# Patient Record
Sex: FEMALE | Race: ASIAN | Employment: OTHER | URBAN - METROPOLITAN AREA
[De-identification: names, ages, dates, MRNs, and addresses within clinical notes are randomized per-mention and may not be internally consistent; named-entity substitution may affect disease eponyms.]

---

## 2017-01-04 ENCOUNTER — TELEPHONE (OUTPATIENT)
Dept: INTERNAL MEDICINE CLINIC | Facility: CLINIC | Age: 47
End: 2017-01-04

## 2017-01-09 RX ORDER — INSULIN LISPRO 100 [IU]/ML
INJECTION, SOLUTION INTRAVENOUS; SUBCUTANEOUS
Qty: 15 ML | Refills: 5 | Status: SHIPPED | OUTPATIENT
Start: 2017-01-09 | End: 2017-07-14

## 2017-01-09 NOTE — TELEPHONE ENCOUNTER
LOV 9/26/16. Per [facility] [place] protocol ok to refill x 6 months.

## 2017-01-10 ENCOUNTER — APPOINTMENT (OUTPATIENT)
Dept: LAB | Age: 47
End: 2017-01-10
Attending: INTERNAL MEDICINE
Payer: MEDICAID

## 2017-01-10 ENCOUNTER — OFFICE VISIT (OUTPATIENT)
Dept: PODIATRY CLINIC | Facility: CLINIC | Age: 47
End: 2017-01-10

## 2017-01-10 DIAGNOSIS — M77.32 CALCANEAL SPUR OF FOOT, LEFT: Primary | ICD-10-CM

## 2017-01-10 PROCEDURE — 99213 OFFICE O/P EST LOW 20 MIN: CPT

## 2017-01-10 PROCEDURE — 85060 BLOOD SMEAR INTERPRETATION: CPT | Performed by: INTERNAL MEDICINE

## 2017-01-10 PROCEDURE — 80053 COMPREHEN METABOLIC PANEL: CPT | Performed by: INTERNAL MEDICINE

## 2017-01-10 PROCEDURE — 85025 COMPLETE CBC W/AUTO DIFF WBC: CPT | Performed by: INTERNAL MEDICINE

## 2017-01-10 PROCEDURE — 80061 LIPID PANEL: CPT | Performed by: INTERNAL MEDICINE

## 2017-01-10 PROCEDURE — 36415 COLL VENOUS BLD VENIPUNCTURE: CPT | Performed by: INTERNAL MEDICINE

## 2017-01-10 NOTE — PROGRESS NOTES
HPI:    Patient ID: Jonn Sanchez is a 48year old female. Foot Pain   The pain is present in the left foot and right foot. This is a chronic problem. There has been no history of extremity trauma. The problem occurs intermittently.  The problem has been Prescriptions:  HUMALOG KWIKPEN 100 UNIT/ML Subcutaneous Solution Pen-injector ADMINISTER 15 UNITS UNDER THE SKIN THREE TIMES DAILY WITH MEALS Disp: 15 mL Rfl: 5   Linagliptin (TRADJENTA) 5 MG Oral Tab Take 5 mg by mouth daily.  Disp: 90 tablet Rfl: 0   hyd 325 mg by mouth daily. Disp:  Rfl:    TRUEPLUS LANCETS 33G Does not apply Misc TEST BLOOD GLUCOSE LEVEL FOUR TIMES DAILY Disp: 200 each Rfl: 5   Metoprolol Succinate ER (TOPROL XL) 100 MG Oral Tablet 24 Hr Take 1 tablet (100 mg total) by mouth daily.  Disp: wants. In the meantime, take NSAIDs - aleve or motrin with food. No orders of the defined types were placed in this encounter.        Meds This Visit:  No prescriptions requested or ordered in this encounter    Imaging & Referrals:  None       [facility]#[id number]

## 2017-01-17 ENCOUNTER — TELEPHONE (OUTPATIENT)
Dept: INTERNAL MEDICINE CLINIC | Facility: CLINIC | Age: 47
End: 2017-01-17

## 2017-01-17 RX ORDER — DIPHENHYDRAMINE HCL 25 MG
1 TABLET ORAL AS NEEDED
Qty: 1 KIT | Refills: 0 | Status: SHIPPED | OUTPATIENT
Start: 2017-01-17 | End: 2017-05-10

## 2017-01-17 NOTE — TELEPHONE ENCOUNTER
Rx approved per IM protocol. Chart reviewed.  Supplies sent to the pharmacy    Diabetes Medications  Protocol Criteria:  · Appointment scheduled in the past 6 months or the next 3 months  · A1C < 7.5 in the past 6 months  · Creatinine in the past 12 months

## 2017-01-17 NOTE — TELEPHONE ENCOUNTER
Per sister of pt,  [email or web address] would like pt to be in a True Metric machine and so pt needs a prescription of strips for that machine and also the Lancet. Per sister of pt,  [email or web address] would like pt to have it asap.   Pls send to pt pharmacy on file verified

## 2017-01-20 RX ORDER — GABAPENTIN 300 MG/1
CAPSULE ORAL
Qty: 90 CAPSULE | Refills: 0 | Status: SHIPPED | OUTPATIENT
Start: 2017-01-20 | End: 2017-02-09

## 2017-01-20 NOTE — TELEPHONE ENCOUNTER
Pharmacy following up on medication. Pharmacy state that pt is home bound and they have a delivery service and are trying to get medication to her today. .. please advise

## 2017-01-28 ENCOUNTER — TELEPHONE (OUTPATIENT)
Dept: INTERNAL MEDICINE CLINIC | Facility: CLINIC | Age: 47
End: 2017-01-28

## 2017-01-28 NOTE — TELEPHONE ENCOUNTER
[facility] requesting RX for Atorvastatin     Pharmacy states this was faxed on 01/24 and called and in and was advised nothing in system   Re faxed it on 1/25 and 1/26 the electronic request was not going thru   Patient has been out of medi

## 2017-01-30 RX ORDER — ATORVASTATIN CALCIUM 40 MG/1
40 TABLET, FILM COATED ORAL NIGHTLY
Qty: 90 TABLET | Refills: 0 | Status: SHIPPED | OUTPATIENT
Start: 2017-01-30 | End: 2017-02-13

## 2017-01-30 NOTE — TELEPHONE ENCOUNTER
Pt states she was told her Cholesterol was high on her Last blood test, Pt states she has been out of this med since last month. Advised per Sourav Hernandez She will send 90 day supply and to please keep her Scheduled apt on 2/2017.

## 2017-01-30 NOTE — TELEPHONE ENCOUNTER
Cholesterol Medications  Protocol Criteria:  · Appointment scheduled in the past 12 months or in the next 3 months  · ALT & LDL on file in the past 12 months  · ALT result < 80  · LDL result <130   Recent Visits       Provider Department Primary Dx    1 mo

## 2017-01-30 NOTE — TELEPHONE ENCOUNTER
[facility] pharmacist stated that they had the meter and needles and will get them ready for patient. Pharmacist indicated that did not know why it was not dispensed.      Left message on voicemail to that [facility] had the prescriptions from 1/17/1

## 2017-02-02 ENCOUNTER — OFFICE VISIT (OUTPATIENT)
Dept: ENDOCRINOLOGY CLINIC | Facility: CLINIC | Age: 47
End: 2017-02-02

## 2017-02-02 VITALS
SYSTOLIC BLOOD PRESSURE: 126 MMHG | HEART RATE: 84 BPM | DIASTOLIC BLOOD PRESSURE: 83 MMHG | HEIGHT: 62 IN | WEIGHT: 194 LBS | BODY MASS INDEX: 35.7 KG/M2

## 2017-02-02 DIAGNOSIS — Z79.4 UNCONTROLLED TYPE 2 DIABETES MELLITUS WITH COMPLICATION, WITH LONG-TERM CURRENT USE OF INSULIN (HCC): Primary | ICD-10-CM

## 2017-02-02 DIAGNOSIS — E11.8 UNCONTROLLED TYPE 2 DIABETES MELLITUS WITH COMPLICATION, WITH LONG-TERM CURRENT USE OF INSULIN (HCC): Primary | ICD-10-CM

## 2017-02-02 DIAGNOSIS — E11.65 UNCONTROLLED TYPE 2 DIABETES MELLITUS WITH COMPLICATION, WITH LONG-TERM CURRENT USE OF INSULIN (HCC): Primary | ICD-10-CM

## 2017-02-02 LAB
CARTRIDGE LOT#: NORMAL NUMERIC
GLUCOSE BLOOD: 168
HEMOGLOBIN A1C: 8 % (ref 4.3–5.6)
TEST STRIP LOT #: NORMAL NUMERIC

## 2017-02-02 PROCEDURE — 36416 COLLJ CAPILLARY BLOOD SPEC: CPT | Performed by: INTERNAL MEDICINE

## 2017-02-02 PROCEDURE — 82962 GLUCOSE BLOOD TEST: CPT | Performed by: INTERNAL MEDICINE

## 2017-02-02 PROCEDURE — 99212 OFFICE O/P EST SF 10 MIN: CPT | Performed by: INTERNAL MEDICINE

## 2017-02-02 PROCEDURE — 83036 HEMOGLOBIN GLYCOSYLATED A1C: CPT | Performed by: INTERNAL MEDICINE

## 2017-02-02 PROCEDURE — 99213 OFFICE O/P EST LOW 20 MIN: CPT | Performed by: INTERNAL MEDICINE

## 2017-02-02 NOTE — PROGRESS NOTES
Name: Rosy Lebron  Date: 2/2/2017    Referring Physician: No ref. provider found    HISTORY OF PRESENT ILLNESS   Rosy Lebron is a 48year old female who presents for diabetes mellitus, diagnosed over 10 years ago.   She was seen during hospitalization in HUMALOG KWIKPEN 100 UNIT/ML Subcutaneous Solution Pen-injector, ADMINISTER 15 UNITS UNDER THE SKIN THREE TIMES DAILY WITH MEALS, Disp: 15 mL, Rfl: 5  •  Linagliptin (TRADJENTA) 5 MG Oral Tab, Take 5 mg by mouth daily. , Disp: 90 tablet, Rfl: 0  •  hydrochlo 30 tablet, Rfl: 6  •  Glucose Blood (TRUETRACK TEST) In Vitro Strip, Test blood glucose level 4 times per day, Disp: 200 each, Rfl: 3  •  Blood Glucose Monitoring Suppl (TRUETRACK BLOOD GLUCOSE) Does not apply Device, Use as directed, Disp: 1 Device, Rfl: EXAM  /83 mmHg  Pulse 84  Ht 5' 2\" (1.575 m)  Wt 194 lb (87.998 kg)  BMI 35.47 kg/m2    General Appearance:  alert, well developed, in no acute distress  Eyes:  normal conjunctivae, sclera. , normal sclera and normal pupils  Ears/Nose/Mouth/Throat/Ne

## 2017-02-02 NOTE — PATIENT INSTRUCTIONS
Levemir 50 units SQ daily    Humalog  INSULIN SLIDING SCALE  Base Values  Breakfast: 15  Lunch: 15  Dinner: 15  Ranges:  80-99: -2  100-119: 0  120-139: 0  140-159: 1  160-179: 1  180-199: 2  200-219: 2  220-239: 3  240-259: 3  260-279: 4  280-299: 4  300-

## 2017-02-06 NOTE — TELEPHONE ENCOUNTER
Per pt, she is out of med Gabapentin, told pt that an rx was sent to her pharmacy last 01/20/2017 for 90 days supplies but pt stts that EV increased her dose to twice a day NOT once a day. Pls advise.       Current Outpatient Prescriptions:        Farrukh Soni

## 2017-02-10 RX ORDER — GABAPENTIN 300 MG/1
CAPSULE ORAL
Qty: 90 CAPSULE | Refills: 0 | Status: SHIPPED | OUTPATIENT
Start: 2017-02-10 | End: 2017-02-13

## 2017-02-13 ENCOUNTER — LAB ENCOUNTER (OUTPATIENT)
Dept: LAB | Age: 47
End: 2017-02-13
Attending: INTERNAL MEDICINE
Payer: MEDICAID

## 2017-02-13 ENCOUNTER — OFFICE VISIT (OUTPATIENT)
Dept: INTERNAL MEDICINE CLINIC | Facility: CLINIC | Age: 47
End: 2017-02-13

## 2017-02-13 VITALS
WEIGHT: 192.63 LBS | SYSTOLIC BLOOD PRESSURE: 136 MMHG | RESPIRATION RATE: 18 BRPM | HEIGHT: 62 IN | BODY MASS INDEX: 35.45 KG/M2 | DIASTOLIC BLOOD PRESSURE: 76 MMHG | HEART RATE: 80 BPM

## 2017-02-13 DIAGNOSIS — E11.8 TYPE 2 DIABETES MELLITUS WITH COMPLICATION, WITH LONG-TERM CURRENT USE OF INSULIN (HCC): ICD-10-CM

## 2017-02-13 DIAGNOSIS — Z98.890 HISTORY OF CRANIOTOMY: ICD-10-CM

## 2017-02-13 DIAGNOSIS — Z79.4 TYPE 2 DIABETES MELLITUS WITH COMPLICATION, WITH LONG-TERM CURRENT USE OF INSULIN (HCC): ICD-10-CM

## 2017-02-13 DIAGNOSIS — D64.9 ANEMIA, UNSPECIFIED TYPE: ICD-10-CM

## 2017-02-13 DIAGNOSIS — G44.52 NEW DAILY PERSISTENT HEADACHE: Primary | ICD-10-CM

## 2017-02-13 DIAGNOSIS — I10 ESSENTIAL HYPERTENSION WITH GOAL BLOOD PRESSURE LESS THAN 140/90: ICD-10-CM

## 2017-02-13 LAB
BASOPHILS # BLD: 0.1 K/UL (ref 0–0.2)
BASOPHILS NFR BLD: 0 %
EOSINOPHIL # BLD: 0.3 K/UL (ref 0–0.7)
EOSINOPHIL NFR BLD: 3 %
ERYTHROCYTE [DISTWIDTH] IN BLOOD BY AUTOMATED COUNT: 19.2 % (ref 11–15)
FERRITIN SERPL IA-MCNC: 8 NG/ML (ref 11–307)
HCT VFR BLD AUTO: 33 % (ref 35–48)
HGB BLD-MCNC: 9.9 G/DL (ref 12–16)
IRON SATN MFR SERPL: 6 % (ref 15–50)
IRON SERPL-MCNC: 25 MCG/DL (ref 28–170)
LYMPHOCYTES # BLD: 2 K/UL (ref 1–4)
LYMPHOCYTES NFR BLD: 18 %
MCH RBC QN AUTO: 19.8 PG (ref 27–32)
MCHC RBC AUTO-ENTMCNC: 29.9 G/DL (ref 32–37)
MCV RBC AUTO: 66.1 FL (ref 80–100)
MONOCYTES # BLD: 0.5 K/UL (ref 0–1)
MONOCYTES NFR BLD: 5 %
NEUTROPHILS # BLD AUTO: 8.4 K/UL (ref 1.8–7.7)
NEUTROPHILS NFR BLD: 74 %
PLATELET # BLD AUTO: 299 K/UL (ref 140–400)
PMV BLD AUTO: 10.1 FL (ref 7.4–10.3)
RBC # BLD AUTO: 5 M/UL (ref 3.7–5.4)
TIBC SERPL-MCNC: 426 MCG/DL (ref 228–428)
TRANSFERRIN SERPL-MCNC: 323 MG/DL (ref 192–382)
VIT B12 SERPL-MCNC: 376 PG/ML (ref 181–914)
WBC # BLD AUTO: 11.4 K/UL (ref 4–11)

## 2017-02-13 PROCEDURE — 83540 ASSAY OF IRON: CPT

## 2017-02-13 PROCEDURE — 82607 VITAMIN B-12: CPT

## 2017-02-13 PROCEDURE — 82728 ASSAY OF FERRITIN: CPT

## 2017-02-13 PROCEDURE — 83021 HEMOGLOBIN CHROMOTOGRAPHY: CPT

## 2017-02-13 PROCEDURE — 99214 OFFICE O/P EST MOD 30 MIN: CPT | Performed by: INTERNAL MEDICINE

## 2017-02-13 PROCEDURE — 99212 OFFICE O/P EST SF 10 MIN: CPT | Performed by: INTERNAL MEDICINE

## 2017-02-13 PROCEDURE — 36415 COLL VENOUS BLD VENIPUNCTURE: CPT

## 2017-02-13 PROCEDURE — 84466 ASSAY OF TRANSFERRIN: CPT

## 2017-02-13 PROCEDURE — 85025 COMPLETE CBC W/AUTO DIFF WBC: CPT

## 2017-02-13 PROCEDURE — 83020 HEMOGLOBIN ELECTROPHORESIS: CPT

## 2017-02-13 RX ORDER — FERROUS SULFATE 325(65) MG
325 TABLET ORAL DAILY
Qty: 90 TABLET | Refills: 1 | Status: SHIPPED | OUTPATIENT
Start: 2017-02-13 | End: 2017-02-18

## 2017-02-13 RX ORDER — ACETAMINOPHEN 325 MG/1
TABLET ORAL
Qty: 360 TABLET | Refills: 0 | Status: SHIPPED | OUTPATIENT
Start: 2017-02-13 | End: 2021-04-19

## 2017-02-13 RX ORDER — DOCUSATE SODIUM 100 MG/1
100 CAPSULE, LIQUID FILLED ORAL 2 TIMES DAILY
Qty: 180 CAPSULE | Refills: 3 | Status: SHIPPED | OUTPATIENT
Start: 2017-02-13 | End: 2017-05-10

## 2017-02-13 RX ORDER — ATORVASTATIN CALCIUM 40 MG/1
40 TABLET, FILM COATED ORAL NIGHTLY
Qty: 90 TABLET | Refills: 0 | Status: SHIPPED | OUTPATIENT
Start: 2017-02-13 | End: 2017-05-10

## 2017-02-13 RX ORDER — LEVETIRACETAM 500 MG/1
500 TABLET ORAL 2 TIMES DAILY
Qty: 180 TABLET | Refills: 1 | Status: SHIPPED | OUTPATIENT
Start: 2017-02-13 | End: 2017-09-28

## 2017-02-13 RX ORDER — METOPROLOL SUCCINATE 100 MG/1
100 TABLET, EXTENDED RELEASE ORAL DAILY
Qty: 90 TABLET | Refills: 0 | Status: SHIPPED | OUTPATIENT
Start: 2017-02-13 | End: 2017-05-13

## 2017-02-13 RX ORDER — GABAPENTIN 300 MG/1
CAPSULE ORAL
Qty: 90 CAPSULE | Refills: 0 | Status: SHIPPED | OUTPATIENT
Start: 2017-02-13 | End: 2017-05-10

## 2017-02-13 RX ORDER — HYDROCHLOROTHIAZIDE 25 MG/1
25 TABLET ORAL
Qty: 90 TABLET | Refills: 0 | Status: SHIPPED | OUTPATIENT
Start: 2017-02-13 | End: 2017-06-03

## 2017-02-13 RX ORDER — LOSARTAN POTASSIUM 100 MG/1
100 TABLET ORAL
Qty: 90 TABLET | Refills: 0 | Status: SHIPPED | OUTPATIENT
Start: 2017-02-13 | End: 2017-04-08

## 2017-02-13 NOTE — PROGRESS NOTES
HPI:    Patient ID: Milan Jose is a 48year old female. HPI Comments: She has been getting bad headaches for the past few days. Headache   This is a recurrent problem. The current episode started in the past 7 days. The problem occurs daily.  The pa (two) times daily. Disp: 60 tablet Rfl: 2   Metoprolol Succinate ER 25 MG Oral Tablet 24 Hr Take 1 tablet (25 mg total) by mouth once daily.  Disp: 30 tablet Rfl: 6   Cholecalciferol (VITAMIN D) 2000 UNITS Oral Cap Take 1 capsule (2,000 Units total) by mout Normocephalic and atraumatic. Eyes: Conjunctivae and EOM are normal.   Cardiovascular: Normal rate, regular rhythm and normal heart sounds. Pulmonary/Chest: Effort normal and breath sounds normal. No respiratory distress.    Neurological: She is alert Tab; Take 1 tablet (325 mg total) by mouth daily. Dispense: 90 tablet; Refill: 1  - CBC With Differential With Platelet; Future  - Vitamin B12 [E]; Future  - Ferritin [E]; Future  - Iron And Tibc [E]; Future  - Hemoglobin Electrophoresis [E];  Future

## 2017-02-16 ENCOUNTER — TELEPHONE (OUTPATIENT)
Dept: FAMILY MEDICINE CLINIC | Facility: CLINIC | Age: 47
End: 2017-02-16

## 2017-02-16 DIAGNOSIS — Z98.890 HISTORY OF CRANIOTOMY: Primary | ICD-10-CM

## 2017-02-16 NOTE — TELEPHONE ENCOUNTER
Pt is calling requesting a refill on medication pt state that she is out of medication  Pt is also requesting to speak with a RN      Current Outpatient Prescriptions:  gabapentin 300 MG Oral Cap TAKE 1 CAPSULE(300 MG) BY MOUTH EVERY MORNING Disp: 90 capsu

## 2017-02-17 LAB
HGB A2 MFR BLD: 1.7 % (ref 1.5–3.5)
HGB F MFR BLD: 0 % (ref 0–2)
HGB PNL BLD ELPH: 96.9 % (ref 95.5–100)
HGB S MFR BLD: 0 %

## 2017-02-18 ENCOUNTER — TELEPHONE (OUTPATIENT)
Dept: INTERNAL MEDICINE CLINIC | Facility: CLINIC | Age: 47
End: 2017-02-18

## 2017-02-18 DIAGNOSIS — D64.9 ANEMIA, UNSPECIFIED TYPE: ICD-10-CM

## 2017-02-18 DIAGNOSIS — D50.9 IRON DEFICIENCY ANEMIA, UNSPECIFIED IRON DEFICIENCY ANEMIA TYPE: Primary | ICD-10-CM

## 2017-02-18 RX ORDER — FERROUS SULFATE 325(65) MG
325 TABLET ORAL DAILY
Qty: 90 TABLET | Refills: 3 | Status: SHIPPED | OUTPATIENT
Start: 2017-02-18 | End: 2017-07-07

## 2017-02-18 NOTE — TELEPHONE ENCOUNTER
Call pt. She is anemic and her iron is low. She should take iron and recheck her blood test in 6 months. I am going to refer her to the gastroenterologist to check her colon for possible source of the blood loss.   Sometimes it can be small polyp or raven

## 2017-02-22 RX ORDER — GABAPENTIN 300 MG/1
CAPSULE ORAL
Qty: 90 CAPSULE | Refills: 0 | Status: CANCELLED | OUTPATIENT
Start: 2017-02-22

## 2017-02-22 NOTE — TELEPHONE ENCOUNTER
Spoke with patient's sister who is on HIPPA  (identified name and ), results reviewed and agrees with plan. Referral ordered. Sister also asked that we refill all medication that is needed.    Instructed to have her pharmacy contact us with what is

## 2017-03-24 NOTE — TELEPHONE ENCOUNTER
Julio Back and Diabetic Educator from [employer] on behalf of patient. Coretta Liz if working/monitoring patient. Pt is going out of town and needs test strips. Pt did not meet protocol due to last hgba1c but is being followed by Dr. Diana Tejeda.

## 2017-03-27 RX ORDER — DOCUSATE SODIUM 100 MG/1
CAPSULE, LIQUID FILLED ORAL
Qty: 180 CAPSULE | Refills: 0 | OUTPATIENT
Start: 2017-03-27

## 2017-03-27 RX ORDER — DOCUSATE SODIUM 100 MG/1
CAPSULE, LIQUID FILLED ORAL
Qty: 180 CAPSULE | Refills: 0 | Status: SHIPPED | OUTPATIENT
Start: 2017-03-27 | End: 2017-04-08

## 2017-03-27 NOTE — TELEPHONE ENCOUNTER
Refill Protocol Appointment Criteria: Refilled per protocol    · Appointment scheduled in the past 12 months or in the next 3 months  Recent Visits       Provider Department Primary Dx    1 month ago Alessio Perdomo MD [facility], [facility], [address] [place] [address]

## 2017-04-06 NOTE — TELEPHONE ENCOUNTER
Refill request received for Losartan. Medication is currently prescribed by Dr. Jabari Partida. Forwarded to IM staff.

## 2017-04-08 ENCOUNTER — TELEPHONE (OUTPATIENT)
Dept: INTERNAL MEDICINE CLINIC | Facility: CLINIC | Age: 47
End: 2017-04-08

## 2017-04-08 DIAGNOSIS — I10 ESSENTIAL HYPERTENSION WITH GOAL BLOOD PRESSURE LESS THAN 140/90: Primary | ICD-10-CM

## 2017-04-08 RX ORDER — DOCUSATE SODIUM 100 MG/1
100 CAPSULE, LIQUID FILLED ORAL 2 TIMES DAILY
Qty: 180 CAPSULE | Refills: 0 | Status: SHIPPED | OUTPATIENT
Start: 2017-04-08 | End: 2017-05-10

## 2017-04-08 RX ORDER — LOSARTAN POTASSIUM 100 MG/1
100 TABLET ORAL
Qty: 90 TABLET | Refills: 0 | Status: SHIPPED | OUTPATIENT
Start: 2017-04-08 | End: 2017-09-28

## 2017-04-08 NOTE — TELEPHONE ENCOUNTER
Spoke to patient, she states that she has not received refill on docusate, I will re-fax to the pharmacy, she has not had bowel movement in 2 days, she is trying to strain and it is difficult, she has mild abdominal pain, I advised her to use Dulcolax supp

## 2017-04-10 RX ORDER — LOSARTAN POTASSIUM 100 MG/1
TABLET ORAL
Qty: 90 TABLET | Refills: 1 | OUTPATIENT
Start: 2017-04-10

## 2017-05-02 ENCOUNTER — OFFICE VISIT (OUTPATIENT)
Dept: GASTROENTEROLOGY | Facility: CLINIC | Age: 47
End: 2017-05-02

## 2017-05-02 ENCOUNTER — TELEPHONE (OUTPATIENT)
Dept: ENDOCRINOLOGY CLINIC | Facility: CLINIC | Age: 47
End: 2017-05-02

## 2017-05-02 ENCOUNTER — TELEPHONE (OUTPATIENT)
Dept: GASTROENTEROLOGY | Facility: CLINIC | Age: 47
End: 2017-05-02

## 2017-05-02 VITALS
DIASTOLIC BLOOD PRESSURE: 74 MMHG | SYSTOLIC BLOOD PRESSURE: 131 MMHG | HEIGHT: 61 IN | BODY MASS INDEX: 36.32 KG/M2 | HEART RATE: 71 BPM | WEIGHT: 192.38 LBS

## 2017-05-02 DIAGNOSIS — D50.0 IRON DEFICIENCY ANEMIA DUE TO CHRONIC BLOOD LOSS: Primary | ICD-10-CM

## 2017-05-02 DIAGNOSIS — N92.1 MENOMETRORRHAGIA: ICD-10-CM

## 2017-05-02 DIAGNOSIS — Z80.0 FAMILY HISTORY OF COLON CANCER: ICD-10-CM

## 2017-05-02 PROCEDURE — 99244 OFF/OP CNSLTJ NEW/EST MOD 40: CPT | Performed by: INTERNAL MEDICINE

## 2017-05-02 PROCEDURE — 99212 OFFICE O/P EST SF 10 MIN: CPT | Performed by: INTERNAL MEDICINE

## 2017-05-02 RX ORDER — INSULIN DETEMIR 100 [IU]/ML
100 INJECTION, SOLUTION SUBCUTANEOUS DAILY
COMMUNITY
Start: 2017-03-20 | End: 2017-06-02 | Stop reason: ALTCHOICE

## 2017-05-02 NOTE — PROGRESS NOTES
HPI:    Patient ID: Sirisha Willard is a 50year old female. HPI    Review of Systems   Constitutional: Positive for unexpected weight change. Negative for fever, chills, diaphoresis, activity change, appetite change and fatigue.    HENT: Negative for conge 500 MG Oral Tab Take 1 tablet (500 mg total) by mouth 2 (two) times daily. Disp: 180 tablet Rfl: 1   Metoprolol Succinate  MG Oral Tablet 24 Hr Take 1 tablet (100 mg total) by mouth daily.  Disp: 90 tablet Rfl: 0   Atorvastatin Calcium 40 MG Oral Tab MG Oral Tab Take 325 mg by mouth daily. Disp:  Rfl:    LEVEMIR FLEXTOUCH 100 UNIT/ML Subcutaneous Solution Pen-injector Inject 100 Units as directed daily.  54 units once daily before bedtime Disp:  Rfl:    docusate sodium (DOK) 100 MG Oral Cap Take 1 capsu no edema or tenderness. Lymphadenopathy:     She has no cervical adenopathy. Neurological: She is alert and oriented to person, place, and time. Skin: Skin is warm and dry. No rash noted. She is not diaphoretic. No erythema. No pallor.    Psychiatric:

## 2017-05-02 NOTE — PATIENT INSTRUCTIONS
1.  Schedule anappointment with gynecology to discuss your heavy menstrual cycle. This may be the cause of your iron deficiency anemia. 2.  Schedule colonoscopy and EGD with Colyte prep and MAC anesthesia.   Your diabetic medications will need to be adjust

## 2017-05-02 NOTE — H&P
History of present illness: This is a 40-year-old female referred by Dr. Sherin Leung and DR. Cacerse as an evaluation for iron deficiency anemia. The patient has had intermittent heavy menstrual bleeding on and off for a years.   She does not have a gynec Colyte preparation, and MAC anesthesia. The rationale, the risks, benefits, the alternatives, and the miss rate for colonoscopy and EGD have been explained to the patient in detail she is agreeable to proceed.     She should get the lab tests that have Art

## 2017-05-02 NOTE — TELEPHONE ENCOUNTER
Fax received from [place] in [facility] requesting Levemir prescription be changed to lantus due to insurance coverage. Per LOV note patient to continue with lantus 50 units nightly.  Per [facility] protocol OK to substitute prescriptions for levemir and lantus u

## 2017-05-03 ENCOUNTER — TELEPHONE (OUTPATIENT)
Dept: GASTROENTEROLOGY | Facility: CLINIC | Age: 47
End: 2017-05-03

## 2017-05-03 NOTE — TELEPHONE ENCOUNTER
Scheduled for:  Colonoscopy [address] and EGD [address]  Provider Name:  Dr. Mcmullen Busing  Date:  6/22/17  Location:  [facility]  Sedation:  MAC  Time:  0800 (pt is aware to arrive at 0700)  Prep:  Colyte  Meds/Allergies Reconciled?:  Yes  Diagnosis with codes:  Iron deficient a

## 2017-05-03 NOTE — TELEPHONE ENCOUNTER
Dr. Anam Sands patient is schedule for a colonoscopy/EGD (see below), please advise on diabetic orders. Thank you.

## 2017-05-04 NOTE — TELEPHONE ENCOUNTER
I spoke with this patients sister to inform her of Dr. Eduar Gallagher DM orders. She verbally understood and also wrote the instructions down. I will mail a new instructions to her today with these orders.

## 2017-05-04 NOTE — TELEPHONE ENCOUNTER
Please hold humalog, metformin and jardiance on the day of prep. In the morning before colonoscopy decrease levemir to 30 units and no other diabetic medication in the morning before procedure.

## 2017-05-05 ENCOUNTER — TELEPHONE (OUTPATIENT)
Dept: FAMILY MEDICINE CLINIC | Facility: CLINIC | Age: 47
End: 2017-05-05

## 2017-05-05 DIAGNOSIS — I10 ESSENTIAL HYPERTENSION WITH GOAL BLOOD PRESSURE LESS THAN 140/90: Primary | ICD-10-CM

## 2017-05-05 NOTE — TELEPHONE ENCOUNTER
[facility] calling for a refill request for med below  Metoprolol Succinate  MG Oral Tablet 24 Hr 90 tablet 0 2/13/2017       Sig :  Take 1 tablet (100 mg total) by mouth daily.       Route:   Oral       Order #:   [id number]

## 2017-05-10 ENCOUNTER — OFFICE VISIT (OUTPATIENT)
Dept: INTERNAL MEDICINE CLINIC | Facility: CLINIC | Age: 47
End: 2017-05-10

## 2017-05-10 VITALS
TEMPERATURE: 98 F | DIASTOLIC BLOOD PRESSURE: 78 MMHG | RESPIRATION RATE: 20 BRPM | HEIGHT: 61 IN | WEIGHT: 189 LBS | SYSTOLIC BLOOD PRESSURE: 119 MMHG | HEART RATE: 74 BPM | BODY MASS INDEX: 35.68 KG/M2

## 2017-05-10 DIAGNOSIS — N93.9 MENSTRUAL BLEEDING PROBLEM: ICD-10-CM

## 2017-05-10 DIAGNOSIS — Z79.4 TYPE 2 DIABETES MELLITUS WITH COMPLICATION, WITH LONG-TERM CURRENT USE OF INSULIN (HCC): Primary | ICD-10-CM

## 2017-05-10 DIAGNOSIS — Z12.31 SCREENING MAMMOGRAM, ENCOUNTER FOR: ICD-10-CM

## 2017-05-10 DIAGNOSIS — Z98.890 HISTORY OF CRANIOTOMY: ICD-10-CM

## 2017-05-10 DIAGNOSIS — E11.8 TYPE 2 DIABETES MELLITUS WITH COMPLICATION, WITH LONG-TERM CURRENT USE OF INSULIN (HCC): Primary | ICD-10-CM

## 2017-05-10 PROCEDURE — 99214 OFFICE O/P EST MOD 30 MIN: CPT | Performed by: INTERNAL MEDICINE

## 2017-05-10 PROCEDURE — 99212 OFFICE O/P EST SF 10 MIN: CPT | Performed by: INTERNAL MEDICINE

## 2017-05-10 RX ORDER — DIPHENHYDRAMINE HCL 25 MG
1 TABLET ORAL AS NEEDED
Qty: 1 KIT | Refills: 0 | Status: SHIPPED | OUTPATIENT
Start: 2017-05-10 | End: 2018-04-10

## 2017-05-10 RX ORDER — DOCUSATE SODIUM 100 MG/1
100 CAPSULE, LIQUID FILLED ORAL 2 TIMES DAILY
Qty: 180 CAPSULE | Refills: 3 | Status: SHIPPED | OUTPATIENT
Start: 2017-05-10 | End: 2017-09-25

## 2017-05-10 RX ORDER — ATORVASTATIN CALCIUM 40 MG/1
40 TABLET, FILM COATED ORAL NIGHTLY
Qty: 90 TABLET | Refills: 3 | Status: SHIPPED | OUTPATIENT
Start: 2017-05-10 | End: 2017-09-28

## 2017-05-10 RX ORDER — GABAPENTIN 300 MG/1
CAPSULE ORAL
Qty: 90 CAPSULE | Refills: 3 | Status: SHIPPED | OUTPATIENT
Start: 2017-05-10 | End: 2017-06-27

## 2017-05-10 NOTE — PROGRESS NOTES
HPI:    Patient ID: Maria L Camejo is a 50year old female. Diabetes  She presents for her follow-up diabetic visit. Her disease course has been improving.  Pertinent negatives for hypoglycemia include no confusion, dizziness, headaches, nervousness/anxiou Blood Glucose Monitoring Suppl (TRUE METRIX AIR GLUCOSE METER) w/Device Does not apply Kit 1 kit by Does not apply route as needed.  Use as directed Disp: 1 kit Rfl: 0   Glucose Blood (TRUE METRIX BLOOD GLUCOSE TEST) In Vitro Strip Test blood sugar three Does not apply Misc TRUE TRACK LANCETS, OR BRAND AS APPROVED BY INSURANCE. TO BE USED 4 TIMES DAILY Disp: 200 each Rfl: 11   aspirin 325 MG Oral Tab Take 325 mg by mouth daily.  Disp:  Rfl:    Metoprolol Succinate  MG Oral Tablet 24 Hr Take 1 tablet ( monofilament/sensation of both feet is normal.  Pulsation pedal pulse exam of both lower legs/feet is normal as well. Skin: Skin is warm and dry. Psychiatric: She has a normal mood and affect. Her behavior is normal.   Nursing note and vitals reviewed. GLUCOSE TEST) In Vitro Strip 300 strip 11      Sig: Test blood sugar three times daily.            Imaging & Referrals:  OBG - INTERNAL  PODIATRY - EXTERNAL  OPHTHALMOLOGY - INTERNAL  US PELVIS (TRANSABDOMINAL AND TRANSVAGINAL) (CPT=76856/05428)  STEPHEN SCREEN

## 2017-05-11 ENCOUNTER — LAB ENCOUNTER (OUTPATIENT)
Dept: LAB | Age: 47
End: 2017-05-11
Attending: INTERNAL MEDICINE
Payer: MEDICAID

## 2017-05-11 DIAGNOSIS — D50.9 IRON DEFICIENCY ANEMIA, UNSPECIFIED IRON DEFICIENCY ANEMIA TYPE: ICD-10-CM

## 2017-05-11 DIAGNOSIS — Z79.4 TYPE 2 DIABETES MELLITUS WITH COMPLICATION, WITH LONG-TERM CURRENT USE OF INSULIN (HCC): ICD-10-CM

## 2017-05-11 DIAGNOSIS — E11.8 TYPE 2 DIABETES MELLITUS WITH COMPLICATION, WITH LONG-TERM CURRENT USE OF INSULIN (HCC): ICD-10-CM

## 2017-05-11 PROCEDURE — 80061 LIPID PANEL: CPT

## 2017-05-11 PROCEDURE — 85025 COMPLETE CBC W/AUTO DIFF WBC: CPT

## 2017-05-11 PROCEDURE — 84443 ASSAY THYROID STIM HORMONE: CPT

## 2017-05-11 PROCEDURE — 82728 ASSAY OF FERRITIN: CPT

## 2017-05-11 PROCEDURE — 83540 ASSAY OF IRON: CPT

## 2017-05-11 PROCEDURE — 80053 COMPREHEN METABOLIC PANEL: CPT

## 2017-05-11 PROCEDURE — 83036 HEMOGLOBIN GLYCOSYLATED A1C: CPT

## 2017-05-11 PROCEDURE — 82043 UR ALBUMIN QUANTITATIVE: CPT

## 2017-05-11 PROCEDURE — 36415 COLL VENOUS BLD VENIPUNCTURE: CPT

## 2017-05-11 PROCEDURE — 82570 ASSAY OF URINE CREATININE: CPT

## 2017-05-11 PROCEDURE — 84466 ASSAY OF TRANSFERRIN: CPT

## 2017-05-12 RX ORDER — OMEPRAZOLE 40 MG/1
CAPSULE, DELAYED RELEASE ORAL
Qty: 90 CAPSULE | Refills: 0 | Status: ON HOLD | OUTPATIENT
Start: 2017-05-12 | End: 2017-06-29

## 2017-05-12 NOTE — TELEPHONE ENCOUNTER
Refill Protocol Appointment Criteria  · Appointment scheduled in the past 12 months or in the next 3 months  Recent Visits       Provider Department Primary Dx    2 months ago Chip Singh MD [facility], [facility], [address], [place] New daily persistent hea

## 2017-05-13 RX ORDER — METOPROLOL SUCCINATE 100 MG/1
100 TABLET, EXTENDED RELEASE ORAL DAILY
Qty: 90 TABLET | Refills: 0 | Status: ON HOLD | OUTPATIENT
Start: 2017-05-13 | End: 2017-06-29

## 2017-05-13 NOTE — TELEPHONE ENCOUNTER
Hypertensive Medications  Protocol Criteria:  · Appointment scheduled in the past 6 months or in the next 3 months  · BMP or CMP in the past 12 months  · Creatinine result < 2    Future Appointments       Provider Department Appt Notes    In 2 weeks [place],

## 2017-05-15 RX ORDER — OMEPRAZOLE 40 MG/1
CAPSULE, DELAYED RELEASE ORAL
Qty: 30 CAPSULE | Refills: 0 | OUTPATIENT
Start: 2017-05-15

## 2017-05-16 ENCOUNTER — TELEPHONE (OUTPATIENT)
Dept: INTERNAL MEDICINE CLINIC | Facility: CLINIC | Age: 47
End: 2017-05-16

## 2017-05-16 NOTE — TELEPHONE ENCOUNTER
No PA required, spoke with [employer]	[place] and diabetic supplies are going through patient insurance.

## 2017-05-16 NOTE — TELEPHONE ENCOUNTER
Pt's sister maricruz HENNING is needed for:    Blood Glucose Monitoring Suppl (TRUE METRIX AIR GLUCOSE METER) w/Device Does not apply Kit

## 2017-05-17 ENCOUNTER — TELEPHONE (OUTPATIENT)
Dept: INTERNAL MEDICINE CLINIC | Facility: CLINIC | Age: 47
End: 2017-05-17

## 2017-05-17 NOTE — TELEPHONE ENCOUNTER
NATASHA pt has been following with EV. Seen 5/10 and follow up scheduled 08/16/17. EV FYI.   May transfer to  today or [phone]

## 2017-05-17 NOTE — TELEPHONE ENCOUNTER
----- Message from Paola Fall MD sent at 5/13/2017  3:01 PM CDT -----  Send letter: Your anemia improved some, but your iron is still low. I recommend that you continue the iron and schedule a follow-up appointment.

## 2017-05-18 ENCOUNTER — TELEPHONE (OUTPATIENT)
Dept: INTERNAL MEDICINE CLINIC | Facility: CLINIC | Age: 47
End: 2017-05-18

## 2017-05-24 RX ORDER — METOPROLOL SUCCINATE 25 MG/1
TABLET, EXTENDED RELEASE ORAL
Qty: 30 TABLET | Refills: 0 | OUTPATIENT
Start: 2017-05-24

## 2017-05-24 NOTE — TELEPHONE ENCOUNTER
Hypertensive Medications: addressed in an earlier encounter  Protocol Criteria:  · Appointment scheduled in the past 6 months or in the next 3 months  · BMP or CMP in the past 12 months  · Creatinine result < 2  Recent Visits       Provider Department Prim

## 2017-05-29 RX ORDER — PEN NEEDLE, DIABETIC 32GX 5/32"
NEEDLE, DISPOSABLE MISCELLANEOUS
Qty: 100 EACH | Refills: 3 | Status: SHIPPED | OUTPATIENT
Start: 2017-05-29 | End: 2017-09-25

## 2017-05-30 ENCOUNTER — TELEPHONE (OUTPATIENT)
Dept: INTERNAL MEDICINE CLINIC | Facility: CLINIC | Age: 47
End: 2017-05-30

## 2017-05-30 DIAGNOSIS — I10 ESSENTIAL HYPERTENSION WITH GOAL BLOOD PRESSURE LESS THAN 140/90: Primary | ICD-10-CM

## 2017-05-30 NOTE — TELEPHONE ENCOUNTER
Chart reviewed. Refills sent per Triage Dept protocol.    Diabetes Medications  Protocol Criteria:  · Appointment scheduled in the past 6 months or the next 3 months  · A1C < 7.5 in the past 6 months  · Creatinine in the past 12 months  · Creatinine result

## 2017-06-01 ENCOUNTER — TELEPHONE (OUTPATIENT)
Dept: INTERNAL MEDICINE CLINIC | Facility: CLINIC | Age: 47
End: 2017-06-01

## 2017-06-01 NOTE — TELEPHONE ENCOUNTER
On last OV, pt stated that she is taking Metoprolol 25 mg one a day as well as Metoprolol 100 mg one a day. Should pt be taking both strength? Please advise. Thank you.

## 2017-06-01 NOTE — TELEPHONE ENCOUNTER
Pt calling in for a refill rx on med below  Linagliptin (TRADJENTA) 5 MG Oral Tab 90 tablet 0 2/13/2017       Sig :  Take 5 mg by mouth daily.       Route:   Oral       Order #:   [id number]

## 2017-06-02 ENCOUNTER — OFFICE VISIT (OUTPATIENT)
Dept: ENDOCRINOLOGY CLINIC | Facility: CLINIC | Age: 47
End: 2017-06-02

## 2017-06-02 VITALS
WEIGHT: 194 LBS | HEART RATE: 84 BPM | SYSTOLIC BLOOD PRESSURE: 154 MMHG | BODY MASS INDEX: 35.7 KG/M2 | HEIGHT: 62 IN | DIASTOLIC BLOOD PRESSURE: 96 MMHG

## 2017-06-02 DIAGNOSIS — E11.65 UNCONTROLLED TYPE 2 DIABETES MELLITUS WITH HYPERGLYCEMIA, WITH LONG-TERM CURRENT USE OF INSULIN (HCC): Primary | ICD-10-CM

## 2017-06-02 DIAGNOSIS — Z79.4 UNCONTROLLED TYPE 2 DIABETES MELLITUS WITH HYPERGLYCEMIA, WITH LONG-TERM CURRENT USE OF INSULIN (HCC): Primary | ICD-10-CM

## 2017-06-02 PROCEDURE — 36416 COLLJ CAPILLARY BLOOD SPEC: CPT | Performed by: INTERNAL MEDICINE

## 2017-06-02 PROCEDURE — 99212 OFFICE O/P EST SF 10 MIN: CPT | Performed by: INTERNAL MEDICINE

## 2017-06-02 PROCEDURE — 99214 OFFICE O/P EST MOD 30 MIN: CPT | Performed by: INTERNAL MEDICINE

## 2017-06-02 PROCEDURE — 82962 GLUCOSE BLOOD TEST: CPT | Performed by: INTERNAL MEDICINE

## 2017-06-02 NOTE — PROGRESS NOTES
Name: Yaron Acuña  Date: 6/2/2017    Referring Physician: No ref. provider found    HISTORY OF PRESENT ILLNESS   Yaron Acuña is a 50year old female who presents for diabetes mellitus, diagnosed over 10 years ago.   She was seen during hospitalization in kit by Does not apply route as needed. Use as directed, Disp: 1 kit, Rfl: 0  •  Glucose Blood (TRUE METRIX BLOOD GLUCOSE TEST) In Vitro Strip, Test blood sugar three times daily. , Disp: 300 strip, Rfl: 11  •  Insulin Glargine (LANTUS SOLOSTAR) 100 UNIT/ML gabapentin 300 MG Oral Cap, TAKE 1 CAPSULE(300 MG) BY MOUTH EVERY MORNING, Disp: 90 capsule, Rfl: 3  •  Linagliptin (TRADJENTA) 5 MG Oral Tab, Take 5 mg by mouth daily. , Disp: 90 tablet, Rfl: 0     Allergies:     Reglan [Metoclopram*        Comment:Sensiti tenderness  Respiratory:  clear to auscultation bilaterally  Cardiovascular:  regular rate, rhythm, , no murmurs, S3 or S4  Gastrointestinal:  normal bowel sounds and no palpable masses in abdomen, organomegaly or tenderness   Musculoskeletal:  normal musc

## 2017-06-02 NOTE — PATIENT INSTRUCTIONS
Levemir 50 units SQ daily    Continue tradjenta and metformin     Novolog  INSULIN SLIDING SCALE  Base Values  Breakfast: 15  Lunch: 15  Dinner: 18  Ranges:  80-99: -2  100-119: 0  120-139: 0  140-159: 1  160-179: 1  180-199: 2  200-219: 2  220-239: 3  240

## 2017-06-03 RX ORDER — HYDROCHLOROTHIAZIDE 25 MG/1
25 TABLET ORAL
Qty: 90 TABLET | Refills: 0 | Status: ON HOLD | OUTPATIENT
Start: 2017-06-03 | End: 2017-06-29

## 2017-06-06 RX ORDER — METOPROLOL SUCCINATE 25 MG/1
TABLET, EXTENDED RELEASE ORAL
Qty: 30 TABLET | Refills: 0 | OUTPATIENT
Start: 2017-06-06

## 2017-06-06 NOTE — TELEPHONE ENCOUNTER
rx sent 5/13/17  Hypertensive Medications  Protocol Criteria:  · Appointment scheduled in the past 6 months or in the next 3 months  · BMP or CMP in the past 12 months  · Creatinine result < 2  Recent Visits       Provider Department Primary Dx    3 weeks

## 2017-06-08 ENCOUNTER — OFFICE VISIT (OUTPATIENT)
Dept: PODIATRY CLINIC | Facility: CLINIC | Age: 47
End: 2017-06-08

## 2017-06-08 DIAGNOSIS — M72.2 PLANTAR FASCIITIS, BILATERAL: Primary | ICD-10-CM

## 2017-06-08 PROCEDURE — 99213 OFFICE O/P EST LOW 20 MIN: CPT | Performed by: PODIATRIST

## 2017-06-08 PROCEDURE — 99212 OFFICE O/P EST SF 10 MIN: CPT | Performed by: PODIATRIST

## 2017-06-08 NOTE — TELEPHONE ENCOUNTER
Patient called and LMTCB. Left message regarding what dosage of Metoprolol ER she is taking with a 100mg and 25mg both on file.

## 2017-06-08 NOTE — TELEPHONE ENCOUNTER
Rx for Metoprolol 25 mg #30 with 6 RF was called to "[facility] [employer] and given to the pharmacist.

## 2017-06-08 NOTE — TELEPHONE ENCOUNTER
Pt stopped by at [address] [place] [address] location wanting to know why medication has not been refilled would like a call back. Pt stated she has been off her meds for 1mos. Please advise.       Current Outpatient Prescriptions:

## 2017-06-14 ENCOUNTER — OFFICE VISIT (OUTPATIENT)
Dept: OBGYN CLINIC | Facility: CLINIC | Age: 47
End: 2017-06-14

## 2017-06-14 ENCOUNTER — APPOINTMENT (OUTPATIENT)
Dept: LAB | Age: 47
End: 2017-06-14
Attending: OBSTETRICS & GYNECOLOGY
Payer: MEDICAID

## 2017-06-14 VITALS — DIASTOLIC BLOOD PRESSURE: 77 MMHG | HEART RATE: 79 BPM | SYSTOLIC BLOOD PRESSURE: 148 MMHG

## 2017-06-14 DIAGNOSIS — N89.8 VAGINAL DRYNESS: ICD-10-CM

## 2017-06-14 DIAGNOSIS — N89.8 VAGINAL ITCHING: ICD-10-CM

## 2017-06-14 DIAGNOSIS — N93.9 ABNORMAL UTERINE BLEEDING (AUB): ICD-10-CM

## 2017-06-14 DIAGNOSIS — N93.9 ABNORMAL UTERINE BLEEDING (AUB): Primary | ICD-10-CM

## 2017-06-14 LAB
ERYTHROCYTE [DISTWIDTH] IN BLOOD BY AUTOMATED COUNT: 19.5 % (ref 11–15)
ESTRADIOL SERPL-MCNC: 48 PG/ML
FSH SERPL-ACNC: 11.2 MIU/ML
HCT VFR BLD AUTO: 34.1 % (ref 35–48)
HGB BLD-MCNC: 10.6 G/DL (ref 12–16)
MCH RBC QN AUTO: 21.5 PG (ref 27–32)
MCHC RBC AUTO-ENTMCNC: 31 G/DL (ref 32–37)
MCV RBC AUTO: 69.4 FL (ref 80–100)
PLATELET # BLD AUTO: 268 K/UL (ref 140–400)
PMV BLD AUTO: 10 FL (ref 7.4–10.3)
RBC # BLD AUTO: 4.91 M/UL (ref 3.7–5.4)
TSH SERPL-ACNC: 2.51 UIU/ML (ref 0.45–5.33)
WBC # BLD AUTO: 14.3 K/UL (ref 4–11)

## 2017-06-14 PROCEDURE — 83001 ASSAY OF GONADOTROPIN (FSH): CPT

## 2017-06-14 PROCEDURE — 85027 COMPLETE CBC AUTOMATED: CPT

## 2017-06-14 PROCEDURE — 82670 ASSAY OF TOTAL ESTRADIOL: CPT

## 2017-06-14 PROCEDURE — 84443 ASSAY THYROID STIM HORMONE: CPT

## 2017-06-14 PROCEDURE — 36415 COLL VENOUS BLD VENIPUNCTURE: CPT

## 2017-06-14 PROCEDURE — 99204 OFFICE O/P NEW MOD 45 MIN: CPT | Performed by: OBSTETRICS & GYNECOLOGY

## 2017-06-14 RX ORDER — MISOPROSTOL 200 UG/1
TABLET ORAL
Qty: 6 TABLET | Refills: 0 | Status: ON HOLD | OUTPATIENT
Start: 2017-06-14 | End: 2017-06-29 | Stop reason: ALTCHOICE

## 2017-06-14 NOTE — PROGRESS NOTES
Mounika Medeiros is a 50year old female [id number] Patient's last menstrual period was 04/14/2017. Patient presents with:  Gyn Problem: IRREGULAR PERIODS / HEAVY BLEEDING / DRY VAGINA  Patient presents today for abnormal uterine bleeding.  She has always had irre Alcohol Use: No    Drug Use: No    Sexual Activity: Not on file   Not on file  Other Topics Concern   None on file     Social History Narrative       MEDICATIONS:    Current outpatient prescriptions:   •  misoprostol (CYTOTEC) 200 MCG Oral Tab, Take 2 tabl daily., Disp: 90 tablet, Rfl: 3  •  levETIRAcetam 500 MG Oral Tab, Take 1 tablet (500 mg total) by mouth 2 (two) times daily. , Disp: 180 tablet, Rfl: 1  •  acetaminophen 325 MG Oral Tab, 2 tabs 3 times daily. , Disp: 360 tablet, Rfl: 0  •  HUMALOG KWIKPEN 1 and affect    Pelvic Exam:  External Genitalia: normal appearance, hair distribution, and no lesions  Urethral Meatus:  normal in size, location, without lesions and prolapse  Bladder:  No fullness, masses or tenderness  Vagina:  Normal appearance without

## 2017-06-14 NOTE — PATIENT INSTRUCTIONS
Instructions:    Please get your blood drawn at the lab to check blood levels. Please call and schedule your ultrasound to look at your uterus and ovaries. Make an appointment for an endometrial biopsy.  You will need to take two pills called cytotec (s

## 2017-06-17 NOTE — TELEPHONE ENCOUNTER
Patient's sister called regarding planned colonoscopy and EGD on Thursday per myself. They lost the instructions of GoLYTELY prep as well as diabetic changes. GI RN: The sister will stop by on Monday to  instructions.   Hold diabetic medications

## 2017-06-19 ENCOUNTER — TELEPHONE (OUTPATIENT)
Dept: OBGYN CLINIC | Facility: CLINIC | Age: 47
End: 2017-06-19

## 2017-06-19 ENCOUNTER — TELEPHONE (OUTPATIENT)
Dept: GASTROENTEROLOGY | Facility: CLINIC | Age: 47
End: 2017-06-19

## 2017-06-19 NOTE — TELEPHONE ENCOUNTER
----- Message from Jocelin Barrientos MD sent at 6/19/2017  8:09 AM CDT -----  Please let patient know that her vaginal culture was negative

## 2017-06-19 NOTE — TELEPHONE ENCOUNTER
Prep instructions with DM meds orders from Dr. Fish Ly and orders on holding iron 5 days prior to procedure from TE 5/3/17- left at the  for the pt and sister to  today.    LM for patient to inform that her instructions are ready for

## 2017-06-19 NOTE — TELEPHONE ENCOUNTER
Since this patient did not hold her Iron medication we had to reschedule this patients procedure    Rescheduled for:  Colonoscopy [phone] and EGD [id number]  Provider Name:  Dr. Adarsh Schroeder  Date:    From-6/22/17  To-8/25/17  Location:  [facility]  Sedation:  MAC  Time:   From

## 2017-06-19 NOTE — TELEPHONE ENCOUNTER
The patients sister and the patient comes in today to ask questions regarding the Colon/EGD on 6/22/17 and holding the DM meds which I explained the orders given on the prep instructions per Dr. Thee Ge.  Also, regarding holding the Iron pills for 5 days shayna

## 2017-06-20 ENCOUNTER — HOSPITAL ENCOUNTER (OUTPATIENT)
Dept: ULTRASOUND IMAGING | Age: 47
Discharge: HOME | End: 2017-06-20
Attending: OBSTETRICS & GYNECOLOGY
Payer: MEDICAID

## 2017-06-20 DIAGNOSIS — N93.9 ABNORMAL UTERINE BLEEDING (AUB): ICD-10-CM

## 2017-06-20 PROCEDURE — 76830 TRANSVAGINAL US NON-OB: CPT | Performed by: OBSTETRICS & GYNECOLOGY

## 2017-06-20 PROCEDURE — 76856 US EXAM PELVIC COMPLETE: CPT | Performed by: OBSTETRICS & GYNECOLOGY

## 2017-06-22 ENCOUNTER — TELEPHONE (OUTPATIENT)
Dept: OBGYN CLINIC | Facility: CLINIC | Age: 47
End: 2017-06-22

## 2017-06-22 ENCOUNTER — TELEPHONE (OUTPATIENT)
Dept: ORTHOPEDICS CLINIC | Facility: CLINIC | Age: 47
End: 2017-06-22

## 2017-06-22 ENCOUNTER — OFFICE VISIT (OUTPATIENT)
Dept: PODIATRY CLINIC | Facility: CLINIC | Age: 47
End: 2017-06-22

## 2017-06-22 DIAGNOSIS — M72.2 PLANTAR FASCIITIS, BILATERAL: Primary | ICD-10-CM

## 2017-06-22 DIAGNOSIS — B35.3 TINEA PEDIS OF BOTH FEET: ICD-10-CM

## 2017-06-22 PROCEDURE — 99212 OFFICE O/P EST SF 10 MIN: CPT | Performed by: PODIATRIST

## 2017-06-22 PROCEDURE — 99213 OFFICE O/P EST LOW 20 MIN: CPT | Performed by: PODIATRIST

## 2017-06-22 RX ORDER — MELOXICAM 15 MG/1
TABLET ORAL
Qty: 30 TABLET | Refills: 1 | Status: SHIPPED | OUTPATIENT
Start: 2017-06-22 | End: 2017-06-27

## 2017-06-22 NOTE — TELEPHONE ENCOUNTER
Pt stopped at desk and states Dr. Rupinder Stinson suggested a cream to her foot. Discussed with Dr. Rupinder Stinson. Lamisil cream- over the counter to be used once daily. Called it in to the [facility] with her prescription going of Mobic.  Called to pt's home phone

## 2017-06-22 NOTE — PROGRESS NOTES
HPI:    Patient ID: Laureen Bartholomew is a 50year old female. HPI  This 77-year-old female presents for follow-up in reference to bilateral arch and heel pain. She would state that she is not significantly better.   She finds that the support makes a diff UNIT/ML Subcutaneous Solution Pen-injector Inject 50 Units into the skin daily. (Patient taking differently: Inject 50 Units into the skin daily. 50-55 units ) Disp: 45 mL Rfl: 1   losartan 100 MG Oral Tab Take 1 tablet (100 mg total) by mouth once daily. to meloxicam I reviewed the use of the medication and my expectations. She was also given some instruction on the use of Lamisil cream for a minor rash.   Plan follow-up in 2 weeks with consideration for a cortisone injection although patient has had a his

## 2017-06-22 NOTE — TELEPHONE ENCOUNTER
----- Message from Chrystal Ramirez MD sent at 6/21/2017  1:35 PM CDT -----  Please let patient know that her hormone levels show that she is NOT menopausal. She is slightly anemic and needs to be taking iron daily (her anemia is improving though).  She had

## 2017-06-23 NOTE — TELEPHONE ENCOUNTER
pt called following up on foot cream.  States its not at the pharmacy. She states she never rec'd a message from RN. Number verified. She is aware office is closed until Monday. Please advise.

## 2017-06-26 ENCOUNTER — TELEPHONE (OUTPATIENT)
Dept: ENDOCRINOLOGY CLINIC | Facility: CLINIC | Age: 47
End: 2017-06-26

## 2017-06-26 NOTE — TELEPHONE ENCOUNTER
Current Outpatient Prescriptions:  Meloxicam 15 MG Oral Tab Take once daily at dinner Disp: 30 tablet Rfl: 1   misoprostol (CYTOTEC) 200 MCG Oral Tab Take 2 tablets night prior to procedure Disp: 6 tablet Rfl: 0   hydrochlorothiazide 25 MG Oral Tab Take acetaminophen 325 MG Oral Tab 2 tabs 3 times daily.  Disp: 360 tablet Rfl: 0   HUMALOG KWIKPEN 100 UNIT/ML Subcutaneous Solution Pen-injector ADMINISTER 15 UNITS UNDER THE SKIN THREE TIMES DAILY WITH MEALS Disp: 15 mL Rfl: 5   Metoprolol Succinate ER 25 M

## 2017-06-26 NOTE — TELEPHONE ENCOUNTER
Per pharmacy notes, Levemir no longer covered and Lantus preferred. Lantus prescription pended if ok to change. LOV 6/2/2017.

## 2017-06-27 ENCOUNTER — APPOINTMENT (OUTPATIENT)
Dept: GENERAL RADIOLOGY | Facility: HOSPITAL | Age: 47
DRG: 067 | End: 2017-06-27
Attending: EMERGENCY MEDICINE
Payer: COMMERCIAL

## 2017-06-27 ENCOUNTER — OFFICE VISIT (OUTPATIENT)
Dept: INTERNAL MEDICINE CLINIC | Facility: CLINIC | Age: 47
End: 2017-06-27

## 2017-06-27 ENCOUNTER — HOSPITAL ENCOUNTER (INPATIENT)
Facility: HOSPITAL | Age: 47
LOS: 6 days | Discharge: HOME | DRG: 067 | End: 2017-07-03
Attending: EMERGENCY MEDICINE | Admitting: HOSPITALIST
Payer: COMMERCIAL

## 2017-06-27 ENCOUNTER — HOSPITAL ENCOUNTER (OUTPATIENT)
Age: 47
Discharge: ANOTHER HOSPITAL | DRG: 067 | End: 2017-06-27
Payer: COMMERCIAL

## 2017-06-27 ENCOUNTER — APPOINTMENT (OUTPATIENT)
Dept: CT IMAGING | Facility: HOSPITAL | Age: 47
DRG: 067 | End: 2017-06-27
Attending: EMERGENCY MEDICINE
Payer: COMMERCIAL

## 2017-06-27 ENCOUNTER — LAB ENCOUNTER (OUTPATIENT)
Dept: LAB | Age: 47
DRG: 067 | End: 2017-06-27
Attending: INTERNAL MEDICINE
Payer: COMMERCIAL

## 2017-06-27 VITALS
OXYGEN SATURATION: 96 % | BODY MASS INDEX: 35.33 KG/M2 | RESPIRATION RATE: 20 BRPM | TEMPERATURE: 98 F | HEART RATE: 81 BPM | SYSTOLIC BLOOD PRESSURE: 130 MMHG | DIASTOLIC BLOOD PRESSURE: 85 MMHG | WEIGHT: 192 LBS | HEIGHT: 62 IN

## 2017-06-27 VITALS
WEIGHT: 190 LBS | HEART RATE: 74 BPM | DIASTOLIC BLOOD PRESSURE: 70 MMHG | HEIGHT: 62 IN | RESPIRATION RATE: 18 BRPM | OXYGEN SATURATION: 100 % | TEMPERATURE: 97 F | BODY MASS INDEX: 34.96 KG/M2 | SYSTOLIC BLOOD PRESSURE: 144 MMHG

## 2017-06-27 DIAGNOSIS — R73.9 HYPERGLYCEMIA: ICD-10-CM

## 2017-06-27 DIAGNOSIS — R06.00 DOE (DYSPNEA ON EXERTION): ICD-10-CM

## 2017-06-27 DIAGNOSIS — D64.9 ANEMIA, UNSPECIFIED TYPE: ICD-10-CM

## 2017-06-27 DIAGNOSIS — I10 ESSENTIAL HYPERTENSION WITH GOAL BLOOD PRESSURE LESS THAN 140/90: ICD-10-CM

## 2017-06-27 DIAGNOSIS — R41.82 ALTERED MENTAL STATUS, UNSPECIFIED ALTERED MENTAL STATUS TYPE: Primary | ICD-10-CM

## 2017-06-27 DIAGNOSIS — R06.02 SHORTNESS OF BREATH: ICD-10-CM

## 2017-06-27 DIAGNOSIS — E11.8 TYPE 2 DIABETES MELLITUS WITH COMPLICATION, WITH LONG-TERM CURRENT USE OF INSULIN (HCC): ICD-10-CM

## 2017-06-27 DIAGNOSIS — R53.1 WEAKNESS: Primary | ICD-10-CM

## 2017-06-27 DIAGNOSIS — R40.4 TRANSIENT ALTERATION OF AWARENESS: ICD-10-CM

## 2017-06-27 DIAGNOSIS — D64.9 ANEMIA, UNSPECIFIED TYPE: Primary | ICD-10-CM

## 2017-06-27 DIAGNOSIS — Z79.4 TYPE 2 DIABETES MELLITUS WITH COMPLICATION, WITH LONG-TERM CURRENT USE OF INSULIN (HCC): ICD-10-CM

## 2017-06-27 DIAGNOSIS — R41.82 ALTERED MENTAL STATUS, UNSPECIFIED ALTERED MENTAL STATUS TYPE: ICD-10-CM

## 2017-06-27 LAB
ANION GAP SERPL CALC-SCNC: 10 MMOL/L (ref 0–18)
APTT PPP: 31.4 SECONDS (ref 23.2–35.3)
BASOPHILS # BLD: 0.1 K/UL (ref 0–0.2)
BASOPHILS NFR BLD: 1 %
BILIRUB UR QL: NEGATIVE
BUN SERPL-MCNC: 14 MG/DL (ref 8–20)
BUN/CREAT SERPL: 15.7 (ref 10–20)
CALCIUM SERPL-MCNC: 9.4 MG/DL (ref 8.5–10.5)
CHLORIDE SERPL-SCNC: 101 MMOL/L (ref 95–110)
CLARITY UR: CLEAR
CO2 SERPL-SCNC: 27 MMOL/L (ref 22–32)
COLOR UR: COLORLESS
CREAT SERPL-MCNC: 0.89 MG/DL (ref 0.5–1.5)
EOSINOPHIL # BLD: 0.2 K/UL (ref 0–0.7)
EOSINOPHIL NFR BLD: 2 %
ERYTHROCYTE [DISTWIDTH] IN BLOOD BY AUTOMATED COUNT: 19.5 % (ref 11–15)
GLUCOSE BLDC GLUCOMTR-MCNC: 127 MG/DL (ref 70–99)
GLUCOSE BLDC GLUCOMTR-MCNC: 227 MG/DL (ref 70–99)
GLUCOSE SERPL-MCNC: 158 MG/DL (ref 70–99)
HCT VFR BLD AUTO: 34.2 % (ref 35–48)
HGB BLD-MCNC: 10.7 G/DL (ref 12–16)
INR BLD: 1 (ref 0.9–1.2)
KETONES UR-MCNC: NEGATIVE MG/DL
LEUKOCYTE ESTERASE UR QL STRIP.AUTO: NEGATIVE
LYMPHOCYTES # BLD: 3.2 K/UL (ref 1–4)
LYMPHOCYTES NFR BLD: 29 %
MCH RBC QN AUTO: 21.5 PG (ref 27–32)
MCHC RBC AUTO-ENTMCNC: 31.2 G/DL (ref 32–37)
MCV RBC AUTO: 69 FL (ref 80–100)
MONOCYTES # BLD: 0.7 K/UL (ref 0–1)
MONOCYTES NFR BLD: 7 %
NEUTROPHILS # BLD AUTO: 6.7 K/UL (ref 1.8–7.7)
NEUTROPHILS NFR BLD: 61 %
NITRITE UR QL STRIP.AUTO: NEGATIVE
OSMOLALITY UR CALC.SUM OF ELEC: 290 MOSM/KG (ref 275–295)
PH UR: 7 [PH] (ref 5–8)
PLATELET # BLD AUTO: 285 K/UL (ref 140–400)
PMV BLD AUTO: 9.2 FL (ref 7.4–10.3)
POTASSIUM SERPL-SCNC: 3.8 MMOL/L (ref 3.3–5.1)
PROT UR-MCNC: NEGATIVE MG/DL
PROTHROMBIN TIME: 12.7 SECONDS (ref 11.8–14.5)
RBC # BLD AUTO: 4.96 M/UL (ref 3.7–5.4)
RBC #/AREA URNS AUTO: <1 /HPF
SODIUM SERPL-SCNC: 138 MMOL/L (ref 136–144)
SP GR UR STRIP: 1 (ref 1–1.03)
TROPONIN I SERPL-MCNC: 0.03 NG/ML (ref ?–0.03)
UROBILINOGEN UR STRIP-ACNC: <2
VIT C UR-MCNC: NEGATIVE MG/DL
WBC # BLD AUTO: 10.9 K/UL (ref 4–11)
WBC #/AREA URNS AUTO: 1 /HPF

## 2017-06-27 PROCEDURE — 85025 COMPLETE CBC W/AUTO DIFF WBC: CPT

## 2017-06-27 PROCEDURE — 99215 OFFICE O/P EST HI 40 MIN: CPT

## 2017-06-27 PROCEDURE — 82306 VITAMIN D 25 HYDROXY: CPT

## 2017-06-27 PROCEDURE — 82607 VITAMIN B-12: CPT

## 2017-06-27 PROCEDURE — 82728 ASSAY OF FERRITIN: CPT

## 2017-06-27 PROCEDURE — 93005 ELECTROCARDIOGRAM TRACING: CPT

## 2017-06-27 PROCEDURE — 93010 ELECTROCARDIOGRAM REPORT: CPT | Performed by: PHYSICIAN ASSISTANT

## 2017-06-27 PROCEDURE — 99223 1ST HOSP IP/OBS HIGH 75: CPT | Performed by: HOSPITALIST

## 2017-06-27 PROCEDURE — 99214 OFFICE O/P EST MOD 30 MIN: CPT | Performed by: INTERNAL MEDICINE

## 2017-06-27 PROCEDURE — 99254 IP/OBS CNSLTJ NEW/EST MOD 60: CPT | Performed by: OTHER

## 2017-06-27 PROCEDURE — 71010 XR CHEST AP PORTABLE  (CPT=71010): CPT | Performed by: EMERGENCY MEDICINE

## 2017-06-27 PROCEDURE — 82746 ASSAY OF FOLIC ACID SERUM: CPT

## 2017-06-27 PROCEDURE — 70450 CT HEAD/BRAIN W/O DYE: CPT | Performed by: EMERGENCY MEDICINE

## 2017-06-27 PROCEDURE — 82962 GLUCOSE BLOOD TEST: CPT

## 2017-06-27 PROCEDURE — 36415 COLL VENOUS BLD VENIPUNCTURE: CPT

## 2017-06-27 PROCEDURE — 99212 OFFICE O/P EST SF 10 MIN: CPT | Performed by: INTERNAL MEDICINE

## 2017-06-27 RX ORDER — HYDROCHLOROTHIAZIDE 25 MG/1
25 TABLET ORAL
Status: DISCONTINUED | OUTPATIENT
Start: 2017-06-27 | End: 2017-07-03

## 2017-06-27 RX ORDER — LEVETIRACETAM 500 MG/1
500 TABLET ORAL 2 TIMES DAILY
Status: DISCONTINUED | OUTPATIENT
Start: 2017-06-27 | End: 2017-07-03

## 2017-06-27 RX ORDER — DEXTROSE MONOHYDRATE 25 G/50ML
50 INJECTION, SOLUTION INTRAVENOUS AS NEEDED
Status: DISCONTINUED | OUTPATIENT
Start: 2017-06-27 | End: 2017-07-03

## 2017-06-27 RX ORDER — ACETAMINOPHEN 325 MG/1
650 TABLET ORAL EVERY 6 HOURS PRN
Status: DISCONTINUED | OUTPATIENT
Start: 2017-06-27 | End: 2017-06-27

## 2017-06-27 RX ORDER — ONDANSETRON 2 MG/ML
4 INJECTION INTRAMUSCULAR; INTRAVENOUS EVERY 6 HOURS PRN
Status: DISCONTINUED | OUTPATIENT
Start: 2017-06-27 | End: 2017-07-03

## 2017-06-27 RX ORDER — METOPROLOL SUCCINATE 25 MG/1
25 TABLET, EXTENDED RELEASE ORAL
Status: DISCONTINUED | OUTPATIENT
Start: 2017-06-27 | End: 2017-07-03

## 2017-06-27 RX ORDER — CETIRIZINE HYDROCHLORIDE 10 MG/1
10 TABLET ORAL DAILY
Status: DISCONTINUED | OUTPATIENT
Start: 2017-06-27 | End: 2017-07-03

## 2017-06-27 RX ORDER — PANTOPRAZOLE SODIUM 40 MG/1
40 TABLET, DELAYED RELEASE ORAL
Status: DISCONTINUED | OUTPATIENT
Start: 2017-06-28 | End: 2017-07-03

## 2017-06-27 RX ORDER — ACETAMINOPHEN 325 MG/1
650 TABLET ORAL EVERY 6 HOURS PRN
Status: DISCONTINUED | OUTPATIENT
Start: 2017-06-27 | End: 2017-07-03

## 2017-06-27 RX ORDER — LORAZEPAM 2 MG/ML
2 INJECTION INTRAMUSCULAR EVERY 4 HOURS PRN
Status: DISCONTINUED | OUTPATIENT
Start: 2017-06-27 | End: 2017-07-03

## 2017-06-27 RX ORDER — METOPROLOL SUCCINATE 100 MG/1
100 TABLET, EXTENDED RELEASE ORAL DAILY
Status: DISCONTINUED | OUTPATIENT
Start: 2017-06-27 | End: 2017-07-03

## 2017-06-27 RX ORDER — LOSARTAN POTASSIUM 100 MG/1
100 TABLET ORAL
Status: DISCONTINUED | OUTPATIENT
Start: 2017-06-27 | End: 2017-07-03

## 2017-06-27 RX ORDER — ESCITALOPRAM OXALATE 10 MG/1
10 TABLET ORAL DAILY
Qty: 30 TABLET | Refills: 0 | Status: SHIPPED | OUTPATIENT
Start: 2017-06-27 | End: 2017-07-11

## 2017-06-27 RX ORDER — DOCUSATE SODIUM 100 MG/1
100 CAPSULE, LIQUID FILLED ORAL 2 TIMES DAILY
Status: DISCONTINUED | OUTPATIENT
Start: 2017-06-27 | End: 2017-07-03

## 2017-06-27 RX ORDER — MELATONIN
325 DAILY
Status: DISCONTINUED | OUTPATIENT
Start: 2017-06-28 | End: 2017-07-03

## 2017-06-27 RX ORDER — LORATADINE 10 MG/1
10 TABLET ORAL DAILY
Qty: 30 TABLET | Refills: 1 | Status: SHIPPED | OUTPATIENT
Start: 2017-06-27 | End: 2017-08-01

## 2017-06-27 RX ORDER — ATORVASTATIN CALCIUM 40 MG/1
40 TABLET, FILM COATED ORAL NIGHTLY
Status: DISCONTINUED | OUTPATIENT
Start: 2017-06-27 | End: 2017-07-03

## 2017-06-27 RX ORDER — POTASSIUM CHLORIDE 20 MEQ/1
40 TABLET, EXTENDED RELEASE ORAL ONCE
Status: COMPLETED | OUTPATIENT
Start: 2017-06-27 | End: 2017-06-27

## 2017-06-27 RX ORDER — ASPIRIN 325 MG
325 TABLET ORAL DAILY
Status: DISCONTINUED | OUTPATIENT
Start: 2017-06-27 | End: 2017-07-03

## 2017-06-27 RX ORDER — CLOPIDOGREL BISULFATE 75 MG/1
75 TABLET ORAL DAILY
Status: DISCONTINUED | OUTPATIENT
Start: 2017-06-27 | End: 2017-07-03

## 2017-06-27 RX ORDER — ESCITALOPRAM OXALATE 10 MG/1
10 TABLET ORAL DAILY
Status: DISCONTINUED | OUTPATIENT
Start: 2017-06-28 | End: 2017-07-03

## 2017-06-27 NOTE — ED PROVIDER NOTES
Patient Seen in: [facility] AND [facility] Emergency Department    History   Patient presents with:  Fatigue (constitutional, neurologic)    Stated Complaint: weakness    HPI    49-year-old female with history of prior CVA with residual left-sided weakness, migr U/F 32G X 4 MM Does not apply Misc,  USE DAILY WITH LEVEMIR   Metoprolol Succinate  MG Oral Tablet 24 Hr,  Take 1 tablet (100 mg total) by mouth daily.    OMEPRAZOLE 40 MG Oral Capsule Delayed Release,  TAKE 1 CAPSULE BY MOUTH EVERY DAY 30 TO 60 MINUT Packs/day: 0.00      Years: 0.00         Quit date: 12/16/2013  Smokeless tobacco: Former User                     Alcohol use:  No                Review of Systems    Positive for stated complaint: weakness  Other systems are as noted (*)     All other components within normal limits   BASIC METABOLIC PANEL (8) - Abnormal; Notable for the following:     Glucose 158 (*)     All other components within normal limits   CBC W/ DIFFERENTIAL - Abnormal; Notable for the following:     HGB 10.7 Unknown

## 2017-06-27 NOTE — TELEPHONE ENCOUNTER
[facility] - Vian[facility] calling requesting new rx for pt since it is the first time using mail order. Pt requesting Trazidone and meds below.        Current Outpatient Prescriptions:  hydrochlorothiazide 25 MG Oral Tab Take 1 tablet (25 mg total) by mouth once d

## 2017-06-27 NOTE — H&P
[facility]    PATIENT'S NAME: Stella Hanson   ATTENDING PHYSICIAN: Bishop Amor MD   PATIENT ACCOUNT#:   [id number]    LOCATION:  [place] [address]  MEDICAL RECORD #:   [id number]       YOB: 1970  ADMISSION DATE:       06/27/2 cholecystectomy. MEDICATIONS:  At home, please see medication reconciliation list.  The patient does not have a history of seizures and she is on p.o. Keppra as prophylaxis considering her history of hemorrhagic stroke.     ALLERGIES:  No known drug purvi cerebrovascular accident. 2.   Diabetes mellitus type 2.  3.   Hyperlipidemia. 4.   Hypertension. 5.   Obesity. 6.   Chronic left hemiparesis.     The patient will be admitted to telemetry floor, obtain electroencephalogram, MRI scan of the brain, naida

## 2017-06-27 NOTE — ED INITIAL ASSESSMENT (HPI)
Pt arrived via medics from [facility] with 20g right ac iv for complaint of feeling weak, dizzy and sleepy. Also states her back hurts. Pt has history of CVA in past, no new deficits per pt. Pt awake and talking but extremely tired.  Pt complains

## 2017-06-27 NOTE — ED PROVIDER NOTES
Patient Seen in: [facility]    History   Patient presents with:  Dizziness (neurologic)    Stated Complaint: dizziness    HPI    Patient is a 49-year-old female with a significant medical history including DM, stroke and (25 mg total) by mouth once daily. BD PEN NEEDLE ABDOULAYE U/F 32G X 4 MM Does not apply Misc,  USE DAILY WITH LEVEMIR   Metoprolol Succinate  MG Oral Tablet 24 Hr,  Take 1 tablet (100 mg total) by mouth daily.    OMEPRAZOLE 40 MG Oral Capsule Delayed Re status: Former Smoker                                                              Packs/day: 0.00      Years: 0.00         Quit date: 12/16/2013  Smokeless tobacco: Former User                     Alcohol use:  No                Review of Systems   Constit deficit. She exhibits normal muscle tone. She displays no seizure activity. GCS eye subscore is 4. GCS verbal subscore is 5. GCS motor subscore is 6. She displays no Babinski's sign on the right side. She displays no Babinski's sign on the left side.    Neg

## 2017-06-27 NOTE — ED NOTES
Pt awake, responsive to verbal stimuli, not in distress, c/o dizziness, weakness, headache, sob and chest discomfort after leaving her primary care' s office and having blood test done, h/o previous stroke, residual weakness to left side of body, no facial

## 2017-06-27 NOTE — ED NOTES
Pt presents to ED with a c/o new onset headache and generalized weakness at approx. 1130. Pt states she had a cva in 2014 and has residual left side weakness with intermittent blurry vision.

## 2017-06-27 NOTE — ED PROVIDER NOTES
Patient Seen in: [facility]    History   Patient presents with:  Dizziness (neurologic)    Stated Complaint: dizziness    HPI    Patient seen and evaluated by JUVENCIO Goddard.   I agree with note and management and will Suppl (TRUE METRIX AIR GLUCOSE METER) w/Device Does not apply Kit,  1 kit by Does not apply route as needed. Use as directed   Glucose Blood (TRUE METRIX BLOOD GLUCOSE TEST) In Vitro Strip,  Test blood sugar three times daily.    losartan 100 MG Oral Tab, Triage Vitals [06/27/17 1325]  BP: 144/70  Pulse: 74  Resp: 18  Temp: (!) 97.4 °F (36.3 °C)  Temp src: Oral  SpO2: 100 %  O2 Device: None (Room air)    Current:/70   Pulse 74   Temp (!) 97.4 °F (36.3 °C) (Oral)   Resp 18   Ht 157.5 cm (5' 2\")   Wt 8

## 2017-06-27 NOTE — PROGRESS NOTES
06/27/17 1528   Clinical Encounter Type   Visited With Patient and family together   Continue Visiting Yes  (Pt and family may prefer a female  )   Crisis Visit (Stroke Alert)   Patient's Supportive Strategies/Resources Family    Patient Spiritu

## 2017-06-27 NOTE — CONSULTS
[facility] HOSP - [facility]    Report of Consultation    Marquise Antonio Patient Status:  Emergency    3/30/1970 MRN [id number]   Location [address] Attending Orin Heller MD   Hosp Day # 0 PCP Arina Sabillon MD     Da • Unspecified essential hypertension        Past Surgical History  Past Surgical History:  2014: BRAIN SURGERY  04/19/16: INCISION AND DRAINAGE Right      Comment: lower abdominal skin  2002: [address] History  Family Histor alert, but is slow to answer questions. Her speech is soft, but she is able to name and repeat. She was oriented ×3. Cranial Nerves: II-Visual acuity grossly normal, with full visual fields. Pupil react to light.   Fundoscopic exam normal.  III,IV,VI- EO convexity and right parieto-occipital region from infarction the vascular territories of the left middle cerebral and watershed region between the right MCA and PCA, respectively. 4. Status post right temporoparietal craniotomy.   5. Mild sequela of chroni

## 2017-06-27 NOTE — PROGRESS NOTES
HPI:    Patient ID: Megan Christina is a 50year old female.     Anemia   This is a new (per patient feels weak and has  some  sob if walking  ,also has  some bleed menstrualy  for last 2  weeks , sugars this am 190  ,  pt used insulin  and  eat ,  state cam Succinate  MG Oral Tablet 24 Hr Take 1 tablet (100 mg total) by mouth daily.  Disp: 90 tablet Rfl: 0   Omeprazole 40 MG Oral Capsule Delayed Release TAKE 1 CAPSULE BY MOUTH EVERY DAY 30 TO 60 MINUTES BEFORE A MEAL Disp: 90 capsule Rfl: 0   hydrochloro is normal and behavior is normal. Judgment and thought content normal. Her mood appears not anxious. Cognition and memory are normal. She exhibits a depressed mood. She expresses no homicidal and no suicidal ideation.  She expresses no suicidal plans and no tablet orally once daily  for 1-2 weeks than as needed Disp: 30 tablet Rfl: 1   Insulin Glargine (LANTUS SOLOSTAR) 100 UNIT/ML Subcutaneous Solution Pen-injector Inject 50 Units into the skin daily.  Disp: 45 mL Rfl: 1   [DISCONTINUED] misoprostol (CYTOTEC) MG Oral Tablet 24 Hr Take 1 tablet (25 mg total) by mouth once daily. Disp: 30 tablet Rfl: 6   Cholecalciferol (VITAMIN D) 2000 UNITS Oral Cap Take 1 capsule (2,000 Units total) by mouth daily.  Disp: 30 capsule Rfl: 2   MetFORMIN HCl 1000 MG Oral Tab Take (10 mg total) by mouth daily. loratadine 10 MG Oral Tab 30 tablet 1      Sig: Take 1 tablet (10 mg total) by mouth daily.  1 tablet orally once daily  for 1-2 weeks than as needed           Imaging & Referrals:  CARD ECHO 2D DOPPLER (CPT=93306)       I

## 2017-06-28 ENCOUNTER — APPOINTMENT (OUTPATIENT)
Dept: MRI IMAGING | Facility: HOSPITAL | Age: 47
DRG: 067 | End: 2017-06-28
Attending: HOSPITALIST
Payer: COMMERCIAL

## 2017-06-28 ENCOUNTER — APPOINTMENT (OUTPATIENT)
Dept: ULTRASOUND IMAGING | Facility: HOSPITAL | Age: 47
DRG: 067 | End: 2017-06-28
Attending: HOSPITALIST
Payer: COMMERCIAL

## 2017-06-28 ENCOUNTER — APPOINTMENT (OUTPATIENT)
Dept: CV DIAGNOSTICS | Facility: HOSPITAL | Age: 47
DRG: 067 | End: 2017-06-28
Attending: HOSPITALIST
Payer: COMMERCIAL

## 2017-06-28 PROBLEM — R41.82 ALTERED MENTAL STATUS: Status: ACTIVE | Noted: 2017-06-27

## 2017-06-28 LAB
ANION GAP SERPL CALC-SCNC: 10 MMOL/L (ref 0–18)
BASOPHILS # BLD: 0.1 K/UL (ref 0–0.2)
BASOPHILS NFR BLD: 1 %
BUN SERPL-MCNC: 11 MG/DL (ref 8–20)
BUN/CREAT SERPL: 12 (ref 10–20)
CALCIUM SERPL-MCNC: 9.5 MG/DL (ref 8.5–10.5)
CHLORIDE SERPL-SCNC: 99 MMOL/L (ref 95–110)
CO2 SERPL-SCNC: 27 MMOL/L (ref 22–32)
CREAT SERPL-MCNC: 0.92 MG/DL (ref 0.5–1.5)
EOSINOPHIL # BLD: 0.2 K/UL (ref 0–0.7)
EOSINOPHIL NFR BLD: 2 %
ERYTHROCYTE [DISTWIDTH] IN BLOOD BY AUTOMATED COUNT: 19.5 % (ref 11–15)
ERYTHROCYTE [SEDIMENTATION RATE] IN BLOOD: 38 MM/HR (ref 0–20)
GLUCOSE BLDC GLUCOMTR-MCNC: 152 MG/DL (ref 70–99)
GLUCOSE BLDC GLUCOMTR-MCNC: 176 MG/DL (ref 70–99)
GLUCOSE BLDC GLUCOMTR-MCNC: 186 MG/DL (ref 70–99)
GLUCOSE BLDC GLUCOMTR-MCNC: 204 MG/DL (ref 70–99)
GLUCOSE BLDC GLUCOMTR-MCNC: 225 MG/DL (ref 70–99)
GLUCOSE BLDC GLUCOMTR-MCNC: 285 MG/DL (ref 70–99)
GLUCOSE SERPL-MCNC: 242 MG/DL (ref 70–99)
HBA1C MFR BLD: 8.2 % (ref 4–6)
HCT VFR BLD AUTO: 32.7 % (ref 35–48)
HGB BLD-MCNC: 10.4 G/DL (ref 12–16)
LYMPHOCYTES # BLD: 2.2 K/UL (ref 1–4)
LYMPHOCYTES NFR BLD: 24 %
MCH RBC QN AUTO: 21.9 PG (ref 27–32)
MCHC RBC AUTO-ENTMCNC: 31.7 G/DL (ref 32–37)
MCV RBC AUTO: 69 FL (ref 80–100)
MONOCYTES # BLD: 0.5 K/UL (ref 0–1)
MONOCYTES NFR BLD: 6 %
NEUTROPHILS # BLD AUTO: 6.2 K/UL (ref 1.8–7.7)
NEUTROPHILS NFR BLD: 68 %
OSMOLALITY UR CALC.SUM OF ELEC: 289 MOSM/KG (ref 275–295)
PLATELET # BLD AUTO: 257 K/UL (ref 140–400)
PMV BLD AUTO: 9.1 FL (ref 7.4–10.3)
POTASSIUM SERPL-SCNC: 4.7 MMOL/L (ref 3.3–5.1)
RBC # BLD AUTO: 4.74 M/UL (ref 3.7–5.4)
SODIUM SERPL-SCNC: 136 MMOL/L (ref 136–144)
TSH SERPL-ACNC: 1.02 UIU/ML (ref 0.45–5.33)
VIT B12 SERPL-MCNC: 316 PG/ML (ref 181–914)
WBC # BLD AUTO: 9.2 K/UL (ref 4–11)

## 2017-06-28 PROCEDURE — 93306 TTE W/DOPPLER COMPLETE: CPT | Performed by: HOSPITALIST

## 2017-06-28 PROCEDURE — 99232 SBSQ HOSP IP/OBS MODERATE 35: CPT | Performed by: OTHER

## 2017-06-28 PROCEDURE — 99233 SBSQ HOSP IP/OBS HIGH 50: CPT | Performed by: HOSPITALIST

## 2017-06-28 PROCEDURE — 4A10X4Z MONITORING OF CENTRAL NERVOUS ELECTRICAL ACTIVITY, EXTERNAL APPROACH: ICD-10-PCS | Performed by: OTHER

## 2017-06-28 PROCEDURE — 93880 EXTRACRANIAL BILAT STUDY: CPT | Performed by: HOSPITALIST

## 2017-06-28 RX ORDER — 0.9 % SODIUM CHLORIDE 0.9 %
VIAL (ML) INJECTION
Status: COMPLETED
Start: 2017-06-28 | End: 2017-06-28

## 2017-06-28 NOTE — OCCUPATIONAL THERAPY NOTE
OCCUPATIONAL THERAPY EVALUATION - INPATIENT     Room Number: 346/346-A  Evaluation Date: 6/28/2017  Type of Evaluation: Initial  Presenting Problem:  (Dizziness, Weakness)    Physician Order: IP Consult to Occupational Therapy  Reason for Therapy: ADL/IADL both upper and lower lids 3/30/2016   • Migraine    • Stenosis of right vertebral artery    • Stroke [facility])    • Type II or unspecified type diabetes mellitus without mention of complication, not stated as uncontrolled    • Unspecified essential hypertensio Pressure: 142    COGNITION  Overall Cognitive Status:  Impaired    VISION  Ocular Range of Motion:  WFL = within functional limits  Tracking:  able to track stimulus in all quads without difficulty    Behavioral/Emotional/Social: Cooperative throughout, do Comment:    Patient will complete item retrieval with mod i  Comment:       Goals  on: 17  Frequency: 3-5x/week    [address]

## 2017-06-28 NOTE — PLAN OF CARE
rec'd from er to 3swb 346, settled in room & oriented to room & hospital/unit routine. See admission, assessment, notes, dr. Justyn Berrios reviewing/ordering meds. Safety in place.

## 2017-06-28 NOTE — PHYSICAL THERAPY NOTE
PHYSICAL THERAPY EVALUATION - INPATIENT     Room Number: 346/346-A  Evaluation Date: 6/28/2017  Type of Evaluation: Initial  Physician Order: PT Eval and Treat    Presenting Problem: AMS  Reason for Therapy: Mobility Dysfunction and Discharge Planning patient's sister she was independent in basic ADL's. Patient has difficulty processing and with vision at baseline. Patient will benefit from continued IP PT services to address these deficits in preparation for discharge.     DISCHARGE RECOMMENDATIONS vision\"    PHYSICAL THERAPY EXAMINATION     OBJECTIVE  Precautions: Low vision  Fall Risk: Standard fall risk    WEIGHT BEARING RESTRICTION  Weight Bearing Restriction: None                PAIN ASSESSMENT  Ratin          COGNITION  · Overall Cognitive tolerated  Gait training  Transfer training    Patient End of Session: Up in chair;Needs met;Call light within reach;RN aware of session/findings; All patient questions and concerns addressed    CURRENT GOALS    Goals to be met by: 7/10/17  Patient Goal Mc Patel

## 2017-06-28 NOTE — PLAN OF CARE
Had carotid us, echo, eeg done. Attempted mri w/ativan (ok'd per dr. Trinity Mason) but wasn't able to lay still enough-mri will reattempt later. Assessment unchanged. Cont. To monitor, md's updated.      Diabetes/Glucose Control    • Glucose maintained within p

## 2017-06-28 NOTE — PAYOR COMM NOTE
ED Provider Notes signed by Mc Joshi MD at 6/27/2017  4:27 PM     Author: Mc Joshi MD Service: (none) Author Type: Physician   Filed: 6/27/2017  4:27 PM Date of Service: 6/27/2017  3:28 PM Status: Signed   : Mc Joshi MD (y loratadine 10 MG Oral Tab,  Take 1 tablet (10 mg total) by mouth daily. 1 tablet orally once daily  for 1-2 weeks than as needed   Insulin Glargine (LANTUS SOLOSTAR) 100 UNIT/ML Subcutaneous Solution Pen-injector,  Inject 50 Units into the skin daily.    m TRUEPLUS LANCETS 33G Does not apply Misc,  TEST BLOOD GLUCOSE LEVEL FOUR TIMES DAILY   Lancets Does not apply Misc,  TRUE TRACK LANCETS, OR BRAND AS APPROVED BY INSURANCE. TO BE USED 4 TIMES DAILY   aspirin 325 MG Oral Tab,  Take 325 mg by mouth daily.     Musculoskeletal: neck is supple non tender        Extremities are symmetrical, full range of motion  Psychiatric: patient is oriented to person only, there is no agitation     DIFFERENTIAL DIAGNOSIS: After history and physical exam differential diagnosis w Patient with improvement during her ED stay. She is much more awake and lucid at present. Lab and CT results noted. Will admit for further diagnostic testing and further evaluation. Discussed with Dr. Jovanny Wiley, neurology.   Also discussed with Dr. Mary Arndt HISTORY OF PRESENT ILLNESS:  The patient is a 78-year-old [facility] female who has a history of intracranial bleed and multiple cerebrovascular accidents.   She was seen by her primary care physician today and during a routine blood work draw she started SOCIAL HISTORY:  No tobacco, alcohol, or drug use. She is an ex-tobacco user.   Currently dependent in her basic activities of daily living.      REVIEW OF SYSTEMS:  The patient still continues to be a bit groggy and confused, but able to answer questions The patient will be admitted to telemetry floor, obtain electroencephalogram, MRI scan of the brain, carotid ultrasound, and 2D echocardiogram with Doppler. Resume her home medications including full-dose aspirin. Seizure and fall precautions.   Neurology Neurologic: She is awake and alert. She is able to name and repeat. Visual fields are full. She is able to finger count with either eye. She has mild left-sided weakness.   She is able to ambulate but has difficulty initiating her gait.     Review of Sy docusate sodium (COLACE) cap 100 mg 100 mg Oral BID   escitalopram (LEXAPRO) tablet 10 mg 10 mg Oral Daily   ferrous sulfate EC tab 325 mg 325 mg Oral Daily   insulin aspart (NOVOLOG) 100 UNIT/ML flexpen 15 Units 15 Units Subcutaneous TID CC   hydrochlorot CONCLUSION:            1. No acute hemorrhage is identified. No acute intracranial process by noncontrast CT technique. 2. Chronic encephalomalacia of the right frontal lobe from remote hemorrhagic or ischemic insult.   3. Additional encephalomalacic hoyt Date of Admission:  6/27/2017  Date of Consult: June 27, 2017  Reason for Consultation:   Poorly responsive     History of Present Illness:   Patient is a 50year old female who was admitted to the hospital for <principal problem not specified>:  Patient i Family History        Family History   Problem Relation Age of Onset   • Diabetes Father     • Heart Disorder Mother     • Glaucoma Neg     • Macular degeneration Neg           Social History  She is . She does not smoke, use alcohol, or drugs. Cranial Nerves: II-Visual acuity grossly normal, with full visual fields. Pupil react to light. Fundoscopic exam normal.  III,IV,VI- EOM full, with normal pursuit. V-Facial sensation intact, with symmetric corneal reflex. VII- face symmetric.  VIII- Audito

## 2017-06-28 NOTE — PROCEDURES
[facility], [address] [place] [address] [address]      PATIENT'S NAME: Gil Bush   ATTENDING PHYSICIAN: Adah Holter, MD   PATIENT ACCOUNT #: [de-identified] LOCATION: [address] [place] [address] Road #: [id number] DATE OF BIRTH: [birth year]

## 2017-06-28 NOTE — PROGRESS NOTES
[facility]    Progress Note    Megan Christina Patient Status:  Inpatient    3/30/1970 MRN [id number]   Location [facility] 3W/SW Attending Tula Krabbe, MD   Hosp Day # 1 PCP Andreea Kothari MD       Subjective:   Cherry Davis abnormalities, but because of the multiple ischemic lesions, MRI will be obtained to rule out any acute ischemia. EEG also would be suggested to rule out partial seizures. Physical therapy will see patient for her gait.              Medications:     Renny CO2 27 06/28/2017    (H) 06/28/2017   CA 9.5 06/28/2017   ALB 3.7 06/27/2017   ALKPHO 72 06/27/2017   BILT 0.5 06/27/2017   TP 7.4 06/27/2017   AST 16 06/27/2017   ALT 19 06/27/2017   PTT 31.4 06/27/2017   INR 1.0 06/27/2017   PT 12.8 09/26/2016 change Electronically signed on 06/27/2017 at 15:22 by MD Anuj Mcbride MD, MD  6/28/2017

## 2017-06-29 ENCOUNTER — APPOINTMENT (OUTPATIENT)
Dept: CT IMAGING | Facility: HOSPITAL | Age: 47
DRG: 067 | End: 2017-06-29
Attending: Other
Payer: COMMERCIAL

## 2017-06-29 ENCOUNTER — APPOINTMENT (OUTPATIENT)
Dept: MRI IMAGING | Facility: HOSPITAL | Age: 47
DRG: 067 | End: 2017-06-29
Attending: HOSPITALIST
Payer: COMMERCIAL

## 2017-06-29 LAB
ANA SER QL: NEGATIVE
ANION GAP SERPL CALC-SCNC: 11 MMOL/L (ref 0–18)
BASOPHILS # BLD: 0.1 K/UL (ref 0–0.2)
BASOPHILS NFR BLD: 1 %
BUN SERPL-MCNC: 11 MG/DL (ref 8–20)
BUN/CREAT SERPL: 17.5 (ref 10–20)
CALCIUM SERPL-MCNC: 9.4 MG/DL (ref 8.5–10.5)
CHLORIDE SERPL-SCNC: 97 MMOL/L (ref 95–110)
CO2 SERPL-SCNC: 26 MMOL/L (ref 22–32)
CREAT SERPL-MCNC: 0.63 MG/DL (ref 0.5–1.5)
EOSINOPHIL # BLD: 0.2 K/UL (ref 0–0.7)
EOSINOPHIL NFR BLD: 2 %
ERYTHROCYTE [DISTWIDTH] IN BLOOD BY AUTOMATED COUNT: 19.3 % (ref 11–15)
GLUCOSE BLDC GLUCOMTR-MCNC: 198 MG/DL (ref 70–99)
GLUCOSE BLDC GLUCOMTR-MCNC: 322 MG/DL (ref 70–99)
GLUCOSE BLDC GLUCOMTR-MCNC: 389 MG/DL (ref 70–99)
GLUCOSE BLDC GLUCOMTR-MCNC: 74 MG/DL (ref 70–99)
GLUCOSE SERPL-MCNC: 204 MG/DL (ref 70–99)
HCT VFR BLD AUTO: 34.3 % (ref 35–48)
HGB BLD-MCNC: 10.8 G/DL (ref 12–16)
LEVETIRACETAM (KEPPRA): 19 UG/ML
LYMPHOCYTES # BLD: 2.5 K/UL (ref 1–4)
LYMPHOCYTES NFR BLD: 27 %
MCH RBC QN AUTO: 21.6 PG (ref 27–32)
MCHC RBC AUTO-ENTMCNC: 31.4 G/DL (ref 32–37)
MCV RBC AUTO: 68.7 FL (ref 80–100)
MONOCYTES # BLD: 0.6 K/UL (ref 0–1)
MONOCYTES NFR BLD: 7 %
NEUTROPHILS # BLD AUTO: 5.7 K/UL (ref 1.8–7.7)
NEUTROPHILS NFR BLD: 63 %
OSMOLALITY UR CALC.SUM OF ELEC: 283 MOSM/KG (ref 275–295)
PLATELET # BLD AUTO: 281 K/UL (ref 140–400)
PMV BLD AUTO: 8.9 FL (ref 7.4–10.3)
POTASSIUM SERPL-SCNC: 3.8 MMOL/L (ref 3.3–5.1)
RBC # BLD AUTO: 4.99 M/UL (ref 3.7–5.4)
SODIUM SERPL-SCNC: 134 MMOL/L (ref 136–144)
WBC # BLD AUTO: 9.1 K/UL (ref 4–11)

## 2017-06-29 PROCEDURE — 99233 SBSQ HOSP IP/OBS HIGH 50: CPT | Performed by: HOSPITALIST

## 2017-06-29 PROCEDURE — 70498 CT ANGIOGRAPHY NECK: CPT | Performed by: OTHER

## 2017-06-29 PROCEDURE — 70551 MRI BRAIN STEM W/O DYE: CPT | Performed by: HOSPITALIST

## 2017-06-29 PROCEDURE — 99232 SBSQ HOSP IP/OBS MODERATE 35: CPT | Performed by: OTHER

## 2017-06-29 PROCEDURE — 95819 EEG AWAKE AND ASLEEP: CPT | Performed by: OTHER

## 2017-06-29 PROCEDURE — 70496 CT ANGIOGRAPHY HEAD: CPT | Performed by: OTHER

## 2017-06-29 RX ORDER — 0.9 % SODIUM CHLORIDE 0.9 %
VIAL (ML) INJECTION
Status: COMPLETED
Start: 2017-06-29 | End: 2017-06-29

## 2017-06-29 RX ORDER — METOPROLOL SUCCINATE 100 MG/1
100 TABLET, EXTENDED RELEASE ORAL DAILY
Qty: 90 TABLET | Refills: 0 | Status: SHIPPED | OUTPATIENT
Start: 2017-06-29 | End: 2017-07-03

## 2017-06-29 RX ORDER — HYDROCHLOROTHIAZIDE 25 MG/1
25 TABLET ORAL
Qty: 90 TABLET | Refills: 0 | Status: SHIPPED | OUTPATIENT
Start: 2017-06-29 | End: 2017-09-08

## 2017-06-29 RX ORDER — OMEPRAZOLE 40 MG/1
CAPSULE, DELAYED RELEASE ORAL
Qty: 90 CAPSULE | Refills: 0 | Status: SHIPPED | OUTPATIENT
Start: 2017-06-29 | End: 2017-09-25

## 2017-06-29 NOTE — PHYSICAL THERAPY NOTE
PHYSICAL THERAPY TREATMENT NOTE - INPATIENT    Room Number: 346/346-A       Presenting Problem: AMS    Problem List  Principal Problem:    Altered mental status  Active Problems:    Transient alteration of awareness      ASSESSMENT   Patient received sitt '6-Clicks' INPATIENT SHORT FORM - BASIC MOBILITY  How much difficulty does the patient currently have. ..  -   Turning over in bed (including adjusting bedclothes, sheets and blankets)?: A Little   -   Sitting down on and standing up from a chair with arms Goal #5 Patient to demonstrate independence with home activity/exercise instructions provided to patient in preparation for discharge.    Goal #5   Current Status In progress   Goal #6     Goal #6  Current Status

## 2017-06-29 NOTE — TELEPHONE ENCOUNTER
Patient is currently hospitalized, she requested order be sent to mail order pharmacy for Trazodone, pended. Last OV 6/27/17, last refill 10/11/2016. Please advise. Patient requested medications be sent to mail order pharmacy.

## 2017-06-29 NOTE — PROGRESS NOTES
[facility]    Progress Note    Joanie Rico Patient Status:  Inpatient    3/30/1970 MRN [id number]   Location [facility] 3W/SW Attending Ridge Camacho MD   Hosp Day # 2 PCP Germán Slater MD       Subjective:   Montse Saravia occlusion. Previous CT angiogram did show some flow in the right carotid.   She does have a superficial temporal artery middle cerebral artery bypass done in the past.    I suggested CT angiogram of the brain and neck, to compare with the prior studies and WBC 9.1 06/29/2017   HGB 10.8 (L) 06/29/2017   HCT 34.3 (L) 06/29/2017    06/29/2017   CREATSERUM 0.63 06/29/2017   BUN 11 06/29/2017    (L) 06/29/2017   K 3.8 06/29/2017   CL 97 06/29/2017   CO2 26 06/29/2017    (H) 06/29/2017   CA 9 6/27/2017  CONCLUSION:  1. No acute cardiopulmonary disease. Mild elevation right diaphragm. Ct Stroke Brain (no Iv)(cpt=70450)    Result Date: 6/27/2017  CONCLUSION:  1. No acute hemorrhage is identified.  No acute intracranial process by Armin Valdovinos

## 2017-06-29 NOTE — DISCHARGE PLANNING
6/29CM-The Patient is pending a PMR consult. Case Management to follow up after the PMR recommendation.      -[facility] [id number]

## 2017-06-29 NOTE — CONSULTS
PHYSICAL MEDICINE AND REHABILITATION CONSULTATION     CC: Impaired mobility and ADL dysfunction secondary to transient alteration of awareness    HPI: This is a 50year old female with PMH of intracranial bleed and multiple cerebrovascular accidents with r 1-11 Units 1-11 Units Subcutaneous TID CC   [COMPLETED] levETIRAcetam (KEPPRA) 500 mg in sodium chloride 0.9 % 100 mL IVPB 500 mg Intravenous Once   Clopidogrel Bisulfate (PLAVIX) tab 75 mg 75 mg Oral Daily   aspirin tab 325 mg 325 mg Oral Daily   atorvast CHOLECYSTECTOMY    Family History:   Family History   Problem Relation Age of Onset   • Diabetes Father    • Heart Disorder Mother    • Glaucoma Neg    • Macular degeneration Neg        Social History:  The patient lives in a condo with her sister.  There a dysfunction secondary to transient alteration of awareness  2. Gait dysfunction with fall risk  3. Hx for multiple strokes in past with residual left sided weakness  4.  HTN  5.DM II uncontrolled with elevated HgA1c    IMPAIRMENTS: ADLs, functional mobility

## 2017-06-29 NOTE — PROGRESS NOTES
[facility]    Progress Note    Daiana Jackson Patient Status:  Inpatient    3/30/1970 MRN [id number]   Location [facility] 3W/SW Attending Brien Estes MD   Hosp Day # 2 PCP Jaswinder Camejo MD       SUBJECTIVE:  No CP, SO flow void of the right MCA, suggesting collateral flow from the left carotid system. Further evaluation with MRA is recommended.   3. Encephalomalacia sequela of remote insults of the right frontal lobe, in the vascular territory the right MCA/ZARA, left par called immediately at 1512 hours to Emergency Department Pod 3 and discussed with the patient's ER physician, Dr. Gabrielle Canela.            Meds:     Current Facility-Administered Medications:  acetaminophen (TYLENOL) tab 650 mg 650 mg Oral Q6H PRN   ondansetron abdominal wall     Allergic rhinitis     Essential hypertension with goal blood pressure less than 140/90     TMJ (dislocation of temporomandibular joint)     Iron deficiency anemia     Leukocytosis     Plantar fasciitis of left foot     Type 2 diabetes me

## 2017-06-30 LAB
ANION GAP SERPL CALC-SCNC: 10 MMOL/L (ref 0–18)
BASOPHILS # BLD: 0.1 K/UL (ref 0–0.2)
BASOPHILS NFR BLD: 1 %
BUN SERPL-MCNC: 15 MG/DL (ref 8–20)
BUN/CREAT SERPL: 18.3 (ref 10–20)
CALCIUM SERPL-MCNC: 9.4 MG/DL (ref 8.5–10.5)
CHLORIDE SERPL-SCNC: 98 MMOL/L (ref 95–110)
CO2 SERPL-SCNC: 26 MMOL/L (ref 22–32)
CREAT SERPL-MCNC: 0.82 MG/DL (ref 0.5–1.5)
EOSINOPHIL # BLD: 0.2 K/UL (ref 0–0.7)
EOSINOPHIL NFR BLD: 2 %
ERYTHROCYTE [DISTWIDTH] IN BLOOD BY AUTOMATED COUNT: 19.4 % (ref 11–15)
GLUCOSE BLDC GLUCOMTR-MCNC: 167 MG/DL (ref 70–99)
GLUCOSE BLDC GLUCOMTR-MCNC: 212 MG/DL (ref 70–99)
GLUCOSE BLDC GLUCOMTR-MCNC: 254 MG/DL (ref 70–99)
GLUCOSE BLDC GLUCOMTR-MCNC: 277 MG/DL (ref 70–99)
GLUCOSE SERPL-MCNC: 219 MG/DL (ref 70–99)
HCT VFR BLD AUTO: 33.8 % (ref 35–48)
HGB BLD-MCNC: 10.6 G/DL (ref 12–16)
LYMPHOCYTES # BLD: 2.5 K/UL (ref 1–4)
LYMPHOCYTES NFR BLD: 22 %
MCH RBC QN AUTO: 21.5 PG (ref 27–32)
MCHC RBC AUTO-ENTMCNC: 31.4 G/DL (ref 32–37)
MCV RBC AUTO: 68.5 FL (ref 80–100)
MONOCYTES # BLD: 0.8 K/UL (ref 0–1)
MONOCYTES NFR BLD: 7 %
NEUTROPHILS # BLD AUTO: 7.7 K/UL (ref 1.8–7.7)
NEUTROPHILS NFR BLD: 69 %
OSMOLALITY UR CALC.SUM OF ELEC: 286 MOSM/KG (ref 275–295)
PLATELET # BLD AUTO: 290 K/UL (ref 140–400)
PMV BLD AUTO: 9.1 FL (ref 7.4–10.3)
POTASSIUM SERPL-SCNC: 3.9 MMOL/L (ref 3.3–5.1)
RBC # BLD AUTO: 4.94 M/UL (ref 3.7–5.4)
SODIUM SERPL-SCNC: 134 MMOL/L (ref 136–144)
WBC # BLD AUTO: 11.2 K/UL (ref 4–11)

## 2017-06-30 PROCEDURE — 99232 SBSQ HOSP IP/OBS MODERATE 35: CPT | Performed by: OTHER

## 2017-06-30 PROCEDURE — 99233 SBSQ HOSP IP/OBS HIGH 50: CPT | Performed by: HOSPITALIST

## 2017-06-30 NOTE — PROGRESS NOTES
[facility] - [facility]    Progress Note    Hood Marte Patient Status:  Inpatient    3/30/1970 MRN [id number]   Location [facility] 3W/SW Attending Constance Rondon MD   Hosp Day # 3 PCP Eliot Spencer MD       Subjective:   Luis Courts much.        Medications:     Current Facility-Administered Medications:  acetaminophen (TYLENOL) tab 650 mg 650 mg Oral Q6H PRN   ondansetron HCl (ZOFRAN) injection 4 mg 4 mg Intravenous Q6H PRN   LORazepam (ATIVAN) injection 2 mg 2 mg Intravenous Q4H PRN INR 1.0 06/27/2017   PT 12.8 09/26/2016   TSH 1.02 06/28/2017   ESRML 38 (H) 06/28/2017   TROP 0.03 06/27/2017   B12 316 06/28/2017       Mri Brain (cpt=70551)    Result Date: 6/29/2017  CONCLUSION:  Severely motion compromised examination.  Within these and PCA, respectively. 5. Senescent changes of parenchymal volume loss with sequela of chronic microvascular ischemic disease. There is also large vessel atherosclerosis. 6. Lesser incidental findings as above.         Cta Carotid Arteries (psm=66323)

## 2017-06-30 NOTE — PHYSICAL THERAPY NOTE
PHYSICAL THERAPY TREATMENT NOTE - INPATIENT    Room Number: 346/346-A       Presenting Problem: AMS    Problem List  Principal Problem:    Altered mental status  Active Problems:    Transient alteration of awareness      ASSESSMENT   Received pt sitting i with arms (e.g., wheelchair, bedside commode, etc.): A Little   -   Moving from lying on back to sitting on the side of the bed?: A Little   How much help from another person does the patient currently need. ..   -   Moving to and from a bed to a chair (inc Pt performed B LE HEP in the chair 1x20        Goal #6     Goal #6  Current Status

## 2017-06-30 NOTE — PAYOR COMM NOTE
REF# [id number]    Progress Notes signed by Elaina Jha MD at 6/30/2017  9:22 AM     Author: Elaina Jha MD Service: (none) Author Type: Physician   Filed: 6/30/2017  9:22 AM Date of Service: 6/30/2017  9:20 AM Status: Signed   :  Sheri Patient admitted with near syncope. MRI does not show any acute change. CT angiogram was obtained, which shows a complete occlusion of the right internal carotid artery, which is a new finding.   She has flow from the external carotid through an anastomos Metoprolol Succinate ER (Toprol XL) 24 hr tab 25 mg 25 mg Oral Daily   Pantoprazole Sodium (PROTONIX) EC tab 40 mg 40 mg Oral QAM AC         Results:      Lab Results  Component Value Date   WBC 11.2 (H) 06/30/2017   HGB 10.6 (L) 06/30/2017   HCT 33.8 (L) CONCLUSION:            1. There is a right external-internal carotid anastomosis. This appears to be continuously patent with preservation of flow in the right MCA and right ZARA.  Thready, minimal flow is present in the petrous and cavernous segments of the

## 2017-06-30 NOTE — OCCUPATIONAL THERAPY NOTE
OCCUPATIONAL THERAPY TREATMENT NOTE - INPATIENT     Room Number: 346/346-A      Presenting Problem:  (Dizziness, Weakness)    Problem List  Principal Problem:    Altered mental status  Active Problems:    Transient alteration of awareness      ASSESSMENT 46.65%  Standardized Score (AM-PAC Scale): 38.66  CMS Modifier (G-Code): CK    FUNCTIONAL TRANSFER ASSESSMENT  Supine to Sit : Not tested  Sit to Stand: CGA    Toilet Transfer: CGA  Shower Transfer: CGA  Chair Transfer: CGA    Bedroom Mobility: CGA    FUNC

## 2017-06-30 NOTE — DISCHARGE PLANNING
6/30CM-MD orders received in regards to discharge planning. The Patient was seen at bedside. The Patient resides with her sister  in [place] in a single family home with 12 stairs and 3 steps to enter.  Prior to hospitalization, the Patient wasn't drivin

## 2017-06-30 NOTE — PROGRESS NOTES
Patient's history and studies were reviewed and discussed with Dr. Gregory Owens from neurology. She may be a candidate for right external carotid endarterectomy.   We have arranged a follow-up appointment with me as an outpatient in my office on 7/19/2017

## 2017-06-30 NOTE — PROGRESS NOTES
[facility]    Progress Note    Nany Xavier Patient Status:  Inpatient    3/30/1970 MRN [id number]   Location [facility] 3W/SW Attending Jcarlos Foss MD   Hosp Day # 3 PCP Shahid Macedo MD       Subjective:   Raphael Monae void of the right MCA, suggesting collateral flow from the left carotid system. Further evaluation with MRA is recommended.   3. Encephalomalacia sequela of remote insults of the right frontal lobe, in the vascular territory the right MCA/ZARA, left parietal chronic microvascular ischemic disease. There is also large vessel atherosclerosis. 6. Lesser incidental findings as above. Cta Carotid Arteries (cpt=70498)    Result Date: 6/29/2017  CONCLUSION:  1.  Status post right external-internal carotid kalie pending  - TSH, B12 nl  - echo with normal EF, no wall motion abnormalities  - continue keppra      DM type 2  - a1c 8.2  - cont accuchecks, ISS  - continue levemier 50 units daily      Hyperlipidemia  - cont statin     Hypertension  - cont current meds, m

## 2017-07-01 LAB
ANION GAP SERPL CALC-SCNC: 10 MMOL/L (ref 0–18)
BASOPHILS # BLD: 0.1 K/UL (ref 0–0.2)
BASOPHILS NFR BLD: 1 %
BUN SERPL-MCNC: 14 MG/DL (ref 8–20)
BUN/CREAT SERPL: 18.7 (ref 10–20)
CALCIUM SERPL-MCNC: 9.5 MG/DL (ref 8.5–10.5)
CHLORIDE SERPL-SCNC: 97 MMOL/L (ref 95–110)
CO2 SERPL-SCNC: 28 MMOL/L (ref 22–32)
CREAT SERPL-MCNC: 0.75 MG/DL (ref 0.5–1.5)
EOSINOPHIL # BLD: 0.3 K/UL (ref 0–0.7)
EOSINOPHIL NFR BLD: 2 %
ERYTHROCYTE [DISTWIDTH] IN BLOOD BY AUTOMATED COUNT: 19.1 % (ref 11–15)
GLUCOSE BLDC GLUCOMTR-MCNC: 214 MG/DL (ref 70–99)
GLUCOSE BLDC GLUCOMTR-MCNC: 224 MG/DL (ref 70–99)
GLUCOSE BLDC GLUCOMTR-MCNC: 225 MG/DL (ref 70–99)
GLUCOSE BLDC GLUCOMTR-MCNC: 259 MG/DL (ref 70–99)
GLUCOSE SERPL-MCNC: 206 MG/DL (ref 70–99)
HCT VFR BLD AUTO: 34 % (ref 35–48)
HGB BLD-MCNC: 10.6 G/DL (ref 12–16)
LYMPHOCYTES # BLD: 2.9 K/UL (ref 1–4)
LYMPHOCYTES NFR BLD: 25 %
MCH RBC QN AUTO: 21.8 PG (ref 27–32)
MCHC RBC AUTO-ENTMCNC: 31.1 G/DL (ref 32–37)
MCV RBC AUTO: 69.9 FL (ref 80–100)
MONOCYTES # BLD: 0.8 K/UL (ref 0–1)
MONOCYTES NFR BLD: 7 %
NEUTROPHILS # BLD AUTO: 7.3 K/UL (ref 1.8–7.7)
NEUTROPHILS NFR BLD: 65 %
OSMOLALITY UR CALC.SUM OF ELEC: 286 MOSM/KG (ref 275–295)
PLATELET # BLD AUTO: 283 K/UL (ref 140–400)
PMV BLD AUTO: 9.4 FL (ref 7.4–10.3)
POTASSIUM SERPL-SCNC: 3.9 MMOL/L (ref 3.3–5.1)
RBC # BLD AUTO: 4.86 M/UL (ref 3.7–5.4)
SODIUM SERPL-SCNC: 135 MMOL/L (ref 136–144)
WBC # BLD AUTO: 11.3 K/UL (ref 4–11)

## 2017-07-01 PROCEDURE — 99233 SBSQ HOSP IP/OBS HIGH 50: CPT | Performed by: HOSPITALIST

## 2017-07-01 PROCEDURE — 99233 SBSQ HOSP IP/OBS HIGH 50: CPT | Performed by: OTHER

## 2017-07-01 NOTE — PROGRESS NOTES
[facility]    Progress Note    Yolande Leung Patient Status:  Inpatient    3/30/1970 MRN [id number]   Location [facility] 3W/SW Attending Mirna Chavez MD   Hosp Day # 4 PCP Iván Stubbs MD       Subjective:   Wayne Maxwell visualized, which may relate to occlusion. There is a diminutive flow void of the right MCA, suggesting collateral flow from the left carotid system. Further evaluation with MRA is recommended.   3. Encephalomalacia sequela of remote insults of the right fr anastomosis between the distal right external carotid artery and posterior division M2/M3 branches of the right middle cerebral artery. 2. There has been interval complete occlusion of the distal right ICA just beyond the origin.  Previously, there was a h type 2  - a1c 8.2  - cont accuchecks, ISS  - continue levemier 50 units daily      Hyperlipidemia  - cont statin     Hypertension  - cont current meds, monitor vitals     Obesity   - BMI 34  - counseled on diet and exercise     Chronic left hemiparesis  -

## 2017-07-01 NOTE — PLAN OF CARE
Diabetes/Glucose Control    • Glucose maintained within prescribed range Not Progressing          NEUROLOGICAL - ADULT    • Achieves stable or improved neurological status Progressing    • Absence of seizures Progressing    • Remains free of injury related

## 2017-07-02 LAB
GLUCOSE BLDC GLUCOMTR-MCNC: 193 MG/DL (ref 70–99)
GLUCOSE BLDC GLUCOMTR-MCNC: 226 MG/DL (ref 70–99)
GLUCOSE BLDC GLUCOMTR-MCNC: 235 MG/DL (ref 70–99)
GLUCOSE BLDC GLUCOMTR-MCNC: 327 MG/DL (ref 70–99)

## 2017-07-02 PROCEDURE — 99233 SBSQ HOSP IP/OBS HIGH 50: CPT | Performed by: HOSPITALIST

## 2017-07-02 RX ORDER — GABAPENTIN 300 MG/1
300 CAPSULE ORAL 3 TIMES DAILY
Status: DISCONTINUED | OUTPATIENT
Start: 2017-07-02 | End: 2017-07-03

## 2017-07-02 NOTE — PROGRESS NOTES
[facility]    Progress Note    Yue Robles Patient Status:  Inpatient    3/30/1970 MRN [id number]   Location [facility] 3W/SW Attending Grace Esqueda MD   Hosp Day # 5 PCP Davie Desai MD       Subjective:   Garima Streeter mg Oral Daily   • ferrous sulfate  325 mg Oral Daily   • insulin aspart  15 Units Subcutaneous TID CC   • hydrochlorothiazide  25 mg Oral Daily   • insulin detemir  50 Units Subcutaneous Daily   • levETIRAcetam  500 mg Oral BID   • cetirizine  10 mg Oral D

## 2017-07-02 NOTE — CONSULTS
She relates no new neurological problems. She still complaining of fatigue, dizziness but this is been present for several days. Residual left-sided weakness. Exam: Visual fields are full.   Cranial nerves III through VII 9 through 12 normal.  There is

## 2017-07-03 VITALS
SYSTOLIC BLOOD PRESSURE: 119 MMHG | RESPIRATION RATE: 18 BRPM | DIASTOLIC BLOOD PRESSURE: 73 MMHG | WEIGHT: 192.31 LBS | BODY MASS INDEX: 35.39 KG/M2 | TEMPERATURE: 98 F | HEART RATE: 63 BPM | HEIGHT: 62 IN | OXYGEN SATURATION: 96 %

## 2017-07-03 LAB
GLUCOSE BLDC GLUCOMTR-MCNC: 203 MG/DL (ref 70–99)
GLUCOSE BLDC GLUCOMTR-MCNC: 204 MG/DL (ref 70–99)

## 2017-07-03 PROCEDURE — 99239 HOSP IP/OBS DSCHRG MGMT >30: CPT | Performed by: HOSPITALIST

## 2017-07-03 RX ORDER — CLOPIDOGREL BISULFATE 75 MG/1
75 TABLET ORAL DAILY
Qty: 30 TABLET | Refills: 0 | Status: SHIPPED | OUTPATIENT
Start: 2017-07-03 | End: 2017-08-01

## 2017-07-03 RX ORDER — METOPROLOL SUCCINATE 25 MG/1
25 TABLET, EXTENDED RELEASE ORAL
Status: DISCONTINUED | OUTPATIENT
Start: 2017-07-03 | End: 2017-07-03

## 2017-07-03 NOTE — PHYSICAL THERAPY NOTE
PHYSICAL THERAPY TREATMENT NOTE - INPATIENT    Room Number: 346/346-A       Presenting Problem: AMS    Problem List  Principal Problem:    Altered mental status  Active Problems:    Transient alteration of awareness      ASSESSMENT   Consulted w/ RN prior lying on back to sitting on the side of the bed?: A Little   How much help from another person does the patient currently need. ..   -   Moving to and from a bed to a chair (including a wheelchair)?: A Little   -   Need to walk in hospital room?: A Little

## 2017-07-03 NOTE — DISCHARGE PLANNING
SW received phone call from PT stating that recommendations at this time are for [facility]. SW placed referral to [facility]. Will need HHC order prior to d/c.     Due to pt's insurance, [facility] and there is no guarantee for [facility] post d/c fro

## 2017-07-03 NOTE — DISCHARGE SUMMARY
[facility] [facility] - [facility]    Discharge Summary    Enmanuel Paris Patient Status:  Inpatient    3/30/1970 MRN [id number]   Location [facility] 3W/SW Attending Deepika Lomeli MD   [facility] Day [facility] 6 PCP Mili Renner MD     Date of Admission:  minor blurry vision and dizziness   - secondary to probable carotid stenosis  - Consulted Dr. Charanjit Lemus- follow up for cea as an outpatient 7/19/2017  - neuro consulted  - eeg negative for acute process  - MRI brain - no acute process  - reviewed carotid dop not apply Misc  USE DAILY WITH LEVEMIR    atorvastatin 40 MG Oral Tab  Take 1 tablet (40 mg total) by mouth nightly. docusate sodium (DOCQLACE) 100 MG Oral Cap  Take 1 capsule (100 mg total) by mouth 2 (two) times daily.     Blood Glucose Monitoring Supp Diet     Discharge Activity: As tolerated       Discharge Medications      START taking these medications      Instructions Prescription details   Clopidogrel Bisulfate 75 MG Tabs  Commonly known as:  PLAVIX      Take 1 tablet (75 mg total) by mouth daily. blood sugar three times daily.    Quantity:  300 strip  Refills:  11     HUMALOG KWIKPEN 100 UNIT/ML Sopn  Generic drug:  Insulin Lispro      ADMINISTER 15 UNITS UNDER THE SKIN THREE TIMES DAILY WITH MEALS   Quantity:  15 mL  Refills:  5     hydrochlorothia Phone:  [phone]   · hydrochlorothiazide 25 MG Tabs  · Metoprolol Succinate  MG Tb24  · Omeprazole 40 MG Cpdr     Please  your prescriptions at the location directed by your doctor or nurse    Bring a paper prescription for each of these m yes

## 2017-07-03 NOTE — PLAN OF CARE
Diabetes/Glucose Control    • Glucose maintained within prescribed range Completed        NEUROLOGICAL - ADULT    • Achieves stable or improved neurological status Completed    • Absence of seizures Completed    • Remains free of injury related to seizure

## 2017-07-03 NOTE — OCCUPATIONAL THERAPY NOTE
OCCUPATIONAL THERAPY TREATMENT NOTE - INPATIENT     Room Number: 346/346-A          Presenting Problem:  (Dizziness, Weakness)    Problem List  Principal Problem:    Altered mental status  Active Problems:    Transient alteration of awareness      ASSESSME with).     OBJECTIVE  Precautions: Low vision    WEIGHT BEARING RESTRICTION  Weight Bearing Restriction: None                PAIN ASSESSMENT  Ratin             ACTIVITIES OF DAILY LIVING ASSESSMENT  AM-PAC ‘6-Clicks’ Inpatient Daily Activity Short Form 3-5x/week    MAYA Saba/CRISTOFER 7/3/2017

## 2017-07-05 ENCOUNTER — TELEPHONE (OUTPATIENT)
Dept: INTERNAL MEDICINE UNIT | Facility: HOSPITAL | Age: 47
End: 2017-07-05

## 2017-07-05 ENCOUNTER — TELEPHONE (OUTPATIENT)
Dept: MEDSURG UNIT | Facility: HOSPITAL | Age: 47
End: 2017-07-05

## 2017-07-05 DIAGNOSIS — D64.9 ANEMIA, UNSPECIFIED TYPE: ICD-10-CM

## 2017-07-05 NOTE — PAYOR COMM NOTE
REF# [id number]  DISCHARGED 7/3  Progress Notes signed by Yvrose Simms MD at 7/1/2017 12:07 PM     Author: Yvrose Simms MD Service: (none) Author Type: Physician   Filed: 7/1/2017 12:07 PM Date of Service: 7/1/2017 12:02 PM Status: Signed   : TSH 1.02 06/28/2017   ESRML 38 (H) 06/28/2017   TROP 0.03 06/27/2017   B12 316 06/28/2017         Mri Brain (hbd=[id number])     Result Date: 6/29/2017  CONCLUSION:            Severely motion compromised examination. Within these parameters: 1.  No acute intracr CONCLUSION:            1. There is a right external-internal carotid anastomosis. This appears to be continuously patent with preservation of flow in the right MCA and right ZARA.  Thready, minimal flow is present in the petrous and cavernous segments of the • ferrous sulfate  325 mg Oral Daily   • insulin aspart  15 Units Subcutaneous TID CC   • hydrochlorothiazide  25 mg Oral Daily   • insulin detemir  50 Units Subcutaneous Daily   • levETIRAcetam  500 mg Oral BID   • cetirizine  10 mg Oral Daily   • haresh []Copied  [facility]     Progress Note           Enmanuel Paris Patient Status:  Inpatient    3/30/1970 MRN [id number]   Location [facility] 3W/SW Attending Deepika Lomeli MD   Hosp Day # 5 PCP MD Alli Jackson • docusate sodium  100 mg Oral BID   • escitalopram  10 mg Oral Daily   • ferrous sulfate  325 mg Oral Daily   • insulin aspart  15 Units Subcutaneous TID CC   • hydrochlorothiazide  25 mg Oral Daily   • insulin detemir  50 Units Subcutaneous Daily   • lev Hospital Account: [de-identified]   Description: 50year old F Primary Service: Cardiac Telemetry Unit Info: [address] [place] [address] 3-W/SW   Discharge Summary Notes     Discharge Summaries signed by Mc Paulino MD at 7/3/2017  4:21 PM     Author: Mc Paulino MD Service: (    Discharge Physical Exam:   Physical Exam:    General: No acute distress. Respiratory: Clear to auscultation bilaterally. No wheezes. No rhonchi. Cardiovascular: S1, S2. Regular rate and rhythm. No murmurs, rubs or gallops.    Abdomen: Soft, nontender,   TROPONIN I, 0 HOUR Collected (06/27/17 8031)             Discharge Plan:   Discharge Condition: Stable     Current Discharge Medication List     New Orders     Clopidogrel Bisulfate 75 MG Oral Tab  Take 1 tablet (75 mg total) by mouth daily.        Home Take 1 tablet (100 mg total) by mouth daily.     Omeprazole 40 MG Oral Capsule Delayed Release  TAKE 1 CAPSULE BY MOUTH EVERY DAY 30 TO 60 MINUTES BEFORE A MEAL     hydrochlorothiazide 25 MG Oral Tab  Take 1 tablet (25 mg total) by mouth once daily.     lo BD PEN NEEDLE ABDOULAYE U/F 32G X 4 MM Misc  Generic drug:  Insulin Pen Needle    USE DAILY WITH LEVEMIR Quantity:  100 each  Refills:  3   docusate sodium 100 MG Caps  Commonly known as:  DOCQLACE    Take 1 capsule (100 mg total) by mouth 2 (two) times daily. Commonly known as:  GLUCOPHAGE    Take 1 tablet (1,000 mg total) by mouth 2 (two) times daily with meals. Quantity:  60 tablet  Refills:  6   TRUE METRIX AIR GLUCOSE METER w/Device Kit    1 kit by Does not apply route as needed.  Use as directed Quantity: GO TO YOUR APPOINTMENT WITH DR Casandra Domingo ON July 19 AT 08:30 AM.

## 2017-07-05 NOTE — TELEPHONE ENCOUNTER
Pt discharged from [facility] on 7/3/17 . Please call to schedule follow up with Primary Care Physician.    Thanks

## 2017-07-06 ENCOUNTER — OFFICE VISIT (OUTPATIENT)
Dept: PODIATRY CLINIC | Facility: CLINIC | Age: 47
End: 2017-07-06

## 2017-07-06 DIAGNOSIS — M72.2 PLANTAR FASCIITIS, BILATERAL: Primary | ICD-10-CM

## 2017-07-06 PROCEDURE — 99212 OFFICE O/P EST SF 10 MIN: CPT | Performed by: PODIATRIST

## 2017-07-06 NOTE — PROGRESS NOTES
HPI:    Patient ID: Daiana Jackson is a 50year old female. HPI  This 64-year-old female presents for follow-up in reference to bilateral arch and heel pain.   Patient has consistently improved although she feels as though the left support is not exactly Tab Take 1 tablet (500 mg total) by mouth 2 (two) times daily. Disp: 180 tablet Rfl: 1   acetaminophen 325 MG Oral Tab 2 tabs 3 times daily.  Disp: 360 tablet Rfl: 0   HUMALOG KWIKPEN 100 UNIT/ML Subcutaneous Solution Pen-injector ADMINISTER 15 UNITS UNDER

## 2017-07-07 RX ORDER — FERROUS SULFATE 325(65) MG
325 TABLET ORAL 2 TIMES DAILY
Qty: 60 TABLET | Refills: 3 | Status: SHIPPED | OUTPATIENT
Start: 2017-07-07 | End: 2017-07-08

## 2017-07-10 ENCOUNTER — TELEPHONE (OUTPATIENT)
Dept: INTERNAL MEDICINE CLINIC | Facility: CLINIC | Age: 47
End: 2017-07-10

## 2017-07-10 RX ORDER — TRAZODONE HYDROCHLORIDE 50 MG/1
TABLET ORAL
Qty: 30 TABLET | Refills: 0 | Status: SHIPPED | OUTPATIENT
Start: 2017-07-10 | End: 2017-07-11

## 2017-07-10 NOTE — TELEPHONE ENCOUNTER
Per Rodney, Dr. Emerson Harrison unable to add pt to full schedule. Pt scheduled to see Dr. Jannet Henning 7/11/17 at 0910. Sister was again instructed to take pt directly to ED if symptoms return. She voices understanding and agrees with plan.

## 2017-07-10 NOTE — TELEPHONE ENCOUNTER
Actions Requested:  Appt for this afternoon with EV. Problem: Weakness on left side, was unable to walk yesterday  Onset and Timing: Yesterday  Associated Symptoms: Nausea since beginning Plavix.   Aggravating by: None  Alleviated by: None  Triage Note: Kofi side of the body, loss of speech or garbled speech  * Sounds like a life-threatening emergency to the triager  * Headache (with neurologic deficit)  * Can't use hand normally (e.g., hold a glass of water)  * Can't walk or can barely walk  * Back pain with

## 2017-07-11 ENCOUNTER — OFFICE VISIT (OUTPATIENT)
Dept: INTERNAL MEDICINE CLINIC | Facility: CLINIC | Age: 47
End: 2017-07-11

## 2017-07-11 ENCOUNTER — TELEPHONE (OUTPATIENT)
Dept: OBGYN CLINIC | Facility: CLINIC | Age: 47
End: 2017-07-11

## 2017-07-11 VITALS
WEIGHT: 192 LBS | HEART RATE: 81 BPM | HEIGHT: 62 IN | TEMPERATURE: 98 F | BODY MASS INDEX: 35.33 KG/M2 | DIASTOLIC BLOOD PRESSURE: 92 MMHG | SYSTOLIC BLOOD PRESSURE: 142 MMHG

## 2017-07-11 DIAGNOSIS — I65.21: ICD-10-CM

## 2017-07-11 DIAGNOSIS — I63.9 LEFT-SIDED CEREBROVASCULAR ACCIDENT (CVA) (HCC): Primary | ICD-10-CM

## 2017-07-11 DIAGNOSIS — N91.2 AMENIA: ICD-10-CM

## 2017-07-11 DIAGNOSIS — K21.9 GASTROESOPHAGEAL REFLUX DISEASE WITHOUT ESOPHAGITIS: ICD-10-CM

## 2017-07-11 DIAGNOSIS — F32.9 REACTIVE DEPRESSION: ICD-10-CM

## 2017-07-11 DIAGNOSIS — E11.49 TYPE 2 DIABETES MELLITUS WITH OTHER NEUROLOGIC COMPLICATION: ICD-10-CM

## 2017-07-11 PROCEDURE — 99212 OFFICE O/P EST SF 10 MIN: CPT | Performed by: INTERNAL MEDICINE

## 2017-07-11 PROCEDURE — 99214 OFFICE O/P EST MOD 30 MIN: CPT | Performed by: INTERNAL MEDICINE

## 2017-07-11 RX ORDER — ESCITALOPRAM OXALATE 20 MG/1
20 TABLET ORAL DAILY
Qty: 30 TABLET | Refills: 1 | Status: SHIPPED | OUTPATIENT
Start: 2017-07-11 | End: 2017-08-10

## 2017-07-11 NOTE — PROGRESS NOTES
Valdemar Pang is a 50year old female.   Patient presents with:  Hospital F/U: presenting for weakness 7/9/17      HPI:   Pt is a 51 yo woman comes with her sister Shun Haynes  Had a stroke in 2014 --?hemorrhagic - surg [facility] - s/p craniotomy with re Glucose Blood (TRUE METRIX BLOOD GLUCOSE TEST) In Vitro Strip Test blood sugar three times daily. Disp: 300 strip Rfl: 11   levETIRAcetam 500 MG Oral Tab Take 1 tablet (500 mg total) by mouth 2 (two) times daily.  Disp: 180 tablet Rfl: 1   HUMALOG KWIKPEN History:  Smoking status: Former Smoker                                                              Packs/day: 0.00      Years: 0.00         Quit date: 12/16/2013  Smokeless tobacco: Never Used                      Alcohol use:  No                 REVIEW O doctor  Reactive depression  Increase dose of meds   -     escitalopram 20 MG Oral Tab;  Take 1 tablet (20 mg total) by mouth daily.  -      NAVIGATOR    Type 2 diabetes mellitus with other neurologic complication (HCC)   Cont to check   [place] -microcy[facility]

## 2017-07-11 NOTE — TELEPHONE ENCOUNTER
Pt's sister states pt can not remember what appointment is for tomorrow. Explained to Isidra Albert that appt is for embx. Procedure explained. Isidra Albert informed that pt can take ibuprofen prior to appt if desired. Gregory Morejon understanding.

## 2017-07-11 NOTE — PATIENT INSTRUCTIONS
Managing Type 2 Diabetes    Type 2 diabetes is a long-term (chronic) condition. Managing your diabetes means making some changes that may be hard. Your healthcare provider, nurse, diabetes educator, and others can help you.   Managing type 2 diabetes mean Ask your health care provider to work with you to create an activity program that's right for you. Your activity program is based on your age, general health, and types of activity you enjoy.  You should start slowly, but aim for at least 30 minutes of exer ¨ At least two times a year, your healthcare provider will check your hemoglobin A1C. This blood test shows how well you have been controlling your blood sugar over 2 to 3 months.  The results help your healthcare provider manage your diabetes.    ¨ You mary

## 2017-07-12 ENCOUNTER — OFFICE VISIT (OUTPATIENT)
Dept: OBGYN CLINIC | Facility: CLINIC | Age: 47
End: 2017-07-12

## 2017-07-12 VITALS
DIASTOLIC BLOOD PRESSURE: 82 MMHG | HEART RATE: 83 BPM | WEIGHT: 192.38 LBS | SYSTOLIC BLOOD PRESSURE: 141 MMHG | BODY MASS INDEX: 35 KG/M2

## 2017-07-12 DIAGNOSIS — N92.0 EXCESSIVE OR FREQUENT MENSTRUATION: ICD-10-CM

## 2017-07-12 DIAGNOSIS — R87.615 UNSATISFACTORY CERVICAL CYTOLOGY SMEAR: Primary | ICD-10-CM

## 2017-07-12 LAB
CONTROL LINE PRESENT WITH A CLEAR BACKGROUND (YES/NO): YES YES/NO
KIT LOT #: NORMAL NUMERIC

## 2017-07-12 PROCEDURE — 81025 URINE PREGNANCY TEST: CPT | Performed by: OBSTETRICS & GYNECOLOGY

## 2017-07-12 PROCEDURE — 58100 BIOPSY OF UTERUS LINING: CPT | Performed by: OBSTETRICS & GYNECOLOGY

## 2017-07-12 NOTE — PROCEDURES
Endometrial Biopsy     Pre-Procedure Care:   Consent was obtained. Procedure/risks were explained. Questions were answered. Correct patient was identified. Correct side and site were confirmed.     Pregnancy Results: negative from urine test     Indicat

## 2017-07-14 RX ORDER — INSULIN LISPRO 100 [IU]/ML
INJECTION, SOLUTION INTRAVENOUS; SUBCUTANEOUS
Qty: 15 ML | Refills: 2 | Status: SHIPPED | OUTPATIENT
Start: 2017-07-14 | End: 2017-09-28

## 2017-07-14 RX ORDER — TRAZODONE HYDROCHLORIDE 50 MG/1
TABLET ORAL
Qty: 30 TABLET | Refills: 0 | OUTPATIENT
Start: 2017-07-14

## 2017-07-14 NOTE — TELEPHONE ENCOUNTER
I am covering for Dr. Dominick Cohen, cannot  approve this trazodone, no records in our list of medications, she did see Dr. Ramiro Craven can ask her if she would approve trazodone, she mentioned depression and referring her to psychiatrist and he will let us know

## 2017-07-15 NOTE — TELEPHONE ENCOUNTER
Yes, pt was seen this week with her sister and it is noted that the trazadone was stoppd in 2014 due to side effects . She was started on lexarp and referred to  behavioral health at her office visit .  I do not think refill of this medication is appropri

## 2017-07-18 RX ORDER — ACETAMINOPHEN 325 MG/1
TABLET ORAL
Qty: 180 TABLET | Refills: 0 | OUTPATIENT
Start: 2017-07-18

## 2017-07-18 NOTE — TELEPHONE ENCOUNTER
Dr Tommy Goltz: please advise on further refills.     Refill Protocol Appointment Criteria  · Appointment scheduled in the past 6 months or in the next 3 months  Recent Outpatient Visits            6 days ago Unsatisfactory cervical cytology smear    Lina CLAROS

## 2017-07-19 NOTE — TELEPHONE ENCOUNTER
Spoke with pt & stated she didn't request rx ref for mapap 325 mg, pt is not even aware the use of this meds. Rx denied. Pt was also advised to f/u with [facility] to update their records. Pt stated understanding. Pt's PCP is Dr Nicolette Richards.    TIM

## 2017-07-28 ENCOUNTER — TELEPHONE (OUTPATIENT)
Dept: OBGYN CLINIC | Facility: CLINIC | Age: 47
End: 2017-07-28

## 2017-07-28 NOTE — TELEPHONE ENCOUNTER
Called patient to review EMB was negative and to discuss options. She would like to do this face to face in the office.  She is to call and make an appointment

## 2017-07-30 RX ORDER — ESCITALOPRAM OXALATE 10 MG/1
TABLET ORAL
Qty: 30 TABLET | Refills: 0 | Status: SHIPPED | OUTPATIENT
Start: 2017-07-30 | End: 2017-08-01

## 2017-08-01 ENCOUNTER — OFFICE VISIT (OUTPATIENT)
Dept: INTERNAL MEDICINE CLINIC | Facility: CLINIC | Age: 47
End: 2017-08-01

## 2017-08-01 VITALS
HEIGHT: 62 IN | BODY MASS INDEX: 34.96 KG/M2 | RESPIRATION RATE: 20 BRPM | DIASTOLIC BLOOD PRESSURE: 80 MMHG | TEMPERATURE: 99 F | WEIGHT: 190 LBS | SYSTOLIC BLOOD PRESSURE: 140 MMHG | HEART RATE: 68 BPM

## 2017-08-01 DIAGNOSIS — I63.9 LEFT-SIDED CEREBROVASCULAR ACCIDENT (CVA) (HCC): ICD-10-CM

## 2017-08-01 DIAGNOSIS — I65.21: ICD-10-CM

## 2017-08-01 DIAGNOSIS — I10 ESSENTIAL HYPERTENSION WITH GOAL BLOOD PRESSURE LESS THAN 140/90: ICD-10-CM

## 2017-08-01 DIAGNOSIS — F32.9 REACTIVE DEPRESSION: ICD-10-CM

## 2017-08-01 DIAGNOSIS — F32.A DEPRESSION, UNSPECIFIED DEPRESSION TYPE: ICD-10-CM

## 2017-08-01 DIAGNOSIS — E11.49 TYPE 2 DIABETES MELLITUS WITH OTHER NEUROLOGIC COMPLICATION: Primary | ICD-10-CM

## 2017-08-01 PROCEDURE — 99214 OFFICE O/P EST MOD 30 MIN: CPT | Performed by: INTERNAL MEDICINE

## 2017-08-01 PROCEDURE — 99212 OFFICE O/P EST SF 10 MIN: CPT | Performed by: INTERNAL MEDICINE

## 2017-08-01 RX ORDER — TRAZODONE HYDROCHLORIDE 50 MG/1
TABLET ORAL
Refills: 0 | COMMUNITY
Start: 2017-07-13 | End: 2017-09-13

## 2017-08-01 RX ORDER — METOPROLOL SUCCINATE 25 MG/1
25 TABLET, EXTENDED RELEASE ORAL NIGHTLY
Refills: 5 | COMMUNITY
Start: 2017-07-13 | End: 2017-09-28

## 2017-08-01 RX ORDER — FERROUS SULFATE 325(65) MG
325 TABLET ORAL 2 TIMES DAILY
COMMUNITY
Start: 2017-07-31 | End: 2017-09-28

## 2017-08-01 RX ORDER — GABAPENTIN 300 MG/1
300 CAPSULE ORAL NIGHTLY
COMMUNITY
Start: 2017-07-17 | End: 2017-09-28

## 2017-08-01 RX ORDER — METOPROLOL SUCCINATE 100 MG/1
TABLET, EXTENDED RELEASE ORAL
Refills: 0 | COMMUNITY
Start: 2017-07-20 | End: 2017-09-28

## 2017-08-01 NOTE — PROGRESS NOTES
HPI:    Patient ID: Chayo Rico is a 50year old female. Hypertension   This is a chronic problem. The current episode started more than 1 year ago. The problem has been gradually improving since onset. The problem is controlled.  Pertinent negatives SKIN THREE TIMES A DAY WITH MEALS Disp: 15 mL Rfl: 2   escitalopram 20 MG Oral Tab Take 1 tablet (20 mg total) by mouth daily.  Disp: 30 tablet Rfl: 1   Omeprazole 40 MG Oral Capsule Delayed Release TAKE 1 CAPSULE BY MOUTH EVERY DAY 30 TO 60 MINUTES BEFORE Constitutional: She is oriented to person, place, and time. She appears well-developed and well-nourished. No distress. Obese    HENT:   Head: Normocephalic and atraumatic.    Right Ear: Tympanic membrane, external ear and ear canal normal.   Left Ear: diabetes mellitus with other neurologic complication (hcc)  (primary encounter diagnosis)  Discussed importance of glycemic control to prevent complications of DM2  Discussed importance of low CHO diet,-1800 osvaldo ADA- Diabetic diet  Education   Encouraged d

## 2017-08-11 ENCOUNTER — OFFICE VISIT (OUTPATIENT)
Dept: OBGYN CLINIC | Facility: CLINIC | Age: 47
End: 2017-08-11

## 2017-08-11 VITALS
SYSTOLIC BLOOD PRESSURE: 142 MMHG | BODY MASS INDEX: 36 KG/M2 | WEIGHT: 194.81 LBS | HEART RATE: 71 BPM | DIASTOLIC BLOOD PRESSURE: 80 MMHG

## 2017-08-11 DIAGNOSIS — N93.9 ABNORMAL UTERINE BLEEDING (AUB): Primary | ICD-10-CM

## 2017-08-11 PROCEDURE — 99212 OFFICE O/P EST SF 10 MIN: CPT | Performed by: OBSTETRICS & GYNECOLOGY

## 2017-08-11 RX ORDER — ESCITALOPRAM OXALATE 20 MG/1
10 TABLET ORAL DAILY
COMMUNITY
End: 2017-09-28

## 2017-08-11 RX ORDER — LINAGLIPTIN 5 MG/1
TABLET, FILM COATED ORAL
COMMUNITY
Start: 2017-08-05 | End: 2017-08-16

## 2017-08-11 NOTE — PROGRESS NOTES
Marybeth Dubon is a 50year old female  No LMP recorded. Patient presents with: Follow - Up: f/u from EMB  Patient presents today for follow up after EMB and US. Pt with normal EMB and US. Reviewed results with her as well as results of labs done. daily., Disp: , Rfl:   •  HUMALOG KWIKPEN 100 UNIT/ML Subcutaneous Solution Pen-injector, INJECT 15 UNITS UNDER THE SKIN THREE TIMES A DAY WITH MEALS, Disp: 15 mL, Rfl: 2  •  Omeprazole 40 MG Oral Capsule Delayed Release, TAKE 1 CAPSULE BY MOUTH EVERY DAY Rfl:     ALLERGIES:    Reglan [Metoclopram*        Comment:Sensitivity, with crawling sensation.       Review of Systems:  Constitutional:  Denies fevers or chills   Gastrointestinal:  denies nausea, vomiting, diarrhea or constipation  Genitourinary:  denie

## 2017-08-14 ENCOUNTER — MED REC SCAN ONLY (OUTPATIENT)
Dept: ENDOCRINOLOGY CLINIC | Facility: CLINIC | Age: 47
End: 2017-08-14

## 2017-08-14 ENCOUNTER — TELEPHONE (OUTPATIENT)
Dept: GASTROENTEROLOGY | Facility: CLINIC | Age: 47
End: 2017-08-14

## 2017-08-14 NOTE — TELEPHONE ENCOUNTER
Pt called to inform EBS that she has not received prep kit for CLN/EGD scheduled on 8/25/17. Please call pt in regards to questions about solution.  Thank You

## 2017-08-14 NOTE — TELEPHONE ENCOUNTER
Dr Pearson Clause:     Please sign off on this prep for procedure date 08/25/17.  Original orders were on 05/02/17

## 2017-08-15 ENCOUNTER — MED REC SCAN ONLY (OUTPATIENT)
Dept: INTERNAL MEDICINE CLINIC | Facility: CLINIC | Age: 47
End: 2017-08-15

## 2017-08-15 RX ORDER — OMEPRAZOLE 40 MG/1
CAPSULE, DELAYED RELEASE ORAL
Qty: 90 CAPSULE | Refills: 0 | OUTPATIENT
Start: 2017-08-15

## 2017-08-15 NOTE — TELEPHONE ENCOUNTER
Pt was notified that the prep was sent to the pharmacy and to not mix the prep with water until she needs it the day before her procedure.  She verbalizes understanding

## 2017-08-16 ENCOUNTER — OFFICE VISIT (OUTPATIENT)
Dept: INTERNAL MEDICINE CLINIC | Facility: CLINIC | Age: 47
End: 2017-08-16

## 2017-08-16 ENCOUNTER — TELEPHONE (OUTPATIENT)
Dept: INTERNAL MEDICINE CLINIC | Facility: CLINIC | Age: 47
End: 2017-08-16

## 2017-08-16 VITALS
DIASTOLIC BLOOD PRESSURE: 82 MMHG | WEIGHT: 190 LBS | BODY MASS INDEX: 34.96 KG/M2 | HEART RATE: 63 BPM | RESPIRATION RATE: 20 BRPM | HEIGHT: 62 IN | SYSTOLIC BLOOD PRESSURE: 150 MMHG | TEMPERATURE: 99 F

## 2017-08-16 DIAGNOSIS — D64.9 ANEMIA, UNSPECIFIED TYPE: ICD-10-CM

## 2017-08-16 DIAGNOSIS — Z91.89 NEED FOR DENTAL CARE: ICD-10-CM

## 2017-08-16 DIAGNOSIS — I10 ESSENTIAL HYPERTENSION: Primary | ICD-10-CM

## 2017-08-16 DIAGNOSIS — E11.49 TYPE 2 DIABETES MELLITUS WITH OTHER NEUROLOGIC COMPLICATION: ICD-10-CM

## 2017-08-16 DIAGNOSIS — I63.9 LEFT-SIDED CEREBROVASCULAR ACCIDENT (CVA) (HCC): ICD-10-CM

## 2017-08-16 PROCEDURE — 99212 OFFICE O/P EST SF 10 MIN: CPT | Performed by: INTERNAL MEDICINE

## 2017-08-16 PROCEDURE — 99214 OFFICE O/P EST MOD 30 MIN: CPT | Performed by: INTERNAL MEDICINE

## 2017-08-16 RX ORDER — AMOXICILLIN 500 MG/1
500 CAPSULE ORAL 3 TIMES DAILY
COMMUNITY
Start: 2017-08-14 | End: 2017-09-08

## 2017-08-16 RX ORDER — TRAZODONE HYDROCHLORIDE 50 MG/1
TABLET ORAL
Qty: 30 TABLET | Refills: 11 | OUTPATIENT
Start: 2017-08-16

## 2017-08-16 NOTE — PROGRESS NOTES
HPI:    Patient ID: Chayo Rico is a 50year old female. Patient patient presents today for approval for dental prcedure  Has possible infection of tooth and  Taking antibiotic . With possible tooth  extraction  Next week .   Patient states doing well Psychiatric/Behavioral: Negative for confusion. The patient is not nervous/anxious. Current Outpatient Prescriptions:  amoxicillin 500 MG Oral Cap Take 500 mg by mouth 3 (three) times daily.    Disp:  Rfl:    PEG 3350-KCl-NaBcb-NaCl-NaSulf ( directed Disp: 1 kit Rfl: 0   Glucose Blood (TRUE METRIX BLOOD GLUCOSE TEST) In Vitro Strip Test blood sugar three times daily. Disp: 300 strip Rfl: 11   losartan 100 MG Oral Tab Take 1 tablet (100 mg total) by mouth once daily.  Disp: 90 tablet Rfl: 0   le has no wheezes. She has no rales. Abdominal: Soft. She exhibits no mass. There is no hepatosplenomegaly. There is no tenderness. There is no CVA tenderness. Musculoskeletal: She exhibits no edema. Lymphadenopathy:     She has no cervical adenopathy. This Visit:    No prescriptions requested or ordered in this encounter       Imaging & Referrals:  None       QX#[id number]

## 2017-08-17 ENCOUNTER — OFFICE VISIT (OUTPATIENT)
Dept: PODIATRY CLINIC | Facility: CLINIC | Age: 47
End: 2017-08-17

## 2017-08-17 DIAGNOSIS — L30.9 DERMATITIS: Primary | ICD-10-CM

## 2017-08-17 PROCEDURE — 99212 OFFICE O/P EST SF 10 MIN: CPT | Performed by: PODIATRIST

## 2017-08-17 PROCEDURE — 99213 OFFICE O/P EST LOW 20 MIN: CPT | Performed by: PODIATRIST

## 2017-08-17 NOTE — PROGRESS NOTES
HPI:    Patient ID: Valdemar Pang is a 50year old female. HPI  This pleasant 15-year-old diabetic presents with concerns and thinks that she has a rash on both of her feet. It has been a concern to her and she thought I should check and evaluated.   R Cap Take 1 capsule (100 mg total) by mouth 2 (two) times daily. Disp: 180 capsule Rfl: 3   Blood Glucose Monitoring Suppl (TRUE METRIX AIR GLUCOSE METER) w/Device Does not apply Kit 1 kit by Does not apply route as needed.  Use as directed Disp: 1 kit Rfl: good portion of the time. Plan follow-up if unresolved         ASSESSMENT/PLAN:   Dermatitis  (primary encounter diagnosis)    No orders of the defined types were placed in this encounter.       Meds This Visit:  No prescriptions requested or ordered in th

## 2017-08-20 RX ORDER — ACETAMINOPHEN 325 MG/1
TABLET ORAL
Qty: 180 TABLET | Refills: 11 | OUTPATIENT
Start: 2017-08-20

## 2017-08-24 ENCOUNTER — OFFICE VISIT (OUTPATIENT)
Dept: OPHTHALMOLOGY | Facility: CLINIC | Age: 47
End: 2017-08-24

## 2017-08-24 DIAGNOSIS — H25.13 AGE-RELATED NUCLEAR CATARACT OF BOTH EYES: ICD-10-CM

## 2017-08-24 DIAGNOSIS — E10.9 TYPE 1 DIABETES MELLITUS WITHOUT RETINOPATHY (HCC): Primary | ICD-10-CM

## 2017-08-24 PROBLEM — E11.9 DIABETES MELLITUS TYPE 2 WITHOUT RETINOPATHY (HCC): Status: ACTIVE | Noted: 2017-08-24

## 2017-08-24 PROCEDURE — 99243 OFF/OP CNSLTJ NEW/EST LOW 30: CPT | Performed by: OPHTHALMOLOGY

## 2017-08-24 PROCEDURE — 99212 OFFICE O/P EST SF 10 MIN: CPT | Performed by: OPHTHALMOLOGY

## 2017-08-24 PROCEDURE — 92015 DETERMINE REFRACTIVE STATE: CPT | Performed by: OPHTHALMOLOGY

## 2017-08-24 NOTE — ASSESSMENT & PLAN NOTE
Diabetes type I: no background retinopathy, no signs of neovascularization noted. Discussed ocular and systemic benefits of blood sugar control.     Discussed with patient that it is important that they continue to follow up with an ophthalmologist in 1 ye

## 2017-08-24 NOTE — PROGRESS NOTES
Mira Aguilar is a 50year old female.     HPI:     HPI     Consult    Additional comments: PCP is Dr. Juan Carlos Nunn           Diabetic Eye Exam    Additional comments: Pt has been a diabetic for 6 years  6 years on pills  2 years on Insulin taking pills and mouth 3 (three) times daily. Disp:  Rfl:    PEG 3350-KCl-NaBcb-NaCl-NaSulf (COLYTE WITH FLAVOR PACKS) 240 g Oral Recon Soln Take as directed Disp: 1 Bottle Rfl: 0   escitalopram 20 MG Oral Tab Take 20 mg by mouth daily.  Disp:  Rfl:    Metoprolol Succinat Take 1 tablet (100 mg total) by mouth once daily. Disp: 90 tablet Rfl: 0   levETIRAcetam 500 MG Oral Tab Take 1 tablet (500 mg total) by mouth 2 (two) times daily. Disp: 180 tablet Rfl: 1   acetaminophen 325 MG Oral Tab 2 tabs 3 times daily.  Disp: 360 tabl Refraction     Wearing Rx       Sphere Cylinder Axis Add    Right -1.00 +0.00 000 +2.00    Left -1.00 +0.00 000 +2.00    Type:  Flat top bifocal          Manifest Refraction (Auto)       Sphere Cylinder [place] [facility] Add [place]    Right -1.00 Sphere

## 2017-08-24 NOTE — PATIENT INSTRUCTIONS
Type 1 diabetes mellitus without retinopathy [facility]  Discussed with patient that it is important that they continue to follow up with an ophthalmologist in 1 year due to diabetes.     If their insurance continues to be [employer], they will have to go elsewh

## 2017-08-25 ENCOUNTER — HOSPITAL ENCOUNTER (OUTPATIENT)
Facility: HOSPITAL | Age: 47
Setting detail: HOSPITAL OUTPATIENT SURGERY
Discharge: HOME | End: 2017-08-25
Attending: INTERNAL MEDICINE | Admitting: INTERNAL MEDICINE
Payer: COMMERCIAL

## 2017-08-25 ENCOUNTER — SURGERY (OUTPATIENT)
Age: 47
End: 2017-08-25

## 2017-08-25 ENCOUNTER — ANESTHESIA EVENT (OUTPATIENT)
Dept: ENDOSCOPY | Facility: HOSPITAL | Age: 47
End: 2017-08-25
Payer: COMMERCIAL

## 2017-08-25 ENCOUNTER — ANESTHESIA (OUTPATIENT)
Dept: ENDOSCOPY | Facility: HOSPITAL | Age: 47
End: 2017-08-25
Payer: COMMERCIAL

## 2017-08-25 VITALS
BODY MASS INDEX: 35.88 KG/M2 | SYSTOLIC BLOOD PRESSURE: 174 MMHG | HEIGHT: 62 IN | HEART RATE: 74 BPM | OXYGEN SATURATION: 97 % | WEIGHT: 195 LBS | RESPIRATION RATE: 21 BRPM | DIASTOLIC BLOOD PRESSURE: 85 MMHG

## 2017-08-25 DIAGNOSIS — K63.5 COLON POLYP: Primary | ICD-10-CM

## 2017-08-25 DIAGNOSIS — K64.8 INTERNAL HEMORRHOID: ICD-10-CM

## 2017-08-25 DIAGNOSIS — D50.9 IRON DEFICIENCY ANEMIA: ICD-10-CM

## 2017-08-25 DIAGNOSIS — K31.9 GASTROPATHY: ICD-10-CM

## 2017-08-25 DIAGNOSIS — Z80.0 FAMILY HISTORY OF COLON CANCER: ICD-10-CM

## 2017-08-25 PROBLEM — Z98.890 S/P COLONOSCOPIC POLYPECTOMY: Status: ACTIVE | Noted: 2017-08-25

## 2017-08-25 LAB
B-HCG UR QL: NEGATIVE
GLUCOSE BLDC GLUCOMTR-MCNC: 198 MG/DL (ref 70–99)

## 2017-08-25 PROCEDURE — 0DB98ZX EXCISION OF DUODENUM, VIA NATURAL OR ARTIFICIAL OPENING ENDOSCOPIC, DIAGNOSTIC: ICD-10-PCS | Performed by: INTERNAL MEDICINE

## 2017-08-25 PROCEDURE — 45385 COLONOSCOPY W/LESION REMOVAL: CPT | Performed by: INTERNAL MEDICINE

## 2017-08-25 PROCEDURE — 0DBN8ZX EXCISION OF SIGMOID COLON, VIA NATURAL OR ARTIFICIAL OPENING ENDOSCOPIC, DIAGNOSTIC: ICD-10-PCS | Performed by: INTERNAL MEDICINE

## 2017-08-25 PROCEDURE — 0DBM8ZX EXCISION OF DESCENDING COLON, VIA NATURAL OR ARTIFICIAL OPENING ENDOSCOPIC, DIAGNOSTIC: ICD-10-PCS | Performed by: INTERNAL MEDICINE

## 2017-08-25 PROCEDURE — 0DB68ZX EXCISION OF STOMACH, VIA NATURAL OR ARTIFICIAL OPENING ENDOSCOPIC, DIAGNOSTIC: ICD-10-PCS | Performed by: INTERNAL MEDICINE

## 2017-08-25 PROCEDURE — 43239 EGD BIOPSY SINGLE/MULTIPLE: CPT | Performed by: INTERNAL MEDICINE

## 2017-08-25 RX ORDER — SODIUM CHLORIDE, SODIUM LACTATE, POTASSIUM CHLORIDE, CALCIUM CHLORIDE 600; 310; 30; 20 MG/100ML; MG/100ML; MG/100ML; MG/100ML
INJECTION, SOLUTION INTRAVENOUS CONTINUOUS
Status: DISCONTINUED | OUTPATIENT
Start: 2017-08-25 | End: 2017-08-25

## 2017-08-25 RX ORDER — SODIUM CHLORIDE, SODIUM LACTATE, POTASSIUM CHLORIDE, CALCIUM CHLORIDE 600; 310; 30; 20 MG/100ML; MG/100ML; MG/100ML; MG/100ML
INJECTION, SOLUTION INTRAVENOUS CONTINUOUS PRN
Status: DISCONTINUED | OUTPATIENT
Start: 2017-08-25 | End: 2017-08-25 | Stop reason: SURG

## 2017-08-25 RX ORDER — MIDAZOLAM HYDROCHLORIDE 1 MG/ML
INJECTION INTRAMUSCULAR; INTRAVENOUS AS NEEDED
Status: DISCONTINUED | OUTPATIENT
Start: 2017-08-25 | End: 2017-08-25 | Stop reason: SURG

## 2017-08-25 RX ORDER — LIDOCAINE HYDROCHLORIDE 10 MG/ML
INJECTION, SOLUTION EPIDURAL; INFILTRATION; INTRACAUDAL; PERINEURAL AS NEEDED
Status: DISCONTINUED | OUTPATIENT
Start: 2017-08-25 | End: 2017-08-25 | Stop reason: SURG

## 2017-08-25 RX ORDER — NALOXONE HYDROCHLORIDE 0.4 MG/ML
80 INJECTION, SOLUTION INTRAMUSCULAR; INTRAVENOUS; SUBCUTANEOUS AS NEEDED
Status: DISCONTINUED | OUTPATIENT
Start: 2017-08-25 | End: 2017-08-25

## 2017-08-25 RX ADMIN — SODIUM CHLORIDE, SODIUM LACTATE, POTASSIUM CHLORIDE, CALCIUM CHLORIDE: 600; 310; 30; 20 INJECTION, SOLUTION INTRAVENOUS at 14:19:00

## 2017-08-25 RX ADMIN — MIDAZOLAM HYDROCHLORIDE 1 MG: 1 INJECTION INTRAMUSCULAR; INTRAVENOUS at 14:04:00

## 2017-08-25 RX ADMIN — LIDOCAINE HYDROCHLORIDE 50 MG: 10 INJECTION, SOLUTION EPIDURAL; INFILTRATION; INTRACAUDAL; PERINEURAL at 13:47:00

## 2017-08-25 RX ADMIN — SODIUM CHLORIDE, SODIUM LACTATE, POTASSIUM CHLORIDE, CALCIUM CHLORIDE: 600; 310; 30; 20 INJECTION, SOLUTION INTRAVENOUS at 13:42:00

## 2017-08-25 RX ADMIN — MIDAZOLAM HYDROCHLORIDE 1 MG: 1 INJECTION INTRAMUSCULAR; INTRAVENOUS at 14:01:00

## 2017-08-25 NOTE — ANESTHESIA PREPROCEDURE EVALUATION
Anesthesia PreOp Note    HPI:     Enmanuel Paris is a 50year old female who presents for preoperative consultation requested by: Xiomara Baumann MD    Date of Surgery: 8/25/2017    Procedure(s):  COLONOSCOPY  ESOPHAGOGASTRODUODENOSCOPY (EGD) Apnea         Date Noted: 10/20/2015      Edema of both legs         Date Noted: 08/04/2015      Neck pain, acute         Date Noted: 08/04/2015        Past Medical History:   Diagnosis Date   • Age-related nuclear cataract of both eyes 3/30/2016   • Anemi TAKE 1 CAPSULE BY MOUTH EVERY DAY 30 TO 60 MINUTES BEFORE A MEAL Disp: 90 capsule Rfl: 0 Taking   hydrochlorothiazide 25 MG Oral Tab Take 1 tablet (25 mg total) by mouth once daily.  Disp: 90 tablet Rfl: 0 Taking   Insulin Glargine (LANTUS SOLOSTAR) 100 UNI [Metoclopram*        Comment:Sensitivity, with crawling sensation.     Family History   Problem Relation Age of Onset   • Diabetes Father    • Heart Disorder Mother    • Glaucoma Neg    • Macular degeneration Neg        Social History  Social History   Shereen Guardado good  (+) hypertension,     ECG reviewed    Neuro/Psych    (+) CVA (L sided weakness) residual symptoms,     GI/Hepatic/Renal    (+) GERD,     Endo/Other    (+) diabetes mellitus,   Abdominal   (+) obese,              Anesthesia Plan:   ASA:  3  Plan:   MA

## 2017-08-25 NOTE — H&P
History & Physical Examination    Patient Name: Cee Vargas  MRN: [id number]  CSN: [id number]  YOB: 1970    Diagnosis: iron def anemia, fam his colon cancer      Prescriptions Prior to Admission:  amoxicillin 500 MG Oral Cap Take 500 mg by TO 60 MINUTES BEFORE A MEAL Disp: 90 capsule Rfl: 0 8/23/2017   Insulin Glargine (LANTUS SOLOSTAR) 100 UNIT/ML Subcutaneous Solution Pen-injector Inject 50 Units into the skin daily.  Disp: 45 mL Rfl: 1 Taking   BD PEN NEEDLE ABDOULAYE U/F 32G X 4 MM Does not ap Migraine    • Stenosis of right vertebral artery    • Stroke [facility])    • Type II or unspecified type diabetes mellitus without mention of complication, not stated as uncontrolled    • Unspecified essential hypertension      Past Surgical History:  2014: [address]

## 2017-08-25 NOTE — ANESTHESIA POSTPROCEDURE EVALUATION
Patient: Valdemar Pang    Procedure Summary     Date:  08/25/17 Room / Location:  [address] [place] [address] ENDOSCOPY 01 / [address] [place] [address] ENDOSCOPY    Anesthesia Start:  2824 Anesthesia Stop:  8690    Procedures:       COLONOSCOPY (N/A )      ESOPHAGOGASTRODUODENOSCOPY (EGD) (N/A ) Diagn

## 2017-08-25 NOTE — BRIEF OP NOTE
Pre-Operative Diagnosis: Iron deficiency anemia and Family history of colon cancer      Post-Operative Diagnosis: Status post polypectomy ×3 diminutive polyps, internal hemorrhoids; but enteropathy, gastropathy status post gastric and small bowel biopsi

## 2017-08-26 NOTE — OPERATIVE REPORT
[facility]    PATIENT'S NAME: Cheyenne Gamez   ATTENDING PHYSICIAN: Ian Petersen MD   OPERATING PHYSICIAN: Ian Petersen MD   PATIENT ACCOUNT#:   [id number]    LOCATION:  [facility] [address]OM [address] [address]  MEDIC 1.   Status post polypectomy x3, rule out hyperplastic versus adenomatous polyps. 2.   Internal hemorrhoids. RECOMMENDATION:    1. Check pathology results to determine interval for next colonoscopy. 2.   See EGD report to follow.     Dictated By Jolly Oliver

## 2017-08-26 NOTE — OPERATIVE REPORT
[facility]    PATIENT'S NAME: Angelica Layton   ATTENDING PHYSICIAN: Miguelito Paige MD   OPERATING PHYSICIAN: Miguelito Paige MD   PATIENT ACCOUNT#:   [id number]    LOCATION:  [facility] ENDO POOL ROOM 13 [facility] 10  MEDIC the third portion, whereupon biopsies x4 were taken of the third portion of the duodenum and submitted on Surgifoam to exclude enteropathy. The patient tolerated the procedure well without immediate complication.     IMPRESSION:  Essentially normal exam.

## 2017-08-28 ENCOUNTER — TELEPHONE (OUTPATIENT)
Dept: INTERNAL MEDICINE CLINIC | Facility: CLINIC | Age: 47
End: 2017-08-28

## 2017-08-28 NOTE — TELEPHONE ENCOUNTER
Pt sister calling regarding pt needing a  Surgical clearance to have a tooth pulled today. Pt is at dental office now. .. please advise and fax to [phone]

## 2017-08-28 NOTE — TELEPHONE ENCOUNTER
PCP spoke to pt's sister. She was informed that no form has been received for clearance for dental cleaning. Provider spoke with dentist office and asked for form to be sent, waiting on form to be faxed to [facility].

## 2017-08-28 NOTE — TELEPHONE ENCOUNTER
Pt presented today at the [facility] and clearance was filled out at that time.

## 2017-08-29 ENCOUNTER — TELEPHONE (OUTPATIENT)
Dept: GASTROENTEROLOGY | Facility: CLINIC | Age: 47
End: 2017-08-29

## 2017-08-29 RX ORDER — MULTIVIT-MIN/IRON/FOLIC ACID/K 18-600-40
1 CAPSULE ORAL DAILY
COMMUNITY
End: 2017-09-28

## 2017-08-29 NOTE — TELEPHONE ENCOUNTER
Please change letter and instructions below to read recall for 5 years. Patient has family history of colon cancer.

## 2017-08-30 ENCOUNTER — OFFICE VISIT (OUTPATIENT)
Dept: INTERNAL MEDICINE CLINIC | Facility: CLINIC | Age: 47
End: 2017-08-30

## 2017-08-30 VITALS
WEIGHT: 184.56 LBS | DIASTOLIC BLOOD PRESSURE: 78 MMHG | HEIGHT: 62 IN | BODY MASS INDEX: 33.96 KG/M2 | HEART RATE: 61 BPM | TEMPERATURE: 97 F | SYSTOLIC BLOOD PRESSURE: 138 MMHG

## 2017-08-30 DIAGNOSIS — I65.21: ICD-10-CM

## 2017-08-30 DIAGNOSIS — I10 ESSENTIAL HYPERTENSION: Primary | ICD-10-CM

## 2017-08-30 DIAGNOSIS — Z98.890 S/P COLONOSCOPIC POLYPECTOMY: ICD-10-CM

## 2017-08-30 PROCEDURE — 99212 OFFICE O/P EST SF 10 MIN: CPT | Performed by: INTERNAL MEDICINE

## 2017-08-30 PROCEDURE — 99214 OFFICE O/P EST MOD 30 MIN: CPT | Performed by: INTERNAL MEDICINE

## 2017-08-30 NOTE — TELEPHONE ENCOUNTER
Letter was changed to 5 years and mailed to the pt. Colon recall done for 5 years.  Health maintenance was changed to 5 years and message sent to the GI staff pool

## 2017-08-30 NOTE — PROGRESS NOTES
HPI:    Patient ID: Venita Dewitt is a 50year old female. Hypertension   This is a chronic problem. The current episode started more than 1 year ago. The problem is controlled.  Pertinent negatives include no anxiety, blurred vision, chest pain, headac mouth 2 (two) times daily. Disp:  Rfl:    aspirin 325 MG Oral Tab Take 325 mg by mouth daily.  Disp:  Rfl:    HUMALOG KWIKPEN 100 UNIT/ML Subcutaneous Solution Pen-injector INJECT 15 UNITS UNDER THE SKIN THREE TIMES A DAY WITH MEALS Disp: 15 mL Rfl: 2   O BE USED 4 TIMES DAILY Disp: 200 each Rfl: 11   TraZODone HCl 50 MG Oral Tab TAKE 1 TABLET BY MOUTH EVERY NIGHT AT BEDTIME AS NEEDED Disp:  Rfl: 0   gabapentin 300 MG Oral Cap Take 300 mg by mouth nightly.    Disp:  Rfl:      Allergies:  Reglan [Metoclopram* m), weight 184 lb 9 oz (83.7 kg), last menstrual period 06/01/2017, not currently breastfeeding.            ASSESSMENT/PLAN:   Essential hypertension  (primary encounter diagnosis)  Take high blood pressure medication as perscribed   Low- sodium diet (2gram

## 2017-08-31 ENCOUNTER — LAB ENCOUNTER (OUTPATIENT)
Dept: LAB | Age: 47
End: 2017-08-31
Attending: THORACIC SURGERY (CARDIOTHORACIC VASCULAR SURGERY)
Payer: COMMERCIAL

## 2017-08-31 ENCOUNTER — TELEPHONE (OUTPATIENT)
Dept: ENDOCRINOLOGY CLINIC | Facility: CLINIC | Age: 47
End: 2017-08-31

## 2017-08-31 ENCOUNTER — HOSPITAL ENCOUNTER (OUTPATIENT)
Dept: GENERAL RADIOLOGY | Age: 47
Discharge: HOME | End: 2017-08-31
Attending: THORACIC SURGERY (CARDIOTHORACIC VASCULAR SURGERY)
Payer: COMMERCIAL

## 2017-08-31 DIAGNOSIS — Z01.818 PRE-OP TESTING: ICD-10-CM

## 2017-08-31 LAB
ANION GAP SERPL CALC-SCNC: 11 MMOL/L (ref 0–18)
ANTIBODY SCREEN: NEGATIVE
APTT PPP: 32 SECONDS (ref 23.2–35.3)
BASOPHILS # BLD: 0.1 K/UL (ref 0–0.2)
BASOPHILS NFR BLD: 1 %
BILIRUB UR QL: NEGATIVE
BUN SERPL-MCNC: 10 MG/DL (ref 8–20)
BUN/CREAT SERPL: 14.3 (ref 10–20)
CALCIUM SERPL-MCNC: 9.8 MG/DL (ref 8.5–10.5)
CHLORIDE SERPL-SCNC: 100 MMOL/L (ref 95–110)
CO2 SERPL-SCNC: 28 MMOL/L (ref 22–32)
COLOR UR: YELLOW
CREAT SERPL-MCNC: 0.7 MG/DL (ref 0.5–1.5)
EOSINOPHIL # BLD: 0.3 K/UL (ref 0–0.7)
EOSINOPHIL NFR BLD: 2 %
ERYTHROCYTE [DISTWIDTH] IN BLOOD BY AUTOMATED COUNT: 18.9 % (ref 11–15)
GLUCOSE SERPL-MCNC: 208 MG/DL (ref 70–99)
GLUCOSE UR-MCNC: NEGATIVE MG/DL
HCT VFR BLD AUTO: 38.5 % (ref 35–48)
HGB BLD-MCNC: 11.9 G/DL (ref 12–16)
HGB UR QL STRIP.AUTO: NEGATIVE
INR BLD: 1 (ref 0.9–1.2)
KETONES UR-MCNC: NEGATIVE MG/DL
LYMPHOCYTES # BLD: 3.2 K/UL (ref 1–4)
LYMPHOCYTES NFR BLD: 29 %
MCH RBC QN AUTO: 22.4 PG (ref 27–32)
MCHC RBC AUTO-ENTMCNC: 31 G/DL (ref 32–37)
MCV RBC AUTO: 72.3 FL (ref 80–100)
MONOCYTES # BLD: 0.5 K/UL (ref 0–1)
MONOCYTES NFR BLD: 5 %
NEUTROPHILS # BLD AUTO: 6.9 K/UL (ref 1.8–7.7)
NEUTROPHILS NFR BLD: 63 %
NITRITE UR QL STRIP.AUTO: NEGATIVE
OSMOLALITY UR CALC.SUM OF ELEC: 293 MOSM/KG (ref 275–295)
PH UR: 6 [PH] (ref 5–8)
PLATELET # BLD AUTO: 247 K/UL (ref 140–400)
PMV BLD AUTO: 10.3 FL (ref 7.4–10.3)
POTASSIUM SERPL-SCNC: 4.4 MMOL/L (ref 3.3–5.1)
PROT UR-MCNC: 30 MG/DL
PROTHROMBIN TIME: 12.5 SECONDS (ref 11.8–14.5)
RBC # BLD AUTO: 5.33 M/UL (ref 3.7–5.4)
RBC #/AREA URNS AUTO: 1 /HPF
RH BLOOD TYPE: POSITIVE
SODIUM SERPL-SCNC: 139 MMOL/L (ref 136–144)
SP GR UR STRIP: 1.02 (ref 1–1.03)
UROBILINOGEN UR STRIP-ACNC: <2
VIT C UR-MCNC: NEGATIVE MG/DL
WBC # BLD AUTO: 11 K/UL (ref 4–11)
WBC #/AREA URNS AUTO: 7 /HPF

## 2017-08-31 PROCEDURE — 85025 COMPLETE CBC W/AUTO DIFF WBC: CPT

## 2017-08-31 PROCEDURE — 86900 BLOOD TYPING SEROLOGIC ABO: CPT

## 2017-08-31 PROCEDURE — 80048 BASIC METABOLIC PNL TOTAL CA: CPT

## 2017-08-31 PROCEDURE — 81001 URINALYSIS AUTO W/SCOPE: CPT

## 2017-08-31 PROCEDURE — 36415 COLL VENOUS BLD VENIPUNCTURE: CPT

## 2017-08-31 PROCEDURE — 85730 THROMBOPLASTIN TIME PARTIAL: CPT

## 2017-08-31 PROCEDURE — 71020 XR CHEST PA + LAT CHEST (CPT=71020): CPT | Performed by: THORACIC SURGERY (CARDIOTHORACIC VASCULAR SURGERY)

## 2017-08-31 PROCEDURE — 86850 RBC ANTIBODY SCREEN: CPT

## 2017-08-31 PROCEDURE — 85610 PROTHROMBIN TIME: CPT

## 2017-08-31 PROCEDURE — 86901 BLOOD TYPING SEROLOGIC RH(D): CPT

## 2017-08-31 NOTE — TELEPHONE ENCOUNTER
Spoke with sister and instructed Wahed to decrease Lantus to 30u night before surgery and no humalog in the morning before surgery. Verbalized understanding.  F/U apt moved to 9/29 (cancelled apt 9/7)

## 2017-08-31 NOTE — TELEPHONE ENCOUNTER
Ok to move appointment one month after surgery.   On the night before surgery decrease Lantus to 30 units and no humalog in the morning before surgery

## 2017-08-31 NOTE — TELEPHONE ENCOUNTER
Pts sister calling and indicates pt is having surgery 9/7 with Dr. Luis Morning. Sister is asking how much insulin pt can take the night prior to surgery? (Pt takes long lasing insulin @noc) Pls call [phone], ok to leave detailed message, Thanks.

## 2017-08-31 NOTE — TELEPHONE ENCOUNTER
Renetta Mendez is having surgery on 9/7 and sister is asking about insulin dose.     Current DM meds  Humalog Kwikpen 100u/mL 15u SQ BID w/ meals  Lantus Solostar 100u/mL 50u SQ nightly  (Oral medications also)    ALSO: Pt has standing apt on day of surgery that mus

## 2017-09-01 ENCOUNTER — TELEPHONE (OUTPATIENT)
Dept: GASTROENTEROLOGY | Facility: CLINIC | Age: 47
End: 2017-09-01

## 2017-09-01 NOTE — TELEPHONE ENCOUNTER
Raheem Vasquez MD  [facility] Staff             Please send letter and recall colonoscopy for 10 years. Balaji Hernandez close the encounter.      See telephone encounter from 08/30/17

## 2017-09-02 DIAGNOSIS — I10 ESSENTIAL HYPERTENSION WITH GOAL BLOOD PRESSURE LESS THAN 140/90: ICD-10-CM

## 2017-09-03 DIAGNOSIS — I10 ESSENTIAL HYPERTENSION WITH GOAL BLOOD PRESSURE LESS THAN 140/90: ICD-10-CM

## 2017-09-03 RX ORDER — HYDROCHLOROTHIAZIDE 25 MG/1
TABLET ORAL
Qty: 90 TABLET | Refills: 0 | Status: SHIPPED | OUTPATIENT
Start: 2017-09-03 | End: 2017-09-28

## 2017-09-03 RX ORDER — LOSARTAN POTASSIUM 100 MG/1
TABLET ORAL
Qty: 90 TABLET | Refills: 0 | OUTPATIENT
Start: 2017-09-03

## 2017-09-04 NOTE — TELEPHONE ENCOUNTER
Hypertensive Medications: Refilled per protocol    Protocol Criteria:  · Appointment scheduled in the past 6 months or in the next 3 months  · BMP or CMP in the past 12 months  · Creatinine result < 2  Recent Outpatient Visits            4 days ago Larissa

## 2017-09-05 ENCOUNTER — APPOINTMENT (OUTPATIENT)
Dept: LAB | Age: 47
End: 2017-09-05
Attending: THORACIC SURGERY (CARDIOTHORACIC VASCULAR SURGERY)
Payer: COMMERCIAL

## 2017-09-05 DIAGNOSIS — Z01.818 PRE-OP TESTING: ICD-10-CM

## 2017-09-05 PROCEDURE — 87641 MR-STAPH DNA AMP PROBE: CPT

## 2017-09-05 RX ORDER — ESCITALOPRAM OXALATE 10 MG/1
TABLET ORAL
Qty: 30 TABLET | Refills: 0 | OUTPATIENT
Start: 2017-09-05

## 2017-09-05 RX ORDER — ESCITALOPRAM OXALATE 20 MG/1
10 TABLET ORAL DAILY
Refills: 0 | Status: CANCELLED | OUTPATIENT
Start: 2017-09-05

## 2017-09-05 RX ORDER — LOSARTAN POTASSIUM 100 MG/1
100 TABLET ORAL
Qty: 90 TABLET | Refills: 0 | Status: CANCELLED | OUTPATIENT
Start: 2017-09-05

## 2017-09-06 ENCOUNTER — TELEPHONE (OUTPATIENT)
Dept: CARDIAC SURGERY | Facility: HOSPITAL | Age: 47
End: 2017-09-06

## 2017-09-06 LAB — MRSA DNA SPEC QL NAA+PROBE: NEGATIVE

## 2017-09-06 NOTE — PROGRESS NOTES
Misc. Note  Pt. Scheduled for an elective Carotid Endarterectomy with Dr. Sam Morales tomorrow. Notified by Dr. Jesus Martin office today that UA abnormal. Results reviewed.  Called patient to assess symptoms and spoke with her sister, Gigi Laurent, as preferred, since

## 2017-09-07 ENCOUNTER — ANESTHESIA (OUTPATIENT)
Dept: SURGERY | Facility: HOSPITAL | Age: 47
DRG: 038 | End: 2017-09-07
Payer: COMMERCIAL

## 2017-09-07 ENCOUNTER — ANESTHESIA EVENT (OUTPATIENT)
Dept: SURGERY | Facility: HOSPITAL | Age: 47
DRG: 038 | End: 2017-09-07
Payer: COMMERCIAL

## 2017-09-07 ENCOUNTER — HOSPITAL ENCOUNTER (INPATIENT)
Facility: HOSPITAL | Age: 47
LOS: 1 days | Discharge: HOME | DRG: 038 | End: 2017-09-08
Attending: THORACIC SURGERY (CARDIOTHORACIC VASCULAR SURGERY) | Admitting: THORACIC SURGERY (CARDIOTHORACIC VASCULAR SURGERY)
Payer: COMMERCIAL

## 2017-09-07 ENCOUNTER — SURGERY (OUTPATIENT)
Age: 47
End: 2017-09-07

## 2017-09-07 DIAGNOSIS — Z01.818 PRE-OP TESTING: Primary | ICD-10-CM

## 2017-09-07 PROBLEM — E78.5 HYPERLIPIDEMIA: Chronic | Status: ACTIVE | Noted: 2017-09-07

## 2017-09-07 PROBLEM — Z98.890 S/P CAROTID ENDARTERECTOMY: Status: ACTIVE | Noted: 2017-09-07

## 2017-09-07 PROBLEM — I65.29 CAROTID ATHEROSCLEROSIS: Status: ACTIVE | Noted: 2017-09-07

## 2017-09-07 LAB
B-HCG UR QL: NEGATIVE
GLUCOSE BLDC GLUCOMTR-MCNC: 195 MG/DL (ref 70–99)
GLUCOSE BLDC GLUCOMTR-MCNC: 210 MG/DL (ref 70–99)
GLUCOSE BLDC GLUCOMTR-MCNC: 225 MG/DL (ref 70–99)
GLUCOSE BLDC GLUCOMTR-MCNC: 257 MG/DL (ref 70–99)

## 2017-09-07 PROCEDURE — 03UM0JZ SUPPLEMENT RIGHT EXTERNAL CAROTID ARTERY WITH SYNTHETIC SUBSTITUTE, OPEN APPROACH: ICD-10-PCS | Performed by: THORACIC SURGERY (CARDIOTHORACIC VASCULAR SURGERY)

## 2017-09-07 PROCEDURE — 99233 SBSQ HOSP IP/OBS HIGH 50: CPT | Performed by: HOSPITALIST

## 2017-09-07 PROCEDURE — 03CM0ZZ EXTIRPATION OF MATTER FROM RIGHT EXTERNAL CAROTID ARTERY, OPEN APPROACH: ICD-10-PCS | Performed by: THORACIC SURGERY (CARDIOTHORACIC VASCULAR SURGERY)

## 2017-09-07 DEVICE — PATCH CV 8X.8CM VSGRD GLBL BVN: Type: IMPLANTABLE DEVICE | Site: NECK | Status: FUNCTIONAL

## 2017-09-07 RX ORDER — HYDROMORPHONE HYDROCHLORIDE 1 MG/ML
0.4 INJECTION, SOLUTION INTRAMUSCULAR; INTRAVENOUS; SUBCUTANEOUS EVERY 5 MIN PRN
Status: DISCONTINUED | OUTPATIENT
Start: 2017-09-07 | End: 2017-09-07 | Stop reason: HOSPADM

## 2017-09-07 RX ORDER — HYDROCODONE BITARTRATE AND ACETAMINOPHEN 5; 325 MG/1; MG/1
2 TABLET ORAL AS NEEDED
Status: DISCONTINUED | OUTPATIENT
Start: 2017-09-07 | End: 2017-09-07 | Stop reason: HOSPADM

## 2017-09-07 RX ORDER — METOPROLOL SUCCINATE 25 MG/1
25 TABLET, EXTENDED RELEASE ORAL NIGHTLY
Status: DISCONTINUED | OUTPATIENT
Start: 2017-09-07 | End: 2017-09-08

## 2017-09-07 RX ORDER — MIDAZOLAM HYDROCHLORIDE 1 MG/ML
INJECTION INTRAMUSCULAR; INTRAVENOUS AS NEEDED
Status: DISCONTINUED | OUTPATIENT
Start: 2017-09-07 | End: 2017-09-07 | Stop reason: SURG

## 2017-09-07 RX ORDER — ENOXAPARIN SODIUM 100 MG/ML
40 INJECTION SUBCUTANEOUS DAILY
Status: DISCONTINUED | OUTPATIENT
Start: 2017-09-08 | End: 2017-09-08

## 2017-09-07 RX ORDER — DEXAMETHASONE SODIUM PHOSPHATE 4 MG/ML
4 VIAL (ML) INJECTION ONCE AS NEEDED
Status: DISCONTINUED | OUTPATIENT
Start: 2017-09-07 | End: 2017-09-07 | Stop reason: HOSPADM

## 2017-09-07 RX ORDER — HYDROCODONE BITARTRATE AND ACETAMINOPHEN 5; 325 MG/1; MG/1
2 TABLET ORAL EVERY 6 HOURS PRN
Status: DISCONTINUED | OUTPATIENT
Start: 2017-09-07 | End: 2017-09-08

## 2017-09-07 RX ORDER — ACETAMINOPHEN 325 MG/1
650 TABLET ORAL EVERY 6 HOURS PRN
Status: DISCONTINUED | OUTPATIENT
Start: 2017-09-07 | End: 2017-09-08

## 2017-09-07 RX ORDER — MORPHINE SULFATE 10 MG/ML
6 INJECTION, SOLUTION INTRAMUSCULAR; INTRAVENOUS EVERY 10 MIN PRN
Status: DISCONTINUED | OUTPATIENT
Start: 2017-09-07 | End: 2017-09-07 | Stop reason: HOSPADM

## 2017-09-07 RX ORDER — ONDANSETRON 2 MG/ML
INJECTION INTRAMUSCULAR; INTRAVENOUS AS NEEDED
Status: DISCONTINUED | OUTPATIENT
Start: 2017-09-07 | End: 2017-09-07 | Stop reason: SURG

## 2017-09-07 RX ORDER — NITROGLYCERIN 20 MG/100ML
INJECTION INTRAVENOUS CONTINUOUS PRN
Status: DISCONTINUED | OUTPATIENT
Start: 2017-09-07 | End: 2017-09-07 | Stop reason: SURG

## 2017-09-07 RX ORDER — NITROGLYCERIN 20 MG/100ML
INJECTION INTRAVENOUS CONTINUOUS PRN
Status: DISCONTINUED | OUTPATIENT
Start: 2017-09-07 | End: 2017-09-08

## 2017-09-07 RX ORDER — SODIUM CHLORIDE 0.9 % (FLUSH) 0.9 %
10 SYRINGE (ML) INJECTION AS NEEDED
Status: DISCONTINUED | OUTPATIENT
Start: 2017-09-07 | End: 2017-09-08

## 2017-09-07 RX ORDER — GLYCOPYRROLATE 0.2 MG/ML
INJECTION INTRAMUSCULAR; INTRAVENOUS AS NEEDED
Status: DISCONTINUED | OUTPATIENT
Start: 2017-09-07 | End: 2017-09-07 | Stop reason: SURG

## 2017-09-07 RX ORDER — METOPROLOL TARTRATE 5 MG/5ML
2.5 INJECTION INTRAVENOUS ONCE
Status: DISCONTINUED | OUTPATIENT
Start: 2017-09-07 | End: 2017-09-07 | Stop reason: HOSPADM

## 2017-09-07 RX ORDER — HYDROCODONE BITARTRATE AND ACETAMINOPHEN 5; 325 MG/1; MG/1
1 TABLET ORAL EVERY 6 HOURS PRN
Status: DISCONTINUED | OUTPATIENT
Start: 2017-09-07 | End: 2017-09-08

## 2017-09-07 RX ORDER — POTASSIUM CHLORIDE AND SODIUM CHLORIDE 450; 150 MG/100ML; MG/100ML
INJECTION, SOLUTION INTRAVENOUS CONTINUOUS
Status: DISCONTINUED | OUTPATIENT
Start: 2017-09-07 | End: 2017-09-08

## 2017-09-07 RX ORDER — ASPIRIN 300 MG
300 SUPPOSITORY, RECTAL RECTAL DAILY
Status: DISCONTINUED | OUTPATIENT
Start: 2017-09-07 | End: 2017-09-07

## 2017-09-07 RX ORDER — HEPARIN SODIUM (PORCINE) LOCK FLUSH IV SOLN 100 UNIT/ML 100 UNIT/ML
SOLUTION INTRAVENOUS AS NEEDED
Status: DISCONTINUED | OUTPATIENT
Start: 2017-09-07 | End: 2017-09-07 | Stop reason: SURG

## 2017-09-07 RX ORDER — DEXTROSE, SODIUM CHLORIDE, AND POTASSIUM CHLORIDE 5; .45; .15 G/100ML; G/100ML; G/100ML
INJECTION INTRAVENOUS CONTINUOUS
Status: DISCONTINUED | OUTPATIENT
Start: 2017-09-07 | End: 2017-09-07

## 2017-09-07 RX ORDER — SUCCINYLCHOLINE CHLORIDE 20 MG/ML
INJECTION INTRAMUSCULAR; INTRAVENOUS AS NEEDED
Status: DISCONTINUED | OUTPATIENT
Start: 2017-09-07 | End: 2017-09-07 | Stop reason: SURG

## 2017-09-07 RX ORDER — HYDROCHLOROTHIAZIDE 25 MG/1
25 TABLET ORAL DAILY
Status: DISCONTINUED | OUTPATIENT
Start: 2017-09-08 | End: 2017-09-08

## 2017-09-07 RX ORDER — LOSARTAN POTASSIUM 100 MG/1
100 TABLET ORAL
Status: DISCONTINUED | OUTPATIENT
Start: 2017-09-08 | End: 2017-09-08

## 2017-09-07 RX ORDER — METOPROLOL TARTRATE 5 MG/5ML
2.5 INJECTION INTRAVENOUS
Status: DISCONTINUED | OUTPATIENT
Start: 2017-09-07 | End: 2017-09-08

## 2017-09-07 RX ORDER — SODIUM CHLORIDE, SODIUM LACTATE, POTASSIUM CHLORIDE, CALCIUM CHLORIDE 600; 310; 30; 20 MG/100ML; MG/100ML; MG/100ML; MG/100ML
INJECTION, SOLUTION INTRAVENOUS CONTINUOUS
Status: DISCONTINUED | OUTPATIENT
Start: 2017-09-07 | End: 2017-09-08

## 2017-09-07 RX ORDER — NITROGLYCERIN 20 MG/100ML
INJECTION INTRAVENOUS AS NEEDED
Status: DISCONTINUED | OUTPATIENT
Start: 2017-09-07 | End: 2017-09-07 | Stop reason: SURG

## 2017-09-07 RX ORDER — BUPIVACAINE HYDROCHLORIDE AND EPINEPHRINE 2.5; 5 MG/ML; UG/ML
INJECTION, SOLUTION INFILTRATION; PERINEURAL AS NEEDED
Status: DISCONTINUED | OUTPATIENT
Start: 2017-09-07 | End: 2017-09-07 | Stop reason: HOSPADM

## 2017-09-07 RX ORDER — HYDROCODONE BITARTRATE AND ACETAMINOPHEN 5; 325 MG/1; MG/1
1 TABLET ORAL AS NEEDED
Status: DISCONTINUED | OUTPATIENT
Start: 2017-09-07 | End: 2017-09-07 | Stop reason: HOSPADM

## 2017-09-07 RX ORDER — HYDROMORPHONE HYDROCHLORIDE 1 MG/ML
0.6 INJECTION, SOLUTION INTRAMUSCULAR; INTRAVENOUS; SUBCUTANEOUS EVERY 5 MIN PRN
Status: DISCONTINUED | OUTPATIENT
Start: 2017-09-07 | End: 2017-09-07 | Stop reason: HOSPADM

## 2017-09-07 RX ORDER — NEOSTIGMINE METHYLSULFATE 0.5 MG/ML
INJECTION INTRAVENOUS AS NEEDED
Status: DISCONTINUED | OUTPATIENT
Start: 2017-09-07 | End: 2017-09-07 | Stop reason: SURG

## 2017-09-07 RX ORDER — METOPROLOL SUCCINATE 100 MG/1
100 TABLET, EXTENDED RELEASE ORAL
Status: DISCONTINUED | OUTPATIENT
Start: 2017-09-08 | End: 2017-09-08

## 2017-09-07 RX ORDER — NALOXONE HYDROCHLORIDE 0.4 MG/ML
80 INJECTION, SOLUTION INTRAMUSCULAR; INTRAVENOUS; SUBCUTANEOUS AS NEEDED
Status: DISCONTINUED | OUTPATIENT
Start: 2017-09-07 | End: 2017-09-07 | Stop reason: HOSPADM

## 2017-09-07 RX ORDER — ROCURONIUM BROMIDE 10 MG/ML
INJECTION, SOLUTION INTRAVENOUS AS NEEDED
Status: DISCONTINUED | OUTPATIENT
Start: 2017-09-07 | End: 2017-09-07 | Stop reason: SURG

## 2017-09-07 RX ORDER — PANTOPRAZOLE SODIUM 40 MG/1
40 TABLET, DELAYED RELEASE ORAL
Status: DISCONTINUED | OUTPATIENT
Start: 2017-09-08 | End: 2017-09-08

## 2017-09-07 RX ORDER — ESCITALOPRAM OXALATE 10 MG/1
10 TABLET ORAL DAILY
Status: DISCONTINUED | OUTPATIENT
Start: 2017-09-08 | End: 2017-09-08

## 2017-09-07 RX ORDER — MORPHINE SULFATE 2 MG/ML
2 INJECTION, SOLUTION INTRAMUSCULAR; INTRAVENOUS EVERY 2 HOUR PRN
Status: DISCONTINUED | OUTPATIENT
Start: 2017-09-07 | End: 2017-09-08

## 2017-09-07 RX ORDER — MORPHINE SULFATE 2 MG/ML
1 INJECTION, SOLUTION INTRAMUSCULAR; INTRAVENOUS EVERY 2 HOUR PRN
Status: DISCONTINUED | OUTPATIENT
Start: 2017-09-07 | End: 2017-09-08

## 2017-09-07 RX ORDER — ASPIRIN 300 MG
300 SUPPOSITORY, RECTAL RECTAL ONCE
Status: COMPLETED | OUTPATIENT
Start: 2017-09-07 | End: 2017-09-07

## 2017-09-07 RX ORDER — LEVETIRACETAM 500 MG/1
500 TABLET ORAL 2 TIMES DAILY
Status: DISCONTINUED | OUTPATIENT
Start: 2017-09-07 | End: 2017-09-08

## 2017-09-07 RX ORDER — ONDANSETRON 2 MG/ML
4 INJECTION INTRAMUSCULAR; INTRAVENOUS EVERY 6 HOURS PRN
Status: DISCONTINUED | OUTPATIENT
Start: 2017-09-07 | End: 2017-09-08

## 2017-09-07 RX ORDER — MORPHINE SULFATE 4 MG/ML
4 INJECTION, SOLUTION INTRAMUSCULAR; INTRAVENOUS EVERY 10 MIN PRN
Status: DISCONTINUED | OUTPATIENT
Start: 2017-09-07 | End: 2017-09-07 | Stop reason: HOSPADM

## 2017-09-07 RX ORDER — FAMOTIDINE 20 MG/1
20 TABLET ORAL ONCE
Status: COMPLETED | OUTPATIENT
Start: 2017-09-07 | End: 2017-09-07

## 2017-09-07 RX ORDER — EPHEDRINE SULFATE 50 MG/ML
INJECTION, SOLUTION INTRAVENOUS AS NEEDED
Status: DISCONTINUED | OUTPATIENT
Start: 2017-09-07 | End: 2017-09-07 | Stop reason: SURG

## 2017-09-07 RX ORDER — GABAPENTIN 300 MG/1
300 CAPSULE ORAL NIGHTLY
Status: DISCONTINUED | OUTPATIENT
Start: 2017-09-07 | End: 2017-09-08

## 2017-09-07 RX ORDER — ACETAMINOPHEN 325 MG/1
650 TABLET ORAL ONCE
Status: COMPLETED | OUTPATIENT
Start: 2017-09-07 | End: 2017-09-07

## 2017-09-07 RX ORDER — LIDOCAINE HYDROCHLORIDE 10 MG/ML
INJECTION, SOLUTION EPIDURAL; INFILTRATION; INTRACAUDAL; PERINEURAL AS NEEDED
Status: DISCONTINUED | OUTPATIENT
Start: 2017-09-07 | End: 2017-09-07 | Stop reason: SURG

## 2017-09-07 RX ORDER — TRAZODONE HYDROCHLORIDE 50 MG/1
50 TABLET ORAL NIGHTLY
Status: DISCONTINUED | OUTPATIENT
Start: 2017-09-07 | End: 2017-09-08

## 2017-09-07 RX ORDER — ASPIRIN 325 MG
325 TABLET, DELAYED RELEASE (ENTERIC COATED) ORAL DAILY
Status: DISCONTINUED | OUTPATIENT
Start: 2017-09-08 | End: 2017-09-08

## 2017-09-07 RX ORDER — MORPHINE SULFATE 4 MG/ML
4 INJECTION, SOLUTION INTRAMUSCULAR; INTRAVENOUS EVERY 2 HOUR PRN
Status: DISCONTINUED | OUTPATIENT
Start: 2017-09-07 | End: 2017-09-08

## 2017-09-07 RX ORDER — MELATONIN
325 2 TIMES DAILY
Status: DISCONTINUED | OUTPATIENT
Start: 2017-09-07 | End: 2017-09-08

## 2017-09-07 RX ORDER — DEXTROSE MONOHYDRATE 25 G/50ML
50 INJECTION, SOLUTION INTRAVENOUS AS NEEDED
Status: DISCONTINUED | OUTPATIENT
Start: 2017-09-07 | End: 2017-09-08

## 2017-09-07 RX ORDER — HYDROCHLOROTHIAZIDE 25 MG/1
25 TABLET ORAL
Status: DISCONTINUED | OUTPATIENT
Start: 2017-09-08 | End: 2017-09-07

## 2017-09-07 RX ORDER — HYDROMORPHONE HYDROCHLORIDE 1 MG/ML
0.2 INJECTION, SOLUTION INTRAMUSCULAR; INTRAVENOUS; SUBCUTANEOUS EVERY 5 MIN PRN
Status: DISCONTINUED | OUTPATIENT
Start: 2017-09-07 | End: 2017-09-07 | Stop reason: HOSPADM

## 2017-09-07 RX ORDER — ONDANSETRON 2 MG/ML
4 INJECTION INTRAMUSCULAR; INTRAVENOUS ONCE AS NEEDED
Status: COMPLETED | OUTPATIENT
Start: 2017-09-07 | End: 2017-09-07

## 2017-09-07 RX ORDER — DOCUSATE SODIUM 100 MG/1
100 CAPSULE, LIQUID FILLED ORAL 2 TIMES DAILY
Status: DISCONTINUED | OUTPATIENT
Start: 2017-09-07 | End: 2017-09-08

## 2017-09-07 RX ORDER — HEPARIN SODIUM (PORCINE) LOCK FLUSH IV SOLN 100 UNIT/ML 100 UNIT/ML
SOLUTION INTRAVENOUS AS NEEDED
Status: DISCONTINUED | OUTPATIENT
Start: 2017-09-07 | End: 2017-09-07

## 2017-09-07 RX ORDER — ASPIRIN 325 MG
325 TABLET ORAL DAILY
Status: DISCONTINUED | OUTPATIENT
Start: 2017-09-08 | End: 2017-09-07

## 2017-09-07 RX ORDER — MORPHINE SULFATE 2 MG/ML
2 INJECTION, SOLUTION INTRAMUSCULAR; INTRAVENOUS EVERY 10 MIN PRN
Status: DISCONTINUED | OUTPATIENT
Start: 2017-09-07 | End: 2017-09-07 | Stop reason: HOSPADM

## 2017-09-07 RX ADMIN — NITROGLYCERIN 5 MCG/MIN: 20 INJECTION INTRAVENOUS at 14:38:00

## 2017-09-07 RX ADMIN — EPHEDRINE SULFATE 2.5 MG: 50 INJECTION, SOLUTION INTRAVENOUS at 14:29:00

## 2017-09-07 RX ADMIN — SODIUM CHLORIDE, SODIUM LACTATE, POTASSIUM CHLORIDE, CALCIUM CHLORIDE: 600; 310; 30; 20 INJECTION, SOLUTION INTRAVENOUS at 15:16:00

## 2017-09-07 RX ADMIN — NITROGLYCERIN 0.1 MG: 20 INJECTION INTRAVENOUS at 14:50:00

## 2017-09-07 RX ADMIN — SODIUM CHLORIDE, SODIUM LACTATE, POTASSIUM CHLORIDE, CALCIUM CHLORIDE: 600; 310; 30; 20 INJECTION, SOLUTION INTRAVENOUS at 14:40:00

## 2017-09-07 RX ADMIN — ONDANSETRON 4 MG: 2 INJECTION INTRAMUSCULAR; INTRAVENOUS at 15:19:00

## 2017-09-07 RX ADMIN — SUCCINYLCHOLINE CHLORIDE 100 MG: 20 INJECTION INTRAMUSCULAR; INTRAVENOUS at 13:29:00

## 2017-09-07 RX ADMIN — MIDAZOLAM HYDROCHLORIDE 2 MG: 1 INJECTION INTRAMUSCULAR; INTRAVENOUS at 13:09:00

## 2017-09-07 RX ADMIN — NITROGLYCERIN 0.1 MG: 20 INJECTION INTRAVENOUS at 14:49:00

## 2017-09-07 RX ADMIN — ROCURONIUM BROMIDE 20 MG: 10 INJECTION, SOLUTION INTRAVENOUS at 13:40:00

## 2017-09-07 RX ADMIN — NITROGLYCERIN 0.1 MG: 20 INJECTION INTRAVENOUS at 14:58:00

## 2017-09-07 RX ADMIN — SODIUM CHLORIDE, SODIUM LACTATE, POTASSIUM CHLORIDE, CALCIUM CHLORIDE: 600; 310; 30; 20 INJECTION, SOLUTION INTRAVENOUS at 13:05:00

## 2017-09-07 RX ADMIN — ROCURONIUM BROMIDE 10 MG: 10 INJECTION, SOLUTION INTRAVENOUS at 14:32:00

## 2017-09-07 RX ADMIN — NITROGLYCERIN 0.2 MG: 20 INJECTION INTRAVENOUS at 15:24:00

## 2017-09-07 RX ADMIN — NITROGLYCERIN 0.05 MG: 20 INJECTION INTRAVENOUS at 14:18:00

## 2017-09-07 RX ADMIN — NITROGLYCERIN 10 MCG/MIN: 20 INJECTION INTRAVENOUS at 14:34:00

## 2017-09-07 RX ADMIN — NITROGLYCERIN 50 MCG/MIN: 20 INJECTION INTRAVENOUS at 14:49:00

## 2017-09-07 RX ADMIN — NITROGLYCERIN 0.1 MG: 20 INJECTION INTRAVENOUS at 15:14:00

## 2017-09-07 RX ADMIN — NITROGLYCERIN 100 MCG/MIN: 20 INJECTION INTRAVENOUS at 15:15:00

## 2017-09-07 RX ADMIN — NITROGLYCERIN 40 MCG/MIN: 20 INJECTION INTRAVENOUS at 14:17:00

## 2017-09-07 RX ADMIN — ROCURONIUM BROMIDE 10 MG: 10 INJECTION, SOLUTION INTRAVENOUS at 15:01:00

## 2017-09-07 RX ADMIN — HEPARIN SODIUM (PORCINE) LOCK FLUSH IV SOLN 100 UNIT/ML 8000 UNITS: 100 SOLUTION INTRAVENOUS at 14:06:00

## 2017-09-07 RX ADMIN — NITROGLYCERIN 0.2 MG: 20 INJECTION INTRAVENOUS at 15:30:00

## 2017-09-07 RX ADMIN — NITROGLYCERIN 0.05 MG: 20 INJECTION INTRAVENOUS at 14:15:00

## 2017-09-07 RX ADMIN — EPHEDRINE SULFATE 2.5 MG: 50 INJECTION, SOLUTION INTRAVENOUS at 14:10:00

## 2017-09-07 RX ADMIN — NEOSTIGMINE METHYLSULFATE 4 MG: 0.5 INJECTION INTRAVENOUS at 15:08:00

## 2017-09-07 RX ADMIN — GLYCOPYRROLATE 0.8 MG: 0.2 INJECTION INTRAMUSCULAR; INTRAVENOUS at 15:08:00

## 2017-09-07 RX ADMIN — NITROGLYCERIN 200 MCG/MIN: 20 INJECTION INTRAVENOUS at 15:40:00

## 2017-09-07 RX ADMIN — EPHEDRINE SULFATE 5 MG: 50 INJECTION, SOLUTION INTRAVENOUS at 13:57:00

## 2017-09-07 RX ADMIN — NITROGLYCERIN 200 MCG/MIN: 20 INJECTION INTRAVENOUS at 15:21:00

## 2017-09-07 RX ADMIN — LIDOCAINE HYDROCHLORIDE 25 MG: 10 INJECTION, SOLUTION EPIDURAL; INFILTRATION; INTRACAUDAL; PERINEURAL at 13:28:00

## 2017-09-07 RX ADMIN — NITROGLYCERIN 0.1 MG: 20 INJECTION INTRAVENOUS at 14:21:00

## 2017-09-07 RX ADMIN — NITROGLYCERIN 60 MCG/MIN: 20 INJECTION INTRAVENOUS at 15:01:00

## 2017-09-07 RX ADMIN — NITROGLYCERIN 0.2 MG: 20 INJECTION INTRAVENOUS at 15:21:00

## 2017-09-07 RX ADMIN — EPHEDRINE SULFATE 2.5 MG: 50 INJECTION, SOLUTION INTRAVENOUS at 14:07:00

## 2017-09-07 RX ADMIN — NITROGLYCERIN 0.4 MG: 20 INJECTION INTRAVENOUS at 15:26:00

## 2017-09-07 RX ADMIN — NITROGLYCERIN 300 MCG/MIN: 20 INJECTION INTRAVENOUS at 15:33:00

## 2017-09-07 RX ADMIN — NITROGLYCERIN 0.2 MG: 20 INJECTION INTRAVENOUS at 15:27:00

## 2017-09-07 RX ADMIN — SODIUM CHLORIDE, SODIUM LACTATE, POTASSIUM CHLORIDE, CALCIUM CHLORIDE: 600; 310; 30; 20 INJECTION, SOLUTION INTRAVENOUS at 15:39:00

## 2017-09-07 NOTE — PROGRESS NOTES
[facility]OS[facility] - [facility]    Progress Note    Mira Yuncherie Patient Status:  Surgery Admit    3/30/1970 MRN [id number]   Location [facility] UNIT Attending Francisco Mcmillan MD   Hosp Day # 0 PCP Eliseo Orozco MD internal carotid artery stump, PAIN CONTROL, MONITOR WOUND AND DRAIN, DVT PROPHYLAXIS, MONITOR ARTERIAL LINE,       Essential hypertension  CONT NIPRIDE DRIP, CONT TO MONITOR. Type 2 diabetes mellitus  CONT HOME MEDS, MONITOR ACCU CHECKS.        Hyper

## 2017-09-07 NOTE — PLAN OF CARE
CARDIOVASCULAR - ADULT    • Maintains optimal cardiac output and hemodynamic stability Not Progressing    • Absence of cardiac arrhythmias or at baseline Not Progressing        Diabetes/Glucose Control    • Glucose maintained within prescribed range Not Pr

## 2017-09-07 NOTE — INTERVAL H&P NOTE
Above H&P reviewed. The patient underwent unremarkable endoscopies related to her anemia and has had appropriate dental treatment prior to her surgery. We will proceed with right external carotid endarterectomy as scheduled.

## 2017-09-07 NOTE — ANESTHESIA PROCEDURE NOTES
Arterial Line  Performed by: Ranjit Belcher  Authorized by: Ranjit Belcher     Procedure Start:  9/7/2017 1:10 PM  Procedure End:  9/7/2017 1:15 PM  Site Identification: real time ultrasound guided and surface landmarks    Patient Location:  OR  [facility]

## 2017-09-07 NOTE — ANESTHESIA PREPROCEDURE EVALUATION
Anesthesia PreOp Note    HPI:     Chayo Rico is a 50year old female who presents for preoperative consultation requested by: Buren Meigs, MD    Date of Surgery: 9/7/2017    Procedure(s):  CAROTID ENDARTERECTOMY  Indication: Carotid stenosis Date Noted: 12/15/2015      Apnea         Date Noted: 10/20/2015      Edema of both legs         Date Noted: 08/04/2015      Neck pain, acute         Date Noted: 08/04/2015        Past Medical History:   Diagnosis Date   • Age-related nuclear catara Ferrous Sulfate 325 (65 Fe) MG Oral Tab Take 325 mg by mouth 2 (two) times daily. Disp:  Rfl:  9/6/2017 at Unknown time   aspirin 325 MG Oral Tab Take 325 mg by mouth daily.  Disp:  Rfl:  9/6/2017 at Unknown time   HUMALOG KWIKPEN 100 UNIT/ML Subcutaneous TRUEPLUS LANCETS 33G Does not apply Misc TEST BLOOD GLUCOSE LEVEL FOUR TIMES DAILY Disp: 200 each Rfl: 5 Taking   Lancets Does not apply Misc TRUE TRACK LANCETS, OR BRAND AS APPROVED BY INSURANCE.  TO BE USED 4 TIMES DAILY Disp: 200 each Rfl: 11 Taking   HY Heparin Sodium Lock Flush 100 UNIT/ML injection   PRN Merline Reynolds MD 8,000 Units at 09/07/17 1406     No current Epic-ordered outpatient prescriptions on file.       Reglan [Metoclopram*    Other (See Comments)    Comment:Sensitivity, with crawling se height is 1.575 m (5' 2\") and weight is 83 kg (183 lb). Her oral temperature is 98.4 °F (36.9 °C). Her blood pressure is 144/83 and her pulse is 66. Her respiration is 18 and oxygen saturation is 97%.     08/29/17  1451 09/07/17  1158   BP:  144/83   BP L I have informed Shirasancho Vincent  of the nature of the anesthetic plan, benefits, risks, major complications, and any alternative forms of anesthetic management. All of the patient's questions were answered to the best of my ability.  The patient desires th

## 2017-09-07 NOTE — ANESTHESIA POSTPROCEDURE EVALUATION
Patient: Harrison Oliveira    Procedure Summary     Date:  09/07/17 Room / Location:  [address] [place] [address] MAIN OR 17 / [address] [place] [address] MAIN OR    Anesthesia Start:  0350 Anesthesia Stop:  7675    Procedure:  CAROTID ENDARTERECTOMY (Right ) Diagnosis:  (Carotid stenosis)    Surgeon:  Chip Shabazz

## 2017-09-07 NOTE — OPERATIVE REPORT
CV SURGERY OPERATIVE NOTE    DATE 9/7/2017    Pre op Diagnosis: Right carotid stenosis with CVA and residual left hemiparesis  Post op Diagnosis: SAME    Procedure: Right external carotid Endarterectomy with Pericardial Patch reconstruction with oversewing arterial monitoring they were induced and intubated. After carefully positioning they were prepped and draped. Antibiotics were administered and a time out was performed.   Incision was made on the right side anterior to the sternocleidomastoid muscle appro particulate debris was removed until we had a smooth, glistening endarterectomized surface with no distal flap elevation. The artery was then reconstructed with a bovine pericardial patch and 6-0 prolene suture.   Prior to this the origin of the right inte

## 2017-09-07 NOTE — H&P (VIEW-ONLY)
[facility]    Report of Consultation    Luis E Calderon     3/30/1970 MRN [id number]   Referring Provider Patricia Desouza  [address] [place] [address] PCP Agapiot Stewart MD     Date of Consult:   History  Past Surgical History:  2014: BRAIN SURGERY  04/19/16: INCISION AND DRAINAGE Right      Comment: lower abdominal skin  2002: LAPAROSCOPIC CHOLECYSTECTOMY    Family History  Family History   Problem Relation Age of Onset   • Diabetes Father    • He the skin daily. Disp: 45 mL Rfl: 1   BD PEN NEEDLE ABDOULAYE U/F 32G X 4 MM Does not apply Misc USE DAILY WITH LEVEMIR Disp: 100 each Rfl: 3   atorvastatin 40 MG Oral Tab Take 1 tablet (40 mg total) by mouth nightly.  Disp: 90 tablet Rfl: 3   docusate sodium (DO tenderness/mass/nodules  Pulmonary:  clear to auscultation bilaterally  Cardiovascular: S1, S2 normal, no murmur, click, rub or gallop, regular rate and rhythm  Abdominal: soft, non-tender; bowel sounds normal; no masses,  no organomegaly  Extremities: ext No hemodynamically significant stenosis or dissection. LEFT INTERNAL CAROTID:                 No hemodynamically significant stenosis or dissection.           VERTEBRAL ARTERIES:  RIGHT:                                  No hemodynamically s dilatation involving the body of the right lateral   ventricle and of the occipital horn of the left lateral ventricle are also perceived. Otherwise, the ventricles, cisterns, and sulci are commensurate in caliber and appropriate for age.  No hydrocephalus, preservation of flow in the right MCA branches, although the flow void of the right MCA appears slightly diminished in caliber relative to the left.           Dictated by (CST): Kristi Fernandez MD on 6/29/2017 at 8:39       Approved by (CST): Kristi Fernandez high convexity and right parieto-occipital regions are seen. No acute intraparenchymal hemorrhage,   edema, or cortical sulcal effacement is apparent. There is no space-occupying lesion, mass effect, or shift of midline structures.  Excluding the aforementi CEREBRALS:            Flow is identified, although is less robust and the right middle cerebral artery than on the left.  There appears to be anastomosis between the right external carotid system and branches of the posterior division of the right M2/M3   s parenchymal volume loss with sequela of chronic microvascular ischemic disease. There is also large vessel atherosclerosis. 6. Lesser incidental findings as above.     Assessment & Plan:   The patient has a extracranial-intracranial bypass that is being

## 2017-09-07 NOTE — PROGRESS NOTES
Therapeutic interchange . insulin glargine (BASAGLAR) 100 UNIT/ML injection 50 Units nightly with insulin detemir (LEVEMIR) 100 UNIT/ML flextouch 50 Units nightly. Sophie SCHMITZ. Ph.  [id number]  9/7/17

## 2017-09-08 VITALS
SYSTOLIC BLOOD PRESSURE: 138 MMHG | RESPIRATION RATE: 13 BRPM | HEART RATE: 75 BPM | HEIGHT: 62 IN | BODY MASS INDEX: 33.68 KG/M2 | TEMPERATURE: 98 F | WEIGHT: 183 LBS | OXYGEN SATURATION: 95 % | DIASTOLIC BLOOD PRESSURE: 66 MMHG

## 2017-09-08 LAB
ANION GAP SERPL CALC-SCNC: 10 MMOL/L (ref 0–18)
BUN SERPL-MCNC: 9 MG/DL (ref 8–20)
BUN/CREAT SERPL: 9.8 (ref 10–20)
CALCIUM SERPL-MCNC: 8.5 MG/DL (ref 8.5–10.5)
CHLORIDE SERPL-SCNC: 98 MMOL/L (ref 95–110)
CO2 SERPL-SCNC: 26 MMOL/L (ref 22–32)
CREAT SERPL-MCNC: 0.92 MG/DL (ref 0.5–1.5)
ERYTHROCYTE [DISTWIDTH] IN BLOOD BY AUTOMATED COUNT: 18.5 % (ref 11–15)
GLUCOSE BLDC GLUCOMTR-MCNC: 160 MG/DL (ref 70–99)
GLUCOSE BLDC GLUCOMTR-MCNC: 221 MG/DL (ref 70–99)
GLUCOSE SERPL-MCNC: 234 MG/DL (ref 70–99)
HBA1C MFR BLD: 8.5 % (ref 4–6)
HCT VFR BLD AUTO: 30.5 % (ref 35–48)
HGB BLD-MCNC: 9.5 G/DL (ref 12–16)
MCH RBC QN AUTO: 22 PG (ref 27–32)
MCHC RBC AUTO-ENTMCNC: 31.3 G/DL (ref 32–37)
MCV RBC AUTO: 70.2 FL (ref 80–100)
OSMOLALITY UR CALC.SUM OF ELEC: 284 MOSM/KG (ref 275–295)
PLATELET # BLD AUTO: 222 K/UL (ref 140–400)
PMV BLD AUTO: 9.2 FL (ref 7.4–10.3)
POTASSIUM SERPL-SCNC: 3.6 MMOL/L (ref 3.3–5.1)
RBC # BLD AUTO: 4.34 M/UL (ref 3.7–5.4)
SODIUM SERPL-SCNC: 134 MMOL/L (ref 136–144)
WBC # BLD AUTO: 11.5 K/UL (ref 4–11)

## 2017-09-08 PROCEDURE — 99239 HOSP IP/OBS DSCHRG MGMT >30: CPT | Performed by: HOSPITALIST

## 2017-09-08 RX ORDER — HYDROCODONE BITARTRATE AND ACETAMINOPHEN 5; 325 MG/1; MG/1
1 TABLET ORAL EVERY 6 HOURS PRN
Qty: 30 TABLET | Refills: 0 | Status: SHIPPED | OUTPATIENT
Start: 2017-09-08 | End: 2018-09-19

## 2017-09-08 NOTE — PROGRESS NOTES
Misc. Note    Pete Pete NP  2017  [facility] HOSP - [facility]    Progress Note    Albertalicha Bull Patient Status:  Inpatient    3/30/1970 MRN [id number]   Location [facility] 2W/SW Attending Robson Dill MD   Hosp Day # 1 PCP Naoma Brain cooperative  Neurologic: Alert and oriented X 3,  Tongue midline; smile symmetrical; moves all extremities Left sided weakness from previous CVA  Psychiatric: calm  Right carotid incision dressing C/D/I;  LY drain present with 15cc serous drainage in drain

## 2017-09-08 NOTE — PLAN OF CARE
- pt up ambulating in hallway  - flat affect, experiences pain when removing tape from arm   - went back to sleep when she was told she could go home  - steady gait  - denies pain at time of d/c  - went over all instructions, f/up appts, meds, and when to

## 2017-09-08 NOTE — DISCHARGE SUMMARY
[facility]    Discharge Summary    Kirill Lisa Patient Status:  Inpatient    3/30/1970 MRN [id number]   Location [facility] 2W/SW Attending Yamila Garcia MD   Hosp Day # 1 PCP Bjorn Potts MD     Date of Admission: 9 retinopathy ([facility] [address]     S/P colonoscopic polypectomy     Gastropathy     S/P carotid endarterectomy     Carotid atherosclerosis     Hyperlipidemia     Pre-op testing        Physical Exam:     Gen: No acute distress, alert and oriented x3  Pulm: Lungs clear, Topher    HTN  - initially on nipride drip - now stopped and pressures controlled - resume home meds on discharge    Type 2 DM  - insulin dependent, resume home meds on discharge, counseled on diet    Hyperlipidemia  - cont statin    Discharge Condition: Ferrous Sulfate 325 (65 Fe) MG Tabs      Take 325 mg by mouth 2 (two) times daily. Refills:  0     gabapentin 300 MG Caps  Commonly known as:  NEURONTIN      Take 300 mg by mouth nightly.    Refills:  0     Glucose Blood Strp  Commonly known as:  TRUE M by mouth daily.    Refills:  0        STOP taking these medications    amoxicillin 500 MG Caps  Commonly known as:  AMOXIL              Where to Get Your Medications      Please  your prescriptions at the location directed by your doctor or nurse

## 2017-09-08 NOTE — PHYSICAL THERAPY NOTE
PHYSICAL THERAPY EVALUATION - INPATIENT     Room Number: 751/209-P  Evaluation Date: 9/8/2017  Type of Evaluation: Initial  Physician Order: PT Eval and Treat    Presenting Problem: carotid endarectectomy  Reason for Therapy: Mobility Dysfunction and D bilateral, both upper and lower lids 3/30/2016   • Migraine    • Migraines    • Muscle weakness     left sided weakness uses walker and cane   • Stenosis of right vertebral artery    • Stroke [facility]) 2014   • Type II or unspecified type diabetes mellitus wit does the patient currently have. ..  -   Turning over in bed (including adjusting bedclothes, sheets and blankets)?: None   -   Sitting down on and standing up from a chair with arms (e.g., wheelchair, bedside commode, etc.): None   -   Moving from lying on

## 2017-09-09 ENCOUNTER — TELEPHONE (OUTPATIENT)
Dept: CARDIOLOGY UNIT | Facility: HOSPITAL | Age: 47
End: 2017-09-09

## 2017-09-10 ENCOUNTER — TELEPHONE (OUTPATIENT)
Dept: CARDIOLOGY UNIT | Facility: HOSPITAL | Age: 47
End: 2017-09-10

## 2017-09-11 NOTE — PAYOR COMM NOTE
--------------  ADMISSION REVIEW     Payor: Yudy Gracia #:  [id number]  Authorization Number: [id number]    Admit date: 9/7/17  Admit time: 3524       Admitting Physician: Thor Snyder MD      Hosp Day # 0 PCP Candelaria Castillo MD     internal carotid artery stump, PAIN CONTROL, MONITOR WOUND AND DRAIN, DVT PROPHYLAXIS, MONITOR ARTERIAL LINE,        Essential hypertension  CONT NIPRIDE DRIP, CONT TO MONITOR.        Type 2 diabetes mellitus  CONT HOME MEDS, MONITOR ACCU CHECKS.        Hy • Metoprolol Succinate ER  100 mg Oral Daily Beta Blocker   • Metoprolol Succinate ER  25 mg Oral Nightly   • aspirin EC  325 mg Oral Daily   • hydrochlorothiazide  25 mg Oral Daily   • insulin detemir  50 Units Subcutaneous Nightly   • Pantoprazole Sodi niece; betadine swabs given to take home        Results:      Lab Results  Component Value Date   WBC 11.5 (H) 09/08/2017   HGB 9.5 (L) 09/08/2017   HCT 30.5 (L) 09/08/2017    09/08/2017   CREATSERUM 0.92 09/08/2017   BUN 9 09/08/2017    (L) 0 carotid stenosis s/p endarterectomy; still recommend for TCM follow-up   Please note that only DMG and EMG patients enrolled in the Medicare ACO, [facility] ACO and [address] [place] [address] will be handled by a member of the Care Management Team.  For all other patients, rahul dry  Psych: Normal affect  Ext: no c/c/e      History of Present Illness: Per Dr Ra Jean    Patient is a 50year old female former smoker with a PMH of right vertebral artery stenosis s/p bypass of right external carotid to right vertebral artery [place] Hodge mouth every 6 (six) hours as needed for Pain.    Quantity:  30 tablet  Refills:  0        CHANGE how you take these medications      Instructions Prescription details   hydrochlorothiazide 25 MG Tabs  Commonly known as:  HYDRODIURIL  What changed:  Another MEALS   Quantity:  15 mL  Refills:  2     Lancets Misc      TRUE TRACK LANCETS, OR BRAND AS APPROVED BY INSURANCE.  TO BE USED 4 TIMES DAILY   Quantity:  200 each  Refills:  11     TRUEPLUS LANCETS 33G Misc      TEST BLOOD GLUCOSE LEVEL FOUR TIMES DAILY   Q discharge[LD.1]    Electronically signed by Chris Lake MD on 9/8/2017 10:25 AM   Attribution Key     LD. 1 - Chris Lake MD on 9/8/2017 10:21 AM   LD. 2 - Chris Lake MD on 9/8/2017 10:25 AM                    REVIEWER COMMENTS  9/8/2017

## 2017-09-13 ENCOUNTER — OFFICE VISIT (OUTPATIENT)
Dept: INTERNAL MEDICINE CLINIC | Facility: CLINIC | Age: 47
End: 2017-09-13

## 2017-09-13 ENCOUNTER — HOSPITAL ENCOUNTER (EMERGENCY)
Facility: HOSPITAL | Age: 47
Discharge: HOME | End: 2017-09-14
Attending: EMERGENCY MEDICINE
Payer: COMMERCIAL

## 2017-09-13 VITALS
DIASTOLIC BLOOD PRESSURE: 84 MMHG | BODY MASS INDEX: 33.31 KG/M2 | HEART RATE: 62 BPM | RESPIRATION RATE: 20 BRPM | TEMPERATURE: 98 F | HEIGHT: 62 IN | WEIGHT: 181 LBS | SYSTOLIC BLOOD PRESSURE: 148 MMHG

## 2017-09-13 DIAGNOSIS — I10 ESSENTIAL HYPERTENSION WITH GOAL BLOOD PRESSURE LESS THAN 140/90: ICD-10-CM

## 2017-09-13 DIAGNOSIS — L30.9 DERMATITIS: ICD-10-CM

## 2017-09-13 DIAGNOSIS — Z98.890 S/P CAROTID ENDARTERECTOMY: Primary | ICD-10-CM

## 2017-09-13 DIAGNOSIS — Z98.890 S/P CAROTID ENDARTERECTOMY: ICD-10-CM

## 2017-09-13 DIAGNOSIS — L29.9 ITCHING: ICD-10-CM

## 2017-09-13 DIAGNOSIS — L30.9 DERMATITIS: Primary | ICD-10-CM

## 2017-09-13 PROCEDURE — 96374 THER/PROPH/DIAG INJ IV PUSH: CPT

## 2017-09-13 PROCEDURE — 99212 OFFICE O/P EST SF 10 MIN: CPT | Performed by: INTERNAL MEDICINE

## 2017-09-13 PROCEDURE — 99284 EMERGENCY DEPT VISIT MOD MDM: CPT

## 2017-09-13 PROCEDURE — 99214 OFFICE O/P EST MOD 30 MIN: CPT | Performed by: INTERNAL MEDICINE

## 2017-09-13 PROCEDURE — 96375 TX/PRO/DX INJ NEW DRUG ADDON: CPT

## 2017-09-13 PROCEDURE — 96376 TX/PRO/DX INJ SAME DRUG ADON: CPT

## 2017-09-13 NOTE — PROGRESS NOTES
HPI:    Patient ID: Juwan Mullen is a 50year old female.   Patient patient presents today for f/u visit  After surgery -right carotid external  endarterectomy , states doing well otherwise-and significantly  improved with headache  ,vision and  Balance p HYDROCHLOROTHIAZIDE 25 MG Oral Tab TAKE 1 TABLET(25 MG) BY MOUTH EVERY DAY Disp: 90 tablet Rfl: 0   Cholecalciferol (VITAMIN D) 2000 units Oral Cap Take 1 capsule by mouth daily. Disp:  Rfl:    escitalopram 20 MG Oral Tab Take 10 mg by mouth daily.    Disp: levETIRAcetam 500 MG Oral Tab Take 1 tablet (500 mg total) by mouth 2 (two) times daily. Disp: 180 tablet Rfl: 1   acetaminophen 325 MG Oral Tab 2 tabs 3 times daily.  Disp: 360 tablet Rfl: 0   MetFORMIN HCl 1000 MG Oral Tab Take 1 tablet (1,000 mg total) b Neurological: She is alert and oriented to person, place, and time. Skin: System normal. Skin is warm and dry. Rash noted. Rash is papular.         Incision right  Neck area -  Healing well no infection signs   No drenage ,no warmth no  Erythema mild  Ten

## 2017-09-14 ENCOUNTER — APPOINTMENT (OUTPATIENT)
Dept: CT IMAGING | Facility: HOSPITAL | Age: 47
End: 2017-09-14
Attending: EMERGENCY MEDICINE
Payer: COMMERCIAL

## 2017-09-14 VITALS
WEIGHT: 180 LBS | BODY MASS INDEX: 33.13 KG/M2 | DIASTOLIC BLOOD PRESSURE: 82 MMHG | HEIGHT: 62 IN | RESPIRATION RATE: 19 BRPM | OXYGEN SATURATION: 96 % | SYSTOLIC BLOOD PRESSURE: 171 MMHG | HEART RATE: 64 BPM | TEMPERATURE: 98 F

## 2017-09-14 LAB
ANION GAP SERPL CALC-SCNC: 10 MMOL/L (ref 0–18)
BASOPHILS # BLD: 0.1 K/UL (ref 0–0.2)
BASOPHILS NFR BLD: 1 %
BUN SERPL-MCNC: 13 MG/DL (ref 8–20)
BUN/CREAT SERPL: 13.5 (ref 10–20)
CALCIUM SERPL-MCNC: 9.1 MG/DL (ref 8.5–10.5)
CHLORIDE SERPL-SCNC: 97 MMOL/L (ref 95–110)
CO2 SERPL-SCNC: 26 MMOL/L (ref 22–32)
CREAT SERPL-MCNC: 0.96 MG/DL (ref 0.5–1.5)
EOSINOPHIL # BLD: 0.2 K/UL (ref 0–0.7)
EOSINOPHIL NFR BLD: 2 %
ERYTHROCYTE [DISTWIDTH] IN BLOOD BY AUTOMATED COUNT: 18.4 % (ref 11–15)
GLUCOSE SERPL-MCNC: 289 MG/DL (ref 70–99)
HCT VFR BLD AUTO: 32 % (ref 35–48)
HGB BLD-MCNC: 10 G/DL (ref 12–16)
LYMPHOCYTES # BLD: 3.1 K/UL (ref 1–4)
LYMPHOCYTES NFR BLD: 27 %
MCH RBC QN AUTO: 22.4 PG (ref 27–32)
MCHC RBC AUTO-ENTMCNC: 31.3 G/DL (ref 32–37)
MCV RBC AUTO: 71.6 FL (ref 80–100)
MONOCYTES # BLD: 0.6 K/UL (ref 0–1)
MONOCYTES NFR BLD: 5 %
NEUTROPHILS # BLD AUTO: 7.5 K/UL (ref 1.8–7.7)
NEUTROPHILS NFR BLD: 65 %
OSMOLALITY UR CALC.SUM OF ELEC: 287 MOSM/KG (ref 275–295)
PLATELET # BLD AUTO: 262 K/UL (ref 140–400)
PMV BLD AUTO: 9 FL (ref 7.4–10.3)
POTASSIUM SERPL-SCNC: 3.3 MMOL/L (ref 3.3–5.1)
RBC # BLD AUTO: 4.47 M/UL (ref 3.7–5.4)
SODIUM SERPL-SCNC: 133 MMOL/L (ref 136–144)
WBC # BLD AUTO: 11.5 K/UL (ref 4–11)

## 2017-09-14 PROCEDURE — 85025 COMPLETE CBC W/AUTO DIFF WBC: CPT | Performed by: EMERGENCY MEDICINE

## 2017-09-14 PROCEDURE — 70491 CT SOFT TISSUE NECK W/DYE: CPT | Performed by: EMERGENCY MEDICINE

## 2017-09-14 PROCEDURE — 80048 BASIC METABOLIC PNL TOTAL CA: CPT | Performed by: EMERGENCY MEDICINE

## 2017-09-14 PROCEDURE — S0028 INJECTION, FAMOTIDINE, 20 MG: HCPCS

## 2017-09-14 RX ORDER — HYDROXYZINE HYDROCHLORIDE 25 MG/1
25 TABLET, FILM COATED ORAL EVERY 6 HOURS PRN
Qty: 20 TABLET | Refills: 0 | Status: SHIPPED | OUTPATIENT
Start: 2017-09-14 | End: 2017-10-14

## 2017-09-14 RX ORDER — FAMOTIDINE 10 MG/ML
20 INJECTION, SOLUTION INTRAVENOUS ONCE
Status: COMPLETED | OUTPATIENT
Start: 2017-09-14 | End: 2017-09-14

## 2017-09-14 RX ORDER — PREDNISONE 20 MG/1
40 TABLET ORAL DAILY
Qty: 6 TABLET | Refills: 0 | Status: SHIPPED | OUTPATIENT
Start: 2017-09-14 | End: 2017-09-17

## 2017-09-14 RX ORDER — FAMOTIDINE 20 MG/1
20 TABLET ORAL 2 TIMES DAILY
Qty: 20 TABLET | Refills: 0 | Status: SHIPPED | OUTPATIENT
Start: 2017-09-14 | End: 2017-09-24

## 2017-09-14 RX ORDER — DIPHENHYDRAMINE HYDROCHLORIDE 50 MG/ML
25 INJECTION INTRAMUSCULAR; INTRAVENOUS ONCE
Status: COMPLETED | OUTPATIENT
Start: 2017-09-14 | End: 2017-09-14

## 2017-09-14 RX ORDER — DIPHENHYDRAMINE HYDROCHLORIDE 50 MG/ML
INJECTION INTRAMUSCULAR; INTRAVENOUS
Status: COMPLETED
Start: 2017-09-14 | End: 2017-09-14

## 2017-09-14 RX ORDER — CEPHALEXIN 500 MG/1
500 CAPSULE ORAL ONCE
Status: COMPLETED | OUTPATIENT
Start: 2017-09-14 | End: 2017-09-14

## 2017-09-14 RX ORDER — METHYLPREDNISOLONE SODIUM SUCCINATE 40 MG/ML
80 INJECTION, POWDER, LYOPHILIZED, FOR SOLUTION INTRAMUSCULAR; INTRAVENOUS ONCE
Status: COMPLETED | OUTPATIENT
Start: 2017-09-14 | End: 2017-09-14

## 2017-09-14 RX ORDER — CEFADROXIL 500 MG/1
500 CAPSULE ORAL 2 TIMES DAILY
Qty: 20 CAPSULE | Refills: 0 | Status: SHIPPED | OUTPATIENT
Start: 2017-09-14 | End: 2017-09-24

## 2017-09-14 RX ORDER — FAMOTIDINE 10 MG/ML
INJECTION, SOLUTION INTRAVENOUS
Status: COMPLETED
Start: 2017-09-14 | End: 2017-09-14

## 2017-09-14 NOTE — ED PROVIDER NOTES
Patient Seen in: [facility] Emergency Department    History   Patient presents with:  Itching    Stated Complaint: pt had surgery on the for \"artery\" itching and pain at surgery site    HPI    Patient presents the emergency department complainin Brother    • Glaucoma Neg    • Macular degeneration Neg        Smoking status: Former Smoker                                                              Packs/day: 0.20      Years: 1.00         Quit date: 12/16/2013  Smokeless tobacco: Never Used petechiae or purpura   Nursing note and vitals reviewed.             ED Course     Labs Reviewed   BASIC METABOLIC PANEL (8) - Abnormal; Notable for the following:        Result Value    Glucose 289 (*)     Sodium 133 (*)     All other components within nor 3. Nonspecific subcutaneous edema in the submandibular regions bilaterally, worse on the right. Radiology exams  Viewed and reviewed by myself and findings discussed with patient including need for follow up    Discussed with Dr. Janelle Zarco.  Pt andrea

## 2017-09-14 NOTE — ED NOTES
Patient presents to ED with rash and intense itching after procedure 1 week ago. Family member at bedside states that it was mild at first but now has gotten significantly worse.  Reassessment after 2 doses of benadryl, patient is sleeping but states that s

## 2017-09-14 NOTE — ED INITIAL ASSESSMENT (HPI)
Pt had carotid endartectomy on 9/7/17 and has been recovering well since but reports 3 days ago began having itching to incision site on her chest and now has progressed to whole body itching.

## 2017-09-15 DIAGNOSIS — F32.9 REACTIVE DEPRESSION: ICD-10-CM

## 2017-09-25 DIAGNOSIS — Z98.890 HISTORY OF CRANIOTOMY: ICD-10-CM

## 2017-09-25 RX ORDER — PEN NEEDLE, DIABETIC 32GX 5/32"
NEEDLE, DISPOSABLE MISCELLANEOUS
Qty: 100 EACH | Refills: 2 | Status: SHIPPED | OUTPATIENT
Start: 2017-09-25 | End: 2017-09-28

## 2017-09-25 RX ORDER — DOCUSATE SODIUM 100 MG/1
CAPSULE, LIQUID FILLED ORAL
Qty: 180 CAPSULE | Refills: 0 | Status: SHIPPED | OUTPATIENT
Start: 2017-09-25 | End: 2017-09-28

## 2017-09-25 RX ORDER — OMEPRAZOLE 40 MG/1
CAPSULE, DELAYED RELEASE ORAL
Qty: 90 CAPSULE | Refills: 0 | Status: SHIPPED | OUTPATIENT
Start: 2017-09-25 | End: 2017-09-28

## 2017-09-25 NOTE — TELEPHONE ENCOUNTER
Refill Protocol Appointment Criteria: Refilled per protocol    · Appointment scheduled in the past 12 months or in the next 3 months  Recent Outpatient Visits            1 week ago S/P carotid endarterectomy    [facility], [address] [place] [address]

## 2017-09-28 ENCOUNTER — OFFICE VISIT (OUTPATIENT)
Dept: INTERNAL MEDICINE CLINIC | Facility: CLINIC | Age: 47
End: 2017-09-28

## 2017-09-28 VITALS
SYSTOLIC BLOOD PRESSURE: 101 MMHG | TEMPERATURE: 98 F | HEIGHT: 62 IN | WEIGHT: 185 LBS | RESPIRATION RATE: 20 BRPM | DIASTOLIC BLOOD PRESSURE: 72 MMHG | HEART RATE: 61 BPM | BODY MASS INDEX: 34.04 KG/M2

## 2017-09-28 DIAGNOSIS — E11.8 TYPE 2 DIABETES MELLITUS WITH COMPLICATION, WITH LONG-TERM CURRENT USE OF INSULIN (HCC): ICD-10-CM

## 2017-09-28 DIAGNOSIS — Z98.890 HISTORY OF CRANIOTOMY: ICD-10-CM

## 2017-09-28 DIAGNOSIS — Z79.4 TYPE 2 DIABETES MELLITUS WITH COMPLICATION, WITH LONG-TERM CURRENT USE OF INSULIN (HCC): ICD-10-CM

## 2017-09-28 DIAGNOSIS — I10 ESSENTIAL HYPERTENSION WITH GOAL BLOOD PRESSURE LESS THAN 140/90: ICD-10-CM

## 2017-09-28 DIAGNOSIS — Z98.890 S/P CAROTID ENDARTERECTOMY: Primary | ICD-10-CM

## 2017-09-28 DIAGNOSIS — Z23 INFLUENZA VACCINATION GIVEN: ICD-10-CM

## 2017-09-28 PROCEDURE — 90686 IIV4 VACC NO PRSV 0.5 ML IM: CPT | Performed by: INTERNAL MEDICINE

## 2017-09-28 PROCEDURE — 99214 OFFICE O/P EST MOD 30 MIN: CPT | Performed by: INTERNAL MEDICINE

## 2017-09-28 PROCEDURE — 90471 IMMUNIZATION ADMIN: CPT | Performed by: INTERNAL MEDICINE

## 2017-09-28 RX ORDER — HYDROXYZINE HYDROCHLORIDE 25 MG/1
25 TABLET, FILM COATED ORAL EVERY 6 HOURS PRN
Qty: 20 TABLET | Refills: 0 | Status: CANCELLED | OUTPATIENT
Start: 2017-09-28 | End: 2017-10-28

## 2017-09-28 RX ORDER — LANCETS 30 GAUGE
EACH MISCELLANEOUS
Qty: 200 EACH | Refills: 11 | Status: SHIPPED | OUTPATIENT
Start: 2017-09-28 | End: 2017-09-29

## 2017-09-28 RX ORDER — METOPROLOL SUCCINATE 25 MG/1
25 TABLET, EXTENDED RELEASE ORAL NIGHTLY
Qty: 90 TABLET | Refills: 0 | Status: CANCELLED | OUTPATIENT
Start: 2017-09-28

## 2017-09-28 RX ORDER — MULTIVIT-MIN/IRON/FOLIC ACID/K 18-600-40
1 CAPSULE ORAL DAILY
Qty: 90 CAPSULE | Refills: 0 | Status: SHIPPED | OUTPATIENT
Start: 2017-09-28

## 2017-09-28 RX ORDER — LEVETIRACETAM 500 MG/1
500 TABLET ORAL 2 TIMES DAILY
Qty: 180 TABLET | Refills: 0 | Status: SHIPPED | OUTPATIENT
Start: 2017-09-28 | End: 2017-12-11

## 2017-09-28 RX ORDER — FERROUS SULFATE 325(65) MG
325 TABLET ORAL 2 TIMES DAILY
Qty: 180 TABLET | Refills: 0 | Status: SHIPPED | OUTPATIENT
Start: 2017-09-28 | End: 2018-01-11

## 2017-09-28 RX ORDER — OMEPRAZOLE 40 MG/1
CAPSULE, DELAYED RELEASE ORAL
Qty: 90 CAPSULE | Refills: 0 | Status: SHIPPED | OUTPATIENT
Start: 2017-09-28 | End: 2017-12-11

## 2017-09-28 RX ORDER — ESCITALOPRAM OXALATE 20 MG/1
10 TABLET ORAL DAILY
Qty: 90 TABLET | Refills: 3 | Status: CANCELLED | OUTPATIENT
Start: 2017-09-28

## 2017-09-28 RX ORDER — OMEPRAZOLE 40 MG/1
CAPSULE, DELAYED RELEASE ORAL
Qty: 90 CAPSULE | Refills: 0 | Status: CANCELLED | OUTPATIENT
Start: 2017-09-28

## 2017-09-28 RX ORDER — GABAPENTIN 300 MG/1
300 CAPSULE ORAL NIGHTLY
Qty: 90 CAPSULE | Refills: 0 | Status: SHIPPED | OUTPATIENT
Start: 2017-09-28 | End: 2018-03-13

## 2017-09-28 RX ORDER — METOPROLOL SUCCINATE 25 MG/1
25 TABLET, EXTENDED RELEASE ORAL NIGHTLY
Qty: 90 TABLET | Refills: 0 | Status: SHIPPED | OUTPATIENT
Start: 2017-09-28 | End: 2018-03-13

## 2017-09-28 RX ORDER — HYDROCHLOROTHIAZIDE 25 MG/1
TABLET ORAL
Qty: 90 TABLET | Refills: 0 | Status: CANCELLED | OUTPATIENT
Start: 2017-09-28

## 2017-09-28 RX ORDER — DOCUSATE SODIUM 100 MG/1
CAPSULE, LIQUID FILLED ORAL
Qty: 180 CAPSULE | Refills: 3 | Status: CANCELLED | OUTPATIENT
Start: 2017-09-28

## 2017-09-28 RX ORDER — MULTIVIT-MIN/IRON/FOLIC ACID/K 18-600-40
1 CAPSULE ORAL DAILY
Qty: 90 CAPSULE | Refills: 3 | Status: CANCELLED | OUTPATIENT
Start: 2017-09-28

## 2017-09-28 RX ORDER — LOSARTAN POTASSIUM 100 MG/1
100 TABLET ORAL
Qty: 90 TABLET | Refills: 0 | Status: SHIPPED | OUTPATIENT
Start: 2017-09-28 | End: 2017-12-11

## 2017-09-28 RX ORDER — METOPROLOL SUCCINATE 100 MG/1
TABLET, EXTENDED RELEASE ORAL
Qty: 90 TABLET | Refills: 0 | Status: CANCELLED | OUTPATIENT
Start: 2017-09-28

## 2017-09-28 RX ORDER — GABAPENTIN 300 MG/1
300 CAPSULE ORAL NIGHTLY
Qty: 90 CAPSULE | Refills: 3 | Status: CANCELLED | OUTPATIENT
Start: 2017-09-28

## 2017-09-28 RX ORDER — ATORVASTATIN CALCIUM 40 MG/1
40 TABLET, FILM COATED ORAL NIGHTLY
Qty: 90 TABLET | Refills: 3 | Status: CANCELLED | OUTPATIENT
Start: 2017-09-28

## 2017-09-28 RX ORDER — DOCUSATE SODIUM 100 MG/1
CAPSULE, LIQUID FILLED ORAL
Qty: 180 CAPSULE | Refills: 0 | Status: SHIPPED | OUTPATIENT
Start: 2017-09-28 | End: 2017-12-11

## 2017-09-28 RX ORDER — FERROUS SULFATE 325(65) MG
325 TABLET ORAL 2 TIMES DAILY
Qty: 180 TABLET | Refills: 3 | Status: CANCELLED | OUTPATIENT
Start: 2017-09-28

## 2017-09-28 RX ORDER — LANCETS 30 GAUGE
EACH MISCELLANEOUS
Qty: 200 EACH | Refills: 1 | Status: CANCELLED | OUTPATIENT
Start: 2017-09-28

## 2017-09-28 RX ORDER — METOPROLOL SUCCINATE 100 MG/1
TABLET, EXTENDED RELEASE ORAL
Qty: 90 TABLET | Refills: 0 | Status: SHIPPED | OUTPATIENT
Start: 2017-09-28 | End: 2017-12-11

## 2017-09-28 RX ORDER — HYDROCHLOROTHIAZIDE 25 MG/1
TABLET ORAL
Qty: 90 TABLET | Refills: 0 | Status: SHIPPED | OUTPATIENT
Start: 2017-09-28 | End: 2018-01-11

## 2017-09-28 RX ORDER — LEVETIRACETAM 500 MG/1
500 TABLET ORAL 2 TIMES DAILY
Qty: 180 TABLET | Refills: 0 | Status: CANCELLED | OUTPATIENT
Start: 2017-09-28

## 2017-09-28 RX ORDER — ESCITALOPRAM OXALATE 20 MG/1
10 TABLET ORAL DAILY
Qty: 90 TABLET | Refills: 0 | Status: SHIPPED | OUTPATIENT
Start: 2017-09-28 | End: 2018-03-13

## 2017-09-28 RX ORDER — LOSARTAN POTASSIUM 100 MG/1
100 TABLET ORAL
Qty: 90 TABLET | Refills: 0 | Status: CANCELLED | OUTPATIENT
Start: 2017-09-28

## 2017-09-28 RX ORDER — ATORVASTATIN CALCIUM 40 MG/1
40 TABLET, FILM COATED ORAL NIGHTLY
Qty: 90 TABLET | Refills: 0 | Status: SHIPPED | OUTPATIENT
Start: 2017-09-28 | End: 2018-04-10

## 2017-09-28 NOTE — PROGRESS NOTES
HPI:    Patient ID: Jelly Adan is a 50year old female. Medication Request   Pertinent negatives include no abdominal pain, chest pain, chills, coughing, fatigue, fever, headaches, nausea, neck pain, numbness, sore throat or vomiting.  The treatment  MG Oral Cap TAKE ONE (1) CAPSULE BY MOUTH TWICE DAILY Disp: 180 capsule Rfl: 0   BD PEN NEEDLE ABDOULAYE U/F 32G X 4 MM Does not apply Misc USE DAILY WITH LEVEMIR Disp: 100 each Rfl: 2   HydrOXYzine HCl 25 MG Oral Tab Take 1 tablet (25 mg total) by mout Glucose Blood (TRUE METRIX BLOOD GLUCOSE TEST) In Vitro Strip Test blood sugar three times daily. Disp: 300 strip Rfl: 11   losartan 100 MG Oral Tab Take 1 tablet (100 mg total) by mouth once daily.  Disp: 90 tablet Rfl: 0   levETIRAcetam 500 MG Oral Tab Ta Abdominal: Soft. She exhibits no mass. There is no hepatosplenomegaly. There is no tenderness. There is no CVA tenderness. Musculoskeletal: She exhibits no edema. Lymphadenopathy:     She has no cervical adenopathy.    Neurological: She is alert and martir Encouraged SBGM   Eye exam and foot exam yearly  Take medications as perscribed  Directions and side effects of medications discussed w/pt  Pt verbalized understanding   Improving  CPM   Pt  Seeing  Dr Constantin Narayanan   · Advice Sig: Inject 50 Units into the skin nightly. Lancets Does not apply Misc 200 each 11     Sig: TRUE TRACK LANCETS, OR BRAND AS APPROVED BY INSURANCE.  TO BE USED 4 TIMES DAILY      levETIRAcetam 500 MG Oral Tab 180 tablet 3     Sig: Take 1 tablet (500

## 2017-09-28 NOTE — TELEPHONE ENCOUNTER
PATIENT HAS APPT TODAY. PLEASE ADVISE ON FURTHER REFILLS.   Refill Protocol Appointment Criteria  · Appointment scheduled in the past 6 months or in the next 3 months  Recent Outpatient Visits            2 weeks ago S/P carotid endarterectomy    Becki Rolon

## 2017-09-29 ENCOUNTER — OFFICE VISIT (OUTPATIENT)
Dept: ENDOCRINOLOGY CLINIC | Facility: CLINIC | Age: 47
End: 2017-09-29

## 2017-09-29 VITALS
BODY MASS INDEX: 33.86 KG/M2 | HEART RATE: 64 BPM | SYSTOLIC BLOOD PRESSURE: 122 MMHG | DIASTOLIC BLOOD PRESSURE: 82 MMHG | WEIGHT: 184 LBS | HEIGHT: 62 IN

## 2017-09-29 DIAGNOSIS — E11.65 UNCONTROLLED TYPE 2 DIABETES MELLITUS WITH HYPERGLYCEMIA, WITH LONG-TERM CURRENT USE OF INSULIN (HCC): Primary | ICD-10-CM

## 2017-09-29 DIAGNOSIS — Z79.4 UNCONTROLLED TYPE 2 DIABETES MELLITUS WITH HYPERGLYCEMIA, WITH LONG-TERM CURRENT USE OF INSULIN (HCC): Primary | ICD-10-CM

## 2017-09-29 LAB
GLUCOSE BLOOD: 208
TEST STRIP LOT #: NORMAL NUMERIC

## 2017-09-29 PROCEDURE — 99214 OFFICE O/P EST MOD 30 MIN: CPT | Performed by: INTERNAL MEDICINE

## 2017-09-29 PROCEDURE — 36416 COLLJ CAPILLARY BLOOD SPEC: CPT | Performed by: INTERNAL MEDICINE

## 2017-09-29 PROCEDURE — 99212 OFFICE O/P EST SF 10 MIN: CPT | Performed by: INTERNAL MEDICINE

## 2017-09-29 PROCEDURE — 82962 GLUCOSE BLOOD TEST: CPT | Performed by: INTERNAL MEDICINE

## 2017-09-29 RX ORDER — LANCETS 30 GAUGE
EACH MISCELLANEOUS
Qty: 200 EACH | Refills: 11 | Status: SHIPPED | OUTPATIENT
Start: 2017-09-29 | End: 2018-08-15

## 2017-09-29 RX ORDER — INSULIN GLARGINE 100 [IU]/ML
55 INJECTION, SOLUTION SUBCUTANEOUS NIGHTLY
Qty: 60 ML | Refills: 1 | Status: SHIPPED | OUTPATIENT
Start: 2017-09-29 | End: 2018-04-10

## 2017-09-29 NOTE — PATIENT INSTRUCTIONS
Lantus 55 units SQ bedtime    Humalog  INSULIN SLIDING SCALE  Base Values  Breakfast: 18  Lunch: 18  Dinner: 18  Ranges:  80-99: -2  100-119: 0  120-139: 0  140-159: 1  160-179: 1  180-199: 2  200-219: 2  220-239: 3  240-259: 3  260-279: 4  280-299: 4  300

## 2017-09-29 NOTE — PROGRESS NOTES
Name: Valdemar Pang  Date: 9/29/2017    Referring Physician: No ref. provider found    HISTORY OF PRESENT ILLNESS   Valdemar Pang is a 50year old female who presents for diabetes mellitus, diagnosed over 10 years ago.   She was seen during hospitalizatio Ferrous Sulfate 325 (65 Fe) MG Oral Tab, Take 1 tablet (325 mg total) by mouth 2 (two) times daily. , Disp: 180 tablet, Rfl: 0  •  gabapentin 300 MG Oral Cap, Take 1 capsule (300 mg total) by mouth nightly., Disp: 90 capsule, Rfl: 0  •  Glucose Blood In Vit Take 1 tablet by mouth every 6 (six) hours as needed for Pain., Disp: 30 tablet, Rfl: 0  •  aspirin 325 MG Oral Tab, Take 325 mg by mouth daily. , Disp: , Rfl:   •  Blood Glucose Monitoring Suppl (TRUE METRIX AIR GLUCOSE METER) w/Device Does not apply Kit, History:  2014: BRAIN SURGERY  No date: CHOLECYSTECTOMY  8/25/2017: COLONOSCOPY N/A      Comment: Procedure: COLONOSCOPY;  Surgeon: Daniel Darden MD;  Location: [address] [place] [address]                ENDOSCOPY  No date: COLONOSCOPY  09/2017: Jessica Montelongo visit and greater than 50% of the time was spent counseling the patient and/or coordinating care.     RTC 3 months    9/29/2017  Hayder Thibodeaux MD

## 2017-10-14 RX ORDER — TRAZODONE HYDROCHLORIDE 50 MG/1
TABLET ORAL
Qty: 30 TABLET | Refills: 0 | OUTPATIENT
Start: 2017-10-14

## 2017-10-26 RX ORDER — INSULIN LISPRO 100 [IU]/ML
INJECTION, SOLUTION INTRAVENOUS; SUBCUTANEOUS
Qty: 15 ML | Refills: 2 | Status: SHIPPED | OUTPATIENT
Start: 2017-10-26 | End: 2018-04-10

## 2017-11-16 ENCOUNTER — TELEPHONE (OUTPATIENT)
Dept: ENDOCRINOLOGY CLINIC | Facility: CLINIC | Age: 47
End: 2017-11-16

## 2017-11-16 NOTE — TELEPHONE ENCOUNTER
Faxed blood sugars received from [facility] for the patient. Dr. Thee Ge would like to know if the patient is finding a new provider. LMTCB.

## 2017-11-17 NOTE — TELEPHONE ENCOUNTER
Spoke with [facility]. She is changing insurance to a plan we take. State her insurance will change in January 2018 FYI.

## 2017-12-11 DIAGNOSIS — I10 ESSENTIAL HYPERTENSION WITH GOAL BLOOD PRESSURE LESS THAN 140/90: ICD-10-CM

## 2017-12-11 DIAGNOSIS — Z98.890 HISTORY OF CRANIOTOMY: ICD-10-CM

## 2017-12-12 RX ORDER — LINAGLIPTIN 5 MG/1
TABLET, FILM COATED ORAL
Qty: 90 TABLET | Refills: 1 | Status: SHIPPED | OUTPATIENT
Start: 2017-12-12 | End: 2018-02-26

## 2017-12-13 RX ORDER — OMEPRAZOLE 40 MG/1
CAPSULE, DELAYED RELEASE ORAL
Qty: 90 CAPSULE | Refills: 10 | Status: SHIPPED | OUTPATIENT
Start: 2017-12-13 | End: 2018-03-05

## 2017-12-13 RX ORDER — DOCUSATE SODIUM 100 MG
CAPSULE ORAL
Qty: 180 CAPSULE | Refills: 10 | Status: SHIPPED | OUTPATIENT
Start: 2017-12-13 | End: 2018-04-10

## 2017-12-13 RX ORDER — PEN NEEDLE, DIABETIC 32GX 5/32"
NEEDLE, DISPOSABLE MISCELLANEOUS
Qty: 100 EACH | Refills: 2 | Status: SHIPPED | OUTPATIENT
Start: 2017-12-13 | End: 2018-08-06

## 2017-12-13 RX ORDER — METOPROLOL SUCCINATE 100 MG/1
TABLET, EXTENDED RELEASE ORAL
Qty: 90 TABLET | Refills: 0 | Status: SHIPPED | OUTPATIENT
Start: 2017-12-13 | End: 2018-03-13

## 2017-12-13 RX ORDER — LOSARTAN POTASSIUM 100 MG/1
TABLET ORAL
Qty: 90 TABLET | Refills: 0 | Status: SHIPPED | OUTPATIENT
Start: 2017-12-13 | End: 2018-03-13

## 2017-12-13 RX ORDER — LEVETIRACETAM 500 MG/1
TABLET ORAL
Qty: 180 TABLET | Refills: 0 | Status: SHIPPED | OUTPATIENT
Start: 2017-12-13 | End: 2018-03-13

## 2017-12-14 NOTE — TELEPHONE ENCOUNTER
Signed Prescriptions Disp Refills    LOSARTAN 100 MG Oral Tab 90 tablet 0      Sig: TAKE 1 TABLET BY MOUTH ONCE DAILY        Authorizing Provider: Howie Barry        Ordering User: Teresa Cintron PENTIPS 32G X 4 MM Does not apply Misc Visit    3 months ago Type 1 diabetes mellitus without retinopathy [facility])    [facility]hthal[facility] Alicia Harris MD    Office Visit            Refill Protocol Appointment Criteria  · Appointment scheduled in the past 6 months o [facility] Adilia Valerio MD    Office Visit            Lab Results  Component Value Date    (H) 09/14/2017   BUN 13 09/14/2017   CREATSERUM 0.96 09/14/2017   BUNCREA 13.5 09/14/2017   GFRNAA >60 09/14/2017

## 2018-01-11 ENCOUNTER — TELEPHONE (OUTPATIENT)
Dept: OTHER | Age: 48
End: 2018-01-11

## 2018-01-11 DIAGNOSIS — I10 ESSENTIAL HYPERTENSION WITH GOAL BLOOD PRESSURE LESS THAN 140/90: ICD-10-CM

## 2018-01-11 NOTE — TELEPHONE ENCOUNTER
Pharmacy called regarding PA for pt test strips, states not covered under pt's insurance, provided fax number.

## 2018-01-15 RX ORDER — FERROUS SULFATE 325(65) MG
TABLET ORAL
Qty: 180 TABLET | Refills: 0 | Status: SHIPPED | OUTPATIENT
Start: 2018-01-15 | End: 2018-04-10

## 2018-01-15 RX ORDER — HYDROCHLOROTHIAZIDE 25 MG/1
TABLET ORAL
Qty: 90 TABLET | Refills: 0 | Status: SHIPPED | OUTPATIENT
Start: 2018-01-15 | End: 2018-04-10

## 2018-01-15 NOTE — TELEPHONE ENCOUNTER
Hypertensive Medications. REFILLED PER PROTOCOL.     Protocol Criteria:  · Appointment scheduled in the past 6 months or in the next 3 months  · BMP or CMP in the past 12 months  · Creatinine result < 2  Recent Outpatient Visits            3 months ago [facility]

## 2018-01-15 NOTE — TELEPHONE ENCOUNTER
PA for True Metrix blood glucose test strips completed with [employer] via CMM response time 3-5 business days KEY [id number].

## 2018-01-18 NOTE — TELEPHONE ENCOUNTER
Fax received from [employer]  [facility] statingtrue metrix blood glucose test strips was denied.  Preferred are [employer] 2, Leonora Lea Next

## 2018-01-21 NOTE — TELEPHONE ENCOUNTER
Ok  To switch  To -any of those  Test strips  -[facility] 2, [facility]ora Le[facility] Next    1 year  Supply    accu   Qid and as  Needed

## 2018-02-10 NOTE — TELEPHONE ENCOUNTER
Patient failed protocol. Script pended. Please advise.     Diabetes Medications  Protocol Criteria:  · Appointment scheduled in the past 6 months or the next 3 months  · A1C < 7.5 in the past 6 months  · Creatinine in the past 12 months  · Creatinine result

## 2018-02-26 ENCOUNTER — TELEPHONE (OUTPATIENT)
Dept: ENDOCRINOLOGY CLINIC | Facility: CLINIC | Age: 48
End: 2018-02-26

## 2018-02-26 NOTE — TELEPHONE ENCOUNTER
PA request received through [employer] for tradjenta. LOV 9/2017. Has [employer].  Per LOV note she is not taking tradjenta:    -Increase Levemir to 55 units SQ daily  -Increase Humalog to 18 units SQ TID with meals   -CF 1:40>140  -Continue Metformin    S

## 2018-03-06 RX ORDER — OMEPRAZOLE 40 MG/1
CAPSULE, DELAYED RELEASE ORAL
Qty: 90 CAPSULE | Refills: 0 | Status: SHIPPED | OUTPATIENT
Start: 2018-03-06 | End: 2018-04-10

## 2018-03-13 DIAGNOSIS — I10 ESSENTIAL HYPERTENSION WITH GOAL BLOOD PRESSURE LESS THAN 140/90: ICD-10-CM

## 2018-03-13 DIAGNOSIS — Z98.890 HISTORY OF CRANIOTOMY: ICD-10-CM

## 2018-03-13 RX ORDER — DOCUSATE SODIUM 100 MG
CAPSULE ORAL
Qty: 180 CAPSULE | Refills: 0 | OUTPATIENT
Start: 2018-03-13

## 2018-03-14 RX ORDER — METOPROLOL SUCCINATE 100 MG/1
TABLET, EXTENDED RELEASE ORAL
Qty: 90 TABLET | Refills: 0 | Status: SHIPPED | OUTPATIENT
Start: 2018-03-14 | End: 2018-03-15

## 2018-03-14 RX ORDER — LEVETIRACETAM 500 MG/1
TABLET ORAL
Qty: 180 TABLET | Refills: 0 | Status: SHIPPED | OUTPATIENT
Start: 2018-03-14 | End: 2018-03-15

## 2018-03-14 RX ORDER — METOPROLOL SUCCINATE 25 MG/1
TABLET, EXTENDED RELEASE ORAL
Qty: 90 TABLET | Refills: 0 | Status: SHIPPED | OUTPATIENT
Start: 2018-03-14 | End: 2018-04-10

## 2018-03-14 RX ORDER — ESCITALOPRAM OXALATE 20 MG/1
TABLET ORAL
Qty: 90 TABLET | Refills: 0 | Status: SHIPPED | OUTPATIENT
Start: 2018-03-14 | End: 2018-04-10

## 2018-03-14 RX ORDER — LOSARTAN POTASSIUM 100 MG/1
TABLET ORAL
Qty: 90 TABLET | Refills: 0 | Status: SHIPPED | OUTPATIENT
Start: 2018-03-14 | End: 2018-03-15

## 2018-03-14 RX ORDER — GABAPENTIN 300 MG/1
CAPSULE ORAL
Qty: 90 CAPSULE | Refills: 0 | Status: SHIPPED | OUTPATIENT
Start: 2018-03-14 | End: 2018-04-10

## 2018-03-14 NOTE — TELEPHONE ENCOUNTER
Current Outpatient Prescriptions:  METFORMIN HCL 1000 MG Oral Tab TAKE ONE (1) TABLET BY MOUTH TWICE DAILY WITH MEALS Disp: 180 tablet Rfl: 1

## 2018-03-14 NOTE — TELEPHONE ENCOUNTER
Chart reviewed. Refills sent per Triage Dept protocol. Has upcoming appt in 3 weeks.    Hypertensive Medications  Protocol Criteria:  · Appointment scheduled in the past 6 months or in the next 3 months  · BMP or CMP in the past 12 months  · Creatinine re

## 2018-03-15 DIAGNOSIS — Z98.890 HISTORY OF CRANIOTOMY: ICD-10-CM

## 2018-03-15 DIAGNOSIS — I10 ESSENTIAL HYPERTENSION WITH GOAL BLOOD PRESSURE LESS THAN 140/90: ICD-10-CM

## 2018-03-15 NOTE — TELEPHONE ENCOUNTER
please advise as does not meet RN protocol for refill criteria - protocol passed but requesting Rx to be faxed, RN unable to complete      Hypertensive Medications  Protocol Criteria:  · Appointment scheduled in the past 6 months or in the next 3 months 09/14/2017   Jose Angel Gutierrez6 287 09/14/2017     Neurology meds    Refill Protocol Appointment Criteria  · Appointment scheduled in the past 6 months or in the next 3 months  Recent Outpatient Visits            5 months ago Uncontrolled type 2 diabetes mellitus wi

## 2018-03-17 RX ORDER — METOPROLOL SUCCINATE 100 MG/1
TABLET, EXTENDED RELEASE ORAL
Qty: 90 TABLET | Refills: 0 | Status: SHIPPED | OUTPATIENT
Start: 2018-03-17 | End: 2018-04-10

## 2018-03-17 RX ORDER — LOSARTAN POTASSIUM 100 MG/1
TABLET ORAL
Qty: 90 TABLET | Refills: 0 | Status: SHIPPED | OUTPATIENT
Start: 2018-03-17 | End: 2018-04-10

## 2018-03-17 RX ORDER — LEVETIRACETAM 500 MG/1
TABLET ORAL
Qty: 180 TABLET | Refills: 0 | Status: SHIPPED | OUTPATIENT
Start: 2018-03-17 | End: 2018-09-19

## 2018-04-09 ENCOUNTER — TELEPHONE (OUTPATIENT)
Dept: OBGYN CLINIC | Facility: CLINIC | Age: 48
End: 2018-04-09

## 2018-04-09 ENCOUNTER — OFFICE VISIT (OUTPATIENT)
Dept: OBGYN CLINIC | Facility: CLINIC | Age: 48
End: 2018-04-09

## 2018-04-09 VITALS
HEIGHT: 60 IN | SYSTOLIC BLOOD PRESSURE: 132 MMHG | DIASTOLIC BLOOD PRESSURE: 84 MMHG | HEART RATE: 63 BPM | BODY MASS INDEX: 36.32 KG/M2 | WEIGHT: 185 LBS

## 2018-04-09 DIAGNOSIS — E66.9 OBESITY, UNSPECIFIED CLASSIFICATION, UNSPECIFIED OBESITY TYPE, UNSPECIFIED WHETHER SERIOUS COMORBIDITY PRESENT: ICD-10-CM

## 2018-04-09 DIAGNOSIS — Z12.31 ENCOUNTER FOR SCREENING MAMMOGRAM FOR BREAST CANCER: ICD-10-CM

## 2018-04-09 DIAGNOSIS — Z01.419 ENCOUNTER FOR GYNECOLOGICAL EXAMINATION WITHOUT ABNORMAL FINDING: Primary | ICD-10-CM

## 2018-04-09 DIAGNOSIS — E10.8 TYPE 1 DIABETES MELLITUS WITH COMPLICATION (HCC): Primary | ICD-10-CM

## 2018-04-09 PROCEDURE — 99213 OFFICE O/P EST LOW 20 MIN: CPT | Performed by: OBSTETRICS & GYNECOLOGY

## 2018-04-09 PROCEDURE — G0101 CA SCREEN;PELVIC/BREAST EXAM: HCPCS | Performed by: OBSTETRICS & GYNECOLOGY

## 2018-04-09 NOTE — PROGRESS NOTES
Well Woman Exam    HPI:  The patient is a 50yo female who presents for annual exam. She denies further vaginal bleeding. She feels well. She notes some pain when having a BM. She also wishes for weight loss.      Reviewed medical and surgical history below Drug use: No    Sexual activity: Not on file     Other Topics Concern   None on file     Social History Narrative   None on file       FAMILY HISTORY:  Family History   Problem Relation Age of Onset   • Diabetes Father    • Hypertension Father    • Hear MOUTH ONCE DAILY, Disp: 90 tablet, Rfl: 0  •  UNIFINE PENTIPS 32G X 4 MM Does not apply Misc, USE DAILY WITH LEVEMIR, Disp: 100 each, Rfl: 2  •  STOOL SOFTENER 100 MG Oral Cap, TAKE ONE (1) CAPSULE BY MOUTH TWICE DAILY, Disp: 180 capsule, Rfl: 10  •  HUMAL Systems:  Constitutional:  Denies fevers and chills   Cardiovascular:  denies chest pain or palpitations  Respiratory:  denies shortness of breath  Gastrointestinal:  denies nausea, vomiting diarrhea or constipation  Genitourinary:  denies dysuria, inconti ACOG encourages shared decision making with the patient and together we reach appropriate screening intervals for the individual  2. Reviewed breast self awareness   3. Mammogram ordered  4. Referral for weight loss clinic  5.  Follow up in 1 year

## 2018-04-10 ENCOUNTER — OFFICE VISIT (OUTPATIENT)
Dept: INTERNAL MEDICINE CLINIC | Facility: CLINIC | Age: 48
End: 2018-04-10

## 2018-04-10 ENCOUNTER — OFFICE VISIT (OUTPATIENT)
Dept: ENDOCRINOLOGY CLINIC | Facility: CLINIC | Age: 48
End: 2018-04-10

## 2018-04-10 VITALS
DIASTOLIC BLOOD PRESSURE: 78 MMHG | RESPIRATION RATE: 20 BRPM | SYSTOLIC BLOOD PRESSURE: 138 MMHG | BODY MASS INDEX: 34.23 KG/M2 | HEART RATE: 67 BPM | TEMPERATURE: 98 F | WEIGHT: 186 LBS | HEIGHT: 62 IN

## 2018-04-10 VITALS
BODY MASS INDEX: 34.41 KG/M2 | HEART RATE: 68 BPM | DIASTOLIC BLOOD PRESSURE: 83 MMHG | HEIGHT: 62 IN | SYSTOLIC BLOOD PRESSURE: 137 MMHG | WEIGHT: 187 LBS

## 2018-04-10 DIAGNOSIS — E11.8 TYPE 2 DIABETES MELLITUS WITH COMPLICATION, WITH LONG-TERM CURRENT USE OF INSULIN (HCC): ICD-10-CM

## 2018-04-10 DIAGNOSIS — E78.00 PURE HYPERCHOLESTEROLEMIA: ICD-10-CM

## 2018-04-10 DIAGNOSIS — Z79.4 TYPE 2 DIABETES MELLITUS WITH COMPLICATION, WITH LONG-TERM CURRENT USE OF INSULIN (HCC): ICD-10-CM

## 2018-04-10 DIAGNOSIS — F32.A DEPRESSION, UNSPECIFIED DEPRESSION TYPE: ICD-10-CM

## 2018-04-10 DIAGNOSIS — E11.8 UNCONTROLLED TYPE 2 DIABETES MELLITUS WITH COMPLICATION, WITH LONG-TERM CURRENT USE OF INSULIN (HCC): Primary | ICD-10-CM

## 2018-04-10 DIAGNOSIS — I10 ESSENTIAL HYPERTENSION WITH GOAL BLOOD PRESSURE LESS THAN 140/90: ICD-10-CM

## 2018-04-10 DIAGNOSIS — Z79.4 UNCONTROLLED TYPE 2 DIABETES MELLITUS WITH COMPLICATION, WITH LONG-TERM CURRENT USE OF INSULIN (HCC): Primary | ICD-10-CM

## 2018-04-10 DIAGNOSIS — D64.9 ANEMIA, UNSPECIFIED TYPE: Primary | ICD-10-CM

## 2018-04-10 DIAGNOSIS — E11.65 UNCONTROLLED TYPE 2 DIABETES MELLITUS WITH COMPLICATION, WITH LONG-TERM CURRENT USE OF INSULIN (HCC): Primary | ICD-10-CM

## 2018-04-10 PROCEDURE — 36416 COLLJ CAPILLARY BLOOD SPEC: CPT | Performed by: INTERNAL MEDICINE

## 2018-04-10 PROCEDURE — G0463 HOSPITAL OUTPT CLINIC VISIT: HCPCS | Performed by: INTERNAL MEDICINE

## 2018-04-10 PROCEDURE — 99213 OFFICE O/P EST LOW 20 MIN: CPT | Performed by: INTERNAL MEDICINE

## 2018-04-10 PROCEDURE — 99214 OFFICE O/P EST MOD 30 MIN: CPT | Performed by: INTERNAL MEDICINE

## 2018-04-10 PROCEDURE — 82962 GLUCOSE BLOOD TEST: CPT | Performed by: INTERNAL MEDICINE

## 2018-04-10 PROCEDURE — 83036 HEMOGLOBIN GLYCOSYLATED A1C: CPT | Performed by: INTERNAL MEDICINE

## 2018-04-10 RX ORDER — ESCITALOPRAM OXALATE 20 MG/1
TABLET ORAL
Qty: 90 TABLET | Refills: 2 | Status: SHIPPED | OUTPATIENT
Start: 2018-04-10 | End: 2019-01-11

## 2018-04-10 RX ORDER — METOPROLOL SUCCINATE 25 MG/1
TABLET, EXTENDED RELEASE ORAL
Qty: 90 TABLET | Refills: 2 | Status: SHIPPED | OUTPATIENT
Start: 2018-04-10 | End: 2018-09-19

## 2018-04-10 RX ORDER — OMEPRAZOLE 40 MG/1
CAPSULE, DELAYED RELEASE ORAL
Qty: 90 CAPSULE | Refills: 2 | Status: SHIPPED | OUTPATIENT
Start: 2018-04-10 | End: 2018-12-24

## 2018-04-10 RX ORDER — FERROUS SULFATE 325(65) MG
TABLET ORAL
Qty: 180 TABLET | Refills: 0 | Status: SHIPPED | OUTPATIENT
Start: 2018-04-10 | End: 2018-08-06

## 2018-04-10 RX ORDER — LOSARTAN POTASSIUM 100 MG/1
100 TABLET ORAL
Qty: 90 TABLET | Refills: 2 | Status: SHIPPED | OUTPATIENT
Start: 2018-04-10 | End: 2018-09-19

## 2018-04-10 RX ORDER — ATORVASTATIN CALCIUM 40 MG/1
40 TABLET, FILM COATED ORAL NIGHTLY
Qty: 90 TABLET | Refills: 2 | Status: SHIPPED | OUTPATIENT
Start: 2018-04-10 | End: 2018-09-19

## 2018-04-10 RX ORDER — LEVETIRACETAM 500 MG/1
TABLET ORAL
Qty: 180 TABLET | Refills: 2 | Status: CANCELLED | OUTPATIENT
Start: 2018-04-10

## 2018-04-10 RX ORDER — HYDROCHLOROTHIAZIDE 25 MG/1
25 TABLET ORAL
Qty: 90 TABLET | Refills: 2 | Status: SHIPPED | OUTPATIENT
Start: 2018-04-10 | End: 2018-09-19

## 2018-04-10 RX ORDER — HYDROCODONE BITARTRATE AND ACETAMINOPHEN 5; 325 MG/1; MG/1
1 TABLET ORAL EVERY 6 HOURS PRN
Qty: 30 TABLET | Refills: 0 | Status: CANCELLED | OUTPATIENT
Start: 2018-04-10

## 2018-04-10 RX ORDER — INSULIN GLARGINE 100 [IU]/ML
55 INJECTION, SOLUTION SUBCUTANEOUS NIGHTLY
Qty: 60 ML | Refills: 1 | Status: SHIPPED | OUTPATIENT
Start: 2018-04-10 | End: 2019-08-26

## 2018-04-10 RX ORDER — LANCETS 30 GAUGE
EACH MISCELLANEOUS
Qty: 200 EACH | Refills: 11 | Status: CANCELLED | OUTPATIENT
Start: 2018-04-10

## 2018-04-10 RX ORDER — GABAPENTIN 300 MG/1
CAPSULE ORAL
Qty: 90 CAPSULE | Refills: 0 | Status: SHIPPED | OUTPATIENT
Start: 2018-04-10 | End: 2018-09-06

## 2018-04-10 RX ORDER — DOCUSATE SODIUM 100 MG/1
CAPSULE, LIQUID FILLED ORAL
Qty: 180 CAPSULE | Refills: 2 | Status: SHIPPED | OUTPATIENT
Start: 2018-04-10 | End: 2018-08-27 | Stop reason: ALTCHOICE

## 2018-04-10 RX ORDER — INSULIN GLARGINE 100 [IU]/ML
55 INJECTION, SOLUTION SUBCUTANEOUS NIGHTLY
Qty: 15 PEN | Refills: 2 | Status: CANCELLED | OUTPATIENT
Start: 2018-04-10

## 2018-04-10 RX ORDER — METOPROLOL SUCCINATE 100 MG/1
100 TABLET, EXTENDED RELEASE ORAL
Qty: 90 TABLET | Refills: 2 | Status: SHIPPED | OUTPATIENT
Start: 2018-04-10 | End: 2018-09-19

## 2018-04-10 NOTE — PATIENT INSTRUCTIONS
Basaglar 55 units SQ daily     Humalog  INSULIN SLIDING SCALE  Base Values  Breakfast: 18  Lunch: 18  Dinner: 18  Ranges:  80-99: -2  100-119: 0  120-139: 0  140-159: 1  160-179: 1  180-199: 2  200-219: 2  220-239: 3  240-259: 3  260-279: 4  280-299: 4  30

## 2018-04-10 NOTE — PROGRESS NOTES
Name: Jelly Adan  Date: 4/10/2018    Referring Physician: No ref. provider found    HISTORY OF PRESENT ILLNESS   Jelly Adan is a 46year old female who presents for diabetes mellitus, diagnosed over 10 years ago.   She was seen during hospitalizatio 0  •  GABAPENTIN 300 MG Oral Cap, TAKE 1 CAPSULE BY MOUTH EVERY NIGHT, Disp: 90 capsule, Rfl: 0  •  METOPROLOL SUCCINATE ER 25 MG Oral Tablet 24 Hr, TAKE 1 TABLET BY MOUTH EVERY NIGHT, Disp: 90 tablet, Rfl: 0  •  OMEPRAZOLE 40 MG Oral Capsule Delayed Relea HYDROcodone-acetaminophen 5-325 MG Oral Tab, Take 1 tablet by mouth every 6 (six) hours as needed for Pain., Disp: 30 tablet, Rfl: 0  •  aspirin 325 MG Oral Tab, Take 325 mg by mouth daily. , Disp: , Rfl:   •  acetaminophen 325 MG Oral Tab, 2 tabs 3 times d Muscle weakness     left sided weakness uses walker and cane   • Stenosis of right vertebral artery    • Stroke [facility]) 2014   • Type II or unspecified type diabetes mellitus without mention of complication, not stated as uncontrolled    • Unspecified essent importance of SBGM  -Discussed importance of low CHO diet  -Continue Levemir 55 units SQ daily  -Continue Humalog to 18 units SQ TID with meals   -CF 1:40>140  -Continue Metformin  -Normotensive  -Normal lipids  -Recheck CMP, lipids, MAB, TSH     RTC 3 mon

## 2018-04-10 NOTE — PROGRESS NOTES
HPI:    Patient ID: Mira Aguilar is a 46year old female. HTN   This is a chronic (pt  state she is doing well  just sad  due to  death of her  ankle .     likely will  be traveling to [facility] - for   -   today seen  Dr Anthony Ramirez  for  Diabetes  - ) p nausea and vomiting. Genitourinary: Negative for dysuria and frequency. Musculoskeletal: Negative for myalgias and neck pain. Skin: Negative for pallor. Neurological: Negative for dizziness and headaches.    Psychiatric/Behavioral: Negative for conf TEST BLOOD SUGAR FOUR TIMES A DAY AND AS NEEDED Disp: 1 kit Rfl: 0   ASHWINI MICROLET LANCETS Does not apply Misc Test blood sugar four times a day and as needed.  Disp: 400 each Rfl: 3   UNIFINE PENTIPS 32G X 4 MM Does not apply Misc USE DAILY WITH LEVEMIR D moist. No oropharyngeal exudate or posterior oropharyngeal erythema. Eyes: Right eye exhibits no discharge. Left eye exhibits no discharge. No scleral icterus. Neck: Neck supple. No JVD present. No thyromegaly present.    Cardiovascular: Normal rate and diagnosis)  Labs , on  ferros  Sulfate 325 mg qd       Depression, unspecified depression type  STABLE ON LEAXPRO  20 mg qd   cpm       Pure hypercholesterolemia  · Advice low fat  diet , lean meat turkey and chicken breast , fish in diet , avoid red meat Sig: TAKE ONE (1) TABLET BY MOUTH TWICE DAILY      escitalopram 20 MG Oral Tab 90 tablet 2      Sig: TAKE ONE-HALF TABLET BY MOUTH ONCE DAILY      atorvastatin 40 MG Oral Tab 90 tablet 2      Sig: Take 1 tablet (40 mg total) by mouth nightly.            Jeison Weber

## 2018-04-11 ENCOUNTER — LAB ENCOUNTER (OUTPATIENT)
Dept: LAB | Age: 48
End: 2018-04-11
Attending: INTERNAL MEDICINE
Payer: MEDICARE

## 2018-04-11 DIAGNOSIS — D64.9 ANEMIA, UNSPECIFIED TYPE: ICD-10-CM

## 2018-04-11 DIAGNOSIS — Z79.4 UNCONTROLLED TYPE 2 DIABETES MELLITUS WITH COMPLICATION, WITH LONG-TERM CURRENT USE OF INSULIN (HCC): ICD-10-CM

## 2018-04-11 DIAGNOSIS — E11.8 UNCONTROLLED TYPE 2 DIABETES MELLITUS WITH COMPLICATION, WITH LONG-TERM CURRENT USE OF INSULIN (HCC): ICD-10-CM

## 2018-04-11 DIAGNOSIS — E11.65 UNCONTROLLED TYPE 2 DIABETES MELLITUS WITH COMPLICATION, WITH LONG-TERM CURRENT USE OF INSULIN (HCC): ICD-10-CM

## 2018-04-11 PROCEDURE — 82043 UR ALBUMIN QUANTITATIVE: CPT

## 2018-04-11 PROCEDURE — 36415 COLL VENOUS BLD VENIPUNCTURE: CPT

## 2018-04-11 PROCEDURE — 85025 COMPLETE CBC W/AUTO DIFF WBC: CPT

## 2018-04-11 PROCEDURE — 82570 ASSAY OF URINE CREATININE: CPT

## 2018-04-11 PROCEDURE — 82728 ASSAY OF FERRITIN: CPT

## 2018-04-11 PROCEDURE — 84443 ASSAY THYROID STIM HORMONE: CPT

## 2018-04-11 PROCEDURE — 80061 LIPID PANEL: CPT

## 2018-04-11 PROCEDURE — 80053 COMPREHEN METABOLIC PANEL: CPT

## 2018-04-12 ENCOUNTER — TELEPHONE (OUTPATIENT)
Dept: ENDOCRINOLOGY CLINIC | Facility: CLINIC | Age: 48
End: 2018-04-12

## 2018-04-12 DIAGNOSIS — E03.9 HYPOTHYROIDISM, UNSPECIFIED TYPE: Primary | ICD-10-CM

## 2018-04-12 NOTE — TELEPHONE ENCOUNTER
Please call patient - good news, overall labs are normal.  Labs demonstrate normal kidney and liver function, normal urine testing. Her thyroid levels were slightly abnormal and recommend repeat TSH, FT4 before next appointment to re-evaluate. Thanks.

## 2018-04-13 RX ORDER — FERROUS SULFATE 325(65) MG
TABLET ORAL
Qty: 180 TABLET | Refills: 11 | OUTPATIENT
Start: 2018-04-13

## 2018-04-13 RX ORDER — CHOLECALCIFEROL (VITAMIN D3) 50 MCG
CAPSULE ORAL
Qty: 90 CAPSULE | Refills: 11 | OUTPATIENT
Start: 2018-04-13

## 2018-04-13 RX ORDER — HYDROCHLOROTHIAZIDE 25 MG/1
TABLET ORAL
Qty: 90 TABLET | Refills: 11 | OUTPATIENT
Start: 2018-04-13

## 2018-04-13 RX ORDER — ATORVASTATIN CALCIUM 40 MG/1
TABLET, FILM COATED ORAL
Qty: 30 TABLET | Refills: 11 | OUTPATIENT
Start: 2018-04-13

## 2018-04-14 ENCOUNTER — HOSPITAL ENCOUNTER (OUTPATIENT)
Dept: MAMMOGRAPHY | Age: 48
Discharge: HOME | End: 2018-04-14
Attending: OBSTETRICS & GYNECOLOGY
Payer: MEDICARE

## 2018-04-14 DIAGNOSIS — Z12.31 ENCOUNTER FOR SCREENING MAMMOGRAM FOR BREAST CANCER: ICD-10-CM

## 2018-04-14 PROCEDURE — 77067 SCR MAMMO BI INCL CAD: CPT | Performed by: OBSTETRICS & GYNECOLOGY

## 2018-04-16 ENCOUNTER — TELEPHONE (OUTPATIENT)
Dept: OTHER | Age: 48
End: 2018-04-16

## 2018-04-16 RX ORDER — FEXOFENADINE HCL 180 MG/1
180 TABLET ORAL DAILY
Qty: 30 TABLET | Refills: 1 | Status: SHIPPED | OUTPATIENT
Start: 2018-04-16 | End: 2018-08-27

## 2018-04-16 NOTE — TELEPHONE ENCOUNTER
Its OTC medication But   Insurance might   Cover it - fexofenidate   180 mg  Qd  Daily  For   3-4 weeks       Sent to pharmacy

## 2018-04-16 NOTE — TELEPHONE ENCOUNTER
Spoke with patient and informed her of her lab results from below and of the plan of care. Patient voiced understanding and agreed with the plan of care.     Patient reports she has had a dry cough now for over a month now, and she states that Dr. Choco June

## 2018-04-16 NOTE — PROGRESS NOTES
Please call patient with blood test results. Blood  Count  Normal -no anemia, ferritin -iron storage is decreased, but improving I recommend patient to continue with ferrous sulfate 1 tablet daily  For  3   Months  Than  Will   Check  Levels  Again .

## 2018-04-20 NOTE — TELEPHONE ENCOUNTER
Patient returned call and discussed below results. Placed orders for repeat thyroid levels to be drawn before next appt.

## 2018-04-25 ENCOUNTER — TELEPHONE (OUTPATIENT)
Dept: OTHER | Age: 48
End: 2018-04-25

## 2018-04-25 NOTE — TELEPHONE ENCOUNTER
Please advice patient To   Take  Allegra 180 mg   1  Tab   Qd  For 2  -3   Weeks - was sent  To pharmacy -  Not  Clear if  She is  Taking medications  Or not   - if not  To  Start   Taking medication and   Need f/u   In 1  -2  Weeks  Or sooner  If   Cough

## 2018-04-25 NOTE — TELEPHONE ENCOUNTER
Received call from patient who reports she is having a cough and Dr. Silverio Lopez was suppose to send an rx to the pharmacy.  Per telephone encounter dated 4/16/18 an rx for fexofenidate was sent to pharmacy on 4/16/18 and this nurse confirmed with Claudetta Arabia at

## 2018-04-25 NOTE — TELEPHONE ENCOUNTER
Spoke with sister Linda Casanova (ANTHONY verified)--sister states patient is currently sleeping. She will make sure patient starts Allegra and f/u as per EV message below. No further questions/concerns at this time.

## 2018-05-30 NOTE — TELEPHONE ENCOUNTER
Pts sister called for refill on [employer] EEZ test strips and lancets. Please send RX to [facility]. Please call pt when RX is sent. Pt has been out of strips/lancets for last 3 wks.

## 2018-07-12 RX ORDER — INSULIN GLARGINE 100 [IU]/ML
INJECTION, SOLUTION SUBCUTANEOUS
Qty: 45 ML | Refills: 0 | Status: SHIPPED | OUTPATIENT
Start: 2018-07-12 | End: 2018-09-19

## 2018-07-17 ENCOUNTER — TELEPHONE (OUTPATIENT)
Dept: ENDOCRINOLOGY CLINIC | Facility: CLINIC | Age: 48
End: 2018-07-17

## 2018-07-17 NOTE — TELEPHONE ENCOUNTER
Shanda Aguero from [facility] pharmacy is requesting a prior authorization for the following medication hemolog quick pen perferred novalog

## 2018-08-07 RX ORDER — PEN NEEDLE, DIABETIC 32GX 5/32"
NEEDLE, DISPOSABLE MISCELLANEOUS
Qty: 100 EACH | Refills: 0 | Status: SHIPPED | OUTPATIENT
Start: 2018-08-07 | End: 2018-09-06

## 2018-08-07 NOTE — TELEPHONE ENCOUNTER
LOV: 4-10-18 Last Rx: 4-10-18     No protocol     Please advise in regards to refill request. Thank You      Refill Protocol Appointment Criteria  · Appointment scheduled in the past 12 months or in the next 3 months  Recent Outpatient Visits            3

## 2018-08-09 ENCOUNTER — TELEPHONE (OUTPATIENT)
Dept: INTERNAL MEDICINE CLINIC | Facility: CLINIC | Age: 48
End: 2018-08-09

## 2018-08-09 NOTE — TELEPHONE ENCOUNTER
Current Outpatient Prescriptions:     •  Ferrous Sulfate 325 (65 Fe) MG Oral Tab, TAKE ONE (1) TABLET BY MOUTH TWICE DAILY, Disp: 180 tablet, Rfl: 0

## 2018-08-10 RX ORDER — FERROUS SULFATE 325(65) MG
TABLET ORAL
Qty: 180 TABLET | Refills: 0 | Status: SHIPPED | OUTPATIENT
Start: 2018-08-10 | End: 2018-10-31

## 2018-08-15 ENCOUNTER — HOSPITAL ENCOUNTER (OUTPATIENT)
Dept: GENERAL RADIOLOGY | Age: 48
Discharge: HOME | End: 2018-08-15
Attending: INTERNAL MEDICINE | Admitting: INTERNAL MEDICINE
Payer: MEDICARE

## 2018-08-15 ENCOUNTER — OFFICE VISIT (OUTPATIENT)
Dept: INTERNAL MEDICINE CLINIC | Facility: CLINIC | Age: 48
End: 2018-08-15
Payer: MEDICARE

## 2018-08-15 VITALS
BODY MASS INDEX: 34.96 KG/M2 | DIASTOLIC BLOOD PRESSURE: 71 MMHG | SYSTOLIC BLOOD PRESSURE: 126 MMHG | HEART RATE: 69 BPM | HEIGHT: 62 IN | TEMPERATURE: 97 F | WEIGHT: 190 LBS | RESPIRATION RATE: 15 BRPM

## 2018-08-15 DIAGNOSIS — E78.00 PURE HYPERCHOLESTEROLEMIA: ICD-10-CM

## 2018-08-15 DIAGNOSIS — E10.9 TYPE 1 DIABETES MELLITUS WITHOUT RETINOPATHY (HCC): Primary | ICD-10-CM

## 2018-08-15 DIAGNOSIS — M79.641 PAIN OF RIGHT HAND: ICD-10-CM

## 2018-08-15 DIAGNOSIS — I63.9 CEREBROVASCULAR ACCIDENT (CVA), UNSPECIFIED MECHANISM (HCC): ICD-10-CM

## 2018-08-15 DIAGNOSIS — Z79.4 TYPE 2 DIABETES MELLITUS WITH OTHER OPHTHALMIC COMPLICATION, WITH LONG-TERM CURRENT USE OF INSULIN (HCC): ICD-10-CM

## 2018-08-15 DIAGNOSIS — I10 ESSENTIAL HYPERTENSION WITH GOAL BLOOD PRESSURE LESS THAN 140/90: ICD-10-CM

## 2018-08-15 DIAGNOSIS — E11.39 TYPE 2 DIABETES MELLITUS WITH OTHER OPHTHALMIC COMPLICATION, WITH LONG-TERM CURRENT USE OF INSULIN (HCC): ICD-10-CM

## 2018-08-15 PROCEDURE — 99214 OFFICE O/P EST MOD 30 MIN: CPT | Performed by: INTERNAL MEDICINE

## 2018-08-15 PROCEDURE — G0463 HOSPITAL OUTPT CLINIC VISIT: HCPCS | Performed by: INTERNAL MEDICINE

## 2018-08-15 PROCEDURE — 73130 X-RAY EXAM OF HAND: CPT | Performed by: INTERNAL MEDICINE

## 2018-08-15 NOTE — PROGRESS NOTES
HPI:    Patient ID: Shira Vincent is a 46year old female. Patient in office today for follow up HTN and DM, leg rash pain tching x 2 weeks. . States feeling   Otherwise well   Denies chest pain, shortnesss of breath, palpitations, or abdominal pain, myra FERROUS SULFATE 325 (65 Fe) MG Oral Tab TAKE ONE (1) TABLET BY MOUTH TWICE DAILY Disp: 180 tablet Rfl: 0   UNIFINE PENTIPS 32G X 4 MM Does not apply Misc USE DAILY WITH LEVEMIR Disp: 100 each Rfl: 0   Insulin Aspart Pen (NOVOLOG FLEXPEN) 100 UNIT/ML Subcut acetaminophen 325 MG Oral Tab 2 tabs 3 times daily.  Disp: 360 tablet Rfl: 0   LANTUS SOLOSTAR 100 UNIT/ML Subcutaneous Solution Pen-injector ADMINISTER 50 UNITS UNDER THE SKIN DAILY Disp: 45 mL Rfl: 0   Fexofenadine HCl 180 MG Oral Tab Take 1 tablet (180 m Pulmonary/Chest: Effort normal and breath sounds normal. No respiratory distress. She has no wheezes. She has no rales. Abdominal: Soft. She exhibits no mass. There is no hepatosplenomegaly. There is no tenderness. There is no CVA tenderness.    [address]  Type 2 diabetes mellitus with complication, with long-term current use of insulin (hcc)  F/U WITH  Endocrinologist   For  Diabetes  menagment -  Dr Tiesha Broussard Dr - Dr Nicole Taveras   insulin   - increased - 55 u qd and  humalog  15  U tid  With  Meals -  Mal[facility]

## 2018-08-16 ENCOUNTER — TELEPHONE (OUTPATIENT)
Dept: ENDOCRINOLOGY CLINIC | Facility: CLINIC | Age: 48
End: 2018-08-16

## 2018-08-16 NOTE — TELEPHONE ENCOUNTER
Pt states the pharmacy is giving her a hard time getting her insulin and supplies. (strips) states she check 3\4 times a day.  offered her 10/18/18 pt requested to be seen sooner

## 2018-08-17 NOTE — TELEPHONE ENCOUNTER
Contacted patient. She is having trouble getting Humalog from pharmacy. Contacted pharmacy and they have Humalog on file but insurance prefers novolog. Gave ok per [address] protocol to change to Novolog.  In addition she gets lancets and test strips from [facility]

## 2018-08-20 ENCOUNTER — LAB ENCOUNTER (OUTPATIENT)
Dept: LAB | Age: 48
End: 2018-08-20
Attending: INTERNAL MEDICINE
Payer: MEDICARE

## 2018-08-20 DIAGNOSIS — Z79.4 TYPE 2 DIABETES MELLITUS WITH OTHER OPHTHALMIC COMPLICATION, WITH LONG-TERM CURRENT USE OF INSULIN (HCC): ICD-10-CM

## 2018-08-20 DIAGNOSIS — E78.00 PURE HYPERCHOLESTEROLEMIA: ICD-10-CM

## 2018-08-20 DIAGNOSIS — E11.39 TYPE 2 DIABETES MELLITUS WITH OTHER OPHTHALMIC COMPLICATION, WITH LONG-TERM CURRENT USE OF INSULIN (HCC): ICD-10-CM

## 2018-08-20 DIAGNOSIS — M79.641 PAIN OF RIGHT HAND: ICD-10-CM

## 2018-08-20 DIAGNOSIS — E03.9 HYPOTHYROIDISM, UNSPECIFIED TYPE: ICD-10-CM

## 2018-08-20 LAB
ALBUMIN SERPL BCP-MCNC: 3.8 G/DL (ref 3.5–4.8)
ALBUMIN/GLOB SERPL: 1.1 {RATIO} (ref 1–2)
ALP SERPL-CCNC: 89 U/L (ref 32–100)
ALT SERPL-CCNC: 26 U/L (ref 14–54)
ANION GAP SERPL CALC-SCNC: 7 MMOL/L (ref 0–18)
AST SERPL-CCNC: 20 U/L (ref 15–41)
BASOPHILS # BLD: 0.1 K/UL (ref 0–0.2)
BASOPHILS NFR BLD: 1 %
BILIRUB SERPL-MCNC: 0.5 MG/DL (ref 0.3–1.2)
BUN SERPL-MCNC: 6 MG/DL (ref 8–20)
BUN/CREAT SERPL: 7.8 (ref 10–20)
CALCIUM SERPL-MCNC: 9.6 MG/DL (ref 8.5–10.5)
CHLORIDE SERPL-SCNC: 101 MMOL/L (ref 95–110)
CHOLEST SERPL-MCNC: 158 MG/DL (ref 110–200)
CO2 SERPL-SCNC: 29 MMOL/L (ref 22–32)
CREAT SERPL-MCNC: 0.77 MG/DL (ref 0.5–1.5)
CREAT UR-MCNC: 117.7 MG/DL
CRP SERPL HS-MCNC: 8.7 MG/L (ref 0–7.5)
EOSINOPHIL # BLD: 0.3 K/UL (ref 0–0.7)
EOSINOPHIL NFR BLD: 3 %
ERYTHROCYTE [DISTWIDTH] IN BLOOD BY AUTOMATED COUNT: 15.9 % (ref 11–15)
ERYTHROCYTE [SEDIMENTATION RATE] IN BLOOD: 19 MM/HR (ref 0–20)
GLOBULIN PLAS-MCNC: 3.6 G/DL (ref 2.5–3.7)
GLUCOSE SERPL-MCNC: 176 MG/DL (ref 70–99)
HBA1C MFR BLD: 9.3 % (ref 4–6)
HCT VFR BLD AUTO: 38.6 % (ref 35–48)
HDLC SERPL-MCNC: 33 MG/DL
HGB BLD-MCNC: 12.5 G/DL (ref 12–16)
LDLC SERPL CALC-MCNC: 98 MG/DL (ref 0–99)
LYMPHOCYTES # BLD: 2.7 K/UL (ref 1–4)
LYMPHOCYTES NFR BLD: 31 %
MCH RBC QN AUTO: 25.3 PG (ref 27–32)
MCHC RBC AUTO-ENTMCNC: 32.3 G/DL (ref 32–37)
MCV RBC AUTO: 78.2 FL (ref 80–100)
MICROALBUMIN UR-MCNC: 0.8 MG/DL (ref 0–1.8)
MICROALBUMIN/CREAT UR: 6.8 MG/G{CREAT} (ref 0–20)
MONOCYTES # BLD: 0.5 K/UL (ref 0–1)
MONOCYTES NFR BLD: 5 %
NEUTROPHILS # BLD AUTO: 5.3 K/UL (ref 1.8–7.7)
NEUTROPHILS NFR BLD: 61 %
NONHDLC SERPL-MCNC: 125 MG/DL
OSMOLALITY UR CALC.SUM OF ELEC: 286 MOSM/KG (ref 275–295)
PATIENT FASTING: YES
PLATELET # BLD AUTO: 238 K/UL (ref 140–400)
PMV BLD AUTO: 10.1 FL (ref 7.4–10.3)
POTASSIUM SERPL-SCNC: 3.8 MMOL/L (ref 3.3–5.1)
PROT SERPL-MCNC: 7.4 G/DL (ref 5.9–8.4)
RBC # BLD AUTO: 4.93 M/UL (ref 3.7–5.4)
RHEUMATOID FACT SER QL: <5 IU/ML
SODIUM SERPL-SCNC: 137 MMOL/L (ref 136–144)
T4 FREE SERPL-MCNC: 0.66 NG/DL (ref 0.58–1.64)
TRIGL SERPL-MCNC: 136 MG/DL (ref 1–149)
TSH SERPL-ACNC: 1.26 UIU/ML (ref 0.45–5.33)
WBC # BLD AUTO: 8.8 K/UL (ref 4–11)

## 2018-08-20 PROCEDURE — 80061 LIPID PANEL: CPT

## 2018-08-20 PROCEDURE — 86200 CCP ANTIBODY: CPT

## 2018-08-20 PROCEDURE — 86141 C-REACTIVE PROTEIN HS: CPT

## 2018-08-20 PROCEDURE — 83036 HEMOGLOBIN GLYCOSYLATED A1C: CPT

## 2018-08-20 PROCEDURE — 86431 RHEUMATOID FACTOR QUANT: CPT

## 2018-08-20 PROCEDURE — 84443 ASSAY THYROID STIM HORMONE: CPT

## 2018-08-20 PROCEDURE — 85025 COMPLETE CBC W/AUTO DIFF WBC: CPT

## 2018-08-20 PROCEDURE — 82043 UR ALBUMIN QUANTITATIVE: CPT

## 2018-08-20 PROCEDURE — 84439 ASSAY OF FREE THYROXINE: CPT

## 2018-08-20 PROCEDURE — 82570 ASSAY OF URINE CREATININE: CPT

## 2018-08-20 PROCEDURE — 36415 COLL VENOUS BLD VENIPUNCTURE: CPT

## 2018-08-20 PROCEDURE — 85652 RBC SED RATE AUTOMATED: CPT

## 2018-08-20 PROCEDURE — 80053 COMPREHEN METABOLIC PANEL: CPT

## 2018-08-22 LAB — CCP IGG SERPL-ACNC: 1 U/ML (ref 0–6.9)

## 2018-08-27 ENCOUNTER — OFFICE VISIT (OUTPATIENT)
Dept: ORTHOPEDICS CLINIC | Facility: CLINIC | Age: 48
End: 2018-08-27
Payer: MEDICARE

## 2018-08-27 DIAGNOSIS — M65.841 STENOSING TENOSYNOVITIS OF FINGER OF RIGHT HAND: Primary | ICD-10-CM

## 2018-08-27 PROCEDURE — G0463 HOSPITAL OUTPT CLINIC VISIT: HCPCS | Performed by: ORTHOPAEDIC SURGERY

## 2018-08-27 PROCEDURE — 99203 OFFICE O/P NEW LOW 30 MIN: CPT | Performed by: ORTHOPAEDIC SURGERY

## 2018-08-27 NOTE — H&P
NURSING INTAKE COMMENTS: Patient presents with:  Consult: C/o constant right hand pain and unable to close her hand for 2-3 months. Recalls no injury. Referred by Dr. Ney Woods.   Xray of hand were completed on 8/15      HPI: This 46year old female pre Disp: 1 Tube Rfl: 0   FERROUS SULFATE 325 (65 Fe) MG Oral Tab TAKE ONE (1) TABLET BY MOUTH TWICE DAILY Disp: 180 tablet Rfl: 0   UNIFINE PENTIPS 32G X 4 MM Does not apply Misc USE DAILY WITH LEVEMIR Disp: 100 each Rfl: 0   Insulin Aspart Pen (NOVOLOG FLEXP Fluocinonide 0.05 % External Cream Apply   Small amouth  Of cream Twice  Daily on affected area -  For 1  Week Disp: 1 Tube Rfl: 0   acetaminophen 325 MG Oral Tab 2 tabs 3 times daily.  Disp: 360 tablet Rfl: 0   HYDROcodone-acetaminophen 5-325 MG Oral Tab perfusion, non-labored breathing, and a soft abdomen. He has pain over the volar metacarpophalangeal joints of her right small, ring, and middle digits. She has locking of her small digit. She is neurovascularly intact.     Imaging: Hand x-rays previou

## 2018-08-28 ENCOUNTER — TELEPHONE (OUTPATIENT)
Dept: OTHER | Age: 48
End: 2018-08-28

## 2018-08-28 NOTE — TELEPHONE ENCOUNTER
----- Message from Lorena Givens MD sent at 8/27/2018  8:21 AM CDT -----  Please call patient with blood test results that is stable/within normal limits except very elevated sugars-A1c 3 months sugar average is 9.3 that is worsening year ago was 8.2

## 2018-08-28 NOTE — TELEPHONE ENCOUNTER
Advised patient on Dr. Debbie Martínez information and recommendations. Patient verbalized understanding, had no questions. Advised to call back if needed.  Scheduled to see the doctor 9/12/18 at 2 pm.

## 2018-09-05 ENCOUNTER — OFFICE VISIT (OUTPATIENT)
Dept: PHYSICAL THERAPY | Age: 48
End: 2018-09-05
Attending: ORTHOPAEDIC SURGERY
Payer: MEDICARE

## 2018-09-05 DIAGNOSIS — M65.841 STENOSING TENOSYNOVITIS OF FINGER OF RIGHT HAND: ICD-10-CM

## 2018-09-05 PROCEDURE — 97110 THERAPEUTIC EXERCISES: CPT | Performed by: OCCUPATIONAL THERAPIST

## 2018-09-05 PROCEDURE — 97166 OT EVAL MOD COMPLEX 45 MIN: CPT | Performed by: OCCUPATIONAL THERAPIST

## 2018-09-05 NOTE — PROGRESS NOTES
OCCUPATIONAL THERAPY UPPER EXTREMITY EVALUATION   Referring Physician: Dr. 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     Precautions:    See above  (Reviewed precautions with patient including post surgical status, pacemaker, diabetes and so forth, if none selected patient reports no applicable condit complexity level)  x1, 1 TE     Total Timed Treatment: 15 min     Total Treatment Time: 45 min       PLAN OF CARE   Goals:    1. Pt will be independent and compliant with comprehensive HEP to maximize progress achieved in OT.   2. Pt complaints of pain mary From: 9/5/2018  To:12/4/2018

## 2018-09-06 DIAGNOSIS — Z98.890 HISTORY OF CRANIOTOMY: ICD-10-CM

## 2018-09-06 RX ORDER — LEVETIRACETAM 500 MG/1
TABLET ORAL
Qty: 180 TABLET | Refills: 0 | Status: SHIPPED | OUTPATIENT
Start: 2018-09-06 | End: 2018-09-19

## 2018-09-06 RX ORDER — LEVETIRACETAM 500 MG/1
TABLET ORAL
Qty: 180 TABLET | Refills: 0 | Status: SHIPPED | OUTPATIENT
Start: 2018-09-06 | End: 2018-09-06

## 2018-09-06 RX ORDER — PEN NEEDLE, DIABETIC 32GX 5/32"
NEEDLE, DISPOSABLE MISCELLANEOUS
Qty: 100 EACH | Refills: 0 | Status: SHIPPED | OUTPATIENT
Start: 2018-09-06 | End: 2018-09-25

## 2018-09-06 RX ORDER — GABAPENTIN 300 MG/1
CAPSULE ORAL
Qty: 90 CAPSULE | Refills: 0 | Status: SHIPPED | OUTPATIENT
Start: 2018-09-06 | End: 2018-11-28

## 2018-09-10 ENCOUNTER — OFFICE VISIT (OUTPATIENT)
Dept: PHYSICAL THERAPY | Age: 48
End: 2018-09-10
Attending: ORTHOPAEDIC SURGERY
Payer: MEDICARE

## 2018-09-10 ENCOUNTER — TELEPHONE (OUTPATIENT)
Dept: INTERNAL MEDICINE CLINIC | Facility: CLINIC | Age: 48
End: 2018-09-10

## 2018-09-10 PROCEDURE — 97110 THERAPEUTIC EXERCISES: CPT | Performed by: OCCUPATIONAL THERAPIST

## 2018-09-10 PROCEDURE — 97140 MANUAL THERAPY 1/> REGIONS: CPT | Performed by: OCCUPATIONAL THERAPIST

## 2018-09-10 NOTE — PROGRESS NOTES
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 3. OTERO in each digit (IF-SF) will increase to 220 degrees in order to grasp a cup with greater ease.   4.  strength will increase to 25 pounds to facilitate ease with

## 2018-09-10 NOTE — TELEPHONE ENCOUNTER
Please advise to the communication below. Thank you. Caroline Griffin .Please respond to pool: EM IM LMB LPN/DENISHA

## 2018-09-10 NOTE — TELEPHONE ENCOUNTER
Pt stts she need to have a note written out   Pt stts the note can state she has had Brain By [facility] and its okay for people to visit from another country.   Pt cousin is trying to visit her from [facility]   Note needed asap in [place]  Please ad

## 2018-09-10 NOTE — TELEPHONE ENCOUNTER
Brain  Stent -  Is not  Restricting  Patient from having  Friends visiting  Her - risk is pretty much  Like Like  anybody  Emily     as  Long they are not having  acute  Sickness   Like   Infections   . .she  Can    Discuss   With  Her  Neurologist   As   We

## 2018-09-11 NOTE — TELEPHONE ENCOUNTER
Pt was contacted and spoke with pt's sister because pt was asleep and pt's sister is on HIPAA. Message was relayed and it was stated that a note is needed for pt's family to come to see her to obtain a VISA.  It was stated that if the notes says that the pt

## 2018-09-11 NOTE — TELEPHONE ENCOUNTER
Pt  Should   Contact  Her  Neurosurgeon  -  Under  His  Care for  That  -need  F/u visit   With him  As  Advised    Before - Dr Scott Fix

## 2018-09-11 NOTE — TELEPHONE ENCOUNTER
Pt's sister was contacted and it was stated that there is no form, that a letter just need to be written stating that the pt have had brain surgery and that she is under the care of PCP. Please advise, thank you. Myriam Bal .Please respond to pool: DARWIN ADAN L

## 2018-09-11 NOTE — TELEPHONE ENCOUNTER
Hydrocortisone 2.5% CR 20G, apply thin layer on affected area leg twice per day 1-2 weeks, qty 20    Current Outpatient Medications:   •  hydrocortisone 2.5 % External Cream, Apply thin layer on affected area  Leg  twice per day  1-2 weeks, Disp: 1 Tube, R

## 2018-09-12 ENCOUNTER — OFFICE VISIT (OUTPATIENT)
Dept: PHYSICAL THERAPY | Age: 48
End: 2018-09-12
Attending: ORTHOPAEDIC SURGERY
Payer: MEDICARE

## 2018-09-12 PROCEDURE — 97140 MANUAL THERAPY 1/> REGIONS: CPT | Performed by: OCCUPATIONAL THERAPIST

## 2018-09-12 PROCEDURE — 97110 THERAPEUTIC EXERCISES: CPT | Performed by: OCCUPATIONAL THERAPIST

## 2018-09-12 PROCEDURE — 97035 APP MDLTY 1+ULTRASOUND EA 15: CPT | Performed by: OCCUPATIONAL THERAPIST

## 2018-09-12 NOTE — PROGRESS NOTES
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 Charges: 1 MT, 1 TE , 1 US   Total Timed Treatment: 38 min  Total Treatment Time: 38 min    Goals     • Therapy Goals      1. Pt will be independent and compliant with comprehensive HEP to maximize progress achieved in OT.   2. Pt complaints

## 2018-09-13 NOTE — TELEPHONE ENCOUNTER
Requested Prescriptions     Pending Prescriptions Disp Refills   • hydrocortisone 2.5 % External Cream 1 Tube 0     Sig: Apply thin layer on affected area  Leg  twice per day  1-2 weeks       Last Office Visit with PCP: 8/15/2018  Last Blood Pressures:  BP

## 2018-09-17 ENCOUNTER — OFFICE VISIT (OUTPATIENT)
Dept: PHYSICAL THERAPY | Age: 48
End: 2018-09-17
Attending: ORTHOPAEDIC SURGERY
Payer: MEDICARE

## 2018-09-17 PROCEDURE — 97110 THERAPEUTIC EXERCISES: CPT | Performed by: OCCUPATIONAL THERAPIST

## 2018-09-17 PROCEDURE — 97035 APP MDLTY 1+ULTRASOUND EA 15: CPT | Performed by: OCCUPATIONAL THERAPIST

## 2018-09-17 PROCEDURE — 97760 ORTHOTIC MGMT&TRAING 1ST ENC: CPT | Performed by: OCCUPATIONAL THERAPIST

## 2018-09-17 NOTE — PROGRESS NOTES
Dx: Stenosing tenosynovitis of finger of right hand (G17.738)             Authorized # of Visits/Insurance:  Medicare         Next MD visit: none scheduled  Fall Risk: standard         Precautions: n/a           Medication Changes since last visit?: No  Lemon gentle composite flexion (1x) in clinic. Plan: Continue therapy 2x/wk to increase function for ADL's. Charges: 1 TE , 1 US , 1 orthosis  Total Timed Treatment: 38 min  Total Treatment Time: 38 min    Goals     • Therapy Goals      1.   Pt will be

## 2018-09-19 ENCOUNTER — OFFICE VISIT (OUTPATIENT)
Dept: INTERNAL MEDICINE CLINIC | Facility: CLINIC | Age: 48
End: 2018-09-19
Payer: MEDICARE

## 2018-09-19 ENCOUNTER — OFFICE VISIT (OUTPATIENT)
Dept: PHYSICAL THERAPY | Age: 48
End: 2018-09-19
Attending: ORTHOPAEDIC SURGERY
Payer: MEDICARE

## 2018-09-19 VITALS
BODY MASS INDEX: 35 KG/M2 | HEART RATE: 61 BPM | WEIGHT: 190 LBS | SYSTOLIC BLOOD PRESSURE: 138 MMHG | DIASTOLIC BLOOD PRESSURE: 80 MMHG

## 2018-09-19 DIAGNOSIS — Z79.4 TYPE 2 DIABETES MELLITUS WITH COMPLICATION, WITH LONG-TERM CURRENT USE OF INSULIN (HCC): ICD-10-CM

## 2018-09-19 DIAGNOSIS — E11.8 TYPE 2 DIABETES MELLITUS WITH COMPLICATION, WITH LONG-TERM CURRENT USE OF INSULIN (HCC): ICD-10-CM

## 2018-09-19 DIAGNOSIS — Z23 NEED FOR IMMUNIZATION AGAINST INFLUENZA: Primary | ICD-10-CM

## 2018-09-19 DIAGNOSIS — Z98.890 HISTORY OF CRANIOTOMY: ICD-10-CM

## 2018-09-19 DIAGNOSIS — I10 ESSENTIAL HYPERTENSION WITH GOAL BLOOD PRESSURE LESS THAN 140/90: ICD-10-CM

## 2018-09-19 PROCEDURE — G0008 ADMIN INFLUENZA VIRUS VAC: HCPCS | Performed by: INTERNAL MEDICINE

## 2018-09-19 PROCEDURE — G0463 HOSPITAL OUTPT CLINIC VISIT: HCPCS | Performed by: INTERNAL MEDICINE

## 2018-09-19 PROCEDURE — 97110 THERAPEUTIC EXERCISES: CPT | Performed by: OCCUPATIONAL THERAPIST

## 2018-09-19 PROCEDURE — 99214 OFFICE O/P EST MOD 30 MIN: CPT | Performed by: INTERNAL MEDICINE

## 2018-09-19 PROCEDURE — 97140 MANUAL THERAPY 1/> REGIONS: CPT | Performed by: OCCUPATIONAL THERAPIST

## 2018-09-19 PROCEDURE — 90686 IIV4 VACC NO PRSV 0.5 ML IM: CPT | Performed by: INTERNAL MEDICINE

## 2018-09-19 PROCEDURE — 97035 APP MDLTY 1+ULTRASOUND EA 15: CPT | Performed by: OCCUPATIONAL THERAPIST

## 2018-09-19 RX ORDER — METOPROLOL SUCCINATE 25 MG/1
TABLET, EXTENDED RELEASE ORAL
Qty: 90 TABLET | Refills: 2 | Status: SHIPPED | OUTPATIENT
Start: 2018-09-19 | End: 2019-01-11

## 2018-09-19 RX ORDER — CLOTRIMAZOLE AND BETAMETHASONE DIPROPIONATE 10; .64 MG/G; MG/G
CREAM TOPICAL
Qty: 60 G | Refills: 0 | Status: SHIPPED | OUTPATIENT
Start: 2018-09-19 | End: 2021-05-28 | Stop reason: ALTCHOICE

## 2018-09-19 RX ORDER — LEVETIRACETAM 500 MG/1
TABLET ORAL
Qty: 180 TABLET | Refills: 0 | Status: SHIPPED | OUTPATIENT
Start: 2018-09-19 | End: 2019-01-11

## 2018-09-19 RX ORDER — HYDROCHLOROTHIAZIDE 25 MG/1
25 TABLET ORAL
Qty: 90 TABLET | Refills: 2 | Status: SHIPPED | OUTPATIENT
Start: 2018-09-19 | End: 2019-01-11

## 2018-09-19 RX ORDER — ATORVASTATIN CALCIUM 40 MG/1
40 TABLET, FILM COATED ORAL NIGHTLY
Qty: 90 TABLET | Refills: 2 | Status: SHIPPED | OUTPATIENT
Start: 2018-09-19 | End: 2019-01-11

## 2018-09-19 RX ORDER — METOPROLOL SUCCINATE 100 MG/1
100 TABLET, EXTENDED RELEASE ORAL
Qty: 90 TABLET | Refills: 2 | Status: SHIPPED | OUTPATIENT
Start: 2018-09-19 | End: 2019-01-11

## 2018-09-19 RX ORDER — LOSARTAN POTASSIUM 100 MG/1
100 TABLET ORAL
Qty: 90 TABLET | Refills: 2 | Status: SHIPPED | OUTPATIENT
Start: 2018-09-19 | End: 2019-01-11

## 2018-09-19 NOTE — PROGRESS NOTES
Dx: Stenosing tenosynovitis of finger of right hand (O22.749)             Authorized # of Visits/Insurance:  Medicare         Next MD visit: none scheduled  Fall Risk: standard         Precautions: n/a           Medication Changes since last visit?: No  Lemon program well. Plan: Continue therapy 2x/wk to increase function for ADL's. Charges: 1 TE, 1 MT, 1 US Total Timed Treatment: 38 min  Total Treatment Time: 43 min    Goals     • Therapy Goals      1.   Pt will be independent and compliant with compre

## 2018-09-19 NOTE — PROGRESS NOTES
HPI:    Patient ID: Cee Vargas is a 46year old female. Patient presents with:  Diabetes      Diabetes   She presents for her follow-up (pt  state   deoing   well  but sugars are increasing    in am  170   or sometimes  higher  .   pt   state  did not Metoprolol Succinate  MG Oral Tablet 24 Hr Take 1 tablet (100 mg total) by mouth once daily.  Disp: 90 tablet Rfl: 2   Metoprolol Succinate ER 25 MG Oral Tablet 24 Hr TAKE 1 TABLET BY MOUTH EVERY NIGHT Disp: 90 tablet Rfl: 2   insulin glargine (LANTUS Comment:Sensitivity, with crawling sensation. Adhesive Tape               Comment:itching   PHYSICAL EXAM:   Physical Exam   Constitutional: She is oriented to person, place, and time. She appears well-developed and well-nourished. No distress.    Obese Low- sodium diet (2grams per day)   Maintain a low saturated fat diet   Maintain ideal weight/BMI   Regular walking/exercise as tolerated   Track and record blood pressure and heart rate at home   The side effects of medication discussed with patient   Yue Moore Sig: Take 1 tablet (100 mg total) by mouth once daily.    • Metoprolol Succinate ER 25 MG Oral Tablet 24 Hr 90 tablet 2     Sig: TAKE 1 TABLET BY MOUTH EVERY NIGHT   • insulin glargine (LANTUS SOLOSTAR) 100 UNIT/ML Subcutaneous Solution Pen-injector 15 pe

## 2018-09-24 ENCOUNTER — OFFICE VISIT (OUTPATIENT)
Dept: PHYSICAL THERAPY | Age: 48
End: 2018-09-24
Attending: ORTHOPAEDIC SURGERY
Payer: MEDICARE

## 2018-09-24 PROCEDURE — 97110 THERAPEUTIC EXERCISES: CPT | Performed by: OCCUPATIONAL THERAPIST

## 2018-09-24 NOTE — PROGRESS NOTES
Dx: Stenosing tenosynovitis of finger of right hand (B87.120)             Authorized # of Visits/Insurance:  Medicare         Next MD visit: none scheduled  Fall Risk: standard         Precautions: n/a           Medication Changes since last visit?: No  Lemon status and on going triggering. Patient tolerated full session well and pain in the LF/RF does continue to steadily progress well. Plan: Continue therapy 2x/wk to increase function for ADL's.     Charges: 2 TE Total Timed Treatment: 30 min  Total Tr

## 2018-09-25 RX ORDER — PEN NEEDLE, DIABETIC 32GX 5/32"
NEEDLE, DISPOSABLE MISCELLANEOUS
Qty: 100 EACH | Refills: 10 | Status: SHIPPED | OUTPATIENT
Start: 2018-09-25 | End: 2021-07-26

## 2018-09-26 ENCOUNTER — TELEPHONE (OUTPATIENT)
Dept: OCCUPATIONAL MEDICINE | Age: 48
End: 2018-09-26

## 2018-09-26 NOTE — TELEPHONE ENCOUNTER
Refill Protocol Appointment Criteria  · Appointment scheduled in the past 12 months or in the next 3 months  Recent Outpatient Visits            Kaleigh Rodríguezo 99     [facility] in [facility], [address] [place] [address] Visit    6 days ago Need for imm

## 2018-09-27 ENCOUNTER — TELEPHONE (OUTPATIENT)
Dept: INTERNAL MEDICINE CLINIC | Facility: CLINIC | Age: 48
End: 2018-09-27

## 2018-09-27 NOTE — TELEPHONE ENCOUNTER
Pt presented to the [facility] lab looking for order. No order was ordered, but pt just had labs done 08/17. Was pt to have order for lab or not? Please advise, thank you. Shanna Gaona .Please respond to pool: DARWIN CRAFT LMB LPN/CMA

## 2018-10-01 ENCOUNTER — OFFICE VISIT (OUTPATIENT)
Dept: ORTHOPEDICS CLINIC | Facility: CLINIC | Age: 48
End: 2018-10-01
Payer: MEDICARE

## 2018-10-01 VITALS — HEART RATE: 67 BPM | RESPIRATION RATE: 18 BRPM | DIASTOLIC BLOOD PRESSURE: 82 MMHG | SYSTOLIC BLOOD PRESSURE: 141 MMHG

## 2018-10-01 DIAGNOSIS — M65.841 STENOSING TENOSYNOVITIS OF FINGER OF RIGHT HAND: Primary | ICD-10-CM

## 2018-10-01 PROCEDURE — 20550 NJX 1 TENDON SHEATH/LIGAMENT: CPT | Performed by: ORTHOPAEDIC SURGERY

## 2018-10-01 NOTE — PROGRESS NOTES
Per verbal order from VT, draw up 1ml of Kenalog 10 and 1ml of 1% lidocaine for cortisone injection to right hand Hank Baldwin

## 2018-10-01 NOTE — PROCEDURES
Procedure: Under sterile preparation and after timeout was performed and consent was obtained, the patient's right hand small finger flexor tendon sheath was injected with 10 mg of Kenalog and 1 mL 1% lidocaine. The patient tolerated the procedure well.

## 2018-10-01 NOTE — TELEPHONE ENCOUNTER
Current Outpatient Medications:                                                    insulin glargine (LANTUS SOLOSTAR) 100 UNIT/ML Subcutaneous Solution Pen-injector ADMINISTER 58 UNITS UNDER THE SKIN DAILY Disp: 15 pen Rfl: 0

## 2018-10-03 ENCOUNTER — OFFICE VISIT (OUTPATIENT)
Dept: NEUROLOGY | Facility: CLINIC | Age: 48
End: 2018-10-03
Payer: MEDICARE

## 2018-10-03 ENCOUNTER — TELEPHONE (OUTPATIENT)
Dept: NEUROLOGY | Facility: CLINIC | Age: 48
End: 2018-10-03

## 2018-10-03 VITALS
HEIGHT: 62 IN | BODY MASS INDEX: 34.96 KG/M2 | DIASTOLIC BLOOD PRESSURE: 88 MMHG | WEIGHT: 190 LBS | SYSTOLIC BLOOD PRESSURE: 146 MMHG | HEART RATE: 78 BPM

## 2018-10-03 DIAGNOSIS — Z86.79 HISTORY OF INTRACRANIAL HEMORRHAGE: Primary | ICD-10-CM

## 2018-10-03 DIAGNOSIS — G43.019 INTRACTABLE MIGRAINE WITHOUT AURA AND WITHOUT STATUS MIGRAINOSUS: ICD-10-CM

## 2018-10-03 DIAGNOSIS — G40.909 SEIZURE DISORDER (HCC): ICD-10-CM

## 2018-10-03 DIAGNOSIS — I65.21 CAROTID OCCLUSION, RIGHT: ICD-10-CM

## 2018-10-03 PROCEDURE — 99214 OFFICE O/P EST MOD 30 MIN: CPT | Performed by: OTHER

## 2018-10-03 NOTE — TELEPHONE ENCOUNTER
Insurance was verified and CTA brain & carotids wo are a covered benefit and they do not require authorization. Procedures are scheduled on 10/11/18.

## 2018-10-29 ENCOUNTER — TELEPHONE (OUTPATIENT)
Dept: ENDOCRINOLOGY CLINIC | Facility: CLINIC | Age: 48
End: 2018-10-29

## 2018-10-29 NOTE — TELEPHONE ENCOUNTER
Per LOV note I do not see that patient is using a medtronic pump where she would need the kanika strips and lancets. However kanika brand should be preferred by [address]. Called the pharmacy to confirm. Does not require PA.  Needs CMN form

## 2018-10-29 NOTE — TELEPHONE ENCOUNTER
Current Outpatient Medications:  ASHWINI MICROLET LANCETS Does not apply Misc Test blood sugar four times a day and as needed.  Disp: 150 each Rfl: 3   Glucose Blood (CONTOUR NEXT TEST) In Vitro Strip Check sugars 4 times daily Disp: 150 each Rfl: 3     PA

## 2018-11-01 NOTE — TELEPHONE ENCOUNTER
Review pended refill request as it does not fall under a protocol.     Last Rx: 8-10-18  LOV: 9-19-18

## 2018-11-02 RX ORDER — LIDOCAINE HYDROCHLORIDE 20 MG/ML
SOLUTION ORAL; TOPICAL
Qty: 90 TABLET | Refills: 0 | Status: SHIPPED | OUTPATIENT
Start: 2018-11-02 | End: 2018-11-28

## 2018-11-06 NOTE — PROGRESS NOTES
Neurology Outpatient Consult Note    Hood Marte : 3/30/1970   Referring Physician: Dr. Mccabe Johanna  HPI:     Hood Marte is a 46year old female who is being seen in neurologic evaluation. Patient describes a headache.   It is localized t (40 mg total) by mouth nightly. Disp: 90 tablet Rfl: 2   hydrochlorothiazide 25 MG Oral Tab Take 1 tablet (25 mg total) by mouth once daily. Disp: 90 tablet Rfl: 2   losartan 100 MG Oral Tab Take 1 tablet (100 mg total) by mouth once daily.  Disp: 90 tablet Subcutaneous Solution Pen-injector ADMINISTER 58 UNITS UNDER THE SKIN DAILY Disp: 15 pen Rfl: 2      Past Medical History:   Diagnosis Date   • Age-related nuclear cataract of both eyes 3/30/2016   • Anemia    • Cataract    • Coronary atherosclerosis    • education: Not on file      Highest education level: Not on file    Social Needs      Financial resource strain: Not on file      Food insecurity - worry: Not on file      Food insecurity - inability: Not on file      Transportation needs - medical: Not on right and 3+ in left biceps, brachioradialis, patella  Coordination / gait: no finger-nose-finger dysmetria, gait with mild left leg circumduction    Studies / labs: Reviewed  LDL 98    CTA brain 6/2017  CONCLUSION:   1.  There is a right external-internal distributions. 4. Postoperative sequela of prior right temporoparietal craniotomy. 5. Scattered sequela of chronic microvascular ischemic disease. CT head wo 6/2017  CONCLUSION:   1. No acute hemorrhage is identified.  No acute intracranial pro nonvisualization of the right     A2 segment which is either congenitally absent or not supplied. 3. \"Movie\" images show excellent slightly delayed filling of the right MCA     via the right ECA/MCA bypass.   4. There is filling of both A2 segments via i [facility])  IMPRESSION:  Decreased cerebral blood flow distribution of the anterior division   of the right middle cerebral artery as compared to the left with   delayed mean transit time on the left and relatively symmetric   cerebral blood volumes    MRA bra

## 2018-11-12 ENCOUNTER — TELEPHONE (OUTPATIENT)
Dept: INTERNAL MEDICINE CLINIC | Facility: CLINIC | Age: 48
End: 2018-11-12

## 2018-11-21 ENCOUNTER — TELEPHONE (OUTPATIENT)
Dept: INTERNAL MEDICINE CLINIC | Facility: CLINIC | Age: 48
End: 2018-11-21

## 2018-11-21 ENCOUNTER — MED REC SCAN ONLY (OUTPATIENT)
Dept: INTERNAL MEDICINE CLINIC | Facility: CLINIC | Age: 48
End: 2018-11-21

## 2018-11-23 NOTE — TELEPHONE ENCOUNTER
PA for LANTUS SOLOSTAR 100 UNIT/ML Subcutaneous [facility] response time 3-5 business days, KEY [id number].

## 2018-11-27 ENCOUNTER — MED REC SCAN ONLY (OUTPATIENT)
Dept: INTERNAL MEDICINE CLINIC | Facility: CLINIC | Age: 48
End: 2018-11-27

## 2018-11-28 RX ORDER — INSULIN ASPART 100 [IU]/ML
INJECTION, SOLUTION INTRAVENOUS; SUBCUTANEOUS
Qty: 15 ML | Refills: 0 | Status: SHIPPED | OUTPATIENT
Start: 2018-11-28 | End: 2018-12-24

## 2018-11-30 RX ORDER — GABAPENTIN 300 MG/1
CAPSULE ORAL
Qty: 90 CAPSULE | Refills: 0 | Status: SHIPPED | OUTPATIENT
Start: 2018-11-30 | End: 2019-01-11

## 2018-11-30 RX ORDER — INSULIN GLARGINE 100 [IU]/ML
INJECTION, SOLUTION SUBCUTANEOUS
Qty: 15 ML | Refills: 0 | Status: SHIPPED | OUTPATIENT
Start: 2018-11-30 | End: 2019-01-02

## 2018-11-30 RX ORDER — LIDOCAINE HYDROCHLORIDE 20 MG/ML
SOLUTION ORAL; TOPICAL
Qty: 90 TABLET | Refills: 0 | Status: SHIPPED | OUTPATIENT
Start: 2018-11-30 | End: 2019-01-11

## 2018-11-30 NOTE — TELEPHONE ENCOUNTER
PA approved effective 11/23/2018 for quantity of 15 ml per 25 days;  [facility] pharmacy notified of the approval.

## 2018-12-12 ENCOUNTER — NURSE TRIAGE (OUTPATIENT)
Dept: OTHER | Age: 48
End: 2018-12-12

## 2018-12-12 RX ORDER — AZITHROMYCIN 250 MG/1
TABLET, FILM COATED ORAL
Qty: 6 TABLET | Refills: 0 | OUTPATIENT
Start: 2018-12-12 | End: 2019-01-09

## 2018-12-12 RX ORDER — FLUTICASONE PROPIONATE 50 MCG
2 SPRAY, SUSPENSION (ML) NASAL DAILY
Qty: 1 BOTTLE | Refills: 0 | OUTPATIENT
Start: 2018-12-12 | End: 2019-01-08

## 2018-12-12 RX ORDER — LORATADINE 10 MG/1
10 TABLET ORAL DAILY
Qty: 30 TABLET | Refills: 1 | OUTPATIENT
Start: 2018-12-12 | End: 2019-08-26

## 2018-12-12 NOTE — TELEPHONE ENCOUNTER
Spoke with patient and relayed EV message below--patient verbalizes understanding and agreement. Verified pharmacy and phoned in medications to [facility] pharmacist in [place], [place] as per EV today. No further questions/concerns at this time.

## 2018-12-12 NOTE — TELEPHONE ENCOUNTER
Can  Call in   To her  Pharmacy -  z pack ,   loratadine  5  Mg    Qd   2  Weeks than as needed  flonase 2  Puffs  Qd 1-2  Weeks     If   Patient  not  Better  Need  To see    In [facility]  - call her insurance and find out  Who can  See her   There ?   At

## 2018-12-12 NOTE — TELEPHONE ENCOUNTER
Please reply to pool: EM RN TRIAGE  Action Requested: Summary for Provider     []  Critical Lab, Recommendations Needed  [x] Need Additional Advice  []   FYI    [x]   Need Orders  [x] Need Medications Sent to Pharmacy  []  Other     SUMMARY:Declines UC/I

## 2018-12-24 RX ORDER — OMEPRAZOLE 40 MG/1
CAPSULE, DELAYED RELEASE ORAL
Qty: 90 CAPSULE | Refills: 0 | Status: SHIPPED | OUTPATIENT
Start: 2018-12-24 | End: 2019-01-11

## 2018-12-24 RX ORDER — INSULIN ASPART 100 [IU]/ML
INJECTION, SOLUTION INTRAVENOUS; SUBCUTANEOUS
Qty: 15 ML | Refills: 0 | Status: SHIPPED | OUTPATIENT
Start: 2018-12-24 | End: 2019-01-07

## 2019-01-02 RX ORDER — INSULIN GLARGINE 100 [IU]/ML
INJECTION, SOLUTION SUBCUTANEOUS
Qty: 15 ML | Refills: 0 | Status: SHIPPED | OUTPATIENT
Start: 2019-01-02 | End: 2019-01-07

## 2019-01-03 ENCOUNTER — MED REC SCAN ONLY (OUTPATIENT)
Dept: INTERNAL MEDICINE CLINIC | Facility: CLINIC | Age: 49
End: 2019-01-03

## 2019-01-04 NOTE — PROGRESS NOTES
Name: Ya Vale  Date: 1/7/ 2019    Referring Physician: No ref. provider found    HISTORY OF PRESENT ILLNESS   Ya Vale is a 46year old female who presents for diabetes mellitus, diagnosed over 10 years ago.   She was seen during hospitalizatio Disp: 15 mL, Rfl: 1  •  insulin glargine (LANTUS SOLOSTAR) 100 UNIT/ML Subcutaneous Solution Pen-injector, Inject 60 Units into the skin nightly., Disp: 15 mL, Rfl: 1  •  Omeprazole 40 MG Oral Capsule Delayed Release, TAKE 1 CAPSULE BY MOUTH DAILY 30 TO 60 Oral Tablet 24 Hr, Take 1 tablet (100 mg total) by mouth once daily. , Disp: 90 tablet, Rfl: 2  •  Metoprolol Succinate ER 25 MG Oral Tablet 24 Hr, TAKE 1 TABLET BY MOUTH EVERY NIGHT, Disp: 90 tablet, Rfl: 2  •  clotrimazole-betamethasone 1-0.05 % External • High blood pressure    • High cholesterol    • Hyperlipidemia    • Meibomian gland dysfunction (MGD), bilateral, both upper and lower lids 3/30/2016   • Migraine    • Migraines    • Muscle weakness     left sided weakness uses walker and cane   • Debio loss    ASSESSMENT/PLAN:      1.  Diabetes Mellitus Type 2, Uncontrolled  -Uncontrolled, HgA1c elevated from last visit 9.8%, patient states she knew it would be up due to \"medication\" (steroids) and vacation in [place]   -Discussed importance of glycemic c

## 2019-01-07 ENCOUNTER — OFFICE VISIT (OUTPATIENT)
Dept: ENDOCRINOLOGY CLINIC | Facility: CLINIC | Age: 49
End: 2019-01-07
Payer: MEDICARE

## 2019-01-07 VITALS
BODY MASS INDEX: 36 KG/M2 | DIASTOLIC BLOOD PRESSURE: 84 MMHG | HEART RATE: 75 BPM | SYSTOLIC BLOOD PRESSURE: 144 MMHG | WEIGHT: 196 LBS

## 2019-01-07 DIAGNOSIS — Z79.4 TYPE 2 DIABETES MELLITUS WITH OTHER NEUROLOGIC COMPLICATION, WITH LONG-TERM CURRENT USE OF INSULIN (HCC): Primary | ICD-10-CM

## 2019-01-07 DIAGNOSIS — E11.49 TYPE 2 DIABETES MELLITUS WITH OTHER NEUROLOGIC COMPLICATION, WITH LONG-TERM CURRENT USE OF INSULIN (HCC): Primary | ICD-10-CM

## 2019-01-07 LAB
CARTRIDGE EXPIRATION DATE: ABNORMAL DATE
CARTRIDGE LOT#: ABNORMAL NUMERIC
GLUCOSE BLOOD: 334
HEMOGLOBIN A1C: 9.8 % (ref 4.3–5.6)
TEST STRIP EXPIRATION DATE: NORMAL DATE
TEST STRIP LOT #: NORMAL NUMERIC

## 2019-01-07 PROCEDURE — 82962 GLUCOSE BLOOD TEST: CPT | Performed by: NURSE PRACTITIONER

## 2019-01-07 PROCEDURE — 36416 COLLJ CAPILLARY BLOOD SPEC: CPT | Performed by: NURSE PRACTITIONER

## 2019-01-07 PROCEDURE — G0463 HOSPITAL OUTPT CLINIC VISIT: HCPCS | Performed by: NURSE PRACTITIONER

## 2019-01-07 PROCEDURE — 99213 OFFICE O/P EST LOW 20 MIN: CPT | Performed by: NURSE PRACTITIONER

## 2019-01-07 PROCEDURE — 83036 HEMOGLOBIN GLYCOSYLATED A1C: CPT | Performed by: NURSE PRACTITIONER

## 2019-01-07 NOTE — PATIENT INSTRUCTIONS
Basaglar 60 units inject into skin once daily at night     Humalog 24 units three times a day with meals plus CF-----sliding scale     Metformin 1000mg twice a day with meals     Follow up with Eye dr     Lab work prior to next visit     2-3 month follow u

## 2019-01-08 RX ORDER — FLUTICASONE PROPIONATE 50 MCG
SPRAY, SUSPENSION (ML) NASAL
Qty: 16 G | Refills: 2 | Status: SHIPPED | OUTPATIENT
Start: 2019-01-08 | End: 2019-11-29

## 2019-01-09 ENCOUNTER — OFFICE VISIT (OUTPATIENT)
Dept: ORTHOPEDICS CLINIC | Facility: CLINIC | Age: 49
End: 2019-01-09
Payer: MEDICARE

## 2019-01-09 ENCOUNTER — OFFICE VISIT (OUTPATIENT)
Dept: INTERNAL MEDICINE CLINIC | Facility: CLINIC | Age: 49
End: 2019-01-09
Payer: MEDICARE

## 2019-01-09 VITALS
DIASTOLIC BLOOD PRESSURE: 89 MMHG | WEIGHT: 196 LBS | BODY MASS INDEX: 36 KG/M2 | RESPIRATION RATE: 17 BRPM | HEART RATE: 78 BPM | SYSTOLIC BLOOD PRESSURE: 156 MMHG

## 2019-01-09 DIAGNOSIS — Z79.4 TYPE 2 DIABETES MELLITUS WITH OTHER NEUROLOGIC COMPLICATION, WITH LONG-TERM CURRENT USE OF INSULIN (HCC): ICD-10-CM

## 2019-01-09 DIAGNOSIS — E11.49 TYPE 2 DIABETES MELLITUS WITH OTHER NEUROLOGIC COMPLICATION, WITH LONG-TERM CURRENT USE OF INSULIN (HCC): ICD-10-CM

## 2019-01-09 DIAGNOSIS — E11.8 TYPE 2 DIABETES MELLITUS WITH COMPLICATION, WITH LONG-TERM CURRENT USE OF INSULIN (HCC): ICD-10-CM

## 2019-01-09 DIAGNOSIS — Z79.4 TYPE 2 DIABETES MELLITUS WITH COMPLICATION, WITH LONG-TERM CURRENT USE OF INSULIN (HCC): ICD-10-CM

## 2019-01-09 DIAGNOSIS — R05.8 POST-VIRAL COUGH SYNDROME: Primary | ICD-10-CM

## 2019-01-09 DIAGNOSIS — M65.342 TRIGGER FINGER, LEFT RING FINGER: Primary | ICD-10-CM

## 2019-01-09 PROCEDURE — G0463 HOSPITAL OUTPT CLINIC VISIT: HCPCS | Performed by: INTERNAL MEDICINE

## 2019-01-09 PROCEDURE — 99213 OFFICE O/P EST LOW 20 MIN: CPT | Performed by: INTERNAL MEDICINE

## 2019-01-09 PROCEDURE — G0463 HOSPITAL OUTPT CLINIC VISIT: HCPCS | Performed by: ORTHOPAEDIC SURGERY

## 2019-01-09 PROCEDURE — 99213 OFFICE O/P EST LOW 20 MIN: CPT | Performed by: ORTHOPAEDIC SURGERY

## 2019-01-09 RX ORDER — FLUTICASONE PROPIONATE 50 MCG
SPRAY, SUSPENSION (ML) NASAL
COMMUNITY
End: 2019-01-09

## 2019-01-09 RX ORDER — PROCHLORPERAZINE MALEATE 10 MG
TABLET ORAL
COMMUNITY
End: 2019-01-11

## 2019-01-09 RX ORDER — BENZONATATE 100 MG/1
100 CAPSULE ORAL 2 TIMES DAILY PRN
Qty: 10 CAPSULE | Refills: 0 | Status: SHIPPED | OUTPATIENT
Start: 2019-01-09 | End: 2019-01-11

## 2019-01-09 RX ORDER — TRAZODONE HYDROCHLORIDE 50 MG/1
TABLET ORAL
COMMUNITY
End: 2019-01-09

## 2019-01-09 RX ORDER — TRAZODONE HYDROCHLORIDE 100 MG/1
TABLET ORAL
COMMUNITY
Start: 2015-05-28 | End: 2019-01-09

## 2019-01-09 RX ORDER — ATORVASTATIN CALCIUM 80 MG/1
TABLET, FILM COATED ORAL
COMMUNITY
Start: 2015-05-28 | End: 2019-01-09

## 2019-01-09 RX ORDER — LEVETIRACETAM 500 MG/1
TABLET ORAL
COMMUNITY
End: 2019-01-09

## 2019-01-09 RX ORDER — HYDROCHLOROTHIAZIDE 25 MG/1
TABLET ORAL
COMMUNITY
Start: 2015-10-13 | End: 2019-01-09

## 2019-01-09 RX ORDER — FAMOTIDINE 20 MG/1
TABLET ORAL
COMMUNITY
End: 2019-01-09

## 2019-01-09 RX ORDER — ZOLPIDEM TARTRATE 5 MG/1
TABLET ORAL
COMMUNITY
End: 2019-01-09 | Stop reason: CLARIF

## 2019-01-09 RX ORDER — AZITHROMYCIN 250 MG/1
TABLET, FILM COATED ORAL
COMMUNITY
End: 2019-01-09

## 2019-01-09 RX ORDER — LOSARTAN POTASSIUM 100 MG/1
TABLET ORAL
COMMUNITY
End: 2019-01-09

## 2019-01-09 RX ORDER — HYDROXYZINE HYDROCHLORIDE 25 MG/1
TABLET, FILM COATED ORAL
COMMUNITY
End: 2019-01-09

## 2019-01-09 RX ORDER — ESCITALOPRAM OXALATE 20 MG/1
TABLET ORAL
COMMUNITY
End: 2019-01-09

## 2019-01-09 RX ORDER — MELOXICAM 7.5 MG/1
7.5 TABLET ORAL DAILY
Qty: 30 TABLET | Refills: 0 | Status: SHIPPED | OUTPATIENT
Start: 2019-01-09 | End: 2019-08-26 | Stop reason: ALTCHOICE

## 2019-01-09 RX ORDER — ATORVASTATIN CALCIUM 40 MG/1
TABLET, FILM COATED ORAL
COMMUNITY
End: 2019-01-09

## 2019-01-09 RX ORDER — BUTALBITAL, ACETAMINOPHEN AND CAFFEINE 50; 325; 40 MG/1; MG/1; MG/1
TABLET ORAL
COMMUNITY
End: 2019-01-09

## 2019-01-09 RX ORDER — ACETAMINOPHEN 325 MG/1
TABLET ORAL
COMMUNITY
End: 2019-01-09

## 2019-01-09 RX ORDER — BUDESONIDE AND FORMOTEROL FUMARATE DIHYDRATE 160; 4.5 UG/1; UG/1
2 AEROSOL RESPIRATORY (INHALATION)
COMMUNITY
Start: 2015-10-13

## 2019-01-09 RX ORDER — AMLODIPINE BESYLATE 5 MG/1
TABLET ORAL
COMMUNITY
End: 2019-01-11

## 2019-01-09 RX ORDER — LOSARTAN POTASSIUM 50 MG/1
50 TABLET ORAL
COMMUNITY
End: 2019-01-09

## 2019-01-09 RX ORDER — OMEPRAZOLE 40 MG/1
CAPSULE, DELAYED RELEASE ORAL
COMMUNITY
End: 2019-01-09

## 2019-01-09 RX ORDER — GLIMEPIRIDE 4 MG/1
TABLET ORAL
COMMUNITY
End: 2019-01-09

## 2019-01-09 RX ORDER — SERTRALINE HYDROCHLORIDE 25 MG/1
TABLET, FILM COATED ORAL
COMMUNITY
End: 2019-01-09

## 2019-01-09 RX ORDER — GABAPENTIN 100 MG/1
CAPSULE ORAL
COMMUNITY
End: 2019-01-09

## 2019-01-09 RX ORDER — GABAPENTIN 300 MG/1
CAPSULE ORAL
COMMUNITY
Start: 2015-08-29 | End: 2019-01-09

## 2019-01-09 RX ORDER — METOPROLOL SUCCINATE 100 MG/1
TABLET, EXTENDED RELEASE ORAL
COMMUNITY
End: 2019-01-09

## 2019-01-09 RX ORDER — DIAZEPAM 5 MG/1
TABLET ORAL
COMMUNITY
End: 2019-01-09

## 2019-01-09 RX ORDER — MISOPROSTOL 200 UG/1
TABLET ORAL
COMMUNITY
End: 2019-01-11

## 2019-01-09 RX ORDER — METOPROLOL SUCCINATE 25 MG/1
TABLET, EXTENDED RELEASE ORAL
COMMUNITY
End: 2019-01-09

## 2019-01-09 RX ORDER — IBUPROFEN 600 MG/1
TABLET ORAL
COMMUNITY
End: 2019-08-26

## 2019-01-09 NOTE — PROGRESS NOTES
Time based billing: total face-to-face time spent examining, counseling and treating this patient 15 minutes; more than 50% of time spent in counseling/coordination of care    Patient presents for follow-up.   I last saw her for trigger finger ring and gave

## 2019-01-09 NOTE — PROGRESS NOTES
Rick Menjivar is a 46year old female.   Patient presents with:  Cough      HPI:   Pt comes for f/u-- sister Ryan Quintanilla in waiting rm --pt is poor historian but per pt erika wanted pt to go by herself to learn   C/c coughing x 2 mns   C/o coughing so much since 150-0.025-1 MG Oral Cap Poly-Iron 150 Forte 150 mg-25 mcg-1 mg capsule Disp:  Rfl:    Insulin Lispro (HUMALOG KWIKPEN) 100 UNIT/ML Subcutaneous Solution Pen-injector Humalog KwikPen (U-100) Insulin 100 unit/mL subcutaneous Disp:  Rfl:    Budesonide-Formote UNIFINE PENTIPS 32G X 4 MM Does not apply Misc USE DAILY WITH LEVEMIR Disp: 100 each Rfl: 10   MetFORMIN HCl 1000 MG Oral Tab TAKE ONE (1) TABLET BY MOUTH TWICE DAILY WITH MEALS Disp: 180 tablet Rfl: 1   levETIRAcetam 500 MG Oral Tab TAKE ONE (1) TABLET 3/30/2016   • Anemia    • Cataract    • Coronary atherosclerosis    • Esophageal reflux    • Headache    • High blood pressure    • High cholesterol    • Hyperlipidemia    • Meibomian gland dysfunction (MGD), bilateral, both upper and lower lids 3/30/2016 (88.9 kg)   BMI 35.85 kg/m²   GENERAL: well developed, well nourished,in no apparent distress   SKIN: no rashes,no suspicious lesions  HEENT: atraumatic, normocephalic,ears b/l ok and throat -tonsils seen no erythema   NECK: supple,no adenopathy, mildly te

## 2019-01-11 ENCOUNTER — HOSPITAL ENCOUNTER (OUTPATIENT)
Dept: ULTRASOUND IMAGING | Facility: HOSPITAL | Age: 49
Discharge: HOME | End: 2019-01-11
Attending: CLINICAL NURSE SPECIALIST
Payer: MEDICARE

## 2019-01-11 ENCOUNTER — OFFICE VISIT (OUTPATIENT)
Dept: INTERNAL MEDICINE CLINIC | Facility: CLINIC | Age: 49
End: 2019-01-11
Payer: MEDICARE

## 2019-01-11 ENCOUNTER — TELEPHONE (OUTPATIENT)
Dept: NEUROLOGY | Facility: CLINIC | Age: 49
End: 2019-01-11

## 2019-01-11 ENCOUNTER — HOSPITAL ENCOUNTER (OUTPATIENT)
Dept: CT IMAGING | Facility: HOSPITAL | Age: 49
Discharge: HOME | End: 2019-01-11
Attending: Other
Payer: MEDICARE

## 2019-01-11 ENCOUNTER — HOSPITAL ENCOUNTER (OUTPATIENT)
Dept: CT IMAGING | Facility: HOSPITAL | Age: 49
Discharge: HOME | End: 2019-01-11
Attending: Other | Admitting: INTERNAL MEDICINE
Payer: MEDICARE

## 2019-01-11 ENCOUNTER — TELEPHONE (OUTPATIENT)
Dept: INTERNAL MEDICINE CLINIC | Facility: CLINIC | Age: 49
End: 2019-01-11

## 2019-01-11 VITALS — DIASTOLIC BLOOD PRESSURE: 96 MMHG | SYSTOLIC BLOOD PRESSURE: 170 MMHG

## 2019-01-11 DIAGNOSIS — E11.8 TYPE 2 DIABETES MELLITUS WITH COMPLICATION, WITH LONG-TERM CURRENT USE OF INSULIN (HCC): ICD-10-CM

## 2019-01-11 DIAGNOSIS — K21.9 GASTROESOPHAGEAL REFLUX DISEASE WITHOUT ESOPHAGITIS: Primary | ICD-10-CM

## 2019-01-11 DIAGNOSIS — E78.00 PURE HYPERCHOLESTEROLEMIA: Chronic | ICD-10-CM

## 2019-01-11 DIAGNOSIS — Z86.79 HISTORY OF INTRACRANIAL HEMORRHAGE: ICD-10-CM

## 2019-01-11 DIAGNOSIS — R05.9 COUGH: ICD-10-CM

## 2019-01-11 DIAGNOSIS — I65.21 CAROTID OCCLUSION, RIGHT: ICD-10-CM

## 2019-01-11 DIAGNOSIS — D64.9 ANEMIA, UNSPECIFIED TYPE: ICD-10-CM

## 2019-01-11 DIAGNOSIS — E11.49 TYPE 2 DIABETES MELLITUS WITH OTHER NEUROLOGIC COMPLICATION, WITH LONG-TERM CURRENT USE OF INSULIN (HCC): Primary | ICD-10-CM

## 2019-01-11 DIAGNOSIS — F32.A DEPRESSION, UNSPECIFIED DEPRESSION TYPE: ICD-10-CM

## 2019-01-11 DIAGNOSIS — I10 ESSENTIAL HYPERTENSION WITH GOAL BLOOD PRESSURE LESS THAN 140/90: ICD-10-CM

## 2019-01-11 DIAGNOSIS — Z79.4 TYPE 2 DIABETES MELLITUS WITH OTHER NEUROLOGIC COMPLICATION, WITH LONG-TERM CURRENT USE OF INSULIN (HCC): Primary | ICD-10-CM

## 2019-01-11 DIAGNOSIS — Z98.890 HISTORY OF CRANIOTOMY: ICD-10-CM

## 2019-01-11 DIAGNOSIS — Z79.4 TYPE 2 DIABETES MELLITUS WITH COMPLICATION, WITH LONG-TERM CURRENT USE OF INSULIN (HCC): ICD-10-CM

## 2019-01-11 DIAGNOSIS — I65.21 OCCLUSION OF RIGHT CAROTID ARTERY: ICD-10-CM

## 2019-01-11 LAB — CREAT BLD-MCNC: 0.7 MG/DL (ref 0.5–1.5)

## 2019-01-11 PROCEDURE — G0463 HOSPITAL OUTPT CLINIC VISIT: HCPCS | Performed by: INTERNAL MEDICINE

## 2019-01-11 PROCEDURE — 99214 OFFICE O/P EST MOD 30 MIN: CPT | Performed by: INTERNAL MEDICINE

## 2019-01-11 PROCEDURE — 70496 CT ANGIOGRAPHY HEAD: CPT | Performed by: OTHER

## 2019-01-11 PROCEDURE — 93880 EXTRACRANIAL BILAT STUDY: CPT | Performed by: CLINICAL NURSE SPECIALIST

## 2019-01-11 PROCEDURE — 70498 CT ANGIOGRAPHY NECK: CPT | Performed by: OTHER

## 2019-01-11 PROCEDURE — 82565 ASSAY OF CREATININE: CPT

## 2019-01-11 RX ORDER — METOPROLOL SUCCINATE 25 MG/1
TABLET, EXTENDED RELEASE ORAL
Qty: 90 TABLET | Refills: 3 | Status: SHIPPED | OUTPATIENT
Start: 2019-01-11 | End: 2020-03-27

## 2019-01-11 RX ORDER — LOSARTAN POTASSIUM 100 MG/1
100 TABLET ORAL
Qty: 90 TABLET | Refills: 2 | Status: SHIPPED | OUTPATIENT
Start: 2019-01-11 | End: 2021-04-19

## 2019-01-11 RX ORDER — AMLODIPINE BESYLATE 5 MG/1
TABLET ORAL
Qty: 90 TABLET | Refills: 3 | Status: SHIPPED | OUTPATIENT
Start: 2019-01-11 | End: 2020-03-27

## 2019-01-11 RX ORDER — OMEPRAZOLE 40 MG/1
CAPSULE, DELAYED RELEASE ORAL
Qty: 90 CAPSULE | Refills: 0 | Status: SHIPPED | OUTPATIENT
Start: 2019-01-11 | End: 2019-07-12

## 2019-01-11 RX ORDER — METOPROLOL SUCCINATE 100 MG/1
100 TABLET, EXTENDED RELEASE ORAL
Qty: 90 TABLET | Refills: 2 | Status: SHIPPED | OUTPATIENT
Start: 2019-01-11 | End: 2019-04-16

## 2019-01-11 RX ORDER — GABAPENTIN 300 MG/1
300 CAPSULE ORAL NIGHTLY
Qty: 90 CAPSULE | Refills: 2 | Status: SHIPPED | OUTPATIENT
Start: 2019-01-11 | End: 2020-01-11

## 2019-01-11 RX ORDER — FERROUS SULFATE 325(65) MG
325 TABLET ORAL 2 TIMES DAILY
Qty: 90 TABLET | Refills: 0 | Status: SHIPPED | OUTPATIENT
Start: 2019-01-11 | End: 2019-02-14

## 2019-01-11 RX ORDER — HYDROCHLOROTHIAZIDE 25 MG/1
25 TABLET ORAL
Qty: 90 TABLET | Refills: 3 | Status: SHIPPED | OUTPATIENT
Start: 2019-01-11 | End: 2020-01-11

## 2019-01-11 RX ORDER — LEVETIRACETAM 500 MG/1
TABLET ORAL
Qty: 180 TABLET | Refills: 2 | Status: SHIPPED | OUTPATIENT
Start: 2019-01-11 | End: 2019-10-15

## 2019-01-11 RX ORDER — ESCITALOPRAM OXALATE 20 MG/1
TABLET ORAL
Qty: 90 TABLET | Refills: 2 | Status: SHIPPED | OUTPATIENT
Start: 2019-01-11 | End: 2019-04-16

## 2019-01-11 RX ORDER — ATORVASTATIN CALCIUM 40 MG/1
40 TABLET, FILM COATED ORAL NIGHTLY
Qty: 90 TABLET | Refills: 3 | Status: SHIPPED | OUTPATIENT
Start: 2019-01-11 | End: 2020-03-27

## 2019-01-11 NOTE — TELEPHONE ENCOUNTER
Per lab, pt is with them and they are looking for blood work for pt but nothing on file,  Pls advise.

## 2019-01-11 NOTE — TELEPHONE ENCOUNTER
Medicare Online for insurance coverage of CTA carotid arteries cpt code 21685, CTA brain cpt code 12561,  Insurance was verified and procedures are a covered benefit and they do not require authorization. Pricedured are scheduled for today.

## 2019-01-11 NOTE — PROGRESS NOTES
Venita Dewitt is a 46year old female. Patient presents with: Follow - Up      HPI:   Pt comes for f/u -here with her sister   C/c cough  C/o cough as before -- see hpi from lst time   jsut had the ct scan today --??  Reason her bp is high   Still has co Outpatient Medications:  atorvastatin 40 MG Oral Tab Take 1 tablet (40 mg total) by mouth nightly. Disp: 90 tablet Rfl: 3   Ferrous Sulfate (FEROSUL) 325 (65 Fe) MG Oral Tab Take 1 tablet (325 mg total) by mouth 2 (two) times daily.  Disp: 90 tablet Rfl: 0 Suspension USE 2 SPRAYS EACH NOSTRIL EVERY DAY X 1-2 WEEKS, THEN AS NEEDED Disp: 16 g Rfl: 2   loratadine 10 MG Oral Tab Take 1 tablet (10 mg total) by mouth daily.  1 tablet orally once daily  for 2 weeks than as needed Disp: 30 tablet Rfl: 1   Cholecalcif 3/30/2016   • Migraine    • Migraines    • Muscle weakness     left sided weakness uses walker and cane   • Stenosis of right vertebral artery    • Stroke [facility]) 2014   • Type II or unspecified type diabetes mellitus without mention of complication, not sta complication, with long-term current use of insulin (HCC)  -     atorvastatin 40 MG Oral Tab; Take 1 tablet (40 mg total) by mouth nightly.  -     gabapentin 300 MG Oral Cap; Take 1 capsule (300 mg total) by mouth nightly.   -     Glucose Blood (CONTOUR NEX anemic, refilled her medications  Cough  -     VENTOLIN  (90 Base) MCG/ACT Inhalation Aero Soln; Inhale 2 puffs into the lungs every 6 (six) hours as needed for Wheezing.   Take Delsym over-the-counter for the cough--Tessalon Perles were not covered

## 2019-01-11 NOTE — TELEPHONE ENCOUNTER
Attempted to reach patient no answer, routed message to PCP unsure if there is labs patient is to have drawn.

## 2019-01-11 NOTE — PATIENT INSTRUCTIONS
Diabetes: Learning About Serving and Portion Sizes     A good rule of thumb: Devote half your plate to vegetables and green salad. Split the other half between protein and starchy carbohydrates. Fruit makes a good dessert. Servings and portions.  What’s When you’re planning for a snack or a meal, keep servings in mind. If you don’t have measuring cups or a scale handy, there are ways to [facility] serving sizes, such as comparing your food to the size of your hand (see pictures above).   Managing portion si · Don’t treat corns or calluses yourself. Talk to your healthcare provider or podiatrist (a healthcare provider who specializes in foot care) if you need assistance trimming your toenails.   · Use moisturizing cream or lotion if you have dry skin, but don’t Any pair of shoes—new or old—should feel comfortable as soon as you put them on. There shouldn’t be any rubbing when you walk. Wear the right shoe for any activity. For instance, a running shoe is designed to keep your feet injury-free while jogging.  Buy s

## 2019-01-11 NOTE — TELEPHONE ENCOUNTER
I saw this pt for an acute visit and she had a1c recently and other labs 1/19  Not sure what labs she is asking for

## 2019-01-12 NOTE — TELEPHONE ENCOUNTER
Labs are in system to  complete  Ethan before  Visit  - for   Other Test  Results   CT-  . Zandra May  To  Call ordering Doctor - Dr Gila Mims her neurologist .

## 2019-01-14 ENCOUNTER — HOSPITAL ENCOUNTER (EMERGENCY)
Facility: HOSPITAL | Age: 49
Discharge: HOME | End: 2019-01-14
Attending: EMERGENCY MEDICINE
Payer: MEDICARE

## 2019-01-14 VITALS
BODY MASS INDEX: 36.07 KG/M2 | HEART RATE: 61 BPM | SYSTOLIC BLOOD PRESSURE: 176 MMHG | OXYGEN SATURATION: 96 % | DIASTOLIC BLOOD PRESSURE: 84 MMHG | TEMPERATURE: 98 F | HEIGHT: 62 IN | RESPIRATION RATE: 18 BRPM | WEIGHT: 196 LBS

## 2019-01-14 DIAGNOSIS — I10 ESSENTIAL HYPERTENSION: Primary | ICD-10-CM

## 2019-01-14 LAB
ANION GAP SERPL CALC-SCNC: 12 MMOL/L (ref 0–18)
BILIRUB UR QL: NEGATIVE
BUN SERPL-MCNC: 5 MG/DL (ref 8–20)
BUN/CREAT SERPL: 5.9 (ref 10–20)
CALCIUM SERPL-MCNC: 9.3 MG/DL (ref 8.5–10.5)
CHLORIDE SERPL-SCNC: 104 MMOL/L (ref 95–110)
CO2 SERPL-SCNC: 23 MMOL/L (ref 22–32)
COLOR UR: YELLOW
CREAT SERPL-MCNC: 0.85 MG/DL (ref 0.5–1.5)
GLUCOSE SERPL-MCNC: 99 MG/DL (ref 70–99)
GLUCOSE UR-MCNC: 50 MG/DL
HGB UR QL STRIP.AUTO: NEGATIVE
KETONES UR-MCNC: NEGATIVE MG/DL
NITRITE UR QL STRIP.AUTO: NEGATIVE
OSMOLALITY UR CALC.SUM OF ELEC: 285 MOSM/KG (ref 275–295)
PH UR: 7 [PH] (ref 5–8)
POTASSIUM SERPL-SCNC: 3.6 MMOL/L (ref 3.3–5.1)
PROT UR-MCNC: NEGATIVE MG/DL
RBC #/AREA URNS AUTO: 1 /HPF
SODIUM SERPL-SCNC: 139 MMOL/L (ref 136–144)
SP GR UR STRIP: 1.01 (ref 1–1.03)
UROBILINOGEN UR STRIP-ACNC: <2
VIT C UR-MCNC: NEGATIVE MG/DL
WBC #/AREA URNS AUTO: 9 /HPF

## 2019-01-14 PROCEDURE — 99284 EMERGENCY DEPT VISIT MOD MDM: CPT

## 2019-01-14 PROCEDURE — 87086 URINE CULTURE/COLONY COUNT: CPT | Performed by: EMERGENCY MEDICINE

## 2019-01-14 PROCEDURE — 93010 ELECTROCARDIOGRAM REPORT: CPT | Performed by: EMERGENCY MEDICINE

## 2019-01-14 PROCEDURE — 93005 ELECTROCARDIOGRAM TRACING: CPT

## 2019-01-14 PROCEDURE — 81001 URINALYSIS AUTO W/SCOPE: CPT | Performed by: EMERGENCY MEDICINE

## 2019-01-14 PROCEDURE — 80048 BASIC METABOLIC PNL TOTAL CA: CPT | Performed by: EMERGENCY MEDICINE

## 2019-01-14 PROCEDURE — 36415 COLL VENOUS BLD VENIPUNCTURE: CPT

## 2019-01-14 RX ORDER — AMLODIPINE BESYLATE 5 MG/1
5 TABLET ORAL DAILY
Qty: 7 TABLET | Refills: 0 | Status: SHIPPED | OUTPATIENT
Start: 2019-01-14 | End: 2019-01-21

## 2019-01-14 RX ORDER — AMLODIPINE BESYLATE 5 MG/1
5 TABLET ORAL ONCE
Status: COMPLETED | OUTPATIENT
Start: 2019-01-14 | End: 2019-01-14

## 2019-01-14 NOTE — ED PROVIDER NOTES
Patient Seen in: [facility] Emergency Department    History   Patient presents with:  Hypertension (cardiovascular)  Headache (neurologic)    Stated Complaint: headache/ high BP    HPI    Patient is 18-year-old female who presents to the emergency History    Tobacco Use      Smoking status: Former Smoker        Packs/day: 0.20        Years: 1.00        Pack years: .2        Quit date: 2013        Years since quittin.0      Smokeless tobacco: Never Used    Alcohol use: No      Alcohol/week: Urine 9 (*)     Bacteria Urine Few (*)     All other components within normal limits   RAINBOW DRAW BLUE   RAINBOW DRAW LAVENDER   RAINBOW DRAW DARK GREEN   RAINBOW DRAW LIGHT GREEN   RAINBOW DRAW GOLD   RAINBOW DRAW LAVENDER TALL (BNP)   URINE CULTURE, RO

## 2019-01-14 NOTE — ED INITIAL ASSESSMENT (HPI)
Sent from md office for evaluation of htn. C/o headache and blurred vision onset 1130 today. Ambulating with steady gait. History of htn and states she has been compliant with medication.

## 2019-01-14 NOTE — ED NOTES
Received pt a/ox3, clear speech, nad, no resp distress, ambulatory with steady gait  Here with c/o HA and blurred vision onset today. Sent from MD office for further eval of HTN  Pt reports hx of HTN, compliant with meds.      Placed on continuous monitors,

## 2019-01-15 ENCOUNTER — TELEPHONE (OUTPATIENT)
Dept: NEUROLOGY | Facility: CLINIC | Age: 49
End: 2019-01-15

## 2019-01-15 NOTE — TELEPHONE ENCOUNTER
The patient's sister Demetrius Monroy who is on HIPPA notified of message below. The patient will be out of the stated for 2 months and will have them completed after she comes back. She will call the neurologist also.

## 2019-01-15 NOTE — TELEPHONE ENCOUNTER
S/w pt, rev'd CTA brain/carotid results; ED notes reviewed; advised follow up in clinic w/ Dr. Leila Pringle, and with myself. CTA brain/carotid results fwd: Dr. Leila Pringle.

## 2019-01-15 NOTE — ED NOTES
BP improved    Pt verbalized understanding of discharge and follow up instructions, along with prescriptions.  Denies additional questioning  Stable upon discharge  piv dc'd, catheter intact, bleeding controlled

## 2019-01-16 ENCOUNTER — TELEPHONE (OUTPATIENT)
Dept: ENDOCRINOLOGY CLINIC | Facility: CLINIC | Age: 49
End: 2019-01-16

## 2019-01-16 NOTE — TELEPHONE ENCOUNTER
Called patient for update / patient was hyperglycemic in the office last week   Reviewed with sister/ patient unavailable (See [facility] Verbal release)   Fasting  according to sister   Pre prandial 364 / post prandial today 81   Does not have log currentl

## 2019-01-23 ENCOUNTER — MED REC SCAN ONLY (OUTPATIENT)
Dept: INTERNAL MEDICINE CLINIC | Facility: CLINIC | Age: 49
End: 2019-01-23

## 2019-01-26 ENCOUNTER — TELEPHONE (OUTPATIENT)
Dept: INTERNAL MEDICINE CLINIC | Facility: CLINIC | Age: 49
End: 2019-01-26

## 2019-01-30 RX ORDER — DOCUSATE SODIUM 100 MG/1
CAPSULE, LIQUID FILLED ORAL
Qty: 180 CAPSULE | Refills: 2 | Status: SHIPPED | OUTPATIENT
Start: 2019-01-30 | End: 2019-11-06

## 2019-02-04 RX ORDER — INSULIN GLARGINE 100 [IU]/ML
INJECTION, SOLUTION SUBCUTANEOUS
Qty: 15 ML | Refills: 3 | Status: SHIPPED | OUTPATIENT
Start: 2019-02-04 | End: 2019-08-26

## 2019-02-06 ENCOUNTER — MED REC SCAN ONLY (OUTPATIENT)
Dept: INTERNAL MEDICINE CLINIC | Facility: CLINIC | Age: 49
End: 2019-02-06

## 2019-02-14 DIAGNOSIS — E11.49 TYPE 2 DIABETES MELLITUS WITH OTHER NEUROLOGIC COMPLICATION, WITH LONG-TERM CURRENT USE OF INSULIN (HCC): ICD-10-CM

## 2019-02-14 DIAGNOSIS — D64.9 ANEMIA, UNSPECIFIED TYPE: ICD-10-CM

## 2019-02-14 DIAGNOSIS — Z79.4 TYPE 2 DIABETES MELLITUS WITH OTHER NEUROLOGIC COMPLICATION, WITH LONG-TERM CURRENT USE OF INSULIN (HCC): ICD-10-CM

## 2019-02-14 RX ORDER — INSULIN ASPART 100 [IU]/ML
INJECTION, SOLUTION INTRAVENOUS; SUBCUTANEOUS
Qty: 15 ML | Refills: 5 | Status: SHIPPED | OUTPATIENT
Start: 2019-02-14 | End: 2019-07-25

## 2019-02-15 RX ORDER — LIDOCAINE HYDROCHLORIDE 20 MG/ML
SOLUTION ORAL; TOPICAL
Qty: 90 TABLET | Refills: 10 | Status: SHIPPED | OUTPATIENT
Start: 2019-02-15 | End: 2019-11-06

## 2019-02-21 ENCOUNTER — TELEPHONE (OUTPATIENT)
Dept: INTERNAL MEDICINE CLINIC | Facility: CLINIC | Age: 49
End: 2019-02-21

## 2019-02-21 NOTE — TELEPHONE ENCOUNTER
Pharmacy calling to verify Amlodipine 5 mg tab - how often. Noted not on original order, so at present time informed RP once a day, and informed would notify ordering physician.

## 2019-02-21 NOTE — TELEPHONE ENCOUNTER
This is correct as per note on 1/11/2019  See below      AmLODIPine Besylate 5 MG Oral Tab; amlodipine 5 mg tablet  ty

## 2019-02-26 DIAGNOSIS — E11.8 TYPE 2 DIABETES MELLITUS WITH COMPLICATION, WITH LONG-TERM CURRENT USE OF INSULIN (HCC): ICD-10-CM

## 2019-02-26 DIAGNOSIS — Z79.4 TYPE 2 DIABETES MELLITUS WITH COMPLICATION, WITH LONG-TERM CURRENT USE OF INSULIN (HCC): ICD-10-CM

## 2019-02-27 NOTE — TELEPHONE ENCOUNTER
Patient requesting 4 refills     Refill Protocol Appointment Criteria  · Appointment scheduled in the past 12 months or in the next 3 months  Recent Outpatient Visits            1 month ago Gastroesophageal reflux disease without esophagitis    Lina Lam

## 2019-03-05 ENCOUNTER — MED REC SCAN ONLY (OUTPATIENT)
Dept: INTERNAL MEDICINE CLINIC | Facility: CLINIC | Age: 49
End: 2019-03-05

## 2019-03-12 ENCOUNTER — MED REC SCAN ONLY (OUTPATIENT)
Dept: INTERNAL MEDICINE CLINIC | Facility: CLINIC | Age: 49
End: 2019-03-12

## 2019-04-15 DIAGNOSIS — I10 ESSENTIAL HYPERTENSION WITH GOAL BLOOD PRESSURE LESS THAN 140/90: ICD-10-CM

## 2019-04-15 DIAGNOSIS — F32.A DEPRESSION, UNSPECIFIED DEPRESSION TYPE: ICD-10-CM

## 2019-04-15 NOTE — TELEPHONE ENCOUNTER
Pharmacy calling to request pen needles for patient's use with Lantus Pen and Novolog Pen  TruePlus Pen needle [id number]

## 2019-04-15 NOTE — TELEPHONE ENCOUNTER
Refill passed per [facility] protocol.     Diabetic Supplies  Protocol Criteria:  · Appointment scheduled in past 12 months or the next 3 months  Refill Protocol Appointment Criteria  · Appointment scheduled in the past 12 months or in the next 3 month

## 2019-04-15 NOTE — TELEPHONE ENCOUNTER
Dina Chiu from [employer] called requested refill  She stated Pt is new to her Pharmacy      Current Outpatient Medications:  Metoprolol Succinate  MG Oral Tablet 24 Hr Take 1 tablet (100 mg total) by mouth once daily.  Disp: 90 tablet Rfl: 2   escitalopram

## 2019-04-16 RX ORDER — METOPROLOL SUCCINATE 100 MG/1
100 TABLET, EXTENDED RELEASE ORAL
Qty: 90 TABLET | Refills: 1 | Status: SHIPPED | OUTPATIENT
Start: 2019-04-16 | End: 2019-10-14

## 2019-04-17 RX ORDER — ESCITALOPRAM OXALATE 20 MG/1
TABLET ORAL
Qty: 45 TABLET | Refills: 1 | Status: SHIPPED | OUTPATIENT
Start: 2019-04-17 | End: 2019-10-14

## 2019-04-17 NOTE — TELEPHONE ENCOUNTER
Refill passed per [facility] protocol.     Please advise on high interaction with meloxicam and escitalipram  Hypertensive Medications  Protocol Criteria:  · Appointment scheduled in the past 6 months or in the next 3 months  · BMP or CMP in the past 1 ago Post-viral cough syndrome    Catherine Roach MD    Office Visit    3 months ago Trigger finger, left ring finger    [facility] for MD Jeanette Girard

## 2019-05-22 RX ORDER — ESCITALOPRAM OXALATE 20 MG/1
TABLET ORAL
Qty: 30 TABLET | Refills: 1 | OUTPATIENT
Start: 2019-05-22

## 2019-05-22 RX ORDER — ACETAMINOPHEN 325 MG/1
TABLET ORAL
Qty: 180 TABLET | Refills: 0 | OUTPATIENT
Start: 2019-05-22

## 2019-07-12 DIAGNOSIS — Z98.890 HISTORY OF CRANIOTOMY: ICD-10-CM

## 2019-07-12 RX ORDER — OMEPRAZOLE 40 MG/1
CAPSULE, DELAYED RELEASE ORAL
Qty: 90 CAPSULE | Refills: 1 | Status: SHIPPED | OUTPATIENT
Start: 2019-07-12 | End: 2020-01-11

## 2019-07-12 NOTE — TELEPHONE ENCOUNTER
Refill passed per [facility] protocol.   Refill Protocol Appointment Criteria  · Appointment scheduled in the past 12 months or in the next 3 months  Recent Outpatient Visits            6 months ago Gastroesophageal reflux disease without esophagitis

## 2019-07-25 DIAGNOSIS — Z79.4 TYPE 2 DIABETES MELLITUS WITH OTHER NEUROLOGIC COMPLICATION, WITH LONG-TERM CURRENT USE OF INSULIN (HCC): ICD-10-CM

## 2019-07-25 DIAGNOSIS — E11.49 TYPE 2 DIABETES MELLITUS WITH OTHER NEUROLOGIC COMPLICATION, WITH LONG-TERM CURRENT USE OF INSULIN (HCC): ICD-10-CM

## 2019-07-25 RX ORDER — INSULIN ASPART 100 [IU]/ML
INJECTION, SOLUTION INTRAVENOUS; SUBCUTANEOUS
Qty: 15 ML | Refills: 0 | Status: SHIPPED | OUTPATIENT
Start: 2019-07-25 | End: 2019-08-17

## 2019-08-17 DIAGNOSIS — E11.49 TYPE 2 DIABETES MELLITUS WITH OTHER NEUROLOGIC COMPLICATION, WITH LONG-TERM CURRENT USE OF INSULIN (HCC): ICD-10-CM

## 2019-08-17 DIAGNOSIS — Z79.4 TYPE 2 DIABETES MELLITUS WITH OTHER NEUROLOGIC COMPLICATION, WITH LONG-TERM CURRENT USE OF INSULIN (HCC): ICD-10-CM

## 2019-08-19 RX ORDER — INSULIN ASPART 100 [IU]/ML
INJECTION, SOLUTION INTRAVENOUS; SUBCUTANEOUS
Qty: 15 ML | Refills: 1 | Status: SHIPPED | OUTPATIENT
Start: 2019-08-19 | End: 2019-10-01

## 2019-08-26 ENCOUNTER — LAB ENCOUNTER (OUTPATIENT)
Dept: LAB | Age: 49
End: 2019-08-26
Attending: INTERNAL MEDICINE
Payer: MEDICARE

## 2019-08-26 ENCOUNTER — OFFICE VISIT (OUTPATIENT)
Dept: INTERNAL MEDICINE CLINIC | Facility: CLINIC | Age: 49
End: 2019-08-26
Payer: MEDICARE

## 2019-08-26 VITALS
HEART RATE: 61 BPM | WEIGHT: 186.19 LBS | SYSTOLIC BLOOD PRESSURE: 125 MMHG | DIASTOLIC BLOOD PRESSURE: 78 MMHG | BODY MASS INDEX: 37.53 KG/M2 | HEIGHT: 59 IN | TEMPERATURE: 99 F

## 2019-08-26 DIAGNOSIS — E11.49 TYPE 2 DIABETES MELLITUS WITH OTHER NEUROLOGIC COMPLICATION, WITH LONG-TERM CURRENT USE OF INSULIN (HCC): ICD-10-CM

## 2019-08-26 DIAGNOSIS — K21.9 GASTROESOPHAGEAL REFLUX DISEASE WITHOUT ESOPHAGITIS: ICD-10-CM

## 2019-08-26 DIAGNOSIS — E11.8 TYPE 2 DIABETES MELLITUS WITH COMPLICATION, WITH LONG-TERM CURRENT USE OF INSULIN (HCC): ICD-10-CM

## 2019-08-26 DIAGNOSIS — I10 ESSENTIAL HYPERTENSION WITH GOAL BLOOD PRESSURE LESS THAN 140/90: Primary | ICD-10-CM

## 2019-08-26 DIAGNOSIS — I10 ESSENTIAL HYPERTENSION WITH GOAL BLOOD PRESSURE LESS THAN 140/90: ICD-10-CM

## 2019-08-26 DIAGNOSIS — E78.00 PURE HYPERCHOLESTEROLEMIA: ICD-10-CM

## 2019-08-26 DIAGNOSIS — Z79.4 TYPE 2 DIABETES MELLITUS WITH COMPLICATION, WITH LONG-TERM CURRENT USE OF INSULIN (HCC): ICD-10-CM

## 2019-08-26 DIAGNOSIS — I63.9 LEFT-SIDED CEREBROVASCULAR ACCIDENT (CVA) (HCC): ICD-10-CM

## 2019-08-26 DIAGNOSIS — Z79.4 TYPE 2 DIABETES MELLITUS WITH OTHER NEUROLOGIC COMPLICATION, WITH LONG-TERM CURRENT USE OF INSULIN (HCC): ICD-10-CM

## 2019-08-26 DIAGNOSIS — M77.8 RIGHT ELBOW TENDONITIS: ICD-10-CM

## 2019-08-26 DIAGNOSIS — S16.1XXA STRAIN OF NECK MUSCLE, INITIAL ENCOUNTER: ICD-10-CM

## 2019-08-26 LAB
ALBUMIN SERPL-MCNC: 4 G/DL (ref 3.4–5)
ALBUMIN/GLOB SERPL: 0.9 {RATIO} (ref 1–2)
ALP LIVER SERPL-CCNC: 101 U/L (ref 41–108)
ALT SERPL-CCNC: 27 U/L (ref 13–56)
ANION GAP SERPL CALC-SCNC: 8 MMOL/L (ref 0–18)
AST SERPL-CCNC: 8 U/L (ref 15–37)
BASOPHILS # BLD AUTO: 0.07 X10(3) UL (ref 0–0.2)
BASOPHILS NFR BLD AUTO: 0.6 %
BILIRUB SERPL-MCNC: 0.3 MG/DL (ref 0.1–2)
BUN BLD-MCNC: 22 MG/DL (ref 7–18)
BUN/CREAT SERPL: 20 (ref 10–20)
CALCIUM BLD-MCNC: 9.9 MG/DL (ref 8.5–10.1)
CHLORIDE SERPL-SCNC: 101 MMOL/L (ref 98–112)
CHOLEST SMN-MCNC: 130 MG/DL (ref ?–200)
CO2 SERPL-SCNC: 32 MMOL/L (ref 21–32)
CREAT BLD-MCNC: 1.1 MG/DL (ref 0.55–1.02)
CREAT UR-SCNC: 145 MG/DL
DEPRECATED RDW RBC AUTO: 43.5 FL (ref 35.1–46.3)
EOSINOPHIL # BLD AUTO: 0.21 X10(3) UL (ref 0–0.7)
EOSINOPHIL NFR BLD AUTO: 1.8 %
ERYTHROCYTE [DISTWIDTH] IN BLOOD BY AUTOMATED COUNT: 14.5 % (ref 11–15)
EST. AVERAGE GLUCOSE BLD GHB EST-MCNC: 258 MG/DL (ref 68–126)
GLOBULIN PLAS-MCNC: 4.5 G/DL (ref 2.8–4.4)
GLUCOSE BLD-MCNC: 191 MG/DL (ref 70–99)
HBA1C MFR BLD HPLC: 10.6 % (ref ?–5.7)
HCT VFR BLD AUTO: 42.3 % (ref 35–48)
HDLC SERPL-MCNC: 25 MG/DL (ref 40–59)
HGB BLD-MCNC: 13.1 G/DL (ref 12–16)
IMM GRANULOCYTES # BLD AUTO: 0.05 X10(3) UL (ref 0–1)
IMM GRANULOCYTES NFR BLD: 0.4 %
LDLC SERPL CALC-MCNC: 58 MG/DL (ref ?–100)
LYMPHOCYTES # BLD AUTO: 2.99 X10(3) UL (ref 1–4)
LYMPHOCYTES NFR BLD AUTO: 25.6 %
M PROTEIN MFR SERPL ELPH: 8.5 G/DL (ref 6.4–8.2)
MCH RBC QN AUTO: 25.8 PG (ref 26–34)
MCHC RBC AUTO-ENTMCNC: 31 G/DL (ref 31–37)
MCV RBC AUTO: 83.4 FL (ref 80–100)
MICROALBUMIN UR-MCNC: 1.18 MG/DL
MICROALBUMIN/CREAT 24H UR-RTO: 8.1 UG/MG (ref ?–30)
MONOCYTES # BLD AUTO: 0.62 X10(3) UL (ref 0.1–1)
MONOCYTES NFR BLD AUTO: 5.3 %
NEUTROPHILS # BLD AUTO: 7.76 X10 (3) UL (ref 1.5–7.7)
NEUTROPHILS # BLD AUTO: 7.76 X10(3) UL (ref 1.5–7.7)
NEUTROPHILS NFR BLD AUTO: 66.3 %
NONHDLC SERPL-MCNC: 105 MG/DL (ref ?–130)
OSMOLALITY SERPL CALC.SUM OF ELEC: 300 MOSM/KG (ref 275–295)
PATIENT FASTING: YES
PATIENT FASTING: YES
PLATELET # BLD AUTO: 273 10(3)UL (ref 150–450)
POTASSIUM SERPL-SCNC: 3.8 MMOL/L (ref 3.5–5.1)
RBC # BLD AUTO: 5.07 X10(6)UL (ref 3.8–5.3)
SODIUM SERPL-SCNC: 141 MMOL/L (ref 136–145)
TRIGL SERPL-MCNC: 233 MG/DL (ref 30–149)
TSI SER-ACNC: 1.74 MIU/ML (ref 0.36–3.74)
VLDLC SERPL CALC-MCNC: 47 MG/DL (ref 0–30)
WBC # BLD AUTO: 11.7 X10(3) UL (ref 4–11)

## 2019-08-26 PROCEDURE — 83036 HEMOGLOBIN GLYCOSYLATED A1C: CPT

## 2019-08-26 PROCEDURE — 85025 COMPLETE CBC W/AUTO DIFF WBC: CPT

## 2019-08-26 PROCEDURE — 84443 ASSAY THYROID STIM HORMONE: CPT

## 2019-08-26 PROCEDURE — 36415 COLL VENOUS BLD VENIPUNCTURE: CPT

## 2019-08-26 PROCEDURE — 82570 ASSAY OF URINE CREATININE: CPT

## 2019-08-26 PROCEDURE — 80061 LIPID PANEL: CPT

## 2019-08-26 PROCEDURE — 80053 COMPREHEN METABOLIC PANEL: CPT

## 2019-08-26 PROCEDURE — 99214 OFFICE O/P EST MOD 30 MIN: CPT | Performed by: INTERNAL MEDICINE

## 2019-08-26 PROCEDURE — 82043 UR ALBUMIN QUANTITATIVE: CPT

## 2019-08-26 RX ORDER — PREDNISONE 1 MG/1
5 TABLET ORAL DAILY
Qty: 5 TABLET | Refills: 0 | Status: SHIPPED | OUTPATIENT
Start: 2019-08-26 | End: 2019-11-29 | Stop reason: ALTCHOICE

## 2019-08-26 NOTE — PATIENT INSTRUCTIONS
Understanding Biceps Tendonitis (Distal)    The biceps is the muscle on the front of the upper arm. Biceps tendons are connective tissue that attaches this muscle to the bones of the shoulder and arm. Overuse or a sudden injury to tendons can cause pain. When to call your healthcare provider  Call your healthcare provider right away if you have any of these:  · Fever of 100.4°F (38°C) or higher, chills, oras directed  · Symptoms that don’t get better, or get worse  · New symptoms  · Bruising or swelling of

## 2019-08-26 NOTE — PROGRESS NOTES
Kari Hall is a 44year old female. Patient presents with:   Follow - Up: Pt states she would like to discuss neck/head pain      HPI:   Pt comes for f/u  C/c neck pain --   C/o neck pain started in [place]--?one mn   No falls trauma or injury that she i Nadia Mercado  eye       Current Outpatient Medications:  insulin glargine (LANTUS SOLOSTAR) 100 UNIT/ML Subcutaneous Solution Pen-injector Inject 60 Units into the skin nightly.  Disp: 15 mL Rfl: 3   predniSONE 5 MG Oral Tab Take 1 tablet (5 mg total) by mouth VENTOLIN  (90 Base) MCG/ACT Inhalation Aero Soln Inhale 2 puffs into the lungs every 6 (six) hours as needed for Wheezing.  Disp: 1 Inhaler Rfl: 1   AmLODIPine Besylate 5 MG Oral Tab amlodipine 5 mg tablet Disp: 90 tablet Rfl: 3   losartan 100 MG O Muscle weakness     left sided weakness uses walker and cane   • Stenosis of right vertebral artery    • Stroke [facility]) 2014   • Type II or unspecified type diabetes mellitus without mention of complication, not stated as uncontrolled    • Unspecified essent right lateral aspect of the proximal forearm, elbow region with tenderness that continues distally, good range of motion, no swelling, no redness      ASSESSMENT AND PLAN:   Diagnoses and all orders for this visit:    Essential hypertension with goal blood

## 2019-08-29 DIAGNOSIS — E11.8 TYPE 2 DIABETES MELLITUS WITH COMPLICATION, WITH LONG-TERM CURRENT USE OF INSULIN (HCC): ICD-10-CM

## 2019-08-29 DIAGNOSIS — Z79.4 TYPE 2 DIABETES MELLITUS WITH COMPLICATION, WITH LONG-TERM CURRENT USE OF INSULIN (HCC): ICD-10-CM

## 2019-08-29 RX ORDER — CALCIUM CITRATE/VITAMIN D3 200MG-6.25
TABLET ORAL
Qty: 600 STRIP | Refills: 0 | Status: SHIPPED | OUTPATIENT
Start: 2019-08-29 | End: 2020-09-23

## 2019-09-06 ENCOUNTER — TELEPHONE (OUTPATIENT)
Dept: INTERNAL MEDICINE CLINIC | Facility: CLINIC | Age: 49
End: 2019-09-06

## 2019-09-06 DIAGNOSIS — H25.9 AGE-RELATED CATARACT OF BOTH EYES, UNSPECIFIED AGE-RELATED CATARACT TYPE: Primary | ICD-10-CM

## 2019-09-06 NOTE — TELEPHONE ENCOUNTER
Jan Rae from [facility] she stated Pt was in the office today 9/6 and they need the referral so insurance will cover visit      Referrals needs to be to: [facility]    Reason: OCT Nerve for both eyes Code: Maria De Jesus both eyes Code 92

## 2019-09-11 NOTE — TELEPHONE ENCOUNTER
Please advise. Last saw Dr. Sariah Davis on 8/26/19    We need a diagnostic code.   This is what I found in her problem list  Age-related nuclear cataract of both eyes

## 2019-09-16 ENCOUNTER — TELEPHONE (OUTPATIENT)
Dept: INTERNAL MEDICINE CLINIC | Facility: CLINIC | Age: 49
End: 2019-09-16

## 2019-09-16 NOTE — TELEPHONE ENCOUNTER
Lisseth. Amanda called stating referral for [facility] is out of network Dr. Grover Harrington in [place] is in network    Called patient, spoke with Mike Mannshana, Sister listed on FYI. Informed her referral no in network      She stated to change referral to Dr. Bridgette Riggs.    Do you approve Dr. Shelbie Frost? Please advise.     Thanks,    [facility]

## 2019-09-18 ENCOUNTER — MED REC SCAN ONLY (OUTPATIENT)
Dept: INTERNAL MEDICINE CLINIC | Facility: CLINIC | Age: 49
End: 2019-09-18

## 2019-09-20 ENCOUNTER — TELEPHONE (OUTPATIENT)
Dept: ENDOCRINOLOGY CLINIC | Facility: CLINIC | Age: 49
End: 2019-09-20

## 2019-09-20 DIAGNOSIS — E11.8 TYPE 2 DIABETES MELLITUS WITH COMPLICATION, WITH LONG-TERM CURRENT USE OF INSULIN (HCC): ICD-10-CM

## 2019-09-20 DIAGNOSIS — Z79.4 TYPE 2 DIABETES MELLITUS WITH COMPLICATION, WITH LONG-TERM CURRENT USE OF INSULIN (HCC): ICD-10-CM

## 2019-09-20 NOTE — TELEPHONE ENCOUNTER
Pt is not getting long lasting insulin and her contour next machine is broken - asking to talk to RN

## 2019-09-23 RX ORDER — BLOOD-GLUCOSE METER
1 EACH MISCELLANEOUS 3 TIMES DAILY
Qty: 1 KIT | Refills: 0 | Status: SHIPPED | OUTPATIENT
Start: 2019-09-23 | End: 2019-10-10

## 2019-09-30 ENCOUNTER — OFFICE VISIT (OUTPATIENT)
Dept: ENDOCRINOLOGY CLINIC | Facility: CLINIC | Age: 49
End: 2019-09-30
Payer: MEDICARE

## 2019-09-30 VITALS
WEIGHT: 182 LBS | SYSTOLIC BLOOD PRESSURE: 128 MMHG | DIASTOLIC BLOOD PRESSURE: 75 MMHG | BODY MASS INDEX: 33 KG/M2 | HEART RATE: 64 BPM

## 2019-09-30 DIAGNOSIS — E11.65 UNCONTROLLED TYPE 2 DIABETES MELLITUS WITH HYPERGLYCEMIA (HCC): Primary | ICD-10-CM

## 2019-09-30 PROCEDURE — 36416 COLLJ CAPILLARY BLOOD SPEC: CPT | Performed by: INTERNAL MEDICINE

## 2019-09-30 PROCEDURE — 82962 GLUCOSE BLOOD TEST: CPT | Performed by: INTERNAL MEDICINE

## 2019-09-30 PROCEDURE — 99213 OFFICE O/P EST LOW 20 MIN: CPT | Performed by: INTERNAL MEDICINE

## 2019-09-30 NOTE — PROGRESS NOTES
Name: Kari Hall  Date: 9/30/2019    Referring Physician: No ref. provider found    HISTORY OF PRESENT ILLNESS   Kari Hall is a 44year old female who presents for diabetes mellitus, diagnosed over 10 years ago.   She was seen during hospitalizatio 1 tablet (15 mg total) by mouth daily. , Disp: 30 tablet, Rfl: 1  •  Glucose Blood (TRUE METRIX BLOOD GLUCOSE TEST) In Vitro Strip, TEST BLOOD SUGAR FOUR TIMES DAILY, Disp: 600 strip, Rfl: 0  •  predniSONE 5 MG Oral Tab, Take 1 tablet (5 mg total) by mouth Rfl: 3  •  VENTOLIN  (90 Base) MCG/ACT Inhalation Aero Soln, Inhale 2 puffs into the lungs every 6 (six) hours as needed for Wheezing., Disp: 1 Inhaler, Rfl: 1  •  AmLODIPine Besylate 5 MG Oral Tab, amlodipine 5 mg tablet, Disp: 90 tablet, Rfl: 3  • children: 3      Years of education: Not on file      Highest education level: Not on file    Occupational History      Occupation:         Comment: disabled      Occupation: phlebotomy    Tobacco Use      Smoking status: Former Smoker distress  Eyes:  normal conjunctivae, sclera. , normal sclera and normal pupils  Ears/Nose/Mouth/Throat/Neck:  no palpable thyroid nodules or cervical lymphadenopathy  Back: no kyphosis or back tenderness  Respiratory:  clear to auscultation bilaterally  Ca

## 2019-10-01 DIAGNOSIS — Z79.4 TYPE 2 DIABETES MELLITUS WITH OTHER NEUROLOGIC COMPLICATION, WITH LONG-TERM CURRENT USE OF INSULIN (HCC): ICD-10-CM

## 2019-10-01 DIAGNOSIS — E11.49 TYPE 2 DIABETES MELLITUS WITH OTHER NEUROLOGIC COMPLICATION, WITH LONG-TERM CURRENT USE OF INSULIN (HCC): ICD-10-CM

## 2019-10-01 RX ORDER — INSULIN ASPART 100 [IU]/ML
INJECTION, SOLUTION INTRAVENOUS; SUBCUTANEOUS
Qty: 15 ML | Refills: 0 | Status: SHIPPED | OUTPATIENT
Start: 2019-10-01 | End: 2019-10-20

## 2019-10-08 ENCOUNTER — OFFICE VISIT (OUTPATIENT)
Dept: OCCUPATIONAL MEDICINE | Facility: HOSPITAL | Age: 49
End: 2019-10-08
Attending: INTERNAL MEDICINE
Payer: MEDICARE

## 2019-10-08 ENCOUNTER — TELEPHONE (OUTPATIENT)
Dept: ENDOCRINOLOGY CLINIC | Facility: CLINIC | Age: 49
End: 2019-10-08

## 2019-10-08 DIAGNOSIS — M77.11 LATERAL EPICONDYLITIS, RIGHT ELBOW: ICD-10-CM

## 2019-10-08 DIAGNOSIS — M65.342 TRIGGER FINGER, LEFT RING FINGER: ICD-10-CM

## 2019-10-08 PROCEDURE — 97167 OT EVAL HIGH COMPLEX 60 MIN: CPT | Performed by: OCCUPATIONAL THERAPIST

## 2019-10-08 PROCEDURE — 97530 THERAPEUTIC ACTIVITIES: CPT | Performed by: OCCUPATIONAL THERAPIST

## 2019-10-08 NOTE — PROGRESS NOTES
OCCUPATIONAL THERAPY UPPER EXTREMITY EVALUATION:   Referring Physician: Dr. Temi Hernandez  Date of onset: elbow pain March,2019, Trigger finger  2017  Diagnosis: Lateral epicondylitis, right elbow (M77.11)  Trigger finger, left ring finger (M65.342) Date of forearm with gripping and significant weakness in . Given the above noted deficits in ROM, pain, and strength, patient presents with impairments in occupation- based task performance for self care skills, and leisure skills.   She could benefit from con for: stretching, STM and shoulder AROM exercises  Charges: OT [place] x1, TA   Total Timed Treatment: 15 min     Total Treatment Time: 45 min       PLAN OF CARE:   Goals:      Pt complaints of pain I right elbow will decrease at worst to 1/10.   Pt will be ind 10/08/19   - 12/06/19

## 2019-10-10 ENCOUNTER — TELEPHONE (OUTPATIENT)
Dept: ENDOCRINOLOGY CLINIC | Facility: CLINIC | Age: 49
End: 2019-10-10

## 2019-10-10 ENCOUNTER — OFFICE VISIT (OUTPATIENT)
Dept: OCCUPATIONAL MEDICINE | Facility: HOSPITAL | Age: 49
End: 2019-10-10
Attending: INTERNAL MEDICINE
Payer: MEDICARE

## 2019-10-10 ENCOUNTER — OFFICE VISIT (OUTPATIENT)
Dept: ENDOCRINOLOGY CLINIC | Facility: CLINIC | Age: 49
End: 2019-10-10
Payer: MEDICARE

## 2019-10-10 VITALS — SYSTOLIC BLOOD PRESSURE: 149 MMHG | DIASTOLIC BLOOD PRESSURE: 87 MMHG | HEART RATE: 69 BPM

## 2019-10-10 DIAGNOSIS — IMO0001 UNCONTROLLED TYPE 2 DIABETES MELLITUS WITHOUT COMPLICATION, WITH LONG-TERM CURRENT USE OF INSULIN: Primary | ICD-10-CM

## 2019-10-10 PROCEDURE — 97140 MANUAL THERAPY 1/> REGIONS: CPT | Performed by: OCCUPATIONAL THERAPIST

## 2019-10-10 PROCEDURE — 95251 CONT GLUC MNTR ANALYSIS I&R: CPT | Performed by: INTERNAL MEDICINE

## 2019-10-10 PROCEDURE — 99213 OFFICE O/P EST LOW 20 MIN: CPT | Performed by: INTERNAL MEDICINE

## 2019-10-10 PROCEDURE — 97110 THERAPEUTIC EXERCISES: CPT | Performed by: OCCUPATIONAL THERAPIST

## 2019-10-10 RX ORDER — BLOOD-GLUCOSE METER
1 EACH MISCELLANEOUS 3 TIMES DAILY
Qty: 1 KIT | Refills: 0 | Status: SHIPPED | OUTPATIENT
Start: 2019-10-10 | End: 2021-04-19 | Stop reason: ALTCHOICE

## 2019-10-10 NOTE — PROGRESS NOTES
Dx:  elbow pain March,2019, Trigger finger  2017  Diagnosis: Lateral epicondylitis, right elbow (M77.11)  Trigger finger, left ring finger (M65.342)       Authorized # of Visits:  10      Next MD visit: none scheduled  Fall Risk: standard         Precautio strength to at least 35 lbs for ease in carrying bags of groceries. Patient will test negative for the following provocative tests: LFT and Cozens. Patient will demonstrate increase in right wrist flexion to 75 degrees and extension to 75 degrees.     Owen

## 2019-10-10 NOTE — PATIENT INSTRUCTIONS
Lantus 60 units SQ daily    Novolog  INSULIN SLIDING SCALE  Base Values  Breakfast: 24  Lunch: 30  Dinner: 28  Ranges:  80-99: -2  100-119: 0  120-139: 0  140-159: 0  160-179: 1  180-199: 1  200-219: 2  220-239: 2  240-259: 3  260-279: 3  280-299: 4  300-3

## 2019-10-10 NOTE — PROGRESS NOTES
Name: Harrison Oliveira  Date: 10/10/2019    Referring Physician: No ref. provider found    HISTORY OF PRESENT ILLNESS   Harrison Oliveira is a 44year old female who presents for diabetes mellitus, diagnosed over 10 years ago.   She was seen during hospitalizati daily., Disp: 1 kit, Rfl: 0  •  Meloxicam 15 MG Oral Tab, Take 1 tablet (15 mg total) by mouth daily. , Disp: 30 tablet, Rfl: 1  •  Glucose Blood (TRUE METRIX BLOOD GLUCOSE TEST) In Vitro Strip, TEST BLOOD SUGAR FOUR TIMES DAILY, Disp: 600 strip, Rfl: 0  • Wheezing., Disp: 1 Inhaler, Rfl: 1  •  AmLODIPine Besylate 5 MG Oral Tab, amlodipine 5 mg tablet, Disp: 90 tablet, Rfl: 3  •  losartan 100 MG Oral Tab, Take 1 tablet (100 mg total) by mouth once daily. , Disp: 90 tablet, Rfl: 2  •  MetFORMIN HCl 1000 MG Ora         Comment: disabled      Occupation: phlebotomy    Tobacco Use      Smoking status: Former Smoker        Packs/day: 0.20        Years: 1.00        Pack years: .2        Quit date: 2013        Years since quittin.8      Smokele kyphosis or back tenderness  Respiratory:  clear to auscultation bilaterally  Cardiovascular:  regular rate, rhythm, , no murmurs, S3 or S4  Gastrointestinal:  normal bowel sounds and no palpable masses in abdomen, organomegaly or tenderness   Musculoskele

## 2019-10-11 ENCOUNTER — TELEPHONE (OUTPATIENT)
Dept: ENDOCRINOLOGY CLINIC | Facility: CLINIC | Age: 49
End: 2019-10-11

## 2019-10-11 DIAGNOSIS — E11.8 TYPE 2 DIABETES MELLITUS WITH COMPLICATION, WITH LONG-TERM CURRENT USE OF INSULIN (HCC): ICD-10-CM

## 2019-10-11 DIAGNOSIS — Z79.4 TYPE 2 DIABETES MELLITUS WITH COMPLICATION, WITH LONG-TERM CURRENT USE OF INSULIN (HCC): ICD-10-CM

## 2019-10-11 RX ORDER — GLUCOSAM/CHON-MSM1/C/MANG/BOSW 500-416.6
TABLET ORAL
Qty: 400 EACH | Refills: 1 | Status: SHIPPED | OUTPATIENT
Start: 2019-10-11 | End: 2021-08-11

## 2019-10-11 NOTE — TELEPHONE ENCOUNTER
Current Outpatient Medications:  Per pharmacy True Metrix Air Kit is not covered, pt also needs refill for true metrix lancets pls clarify

## 2019-10-14 DIAGNOSIS — F32.A DEPRESSION, UNSPECIFIED DEPRESSION TYPE: ICD-10-CM

## 2019-10-14 DIAGNOSIS — Z98.890 HISTORY OF CRANIOTOMY: ICD-10-CM

## 2019-10-14 DIAGNOSIS — Z79.4 TYPE 2 DIABETES MELLITUS WITH COMPLICATION, WITH LONG-TERM CURRENT USE OF INSULIN (HCC): ICD-10-CM

## 2019-10-14 DIAGNOSIS — E11.8 TYPE 2 DIABETES MELLITUS WITH COMPLICATION, WITH LONG-TERM CURRENT USE OF INSULIN (HCC): ICD-10-CM

## 2019-10-14 DIAGNOSIS — I10 ESSENTIAL HYPERTENSION WITH GOAL BLOOD PRESSURE LESS THAN 140/90: ICD-10-CM

## 2019-10-14 NOTE — TELEPHONE ENCOUNTER
Current Outpatient Medications:   •  levETIRAcetam 500 MG Oral Tab, TAKE ONE (1) TABLET BY MOUTH TWICE DAILY, Disp: 180 tablet, Rfl: 2

## 2019-10-15 RX ORDER — LEVETIRACETAM 500 MG/1
TABLET ORAL
Qty: 180 TABLET | Refills: 1 | Status: SHIPPED | OUTPATIENT
Start: 2019-10-15 | End: 2020-03-27

## 2019-10-15 NOTE — TELEPHONE ENCOUNTER
Refill Protocol Appointment Criteria  · Appointment scheduled in the past 6 months or in the next 3 months  Recent Outpatient Visits            5 days ago Uncontrolled type 2 diabetes mellitus without complication, with long-term current use of insulin ([facility]

## 2019-10-16 RX ORDER — ESCITALOPRAM OXALATE 20 MG/1
TABLET ORAL
Qty: 45 TABLET | Refills: 1 | Status: SHIPPED | OUTPATIENT
Start: 2019-10-16 | End: 2020-03-27

## 2019-10-16 RX ORDER — METOPROLOL SUCCINATE 100 MG/1
TABLET, EXTENDED RELEASE ORAL
Qty: 90 TABLET | Refills: 1 | Status: SHIPPED | OUTPATIENT
Start: 2019-10-16 | End: 2020-03-27

## 2019-10-16 NOTE — TELEPHONE ENCOUNTER
Please review; protocol failed.     Requested Prescriptions     Pending Prescriptions Disp Refills   • escitalopram 20 MG Oral Tab [Pharmacy Med Name: ESCITALOPRAM 20MG TABLETS] 45 tablet 1     Sig: TAKE 1/2 TABLET BY MOUTH EVERY DAY   • metFORMIN HCl 1000

## 2019-10-16 NOTE — TELEPHONE ENCOUNTER
Interaction between meloxicam and escitalopram, she is currently not taking the meloxicam also has GERD--reviewed

## 2019-10-16 NOTE — TELEPHONE ENCOUNTER
Refill passed per [facility], [facility] protocol.   Hypertensive Medications  Protocol Criteria:  · Appointment scheduled in the past 6 months or in the next 3 months  · BMP or CMP in the past 12 months  · Creatinine result < 2  Recent Outpatient Visits 22 (H) 08/26/2019    CREATSERUM 1.10 (H) 08/26/2019    BUNCREA 20.0 08/26/2019    GFRNAA 58 (L) 08/26/2019    GFRAA 67 08/26/2019    CA 9.9 08/26/2019    ALKPHOS 84 09/26/2016    AST 8 (L) 08/26/2019    ALT 27 08/26/2019    BILT 0.3 08/26/2019    TP 8.5 (H Therapy **see message  NO C/P    In 3 weeks Ricardo Keene, [address] [place] [address] Occupational Therapy **see message  NO C/P    In 3 weeks Ricardo Keene, [address] [place] [address] Occupational Therapy **see message  NO C/P    In 1 month Neyda Venegas

## 2019-10-18 ENCOUNTER — OFFICE VISIT (OUTPATIENT)
Dept: OCCUPATIONAL MEDICINE | Facility: HOSPITAL | Age: 49
End: 2019-10-18
Attending: INTERNAL MEDICINE
Payer: MEDICARE

## 2019-10-18 PROCEDURE — 97140 MANUAL THERAPY 1/> REGIONS: CPT | Performed by: OCCUPATIONAL THERAPIST

## 2019-10-18 PROCEDURE — 97110 THERAPEUTIC EXERCISES: CPT | Performed by: OCCUPATIONAL THERAPIST

## 2019-10-18 NOTE — PROGRESS NOTES
Dx:  elbow pain March,2019, Trigger finger  2017  Diagnosis: Lateral epicondylitis, right elbow (M77.11)  Trigger finger, left ring finger (M65.342)       Authorized # of Visits:  10      Next MD visit: none scheduled  Fall Risk: standard         Precautio increase in right wrist flexion to 75 degrees and extension to 75 degrees. Plan:  Continue to work toward pain reduction, fabricate trigger finger orthosis.         Charges: MT2, TE  Total Timed Treatment: 45 min  Total Treatment Time: 50 min

## 2019-10-20 DIAGNOSIS — E11.49 TYPE 2 DIABETES MELLITUS WITH OTHER NEUROLOGIC COMPLICATION, WITH LONG-TERM CURRENT USE OF INSULIN (HCC): ICD-10-CM

## 2019-10-20 DIAGNOSIS — Z79.4 TYPE 2 DIABETES MELLITUS WITH OTHER NEUROLOGIC COMPLICATION, WITH LONG-TERM CURRENT USE OF INSULIN (HCC): ICD-10-CM

## 2019-10-21 RX ORDER — INSULIN ASPART 100 [IU]/ML
INJECTION, SOLUTION INTRAVENOUS; SUBCUTANEOUS
Qty: 15 ML | Refills: 0 | Status: SHIPPED | OUTPATIENT
Start: 2019-10-21 | End: 2020-03-27

## 2019-10-23 ENCOUNTER — TELEPHONE (OUTPATIENT)
Dept: ENDOCRINOLOGY CLINIC | Facility: CLINIC | Age: 49
End: 2019-10-23

## 2019-10-23 ENCOUNTER — OFFICE VISIT (OUTPATIENT)
Dept: OCCUPATIONAL MEDICINE | Facility: HOSPITAL | Age: 49
End: 2019-10-23
Attending: INTERNAL MEDICINE
Payer: MEDICARE

## 2019-10-23 PROCEDURE — 97110 THERAPEUTIC EXERCISES: CPT

## 2019-10-23 PROCEDURE — 97760 ORTHOTIC MGMT&TRAING 1ST ENC: CPT

## 2019-10-23 PROCEDURE — 97140 MANUAL THERAPY 1/> REGIONS: CPT

## 2019-10-23 RX ORDER — LANCETS
EACH MISCELLANEOUS
Qty: 300 EACH | Refills: 0 | Status: SHIPPED | OUTPATIENT
Start: 2019-10-23 | End: 2019-10-28

## 2019-10-23 RX ORDER — BLOOD-GLUCOSE METER
EACH MISCELLANEOUS
Qty: 1 KIT | Refills: 0 | Status: SHIPPED | OUTPATIENT
Start: 2019-10-23 | End: 2019-10-28

## 2019-10-23 RX ORDER — BLOOD SUGAR DIAGNOSTIC
STRIP MISCELLANEOUS
Qty: 300 STRIP | Refills: 1 | Status: SHIPPED | OUTPATIENT
Start: 2019-10-23 | End: 2019-10-28

## 2019-10-23 NOTE — TELEPHONE ENCOUNTER
LMTCB    RN on hold w/ pharm 20+ mins - Unable to reach pharm. RN sent over order for Accu-chek guide meter and strips/lancets per request - This meter should be preferred by [facility]. OK to substitute on script. Dr Kiara Rodriguez did send over order to Contour next meter to pharm on 10/10/19 as requested. RN refilled trueplus lancets to pharm as requested on 10/11/19.

## 2019-10-23 NOTE — PROGRESS NOTES
Dx:  elbow pain March,2019, Trigger finger  2017  Diagnosis: Lateral epicondylitis, right elbow (M77.11)  Trigger finger, left ring finger (M65.342)       Authorized # of Visits:  10      Next MD visit: none scheduled  Fall Risk: standard         Precautio with don/doff trigger finger splints. Patient will demonstrate increase in right  strength to at least 35 lbs for ease in carrying bags of groceries. Patient will test negative for the following provocative tests: LFT and Cozens.   Patient will demons

## 2019-10-23 NOTE — TELEPHONE ENCOUNTER
Pt called to speak to RN about new meter. She states Dr. Maurisio Anne was going to send RX to pharmacy at her last visit on 10/10/19 but nothing was received. Pt has not tested sugar in 2 wks. Please call.

## 2019-10-25 ENCOUNTER — OFFICE VISIT (OUTPATIENT)
Dept: OCCUPATIONAL MEDICINE | Facility: HOSPITAL | Age: 49
End: 2019-10-25
Attending: INTERNAL MEDICINE
Payer: MEDICARE

## 2019-10-25 PROCEDURE — 97110 THERAPEUTIC EXERCISES: CPT | Performed by: OCCUPATIONAL THERAPIST

## 2019-10-25 PROCEDURE — 97140 MANUAL THERAPY 1/> REGIONS: CPT | Performed by: OCCUPATIONAL THERAPIST

## 2019-10-25 NOTE — PROGRESS NOTES
Dx:  elbow pain March,2019, Trigger finger  2017  Diagnosis: Lateral epicondylitis, right elbow (M77.11)  Trigger finger, left ring finger (M65.342)       Authorized # of Visits:  10      Next MD visit: none scheduled  Fall Risk: standard         Precautio progress achieved in OT. Patient will demonstrate increase in right RF and SF to at least 225 degrees for ease in grasping knife. Patient will demonstrate independence with don/doff trigger finger splints.   Patient will demonstrate increase in right

## 2019-10-28 ENCOUNTER — OFFICE VISIT (OUTPATIENT)
Dept: OCCUPATIONAL MEDICINE | Facility: HOSPITAL | Age: 49
End: 2019-10-28
Attending: INTERNAL MEDICINE
Payer: MEDICARE

## 2019-10-28 PROCEDURE — 97140 MANUAL THERAPY 1/> REGIONS: CPT | Performed by: OCCUPATIONAL THERAPIST

## 2019-10-28 PROCEDURE — 97110 THERAPEUTIC EXERCISES: CPT | Performed by: OCCUPATIONAL THERAPIST

## 2019-10-28 RX ORDER — BLOOD SUGAR DIAGNOSTIC
STRIP MISCELLANEOUS
Qty: 300 STRIP | Refills: 1 | Status: SHIPPED | OUTPATIENT
Start: 2019-10-28 | End: 2019-11-05

## 2019-10-28 RX ORDER — LANCETS
EACH MISCELLANEOUS
Qty: 300 EACH | Refills: 0 | Status: SHIPPED | OUTPATIENT
Start: 2019-10-28 | End: 2019-11-05

## 2019-10-28 RX ORDER — BLOOD-GLUCOSE METER
EACH MISCELLANEOUS
Qty: 1 KIT | Refills: 0 | Status: SHIPPED | OUTPATIENT
Start: 2019-10-28 | End: 2019-11-05

## 2019-10-28 NOTE — PROGRESS NOTES
Dx:  elbow pain March,2019, Trigger finger  2017  Diagnosis: Lateral epicondylitis, right elbow (M77.11)  Trigger finger, left ring finger (M65.342)       Authorized # of Visits:  10      Next MD visit: none scheduled  Fall Risk: standard         Precautio HEP to maintain progress achieved in OT. Patient will demonstrate increase in right RF and SF to at least 225 degrees for ease in grasping knife. Patient will demonstrate independence with don/doff trigger finger splints. Tai Hicks .(Achieved)  Patient will ramon

## 2019-10-28 NOTE — TELEPHONE ENCOUNTER
Pt states she was given \"True Metrix\" strips and she does not even have this meter. RN advised that we did send over Accu-chek meter and supplies on 10/23/19. We have tried calling pharm and was placed on hold/hung up. RN attempted to call pharm again today and after 30+ mins was hung up on again. RN resent script to pharm w/ note to fill meter STAT and to call our clinic. RN advised pt to go to pharm in person to p/u supplies and/or advise new pharmacy for us to send script to. Pt verbalized understanding.

## 2019-10-30 ENCOUNTER — OFFICE VISIT (OUTPATIENT)
Dept: OCCUPATIONAL MEDICINE | Facility: HOSPITAL | Age: 49
End: 2019-10-30
Attending: INTERNAL MEDICINE
Payer: MEDICARE

## 2019-10-30 PROCEDURE — 97140 MANUAL THERAPY 1/> REGIONS: CPT | Performed by: OCCUPATIONAL THERAPIST

## 2019-10-30 PROCEDURE — 97110 THERAPEUTIC EXERCISES: CPT | Performed by: OCCUPATIONAL THERAPIST

## 2019-10-30 NOTE — PROGRESS NOTES
Dx:  elbow pain March,2019, Trigger finger  2017  Diagnosis: Lateral epicondylitis, right elbow (M77.11)  Trigger finger, left ring finger (M65.342)       Authorized # of Visits:  10      Next MD visit: none scheduled  Fall Risk: standard         Precautio norman. No triggering observed during exercises and activities today. Minimal pain reported in bicep and shoulders today. Goals:     Pt complaints of pain I right elbow will decrease at worst to 1/10.   Pt will be independent and compliant with comprehensiv

## 2019-11-05 ENCOUNTER — OFFICE VISIT (OUTPATIENT)
Dept: OCCUPATIONAL MEDICINE | Facility: HOSPITAL | Age: 49
End: 2019-11-05
Attending: INTERNAL MEDICINE
Payer: MEDICARE

## 2019-11-05 ENCOUNTER — TELEPHONE (OUTPATIENT)
Dept: ENDOCRINOLOGY CLINIC | Facility: CLINIC | Age: 49
End: 2019-11-05

## 2019-11-05 PROCEDURE — 97140 MANUAL THERAPY 1/> REGIONS: CPT | Performed by: OCCUPATIONAL THERAPIST

## 2019-11-05 PROCEDURE — 97110 THERAPEUTIC EXERCISES: CPT | Performed by: OCCUPATIONAL THERAPIST

## 2019-11-05 RX ORDER — BLOOD-GLUCOSE METER
1 EACH MISCELLANEOUS DAILY
Qty: 1 KIT | Refills: 0 | Status: SHIPPED | OUTPATIENT
Start: 2019-11-05 | End: 2021-04-19 | Stop reason: ALTCHOICE

## 2019-11-05 NOTE — TELEPHONE ENCOUNTER
Spoke with patient. She received accu check guide meter but was not able to get test strips from pharmacy for some reason. Looks like prescription was sent.  She also is not happy because she doesn't know how to use the lancing device with the accu check me

## 2019-11-05 NOTE — TELEPHONE ENCOUNTER
Patient requesting to be transferred to nurse, regarding her not checking her sugars and about medications. Will try to transfer, if not please call at:[phone],thanks.   *patient received machine, but does have test strips

## 2019-11-05 NOTE — PROGRESS NOTES
Dx:  elbow pain March,2019, Trigger finger  2017  Diagnosis: Lateral epicondylitis, right elbow (M77.11)  Trigger finger, left ring finger (M65.342)       Authorized # of Visits:  10      Next MD visit: none scheduled  Fall Risk: standard         Precautio containers Two point pinch, yellow,red, green and blue x30 Two point pinch, yellow,red, green and blue x30 Two point pinch, yellow,red, green and blue x30   Shoulder AAROM towel ex on wall        Flex/ext and arcs x 10 each                 Assessment:Digna

## 2019-11-06 ENCOUNTER — TELEPHONE (OUTPATIENT)
Dept: ENDOCRINOLOGY CLINIC | Facility: CLINIC | Age: 49
End: 2019-11-06

## 2019-11-06 ENCOUNTER — OFFICE VISIT (OUTPATIENT)
Dept: ENDOCRINOLOGY CLINIC | Facility: CLINIC | Age: 49
End: 2019-11-06
Payer: MEDICARE

## 2019-11-06 VITALS
BODY MASS INDEX: 33 KG/M2 | WEIGHT: 182 LBS | DIASTOLIC BLOOD PRESSURE: 99 MMHG | HEART RATE: 61 BPM | SYSTOLIC BLOOD PRESSURE: 169 MMHG

## 2019-11-06 DIAGNOSIS — E03.9 HYPOTHYROIDISM, UNSPECIFIED TYPE: Primary | ICD-10-CM

## 2019-11-06 DIAGNOSIS — E11.65 UNCONTROLLED TYPE 2 DIABETES MELLITUS WITH HYPERGLYCEMIA (HCC): Primary | ICD-10-CM

## 2019-11-06 DIAGNOSIS — D64.9 ANEMIA, UNSPECIFIED TYPE: ICD-10-CM

## 2019-11-06 DIAGNOSIS — E78.00 PURE HYPERCHOLESTEROLEMIA: ICD-10-CM

## 2019-11-06 PROCEDURE — 99213 OFFICE O/P EST LOW 20 MIN: CPT | Performed by: INTERNAL MEDICINE

## 2019-11-06 PROCEDURE — 83036 HEMOGLOBIN GLYCOSYLATED A1C: CPT | Performed by: INTERNAL MEDICINE

## 2019-11-06 PROCEDURE — 82962 GLUCOSE BLOOD TEST: CPT | Performed by: INTERNAL MEDICINE

## 2019-11-06 PROCEDURE — 36416 COLLJ CAPILLARY BLOOD SPEC: CPT | Performed by: INTERNAL MEDICINE

## 2019-11-06 RX ORDER — FERROUS SULFATE 325(65) MG
TABLET ORAL
Qty: 90 TABLET | Refills: 0 | Status: SHIPPED | OUTPATIENT
Start: 2019-11-06 | End: 2021-04-19

## 2019-11-06 RX ORDER — GLUCOSAM/CHON-MSM1/C/MANG/BOSW 500-416.6
TABLET ORAL
Qty: 100 EACH | Refills: 2 | Status: SHIPPED | OUTPATIENT
Start: 2019-11-06 | End: 2020-02-10

## 2019-11-06 RX ORDER — DOCUSATE SODIUM 100 MG/1
CAPSULE, LIQUID FILLED ORAL
Qty: 60 CAPSULE | Refills: 1 | Status: SHIPPED | OUTPATIENT
Start: 2019-11-06 | End: 2019-11-29

## 2019-11-06 RX ORDER — CALCIUM CITRATE/VITAMIN D3 200MG-6.25
TABLET ORAL
Qty: 150 STRIP | Refills: 0 | Status: SHIPPED | OUTPATIENT
Start: 2019-11-06 | End: 2019-11-09

## 2019-11-06 RX ORDER — LANCETS
EACH MISCELLANEOUS
Qty: 150 EACH | Refills: 2 | Status: SHIPPED | OUTPATIENT
Start: 2019-11-06

## 2019-11-06 RX ORDER — BLOOD SUGAR DIAGNOSTIC
STRIP MISCELLANEOUS
Qty: 150 EACH | Refills: 2 | Status: SHIPPED | OUTPATIENT
Start: 2019-11-06 | End: 2021-04-19 | Stop reason: ALTCHOICE

## 2019-11-06 RX ORDER — BLOOD SUGAR DIAGNOSTIC
STRIP MISCELLANEOUS
Qty: 150 STRIP | Refills: 2 | Status: SHIPPED | OUTPATIENT
Start: 2019-11-06 | End: 2021-04-19 | Stop reason: ALTCHOICE

## 2019-11-06 NOTE — TELEPHONE ENCOUNTER
PT here for OV - Contour Next One meter given to pt as this is device she is most comfortable with and has used in the past. Pt will need refills of strips and microlet lancets.  Pt asking to test 4-5 times daily - RN advised her insurance would not pay for

## 2019-11-06 NOTE — TELEPHONE ENCOUNTER
True Metrix brand preferred per pharm by insurance. RN filled per protocol and made pt aware. Pt asking to have all medications filled through pharmacy that \"packs her pills for her\" because she states she forgets if she took medication and will take multiple times due to memory issues. She states he had this in the past. RN unsure how to proceed - will look into and call pt back tomorrow.     Pt asking for status of [facility] advised pt order is pending and will be getting from mail order pharm that ins prefers    RN to call pt back 11/7

## 2019-11-06 NOTE — TELEPHONE ENCOUNTER
Dr Colleen Ruiz - Pt requesting refill of iron and stool softener medication. RN advised pt to contact PCP for these medication, but patient asking to have message forwarded to you.

## 2019-11-06 NOTE — TELEPHONE ENCOUNTER
[facility] lancets 33G 100:s    Pharmacy faxed a message to Prescriber:  True Metrix is covered under Pts plan, Please change in Pts profile to this brand.

## 2019-11-06 NOTE — PROGRESS NOTES
Name: Oliva Serna  Date: 11/6/2019    Referring Physician: No ref. provider found    HISTORY OF PRESENT ILLNESS   Oliva Serna is a 44year old female who presents for diabetes mellitus, diagnosed over 10 years ago.   She was seen during hospitalizatio each, Rfl: 1  •  NOVOLOG FLEXPEN 100 UNIT/ML Subcutaneous Solution Pen-injector, INJECT 24 UNITS UNDER THE SKIN THREE TIMES DAILY BEFORE MEALS., Disp: 15 mL, Rfl: 0  •  escitalopram 20 MG Oral Tab, TAKE 1/2 TABLET BY MOUTH EVERY DAY, Disp: 45 tablet, Rfl: DAILY *PATIENT NEEDS APPOINTMENT*, Disp: 90 tablet, Rfl: 10  •  docusate sodium (STOOL SOFTENER) 100 MG Oral Cap, TAKE ONE (1) CAPSULE BY MOUTH TWICE DAILY, Disp: 180 capsule, Rfl: 2  •  atorvastatin 40 MG Oral Tab, Take 1 tablet (40 mg total) by mouth nig Cholecalciferol (VITAMIN D) 2000 units Oral Cap, Take 1 capsule (2,000 Units total) by mouth daily. , Disp: 90 capsule, Rfl: 0  •  acetaminophen 325 MG Oral Tab, 2 tabs 3 times daily. , Disp: 360 tablet, Rfl: 0     Allergies:     Reglan [Metoclopram*    OTHE COLONOSCOPY N/A 8/25/2017    Performed by Azul Vasquez MD at [facility] ENDOSCOPY   • ESOPHAGOGASTRODUODENOSCOPY (EGD) N/A 8/25/2017    Performed by Azul Vasquez MD at [facility] ENDOSCOPY   • EXCISE CAROTID BODY+CAROTID ARTERY  09/2017   • over chart notes for CGM approval   -Continue Metformin  -Normotensive  -Normal lipids    RTC 3 months     11/6/2019  Cody Burns MD

## 2019-11-07 ENCOUNTER — OFFICE VISIT (OUTPATIENT)
Dept: OCCUPATIONAL MEDICINE | Facility: HOSPITAL | Age: 49
End: 2019-11-07
Attending: INTERNAL MEDICINE
Payer: MEDICARE

## 2019-11-07 PROCEDURE — 97110 THERAPEUTIC EXERCISES: CPT | Performed by: OCCUPATIONAL THERAPIST

## 2019-11-07 PROCEDURE — 97140 MANUAL THERAPY 1/> REGIONS: CPT | Performed by: OCCUPATIONAL THERAPIST

## 2019-11-07 NOTE — TELEPHONE ENCOUNTER
Approved patient needs appointment needs to have blood testing for further refills and appointment  Labs to complete

## 2019-11-07 NOTE — PROGRESS NOTES
Dx:  elbow pain March,2019, Trigger finger  2017  Diagnosis: Lateral epicondylitis, right elbow (M77.11)  Trigger finger, left ring finger (M65.342)       Authorized # of Visits:  10      Next MD visit: none scheduled  Fall Risk: standard         Precautio Exerstick 2 min   Green putty pinches    5 min 5 min  5 min  5 min   Shoulder shrugs and retraction    X 10 eacg x10 each x15 x15 x15   Functional activity- open/close different size containers using orthosis    5 containers Two point pinch, yellow,red, gr physical therapy for neck and shoulder pain.     Objective: See below for objective measurements:      AROM:   Right wrist:  Flexion: 70 degrees  Extension: 75 degrees  UD: 45 degrees  RD: 25 degrees        DIGIT ROM:     RIGHT HAND AROM:    RF SF   MP 0/80 OTR/L, CHT    Physician's certification required: Yes  Please co-sign or sign and return this letter via fax as soon as possible to [phone]. I certify the need for these services furnished under this plan of treatment and while under my care.     X__

## 2019-11-09 RX ORDER — CALCIUM CITRATE/VITAMIN D3 200MG-6.25
TABLET ORAL
Qty: 300 STRIP | Refills: 0 | Status: SHIPPED | OUTPATIENT
Start: 2019-11-09 | End: 2019-11-29

## 2019-11-11 ENCOUNTER — APPOINTMENT (OUTPATIENT)
Dept: OCCUPATIONAL MEDICINE | Facility: HOSPITAL | Age: 49
End: 2019-11-11
Attending: INTERNAL MEDICINE
Payer: MEDICARE

## 2019-11-12 ENCOUNTER — TELEPHONE (OUTPATIENT)
Dept: ENDOCRINOLOGY CLINIC | Facility: CLINIC | Age: 49
End: 2019-11-12

## 2019-11-29 ENCOUNTER — TELEPHONE (OUTPATIENT)
Dept: INTERNAL MEDICINE CLINIC | Facility: CLINIC | Age: 49
End: 2019-11-29

## 2019-11-29 ENCOUNTER — OFFICE VISIT (OUTPATIENT)
Dept: INTERNAL MEDICINE CLINIC | Facility: CLINIC | Age: 49
End: 2019-11-29
Payer: MEDICARE

## 2019-11-29 VITALS
SYSTOLIC BLOOD PRESSURE: 110 MMHG | DIASTOLIC BLOOD PRESSURE: 69 MMHG | RESPIRATION RATE: 17 BRPM | TEMPERATURE: 99 F | HEART RATE: 71 BPM

## 2019-11-29 DIAGNOSIS — K59.00 CONSTIPATION, UNSPECIFIED CONSTIPATION TYPE: ICD-10-CM

## 2019-11-29 DIAGNOSIS — I10 ESSENTIAL HYPERTENSION WITH GOAL BLOOD PRESSURE LESS THAN 140/90: Primary | ICD-10-CM

## 2019-11-29 DIAGNOSIS — E11.8 TYPE 2 DIABETES MELLITUS WITH COMPLICATION, WITH LONG-TERM CURRENT USE OF INSULIN (HCC): ICD-10-CM

## 2019-11-29 DIAGNOSIS — R12 HEARTBURN: ICD-10-CM

## 2019-11-29 DIAGNOSIS — Z79.4 TYPE 2 DIABETES MELLITUS WITH COMPLICATION, WITH LONG-TERM CURRENT USE OF INSULIN (HCC): ICD-10-CM

## 2019-11-29 DIAGNOSIS — K59.04 CHRONIC IDIOPATHIC CONSTIPATION: ICD-10-CM

## 2019-11-29 DIAGNOSIS — Z23 NEED FOR VACCINATION: ICD-10-CM

## 2019-11-29 DIAGNOSIS — J30.9 ALLERGIC RHINITIS, UNSPECIFIED SEASONALITY, UNSPECIFIED TRIGGER: ICD-10-CM

## 2019-11-29 PROCEDURE — 90471 IMMUNIZATION ADMIN: CPT | Performed by: INTERNAL MEDICINE

## 2019-11-29 PROCEDURE — 99214 OFFICE O/P EST MOD 30 MIN: CPT | Performed by: INTERNAL MEDICINE

## 2019-11-29 PROCEDURE — 90734 MENACWYD/MENACWYCRM VACC IM: CPT | Performed by: INTERNAL MEDICINE

## 2019-11-29 RX ORDER — DOCUSATE SODIUM 100 MG/1
CAPSULE, LIQUID FILLED ORAL
COMMUNITY
End: 2019-11-29

## 2019-11-29 RX ORDER — FLUTICASONE PROPIONATE 50 MCG
1 SPRAY, SUSPENSION (ML) NASAL DAILY
Qty: 1 BOTTLE | Refills: 0 | Status: SHIPPED | OUTPATIENT
Start: 2019-11-29 | End: 2020-03-27

## 2019-11-29 RX ORDER — CALCIUM CITRATE/VITAMIN D3 200MG-6.25
TABLET ORAL
Qty: 300 STRIP | Refills: 1 | Status: SHIPPED | OUTPATIENT
Start: 2019-11-29 | End: 2019-12-12

## 2019-11-29 RX ORDER — DOCUSATE SODIUM 100 MG/1
CAPSULE, LIQUID FILLED ORAL
Qty: 30 CAPSULE | Refills: 1 | Status: SHIPPED | OUTPATIENT
Start: 2019-11-29 | End: 2020-03-27

## 2019-11-29 NOTE — PROGRESS NOTES
Daiana Jackson is a 44year old female.   Patient presents with:  Imm/Inj: flu shot and       HPI:   Pt comes for f/u  C/c htn dm2  C/o needs refills ,traveling          History- 7/17 visit   Had a stroke in 2014 --?hemorrhagic - surg [facility] - s/ Bottle 0   • Insulin Pen Needle (BD PEN NEEDLE ABDOULAYE U/F) 32G X 4 MM Does not apply Misc Inject 4 times daily 200 each 2   • CONTOUR NEXT TEST In Vitro Strip Use to test BG 3 times daily 150 each 2   • MICROLET LANCETS Does not apply Misc Use to test sugar 3 times per day 200 strip 0   • insulin glargine (LANTUS SOLOSTAR) 100 UNIT/ML Subcutaneous Solution Pen-injector Inject 60 Units into the skin nightly.  54 mL 0   • Glucose Blood (TRUE METRIX BLOOD GLUCOSE TEST) In Vitro Strip TEST BLOOD SUGAR FOUR TIMES D 0   • Cholecalciferol (VITAMIN D) 2000 units Oral Cap Take 1 capsule (2,000 Units total) by mouth daily. 90 capsule 0   • acetaminophen 325 MG Oral Tab 2 tabs 3 times daily.  360 tablet 0      Past Medical History:   Diagnosis Date   • Age-related nuclear c depression    EXAM:   /69 (BP Location: Right arm, Patient Position: Sitting, Cuff Size: large)   Pulse 71   Temp 98.6 °F (37 °C) (Oral)   Resp 17   GENERAL: well developed, well nourished,in no apparent distress  SKIN: no rashes,no suspicious lesion indicates understanding of these issues and agrees to the plan. No follow-ups on file.

## 2019-11-29 NOTE — TELEPHONE ENCOUNTER
Pt stated that she has a appt today with  at 10 am but she is running about 10-20 min late as her ride is still not there. Noted appt is at 10:40 not 10 am . Pt made aware.     Future Appointments   Date Time Provider Joan Monterroso   11/29/2

## 2019-12-04 ENCOUNTER — TELEPHONE (OUTPATIENT)
Dept: ENDOCRINOLOGY CLINIC | Facility: CLINIC | Age: 49
End: 2019-12-04

## 2019-12-04 NOTE — TELEPHONE ENCOUNTER
PA fax received from [employer] in [facility] for True metrix test strips TID testing. Called the pt. Spoke with her sister. They are both out shopping. Per chart pt has been prescribed over 3 different brands of testing supplies in the past 1 month.  Rubin Alvarado

## 2019-12-05 ENCOUNTER — LAB ENCOUNTER (OUTPATIENT)
Dept: LAB | Facility: HOSPITAL | Age: 49
End: 2019-12-05
Attending: INTERNAL MEDICINE
Payer: MEDICARE

## 2019-12-05 DIAGNOSIS — E11.49 TYPE 2 DIABETES MELLITUS WITH OTHER NEUROLOGIC COMPLICATION, WITH LONG-TERM CURRENT USE OF INSULIN (HCC): ICD-10-CM

## 2019-12-05 DIAGNOSIS — D64.9 ANEMIA, UNSPECIFIED TYPE: ICD-10-CM

## 2019-12-05 DIAGNOSIS — Z79.4 TYPE 2 DIABETES MELLITUS WITH OTHER NEUROLOGIC COMPLICATION, WITH LONG-TERM CURRENT USE OF INSULIN (HCC): ICD-10-CM

## 2019-12-05 DIAGNOSIS — E78.00 PURE HYPERCHOLESTEROLEMIA: ICD-10-CM

## 2019-12-05 DIAGNOSIS — E03.9 HYPOTHYROIDISM, UNSPECIFIED TYPE: ICD-10-CM

## 2019-12-05 PROCEDURE — 80053 COMPREHEN METABOLIC PANEL: CPT

## 2019-12-05 PROCEDURE — 85025 COMPLETE CBC W/AUTO DIFF WBC: CPT

## 2019-12-05 PROCEDURE — 84443 ASSAY THYROID STIM HORMONE: CPT

## 2019-12-05 PROCEDURE — 82728 ASSAY OF FERRITIN: CPT

## 2019-12-05 PROCEDURE — 36415 COLL VENOUS BLD VENIPUNCTURE: CPT

## 2019-12-05 PROCEDURE — 80061 LIPID PANEL: CPT

## 2019-12-05 PROCEDURE — 82607 VITAMIN B-12: CPT

## 2019-12-05 PROCEDURE — 81001 URINALYSIS AUTO W/SCOPE: CPT

## 2019-12-08 NOTE — PROGRESS NOTES
Please call patient with blood test results.     Kidney function decreased and is worsening   and liver function are normal,     Mild anemia present      Cholesterol is elevated triglycerides     sugar is elevated   -   A 1  C  10.6 -  In 11/6 -  Patient to

## 2019-12-09 ENCOUNTER — TELEPHONE (OUTPATIENT)
Dept: ENDOCRINOLOGY CLINIC | Facility: CLINIC | Age: 49
End: 2019-12-09

## 2019-12-09 ENCOUNTER — TELEPHONE (OUTPATIENT)
Dept: INTERNAL MEDICINE CLINIC | Facility: CLINIC | Age: 49
End: 2019-12-09

## 2019-12-09 NOTE — TELEPHONE ENCOUNTER
Per pharmacy fax requesting CMN form for patient in order to dispense med below     Current Outpatient Medications:   •  TRUE METRIX BLOOD GLUCOSE TEST In Vitro Strip, USE TO TEST THREE TIMES DAILY, Disp: 300 strip, Rfl: 1

## 2019-12-09 NOTE — TELEPHONE ENCOUNTER
Dr. Lisa Salcedo asking for patient to be seen for uncontrolled diabetes. LOV with SH was 11/06/19. Please advise when you'd like to see patient again.

## 2019-12-09 NOTE — TELEPHONE ENCOUNTER
----- Message from [place] MD sent at 12/8/2019  5:45 PM CST -----  Please call patient with blood test results.       Kidney function decreased and is worsening   and liver function are normal,     Mild anemia present      Cholesterol is elevate

## 2019-12-09 NOTE — TELEPHONE ENCOUNTER
Her sugars were actually starting to improve at last visit. I sent ppw for CGM. Call patient to see if she was able to get new system and schedule visit with CDE. Thanks.

## 2019-12-11 NOTE — TELEPHONE ENCOUNTER
No form at [facility] and pt is not a pt of Dr. Anival Marcial. Will forward form to correct PCP when received.

## 2019-12-12 ENCOUNTER — OFFICE VISIT (OUTPATIENT)
Dept: ENDOCRINOLOGY CLINIC | Facility: CLINIC | Age: 49
End: 2019-12-12
Payer: MEDICARE

## 2019-12-12 VITALS — HEART RATE: 62 BPM | DIASTOLIC BLOOD PRESSURE: 74 MMHG | SYSTOLIC BLOOD PRESSURE: 133 MMHG

## 2019-12-12 DIAGNOSIS — Z79.4 TYPE 2 DIABETES MELLITUS WITH COMPLICATION, WITH LONG-TERM CURRENT USE OF INSULIN (HCC): ICD-10-CM

## 2019-12-12 DIAGNOSIS — E11.8 TYPE 2 DIABETES MELLITUS WITH COMPLICATION, WITH LONG-TERM CURRENT USE OF INSULIN (HCC): ICD-10-CM

## 2019-12-12 PROCEDURE — 99213 OFFICE O/P EST LOW 20 MIN: CPT | Performed by: INTERNAL MEDICINE

## 2019-12-12 PROCEDURE — 36416 COLLJ CAPILLARY BLOOD SPEC: CPT | Performed by: INTERNAL MEDICINE

## 2019-12-12 PROCEDURE — 82962 GLUCOSE BLOOD TEST: CPT | Performed by: INTERNAL MEDICINE

## 2019-12-12 RX ORDER — CALCIUM CITRATE/VITAMIN D3 200MG-6.25
TABLET ORAL
Qty: 300 STRIP | Refills: 1 | Status: SHIPPED | OUTPATIENT
Start: 2019-12-12 | End: 2021-08-11

## 2019-12-12 NOTE — PROGRESS NOTES
Name: Oliva Serna  Date: 12/12/2019    Referring Physician: No ref. provider found    HISTORY OF PRESENT ILLNESS   Oliva Serna is a 44year old female who presents for diabetes mellitus, diagnosed over 10 years ago.   She was seen during hospitalizati into the lungs every 6 (six) hours as needed for Wheezing., Disp: 1 Inhaler, Rfl: 1  •  Fluticasone Propionate 50 MCG/ACT Nasal Suspension, 1 spray by Nasal route daily. , Disp: 1 Bottle, Rfl: 0  •  Insulin Pen Needle (BD PEN NEEDLE ABDOULAYE U/F) 32G X 4 MM Garsia TRUEPLUS LANCETS 33G Does not apply Misc, Use to check sugar 4 times daily. , Disp: 400 each, Rfl: 1  •  CONTOUR NEXT MONITOR w/Device Does not apply Kit, 1 each by Does not apply route 3 (three) times daily. Use to check sugar three times daily. , Disp: 1 Oral Tab, Take 81 mg by mouth., Disp: , Rfl:   •  Blood Glucose Monitoring Suppl ([employer] CONTOUR MONITOR) w/Device Does not apply Kit, 1 kit by Does not apply route 4 (four) times daily.  And as needed, Disp: 1 kit, Rfl: 0  •  UNIFINE PENTIPS 32G X 4 MM Does (MGD), bilateral, both upper and lower lids 3/30/2016   • Migraine    • Migraines    • Muscle weakness     left sided weakness uses walker and cane   • Stenosis of right vertebral artery    • Stroke [facility]) 2014   • Type II or unspecified type diabetes mellsancho suspect A1c was high from trip to [facility]   -Discussed importance of glycemic control to prevent complications of diabetes  -Discussed complications of diabetes include retinopathy, neuropathy, nephropathy and cardiovascular disease  -Discussed importance of

## 2019-12-13 ENCOUNTER — OFFICE VISIT (OUTPATIENT)
Dept: NEPHROLOGY | Facility: CLINIC | Age: 49
End: 2019-12-13
Payer: MEDICARE

## 2019-12-13 VITALS
BODY MASS INDEX: 32.49 KG/M2 | SYSTOLIC BLOOD PRESSURE: 131 MMHG | HEART RATE: 61 BPM | DIASTOLIC BLOOD PRESSURE: 85 MMHG | HEIGHT: 63 IN | WEIGHT: 183.38 LBS

## 2019-12-13 DIAGNOSIS — I63.9 LEFT-SIDED CEREBROVASCULAR ACCIDENT (CVA) (HCC): ICD-10-CM

## 2019-12-13 DIAGNOSIS — N18.30 CKD STAGE 3 DUE TO TYPE 1 DIABETES MELLITUS (HCC): ICD-10-CM

## 2019-12-13 DIAGNOSIS — E10.22 CKD STAGE 3 DUE TO TYPE 1 DIABETES MELLITUS (HCC): ICD-10-CM

## 2019-12-13 DIAGNOSIS — D50.9 IRON DEFICIENCY ANEMIA, UNSPECIFIED IRON DEFICIENCY ANEMIA TYPE: Primary | ICD-10-CM

## 2019-12-13 PROCEDURE — 99204 OFFICE O/P NEW MOD 45 MIN: CPT | Performed by: INTERNAL MEDICINE

## 2019-12-13 RX ORDER — ASPIRIN 325 MG
325 TABLET ORAL DAILY
COMMUNITY

## 2019-12-13 NOTE — PATIENT INSTRUCTIONS
Please stop losartan    Do kidney ultrasound and labs in early January    Please schedule ultrasound    See me second week of January    No pain meds except tylenol    [facility] meet you [facility]

## 2019-12-22 DIAGNOSIS — Z79.4 TYPE 2 DIABETES MELLITUS WITH COMPLICATION, WITH LONG-TERM CURRENT USE OF INSULIN (HCC): ICD-10-CM

## 2019-12-22 DIAGNOSIS — E11.8 TYPE 2 DIABETES MELLITUS WITH COMPLICATION, WITH LONG-TERM CURRENT USE OF INSULIN (HCC): ICD-10-CM

## 2019-12-23 RX ORDER — INSULIN GLARGINE 100 [IU]/ML
INJECTION, SOLUTION SUBCUTANEOUS
Qty: 54 ML | Refills: 0 | Status: SHIPPED | OUTPATIENT
Start: 2019-12-23 | End: 2020-03-23

## 2019-12-26 ENCOUNTER — TELEPHONE (OUTPATIENT)
Dept: INTERNAL MEDICINE CLINIC | Facility: CLINIC | Age: 49
End: 2019-12-26

## 2019-12-26 DIAGNOSIS — E11.40 DIABETIC NEUROPATHY WITH NEUROLOGIC COMPLICATION (HCC): Primary | ICD-10-CM

## 2019-12-26 DIAGNOSIS — Z79.4 ENCOUNTER FOR LONG-TERM (CURRENT) USE OF INSULIN (HCC): ICD-10-CM

## 2019-12-26 DIAGNOSIS — E11.8 DIABETIC COMPLICATION (HCC): ICD-10-CM

## 2019-12-26 DIAGNOSIS — E11.49 DIABETIC NEUROPATHY WITH NEUROLOGIC COMPLICATION (HCC): Primary | ICD-10-CM

## 2019-12-26 NOTE — TELEPHONE ENCOUNTER
Diego Jiménez from [employer] (DME) faxed a referral request for diabetic supplies to both [place] and [address] [place] [address],   [address] office.  Calling to check status, if not received please call leanne valdez @ [phone] ext [phone]

## 2019-12-26 NOTE — TELEPHONE ENCOUNTER
Message was left on voicemail of representative Palacio Gustavo to inform that the form was received and that pt has another PCP with name and fax number provided.  Form was faxed to Dr Brenden Miller.

## 2019-12-31 NOTE — TELEPHONE ENCOUNTER
Israel Candelario with [address] is calling for an update on the form that was received.  Please call him back with the status of referral.

## 2020-01-06 NOTE — TELEPHONE ENCOUNTER
Faxing over referral request patient is [facility]  Need referral for CGM (continuous glucose monitor)   and 12-sensor.    Gave her fax number for Eboni Krause staff please look for form for Dr. Radha Hoffman to sign   Thank you     Dr. Radha Hoffman r

## 2020-01-09 ENCOUNTER — TELEPHONE (OUTPATIENT)
Dept: INTERNAL MEDICINE CLINIC | Facility: CLINIC | Age: 50
End: 2020-01-09

## 2020-01-09 DIAGNOSIS — E11.8 DIABETIC COMPLICATION (HCC): Primary | ICD-10-CM

## 2020-01-09 NOTE — TELEPHONE ENCOUNTER
(Please see 12/26 Forms Encounter)    Valdemar/[facility] called in stating that he was following up on request for referral for Continuous glucose monitor and sensors from [facility].     He states that the referral request was sent on 1/6, but is re-faxing today to #[phone]. Please be aware.

## 2020-01-10 DIAGNOSIS — E11.8 TYPE 2 DIABETES MELLITUS WITH COMPLICATION, WITH LONG-TERM CURRENT USE OF INSULIN (HCC): ICD-10-CM

## 2020-01-10 DIAGNOSIS — Z79.4 TYPE 2 DIABETES MELLITUS WITH COMPLICATION, WITH LONG-TERM CURRENT USE OF INSULIN (HCC): ICD-10-CM

## 2020-01-10 DIAGNOSIS — I10 ESSENTIAL HYPERTENSION WITH GOAL BLOOD PRESSURE LESS THAN 140/90: ICD-10-CM

## 2020-01-10 DIAGNOSIS — Z98.890 HISTORY OF CRANIOTOMY: ICD-10-CM

## 2020-01-10 NOTE — TELEPHONE ENCOUNTER
[facility] form faxed to Dr. Galindo Lunch office to nurses. I called [phone]  I did not know the cpt code that they were asking and some other stuff. Pt needs a referral,  But it has to be authorized.

## 2020-01-11 RX ORDER — OMEPRAZOLE 40 MG/1
CAPSULE, DELAYED RELEASE ORAL
Qty: 90 CAPSULE | Refills: 1 | Status: SHIPPED | OUTPATIENT
Start: 2020-01-11 | End: 2020-07-31

## 2020-01-11 RX ORDER — GABAPENTIN 300 MG/1
CAPSULE ORAL
Qty: 90 CAPSULE | Refills: 1 | Status: SHIPPED | OUTPATIENT
Start: 2020-01-11 | End: 2020-07-31

## 2020-01-11 RX ORDER — LOSARTAN POTASSIUM 100 MG/1
TABLET ORAL
Qty: 90 TABLET | Refills: 2 | OUTPATIENT
Start: 2020-01-11

## 2020-01-11 NOTE — TELEPHONE ENCOUNTER
Pt was called and she stated that she is no longer taking Losartan. Pt then asked that she also needs a refill request for her hctz.

## 2020-01-11 NOTE — TELEPHONE ENCOUNTER
Per LOV with Dr. Frieda Sicard 12/13/19. Pt to stop losartan. Please call Pt to verify she is not taking Losartan. Thanks    Neurotology protocol and GI protocol. Refill passed per [facility] protocol.     Refill Protocol Appointment Criteria  · Appointme

## 2020-01-13 ENCOUNTER — TELEPHONE (OUTPATIENT)
Dept: CARDIOLOGY CLINIC | Facility: CLINIC | Age: 50
End: 2020-01-13

## 2020-01-13 ENCOUNTER — TELEPHONE (OUTPATIENT)
Dept: ENDOCRINOLOGY CLINIC | Facility: CLINIC | Age: 50
End: 2020-01-13

## 2020-01-13 NOTE — TELEPHONE ENCOUNTER
Called [facility] in [facility] as indicated on message, patient does not use this pharmacy. Call transferred to [facility] in [facility]. Spoke to Batool at [facility] who states to request was placed by patient.

## 2020-01-13 NOTE — TELEPHONE ENCOUNTER
Spoke with Patricio Holly, pharmacist, and states there's a partial strips (50) ready for her. They don't have the full 350 strips and had to order them as they had ran out. Expecting to get it by tomorrow.       Contacted patient twice and explained there was

## 2020-01-13 NOTE — TELEPHONE ENCOUNTER
Pharm is a new Rx for ear drops. I don't see any medication below to reference to and pharm did not reference any script.         Current Outpatient Medications:   •  GABAPENTIN 300 MG Oral Cap, TAKE ONE CAPSULE BY MOUTH EVERY NIGHT, Disp: 90 capsule, Rfl: (Patient not taking: Reported on 12/13/2019 ), Disp: 90 tablet, Rfl: 0  •  Blood Glucose Monitoring Suppl (CONTOUR NEXT MONITOR) w/Device Does not apply Kit, 1 each by Does not apply route daily. , Disp: 1 kit, Rfl: 0  •  ASHWINI MICROLET LANCETS Does not miquel atorvastatin 40 MG Oral Tab, Take 1 tablet (40 mg total) by mouth nightly., Disp: 90 tablet, Rfl: 3  •  hydrochlorothiazide 25 MG Oral Tab, Take 1 tablet (25 mg total) by mouth once daily. , Disp: 90 tablet, Rfl: 3  •  Metoprolol Succinate ER 25 MG Oral Tab

## 2020-01-13 NOTE — TELEPHONE ENCOUNTER
Reviewed Endo note  -Will continue to work on CGM approval  -Provided new script for BG test strips   -Continue Metformin  -Normotensive  -Normal lipids    Signed pended order--thank you  We will send to Dr. WYNN as an [phone] [address]

## 2020-01-13 NOTE — TELEPHONE ENCOUNTER
Dr. Mary Ellen Arenas, received a call from [address] [place] [address] requesting CGM sensor and  for patient to check glucose. Original prescription was given to patient by Dr. Rona Woody. Due to Thomas Frias, order now needs to come from PCP. I have pended the order. If approved, please sign off on order. Bean Richburg from [place] [address] states this will need approval from [facility].      Please reply to pool: [facility]      [phone] ext [phone]  Fax [phone]

## 2020-01-15 ENCOUNTER — TELEPHONE (OUTPATIENT)
Dept: INTERNAL MEDICINE CLINIC | Facility: CLINIC | Age: 50
End: 2020-01-15

## 2020-01-15 NOTE — TELEPHONE ENCOUNTER
Deidre - medication Metformin 1000 mg on back order, we have on stock 500 mg. Please send approval along with strength, directions, quantity and refills. Thank you!       Current Outpatient Medications:     •  metFORMIN HCl 1000 MG Oral Tab, TAKE ONE

## 2020-01-15 NOTE — TELEPHONE ENCOUNTER
Dr Lopez Plane see below, medication pended for approval.    Please reply to alberta: DARWIN Hendrickson

## 2020-01-15 NOTE — TELEPHONE ENCOUNTER
Dr. Suggs Royalty,    Since nothing is needed from our end correct? We do not handle these authorization anyway.     [address] [facility]

## 2020-01-16 NOTE — TELEPHONE ENCOUNTER
[facility]  Pamela Caraballo [facility] [address], [phone], [phone]   Outpatient Medication Detail      Disp Refills Start End    metFORMIN HCl 500 MG Oral Tab 360 tablet 1 1/15/2020     Sig - Rou

## 2020-01-17 NOTE — TELEPHONE ENCOUNTER
Wysayda Matty from [facility] called to check status. I reached out to [facility] who told me that since patient has [employer] that DME would have to get the authorization. I informed her, but Nuratalat David states that [employer] told them the authorization has to come from PCP. I spoke with Asif in Managed [employer] and she advised me to add on to the encounter.  Alice's fax number is: [phone] ATTN: Saloni Duty contact number: [phone]

## 2020-01-20 NOTE — TELEPHONE ENCOUNTER
Called [facility] ext [phone]- Lord Comings no answer , do you have the facility  Address npi and tax id so I can process this request?  Thanks,    Asif

## 2020-01-21 NOTE — TELEPHONE ENCOUNTER
Per dme information below, request has been submitted to insurance.     Pending approval    [facility]

## 2020-01-21 NOTE — TELEPHONE ENCOUNTER
Managed Care - Asif,     Please see below information needed in order to process the referral.      Tax id: [id number]  NPI: [id number]  Address: [address] [place] [address]    Phone number: [phone] NADIR Jesenia Zamarripa [address]:    (SENSORS) [phone] () [phone]    Please contact Saúl Adams for further questions. Thank you.

## 2020-01-21 NOTE — TELEPHONE ENCOUNTER
Thanks for dme information below    I submitted the information to [facility] for approval    Pending review    Thanks  [facility]

## 2020-01-22 RX ORDER — HYDROCHLOROTHIAZIDE 25 MG/1
25 TABLET ORAL
Qty: 90 TABLET | Refills: 1 | Status: SHIPPED | OUTPATIENT
Start: 2020-01-22 | End: 2020-09-25

## 2020-01-26 NOTE — TELEPHONE ENCOUNTER
Was this tken care of ?-- there was a fax that had said as per her insurance the the referral had to come from the pcp office\" if this is so-- pls pned the referral and I can sign it   The fax david Paul 62 phone number [phone]   Needs to specify that the pt will need ot obtain listed supplies from [address] [address] ,[address] [facility] tax ID [id number]  [place] [address] NPI  [id number]  Phone [phone]  Fax

## 2020-01-27 NOTE — TELEPHONE ENCOUNTER
Dr. Dayo Joy,    Edgar [facility] are welcome, we will let you know as soon as approved or not    Thanks  [facility]

## 2020-01-27 NOTE — TELEPHONE ENCOUNTER
Dr. Weiner Prime    [facility],     We need certification number from [facility] to send to [facility]- once authorized it will be faxed to information indicated below    Thanks    [facility]

## 2020-01-29 ENCOUNTER — MED REC SCAN ONLY (OUTPATIENT)
Dept: INTERNAL MEDICINE CLINIC | Facility: CLINIC | Age: 50
End: 2020-01-29

## 2020-02-10 RX ORDER — GLUCOSAM/CHON-MSM1/C/MANG/BOSW 500-416.6
TABLET ORAL
Qty: 100 EACH | Refills: 2 | Status: SHIPPED | OUTPATIENT
Start: 2020-02-10 | End: 2021-10-05

## 2020-02-10 RX ORDER — LANCETS
EACH MISCELLANEOUS
Qty: 300 EACH | Refills: 0 | Status: SHIPPED | OUTPATIENT
Start: 2020-02-10 | End: 2020-05-28

## 2020-02-14 ENCOUNTER — TELEPHONE (OUTPATIENT)
Dept: NEPHROLOGY | Facility: CLINIC | Age: 50
End: 2020-02-14

## 2020-03-13 ENCOUNTER — NURSE TRIAGE (OUTPATIENT)
Dept: OTHER | Age: 50
End: 2020-03-13

## 2020-03-13 NOTE — TELEPHONE ENCOUNTER
Action Requested: Summary for Provider     []  Critical Lab, Recommendations Needed  [] Need Additional Advice  []   FYI    []   Need Orders  [] Need Medications Sent to Pharmacy  []  Other     SUMMARY:   The patient is in [place] and cannot come home at t

## 2020-03-16 NOTE — TELEPHONE ENCOUNTER
Female that answered home phone states pt not home. I asked her to have pt call us back to follow up.

## 2020-03-22 DIAGNOSIS — Z79.4 TYPE 2 DIABETES MELLITUS WITH COMPLICATION, WITH LONG-TERM CURRENT USE OF INSULIN (HCC): ICD-10-CM

## 2020-03-22 DIAGNOSIS — E11.8 TYPE 2 DIABETES MELLITUS WITH COMPLICATION, WITH LONG-TERM CURRENT USE OF INSULIN (HCC): ICD-10-CM

## 2020-03-23 RX ORDER — INSULIN GLARGINE 100 [IU]/ML
INJECTION, SOLUTION SUBCUTANEOUS
Qty: 54 ML | Refills: 0 | Status: SHIPPED | OUTPATIENT
Start: 2020-03-23 | End: 2020-06-18

## 2020-03-27 DIAGNOSIS — Z79.4 TYPE 2 DIABETES MELLITUS WITH OTHER NEUROLOGIC COMPLICATION, WITH LONG-TERM CURRENT USE OF INSULIN (HCC): ICD-10-CM

## 2020-03-27 DIAGNOSIS — E11.8 TYPE 2 DIABETES MELLITUS WITH COMPLICATION, WITH LONG-TERM CURRENT USE OF INSULIN (HCC): ICD-10-CM

## 2020-03-27 DIAGNOSIS — F32.A DEPRESSION, UNSPECIFIED DEPRESSION TYPE: ICD-10-CM

## 2020-03-27 DIAGNOSIS — I10 ESSENTIAL HYPERTENSION WITH GOAL BLOOD PRESSURE LESS THAN 140/90: ICD-10-CM

## 2020-03-27 DIAGNOSIS — E11.49 TYPE 2 DIABETES MELLITUS WITH OTHER NEUROLOGIC COMPLICATION, WITH LONG-TERM CURRENT USE OF INSULIN (HCC): ICD-10-CM

## 2020-03-27 DIAGNOSIS — K59.04 CHRONIC IDIOPATHIC CONSTIPATION: ICD-10-CM

## 2020-03-27 DIAGNOSIS — Z79.4 TYPE 2 DIABETES MELLITUS WITH COMPLICATION, WITH LONG-TERM CURRENT USE OF INSULIN (HCC): ICD-10-CM

## 2020-03-27 DIAGNOSIS — Z98.890 HISTORY OF CRANIOTOMY: ICD-10-CM

## 2020-03-27 DIAGNOSIS — J30.9 ALLERGIC RHINITIS, UNSPECIFIED SEASONALITY, UNSPECIFIED TRIGGER: ICD-10-CM

## 2020-03-27 RX ORDER — METOPROLOL SUCCINATE 100 MG/1
100 TABLET, EXTENDED RELEASE ORAL
Qty: 90 TABLET | Refills: 1 | Status: SHIPPED | OUTPATIENT
Start: 2020-03-27 | End: 2020-09-23

## 2020-03-27 RX ORDER — ESCITALOPRAM OXALATE 20 MG/1
TABLET ORAL
Qty: 45 TABLET | Refills: 1 | Status: SHIPPED | OUTPATIENT
Start: 2020-03-27 | End: 2020-09-23

## 2020-03-27 RX ORDER — LEVETIRACETAM 500 MG/1
TABLET ORAL
Qty: 180 TABLET | Refills: 1 | Status: SHIPPED | OUTPATIENT
Start: 2020-03-27 | End: 2020-09-23

## 2020-03-27 RX ORDER — METOPROLOL SUCCINATE 25 MG/1
TABLET, EXTENDED RELEASE ORAL
Qty: 90 TABLET | Refills: 1 | Status: SHIPPED | OUTPATIENT
Start: 2020-03-27 | End: 2020-09-23

## 2020-03-27 RX ORDER — ATORVASTATIN CALCIUM 40 MG/1
40 TABLET, FILM COATED ORAL NIGHTLY
Qty: 90 TABLET | Refills: 1 | Status: SHIPPED | OUTPATIENT
Start: 2020-03-27 | End: 2020-07-09

## 2020-03-27 RX ORDER — DOCUSATE SODIUM 100 MG/1
CAPSULE, LIQUID FILLED ORAL
Qty: 30 CAPSULE | Refills: 1 | Status: SHIPPED | OUTPATIENT
Start: 2020-03-27 | End: 2021-07-13

## 2020-03-27 RX ORDER — FLUTICASONE PROPIONATE 50 MCG
1 SPRAY, SUSPENSION (ML) NASAL DAILY
Qty: 1 BOTTLE | Refills: 0 | Status: SHIPPED | OUTPATIENT
Start: 2020-03-27 | End: 2020-05-29

## 2020-03-27 RX ORDER — AMLODIPINE BESYLATE 5 MG/1
TABLET ORAL
Qty: 90 TABLET | Refills: 1 | Status: SHIPPED | OUTPATIENT
Start: 2020-03-27 | End: 2020-09-23

## 2020-03-30 NOTE — TELEPHONE ENCOUNTER
Refill sent to pharmacy     LOV 12/12/19  A1C 8.8%  Patient was to follow up in 3 mo. Had an apt for 3/19/20 but was cancelled. Please help patient make a new apt.     Thank you

## 2020-04-08 ENCOUNTER — TELEPHONE (OUTPATIENT)
Dept: NEPHROLOGY | Facility: CLINIC | Age: 50
End: 2020-04-08

## 2020-05-02 DIAGNOSIS — E11.49 TYPE 2 DIABETES MELLITUS WITH OTHER NEUROLOGIC COMPLICATION, WITH LONG-TERM CURRENT USE OF INSULIN (HCC): ICD-10-CM

## 2020-05-02 DIAGNOSIS — Z79.4 TYPE 2 DIABETES MELLITUS WITH OTHER NEUROLOGIC COMPLICATION, WITH LONG-TERM CURRENT USE OF INSULIN (HCC): ICD-10-CM

## 2020-05-19 ENCOUNTER — TELEPHONE (OUTPATIENT)
Dept: INTERNAL MEDICINE CLINIC | Facility: CLINIC | Age: 50
End: 2020-05-19

## 2020-05-19 DIAGNOSIS — J30.9 ALLERGIC RHINITIS, UNSPECIFIED SEASONALITY, UNSPECIFIED TRIGGER: ICD-10-CM

## 2020-05-28 RX ORDER — LANCETS
EACH MISCELLANEOUS
Qty: 300 EACH | Refills: 0 | Status: SHIPPED | OUTPATIENT
Start: 2020-05-28 | End: 2020-08-26

## 2020-05-29 ENCOUNTER — TELEPHONE (OUTPATIENT)
Dept: INTERNAL MEDICINE CLINIC | Facility: CLINIC | Age: 50
End: 2020-05-29

## 2020-05-29 DIAGNOSIS — J30.9 ALLERGIC RHINITIS, UNSPECIFIED SEASONALITY, UNSPECIFIED TRIGGER: ICD-10-CM

## 2020-05-29 RX ORDER — FLUTICASONE PROPIONATE 50 MCG
1 SPRAY, SUSPENSION (ML) NASAL DAILY
Qty: 1 BOTTLE | Refills: 0 | Status: SHIPPED | OUTPATIENT
Start: 2020-05-29 | End: 2020-08-03

## 2020-05-29 NOTE — TELEPHONE ENCOUNTER
Current Outpatient Medications:   ••  Fluticasone Propionate 50 MCG/ACT Nasal Suspension, 1 spray by Nasal route daily. , Disp: 1 Bottle, Rfl: 0

## 2020-06-03 ENCOUNTER — TELEPHONE (OUTPATIENT)
Dept: ENDOCRINOLOGY CLINIC | Facility: CLINIC | Age: 50
End: 2020-06-03

## 2020-06-03 NOTE — TELEPHONE ENCOUNTER
FYI - Pts sister states that pt is quarantined in [place] and will schedule an appt as soon as she returns. She received letter in mail reminding her to schedule an appt.

## 2020-06-18 DIAGNOSIS — Z79.4 TYPE 2 DIABETES MELLITUS WITH COMPLICATION, WITH LONG-TERM CURRENT USE OF INSULIN (HCC): ICD-10-CM

## 2020-06-18 DIAGNOSIS — E11.8 TYPE 2 DIABETES MELLITUS WITH COMPLICATION, WITH LONG-TERM CURRENT USE OF INSULIN (HCC): ICD-10-CM

## 2020-06-18 RX ORDER — INSULIN GLARGINE 100 [IU]/ML
INJECTION, SOLUTION SUBCUTANEOUS
Qty: 54 ML | Refills: 0 | Status: SHIPPED | OUTPATIENT
Start: 2020-06-18 | End: 2020-12-28

## 2020-06-18 NOTE — TELEPHONE ENCOUNTER
lov 12/12/19  Letter sent to make miquel .  Sister said quarantine in [facility] now

## 2020-07-07 DIAGNOSIS — E11.8 TYPE 2 DIABETES MELLITUS WITH COMPLICATION, WITH LONG-TERM CURRENT USE OF INSULIN (HCC): ICD-10-CM

## 2020-07-07 DIAGNOSIS — Z79.4 TYPE 2 DIABETES MELLITUS WITH COMPLICATION, WITH LONG-TERM CURRENT USE OF INSULIN (HCC): ICD-10-CM

## 2020-07-09 RX ORDER — ATORVASTATIN CALCIUM 40 MG/1
TABLET, FILM COATED ORAL
Qty: 360 TABLET | Refills: 0 | Status: SHIPPED | OUTPATIENT
Start: 2020-07-09 | End: 2021-04-19

## 2020-07-21 ENCOUNTER — TELEPHONE (OUTPATIENT)
Dept: INTERNAL MEDICINE CLINIC | Facility: CLINIC | Age: 50
End: 2020-07-21

## 2020-07-21 NOTE — TELEPHONE ENCOUNTER
Agree with advice given--strongly encourage patient to get the covert testing done  I cannot do anything as she is in [place]

## 2020-07-21 NOTE — TELEPHONE ENCOUNTER
Patient currently in [place] since Feb/March this year due to [place].   Reported that she lost her taste,body aches,neck pain and diarrhea for 2 days now, no fever,  No shortness of breath,states not going out and no exposure to positive COVID individual,sta

## 2020-07-21 NOTE — TELEPHONE ENCOUNTER
Left message that Dr. Tripp Mercado recommends that she seek treatment and testing in [place], she is unable to provide care from here.

## 2020-07-31 DIAGNOSIS — E11.8 TYPE 2 DIABETES MELLITUS WITH COMPLICATION, WITH LONG-TERM CURRENT USE OF INSULIN (HCC): ICD-10-CM

## 2020-07-31 DIAGNOSIS — Z79.4 TYPE 2 DIABETES MELLITUS WITH COMPLICATION, WITH LONG-TERM CURRENT USE OF INSULIN (HCC): ICD-10-CM

## 2020-07-31 DIAGNOSIS — J30.9 ALLERGIC RHINITIS, UNSPECIFIED SEASONALITY, UNSPECIFIED TRIGGER: ICD-10-CM

## 2020-07-31 DIAGNOSIS — Z98.890 HISTORY OF CRANIOTOMY: ICD-10-CM

## 2020-07-31 NOTE — TELEPHONE ENCOUNTER
Current Outpatient Medications:   •  Fluticasone Propionate 50 MCG/ACT Nasal Suspension, 1 spray by Nasal route daily. , Disp: 1 Bottle, Rfl: 0  ••  OMEPRAZOLE 40 MG Oral Capsule Delayed Release, TAKE 1 CAPSULE BY MOUTH DAILY 30 TO 60 MINUTES BEFORE A ERMELINDA

## 2020-08-03 DIAGNOSIS — J30.9 ALLERGIC RHINITIS, UNSPECIFIED SEASONALITY, UNSPECIFIED TRIGGER: ICD-10-CM

## 2020-08-03 RX ORDER — GABAPENTIN 300 MG/1
300 CAPSULE ORAL NIGHTLY
Qty: 90 CAPSULE | Refills: 1 | Status: SHIPPED | OUTPATIENT
Start: 2020-08-03 | End: 2021-04-19

## 2020-08-03 RX ORDER — FLUTICASONE PROPIONATE 50 MCG
1 SPRAY, SUSPENSION (ML) NASAL DAILY
Qty: 1 BOTTLE | Refills: 0 | Status: SHIPPED | OUTPATIENT
Start: 2020-08-03 | End: 2020-08-03

## 2020-08-03 RX ORDER — FLUTICASONE PROPIONATE 50 MCG
SPRAY, SUSPENSION (ML) NASAL
Qty: 48 G | Refills: 0 | Status: SHIPPED | OUTPATIENT
Start: 2020-08-03 | End: 2020-08-06

## 2020-08-03 RX ORDER — OMEPRAZOLE 40 MG/1
CAPSULE, DELAYED RELEASE ORAL
Qty: 90 CAPSULE | Refills: 1 | Status: SHIPPED | OUTPATIENT
Start: 2020-08-03 | End: 2021-06-03

## 2020-08-05 DIAGNOSIS — J30.9 ALLERGIC RHINITIS, UNSPECIFIED SEASONALITY, UNSPECIFIED TRIGGER: ICD-10-CM

## 2020-08-05 NOTE — TELEPHONE ENCOUNTER
Second request from pharmacy      Current Outpatient Medications:   •  FLUTICASONE PROPIONATE 50 MCG/ACT Nasal Suspension, SHAKE LIQUID AND USE 1 SPRAY IN EACH NOSTRIL DAILY, Disp: 48 g, Rfl: 0

## 2020-08-06 RX ORDER — FLUTICASONE PROPIONATE 50 MCG
1 SPRAY, SUSPENSION (ML) NASAL DAILY
Qty: 48 G | Refills: 0 | Status: SHIPPED | OUTPATIENT
Start: 2020-08-06

## 2020-08-06 NOTE — TELEPHONE ENCOUNTER
Dr Germaine Fowler please see pending rx, patient is out of state and needs a refill  Please reply to pool: EM TRIAGE SUPPORT

## 2020-08-26 RX ORDER — LANCETS
EACH MISCELLANEOUS
Qty: 100 EACH | Refills: 0 | Status: SHIPPED | OUTPATIENT
Start: 2020-08-26 | End: 2020-08-27

## 2020-08-26 NOTE — TELEPHONE ENCOUNTER
LOV 12/12/19. RTC 3 months. No F/u. Called to schedule. No answer, unable to leave message. Pended 1 month supply.

## 2020-08-27 RX ORDER — LANCETS
EACH MISCELLANEOUS
Qty: 306 EACH | Refills: 0 | Status: SHIPPED | OUTPATIENT
Start: 2020-08-27 | End: 2021-04-19 | Stop reason: ALTCHOICE

## 2020-09-23 DIAGNOSIS — F32.A DEPRESSION, UNSPECIFIED DEPRESSION TYPE: ICD-10-CM

## 2020-09-23 DIAGNOSIS — Z79.4 TYPE 2 DIABETES MELLITUS WITH COMPLICATION, WITH LONG-TERM CURRENT USE OF INSULIN (HCC): ICD-10-CM

## 2020-09-23 DIAGNOSIS — Z98.890 HISTORY OF CRANIOTOMY: ICD-10-CM

## 2020-09-23 DIAGNOSIS — I10 ESSENTIAL HYPERTENSION WITH GOAL BLOOD PRESSURE LESS THAN 140/90: ICD-10-CM

## 2020-09-23 DIAGNOSIS — E11.8 TYPE 2 DIABETES MELLITUS WITH COMPLICATION, WITH LONG-TERM CURRENT USE OF INSULIN (HCC): ICD-10-CM

## 2020-09-23 RX ORDER — CALCIUM CITRATE/VITAMIN D3 200MG-6.25
TABLET ORAL
Qty: 200 STRIP | Refills: 1 | Status: SHIPPED | OUTPATIENT
Start: 2020-09-23 | End: 2021-10-05

## 2020-09-23 RX ORDER — ESCITALOPRAM OXALATE 20 MG/1
TABLET ORAL
Qty: 45 TABLET | Refills: 1 | Status: SHIPPED | OUTPATIENT
Start: 2020-09-23 | End: 2021-04-19

## 2020-09-23 RX ORDER — METOPROLOL SUCCINATE 25 MG/1
TABLET, EXTENDED RELEASE ORAL
Qty: 90 TABLET | Refills: 1 | Status: SHIPPED | OUTPATIENT
Start: 2020-09-23 | End: 2021-04-19

## 2020-09-23 RX ORDER — AMLODIPINE BESYLATE 5 MG/1
TABLET ORAL
Qty: 90 TABLET | Refills: 1 | Status: SHIPPED | OUTPATIENT
Start: 2020-09-23 | End: 2021-04-19

## 2020-09-23 RX ORDER — LEVETIRACETAM 500 MG/1
TABLET ORAL
Qty: 180 TABLET | Refills: 1 | Status: SHIPPED | OUTPATIENT
Start: 2020-09-23 | End: 2021-04-19

## 2020-09-23 RX ORDER — METOPROLOL SUCCINATE 100 MG/1
100 TABLET, EXTENDED RELEASE ORAL
Qty: 90 TABLET | Refills: 1 | Status: SHIPPED | OUTPATIENT
Start: 2020-09-23 | End: 2021-04-19

## 2020-09-23 NOTE — TELEPHONE ENCOUNTER
I tried to reach pt at both home & mobile numbers. Each rang multiple times but ended with a rapid busy tone with no option to leave a message. Pt does not have MyChart. Will postpone message, please try again tomorrow.

## 2020-09-23 NOTE — TELEPHONE ENCOUNTER
Current Outpatient Medications:   •  escitalopram 20 MG Oral Tab, TAKE 1/2 TABLET BY MOUTH EVERY DAY, Disp: 45 tablet, Rfl: 1    •  levETIRAcetam 500 MG Oral Tab, TAKE ONE (1) TABLET BY MOUTH TWICE DAILY, Disp: 180 tablet, Rfl: 1    •  Metoprolol [place]

## 2020-09-23 NOTE — TELEPHONE ENCOUNTER
LOV 12/12/19. RTC 3 months. No F/u. Called to schedule. NO answer, unable to leave message. Routed to provider.

## 2020-09-24 ENCOUNTER — TELEPHONE (OUTPATIENT)
Dept: ENDOCRINOLOGY CLINIC | Facility: CLINIC | Age: 50
End: 2020-09-24

## 2020-09-24 DIAGNOSIS — I10 ESSENTIAL HYPERTENSION WITH GOAL BLOOD PRESSURE LESS THAN 140/90: ICD-10-CM

## 2020-09-24 NOTE — TELEPHONE ENCOUNTER
Request for written prescription received from [address] [place] [address] for ISBXe CGM supplies. Form filled out and placed on Dr. Magalie Gan desk for review/signature.

## 2020-09-24 NOTE — TELEPHONE ENCOUNTER
Both numbers are busy. A no response letter was sent with the message that an appointment is needed.

## 2020-09-24 NOTE — TELEPHONE ENCOUNTER
Second Request        Current Outpatient Medications:     •  metFORMIN HCl 500 MG Oral Tab, Take 2 tablets (1,000 mg total) by mouth 2 (two) times daily with meals. , Disp: 360 tablet, Rfl: 1

## 2020-09-24 NOTE — TELEPHONE ENCOUNTER
Current Outpatient Medications:     •  hydrochlorothiazide 25 MG Oral Tab, Take 1 tablet (25 mg total) by mouth once daily. , Disp: 90 tablet, Rfl: 1

## 2020-09-24 NOTE — TELEPHONE ENCOUNTER
Tried calling patient on both mobile and home numbers - both rang then no anwer/busy - wanted to schedule f/u with Dr. Lb Fair and also inform her that form from [facility] was completed and faxed.     Letter sent to remind patient to schedule f/u

## 2020-09-25 ENCOUNTER — TELEPHONE (OUTPATIENT)
Dept: INTERNAL MEDICINE CLINIC | Facility: CLINIC | Age: 50
End: 2020-09-25

## 2020-09-25 DIAGNOSIS — I10 ESSENTIAL HYPERTENSION WITH GOAL BLOOD PRESSURE LESS THAN 140/90: ICD-10-CM

## 2020-09-25 RX ORDER — HYDROCHLOROTHIAZIDE 25 MG/1
25 TABLET ORAL
Qty: 90 TABLET | Refills: 1 | OUTPATIENT
Start: 2020-09-25

## 2020-09-25 RX ORDER — HYDROCHLOROTHIAZIDE 25 MG/1
25 TABLET ORAL
Qty: 90 TABLET | Refills: 1 | Status: SHIPPED | OUTPATIENT
Start: 2020-09-25 | End: 2021-04-19

## 2020-09-25 NOTE — TELEPHONE ENCOUNTER
This was already refilled on 9/23/2020   patient is overdue for an appointment--no more refills can be given without an appointment

## 2020-09-26 NOTE — TELEPHONE ENCOUNTER
At time of this note, pt does not have an upcoming appt made. Pt does not have a [employer] account. Call center, please assist with an appt.

## 2020-10-15 ENCOUNTER — TELEPHONE (OUTPATIENT)
Dept: CASE MANAGEMENT | Age: 50
End: 2020-10-15

## 2020-10-15 NOTE — TELEPHONE ENCOUNTER
Attempted both lines; unable to reach; \"phone does not accept incoming calls\"; will close encounter.

## 2020-12-26 DIAGNOSIS — E11.8 TYPE 2 DIABETES MELLITUS WITH COMPLICATION, WITH LONG-TERM CURRENT USE OF INSULIN (HCC): ICD-10-CM

## 2020-12-26 DIAGNOSIS — Z79.4 TYPE 2 DIABETES MELLITUS WITH COMPLICATION, WITH LONG-TERM CURRENT USE OF INSULIN (HCC): ICD-10-CM

## 2020-12-29 NOTE — TELEPHONE ENCOUNTER
Multiple attempts have been made to contact patient. Called once more. No answer, unable to leave message. Routed to provider.

## 2020-12-29 NOTE — TELEPHONE ENCOUNTER
RN attempted to call patient right back. Both numbers not working. Unfortunately, pt did not leave a new working number, nor did she book a f/u apt. RN attempted [facility] who provided another number [phone] for patient.     Pt states she is temporarily l

## 2020-12-29 NOTE — TELEPHONE ENCOUNTER
Patient also needs medication refill LANTUS SOLOSTAR 100 UNIT/ML Subcutaneous Solution Pen-injector. She also needs pen needles.  Please follow up

## 2020-12-29 NOTE — TELEPHONE ENCOUNTER
Patient states she also needs refill on maximo sensor and true metrix blood glucose test strip and machine and true metrix lancets. Patient is running low on medication.  Please follow up

## 2020-12-30 RX ORDER — INSULIN GLARGINE 100 [IU]/ML
60 INJECTION, SOLUTION SUBCUTANEOUS NIGHTLY
Qty: 54 ML | Refills: 0 | Status: SHIPPED | OUTPATIENT
Start: 2020-12-30 | End: 2021-03-30

## 2021-01-04 ENCOUNTER — TELEPHONE (OUTPATIENT)
Dept: ENDOCRINOLOGY CLINIC | Facility: CLINIC | Age: 51
End: 2021-01-04

## 2021-01-04 NOTE — TELEPHONE ENCOUNTER
Received Fax from [facility] requesting chart notes/labs/logs from the last 6 months. Pt was last seen 12/12/19. Multiple attempts have been made by clinic to schedule apt. Called Both Numbers under patients name. No answer and unable to leave message.  Pe

## 2021-01-21 ENCOUNTER — TELEPHONE (OUTPATIENT)
Dept: INTERNAL MEDICINE CLINIC | Facility: CLINIC | Age: 51
End: 2021-01-21

## 2021-01-21 NOTE — TELEPHONE ENCOUNTER
[facility] to advised patient was admitted to [facility] on 1/17 for cellulitis of the right lower leg and discharged on 1/19.

## 2021-02-04 DIAGNOSIS — E11.8 TYPE 2 DIABETES MELLITUS WITH COMPLICATION, WITH LONG-TERM CURRENT USE OF INSULIN (HCC): ICD-10-CM

## 2021-02-04 DIAGNOSIS — Z79.4 TYPE 2 DIABETES MELLITUS WITH COMPLICATION, WITH LONG-TERM CURRENT USE OF INSULIN (HCC): ICD-10-CM

## 2021-02-04 DIAGNOSIS — Z98.890 HISTORY OF CRANIOTOMY: ICD-10-CM

## 2021-02-04 RX ORDER — OMEPRAZOLE 40 MG/1
CAPSULE, DELAYED RELEASE ORAL
Qty: 90 CAPSULE | Refills: 1 | OUTPATIENT
Start: 2021-02-04

## 2021-02-04 RX ORDER — GABAPENTIN 300 MG/1
CAPSULE ORAL
Qty: 90 CAPSULE | Refills: 1 | OUTPATIENT
Start: 2021-02-04

## 2021-02-08 DIAGNOSIS — Z79.4 TYPE 2 DIABETES MELLITUS WITH OTHER NEUROLOGIC COMPLICATION, WITH LONG-TERM CURRENT USE OF INSULIN (HCC): ICD-10-CM

## 2021-02-08 DIAGNOSIS — E11.49 TYPE 2 DIABETES MELLITUS WITH OTHER NEUROLOGIC COMPLICATION, WITH LONG-TERM CURRENT USE OF INSULIN (HCC): ICD-10-CM

## 2021-02-10 RX ORDER — INSULIN ASPART 100 [IU]/ML
INJECTION, SOLUTION INTRAVENOUS; SUBCUTANEOUS
Qty: 90 ML | Refills: 0 | OUTPATIENT
Start: 2021-02-10

## 2021-02-10 RX ORDER — LANCETS
EACH MISCELLANEOUS
Qty: 306 EACH | Refills: 0 | OUTPATIENT
Start: 2021-02-10

## 2021-02-10 NOTE — TELEPHONE ENCOUNTER
LOV 12/12/2019  No F/U     Per TE dtd 12/26/20 - Pt living in [place]. RN advised pt at that time she will need to find another provider in [place] or [place] apt. Letter sent 9/24/2020    NOTE: since pt has not been seen in last 6 mos, Medicare requirements not met for freestyle maximo. Per TE dtd 1/4/21 -Our office has made multiple attempts to contact patient including on 1/4/21and to [facility] phone number in [place].

## 2021-03-10 ENCOUNTER — TELEPHONE (OUTPATIENT)
Dept: INTERNAL MEDICINE CLINIC | Facility: CLINIC | Age: 51
End: 2021-03-10

## 2021-03-17 DIAGNOSIS — Z23 NEED FOR VACCINATION: ICD-10-CM

## 2021-03-22 NOTE — TELEPHONE ENCOUNTER
Patient has not been seen by Endo or IM since 2019  May have moved or, new doctor?   Needs appointment

## 2021-04-02 DIAGNOSIS — Z79.4 TYPE 2 DIABETES MELLITUS WITH COMPLICATION, WITH LONG-TERM CURRENT USE OF INSULIN (HCC): ICD-10-CM

## 2021-04-02 DIAGNOSIS — E11.8 TYPE 2 DIABETES MELLITUS WITH COMPLICATION, WITH LONG-TERM CURRENT USE OF INSULIN (HCC): ICD-10-CM

## 2021-04-02 RX ORDER — INSULIN GLARGINE 100 [IU]/ML
INJECTION, SOLUTION SUBCUTANEOUS
Qty: 54 ML | Refills: 0 | OUTPATIENT
Start: 2021-04-02

## 2021-04-02 NOTE — TELEPHONE ENCOUNTER
Per previous encounter patient has moved out of state and prior refills were already sent. Last seen >1 year ago.

## 2021-04-07 ENCOUNTER — TELEPHONE (OUTPATIENT)
Dept: INTERNAL MEDICINE CLINIC | Facility: CLINIC | Age: 51
End: 2021-04-07

## 2021-04-07 DIAGNOSIS — H25.13 AGE-RELATED NUCLEAR CATARACT OF BOTH EYES: Primary | ICD-10-CM

## 2021-04-07 DIAGNOSIS — H02.88A MEIBOMIAN GLAND DYSFUNCTION (MGD), BILATERAL, BOTH UPPER AND LOWER LIDS: ICD-10-CM

## 2021-04-07 DIAGNOSIS — H02.88B MEIBOMIAN GLAND DYSFUNCTION (MGD), BILATERAL, BOTH UPPER AND LOWER LIDS: ICD-10-CM

## 2021-04-07 DIAGNOSIS — E10.9 TYPE 1 DIABETES MELLITUS WITHOUT RETINOPATHY (HCC): ICD-10-CM

## 2021-04-07 NOTE — TELEPHONE ENCOUNTER
Pt calling to request a Opthalmology referral. She is unable to provide me the doctor's last name only her first name is ninoska. Pt states her apt is scheduled for Monday 4/12/21.  Advised that I am unable to see any apts scheduled for this upcoming Monday t

## 2021-04-08 ENCOUNTER — TELEPHONE (OUTPATIENT)
Dept: INTERNAL MEDICINE CLINIC | Facility: CLINIC | Age: 51
End: 2021-04-08

## 2021-04-08 NOTE — TELEPHONE ENCOUNTER
Patient is calling because she recevied a message to obtain an order for and to schedule a Hemoglobin A1C. Can we please order that, If appropriate? Please Advise.

## 2021-04-08 NOTE — TELEPHONE ENCOUNTER
Patient is calling looking for an update on the referral for Dr. Cecile Bernheim, in Ophthalmology. Patient's concern is that her appointment is coming up on Monday 4/12      Please Advise.

## 2021-04-08 NOTE — TELEPHONE ENCOUNTER
Patient called and is asking to have a Referral for a Diabetic Eye Exam to be sent to Dr. Waldo Petersen as well. She received a message stating she is due for one now. Please Advise.

## 2021-04-09 NOTE — TELEPHONE ENCOUNTER
It could be that Dr. Barb Hollis did not sign orders because she has not seen pt since 2018.   Pt has been seeing Dr. Mingo Mccormack, but also has not seen her since 2019

## 2021-04-09 NOTE — TELEPHONE ENCOUNTER
Dr. Cristal Conway, patient was last seen in 2019. Appointment is booked for 04/19/2021. Would you like to order labs or wait until the office visit?

## 2021-04-09 NOTE — TELEPHONE ENCOUNTER
Spoke to patient and informed her that she will need to schedule an appointment before a referral is given. Last time she was seen was 11/29/19. She was transferred to the phone room to make an appointment.     She has an appointment on 4-19-21 with Maya Hernández

## 2021-04-12 ENCOUNTER — TELEPHONE (OUTPATIENT)
Dept: INTERNAL MEDICINE CLINIC | Facility: CLINIC | Age: 51
End: 2021-04-12

## 2021-04-12 DIAGNOSIS — Z79.4 TYPE 2 DIABETES MELLITUS WITH COMPLICATION, WITH LONG-TERM CURRENT USE OF INSULIN (HCC): Primary | ICD-10-CM

## 2021-04-12 DIAGNOSIS — E11.8 TYPE 2 DIABETES MELLITUS WITH COMPLICATION, WITH LONG-TERM CURRENT USE OF INSULIN (HCC): Primary | ICD-10-CM

## 2021-04-12 NOTE — TELEPHONE ENCOUNTER
LMTCB.  When pt calls back pls inform her that Dr. Cristina Michaud has ordered labs to be done prior to office visit on 4/19. Pt needs to be fasting for 12 hrs prior to labs being drawn, and to stay hydrated with water.

## 2021-04-15 ENCOUNTER — TELEPHONE (OUTPATIENT)
Dept: INTERNAL MEDICINE CLINIC | Facility: CLINIC | Age: 51
End: 2021-04-15

## 2021-04-15 DIAGNOSIS — Z01.00 DIABETIC EYE EXAM (HCC): ICD-10-CM

## 2021-04-15 DIAGNOSIS — H26.9 CATARACT, UNSPECIFIED CATARACT TYPE, UNSPECIFIED LATERALITY: Primary | ICD-10-CM

## 2021-04-15 DIAGNOSIS — E11.9 DIABETIC EYE EXAM (HCC): ICD-10-CM

## 2021-04-15 NOTE — TELEPHONE ENCOUNTER
Hello,    Please note that Dr. Breonna Singh is not within patient's [employer] network. Please re-direct patient to an in network provider. Thank you, Laz Mathew Specialist    Managed Care.

## 2021-04-19 ENCOUNTER — LAB ENCOUNTER (OUTPATIENT)
Dept: LAB | Age: 51
End: 2021-04-19
Attending: INTERNAL MEDICINE
Payer: MEDICARE

## 2021-04-19 ENCOUNTER — OFFICE VISIT (OUTPATIENT)
Dept: INTERNAL MEDICINE CLINIC | Facility: CLINIC | Age: 51
End: 2021-04-19
Payer: MEDICARE

## 2021-04-19 VITALS
HEIGHT: 62 IN | SYSTOLIC BLOOD PRESSURE: 170 MMHG | HEART RATE: 75 BPM | BODY MASS INDEX: 35.33 KG/M2 | WEIGHT: 192 LBS | DIASTOLIC BLOOD PRESSURE: 89 MMHG

## 2021-04-19 DIAGNOSIS — E11.8 TYPE 2 DIABETES MELLITUS WITH COMPLICATION, WITH LONG-TERM CURRENT USE OF INSULIN (HCC): ICD-10-CM

## 2021-04-19 DIAGNOSIS — K59.04 CHRONIC IDIOPATHIC CONSTIPATION: ICD-10-CM

## 2021-04-19 DIAGNOSIS — I10 ESSENTIAL HYPERTENSION WITH GOAL BLOOD PRESSURE LESS THAN 140/90: ICD-10-CM

## 2021-04-19 DIAGNOSIS — Z98.890 S/P CAROTID ENDARTERECTOMY: ICD-10-CM

## 2021-04-19 DIAGNOSIS — R56.9 SEIZURE (HCC): ICD-10-CM

## 2021-04-19 DIAGNOSIS — I63.9 CEREBROVASCULAR ACCIDENT (CVA), UNSPECIFIED MECHANISM (HCC): Primary | ICD-10-CM

## 2021-04-19 DIAGNOSIS — F32.A DEPRESSION, UNSPECIFIED DEPRESSION TYPE: ICD-10-CM

## 2021-04-19 DIAGNOSIS — Z79.4 TYPE 2 DIABETES MELLITUS WITH COMPLICATION, WITH LONG-TERM CURRENT USE OF INSULIN (HCC): ICD-10-CM

## 2021-04-19 DIAGNOSIS — Z12.31 SCREENING MAMMOGRAM, ENCOUNTER FOR: ICD-10-CM

## 2021-04-19 DIAGNOSIS — Z98.890 HISTORY OF CRANIOTOMY: ICD-10-CM

## 2021-04-19 PROCEDURE — 80061 LIPID PANEL: CPT

## 2021-04-19 PROCEDURE — 99214 OFFICE O/P EST MOD 30 MIN: CPT | Performed by: INTERNAL MEDICINE

## 2021-04-19 PROCEDURE — 83036 HEMOGLOBIN GLYCOSYLATED A1C: CPT

## 2021-04-19 PROCEDURE — 36415 COLL VENOUS BLD VENIPUNCTURE: CPT

## 2021-04-19 PROCEDURE — 84443 ASSAY THYROID STIM HORMONE: CPT

## 2021-04-19 PROCEDURE — 85060 BLOOD SMEAR INTERPRETATION: CPT

## 2021-04-19 PROCEDURE — 82570 ASSAY OF URINE CREATININE: CPT

## 2021-04-19 PROCEDURE — 85025 COMPLETE CBC W/AUTO DIFF WBC: CPT

## 2021-04-19 PROCEDURE — 82043 UR ALBUMIN QUANTITATIVE: CPT

## 2021-04-19 PROCEDURE — 80053 COMPREHEN METABOLIC PANEL: CPT

## 2021-04-19 PROCEDURE — 81001 URINALYSIS AUTO W/SCOPE: CPT | Performed by: INTERNAL MEDICINE

## 2021-04-19 RX ORDER — METOPROLOL SUCCINATE 100 MG/1
100 TABLET, EXTENDED RELEASE ORAL
Qty: 90 TABLET | Refills: 1 | Status: SHIPPED | OUTPATIENT
Start: 2021-04-19 | End: 2021-11-05

## 2021-04-19 RX ORDER — LOSARTAN POTASSIUM 100 MG/1
50 TABLET ORAL
Qty: 90 TABLET | Refills: 0 | Status: SHIPPED | OUTPATIENT
Start: 2021-04-19 | End: 2021-05-10

## 2021-04-19 RX ORDER — METOPROLOL SUCCINATE 25 MG/1
TABLET, EXTENDED RELEASE ORAL
Qty: 90 TABLET | Refills: 1 | Status: CANCELLED | OUTPATIENT
Start: 2021-04-19

## 2021-04-19 RX ORDER — AMLODIPINE BESYLATE 5 MG/1
TABLET ORAL
Qty: 90 TABLET | Refills: 1 | Status: CANCELLED | OUTPATIENT
Start: 2021-04-19

## 2021-04-19 RX ORDER — ATORVASTATIN CALCIUM 40 MG/1
40 TABLET, FILM COATED ORAL NIGHTLY
Qty: 90 TABLET | Refills: 1 | Status: CANCELLED | OUTPATIENT
Start: 2021-04-19

## 2021-04-19 RX ORDER — ATORVASTATIN CALCIUM 40 MG/1
40 TABLET, FILM COATED ORAL NIGHTLY
Qty: 360 TABLET | Refills: 0 | Status: SHIPPED | OUTPATIENT
Start: 2021-04-19 | End: 2021-10-29

## 2021-04-19 RX ORDER — ESCITALOPRAM OXALATE 20 MG/1
TABLET ORAL
Qty: 45 TABLET | Refills: 1 | Status: SHIPPED | OUTPATIENT
Start: 2021-04-19 | End: 2021-06-03

## 2021-04-19 RX ORDER — METOPROLOL SUCCINATE 25 MG/1
TABLET, EXTENDED RELEASE ORAL
Qty: 90 TABLET | Refills: 1 | Status: SHIPPED | OUTPATIENT
Start: 2021-04-19 | End: 2021-11-05

## 2021-04-19 RX ORDER — HYDROCHLOROTHIAZIDE 25 MG/1
25 TABLET ORAL
Qty: 90 TABLET | Refills: 1 | Status: CANCELLED | OUTPATIENT
Start: 2021-04-19

## 2021-04-19 RX ORDER — ESCITALOPRAM OXALATE 20 MG/1
TABLET ORAL
Qty: 45 TABLET | Refills: 1 | Status: CANCELLED | OUTPATIENT
Start: 2021-04-19

## 2021-04-19 RX ORDER — GABAPENTIN 300 MG/1
300 CAPSULE ORAL NIGHTLY
Qty: 90 CAPSULE | Refills: 1 | Status: SHIPPED | OUTPATIENT
Start: 2021-04-19

## 2021-04-19 RX ORDER — DOCUSATE SODIUM 100 MG/1
CAPSULE, LIQUID FILLED ORAL
Qty: 30 CAPSULE | Refills: 1 | Status: CANCELLED | OUTPATIENT
Start: 2021-04-19

## 2021-04-19 RX ORDER — OMEPRAZOLE 40 MG/1
CAPSULE, DELAYED RELEASE ORAL
Qty: 90 CAPSULE | Refills: 1 | Status: CANCELLED | OUTPATIENT
Start: 2021-04-19

## 2021-04-19 RX ORDER — AMLODIPINE BESYLATE 5 MG/1
TABLET ORAL
Qty: 90 TABLET | Refills: 1 | Status: SHIPPED | OUTPATIENT
Start: 2021-04-19 | End: 2021-12-08

## 2021-04-19 RX ORDER — METOPROLOL SUCCINATE 100 MG/1
100 TABLET, EXTENDED RELEASE ORAL
Qty: 90 TABLET | Refills: 1 | Status: CANCELLED | OUTPATIENT
Start: 2021-04-19

## 2021-04-19 RX ORDER — HYDROCHLOROTHIAZIDE 25 MG/1
25 TABLET ORAL
Qty: 90 TABLET | Refills: 0 | Status: SHIPPED | OUTPATIENT
Start: 2021-04-19

## 2021-04-19 RX ORDER — LEVETIRACETAM 500 MG/1
TABLET ORAL
Qty: 180 TABLET | Refills: 1 | Status: CANCELLED | OUTPATIENT
Start: 2021-04-19

## 2021-04-19 RX ORDER — LEVETIRACETAM 500 MG/1
TABLET ORAL
Qty: 180 TABLET | Refills: 0 | Status: SHIPPED | OUTPATIENT
Start: 2021-04-19 | End: 2021-07-29

## 2021-04-19 NOTE — PROGRESS NOTES
HPI/Subjective:   Patient ID: Juwan Mullen is a 46year old female.   Patient presents with:  HTN    Patient in office today for HTN   Need all refills  Pt changing  PCP  Has  Been  [place] back and fourth visiting her mother   said he she has not have a (temporal  and occasionaly  eyes  hurting ,  no headache today ). Negative for dizziness, speech difficulty, weakness and light-headedness. Psychiatric/Behavioral: Negative for confusion. The patient is not nervous/anxious.       Current Outpatient [facility] PEN NEEDLE ABDOULAYE U/F) 32G X 4 MM Does not apply Misc Inject 4 times daily 200 each 2   • docusate sodium (DOK) 100 MG Oral Cap  mg capsule 30 capsule 1   • TRUEPLUS LANCETS 33G Does not apply Misc TEST THREE TIMES DAILY 100 each 2   • aspirin 325 MG membrane, ear canal and external ear normal.      Left Ear: Tympanic membrane, ear canal and external ear normal.      Nose:      Right Sinus: No maxillary sinus tenderness or frontal sinus tenderness.       Left Sinus: No maxillary sinus tenderness or fron losartan 100 mg for some time   Start 100 mg  Mg 1/2 mg - 50 mg every day   Fasting labs to complete today   Refills after the blood test completed    Depression, unspecified depression type  Stable Lexapro 20 mg half of the tablet     History of craniotom total) by mouth 2 (two) times daily with meals. • losartan 100 MG Oral Tab 90 tablet 0     Sig: Take 0.5 tablets (50 mg total) by mouth once daily. • gabapentin 300 MG Oral Cap 90 capsule 1     Sig: Take 1 capsule (300 mg total) by mouth nightly.    • e

## 2021-04-20 RX ORDER — ATORVASTATIN CALCIUM 40 MG/1
TABLET, FILM COATED ORAL
Qty: 360 TABLET | Refills: 0 | OUTPATIENT
Start: 2021-04-20

## 2021-04-22 ENCOUNTER — TELEPHONE (OUTPATIENT)
Dept: INTERNAL MEDICINE CLINIC | Facility: CLINIC | Age: 51
End: 2021-04-22

## 2021-04-22 DIAGNOSIS — D50.9 IRON DEFICIENCY ANEMIA, UNSPECIFIED IRON DEFICIENCY ANEMIA TYPE: Primary | ICD-10-CM

## 2021-04-22 NOTE — PROGRESS NOTES
Please call patient with blood test results. Kidney and liver function are normal, no anemia.    Cholesterol is elevated LDL-patient to restart cholesterol medicine  sugar is elevated A1c is 10.7 goal is less than 7-recommend to see her endocrinologist-f

## 2021-04-22 NOTE — TELEPHONE ENCOUNTER
Can use cera cliff  Cream - not lotion - in   [place]      For hearing thinning comes with age but her thyroid hormone is in normal range , sugars are elevated needs to work on that  Possibly due to low iron or B12 vitamin deficiency  Labs on-iron and [address]

## 2021-04-22 NOTE — TELEPHONE ENCOUNTER
Dr Shandra Patel: Patient asked if you can recommend a cream for dry skin on legs, and cracked skin on fingers.   She states she mentioned this at [address] [place] [address] 4/19/21 and assumed she was going to get a medicated cream.        (labs discussed with patient; she has f/u

## 2021-04-23 NOTE — TELEPHONE ENCOUNTER
RN called patient. RN informed patient of provider's message below. Patient verbalizes understanding and is agreeable to instructions.  RN provided phone number to schedule lab tests and mammogram.

## 2021-05-03 ENCOUNTER — TELEPHONE (OUTPATIENT)
Dept: INTERNAL MEDICINE CLINIC | Facility: CLINIC | Age: 51
End: 2021-05-03

## 2021-05-03 DIAGNOSIS — Z01.00 DIABETIC EYE EXAM (HCC): ICD-10-CM

## 2021-05-03 DIAGNOSIS — E11.9 DIABETIC EYE EXAM (HCC): ICD-10-CM

## 2021-05-03 DIAGNOSIS — H02.88A MEIBOMIAN GLAND DYSFUNCTION (MGD), BILATERAL, BOTH UPPER AND LOWER LIDS: ICD-10-CM

## 2021-05-03 DIAGNOSIS — H26.9 CATARACT, UNSPECIFIED CATARACT TYPE, UNSPECIFIED LATERALITY: Primary | ICD-10-CM

## 2021-05-03 DIAGNOSIS — E10.9 TYPE 1 DIABETES MELLITUS WITHOUT RETINOPATHY (HCC): ICD-10-CM

## 2021-05-03 DIAGNOSIS — H02.88B MEIBOMIAN GLAND DYSFUNCTION (MGD), BILATERAL, BOTH UPPER AND LOWER LIDS: ICD-10-CM

## 2021-05-03 DIAGNOSIS — H25.13 AGE-RELATED NUCLEAR CATARACT OF BOTH EYES: ICD-10-CM

## 2021-05-03 DIAGNOSIS — I63.9 CEREBROVASCULAR ACCIDENT (CVA), UNSPECIFIED MECHANISM (HCC): ICD-10-CM

## 2021-05-06 ENCOUNTER — HOSPITAL ENCOUNTER (OUTPATIENT)
Dept: MAMMOGRAPHY | Facility: HOSPITAL | Age: 51
Discharge: HOME | End: 2021-05-06
Attending: INTERNAL MEDICINE
Payer: MEDICARE

## 2021-05-06 DIAGNOSIS — Z12.31 SCREENING MAMMOGRAM, ENCOUNTER FOR: ICD-10-CM

## 2021-05-06 PROCEDURE — 77063 BREAST TOMOSYNTHESIS BI: CPT | Performed by: INTERNAL MEDICINE

## 2021-05-06 PROCEDURE — 77067 SCR MAMMO BI INCL CAD: CPT | Performed by: INTERNAL MEDICINE

## 2021-05-10 ENCOUNTER — OFFICE VISIT (OUTPATIENT)
Dept: INTERNAL MEDICINE CLINIC | Facility: CLINIC | Age: 51
End: 2021-05-10
Payer: MEDICARE

## 2021-05-10 VITALS
BODY MASS INDEX: 35.7 KG/M2 | WEIGHT: 194 LBS | HEIGHT: 62 IN | HEART RATE: 73 BPM | SYSTOLIC BLOOD PRESSURE: 158 MMHG | OXYGEN SATURATION: 96 % | DIASTOLIC BLOOD PRESSURE: 80 MMHG

## 2021-05-10 DIAGNOSIS — I10 ESSENTIAL HYPERTENSION: Primary | ICD-10-CM

## 2021-05-10 DIAGNOSIS — Z01.419 ENCOUNTER FOR ANNUAL ROUTINE GYNECOLOGICAL EXAMINATION: ICD-10-CM

## 2021-05-10 DIAGNOSIS — Z79.4 TYPE 2 DIABETES MELLITUS WITH OTHER NEUROLOGIC COMPLICATION, WITH LONG-TERM CURRENT USE OF INSULIN (HCC): ICD-10-CM

## 2021-05-10 DIAGNOSIS — E11.49 TYPE 2 DIABETES MELLITUS WITH OTHER NEUROLOGIC COMPLICATION, WITH LONG-TERM CURRENT USE OF INSULIN (HCC): ICD-10-CM

## 2021-05-10 DIAGNOSIS — I10 ESSENTIAL HYPERTENSION WITH GOAL BLOOD PRESSURE LESS THAN 140/90: ICD-10-CM

## 2021-05-10 PROCEDURE — 99214 OFFICE O/P EST MOD 30 MIN: CPT | Performed by: INTERNAL MEDICINE

## 2021-05-10 RX ORDER — INSULIN GLARGINE 100 [IU]/ML
50 INJECTION, SOLUTION SUBCUTANEOUS NIGHTLY
COMMUNITY
End: 2021-11-12

## 2021-05-10 RX ORDER — LOSARTAN POTASSIUM 100 MG/1
100 TABLET ORAL
Qty: 90 TABLET | Refills: 0 | Status: SHIPPED | OUTPATIENT
Start: 2021-05-10

## 2021-05-10 NOTE — PROGRESS NOTES
HPI/Subjective:   Patient ID: Juwan Mullen is a 46year old female. Patient presents with:  Hypertension      Patient in office today for Bp check pt state  Did not take losartan - did not  from pharmacy   Patient states feeling well otherwise.  D mouth once daily.  Start  1/2 tab  Daily for 1-2 weeks 90 tablet 0   • Metoprolol Succinate ER 25 MG Oral Tablet 24 Hr TAKE 1 TABLET BY MOUTH EVERY NIGHT 90 tablet 1   • Metoprolol Succinate  MG Oral Tablet 24 Hr Take 1 tablet (100 mg total) by mouth LANCETS 33G Does not apply Misc TEST THREE TIMES DAILY 100 each 2   • aspirin 325 MG Oral Tab Take 325 mg by mouth daily.      • TRUE METRIX BLOOD GLUCOSE TEST In Vitro Strip USE TO TEST THREE TIMES DAILY 300 strip 1   • MICROLET LANCETS Does not apply Misc Right eye: No discharge. Left eye: No discharge. Neck:      Thyroid: No thyromegaly. Vascular: No JVD. Cardiovascular:      Rate and Rhythm: Normal rate and regular rhythm. Heart sounds: Normal heart sounds. No murmur heard.      Pulmo test completed  F/u visit  2- weeks     Depression, unspecified depression type  Stable Lexapro 20 mg half of the tablet     History of craniotomy  Hx of seizures  S/p CEA  Carotid endarterectomy   History of CVA patient is seeing neurosurgeon Dr. Nohemy Montalvo

## 2021-05-17 NOTE — TELEPHONE ENCOUNTER
Pls advise on message below.   Ok to provide referral to [facility]?

## 2021-05-17 NOTE — TELEPHONE ENCOUNTER
Willis Damon,    Dr. Sumeet Blackmon is not within patient's health plan. Please re-direct patient to in network provider. Patient can be seen at [facility]. Thank you, Bebe Cantu Specialist    Managed Care.

## 2021-05-18 ENCOUNTER — MED REC SCAN ONLY (OUTPATIENT)
Dept: INTERNAL MEDICINE CLINIC | Facility: CLINIC | Age: 51
End: 2021-05-18

## 2021-05-20 NOTE — TELEPHONE ENCOUNTER
Please provide a doctor that patient will be seeing at [facility]. Thank you, Nicole Letters Specialist    Managed Care.

## 2021-05-20 NOTE — TELEPHONE ENCOUNTER
Referral placed in the system for specialist in [place] to manage care for approval from insurance

## 2021-05-25 ENCOUNTER — TELEPHONE (OUTPATIENT)
Dept: INTERNAL MEDICINE CLINIC | Facility: CLINIC | Age: 51
End: 2021-05-25

## 2021-05-25 ENCOUNTER — NURSE TRIAGE (OUTPATIENT)
Dept: INTERNAL MEDICINE CLINIC | Facility: CLINIC | Age: 51
End: 2021-05-25

## 2021-05-25 NOTE — TELEPHONE ENCOUNTER
Referral that is pending was placed by [facility]. Manage care does not have status to this referral. Patient will needto contact Dr. Luke Bloom office for status. Thank you, Gina Freed Specialist    [facility] Care.

## 2021-05-25 NOTE — TELEPHONE ENCOUNTER
Left message to call back. Please transfer to triage. 1st attempt. I called pt to provide her with the [employer] transportion services number from [facility] they might be able to help her. The number is [phone].

## 2021-05-25 NOTE — TELEPHONE ENCOUNTER
Sounds patient was overwhelmed with the appointment coming up  Has some anxiety possible panic disorder regarding that    Patient had elevated sugars recommend to check her sugars more often and take her insulin talk to the endocrinologist to improve the s

## 2021-05-25 NOTE — TELEPHONE ENCOUNTER
Pt calling and wanting to know about her referral for Dr. Paiz Every informed her it was pending and she will need to call manage care if she needs further information.  She did not want the number right now and then started asking about her appt with other provid

## 2021-05-25 NOTE — TELEPHONE ENCOUNTER
Referral needs to be place for an individual provider. Please see below. Thank you, Alexei Cruz Specialist    [facility] Care.

## 2021-05-25 NOTE — TELEPHONE ENCOUNTER
Patient asking for status on referral for  at [address] [place] [address]. Referral request 5/20/21 still pending.

## 2021-05-25 NOTE — TELEPHONE ENCOUNTER
Sounds patient was overwhelmed with the appointment coming up  Has some anxiety possible panic disorder regarding that     Patient had elevated sugars recommend to check her sugars more often and take her insulin talk to the endocrinologist to improve the

## 2021-05-25 NOTE — TELEPHONE ENCOUNTER
Dr. Obdulia Robison - Patient presented confused but possibly a panic attack, states she feels overwhelmed with all the appointments coming up. RN advised 911 call now, she declined. Any further recommendations? Please advise, thank you.    Please reply to pool call 911 and I will go when I need to\".  RN advised patient to call us back if symptoms persist, however if symptoms get severely worse, patient to seek immediate medical attention at ER, also if patient experiences shortness of breath, chest pain, or emma

## 2021-05-26 ENCOUNTER — EKG ENCOUNTER (OUTPATIENT)
Dept: LAB | Facility: HOSPITAL | Age: 51
End: 2021-05-26
Attending: ORTHOPAEDIC SURGERY
Payer: MEDICARE

## 2021-05-26 DIAGNOSIS — Z01.818 PREOP EXAMINATION: Primary | ICD-10-CM

## 2021-05-26 DIAGNOSIS — Z01.818 PREOP EXAMINATION: ICD-10-CM

## 2021-05-26 DIAGNOSIS — D50.9 IRON DEFICIENCY ANEMIA, UNSPECIFIED IRON DEFICIENCY ANEMIA TYPE: ICD-10-CM

## 2021-05-26 PROCEDURE — 82728 ASSAY OF FERRITIN: CPT

## 2021-05-26 PROCEDURE — 80053 COMPREHEN METABOLIC PANEL: CPT

## 2021-05-26 PROCEDURE — 93005 ELECTROCARDIOGRAM TRACING: CPT

## 2021-05-26 PROCEDURE — 36415 COLL VENOUS BLD VENIPUNCTURE: CPT

## 2021-05-26 PROCEDURE — 84466 ASSAY OF TRANSFERRIN: CPT

## 2021-05-26 PROCEDURE — 83540 ASSAY OF IRON: CPT

## 2021-05-26 PROCEDURE — 82607 VITAMIN B-12: CPT

## 2021-05-26 PROCEDURE — 93010 ELECTROCARDIOGRAM REPORT: CPT | Performed by: ORTHOPAEDIC SURGERY

## 2021-05-26 NOTE — TELEPHONE ENCOUNTER
SEE encounter 5/3    Note     Left message to call back. Please transfer to triage. 1st attempt. I called pt to provide her with the [employer] transportion services number from [facility] they might be able to help her.  The number is [phone]

## 2021-05-26 NOTE — TELEPHONE ENCOUNTER
Patient returned call, does not remember much information from yesterday does not remember speaking to anyone, reviewed notes from Dr Frankey Bowl, patient agreed, she has follow up appts scheduled with Dr Frankey Bowl 6/3 and Dr Heather Martínez 7/2.  Number provided t

## 2021-05-28 ENCOUNTER — OFFICE VISIT (OUTPATIENT)
Dept: FAMILY MEDICINE CLINIC | Facility: CLINIC | Age: 51
End: 2021-05-28
Payer: MEDICARE

## 2021-05-28 VITALS
HEIGHT: 62 IN | DIASTOLIC BLOOD PRESSURE: 92 MMHG | SYSTOLIC BLOOD PRESSURE: 144 MMHG | WEIGHT: 193 LBS | HEART RATE: 75 BPM | BODY MASS INDEX: 35.51 KG/M2

## 2021-05-28 DIAGNOSIS — Z01.818 PREOPERATIVE EXAMINATION: Primary | ICD-10-CM

## 2021-05-28 DIAGNOSIS — E11.65 TYPE 2 DIABETES MELLITUS WITH HYPERGLYCEMIA, WITH LONG-TERM CURRENT USE OF INSULIN (HCC): ICD-10-CM

## 2021-05-28 DIAGNOSIS — Z79.4 TYPE 2 DIABETES MELLITUS WITH HYPERGLYCEMIA, WITH LONG-TERM CURRENT USE OF INSULIN (HCC): ICD-10-CM

## 2021-05-28 PROCEDURE — 83036 HEMOGLOBIN GLYCOSYLATED A1C: CPT | Performed by: PHYSICIAN ASSISTANT

## 2021-05-28 PROCEDURE — 99213 OFFICE O/P EST LOW 20 MIN: CPT | Performed by: PHYSICIAN ASSISTANT

## 2021-05-28 RX ORDER — CALCIUM CITRATE/VITAMIN D3 200MG-6.25
TABLET ORAL
COMMUNITY

## 2021-05-28 RX ORDER — PEN NEEDLE, DIABETIC 32GX 5/32"
NEEDLE, DISPOSABLE MISCELLANEOUS
COMMUNITY

## 2021-05-28 NOTE — PROGRESS NOTES
HPI:     HPI  46year-old female is here in the office for Pre-op. Patient will have right carpal syndrome surgery on 06/02/2021. Patient is doing fine at this time. Patient denies of chest pain, SOB, N/V/C/D, fever, dizziness, syncope, abdominal pain.  Erinn Cho AND SLIDING SCALE ) 90 mL 0   • docusate sodium (DOK) 100 MG Oral Cap  mg capsule 30 capsule 1   • aspirin 325 MG Oral Tab Take 325 mg by mouth daily.      • Cholecalciferol (VITAMIN D) 2000 units Oral Cap Take 1 capsule (2,000 Units total) by mouth not taking: Reported on 5/28/2021 )     • UNIFINE PENTIPS 32G X 4 MM Does not apply Misc USE DAILY WITH LEVEMIR 100 each 10       Allergies:     Reglan [Metoclopram*    OTHER (SEE COMMENTS)    Comment:Sensitivity, with crawling sensation.   Adhesive Tape Problem Relation Age of Onset   • Diabetes Father    • Hypertension Father    • Heart Disorder Father    • Lipids Father    • Obesity Father    • Heart Disorder Mother    • Lipids Mother    • Obesity Mother    • Lipids Brother    • Obesity Brother    • G Ex-Partner:       Emotionally Abused:       Physically Abused:       Sexually Abused:     Review of Systems:   Review of Systems   Constitutional: Negative for activity change, chills, fatigue and fever.    HENT: Negative for congestion, ear discharge, ear There is no right CVA tenderness or left CVA tenderness. Lymphadenopathy:      Cervical: No cervical adenopathy. Skin:     Findings: No rash. Neurological:      Mental Status: She is alert and oriented to person, place, and time.       Gait: Gait norm

## 2021-05-29 PROBLEM — E11.65 TYPE 2 DIABETES MELLITUS WITH HYPERGLYCEMIA, WITH LONG-TERM CURRENT USE OF INSULIN (HCC): Status: ACTIVE | Noted: 2021-05-29

## 2021-05-29 PROBLEM — Z79.4 TYPE 2 DIABETES MELLITUS WITH HYPERGLYCEMIA, WITH LONG-TERM CURRENT USE OF INSULIN (HCC): Status: ACTIVE | Noted: 2021-05-29

## 2021-06-01 ENCOUNTER — TELEPHONE (OUTPATIENT)
Dept: INTERNAL MEDICINE CLINIC | Facility: CLINIC | Age: 51
End: 2021-06-01

## 2021-06-01 ENCOUNTER — TELEPHONE (OUTPATIENT)
Dept: FAMILY MEDICINE CLINIC | Facility: CLINIC | Age: 51
End: 2021-06-01

## 2021-06-01 DIAGNOSIS — D50.9 IRON DEFICIENCY ANEMIA, UNSPECIFIED IRON DEFICIENCY ANEMIA TYPE: Primary | ICD-10-CM

## 2021-06-01 RX ORDER — DOXYCYCLINE HYCLATE 50 MG/1
325 CAPSULE, GELATIN COATED ORAL
Qty: 90 TABLET | Refills: 1 | Status: SHIPPED | OUTPATIENT
Start: 2021-06-01 | End: 2021-06-03

## 2021-06-01 NOTE — TELEPHONE ENCOUNTER
Please call patient and notify patient that she is not cleared for her surgery due to elevated BG. Patient needs to f/u with PCP for lower BG.

## 2021-06-01 NOTE — TELEPHONE ENCOUNTER
Osiel Rico from [place] [address] (Dr. Justus Choudhury) is requesting medical clearance for patient. Patient is scheduled for surgery tomorrow. Please fax, call with any questions.     Fax # [phone]

## 2021-06-01 NOTE — TELEPHONE ENCOUNTER
Called [phone]. Surgery schedulers have left the office for today. Left message informing them to disregard faxed medical clearance. Sheri Gan will review this with Dr. Lana Lora.  Pt is not cleared for surgery tomorrow and pt needs to follow up with D

## 2021-06-01 NOTE — TELEPHONE ENCOUNTER
Pls see surgery clearance telephone communication 6/1. S/w pt and left surgery schedulers a message that pt is not cleared for surgery.

## 2021-06-01 NOTE — TELEPHONE ENCOUNTER
Patient returned the  call does not remember her blood sugar number from this morning , sounds very sleepy,     Call was lost     [place]  office spoke with Cheli Chavez .  Cheli Chavez will inform [facility] concerning above

## 2021-06-01 NOTE — TELEPHONE ENCOUNTER
Returned patient's call back.  Patient stated she just checked her blood sugar and she got 301 and states it's been about two hours since she last ate, patient does not remember if she did or did not check her blood sugar in the morning, Patient sounded lik

## 2021-06-02 ENCOUNTER — MED REC SCAN ONLY (OUTPATIENT)
Dept: INTERNAL MEDICINE CLINIC | Facility: CLINIC | Age: 51
End: 2021-06-02

## 2021-06-02 NOTE — TELEPHONE ENCOUNTER
S/w Shawn Thibodeaux today and inquired if she rec'd my voicemail. She has not rec'd my message and I informed her that we are not clearing pt for surgery with Dr. Brien Ayers today.   Pt has a follow up with Dr. Radha Quinn tomorrow and will discuss further treatment plans

## 2021-06-03 ENCOUNTER — OFFICE VISIT (OUTPATIENT)
Dept: INTERNAL MEDICINE CLINIC | Facility: CLINIC | Age: 51
End: 2021-06-03
Payer: MEDICARE

## 2021-06-03 VITALS
SYSTOLIC BLOOD PRESSURE: 138 MMHG | HEIGHT: 62 IN | DIASTOLIC BLOOD PRESSURE: 78 MMHG | BODY MASS INDEX: 35.88 KG/M2 | WEIGHT: 195 LBS

## 2021-06-03 DIAGNOSIS — M70.31 CUBITAL BURSITIS OF RIGHT ELBOW: Primary | ICD-10-CM

## 2021-06-03 DIAGNOSIS — R41.3 MEMORY DISTURBANCE: ICD-10-CM

## 2021-06-03 DIAGNOSIS — F41.8 DEPRESSION WITH ANXIETY: ICD-10-CM

## 2021-06-03 DIAGNOSIS — Z98.890 HISTORY OF CRANIOTOMY: ICD-10-CM

## 2021-06-03 DIAGNOSIS — M67.421: ICD-10-CM

## 2021-06-03 PROCEDURE — 99214 OFFICE O/P EST MOD 30 MIN: CPT | Performed by: INTERNAL MEDICINE

## 2021-06-03 RX ORDER — OMEPRAZOLE 40 MG/1
CAPSULE, DELAYED RELEASE ORAL
Qty: 90 CAPSULE | Refills: 1 | Status: SHIPPED | OUTPATIENT
Start: 2021-06-03

## 2021-06-03 RX ORDER — ESCITALOPRAM OXALATE 20 MG/1
TABLET ORAL
Qty: 45 TABLET | Refills: 3 | Status: SHIPPED | OUTPATIENT
Start: 2021-06-03 | End: 2021-10-05

## 2021-06-03 NOTE — PROGRESS NOTES
HPI/Subjective:   Patient ID: Nany Xavier is a 46year old female. Patient presents with:  Diabetes  Arm Pain: C/o right arm pain for about 1-2 years. Recalls no injuries. Denies any numbness, tingling, but has some swelling.        Patient in office counting. She never participates in exercise. Her overall blood glucose range is >200 mg/dl. An ACE inhibitor/angiotensin II receptor blocker is being taken. Eye exam is current. Arm Pain   The pain is present in the right arm. This is a chronic problem. apply Misc BD Barbara 2nd Gen Pen Needle 32 gauge x 5/32\"   INJECT FOUR TIMES DAILY AS DIRECTED     • insulin glargine (LANTUS SOLOSTAR) 100 UNIT/ML Subcutaneous Solution Pen-injector Inject 50 Units into the skin nightly.        • losartan 100 MG Oral Tab Ta Does not apply Misc TEST THREE TIMES DAILY 100 each 2   • aspirin 325 MG Oral Tab Take 325 mg by mouth daily.      • TRUE METRIX BLOOD GLUCOSE TEST In Vitro Strip USE TO TEST THREE TIMES DAILY 300 strip 1   • MICROLET LANCETS Does not apply Misc Use to test tenderness or frontal sinus tenderness. Eyes:      General: No scleral icterus. Right eye: No discharge. Left eye: No discharge. Neck:      Thyroid: No thyromegaly. Vascular: No JVD.    Cardiovascular:      Rate and Rhythm: Normal ra qd  hydrochlorothiazide 25 mg every day   Labs        Memory concern  Had episode where he could not find things was anxious looking for things but did not know what she was doing  Patient states she slept afterwards and felt better had a few episodes last clothes   Advised to elevate the head of the bed   Avoid eating at least 3 hours before bedtime   Counseling on ideal weight/BMI  Take PPIs qd in the morning 30-60 minutes before breakfast always on empty stomach   Add Pepsid ac 30 min before diner   Side

## 2021-06-08 ENCOUNTER — HOSPITAL ENCOUNTER (OUTPATIENT)
Dept: GENERAL RADIOLOGY | Age: 51
Discharge: HOME | End: 2021-06-08
Attending: INTERNAL MEDICINE
Payer: MEDICARE

## 2021-06-08 DIAGNOSIS — M67.421: ICD-10-CM

## 2021-06-08 DIAGNOSIS — M70.31 CUBITAL BURSITIS OF RIGHT ELBOW: ICD-10-CM

## 2021-06-08 PROCEDURE — 73080 X-RAY EXAM OF ELBOW: CPT | Performed by: INTERNAL MEDICINE

## 2021-06-29 ENCOUNTER — TELEPHONE (OUTPATIENT)
Dept: ENDOCRINOLOGY CLINIC | Facility: CLINIC | Age: 51
End: 2021-06-29

## 2021-06-29 NOTE — TELEPHONE ENCOUNTER
Pharmacy refill request for:      •  Insulin Aspart Pen (NOVOLOG FLEXPEN) 100 UNIT/ML Subcutaneous Solution Pen-injector, INJECT 88 UNITS UNDER THE SKIN MAXIMUM THREE TIMES DAILY AT MEAL TIMES AS DIRECTED WITH BASE DOSE AND SLIDING SCALE (Patient taking di

## 2021-07-01 NOTE — TELEPHONE ENCOUNTER
LOV 12/12/19  FU 7/2/21      Per previous encounters she has moved out of state but had recent OV w/ PCP on 6/3/21     Routed as FYI.

## 2021-07-02 ENCOUNTER — TELEPHONE (OUTPATIENT)
Dept: ENDOCRINOLOGY CLINIC | Facility: CLINIC | Age: 51
End: 2021-07-02

## 2021-07-02 ENCOUNTER — OFFICE VISIT (OUTPATIENT)
Dept: ENDOCRINOLOGY CLINIC | Facility: CLINIC | Age: 51
End: 2021-07-02
Payer: MEDICARE

## 2021-07-02 VITALS
SYSTOLIC BLOOD PRESSURE: 139 MMHG | DIASTOLIC BLOOD PRESSURE: 83 MMHG | BODY MASS INDEX: 36 KG/M2 | HEART RATE: 63 BPM | WEIGHT: 195 LBS

## 2021-07-02 DIAGNOSIS — E11.8 TYPE 2 DIABETES MELLITUS WITH COMPLICATION, WITH LONG-TERM CURRENT USE OF INSULIN (HCC): Primary | ICD-10-CM

## 2021-07-02 DIAGNOSIS — Z79.4 TYPE 2 DIABETES MELLITUS WITH COMPLICATION, WITH LONG-TERM CURRENT USE OF INSULIN (HCC): Primary | ICD-10-CM

## 2021-07-02 LAB
CARTRIDGE LOT#: ABNORMAL NUMERIC
GLUCOSE BLOOD: 252
HEMOGLOBIN A1C: 10 % (ref 4.3–5.6)
TEST STRIP LOT #: NORMAL NUMERIC

## 2021-07-02 PROCEDURE — 82947 ASSAY GLUCOSE BLOOD QUANT: CPT | Performed by: INTERNAL MEDICINE

## 2021-07-02 PROCEDURE — 36416 COLLJ CAPILLARY BLOOD SPEC: CPT | Performed by: INTERNAL MEDICINE

## 2021-07-02 PROCEDURE — 83036 HEMOGLOBIN GLYCOSYLATED A1C: CPT | Performed by: INTERNAL MEDICINE

## 2021-07-02 PROCEDURE — 99214 OFFICE O/P EST MOD 30 MIN: CPT | Performed by: INTERNAL MEDICINE

## 2021-07-02 RX ORDER — SEMAGLUTIDE 1.34 MG/ML
0.5 INJECTION, SOLUTION SUBCUTANEOUS WEEKLY
Qty: 1.5 ML | Refills: 3 | Status: SHIPPED | OUTPATIENT
Start: 2021-07-02 | End: 2021-10-19

## 2021-07-02 NOTE — PATIENT INSTRUCTIONS
START Ozempic 0.25mg subcutaneous weekly for one month then increase to 0.5mg subcutaneous weekly     CONTINUE Lantus 60 units subcutaneous daily    Novolog  INSULIN SLIDING SCALE  Base Values  Breakfast: 30  Lunch: 30  Dinner: 30  Ranges:  80-99: -2  100-

## 2021-07-02 NOTE — PROGRESS NOTES
Name: Dick Hudson  Date: 7/2/2021    Referring Physician: Michael Elliott    HISTORY OF PRESENT ILLNESS   Dick Hudson is a 46year old female who presents for diabetes mellitus, diagnosed over 10 years ago.   She was seen during hospitalization in 3 PEN NEEDLE ABDOULAYE 2ND GEN) 32G X 4 MM Does not apply Misc, BD Abdoulaye 2nd Gen Pen Needle 32 gauge x 5/32\"  INJECT FOUR TIMES DAILY AS DIRECTED, Disp: , Rfl:   •  insulin glargine (LANTUS SOLOSTAR) 100 UNIT/ML Subcutaneous Solution Pen-injector, Inject 50 Units SLIDING SCALE (Patient taking differently: INJECT 35 UNITS UNDER THE SKIN MAXIMUM THREE TIMES DAILY AT MEAL TIMES AS DIRECTED WITH BASE DOSE AND SLIDING SCALE ), Disp: 90 mL, Rfl: 0  •  Insulin Pen Needle (BD PEN NEEDLE ABDOULAYE U/F) 32G X 4 MM Does not apply       Spouse name: Not on file      Number of children: 3      Years of education: Not on file      Highest education level: Not on file    Occupational History      Occupation:         Comment: disabled      Occupation: phlebotomy pupils  Ears/Nose/Mouth/Throat/Neck:  no palpable thyroid nodules or cervical lymphadenopathy  Back: no kyphosis or back tenderness  Musculoskeletal:  normal muscle strength and tone  Skin:  normal moisture and skin texture  Hair & Nails:  normal scalp shivani

## 2021-07-07 ENCOUNTER — TELEPHONE (OUTPATIENT)
Dept: INTERNAL MEDICINE CLINIC | Facility: CLINIC | Age: 51
End: 2021-07-07

## 2021-07-07 NOTE — TELEPHONE ENCOUNTER
Phone rings, no answer. Please find out which pharmacy should medication be sent since on 06/05/2021 it was refilled to [facility] in [place].       Disp Refills Start End    Omeprazole 40 MG Oral Capsule Delayed Release 90 capsule 1 6/3/2021     Sig: T

## 2021-07-07 NOTE — TELEPHONE ENCOUNTER
Called pt to reschedule her appt with  and she stated that she needs refill for Omeprazole 40 MG Oral Capsule Delayed Release  Please advise.

## 2021-07-08 NOTE — TELEPHONE ENCOUNTER
Left message for patient to call back. If she calls back please ask where the omeprazole should go to.

## 2021-07-13 ENCOUNTER — TELEPHONE (OUTPATIENT)
Dept: INTERNAL MEDICINE CLINIC | Facility: CLINIC | Age: 51
End: 2021-07-13

## 2021-07-13 ENCOUNTER — OFFICE VISIT (OUTPATIENT)
Dept: INTERNAL MEDICINE CLINIC | Facility: CLINIC | Age: 51
End: 2021-07-13
Payer: MEDICARE

## 2021-07-13 VITALS
WEIGHT: 192 LBS | SYSTOLIC BLOOD PRESSURE: 149 MMHG | HEIGHT: 62 IN | DIASTOLIC BLOOD PRESSURE: 87 MMHG | BODY MASS INDEX: 35.33 KG/M2 | HEART RATE: 81 BPM

## 2021-07-13 DIAGNOSIS — K59.04 CHRONIC IDIOPATHIC CONSTIPATION: ICD-10-CM

## 2021-07-13 DIAGNOSIS — Z79.4 TYPE 2 DIABETES MELLITUS WITH OTHER NEUROLOGIC COMPLICATION, WITH LONG-TERM CURRENT USE OF INSULIN (HCC): ICD-10-CM

## 2021-07-13 DIAGNOSIS — I10 ESSENTIAL HYPERTENSION: Primary | ICD-10-CM

## 2021-07-13 DIAGNOSIS — K43.9 HERNIA OF ANTERIOR ABDOMINAL WALL: ICD-10-CM

## 2021-07-13 DIAGNOSIS — E11.49 TYPE 2 DIABETES MELLITUS WITH OTHER NEUROLOGIC COMPLICATION, WITH LONG-TERM CURRENT USE OF INSULIN (HCC): ICD-10-CM

## 2021-07-13 PROCEDURE — 99214 OFFICE O/P EST MOD 30 MIN: CPT | Performed by: INTERNAL MEDICINE

## 2021-07-13 RX ORDER — DOCUSATE SODIUM 100 MG/1
CAPSULE, LIQUID FILLED ORAL
Qty: 30 CAPSULE | Refills: 1 | Status: SHIPPED | OUTPATIENT
Start: 2021-07-13 | End: 2021-07-13 | Stop reason: DRUGHIGH

## 2021-07-13 RX ORDER — ASPIRIN 81 MG
100 TABLET, DELAYED RELEASE (ENTERIC COATED) ORAL 2 TIMES DAILY PRN
Qty: 60 TABLET | Refills: 1 | Status: SHIPPED | OUTPATIENT
Start: 2021-07-13 | End: 2021-10-27

## 2021-07-13 NOTE — PROGRESS NOTES
HPI/Subjective:   Patient ID: Yolande Leung is a 46year old female. Patient presents with:  Abdominal Pain: Left sided pain / swelling   Follow - Up: right elbow, medication f/u  Medication Request: Stool softner       Patient in office today for htn . constipation, diarrhea, nausea and vomiting. Genitourinary: Negative for dysuria and frequency. Skin: Negative for color change, rash and wound. Neurological: Negative for light-headedness.    Psychiatric/Behavioral: Negative for decreased concentrati (25 mg total) by mouth once daily.  90 tablet 0   • levETIRAcetam 500 MG Oral Tab TAKE ONE (1) TABLET BY MOUTH TWICE DAILY 180 tablet 0   • TRUE METRIX BLOOD GLUCOSE TEST In Vitro Strip TEST BLOOD SUGAR FOUR TIMES DAILY 200 strip 1   • Fluticasone Propionat Kit Use to test sugar three times daily. 1 kit 0   • TRUEPLUS LANCETS 33G Does not apply Misc Use to check sugar 4 times daily.  400 each 1   • UNIFINE PENTIPS 32G X 4 MM Does not apply Misc USE DAILY WITH LEVEMIR 100 each 10     Allergies:  Reglan [Metoclo asymmetry. Motor: Motor function is intact. No weakness. Gait: Gait normal.   Psychiatric:         Mood and Affect: Mood is anxious. Mood is not depressed.          Behavior: Behavior normal.         Assessment & Plan:   Essential hypertension wit memory concerns     Type 2 diabetes mellitus with complication, with long-term current use of insulin (HCC)  Keep sugars glycemic control to prevent complications of DM2  Keep 1800 osvaldo ADA- Diabetic diet   diet/exercise   accu-checks   Eye exam  -referred

## 2021-07-13 NOTE — TELEPHONE ENCOUNTER
Outpatient Medication Detail     Disp Refills Start End    docusate sodium 100 MG Oral Tab 60 tablet 1 7/13/2021     Sig - Route:  Take 1 tablet (100 mg total) by mouth 2 (two) times daily as needed for constipation. - Oral    Sent to pharmacy as: Alea Delgado

## 2021-07-13 NOTE — TELEPHONE ENCOUNTER
New prescription sent to the pharmacy docusate sodium twice daily as needed for constipation 60+1 additional refill sent

## 2021-07-13 NOTE — TELEPHONE ENCOUNTER
Fax received. Message to prescriber:    •  docusate sodium (DOK) 100 MG Oral Cap,  mg capsule, Disp: 30 capsule, Rfl: 1    PLAN REQUIRES SPECIFIC DIRECTIONS TO PROCESS THE PRESCRIPTION.  [address]  PLEASE FAX BACK WITH FREQUENCY OF HOW PAT

## 2021-07-19 ENCOUNTER — OFFICE VISIT (OUTPATIENT)
Dept: SURGERY | Facility: CLINIC | Age: 51
End: 2021-07-19
Payer: MEDICARE

## 2021-07-19 VITALS — HEIGHT: 62 IN | BODY MASS INDEX: 35.33 KG/M2 | WEIGHT: 192 LBS

## 2021-07-19 DIAGNOSIS — R10.12 ABDOMINAL PAIN, LEFT UPPER QUADRANT: Primary | ICD-10-CM

## 2021-07-19 DIAGNOSIS — N30.90 CYSTITIS: ICD-10-CM

## 2021-07-19 PROCEDURE — 99204 OFFICE O/P NEW MOD 45 MIN: CPT | Performed by: SURGERY

## 2021-07-23 ENCOUNTER — HOSPITAL ENCOUNTER (OUTPATIENT)
Dept: CT IMAGING | Facility: HOSPITAL | Age: 51
Discharge: HOME | End: 2021-07-23
Attending: SURGERY
Payer: MEDICARE

## 2021-07-23 DIAGNOSIS — R10.12 ABDOMINAL PAIN, LEFT UPPER QUADRANT: ICD-10-CM

## 2021-07-23 LAB — CREAT BLD-MCNC: 0.8 MG/DL

## 2021-07-23 PROCEDURE — 82565 ASSAY OF CREATININE: CPT

## 2021-07-23 PROCEDURE — 74177 CT ABD & PELVIS W/CONTRAST: CPT | Performed by: SURGERY

## 2021-07-26 ENCOUNTER — OFFICE VISIT (OUTPATIENT)
Dept: OBGYN CLINIC | Facility: CLINIC | Age: 51
End: 2021-07-26
Payer: MEDICARE

## 2021-07-26 ENCOUNTER — LAB ENCOUNTER (OUTPATIENT)
Dept: LAB | Facility: HOSPITAL | Age: 51
End: 2021-07-26
Attending: SURGERY
Payer: MEDICARE

## 2021-07-26 VITALS
DIASTOLIC BLOOD PRESSURE: 100 MMHG | HEART RATE: 81 BPM | WEIGHT: 192.81 LBS | BODY MASS INDEX: 35 KG/M2 | SYSTOLIC BLOOD PRESSURE: 162 MMHG

## 2021-07-26 DIAGNOSIS — Z98.890 HISTORY OF CRANIOTOMY: ICD-10-CM

## 2021-07-26 DIAGNOSIS — Z01.411 ENCOUNTER FOR GYNECOLOGICAL EXAMINATION WITH ABNORMAL FINDING: Primary | ICD-10-CM

## 2021-07-26 DIAGNOSIS — N30.90 CYSTITIS: ICD-10-CM

## 2021-07-26 DIAGNOSIS — R10.12 ABDOMINAL PAIN, LEFT UPPER QUADRANT: ICD-10-CM

## 2021-07-26 DIAGNOSIS — R10.2 PELVIC PAIN: ICD-10-CM

## 2021-07-26 PROCEDURE — G0101 CA SCREEN;PELVIC/BREAST EXAM: HCPCS | Performed by: OBSTETRICS & GYNECOLOGY

## 2021-07-26 PROCEDURE — 99212 OFFICE O/P EST SF 10 MIN: CPT | Performed by: OBSTETRICS & GYNECOLOGY

## 2021-07-26 PROCEDURE — 81001 URINALYSIS AUTO W/SCOPE: CPT

## 2021-07-26 NOTE — PROGRESS NOTES
Ya Vale is a 46year old female  No LMP recorded. (Menstrual status: Menopause). Patient presents with:  Gyn Exam: Chuckie Gross 1 pt. Last seen . Did not take meds this morning.  hx stroke in past  Pelvic Pain: for one month in low Medium Risk      Smoking Tobacco Use: Former Smoker      Smokeless Tobacco Use: Never Used    FAMILY HISTORY:  Family History   Problem Relation Age of Onset   • Diabetes Father    • Hypertension Father    • Heart Disorder Father    • Lipids Father    • Ob tablets (1,000 mg total) by mouth 2 (two) times daily with meals. , Disp: 360 tablet, Rfl: 1  •  gabapentin 300 MG Oral Cap, Take 1 capsule (300 mg total) by mouth nightly., Disp: 90 capsule, Rfl: 1  •  atorvastatin 40 MG Oral Tab, Take 1 tablet (40 mg tota Disp: 1 kit, Rfl: 0  •  Insulin Pen Needle 32G X 4 MM Does not apply Misc, Use with injections four times daily as directed, Disp: 200 each, Rfl: 1  •  TRUEPLUS LANCETS 33G Does not apply Misc, Use to check sugar 4 times daily. , Disp: 400 each, Rfl: 1  • nontender, nondistended, no masses  Skin/Hair:  no unusual rashes or bruises  Extremities:  no edema, no cyanosis  Psychiatric:   oriented to time, place, person and situation.  Appropriate mood and affect    Pelvic Exam:  External Genitalia:  normal appear

## 2021-07-27 ENCOUNTER — OFFICE VISIT (OUTPATIENT)
Dept: INTERNAL MEDICINE CLINIC | Facility: CLINIC | Age: 51
End: 2021-07-27
Payer: MEDICARE

## 2021-07-27 ENCOUNTER — LAB ENCOUNTER (OUTPATIENT)
Dept: LAB | Age: 51
End: 2021-07-27
Attending: INTERNAL MEDICINE
Payer: MEDICARE

## 2021-07-27 VITALS
BODY MASS INDEX: 35.15 KG/M2 | DIASTOLIC BLOOD PRESSURE: 80 MMHG | HEART RATE: 79 BPM | WEIGHT: 191 LBS | SYSTOLIC BLOOD PRESSURE: 138 MMHG | HEIGHT: 62 IN

## 2021-07-27 DIAGNOSIS — E11.8 TYPE 2 DIABETES MELLITUS WITH COMPLICATION, WITH LONG-TERM CURRENT USE OF INSULIN (HCC): ICD-10-CM

## 2021-07-27 DIAGNOSIS — Z79.4 TYPE 2 DIABETES MELLITUS WITH COMPLICATION, WITH LONG-TERM CURRENT USE OF INSULIN (HCC): ICD-10-CM

## 2021-07-27 DIAGNOSIS — K21.9 GASTROESOPHAGEAL REFLUX DISEASE WITHOUT ESOPHAGITIS: ICD-10-CM

## 2021-07-27 DIAGNOSIS — D50.9 IRON DEFICIENCY ANEMIA, UNSPECIFIED IRON DEFICIENCY ANEMIA TYPE: ICD-10-CM

## 2021-07-27 DIAGNOSIS — I10 ESSENTIAL HYPERTENSION WITH GOAL BLOOD PRESSURE LESS THAN 140/90: Primary | ICD-10-CM

## 2021-07-27 DIAGNOSIS — K21.9 GASTROESOPHAGEAL REFLUX DISEASE, UNSPECIFIED WHETHER ESOPHAGITIS PRESENT: ICD-10-CM

## 2021-07-27 LAB
BASOPHILS # BLD AUTO: 0.09 X10(3) UL (ref 0–0.2)
BASOPHILS NFR BLD AUTO: 0.8 %
DEPRECATED HBV CORE AB SER IA-ACNC: 50.3 NG/ML
DEPRECATED RDW RBC AUTO: 44.3 FL (ref 35.1–46.3)
EOSINOPHIL # BLD AUTO: 0.32 X10(3) UL (ref 0–0.7)
EOSINOPHIL NFR BLD AUTO: 2.8 %
ERYTHROCYTE [DISTWIDTH] IN BLOOD BY AUTOMATED COUNT: 15.6 % (ref 11–15)
HCT VFR BLD AUTO: 44.8 %
HGB BLD-MCNC: 13.5 G/DL
HPV I/H RISK 1 DNA SPEC QL NAA+PROBE: NEGATIVE
IMM GRANULOCYTES # BLD AUTO: 0.03 X10(3) UL (ref 0–1)
IMM GRANULOCYTES NFR BLD: 0.3 %
IRON SATURATION: 13 %
IRON SERPL-MCNC: 52 UG/DL
LYMPHOCYTES # BLD AUTO: 2.91 X10(3) UL (ref 1–4)
LYMPHOCYTES NFR BLD AUTO: 25.4 %
MCH RBC QN AUTO: 23.7 PG (ref 26–34)
MCHC RBC AUTO-ENTMCNC: 30.1 G/DL (ref 31–37)
MCV RBC AUTO: 78.6 FL
MONOCYTES # BLD AUTO: 0.65 X10(3) UL (ref 0.1–1)
MONOCYTES NFR BLD AUTO: 5.7 %
NEUTROPHILS # BLD AUTO: 7.44 X10 (3) UL (ref 1.5–7.7)
NEUTROPHILS # BLD AUTO: 7.44 X10(3) UL (ref 1.5–7.7)
NEUTROPHILS NFR BLD AUTO: 65 %
PLATELET # BLD AUTO: 278 10(3)UL (ref 150–450)
RBC # BLD AUTO: 5.7 X10(6)UL
TOTAL IRON BINDING CAPACITY: 396 UG/DL (ref 240–450)
TRANSFERRIN SERPL-MCNC: 266 MG/DL (ref 200–360)
WBC # BLD AUTO: 11.4 X10(3) UL (ref 4–11)

## 2021-07-27 PROCEDURE — 82728 ASSAY OF FERRITIN: CPT

## 2021-07-27 PROCEDURE — 83540 ASSAY OF IRON: CPT

## 2021-07-27 PROCEDURE — 99214 OFFICE O/P EST MOD 30 MIN: CPT | Performed by: INTERNAL MEDICINE

## 2021-07-27 PROCEDURE — 85025 COMPLETE CBC W/AUTO DIFF WBC: CPT

## 2021-07-27 PROCEDURE — 36415 COLL VENOUS BLD VENIPUNCTURE: CPT

## 2021-07-27 PROCEDURE — 84466 ASSAY OF TRANSFERRIN: CPT

## 2021-07-27 NOTE — PROGRESS NOTES
HPI/Subjective:   Patient ID: Korey Broderick is a 46year old female.   Patient presents with:  Hypertension     Patient in office today for HTN   Patient states she forgot to take yesterday her medication her blood pressure was elevated in the doctor offic bleeding, blood in stool and vomiting. Skin: Negative for color change, rash and wound. Neurological: Negative for light-headedness. Psychiatric/Behavioral: Negative for decreased concentration and sleep disturbance.  The patient is not nervous/anxiou tablet daily 90 tablet 1   • hydrochlorothiazide 25 MG Oral Tab Take 1 tablet (25 mg total) by mouth once daily.  90 tablet 0   • levETIRAcetam 500 MG Oral Tab TAKE ONE (1) TABLET BY MOUTH TWICE DAILY 180 tablet 0   • TRUE METRIX BLOOD GLUCOSE TEST In Vitro 2000 units Oral Cap Take 1 capsule (2,000 Units total) by mouth daily. 90 capsule 0     Allergies:  Reglan [Metoclopram*    OTHER (SEE COMMENTS)    Comment:Sensitivity, with crawling sensation.   Adhesive Tape               Comment:itching    Objective: Mood and Affect: Mood is anxious. Mood is not depressed. Behavior: Behavior normal.       Blood pressure 138/80, pulse 79, height 5' 2\" (1.575 m), weight 191 lb (86.6 kg), not currently breastfeeding.       Assessment & Plan:   Essential hypertensi 2 diabetes mellitus with complication, with long-term current use of insulin (HCC)  Keep sugars glycemic control to prevent complications of DM2  Keep 1800 osvaldo ADA- Diabetic diet   diet/exercise   accu-checks   Eye exam  -referred has apt soon  and foot ex

## 2021-07-29 RX ORDER — LEVETIRACETAM 500 MG/1
TABLET ORAL
Qty: 180 TABLET | Refills: 0 | Status: SHIPPED | OUTPATIENT
Start: 2021-07-29 | End: 2021-11-05

## 2021-08-05 ENCOUNTER — MED REC SCAN ONLY (OUTPATIENT)
Dept: INTERNAL MEDICINE CLINIC | Facility: CLINIC | Age: 51
End: 2021-08-05

## 2021-08-11 ENCOUNTER — TELEPHONE (OUTPATIENT)
Dept: ENDOCRINOLOGY CLINIC | Facility: CLINIC | Age: 51
End: 2021-08-11

## 2021-08-11 ENCOUNTER — NURSE TRIAGE (OUTPATIENT)
Dept: INTERNAL MEDICINE CLINIC | Facility: CLINIC | Age: 51
End: 2021-08-11

## 2021-08-11 DIAGNOSIS — E11.8 TYPE 2 DIABETES MELLITUS WITH COMPLICATION, WITH LONG-TERM CURRENT USE OF INSULIN (HCC): ICD-10-CM

## 2021-08-11 DIAGNOSIS — Z79.4 TYPE 2 DIABETES MELLITUS WITH COMPLICATION, WITH LONG-TERM CURRENT USE OF INSULIN (HCC): ICD-10-CM

## 2021-08-11 RX ORDER — CALCIUM CITRATE/VITAMIN D3 200MG-6.25
TABLET ORAL
Qty: 400 STRIP | Refills: 1 | Status: SHIPPED | OUTPATIENT
Start: 2021-08-11 | End: 2021-10-05

## 2021-08-11 RX ORDER — GLUCOSAM/CHON-MSM1/C/MANG/BOSW 500-416.6
TABLET ORAL
Qty: 400 EACH | Refills: 1 | Status: SHIPPED | OUTPATIENT
Start: 2021-08-11 | End: 2021-10-05

## 2021-08-11 NOTE — TELEPHONE ENCOUNTER
pt. states that she is almost out of her TruMatrix Test Strip, and she needs a new Rx for new Glucose monitor machine and lancets. and that she no longer uses the [facility] sensor.      Current Outpatient Medications   Medication Sig Dispense Refill   •    0

## 2021-08-11 NOTE — TELEPHONE ENCOUNTER
Action Requested: Summary for Provider     []  Critical Lab, Recommendations Needed  [] Need Additional Advice  [x]   FYI    []   Need Orders  [] Need Medications Sent to Pharmacy  []  Other     SUMMARY: Patient stated that ate something hot yesterday and

## 2021-09-14 ENCOUNTER — HOSPITAL ENCOUNTER (OUTPATIENT)
Dept: ULTRASOUND IMAGING | Age: 51
Discharge: HOME | End: 2021-09-14
Attending: OBSTETRICS & GYNECOLOGY
Payer: MEDICARE

## 2021-09-14 DIAGNOSIS — R10.2 PELVIC PAIN: ICD-10-CM

## 2021-09-14 PROCEDURE — 76830 TRANSVAGINAL US NON-OB: CPT | Performed by: OBSTETRICS & GYNECOLOGY

## 2021-09-14 PROCEDURE — 76856 US EXAM PELVIC COMPLETE: CPT | Performed by: OBSTETRICS & GYNECOLOGY

## 2021-09-27 ENCOUNTER — TELEPHONE (OUTPATIENT)
Dept: OBGYN CLINIC | Facility: CLINIC | Age: 51
End: 2021-09-27

## 2021-09-27 NOTE — TELEPHONE ENCOUNTER
Pt informed of NJGs recs. endosee explained to pt and questions answered. Pt accepted appt for Endosee with [facility] on 10/18 at 1pm. Pt advised to take 600mg ibuprofen 30-60 min prior to appt.

## 2021-09-27 NOTE — TELEPHONE ENCOUNTER
----- Message from Jose Rivera MD sent at 9/22/2021  7:43 PM CDT -----  Thickened endometrial stripe -- needs endosee to r/o polyp.  Inform pt left ovary normal. Schedule endosee when I am on call between 1-3 pm in next month

## 2021-09-29 ENCOUNTER — TELEPHONE (OUTPATIENT)
Dept: INTERNAL MEDICINE CLINIC | Facility: CLINIC | Age: 51
End: 2021-09-29

## 2021-09-29 NOTE — TELEPHONE ENCOUNTER
Rojas Vazquez from surgery center indicated that patient was supposed to have surgery today but it was cancelled because blood pressure today at 13:28 was 230/116, at 14:15 it was 140/99, and about 10 minutes ago it was 166/78. Patient is asymptomatic.  Patient to

## 2021-09-29 NOTE — TELEPHONE ENCOUNTER
Spoke with patient (name and  verified), informed of message below. No further questions. Saloni Reinoso from Surgery center also informed of  message.

## 2021-10-01 ENCOUNTER — HOSPITAL ENCOUNTER (EMERGENCY)
Facility: HOSPITAL | Age: 51
Discharge: HOME | End: 2021-10-01
Attending: EMERGENCY MEDICINE
Payer: MEDICARE

## 2021-10-01 ENCOUNTER — NURSE TRIAGE (OUTPATIENT)
Dept: INTERNAL MEDICINE CLINIC | Facility: CLINIC | Age: 51
End: 2021-10-01

## 2021-10-01 ENCOUNTER — TELEPHONE (OUTPATIENT)
Dept: ENDOCRINOLOGY CLINIC | Facility: CLINIC | Age: 51
End: 2021-10-01

## 2021-10-01 VITALS
OXYGEN SATURATION: 96 % | TEMPERATURE: 98 F | RESPIRATION RATE: 20 BRPM | HEART RATE: 69 BPM | SYSTOLIC BLOOD PRESSURE: 163 MMHG | DIASTOLIC BLOOD PRESSURE: 97 MMHG

## 2021-10-01 DIAGNOSIS — I10 UNCONTROLLED HYPERTENSION: Primary | ICD-10-CM

## 2021-10-01 PROCEDURE — 99283 EMERGENCY DEPT VISIT LOW MDM: CPT

## 2021-10-01 RX ORDER — HYDRALAZINE HYDROCHLORIDE 10 MG/1
10 TABLET, FILM COATED ORAL 3 TIMES DAILY PRN
Qty: 90 TABLET | Refills: 0 | Status: SHIPPED | OUTPATIENT
Start: 2021-10-01 | End: 2021-10-31

## 2021-10-01 RX ORDER — HYDRALAZINE HYDROCHLORIDE 10 MG/1
10 TABLET, FILM COATED ORAL ONCE
Status: COMPLETED | OUTPATIENT
Start: 2021-10-01 | End: 2021-10-01

## 2021-10-01 NOTE — ED INITIAL ASSESSMENT (HPI)
William Peacock is here for evaluation of elevated BP today. 230s/110s. Complains of blurred vision.

## 2021-10-01 NOTE — TELEPHONE ENCOUNTER
Received fax from [facility]. Completed the [facility] physician order form and placed on MD desk for review.

## 2021-10-01 NOTE — TELEPHONE ENCOUNTER
Action Requested: Summary for Provider     []  Critical Lab, Recommendations Needed  [] Need Additional Advice  []   FYI    []   Need Orders  [] Need Medications Sent to Pharmacy  []  Other     SUMMARY: per protocol advised pt to go to ER for elevated bloo

## 2021-10-02 NOTE — TELEPHONE ENCOUNTER
Disposition and Plan      Clinical Impression:  Uncontrolled hypertension  (primary encounter diagnosis)     Disposition:  Discharge     Follow-up:  Kameron Smith MD  [address] [place] [address]  [phone]     Schedule an appointment as soon a

## 2021-10-02 NOTE — ED PROVIDER NOTES
Patient Seen in: [facility] AND [facility] Emergency Department    History   Patient presents with:  Blood Pressure      HPI    The patient presents to the ED complaining of elevated blood pressure today. She states her blood pressure as high as 230/110.   She h Socioeconomic History      Marital status: Legally       Number of children: 3    Occupational History      Occupation:         Comment: disabled      Occupation: phlebotomy    Tobacco Use      Smoking status: Former [address] regular rhythm. Pulses: Normal pulses. Heart sounds: Normal heart sounds. Pulmonary:      Effort: Pulmonary effort is normal. No respiratory distress. Breath sounds: Normal breath sounds. No stridor.    Musculoskeletal:         General: No patient and/or caregiver.       Condition upon leaving the department: Stable    Disposition and Plan     Clinical Impression:  Uncontrolled hypertension  (primary encounter diagnosis)    Disposition:  Discharge    Follow-up:  Hakan Tilley MD  [address]

## 2021-10-04 NOTE — TELEPHONE ENCOUNTER
Pt was in the ER on 10/1 due to high blood pressure. Pt will f/u with you tomorrow. But she mentioned to me that she was discharge with hydralazine 10mg to take by mouth 3 times daily as needed.  But when she takes it she feels dizzy, blurry

## 2021-10-04 NOTE — TELEPHONE ENCOUNTER
Patient has appointment tomorrow  Patient to bring the blood pressure readings from home and all her medications from home    She is taking 4 medications for blood pressure   losartan 100 mg   metoprolol 125 mg daily   amlodipine 5 mg  Hydrochlorothiazide

## 2021-10-05 ENCOUNTER — OFFICE VISIT (OUTPATIENT)
Dept: INTERNAL MEDICINE CLINIC | Facility: CLINIC | Age: 51
End: 2021-10-05
Payer: MEDICARE

## 2021-10-05 VITALS
DIASTOLIC BLOOD PRESSURE: 84 MMHG | HEIGHT: 62 IN | SYSTOLIC BLOOD PRESSURE: 176 MMHG | WEIGHT: 187 LBS | BODY MASS INDEX: 34.41 KG/M2 | OXYGEN SATURATION: 97 % | HEART RATE: 76 BPM

## 2021-10-05 DIAGNOSIS — E78.00 PURE HYPERCHOLESTEROLEMIA: ICD-10-CM

## 2021-10-05 DIAGNOSIS — Z79.4 TYPE 2 DIABETES MELLITUS WITH HYPERGLYCEMIA, WITH LONG-TERM CURRENT USE OF INSULIN (HCC): ICD-10-CM

## 2021-10-05 DIAGNOSIS — N18.30 CKD STAGE 3 DUE TO TYPE 1 DIABETES MELLITUS (HCC): ICD-10-CM

## 2021-10-05 DIAGNOSIS — E11.65 TYPE 2 DIABETES MELLITUS WITH HYPERGLYCEMIA, WITH LONG-TERM CURRENT USE OF INSULIN (HCC): ICD-10-CM

## 2021-10-05 DIAGNOSIS — I10 ESSENTIAL HYPERTENSION WITH GOAL BLOOD PRESSURE LESS THAN 140/90: Primary | ICD-10-CM

## 2021-10-05 DIAGNOSIS — E10.22 CKD STAGE 3 DUE TO TYPE 1 DIABETES MELLITUS (HCC): ICD-10-CM

## 2021-10-05 DIAGNOSIS — K21.9 GASTROESOPHAGEAL REFLUX DISEASE WITHOUT ESOPHAGITIS: ICD-10-CM

## 2021-10-05 PROCEDURE — 99214 OFFICE O/P EST MOD 30 MIN: CPT | Performed by: INTERNAL MEDICINE

## 2021-10-05 RX ORDER — BLOOD PRESSURE TEST KIT-MEDIUM
KIT MISCELLANEOUS
Qty: 1 EACH | Refills: 0 | Status: SHIPPED | OUTPATIENT
Start: 2021-10-05

## 2021-10-05 RX ORDER — DULOXETIN HYDROCHLORIDE 30 MG/1
30 CAPSULE, DELAYED RELEASE ORAL DAILY
COMMUNITY
Start: 2021-07-23

## 2021-10-05 RX ORDER — CALCIUM CITRATE/VITAMIN D3 200MG-6.25
TABLET ORAL
Qty: 400 STRIP | Refills: 1 | Status: SHIPPED | OUTPATIENT
Start: 2021-10-05

## 2021-10-05 RX ORDER — GLUCOSAM/CHON-MSM1/C/MANG/BOSW 500-416.6
TABLET ORAL
Qty: 400 EACH | Refills: 1 | Status: SHIPPED | OUTPATIENT
Start: 2021-10-05

## 2021-10-05 RX ORDER — ONDANSETRON 4 MG/1
4 TABLET, ORALLY DISINTEGRATING ORAL
Qty: 20 TABLET | Refills: 0 | Status: SHIPPED | OUTPATIENT
Start: 2021-10-05 | End: 2021-10-27

## 2021-10-05 NOTE — TELEPHONE ENCOUNTER
Spoke with patient ( verified) and relayed Dr. Varun Cartagena message below--to also bring her b/p machine to office for calibration--patient verbalizes understanding and agreement. No further questions/concerns at this time.

## 2021-10-05 NOTE — PROGRESS NOTES
Subjective:   Patient ID: Mira Aguilar is a 46year old female. Patient presents with:  ER F/U: Stts she went to [address] [place] [address] ER on 10/1/21 due to high blood pressure.   Stts she was given hydralazine to take she started getting dizzy, weakness, drowsy     Patien pain, palpitations, leg swelling and PND. Gastrointestinal: Negative for anal bleeding, blood in stool, nausea and vomiting. Skin: Negative for color change, rash and wound. Neurological: Negative for light-headedness.    Psychiatric/Behavioral: Negat Take 1 tablet (100 mg total) by mouth once daily.  Start  1/2 tab  Daily for 1-2 weeks (Patient taking differently: Take 100 mg by mouth once daily.) 90 tablet 0   • Metoprolol Succinate ER 25 MG Oral Tablet 24 Hr TAKE 1 TABLET BY MOUTH EVERY NIGHT 90 table • Insulin Pen Needle 32G X 4 MM Does not apply Misc Use with injections four times daily as directed 200 each 1   • Insulin Pen Needle (TRUEPLUS PEN NEEDLES) 32G X 4 MM Does not apply Misc Use once daily with LantusPen and 3 times daily with NovologPen D tenderness. There is no right CVA tenderness, left CVA tenderness, guarding or rebound. Comments:      Musculoskeletal:      Cervical back: Neck supple. Right lower leg: No edema. Left lower leg: No edema.    Lymphadenopathy:      Cervical: N neurosurgeon Dr. Josey Santana  Stable cpm Ariane Labor 500 mg bid   CPM stable    apt [facility] nerlogist Dr Virginia Cohn due to headache and per pt memory concerns   Patient was prescribed citalopram 30 mg daily continue present medicine  Lisinopril 20 mg -2 slowly tapered down Hemoglobin A1C      Lipid Panel      TSH W Reflex To Free T4      Microalb/Creat Ratio, Random Urine      Meds This Visit:  Requested Prescriptions     Signed Prescriptions Disp Refills   • Blood Pressure Monitoring (BLOOD PRESS MONITOR/M-L CUFF) Does not

## 2021-10-18 ENCOUNTER — TELEPHONE (OUTPATIENT)
Dept: OBGYN CLINIC | Facility: CLINIC | Age: 51
End: 2021-10-18

## 2021-10-18 NOTE — TELEPHONE ENCOUNTER
Message to [facility]. Looking to reschedule pts Endosee for 11/2 while you are on call. You already have an endosee and 2 colpos scheduled during that time frame.  OK to schedule another Endosee or pick a different day for pt?

## 2021-10-19 RX ORDER — SEMAGLUTIDE 1.34 MG/ML
INJECTION, SOLUTION SUBCUTANEOUS
Qty: 1.5 ML | Refills: 2 | Status: SHIPPED | OUTPATIENT
Start: 2021-10-19 | End: 2021-10-29

## 2021-10-27 ENCOUNTER — LAB ENCOUNTER (OUTPATIENT)
Dept: LAB | Age: 51
End: 2021-10-27
Attending: INTERNAL MEDICINE
Payer: MEDICARE

## 2021-10-27 ENCOUNTER — OFFICE VISIT (OUTPATIENT)
Dept: INTERNAL MEDICINE CLINIC | Facility: CLINIC | Age: 51
End: 2021-10-27
Payer: MEDICARE

## 2021-10-27 VITALS
DIASTOLIC BLOOD PRESSURE: 80 MMHG | HEART RATE: 73 BPM | WEIGHT: 187 LBS | SYSTOLIC BLOOD PRESSURE: 146 MMHG | HEIGHT: 62 IN | OXYGEN SATURATION: 97 % | BODY MASS INDEX: 34.41 KG/M2

## 2021-10-27 DIAGNOSIS — N93.9 VAGINA BLEEDING: Primary | ICD-10-CM

## 2021-10-27 DIAGNOSIS — Z79.4 TYPE 2 DIABETES MELLITUS WITH HYPERGLYCEMIA, WITH LONG-TERM CURRENT USE OF INSULIN (HCC): ICD-10-CM

## 2021-10-27 DIAGNOSIS — I10 ESSENTIAL HYPERTENSION WITH GOAL BLOOD PRESSURE LESS THAN 140/90: ICD-10-CM

## 2021-10-27 DIAGNOSIS — J30.9 ALLERGIC RHINITIS, UNSPECIFIED SEASONALITY, UNSPECIFIED TRIGGER: ICD-10-CM

## 2021-10-27 DIAGNOSIS — Z79.4 TYPE 2 DIABETES MELLITUS WITH OTHER NEUROLOGIC COMPLICATION, WITH LONG-TERM CURRENT USE OF INSULIN (HCC): ICD-10-CM

## 2021-10-27 DIAGNOSIS — E78.00 PURE HYPERCHOLESTEROLEMIA: ICD-10-CM

## 2021-10-27 DIAGNOSIS — E11.49 TYPE 2 DIABETES MELLITUS WITH OTHER NEUROLOGIC COMPLICATION, WITH LONG-TERM CURRENT USE OF INSULIN (HCC): ICD-10-CM

## 2021-10-27 DIAGNOSIS — E11.65 TYPE 2 DIABETES MELLITUS WITH HYPERGLYCEMIA, WITH LONG-TERM CURRENT USE OF INSULIN (HCC): ICD-10-CM

## 2021-10-27 PROCEDURE — 36415 COLL VENOUS BLD VENIPUNCTURE: CPT

## 2021-10-27 PROCEDURE — 99214 OFFICE O/P EST MOD 30 MIN: CPT | Performed by: INTERNAL MEDICINE

## 2021-10-27 PROCEDURE — 85025 COMPLETE CBC W/AUTO DIFF WBC: CPT

## 2021-10-27 PROCEDURE — 83036 HEMOGLOBIN GLYCOSYLATED A1C: CPT

## 2021-10-27 PROCEDURE — 80053 COMPREHEN METABOLIC PANEL: CPT

## 2021-10-27 PROCEDURE — 80061 LIPID PANEL: CPT

## 2021-10-27 PROCEDURE — 84443 ASSAY THYROID STIM HORMONE: CPT

## 2021-10-27 RX ORDER — ONDANSETRON 4 MG/1
4 TABLET, ORALLY DISINTEGRATING ORAL
Qty: 20 TABLET | Refills: 0 | Status: SHIPPED | OUTPATIENT
Start: 2021-10-27

## 2021-10-27 RX ORDER — ASPIRIN 81 MG
100 TABLET, DELAYED RELEASE (ENTERIC COATED) ORAL 2 TIMES DAILY PRN
Qty: 60 TABLET | Refills: 1 | Status: SHIPPED | OUTPATIENT
Start: 2021-10-27

## 2021-10-27 NOTE — PROGRESS NOTES
Subjective:   Patient ID: Rick Menjivar is a 46year old female.   Patient presents with:  Hypertension  Menstrual Problem   Patient in office today for HTN , and menstrual.  Patient states that she has been bleeding for 2 to 3 days-heavily she has to Xavi Gastrointestinal: Negative for anal bleeding, blood in stool, nausea and vomiting. Genitourinary: Positive for menstrual problem. Skin: Negative for color change, rash and wound. Neurological: Negative for light-headedness.    Psychiatric/Behavioral UNIT/ML Subcutaneous Solution Pen-injector Inject 50 Units into the skin nightly. • losartan 100 MG Oral Tab Take 1 tablet (100 mg total) by mouth once daily.  Start  1/2 tab  Daily for 1-2 weeks (Patient taking differently: Take 100 mg by mouth once with LantusPen and 3 times daily with NovologPen DxE11.8 IDDM [id number] 300 each 3   • Budesonide-Formoterol Fumarate 160-4.5 MCG/ACT Inhalation Aerosol Inhale 2 puffs into the lungs.        • Cholecalciferol (VITAMIN D) 2000 units Oral Cap Take 1 capsu No edema. Lymphadenopathy:      Cervical: No cervical adenopathy. Skin:     General: Skin is warm and dry. Comments:      Neurological:      General: No focal deficit present.       Mental Status: She is alert and oriented to person, place, and eddie Carotid endarterectomy   History of CVA patient is seeing neurosurgeon Dr. Yenni Mckeon  Stable cpm kepra 500 mg bid   CPM stable    apt [facility] nerlogist Dr Cherylene League due to headache and per pt memory concerns   Patient was prescribed citalopram 30 mg daily continue allergies and  Cannot breathe but albuterol inhaler helps her always  Refilled use only as needed      Constipation  Stable Colace twice daily with breakfast and dinner  Keep with good hydration    No orders of the defined types were placed in this encount

## 2021-10-29 ENCOUNTER — OFFICE VISIT (OUTPATIENT)
Dept: ENDOCRINOLOGY CLINIC | Facility: CLINIC | Age: 51
End: 2021-10-29
Payer: MEDICARE

## 2021-10-29 VITALS
DIASTOLIC BLOOD PRESSURE: 84 MMHG | SYSTOLIC BLOOD PRESSURE: 156 MMHG | BODY MASS INDEX: 34 KG/M2 | HEART RATE: 77 BPM | WEIGHT: 184 LBS

## 2021-10-29 DIAGNOSIS — Z79.4 TYPE 2 DIABETES MELLITUS WITH COMPLICATION, WITH LONG-TERM CURRENT USE OF INSULIN (HCC): Primary | ICD-10-CM

## 2021-10-29 DIAGNOSIS — E11.8 TYPE 2 DIABETES MELLITUS WITH COMPLICATION, WITH LONG-TERM CURRENT USE OF INSULIN (HCC): Primary | ICD-10-CM

## 2021-10-29 PROCEDURE — 36416 COLLJ CAPILLARY BLOOD SPEC: CPT | Performed by: INTERNAL MEDICINE

## 2021-10-29 PROCEDURE — 82947 ASSAY GLUCOSE BLOOD QUANT: CPT | Performed by: INTERNAL MEDICINE

## 2021-10-29 PROCEDURE — 99214 OFFICE O/P EST MOD 30 MIN: CPT | Performed by: INTERNAL MEDICINE

## 2021-10-29 RX ORDER — ATORVASTATIN CALCIUM 80 MG/1
80 TABLET, FILM COATED ORAL NIGHTLY
Qty: 90 TABLET | Refills: 1 | Status: SHIPPED | OUTPATIENT
Start: 2021-10-29

## 2021-10-29 RX ORDER — SEMAGLUTIDE 1.34 MG/ML
1 INJECTION, SOLUTION SUBCUTANEOUS WEEKLY
Qty: 9 ML | Refills: 1 | Status: SHIPPED | OUTPATIENT
Start: 2021-10-29

## 2021-10-29 NOTE — PATIENT INSTRUCTIONS
DECREASE Lantus to 45 units subcutaneous daily    DECREASE Novolog to 30 units subcutaneous 3 times per day    INCREASE Ozempic to 1mg subcutaneous weekly     CONTINUE Metformin

## 2021-10-29 NOTE — PROGRESS NOTES
Name: Jozef Sue  Date: 10/29/2021    Referring Physician: No ref. provider found    HISTORY OF PRESENT ILLNESS   Jozef Sue is a 46year old female who presents for diabetes mellitus, diagnosed over 10 years ago.   She was seen during hospitalizati 0. 25 OR 0.5 MG/DOSE, 2 MG/1.5ML Subcutaneous Solution Pen-injector, INJECT 0.5MG INTO THE SKIN ONCE A WEEK, Disp: 1.5 mL, Rfl: 2  •  Blood Pressure Monitoring (BLOOD PRESS MONITOR/M-L CUFF) Does not apply Misc, OMRON -brand  if possible, Disp: 1 each, Rfl: metFORMIN HCl 500 MG Oral Tab, Take 2 tablets (1,000 mg total) by mouth 2 (two) times daily with meals. , Disp: 360 tablet, Rfl: 1  •  gabapentin 300 MG Oral Cap, Take 1 capsule (300 mg total) by mouth nightly., Disp: 90 capsule, Rfl: 1  •  atorvastatin 40 capsule, Rfl: 0     Allergies:     Reglan [Metoclopram*    OTHER (SEE COMMENTS)    Comment:Sensitivity, with crawling sensation.   Adhesive Tape               Comment:itching    Social History:   Social History    Socioeconomic History      Marital status: normal sclera and normal pupils  Ears/Nose/Mouth/Throat/Neck:  no palpable thyroid nodules or cervical lymphadenopathy  Back: no kyphosis or back tenderness  Musculoskeletal:  normal muscle strength and tone  Skin:  normal moisture and skin texture  Hair &

## 2021-11-02 DIAGNOSIS — I10 ESSENTIAL HYPERTENSION WITH GOAL BLOOD PRESSURE LESS THAN 140/90: ICD-10-CM

## 2021-11-02 DIAGNOSIS — Z98.890 HISTORY OF CRANIOTOMY: ICD-10-CM

## 2021-11-02 NOTE — TELEPHONE ENCOUNTER
Spoke to [address] [place] [address] who stated they received paperwork, however could not get a hold of patient at phone number on file. RN called patient to follow up on this.     Called and spoke to patient regarding Dexcom who stated she would call [facility] medical regardin

## 2021-11-03 DIAGNOSIS — E11.49 TYPE 2 DIABETES MELLITUS WITH OTHER NEUROLOGIC COMPLICATION, WITH LONG-TERM CURRENT USE OF INSULIN (HCC): ICD-10-CM

## 2021-11-03 DIAGNOSIS — Z79.4 TYPE 2 DIABETES MELLITUS WITH OTHER NEUROLOGIC COMPLICATION, WITH LONG-TERM CURRENT USE OF INSULIN (HCC): ICD-10-CM

## 2021-11-03 NOTE — TELEPHONE ENCOUNTER
PHARMACY COMMENTS: the patient is requesting a new Rx for the following medication. Please fax or call a new Rx into [facility].          •  insulin glargine (LANTUS SOLOSTAR) 100 UNIT/ML Subcutaneous Solution Pen-injector, Inject 50 Units into the skin chelly

## 2021-11-04 RX ORDER — PEN NEEDLE, DIABETIC 32GX 5/32"
NEEDLE, DISPOSABLE MISCELLANEOUS
Qty: 200 EACH | Refills: 2 | Status: SHIPPED | OUTPATIENT
Start: 2021-11-04

## 2021-11-05 RX ORDER — METOPROLOL SUCCINATE 25 MG/1
TABLET, EXTENDED RELEASE ORAL
Qty: 90 TABLET | Refills: 1 | Status: SHIPPED | OUTPATIENT
Start: 2021-11-05

## 2021-11-05 RX ORDER — METOPROLOL SUCCINATE 100 MG/1
TABLET, EXTENDED RELEASE ORAL
Qty: 90 TABLET | Refills: 1 | Status: SHIPPED | OUTPATIENT
Start: 2021-11-05

## 2021-11-05 RX ORDER — LEVETIRACETAM 500 MG/1
TABLET ORAL
Qty: 180 TABLET | Refills: 0 | Status: SHIPPED | OUTPATIENT
Start: 2021-11-05

## 2021-11-12 RX ORDER — INSULIN ASPART 100 [IU]/ML
INJECTION, SOLUTION INTRAVENOUS; SUBCUTANEOUS
Qty: 81 ML | Refills: 0 | Status: SHIPPED | OUTPATIENT
Start: 2021-11-12

## 2021-11-12 RX ORDER — INSULIN GLARGINE 100 [IU]/ML
45 INJECTION, SOLUTION SUBCUTANEOUS NIGHTLY
Qty: 42 ML | Refills: 0 | Status: SHIPPED | OUTPATIENT
Start: 2021-11-12

## 2021-11-12 NOTE — TELEPHONE ENCOUNTER
[facility]  [address] [place] [address]  Dept: [phone]  Loc: [phone]  Visit Date: 8/19/2020      HPI:   Amara Ramsey is a 77 y.o. male thatpresents for Psoriasis (Pt presents to discuss psoriasis on his L and R leg. )    HPI:    Multiple skin tags on chest  Get caught in his necklace  Continues with rash in groin and in her skin folds, some pruritis at times  Generalized rash has improved  Psoriasis has significantly improved on bilateral legs and left foot, healing over now  BP elevated today, 142/96  Denies any chest pain, shortness of breath, palpitations  He comes in with his wife today  History obtained from pt and medical records  I have reviewed the patient's past medical history, past surgical history, allergies,medications, social and family history and I have made updates where appropriate.     Past Medical History:   Diagnosis Date    HTN (hypertension) 2/6/2014       Past Surgical History:   Procedure Laterality Date    KNEE ARTHROSCOPY      right    ULNAR TUNNEL RELEASE         Social History     Tobacco Use    Smoking status: Never Smoker    Smokeless tobacco: Never Used   Substance Use Topics    Alcohol use: No    Drug use: No       Family History   Problem Relation Age of Onset    Heart Disease Father          Review of Systems  Constitutional: Negative for Fever, Chills, Fatigue  Cardiovascular:  Negative forChest Pain, Palpitations  Respiratory:  Negative for Cough, Wheezing, Shortness of breath  Gastrointestinal:  Nausea, Vomiting, Diarrhea, Constipation, Blood in stool  Genitourinary:  Negative for Difficulty or painful urination,Change in frequency, Urgency  Skin:  Negative for Color change, Wound +lower abd rash, itching  Psychiatric:  Negative for Anxiety, Depression, Suicidal ideation  Musculoskeletal:  Negative for Joint pain, Back pain, Gait problems  Neurological:  Negative for Dizziness, Headaches        PHYSICAL LOV 10/29/21. F/u 02/10/22. Pended 3 month supply. Per LOV \"Decrease Lantus to 45 units subcutaneous daily given nocturnal hypoglycemia   -Decrease Humalog to 30 units with meals \"  Updated Rx. at length today  He is drinking several soda pops a day, Pepsi, occasionally water  Does have a history of stones  Recent labwork was unremarkable. Advised him to cut back to 1 pop per day and increase his water intake to 2-3 liters per day  Encouraged him to message or call me sooner with any questions or concerns. No follow-ups on file. Susie Biggs received counseling on the following healthy behaviors: nutrition, exercise and medication adherence  Reviewedprior labs and health maintenance. Continue current medications, diet and exercise. Discussed use, benefit, and side effects of prescribed medications. Barriers to medication compliance addressed. Patient given educationalmaterials - see patient instructions. All patient questions answered. Patient voiced understanding.      Electronically signed by JENNY Aguilar on 8/19/20 at 9:15 AM EDT

## 2021-11-19 ENCOUNTER — TELEPHONE (OUTPATIENT)
Dept: OBGYN CLINIC | Facility: CLINIC | Age: 51
End: 2021-11-19

## 2021-11-19 NOTE — TELEPHONE ENCOUNTER
Patient accepts reschedule to 12/10 at 11:40am, but procedure room booked at 11am for CAP for colpo. Okay to book 11:40 Endosee in procedure room? To Arlyn Correia to advise. Will keep 12/17 for now until confirmed.

## 2021-11-19 NOTE — TELEPHONE ENCOUNTER
Patient missed Endosee today. Rescheduled to 12/17/21 in next available procedure spot. [facility]- okay to keep appt- or able to work in sooner?

## 2021-11-22 NOTE — TELEPHONE ENCOUNTER
To PSR to assist- please let patient know to keep 12/17 appt. 12/10 appt that was offered is no longer available.

## 2021-11-23 DIAGNOSIS — J30.9 ALLERGIC RHINITIS, UNSPECIFIED SEASONALITY, UNSPECIFIED TRIGGER: ICD-10-CM

## 2021-11-23 RX ORDER — ALBUTEROL SULFATE 90 UG/1
2 AEROSOL, METERED RESPIRATORY (INHALATION) EVERY 6 HOURS PRN
Qty: 18 G | Refills: 1 | Status: SHIPPED | OUTPATIENT
Start: 2021-11-23 | End: 2022-02-18

## 2021-11-23 NOTE — TELEPHONE ENCOUNTER
Refill passed per [facility] [place] protocol.   Requested Prescriptions   Pending Prescriptions Disp Refills    VENTOLIN  (90 Base) MCG/ACT Inhalation Aero Soln [Pharmacy Med Name: VENTOLIN HFA INH W/DOS CTR 200PUFFS] 18 g 0     Sig: INHALE 2 PUFFS INTO THE LUNGS EVERY 6 HOURS AS NEEDED FOR WHEEZING        Asthma & COPD Medication Protocol Passed - 11/23/2021 12:46 PM        Passed - Appointment in past 6 or next 3 months             Recent Outpatient Visits              3 weeks ago Type 2 diabetes mellitus with complication, with long-term current use of insulin [facility])    [address] [place] [address] [facility] Aden Miranda MD    Office Visit    3 weeks ago Vagina bleeding    [facility], [address] [place] [address] [place] Nieto MD    Office Visit    1 month ago Essential hypertension with goal blood pressure less than 140/90    [facility], [address] [place] [address] [place] Nieto MD    Office Visit    1 month ago Trigger finger, right ring finger    Orthopaedics - Marcela Mullen MD    Office Visit    3 months ago Essential hypertension with goal blood pressure less than 140/90    [facility], [address] [place], [place] [address]Antonieta MD    Office Visit           Future Appointments         Provider Department Appt Notes    In 2 weeks Linda Nieto MD [address] [place] [address], [address] [place] [address]     In 3 weeks Miki Malik MD; [address] [facility], [facility] 56 per [address]    In 2 months Aden Miranda, [address] [facility]  f/u

## 2021-12-08 ENCOUNTER — OFFICE VISIT (OUTPATIENT)
Dept: INTERNAL MEDICINE CLINIC | Facility: CLINIC | Age: 51
End: 2021-12-08
Payer: MEDICARE

## 2021-12-08 VITALS
SYSTOLIC BLOOD PRESSURE: 165 MMHG | DIASTOLIC BLOOD PRESSURE: 89 MMHG | WEIGHT: 187 LBS | TEMPERATURE: 98 F | HEIGHT: 62 IN | OXYGEN SATURATION: 97 % | BODY MASS INDEX: 34.41 KG/M2 | HEART RATE: 80 BPM

## 2021-12-08 DIAGNOSIS — Z79.4 TYPE 2 DIABETES MELLITUS WITH OTHER NEUROLOGIC COMPLICATION, WITH LONG-TERM CURRENT USE OF INSULIN (HCC): ICD-10-CM

## 2021-12-08 DIAGNOSIS — K21.9 GASTROESOPHAGEAL REFLUX DISEASE WITHOUT ESOPHAGITIS: ICD-10-CM

## 2021-12-08 DIAGNOSIS — J30.9 ALLERGIC RHINITIS, UNSPECIFIED SEASONALITY, UNSPECIFIED TRIGGER: Primary | ICD-10-CM

## 2021-12-08 DIAGNOSIS — R05.9 COUGH: ICD-10-CM

## 2021-12-08 DIAGNOSIS — E11.49 TYPE 2 DIABETES MELLITUS WITH OTHER NEUROLOGIC COMPLICATION, WITH LONG-TERM CURRENT USE OF INSULIN (HCC): ICD-10-CM

## 2021-12-08 DIAGNOSIS — I10 ESSENTIAL HYPERTENSION WITH GOAL BLOOD PRESSURE LESS THAN 140/90: ICD-10-CM

## 2021-12-08 PROCEDURE — 99214 OFFICE O/P EST MOD 30 MIN: CPT | Performed by: INTERNAL MEDICINE

## 2021-12-08 RX ORDER — AMLODIPINE BESYLATE 5 MG/1
TABLET ORAL
Qty: 180 TABLET | Refills: 1 | Status: SHIPPED | OUTPATIENT
Start: 2021-12-08

## 2021-12-08 RX ORDER — FEXOFENADINE HCL 180 MG/1
180 TABLET ORAL DAILY
Qty: 90 TABLET | Refills: 0 | Status: SHIPPED | OUTPATIENT
Start: 2021-12-08

## 2021-12-08 RX ORDER — FLUTICASONE PROPIONATE 50 MCG
2 SPRAY, SUSPENSION (ML) NASAL DAILY
Qty: 1 EACH | Refills: 1 | Status: SHIPPED | OUTPATIENT
Start: 2021-12-08 | End: 2022-12-03

## 2021-12-08 NOTE — PROGRESS NOTES
Subjective:   Patient ID: Jelly Adan is a 46year old female. Patient presents with:  Hypertension  Cough: Stts that she has had a cough that started 2 days ago with sinus congestion.    Patient in office today for HTN , cough for 2 days nasal congesti redness. Respiratory: Positive for cough (feels like postnasal - clears throat frequently). Negative for chest tightness, shortness of breath and wheezing. Cardiovascular: Negative for chest pain, palpitations, leg swelling and PND.    Gastrointestinal MG/DOSE, (OZEMPIC, 1 MG/DOSE,) 4 MG/3ML Subcutaneous Solution Pen-injector Inject 1 mg into the skin once a week. 9 mL 1   • atorvastatin 80 MG Oral Tab Take 1 tablet (80 mg total) by mouth nightly.  90 tablet 1   • docusate sodium 100 MG Oral Tab Take 1 ta each 1   • Insulin Pen Needle (TRUEPLUS PEN NEEDLES) 32G X 4 MM Does not apply Misc Use once daily with LantusPen and 3 times daily with NovologPen DxE11.8 IDDM [id number] 300 each 3   • Cholecalciferol (VITAMIN D) 2000 units Oral Cap Take 1 capsule (2, Musculoskeletal:      Cervical back: Neck supple. Right lower leg: No edema. Left lower leg: No edema. Lymphadenopathy:      Cervical: No cervical adenopathy. Skin:     General: Skin is warm and dry.       Comments:      Neurological: few days she has a doctor there she will check her blood pressure with  From [place]        Depression  And anxiety  stable    Continue present management    History of craniotomy  Hx of seizures  Stable  S/p CEA  Carotid endarterectomy   History of CVA postnasal drainage  Recommend patient to still have the COVID-19 test  Order placed in the system in meantime patient will use  Allegra 180 mg daily for 2 weeks and then as needed can also use the Flonase for nasal congestion 2 puffs  In each nostril once

## 2022-01-05 ENCOUNTER — TELEPHONE (OUTPATIENT)
Dept: INTERNAL MEDICINE CLINIC | Facility: CLINIC | Age: 52
End: 2022-01-05

## 2022-01-13 ENCOUNTER — MED REC SCAN ONLY (OUTPATIENT)
Dept: INTERNAL MEDICINE CLINIC | Facility: CLINIC | Age: 52
End: 2022-01-13

## 2022-01-13 NOTE — TELEPHONE ENCOUNTER
Late entry. Patient had called at 4 AM with sore throat and dysphagia. Advised patient to go to the emergency room if she was unable to swallow.

## 2022-01-13 NOTE — TELEPHONE ENCOUNTER
Message # [phone]         01/05/2022 04:26a   [JENNIFER]  To:  Nicola Sales  From:  Suha Caldera MD:  Phone#:  [phone]  ----------------------------------------------------------------------  RE; SORE THROAT/ CAN NOT SWALLOW AND RIGHT EAR PAIN

## 2022-01-27 ENCOUNTER — TELEPHONE (OUTPATIENT)
Dept: INTERNAL MEDICINE CLINIC | Facility: CLINIC | Age: 52
End: 2022-01-27

## 2022-01-27 ENCOUNTER — TELEPHONE (OUTPATIENT)
Dept: ENDOCRINOLOGY CLINIC | Facility: CLINIC | Age: 52
End: 2022-01-27

## 2022-01-27 NOTE — TELEPHONE ENCOUNTER
RN Please try calling tomorrow. RN called [employer]. Office currently closed -outbound mesage asked to call back during business hours and then ended the call.

## 2022-01-27 NOTE — TELEPHONE ENCOUNTER
Lisseth at [facility] calling to inform Dr. Taya Douglass of patients recent inpatient hospitalization in [place]. Patient has been discharged to [facility] as of yesterday January 26, 2022.        # [phone]  Ext [phone]

## 2022-01-27 NOTE — TELEPHONE ENCOUNTER
Veto Menjivar (sister-ANTHONY) calling and requesting informations regarding all the physicians that patient seen for the last 12 months,states that she is applying SSI for the patient, instructed to call Medical Records at [phone] to request for it.   Requesting

## 2022-01-28 NOTE — TELEPHONE ENCOUNTER
Left message on dedicated [facility] [phone]) with MD message. To call back with questions or concerns. Phone number and hours of operation left in message.

## 2022-01-31 ENCOUNTER — TELEPHONE (OUTPATIENT)
Dept: ENDOCRINOLOGY CLINIC | Facility: CLINIC | Age: 52
End: 2022-01-31

## 2022-01-31 NOTE — TELEPHONE ENCOUNTER
Per chart review PWO from [facility] faxed on 1/27/22. Advised patient to contact [facility] to change address.

## 2022-02-02 ENCOUNTER — TELEPHONE (OUTPATIENT)
Dept: INTERNAL MEDICINE CLINIC | Facility: CLINIC | Age: 52
End: 2022-02-02

## 2022-02-02 NOTE — TELEPHONE ENCOUNTER
Pt stated she was in the hospital in [place], [place] for 2 weeks , c/c vomited started yesterday, and twice today, feels and sounds weak, felt dizzy,vision became blurred , Pt c/o of no appetite and no taste had several Covid negative test at hospital, tongue/top of mouth is red  Advised ER for prompt eval, Pt verbalized understanding

## 2022-02-03 ENCOUNTER — TELEPHONE (OUTPATIENT)
Dept: ENDOCRINOLOGY CLINIC | Facility: CLINIC | Age: 52
End: 2022-02-03

## 2022-02-03 ENCOUNTER — MED REC SCAN ONLY (OUTPATIENT)
Dept: INTERNAL MEDICINE CLINIC | Facility: CLINIC | Age: 52
End: 2022-02-03

## 2022-02-03 NOTE — TELEPHONE ENCOUNTER
Per Care Everywhere , patient is currently admitted to telemetry unit in [facility].

## 2022-02-03 NOTE — TELEPHONE ENCOUNTER
Received fax from [address] [place] [address] for CGM supplies. Filled out form with last A1C of 9.4 from [address] [place] [address] O on 10/29/2021. Placed on provider desk for review and signature.

## 2022-02-09 ENCOUNTER — TELEPHONE (OUTPATIENT)
Dept: INTERNAL MEDICINE CLINIC | Facility: CLINIC | Age: 52
End: 2022-02-09

## 2022-02-09 NOTE — TELEPHONE ENCOUNTER
Patient does not know when she will return to [place]. She will schedule a follow up when she does.

## 2022-02-09 NOTE — TELEPHONE ENCOUNTER
Lisseth providing courtesy call to inform Dr. Vinay Cisneros that the patient is in [place] visiting family got sick and was discharged from [facility] on 2/5. Patient may need follow appointment upon her return to [place].

## 2022-02-10 RX ORDER — INSULIN GLARGINE 100 [IU]/ML
INJECTION, SOLUTION SUBCUTANEOUS
Qty: 42 ML | Refills: 0 | Status: SHIPPED | OUTPATIENT
Start: 2022-02-10 | End: 2022-03-21

## 2022-02-10 RX ORDER — INSULIN ASPART 100 [IU]/ML
INJECTION, SOLUTION INTRAVENOUS; SUBCUTANEOUS
Qty: 81 ML | Refills: 0 | Status: SHIPPED | OUTPATIENT
Start: 2022-02-10 | End: 2022-03-21

## 2022-02-10 NOTE — TELEPHONE ENCOUNTER
LOV 10/29/21. RTC 3 months. No f/u. Sent [address] reminder. Pended 3 month supply.

## 2022-02-10 NOTE — TELEPHONE ENCOUNTER
Dr. Clarissa Cole - seeking clarification. Okay to refill? Because Last 2 refills 3 month supply only, no refills. And the July Rx note to pharmacy said: \"Note to Pharmacy:   PATIENT NEED FUTURE REFILLS FROM DR. Wiley Batch HER NEUROLOGIST\"      Refill passed per [facility] protocol.     Requested Prescriptions   Pending Prescriptions Disp Refills    LEVETIRACETAM 500 MG Oral Tab [Pharmacy Med Name: LEVETIRACETAM 500MG TABLETS] 180 tablet 0     Sig: TAKE 1 TABLET BY MOUTH TWICE DAILY        Neurology Medications Passed - 2/10/2022 12:26 AM        Passed - Appointment in the past 6 or next 3 months            Recent Outpatient Visits              2 months ago Allergic rhinitis, unspecified seasonality, unspecified trigger    Silver Maradiaga MD    Office Visit    3 months ago Type 2 diabetes mellitus with complication, with long-term current use of insulin [facility]    [facility] Endocrinology Aleida Singh MD    Office Visit    3 months ago Vagina bleeding    [place], [place] Euna Smiles, MD    Office Visit    4 months ago Essential hypertension with goal blood pressure less than 140/90    [place] Euna Smiles, MD    Office Visit    4 months ago Trigger finger, right ring finger    Royal Ramírez MD    Office Visit

## 2022-02-12 RX ORDER — LEVETIRACETAM 500 MG/1
TABLET ORAL
Qty: 180 TABLET | Refills: 0 | Status: SHIPPED | OUTPATIENT
Start: 2022-02-12 | End: 2022-03-16

## 2022-02-16 ENCOUNTER — TELEPHONE (OUTPATIENT)
Dept: ENDOCRINOLOGY CLINIC | Facility: CLINIC | Age: 52
End: 2022-02-16

## 2022-02-18 ENCOUNTER — TELEPHONE (OUTPATIENT)
Dept: INTERNAL MEDICINE CLINIC | Facility: CLINIC | Age: 52
End: 2022-02-18

## 2022-02-18 RX ORDER — METOPROLOL SUCCINATE 25 MG/1
25 TABLET, EXTENDED RELEASE ORAL NIGHTLY
Qty: 90 TABLET | Refills: 1 | Status: SHIPPED | OUTPATIENT
Start: 2022-02-18

## 2022-02-18 RX ORDER — FLUTICASONE PROPIONATE 50 MCG
2 SPRAY, SUSPENSION (ML) NASAL DAILY
Qty: 1 EACH | Refills: 1 | Status: SHIPPED | OUTPATIENT
Start: 2022-02-18 | End: 2023-02-13

## 2022-02-18 RX ORDER — ONDANSETRON 4 MG/1
4 TABLET, ORALLY DISINTEGRATING ORAL
Qty: 20 TABLET | Refills: 0 | Status: SHIPPED | OUTPATIENT
Start: 2022-02-18 | End: 2022-03-11

## 2022-02-18 RX ORDER — METOPROLOL SUCCINATE 100 MG/1
100 TABLET, EXTENDED RELEASE ORAL DAILY
Qty: 90 TABLET | Refills: 1 | Status: SHIPPED | OUTPATIENT
Start: 2022-02-18

## 2022-02-18 RX ORDER — LEVETIRACETAM 500 MG/1
TABLET ORAL
Qty: 180 TABLET | Refills: 0 | OUTPATIENT
Start: 2022-02-18

## 2022-02-18 NOTE — TELEPHONE ENCOUNTER
Refill passed per [place] [facility] protocol. Requested Prescriptions   Pending Prescriptions Disp Refills    metoprolol succinate 100 MG Oral Tablet 24 Hr 90 tablet 1     Sig: Take 1 tablet (100 mg total) by mouth daily. Hypertensive Medications Protocol Passed - 2022  2:24 PM        Passed - CMP or BMP in past 12 months        Passed - Appointment in past 6 or next 3 months        Passed - GFR Non- > 50     Lab Results   Component Value Date    [facility]                    metoprolol succinate 25 MG Oral Tablet 24 Hr 90 tablet 1     Sig: Take 1 tablet (25 mg total) by mouth nightly. Hypertensive Medications Protocol Passed - 2022  2:24 PM        Passed - CMP or BMP in past 12 months        Passed - Appointment in past 6 or next 3 months        Passed - GFR Non- > 50     Lab Results   Component Value Date    [facility] 77 10/27/2021                    ondansetron 4 MG Oral Tablet Dispersible 20 tablet 0     Sig: Take 1 tablet (4 mg total) by mouth daily as needed for Nausea. Gastrointestional Medication Protocol Passed - 2022  2:24 PM        Passed - Appointment in past 12 or next 3 months           levETIRAcetam 500 MG Oral Tab 180 tablet 0     Sig: TAKE 1 TABLET BY MOUTH TWICE DAILY        Neurology Medications Passed - 2022  2:24 PM        Passed - Appointment in the past 6 or next 3 months           fluticasone propionate 50 MCG/ACT Nasal Suspension 1 each 1     Si sprays by Each Nare route daily. Allergy Medication Protocol Passed - 2022  2:24 PM        Passed - Appoinment in past 12 or next 3 months           VENTOLIN  (90 Base) MCG/ACT Inhalation Aero Soln 18 g 1     Sig: Inhale 2 puffs into the lungs every 6 (six) hours as needed for Wheezing.         Asthma & COPD Medication Protocol Passed - 2022  2:24 PM        Passed - Appointment in past 6 or next 3 months            Future Appointments Provider Department Appt Notes    In 1 week Ridge Rees, [facility], [address] [facility] Follow up- Care partner policy *BA    In 1 week [facility]    In 1 week Angelica Villareal, [facility] Endocrinology diabetes follow up, scheduled by sister Jerome Christensen  informed of policy    In 1 week Kenneth Meyer MD [facility] [place] carotid stenosis; s/p R external CEA          Recent Outpatient Visits              2 months ago Allergic rhinitis, unspecified seasonality, unspecified trigger    [facility] [place], [place] Martha Goes, MD    Office Visit    3 months ago Type 2 diabetes mellitus with complication, with long-term current use of insulin [facility])    [facility] Endocrinology Christiano Soria MD    Office Visit    3 months ago Vagina bleeding    [facility] [place] Martha Goes, MD    Office Visit    4 months ago Essential hypertension with goal blood pressure less than 140/90    [facility] [place] Martha Goes, MD    Office Visit    4 months ago Trigger finger, right ring finger    Caitlyn Contreras MD    Office Visit

## 2022-02-18 NOTE — TELEPHONE ENCOUNTER
Patient's sister calling stating Opthalmology needs a referral from Dr. Tricia Phipps  before the patient can be seen. Please call once referral sent, patient gets injections it has been over a year due to [place]. Patients sister requested to be high priority. Thank you.

## 2022-02-22 RX ORDER — AMLODIPINE BESYLATE 5 MG/1
TABLET ORAL
Qty: 180 TABLET | Refills: 1 | Status: SHIPPED | OUTPATIENT
Start: 2022-02-22

## 2022-02-22 RX ORDER — ASPIRIN 81 MG
100 TABLET, DELAYED RELEASE (ENTERIC COATED) ORAL 2 TIMES DAILY PRN
Qty: 60 TABLET | Refills: 1 | Status: SHIPPED | OUTPATIENT
Start: 2022-02-22

## 2022-02-22 RX ORDER — LOSARTAN POTASSIUM 100 MG/1
100 TABLET ORAL
Qty: 90 TABLET | Refills: 0 | Status: SHIPPED | OUTPATIENT
Start: 2022-02-22

## 2022-02-22 RX ORDER — GABAPENTIN 300 MG/1
300 CAPSULE ORAL NIGHTLY
Qty: 90 CAPSULE | Refills: 1 | Status: SHIPPED | OUTPATIENT
Start: 2022-02-22

## 2022-02-23 ENCOUNTER — TELEPHONE (OUTPATIENT)
Dept: CASE MANAGEMENT | Age: 52
End: 2022-02-23

## 2022-02-23 NOTE — TELEPHONE ENCOUNTER
LOV 10/29/21. RTC 3 months. F/u 03/02/22 with APRN. Pended 90 day supply. *Insulins were refilled for 90 days 02/10/22.

## 2022-02-23 NOTE — TELEPHONE ENCOUNTER
[facility] patients sister is calling to follow up on opthalmology referral requested. She is requesting a return call.     Ph. [phone]

## 2022-02-23 NOTE — TELEPHONE ENCOUNTER
Dr. Brandt Mcelroy,    The patient's sister is requesting an urgent referral for a follow up visit for her sister to see Dr. Ambar Paul for a follow up eye visit. Pended referral please review diagnosis and sign off if you agree. Thank you.   Raúl Mast

## 2022-02-24 RX ORDER — ATORVASTATIN CALCIUM 80 MG/1
80 TABLET, FILM COATED ORAL NIGHTLY
Qty: 90 TABLET | Refills: 0 | Status: SHIPPED | OUTPATIENT
Start: 2022-02-24 | End: 2022-03-21

## 2022-02-24 RX ORDER — SEMAGLUTIDE 1.34 MG/ML
1 INJECTION, SOLUTION SUBCUTANEOUS WEEKLY
Qty: 9 ML | Refills: 0 | Status: SHIPPED | OUTPATIENT
Start: 2022-02-24 | End: 2022-03-21

## 2022-02-24 RX ORDER — PEN NEEDLE, DIABETIC 32GX 5/32"
NEEDLE, DISPOSABLE MISCELLANEOUS
Qty: 200 EACH | Refills: 1 | Status: SHIPPED | OUTPATIENT
Start: 2022-02-24

## 2022-02-24 RX ORDER — BLOOD PRESSURE TEST KIT
KIT MISCELLANEOUS
Qty: 100 EACH | Refills: 0 | Status: SHIPPED | OUTPATIENT
Start: 2022-02-24 | End: 2022-03-21

## 2022-02-28 ENCOUNTER — OFFICE VISIT (OUTPATIENT)
Dept: INTERNAL MEDICINE CLINIC | Facility: CLINIC | Age: 52
End: 2022-02-28
Payer: MEDICARE

## 2022-02-28 VITALS
HEIGHT: 62 IN | WEIGHT: 178 LBS | SYSTOLIC BLOOD PRESSURE: 164 MMHG | BODY MASS INDEX: 32.76 KG/M2 | DIASTOLIC BLOOD PRESSURE: 93 MMHG | HEART RATE: 73 BPM

## 2022-02-28 DIAGNOSIS — Z79.4 TYPE 2 DIABETES MELLITUS WITH HYPERGLYCEMIA, WITH LONG-TERM CURRENT USE OF INSULIN (HCC): ICD-10-CM

## 2022-02-28 DIAGNOSIS — I10 ESSENTIAL HYPERTENSION WITH GOAL BLOOD PRESSURE LESS THAN 140/90: ICD-10-CM

## 2022-02-28 DIAGNOSIS — R53.1 LEFT-SIDED WEAKNESS: Primary | ICD-10-CM

## 2022-02-28 DIAGNOSIS — E11.65 TYPE 2 DIABETES MELLITUS WITH HYPERGLYCEMIA, WITH LONG-TERM CURRENT USE OF INSULIN (HCC): ICD-10-CM

## 2022-02-28 DIAGNOSIS — Z86.73 HISTORY OF TIA (TRANSIENT ISCHEMIC ATTACK): ICD-10-CM

## 2022-02-28 DIAGNOSIS — I65.21 ATHEROSCLEROSIS OF RIGHT CAROTID ARTERY: ICD-10-CM

## 2022-02-28 PROCEDURE — 99214 OFFICE O/P EST MOD 30 MIN: CPT | Performed by: PHYSICIAN ASSISTANT

## 2022-03-02 ENCOUNTER — OFFICE VISIT (OUTPATIENT)
Dept: ENDOCRINOLOGY CLINIC | Facility: CLINIC | Age: 52
End: 2022-03-02
Payer: MEDICARE

## 2022-03-02 VITALS
BODY MASS INDEX: 33.01 KG/M2 | HEART RATE: 70 BPM | WEIGHT: 179.38 LBS | HEIGHT: 62 IN | DIASTOLIC BLOOD PRESSURE: 88 MMHG | SYSTOLIC BLOOD PRESSURE: 155 MMHG | RESPIRATION RATE: 18 BRPM

## 2022-03-02 DIAGNOSIS — Z79.4 TYPE 2 DIABETES MELLITUS WITH COMPLICATION, WITH LONG-TERM CURRENT USE OF INSULIN (HCC): Primary | ICD-10-CM

## 2022-03-02 DIAGNOSIS — E11.8 TYPE 2 DIABETES MELLITUS WITH COMPLICATION, WITH LONG-TERM CURRENT USE OF INSULIN (HCC): Primary | ICD-10-CM

## 2022-03-02 LAB
CARTRIDGE LOT#: ABNORMAL NUMERIC
GLUCOSE BLOOD: 313
HEMOGLOBIN A1C: 9.4 % (ref 4.3–5.6)
TEST STRIP LOT #: NORMAL NUMERIC

## 2022-03-02 PROCEDURE — 99215 OFFICE O/P EST HI 40 MIN: CPT

## 2022-03-02 PROCEDURE — 36416 COLLJ CAPILLARY BLOOD SPEC: CPT

## 2022-03-02 PROCEDURE — 83036 HEMOGLOBIN GLYCOSYLATED A1C: CPT

## 2022-03-02 PROCEDURE — 82947 ASSAY GLUCOSE BLOOD QUANT: CPT

## 2022-03-02 RX ORDER — FLUCONAZOLE 150 MG/1
150 TABLET ORAL WEEKLY
Qty: 4 TABLET | Refills: 0 | Status: SHIPPED | OUTPATIENT
Start: 2022-03-02

## 2022-03-02 RX ORDER — EMPAGLIFLOZIN 25 MG/1
1 TABLET, FILM COATED ORAL DAILY
Qty: 90 TABLET | Refills: 1 | Status: SHIPPED | OUTPATIENT
Start: 2022-03-02 | End: 2022-04-01

## 2022-03-02 NOTE — PATIENT INSTRUCTIONS
-Decrease Lantus to 45 units daily     -Decreas Humalog to 35 units three times daily with meals.     -Continue Ozempic 1mg subcutaneous weekly. -Start Jardiance 25mg once daily in the morning.

## 2022-03-02 NOTE — TELEPHONE ENCOUNTER
Please note that Dr. Von Álvarez is not within patient's [facility] network. Please redirect patient to an in network retina specialist such as Dr. Janae Schmidt. Thank you, Camille Phillips Specialist    Managed Care.

## 2022-03-08 NOTE — TELEPHONE ENCOUNTER
Referral for Dr. Cheyanne Godinez office has been authorized. Sent [email or web address] message to pt informing her as well, and provided phone number to his office to schedule an appt.

## 2022-03-09 ENCOUNTER — TELEPHONE (OUTPATIENT)
Dept: INTERNAL MEDICINE CLINIC | Facility: CLINIC | Age: 52
End: 2022-03-09

## 2022-03-09 ENCOUNTER — PATIENT MESSAGE (OUTPATIENT)
Dept: INTERNAL MEDICINE CLINIC | Facility: CLINIC | Age: 52
End: 2022-03-09

## 2022-03-09 NOTE — TELEPHONE ENCOUNTER
From: Joellen CAMARA  To: Leonardo Mcclain  Sent: 3/8/2022 9:19 AM CST  Subject: Referral    Dear Sallie Olea,    We were informed by our managed care department that Dr. Martha Unger is not within your [facility] network, and to redirect you to an in network retina specialist by the name of Dr. Fadumo Alamo. Dr. Merly Keen has approved of Dr. Fadumo Alamo referral, and [facility] has authorized the referral as well. Please give Dr. Anny Mendoza office a call at [phone] to schedule your appointment. If you have any questions please contact our office.     Joellen Styles LPN

## 2022-03-10 RX ORDER — ONDANSETRON 4 MG/1
TABLET, ORALLY DISINTEGRATING ORAL
Qty: 20 TABLET | Refills: 0 | OUTPATIENT
Start: 2022-03-10

## 2022-03-11 RX ORDER — ONDANSETRON 4 MG/1
4 TABLET, ORALLY DISINTEGRATING ORAL
Qty: 20 TABLET | Refills: 0 | Status: SHIPPED | OUTPATIENT
Start: 2022-03-11

## 2022-03-11 NOTE — TELEPHONE ENCOUNTER
2nd attempt. Patient was left a message to call back. Transfer to triage dept [id number]  mychart msg also sent.

## 2022-03-14 RX ORDER — HYDROCHLOROTHIAZIDE 25 MG/1
25 TABLET ORAL DAILY
Qty: 90 TABLET | Refills: 1 | Status: SHIPPED | OUTPATIENT
Start: 2022-03-14

## 2022-03-14 NOTE — TELEPHONE ENCOUNTER
[facility].     Requested Prescriptions   Pending Prescriptions Disp Refills    HYDROCHLOROTHIAZIDE 25 MG Oral Tab [Pharmacy Med Name: HYDROCHLOROTHIAZIDE 25MG TABLETS] 90 tablet 0     Sig: TAKE 1 TABLET(25 MG) BY MOUTH EVERY DAY        Hypertensive Medications Protocol Passed - 3/14/2022  9:25 AM        Passed - CMP or BMP in past 12 months        Passed - Appointment in past 6 or next 3 months        Passed - GFR Non- > 50     Lab Results   Component Value Date    GFRNAA 77 10/27/2021                     Recent Outpatient Visits              1 week ago Type 2 diabetes mellitus with complication, with long-term current use of insulin [facility])    [facility] Endocrinology GINGER Rudd    Office Visit    1 week ago Trigger finger, right ring finger    Orthopaedics - Minh Traore MD    Office Visit    2 weeks ago Left-sided weakness    [facility] [address] [place] [address]    Office Visit    3 months ago Allergic rhinitis, unspecified seasonality, unspecified trigger    [facility] [address], [place] Matt Alexander MD    Office Visit    4 months ago Type 2 diabetes mellitus with complication, with long-term current use of insulin [facility])    [facility] Endocrinology Julio Reinoso MD    Office Visit          Future Appointments         Provider Department Appt Notes    In 2 months Johnathon Robison  Endocrinology

## 2022-03-16 ENCOUNTER — TELEPHONE (OUTPATIENT)
Dept: INTERNAL MEDICINE CLINIC | Facility: CLINIC | Age: 52
End: 2022-03-16

## 2022-03-16 DIAGNOSIS — Z98.890 HISTORY OF CRANIOTOMY: ICD-10-CM

## 2022-03-16 RX ORDER — HYDRALAZINE HYDROCHLORIDE 10 MG/1
10 TABLET, FILM COATED ORAL 3 TIMES DAILY PRN
Qty: 90 TABLET | Refills: 1 | Status: CANCELLED | OUTPATIENT
Start: 2022-03-16 | End: 2022-04-15

## 2022-03-16 NOTE — TELEPHONE ENCOUNTER
Patient indicated that she needed a note in order to have a bottled water in the airport and on the plane. Please advise.

## 2022-03-16 NOTE — TELEPHONE ENCOUNTER
Patient should follow usual procedures /instructions at the airport and the plane -      They  serve water in the plane , and she can purchase bottle of water at the airport if needed .

## 2022-03-17 RX ORDER — CARBAMAZEPINE 200 MG/1
200 TABLET ORAL DAILY
Qty: 90 TABLET | Refills: 1 | OUTPATIENT
Start: 2022-03-17

## 2022-03-17 RX ORDER — LEVETIRACETAM 500 MG/1
TABLET ORAL
Qty: 180 TABLET | Refills: 1 | Status: SHIPPED | OUTPATIENT
Start: 2022-03-17 | End: 2022-07-18

## 2022-03-17 RX ORDER — HYDRALAZINE HYDROCHLORIDE 100 MG/1
100 TABLET, FILM COATED ORAL 3 TIMES DAILY
Qty: 270 TABLET | Refills: 1 | OUTPATIENT
Start: 2022-03-17 | End: 2022-06-15

## 2022-03-17 RX ORDER — ESCITALOPRAM OXALATE 10 MG/1
10 TABLET ORAL DAILY
Qty: 90 TABLET | Refills: 1 | OUTPATIENT
Start: 2022-03-17 | End: 2022-04-16

## 2022-03-17 NOTE — TELEPHONE ENCOUNTER
Refill passed per [place] [facility] [place] protocol. The remaining rxs were not refilled as they didn't pass protocol, were not on current medlist, had a high level interaction warning  or do not have a protocol. Requested Prescriptions   Pending Prescriptions Disp Refills    levETIRAcetam 500 MG Oral Tab 180 tablet 1     Sig: TAKE 1 TABLET BY MOUTH TWICE DAILY        Neurology Medications Passed - 3/16/2022  1:30 PM        Passed - Appointment in the past 6 or next 3 months           metFORMIN 500 MG Oral Tab 360 tablet 1     Sig: Take 2 tablets (1,000 mg total) by mouth 2 (two) times daily with meals. Diabetes Medication Protocol Failed - 3/16/2022  1:30 PM        Failed - Last A1C < 7.5 and within past 6 months     Lab Results   Component Value Date    A1C 9.4 (A) 03/02/2022               Passed - Appointment in past 6 or next 3 months        Passed - GFR Non- > 50     Lab Results   Component Value Date    GFRNAA 77 10/27/2021                 Passed - GFR in the past 12 months           escitalopram (LEXAPRO) 10 MG Oral Tab 90 tablet 1     Sig: Take 1 tablet (10 mg total) by mouth daily. Psychiatric Non-Scheduled (Anti-Anxiety) Passed - 3/16/2022  1:30 PM        Passed - Appointment in last 6 or next 3 months           carBAMazepine 200 MG Oral Tab 90 tablet 1     Sig: Take 1 tablet (200 mg total) by mouth daily. Neurology Medications Passed - 3/16/2022  1:30 PM        Passed - Appointment in the past 6 or next 3 months           DULoxetine 30 MG Oral Cap DR Particles 90 capsule 1     Sig: Take 1 capsule (30 mg total) by mouth daily. Psychiatric Non-Scheduled (Anti-Anxiety) Passed - 3/16/2022  1:30 PM        Passed - Appointment in last 6 or next 3 months           aspirin 325 MG Oral Tab 90 tablet 1     Sig: Take 1 tablet (325 mg total) by mouth daily.         Aspirin Protocol Passed - 3/16/2022  1:30 PM        Passed - Appointment in past 6 or next 3 months hydrALAZINE 100 MG Oral Tab 270 tablet 1     Sig: Take 1 tablet (100 mg total) by mouth 3 (three) times daily.         Hypertensive Medications Protocol Passed - 3/16/2022  1:30 PM        Passed - CMP or BMP in past 12 months        Passed - Appointment in past 6 or next 3 months        Passed - GFR Non- > 50     Lab Results   Component Value Date    GFRNAA 77 10/27/2021                       [unfilled]      @[facility]@

## 2022-03-21 ENCOUNTER — TELEPHONE (OUTPATIENT)
Dept: ENDOCRINOLOGY CLINIC | Facility: CLINIC | Age: 52
End: 2022-03-21

## 2022-03-21 RX ORDER — INSULIN GLARGINE 100 [IU]/ML
45 INJECTION, SOLUTION SUBCUTANEOUS NIGHTLY
Qty: 42 ML | Refills: 0 | Status: SHIPPED | OUTPATIENT
Start: 2022-03-21

## 2022-03-21 RX ORDER — ASPIRIN 325 MG
325 TABLET ORAL DAILY
Qty: 90 TABLET | Refills: 1 | Status: CANCELLED | OUTPATIENT
Start: 2022-03-21

## 2022-03-21 RX ORDER — BLOOD PRESSURE TEST KIT
KIT MISCELLANEOUS
Qty: 100 EACH | Refills: 0 | Status: SHIPPED | OUTPATIENT
Start: 2022-03-21

## 2022-03-21 RX ORDER — SEMAGLUTIDE 1.34 MG/ML
1 INJECTION, SOLUTION SUBCUTANEOUS WEEKLY
Qty: 9 ML | Refills: 0 | Status: SHIPPED | OUTPATIENT
Start: 2022-03-21

## 2022-03-21 RX ORDER — DULOXETIN HYDROCHLORIDE 30 MG/1
30 CAPSULE, DELAYED RELEASE ORAL DAILY
Qty: 90 CAPSULE | Refills: 1 | Status: CANCELLED | OUTPATIENT
Start: 2022-03-21

## 2022-03-21 RX ORDER — INSULIN ASPART 100 [IU]/ML
INJECTION, SOLUTION INTRAVENOUS; SUBCUTANEOUS
Qty: 81 ML | Refills: 0 | Status: SHIPPED | OUTPATIENT
Start: 2022-03-21

## 2022-03-21 RX ORDER — ATORVASTATIN CALCIUM 80 MG/1
80 TABLET, FILM COATED ORAL NIGHTLY
Qty: 90 TABLET | Refills: 0 | Status: SHIPPED | OUTPATIENT
Start: 2022-03-21

## 2022-03-21 NOTE — TELEPHONE ENCOUNTER
LOV: 3/2/22  F/U: 6/2/22    [facility] to [facility]

## 2022-04-06 RX ORDER — PEN NEEDLE, DIABETIC 32GX 5/32"
1 NEEDLE, DISPOSABLE MISCELLANEOUS 4 TIMES DAILY
Qty: 200 EACH | Refills: 1 | Status: SHIPPED | OUTPATIENT
Start: 2022-04-06

## 2022-04-06 NOTE — TELEPHONE ENCOUNTER
Called pt to confirm, pt states she is still using the pen needles.     LOV: 3/2/22    RTC: 3 Months    FU: 6/2/22

## 2022-05-01 ENCOUNTER — TELEPHONE (OUTPATIENT)
Dept: INTERNAL MEDICINE CLINIC | Facility: CLINIC | Age: 52
End: 2022-05-01

## 2022-05-01 PROCEDURE — 99442 PHONE E/M BY PHYS 11-20 MIN: CPT | Performed by: INTERNAL MEDICINE

## 2022-05-02 ENCOUNTER — HOSPITAL ENCOUNTER (EMERGENCY)
Facility: HOSPITAL | Age: 52
Discharge: HOME | End: 2022-05-02
Attending: EMERGENCY MEDICINE
Payer: MEDICARE

## 2022-05-02 VITALS
RESPIRATION RATE: 20 BRPM | BODY MASS INDEX: 29.44 KG/M2 | DIASTOLIC BLOOD PRESSURE: 80 MMHG | WEIGHT: 160 LBS | TEMPERATURE: 99 F | HEART RATE: 97 BPM | SYSTOLIC BLOOD PRESSURE: 186 MMHG | OXYGEN SATURATION: 97 % | HEIGHT: 62 IN

## 2022-05-02 DIAGNOSIS — J02.0 STREPTOCOCCAL SORE THROAT: Primary | ICD-10-CM

## 2022-05-02 LAB — S PYO AG THROAT QL: POSITIVE

## 2022-05-02 PROCEDURE — 87880 STREP A ASSAY W/OPTIC: CPT

## 2022-05-02 PROCEDURE — 99283 EMERGENCY DEPT VISIT LOW MDM: CPT

## 2022-05-02 RX ORDER — TRAMADOL HYDROCHLORIDE 50 MG/1
TABLET ORAL EVERY 6 HOURS PRN
Qty: 10 TABLET | Refills: 0 | Status: SHIPPED | OUTPATIENT
Start: 2022-05-02 | End: 2022-05-07

## 2022-05-02 RX ORDER — AMOXICILLIN 500 MG/1
500 TABLET, FILM COATED ORAL 2 TIMES DAILY
Qty: 20 TABLET | Refills: 0 | Status: SHIPPED | OUTPATIENT
Start: 2022-05-02 | End: 2022-05-12

## 2022-05-02 NOTE — ED QUICK NOTES
PATIENT APPROVED FOR DISCHARGE PER ER PROVIDER. patient GIVEN VERBAL AND WRITTEN DISCHARGE INSTRUCTIONS. GIVEN INSTRUCTIONS ON RX X 2, FOLLOW UP WITH pcp AND SYMPTOMS THAT NECESSITATE COMING BACK TO ED. VERBALIZES UNDERSTANDING OF DISCHARGE INSTRUCTIONS. PATIENT AOX3 OUT OF ED with steady gait. RESP UNLABORED.

## 2022-05-06 ENCOUNTER — TELEPHONE (OUTPATIENT)
Dept: INTERNAL MEDICINE CLINIC | Facility: CLINIC | Age: 52
End: 2022-05-06

## 2022-05-06 NOTE — TELEPHONE ENCOUNTER
FYI Dr Conrado Mohs     Patient (name and  verified) calling c/o headache, red eyes with drainage, bilateral ear pain with severe body aches. Patient is taking amoxicillin for strep throat, recent ER visit on 22. Patient states that her throat is better but reports that her \"tongue is swollen\" started Tuesday. Patient denies difficulty breathing. Patient is speaking full sentences over the phone. Patient is also taking tramadol for pain. Patient reports intermittent fever. Patient reports is not getting better and wants to be seen. Explained to patient that based on new and ongoing symptoms she should be rechecked in the ER. Spoke with with patient's sister, Darrius Bal (ANTHONY verified) and instructed to bring patient to the ER for ongoing and new symptoms. Verbalized understanding and agrees to plan.

## 2022-05-07 DIAGNOSIS — I10 ESSENTIAL HYPERTENSION WITH GOAL BLOOD PRESSURE LESS THAN 140/90: ICD-10-CM

## 2022-05-07 RX ORDER — LOSARTAN POTASSIUM 100 MG/1
100 TABLET ORAL
Qty: 90 TABLET | Refills: 1 | Status: SHIPPED | OUTPATIENT
Start: 2022-05-07 | End: 2023-02-22

## 2022-05-07 NOTE — TELEPHONE ENCOUNTER
Refill passed per [address] protocol. Requested Prescriptions   Pending Prescriptions Disp Refills    LOSARTAN 100 MG Oral Tab [Pharmacy Med Name: LOSARTAN POTASSIUM 100 MG Tablet] 90 tablet 0     Sig: Take 1 tablet (100 mg total) by mouth once daily.         Hypertensive Medications Protocol Passed - 5/7/2022  7:22 AM        Passed - CMP or BMP in past 12 months        Passed - Appointment in past 6 or next 3 months        Passed - GFR Non- > 50     Lab Results   Component Value Date    GFRNAA 77 10/27/2021                       Future Appointments         Provider Department Appt Notes    In 3 weeks [facility]ocrin[facility] 3m             Recent Outpatient Visits              2 months ago Type 2 diabetes mellitus with complication, with long-term current use of insulin [facility])    [address] Endocrinology GINGER Gtz    Office Visit    2 months ago Trigger finger, right ring finger    Ridge MD Josep    Office Visit    2 months ago Left-sided weakness    Andrés Horvath [place], PA-C    Office Visit    5 months ago Allergic rhinitis, unspecified seasonality, unspecified trigger    Bobie [place], [place] Dennice Dukes, MD    Office Visit    6 months ago Type 2 diabetes mellitus with complication, with long-term current use of insulin [facility])    [address] [facility] Tennille Foss MD    Office Visit

## 2022-05-16 ENCOUNTER — TELEPHONE (OUTPATIENT)
Dept: INTERNAL MEDICINE CLINIC | Facility: CLINIC | Age: 52
End: 2022-05-16

## 2022-05-16 ENCOUNTER — HOSPITAL ENCOUNTER (EMERGENCY)
Facility: HOSPITAL | Age: 52
Discharge: HOME | End: 2022-05-16
Attending: EMERGENCY MEDICINE
Payer: MEDICARE

## 2022-05-16 VITALS
BODY MASS INDEX: 30.36 KG/M2 | DIASTOLIC BLOOD PRESSURE: 92 MMHG | TEMPERATURE: 98 F | HEIGHT: 62 IN | HEART RATE: 70 BPM | OXYGEN SATURATION: 97 % | RESPIRATION RATE: 15 BRPM | WEIGHT: 165 LBS | SYSTOLIC BLOOD PRESSURE: 199 MMHG

## 2022-05-16 DIAGNOSIS — N30.00 ACUTE CYSTITIS WITHOUT HEMATURIA: ICD-10-CM

## 2022-05-16 DIAGNOSIS — R53.1 WEAKNESS GENERALIZED: Primary | ICD-10-CM

## 2022-05-16 LAB
ANION GAP SERPL CALC-SCNC: 6 MMOL/L (ref 0–18)
BASOPHILS # BLD AUTO: 0.04 X10(3) UL (ref 0–0.2)
BASOPHILS NFR BLD AUTO: 0.4 %
BILIRUB UR QL: NEGATIVE
BUN BLD-MCNC: 12 MG/DL (ref 7–18)
BUN/CREAT SERPL: 12.2 (ref 10–20)
CALCIUM BLD-MCNC: 9.4 MG/DL (ref 8.5–10.1)
CHLORIDE SERPL-SCNC: 103 MMOL/L (ref 98–112)
CLARITY UR: CLEAR
CO2 SERPL-SCNC: 26 MMOL/L (ref 21–32)
COLOR UR: YELLOW
CREAT BLD-MCNC: 0.98 MG/DL
DEPRECATED RDW RBC AUTO: 40.6 FL (ref 35.1–46.3)
EOSINOPHIL # BLD AUTO: 0.14 X10(3) UL (ref 0–0.7)
EOSINOPHIL NFR BLD AUTO: 1.5 %
ERYTHROCYTE [DISTWIDTH] IN BLOOD BY AUTOMATED COUNT: 14.1 % (ref 11–15)
GLUCOSE BLD-MCNC: 236 MG/DL (ref 70–99)
GLUCOSE BLDC GLUCOMTR-MCNC: 222 MG/DL (ref 70–99)
GLUCOSE UR-MCNC: 50 MG/DL
HCT VFR BLD AUTO: 43.8 %
HGB BLD-MCNC: 13.1 G/DL
HGB UR QL STRIP.AUTO: NEGATIVE
IMM GRANULOCYTES # BLD AUTO: 0.02 X10(3) UL (ref 0–1)
IMM GRANULOCYTES NFR BLD: 0.2 %
KETONES UR-MCNC: NEGATIVE MG/DL
LYMPHOCYTES # BLD AUTO: 2.7 X10(3) UL (ref 1–4)
LYMPHOCYTES NFR BLD AUTO: 29 %
MCH RBC QN AUTO: 24.1 PG (ref 26–34)
MCHC RBC AUTO-ENTMCNC: 29.9 G/DL (ref 31–37)
MCV RBC AUTO: 80.5 FL
MONOCYTES # BLD AUTO: 0.59 X10(3) UL (ref 0.1–1)
MONOCYTES NFR BLD AUTO: 6.3 %
NEUTROPHILS # BLD AUTO: 5.82 X10 (3) UL (ref 1.5–7.7)
NEUTROPHILS # BLD AUTO: 5.82 X10(3) UL (ref 1.5–7.7)
NEUTROPHILS NFR BLD AUTO: 62.6 %
NITRITE UR QL STRIP.AUTO: NEGATIVE
OSMOLALITY SERPL CALC.SUM OF ELEC: 287 MOSM/KG (ref 275–295)
PH UR: 7 [PH] (ref 5–8)
PLATELET # BLD AUTO: 300 10(3)UL (ref 150–450)
POTASSIUM SERPL-SCNC: 4.4 MMOL/L (ref 3.5–5.1)
PROT UR-MCNC: NEGATIVE MG/DL
RBC # BLD AUTO: 5.44 X10(6)UL
SARS-COV-2 RNA RESP QL NAA+PROBE: NOT DETECTED
SODIUM SERPL-SCNC: 135 MMOL/L (ref 136–145)
SP GR UR STRIP: 1.01 (ref 1–1.03)
UROBILINOGEN UR STRIP-ACNC: <2
VIT C UR-MCNC: NEGATIVE MG/DL
WBC # BLD AUTO: 9.3 X10(3) UL (ref 4–11)

## 2022-05-16 PROCEDURE — 81001 URINALYSIS AUTO W/SCOPE: CPT | Performed by: EMERGENCY MEDICINE

## 2022-05-16 PROCEDURE — 82962 GLUCOSE BLOOD TEST: CPT

## 2022-05-16 PROCEDURE — 93005 ELECTROCARDIOGRAM TRACING: CPT

## 2022-05-16 PROCEDURE — 93010 ELECTROCARDIOGRAM REPORT: CPT | Performed by: EMERGENCY MEDICINE

## 2022-05-16 PROCEDURE — 87086 URINE CULTURE/COLONY COUNT: CPT | Performed by: EMERGENCY MEDICINE

## 2022-05-16 PROCEDURE — 80048 BASIC METABOLIC PNL TOTAL CA: CPT | Performed by: EMERGENCY MEDICINE

## 2022-05-16 PROCEDURE — 96361 HYDRATE IV INFUSION ADD-ON: CPT

## 2022-05-16 PROCEDURE — 96374 THER/PROPH/DIAG INJ IV PUSH: CPT

## 2022-05-16 PROCEDURE — 85025 COMPLETE CBC W/AUTO DIFF WBC: CPT | Performed by: EMERGENCY MEDICINE

## 2022-05-16 PROCEDURE — 99285 EMERGENCY DEPT VISIT HI MDM: CPT

## 2022-05-16 RX ORDER — ONDANSETRON 4 MG/1
4 TABLET, ORALLY DISINTEGRATING ORAL EVERY 4 HOURS PRN
Qty: 10 TABLET | Refills: 0 | Status: SHIPPED | OUTPATIENT
Start: 2022-05-16 | End: 2022-05-23

## 2022-05-16 RX ORDER — CYCLOBENZAPRINE HCL 10 MG
10 TABLET ORAL 3 TIMES DAILY PRN
Qty: 20 TABLET | Refills: 0 | Status: SHIPPED | OUTPATIENT
Start: 2022-05-16 | End: 2022-05-23

## 2022-05-16 RX ORDER — CEPHALEXIN 500 MG/1
500 CAPSULE ORAL 2 TIMES DAILY
Qty: 10 CAPSULE | Refills: 0 | Status: SHIPPED | OUTPATIENT
Start: 2022-05-16 | End: 2022-05-21

## 2022-05-16 RX ORDER — ONDANSETRON 2 MG/ML
4 INJECTION INTRAMUSCULAR; INTRAVENOUS ONCE
Status: COMPLETED | OUTPATIENT
Start: 2022-05-16 | End: 2022-05-16

## 2022-05-16 NOTE — TELEPHONE ENCOUNTER
Action Requested: Summary for Provider     []  Critical Lab, Recommendations Needed  [] Need Additional Advice  []   FYI    []   Need Orders  [] Need Medications Sent to Pharmacy  []  Other     SUMMARY: pt going to ER for eval.      Pt reports x 1 week numbness in hands bilat and feet bilat per pt \"my chest feels heavy I am SOB, very very weak, very very dizzy, and when I walk up the stairs I feel like I have to hold on or my legs will give out\"    Pt states when she tries to hold on to items she feels like she is going to drop them. Pt denies chest pain, arm/jaw/shoulder pain, visual disturbances, feeling faint    Advised ER now and pt states she doesn't want to wait in the ER and \"what does it sound like I have\"  Advised pt unable to determine without evaluation and due to symptoms is best evaluated in the ER. Pt agrees to plan and pt sister will drive her now. Reason for call: No chief complaint on file.   Onset: Data Unavailable

## 2022-05-16 NOTE — ED INITIAL ASSESSMENT (HPI)
PT DIAGNOSED WITH POSITIVE STREP 2 WEEKS AGO HERE, CAME IN FOR LOSS OF APPETITE, FATIGUE, FEELING WEAK. PT WITH HX OF STROKE, MUSCLE WEAKNESS, HYPERTENSION, ANEMIA, MGD. PT IS A/O X 4, BREATHING UNLABORED.

## 2022-05-23 ENCOUNTER — PATIENT OUTREACH (OUTPATIENT)
Dept: CASE MANAGEMENT | Age: 52
End: 2022-05-23

## 2022-05-23 DIAGNOSIS — Z98.890 HISTORY OF CRANIOTOMY: ICD-10-CM

## 2022-05-23 NOTE — TELEPHONE ENCOUNTER
Pt requesting rx, last refill 6/3/21 90/1  Refill passed per [email or web address] protocol.       Requested Prescriptions   Pending Prescriptions Disp Refills    Omeprazole 40 MG Oral Capsule Delayed Release 90 capsule 1     Sig: TAKE 1 CAPSULE BY MOUTH DAILY 30 TO 60 MINUTES BEFORE A MEAL        Gastrointestional Medication Protocol Passed - 5/23/2022  3:25 PM        Passed - Appointment in past 12 or next 3 months               Future Appointments         Provider Department Appt Notes    In 1 week Vangie Robles, [address] Endocrinology 3m             Recent Outpatient Visits              2 months ago Type 2 diabetes mellitus with complication, with long-term current use of insulin [facility])    [facility] Endocrinology GINGER Ferraro    Office Visit    2 months ago Trigger finger, right ring finger    Loren Akhtar MD    Office Visit    2 months ago Left-sided weakness    Andrés De La Cruz [place], PA-C    Office Visit    5 months ago Allergic rhinitis, unspecified seasonality, unspecified trigger    Gerianne Custard, [place] Eli Cook, MD    Office Visit    6 months ago Type 2 diabetes mellitus with complication, with long-term current use of insulin [facility])    [email or web address] Endocrinology Rafael Fried MD    Office Visit

## 2022-05-23 NOTE — TELEPHONE ENCOUNTER
Patient is requesting a refill on omeprazole 40 mg, pended , please approve /deny. Requesting omeprazole 40 mg.   LOV: 12/8/2021  RTC: 1 week    Filled: 6/3/21 #90  with 1 refill    Future Appointments   Date Time Provider Joan Monterroso   6/2/2022 11:30 AM GINGER Bartlett [facility]

## 2022-05-24 ENCOUNTER — PATIENT OUTREACH (OUTPATIENT)
Dept: CASE MANAGEMENT | Age: 52
End: 2022-05-24

## 2022-05-24 RX ORDER — OMEPRAZOLE 40 MG/1
CAPSULE, DELAYED RELEASE ORAL
Qty: 90 CAPSULE | Refills: 0 | Status: SHIPPED | OUTPATIENT
Start: 2022-05-24 | End: 2022-06-03

## 2022-05-24 NOTE — PROGRESS NOTES
Attempted ccm assessment today , no answer left message for patient to call back , will try again at a later date.

## 2022-05-25 RX ORDER — ATORVASTATIN CALCIUM 80 MG/1
TABLET, FILM COATED ORAL
Qty: 90 TABLET | Refills: 0 | Status: SHIPPED | OUTPATIENT
Start: 2022-05-25

## 2022-06-02 ENCOUNTER — TELEPHONE (OUTPATIENT)
Dept: INTERNAL MEDICINE CLINIC | Facility: CLINIC | Age: 52
End: 2022-06-02

## 2022-06-02 ENCOUNTER — OFFICE VISIT (OUTPATIENT)
Dept: ENDOCRINOLOGY CLINIC | Facility: CLINIC | Age: 52
End: 2022-06-02
Payer: MEDICARE

## 2022-06-02 VITALS
HEART RATE: 71 BPM | BODY MASS INDEX: 33 KG/M2 | DIASTOLIC BLOOD PRESSURE: 80 MMHG | WEIGHT: 179.38 LBS | SYSTOLIC BLOOD PRESSURE: 152 MMHG

## 2022-06-02 DIAGNOSIS — E11.49 TYPE 2 DIABETES MELLITUS WITH OTHER NEUROLOGIC COMPLICATION, WITH LONG-TERM CURRENT USE OF INSULIN (HCC): Primary | ICD-10-CM

## 2022-06-02 DIAGNOSIS — Z12.31 BREAST CANCER SCREENING BY MAMMOGRAM: Primary | ICD-10-CM

## 2022-06-02 DIAGNOSIS — Z79.4 TYPE 2 DIABETES MELLITUS WITH OTHER NEUROLOGIC COMPLICATION, WITH LONG-TERM CURRENT USE OF INSULIN (HCC): Primary | ICD-10-CM

## 2022-06-02 DIAGNOSIS — Z12.31 SCREENING MAMMOGRAM, ENCOUNTER FOR: ICD-10-CM

## 2022-06-02 DIAGNOSIS — Z01.419 ENCOUNTER FOR GYNECOLOGICAL EXAMINATION WITHOUT ABNORMAL FINDING: ICD-10-CM

## 2022-06-02 DIAGNOSIS — Z01.419 ENCOUNTER FOR ANNUAL ROUTINE GYNECOLOGICAL EXAMINATION: ICD-10-CM

## 2022-06-02 DIAGNOSIS — Z01.818 PREOPERATIVE EXAMINATION: ICD-10-CM

## 2022-06-02 LAB
CARTRIDGE LOT#: ABNORMAL NUMERIC
GLUCOSE BLOOD: 330
HEMOGLOBIN A1C: 8.8 % (ref 4.3–5.6)
TEST STRIP LOT #: NORMAL NUMERIC

## 2022-06-02 PROCEDURE — 83036 HEMOGLOBIN GLYCOSYLATED A1C: CPT

## 2022-06-02 PROCEDURE — 82947 ASSAY GLUCOSE BLOOD QUANT: CPT

## 2022-06-02 PROCEDURE — 99215 OFFICE O/P EST HI 40 MIN: CPT

## 2022-06-02 PROCEDURE — 36416 COLLJ CAPILLARY BLOOD SPEC: CPT

## 2022-06-02 RX ORDER — SEMAGLUTIDE 2.68 MG/ML
2 INJECTION, SOLUTION SUBCUTANEOUS WEEKLY
Qty: 3 ML | Refills: 3 | Status: SHIPPED | OUTPATIENT
Start: 2022-06-02

## 2022-06-02 RX ORDER — PEN NEEDLE, DIABETIC 32GX 5/32"
NEEDLE, DISPOSABLE MISCELLANEOUS
Qty: 200 EACH | Refills: 1 | Status: SHIPPED | OUTPATIENT
Start: 2022-06-02

## 2022-06-02 NOTE — TELEPHONE ENCOUNTER
Advised patient of Dr Umair Kelsey note. Patient verbalized understanding.      Future Appointments   Date Time Provider Joan Monterroso   6/3/2022  9:45 AM Nathanael Casillas MD [facility]   9[facility]8/2022  3[facility]00 [facility]

## 2022-06-02 NOTE — TELEPHONE ENCOUNTER
MALLORY Read, from [facility], a well home visit  from patient's insurance ,  is calling to advise PCP that she saw patient on 6/1/22 and patient's blood pressure was high 180/107    Patient didn't seem concern and noted it does go high. Patient was advised to follow up with PCP. Patient did note she took her medication.     Any questions, please call back [phone]

## 2022-06-02 NOTE — TELEPHONE ENCOUNTER
OB referral pending review and approval.    Appointment made. Future Appointments   Date Time Provider Joan Kriss   6/3/2022  9:45 AM Martin Desai MD [facility]     Patient requesting an order for mammogram as well as referral for OB- Dr. Junior Marquez. Mammogram ordered per protocol.

## 2022-06-02 NOTE — PATIENT INSTRUCTIONS
Lantus- 58 units daily in the evening    Humalog- 15 units before meals if sugar is 100-120 or less and take 35 units if sugar is >120. Increase Ozempic to 2mg subcutaneous weekly.

## 2022-06-03 ENCOUNTER — OFFICE VISIT (OUTPATIENT)
Dept: INTERNAL MEDICINE CLINIC | Facility: CLINIC | Age: 52
End: 2022-06-03
Payer: MEDICARE

## 2022-06-03 ENCOUNTER — TELEPHONE (OUTPATIENT)
Dept: INTERNAL MEDICINE CLINIC | Facility: CLINIC | Age: 52
End: 2022-06-03

## 2022-06-03 VITALS
SYSTOLIC BLOOD PRESSURE: 148 MMHG | OXYGEN SATURATION: 98 % | HEIGHT: 62 IN | RESPIRATION RATE: 14 BRPM | WEIGHT: 179 LBS | DIASTOLIC BLOOD PRESSURE: 88 MMHG | HEART RATE: 86 BPM | BODY MASS INDEX: 32.94 KG/M2

## 2022-06-03 DIAGNOSIS — I10 ESSENTIAL HYPERTENSION WITH GOAL BLOOD PRESSURE LESS THAN 140/90: Primary | ICD-10-CM

## 2022-06-03 DIAGNOSIS — Z79.4 TYPE 2 DIABETES MELLITUS WITH COMPLICATION, WITH LONG-TERM CURRENT USE OF INSULIN (HCC): ICD-10-CM

## 2022-06-03 DIAGNOSIS — E78.00 PURE HYPERCHOLESTEROLEMIA: Chronic | ICD-10-CM

## 2022-06-03 DIAGNOSIS — E66.9 CLASS 1 OBESITY WITH SERIOUS COMORBIDITY AND BODY MASS INDEX (BMI) OF 32.0 TO 32.9 IN ADULT, UNSPECIFIED OBESITY TYPE: ICD-10-CM

## 2022-06-03 DIAGNOSIS — E11.8 TYPE 2 DIABETES MELLITUS WITH COMPLICATION, WITH LONG-TERM CURRENT USE OF INSULIN (HCC): ICD-10-CM

## 2022-06-03 PROCEDURE — 99214 OFFICE O/P EST MOD 30 MIN: CPT | Performed by: INTERNAL MEDICINE

## 2022-06-03 RX ORDER — OMEPRAZOLE 40 MG/1
CAPSULE, DELAYED RELEASE ORAL
Qty: 90 CAPSULE | Refills: 1 | Status: SHIPPED | OUTPATIENT
Start: 2022-06-03

## 2022-06-03 RX ORDER — INSULIN ASPART 100 [IU]/ML
INJECTION, SOLUTION INTRAVENOUS; SUBCUTANEOUS
Qty: 81 ML | Refills: 3 | Status: SHIPPED | OUTPATIENT
Start: 2022-06-03

## 2022-06-03 RX ORDER — INSULIN GLARGINE 100 [IU]/ML
58 INJECTION, SOLUTION SUBCUTANEOUS NIGHTLY
Qty: 1 EACH | Refills: 3 | Status: SHIPPED | OUTPATIENT
Start: 2022-06-03

## 2022-06-03 NOTE — TELEPHONE ENCOUNTER
OV Notes from today was addendum and faxed to [facility] [place].   Pending Confirmation

## 2022-06-03 NOTE — TELEPHONE ENCOUNTER
Per [place] with Dr. Xochilt Bray office, patient is scheduled for carpal tunnel surgery on 6/6 and they need surgery clearance. Per [place], and addendum can be made to today's office visit note to clear patient for surgery.

## 2022-06-16 ENCOUNTER — PATIENT OUTREACH (OUTPATIENT)
Dept: CASE MANAGEMENT | Age: 52
End: 2022-06-16

## 2022-06-16 NOTE — PROGRESS NOTES
Called patient for Assessment, unable to do at this time , would like a call next week . re-scheduled for patient on Monday 6/20.

## 2022-06-19 ENCOUNTER — HOSPITAL ENCOUNTER (EMERGENCY)
Facility: HOSPITAL | Age: 52
Discharge: HOME | End: 2022-06-19
Attending: EMERGENCY MEDICINE
Payer: MEDICARE

## 2022-06-19 VITALS
OXYGEN SATURATION: 99 % | HEART RATE: 88 BPM | DIASTOLIC BLOOD PRESSURE: 103 MMHG | SYSTOLIC BLOOD PRESSURE: 181 MMHG | RESPIRATION RATE: 21 BRPM | TEMPERATURE: 98 F

## 2022-06-19 DIAGNOSIS — L03.90 CELLULITIS, UNSPECIFIED CELLULITIS SITE: Primary | ICD-10-CM

## 2022-06-19 DIAGNOSIS — T17.1XXA FOREIGN BODY IN NOSE, INITIAL ENCOUNTER: ICD-10-CM

## 2022-06-19 PROCEDURE — 99283 EMERGENCY DEPT VISIT LOW MDM: CPT

## 2022-06-19 RX ORDER — SULFAMETHOXAZOLE AND TRIMETHOPRIM 800; 160 MG/1; MG/1
1 TABLET ORAL 2 TIMES DAILY
Qty: 14 TABLET | Refills: 0 | Status: SHIPPED | OUTPATIENT
Start: 2022-06-19 | End: 2022-06-26

## 2022-06-19 RX ORDER — CEPHALEXIN 500 MG/1
500 CAPSULE ORAL 3 TIMES DAILY
Qty: 21 CAPSULE | Refills: 0 | Status: SHIPPED | OUTPATIENT
Start: 2022-06-19 | End: 2022-06-26

## 2022-06-19 RX ORDER — LIDOCAINE HYDROCHLORIDE 10 MG/ML
20 INJECTION, SOLUTION EPIDURAL; INFILTRATION; INTRACAUDAL; PERINEURAL ONCE
Status: COMPLETED | OUTPATIENT
Start: 2022-06-19 | End: 2022-06-19

## 2022-06-19 NOTE — ED QUICK NOTES
ED PROVIDER aware of patients BP. Per patient she is asymptomatic, denies of headache or CP. Patient states she did not take her BP medication because she did not eat food this morning but she will eat food and take her medication when she gets home.  ED PROVIDER aware, per ED MD patient is cleared for discharge home at this time

## 2022-06-20 ENCOUNTER — PATIENT OUTREACH (OUTPATIENT)
Dept: CASE MANAGEMENT | Age: 52
End: 2022-06-20

## 2022-06-28 ENCOUNTER — TELEPHONE (OUTPATIENT)
Dept: GASTROENTEROLOGY | Facility: CLINIC | Age: 52
End: 2022-06-28

## 2022-06-28 NOTE — TELEPHONE ENCOUNTER
----- Message from [place] Bush, [address] [place] [address] Marcella sent at 6/27/2022  9:20 AM CDT -----  Regarding: Recall Colon  Kristan Moran, RN  P [facility] Clinical Staff  Colon recall for 5 years per EBS

## 2022-06-30 ENCOUNTER — MED REC SCAN ONLY (OUTPATIENT)
Dept: INTERNAL MEDICINE CLINIC | Facility: CLINIC | Age: 52
End: 2022-06-30

## 2022-07-06 ENCOUNTER — PATIENT OUTREACH (OUTPATIENT)
Dept: CASE MANAGEMENT | Age: 52
End: 2022-07-06

## 2022-07-06 DIAGNOSIS — Z79.4 TYPE 2 DIABETES MELLITUS WITH COMPLICATION, WITH LONG-TERM CURRENT USE OF INSULIN (HCC): ICD-10-CM

## 2022-07-06 DIAGNOSIS — E10.22 CKD STAGE 3 DUE TO TYPE 1 DIABETES MELLITUS (HCC): ICD-10-CM

## 2022-07-06 DIAGNOSIS — N18.30 CKD STAGE 3 DUE TO TYPE 1 DIABETES MELLITUS (HCC): ICD-10-CM

## 2022-07-06 DIAGNOSIS — E11.8 TYPE 2 DIABETES MELLITUS WITH COMPLICATION, WITH LONG-TERM CURRENT USE OF INSULIN (HCC): ICD-10-CM

## 2022-07-06 DIAGNOSIS — E78.00 PURE HYPERCHOLESTEROLEMIA: ICD-10-CM

## 2022-07-06 DIAGNOSIS — I10 ESSENTIAL HYPERTENSION WITH GOAL BLOOD PRESSURE LESS THAN 140/90: ICD-10-CM

## 2022-07-06 DIAGNOSIS — K21.9 GASTROESOPHAGEAL REFLUX DISEASE WITHOUT ESOPHAGITIS: ICD-10-CM

## 2022-07-06 RX ORDER — HYDROCODONE BITARTRATE AND ACETAMINOPHEN 5; 325 MG/1; MG/1
1-2 TABLET ORAL EVERY 6 HOURS PRN
COMMUNITY
Start: 2022-06-06

## 2022-07-06 RX ORDER — EMPAGLIFLOZIN 25 MG/1
TABLET, FILM COATED ORAL
COMMUNITY
Start: 2022-05-10 | End: 2022-07-06 | Stop reason: SINTOL

## 2022-07-17 DIAGNOSIS — E11.49 TYPE 2 DIABETES MELLITUS WITH OTHER NEUROLOGIC COMPLICATION, WITH LONG-TERM CURRENT USE OF INSULIN (HCC): ICD-10-CM

## 2022-07-17 DIAGNOSIS — I10 ESSENTIAL HYPERTENSION WITH GOAL BLOOD PRESSURE LESS THAN 140/90: ICD-10-CM

## 2022-07-17 DIAGNOSIS — Z98.890 HISTORY OF CRANIOTOMY: ICD-10-CM

## 2022-07-17 DIAGNOSIS — Z79.4 TYPE 2 DIABETES MELLITUS WITH OTHER NEUROLOGIC COMPLICATION, WITH LONG-TERM CURRENT USE OF INSULIN (HCC): ICD-10-CM

## 2022-07-18 RX ORDER — LEVETIRACETAM 500 MG/1
TABLET ORAL
Qty: 180 TABLET | Refills: 1 | Status: SHIPPED | OUTPATIENT
Start: 2022-07-18

## 2022-07-18 RX ORDER — AMLODIPINE BESYLATE 5 MG/1
TABLET ORAL
Qty: 180 TABLET | Refills: 1 | Status: SHIPPED | OUTPATIENT
Start: 2022-07-18

## 2022-07-18 RX ORDER — METOPROLOL SUCCINATE 25 MG/1
25 TABLET, EXTENDED RELEASE ORAL NIGHTLY
Qty: 90 TABLET | Refills: 1 | Status: SHIPPED | OUTPATIENT
Start: 2022-07-18

## 2022-07-18 RX ORDER — METOPROLOL SUCCINATE 100 MG/1
100 TABLET, EXTENDED RELEASE ORAL DAILY
Qty: 90 TABLET | Refills: 1 | Status: SHIPPED | OUTPATIENT
Start: 2022-07-18

## 2022-07-18 RX ORDER — EMPAGLIFLOZIN 25 MG/1
TABLET, FILM COATED ORAL
Qty: 90 TABLET | Refills: 1 | Status: SHIPPED | OUTPATIENT
Start: 2022-07-18

## 2022-07-18 RX ORDER — SEMAGLUTIDE 1.34 MG/ML
INJECTION, SOLUTION SUBCUTANEOUS
Qty: 9 ML | Refills: 0 | OUTPATIENT
Start: 2022-07-18

## 2022-07-18 RX ORDER — ATORVASTATIN CALCIUM 80 MG/1
80 TABLET, FILM COATED ORAL NIGHTLY
Qty: 90 TABLET | Refills: 0 | Status: SHIPPED | OUTPATIENT
Start: 2022-07-18 | End: 2022-10-16

## 2022-07-18 NOTE — TELEPHONE ENCOUNTER
LOV: 6/2/22    RTC: 3 Months    FU: 9/8.22     Per LOV, \"-She stopped Jardiance due to muscle aches and hair loss. Unclear if related to medication.  \"    6 Month Supply Pending     Please refuse if appropriate

## 2022-07-21 ENCOUNTER — PATIENT OUTREACH (OUTPATIENT)
Dept: CASE MANAGEMENT | Age: 52
End: 2022-07-21

## 2022-07-21 DIAGNOSIS — I10 ESSENTIAL HYPERTENSION WITH GOAL BLOOD PRESSURE LESS THAN 140/90: ICD-10-CM

## 2022-07-21 DIAGNOSIS — E11.49 TYPE 2 DIABETES MELLITUS WITH OTHER NEUROLOGIC COMPLICATION, WITH LONG-TERM CURRENT USE OF INSULIN (HCC): ICD-10-CM

## 2022-07-21 DIAGNOSIS — Z79.4 TYPE 2 DIABETES MELLITUS WITH OTHER NEUROLOGIC COMPLICATION, WITH LONG-TERM CURRENT USE OF INSULIN (HCC): ICD-10-CM

## 2022-07-21 DIAGNOSIS — E11.8 TYPE 2 DIABETES MELLITUS WITH COMPLICATION, WITH LONG-TERM CURRENT USE OF INSULIN (HCC): ICD-10-CM

## 2022-07-21 DIAGNOSIS — E78.00 PURE HYPERCHOLESTEROLEMIA: ICD-10-CM

## 2022-07-21 DIAGNOSIS — N18.30 CKD STAGE 3 DUE TO TYPE 1 DIABETES MELLITUS (HCC): ICD-10-CM

## 2022-07-21 DIAGNOSIS — E10.22 CKD STAGE 3 DUE TO TYPE 1 DIABETES MELLITUS (HCC): ICD-10-CM

## 2022-07-21 DIAGNOSIS — D64.9 ANEMIA, UNSPECIFIED TYPE: ICD-10-CM

## 2022-07-21 DIAGNOSIS — Z79.4 TYPE 2 DIABETES MELLITUS WITH COMPLICATION, WITH LONG-TERM CURRENT USE OF INSULIN (HCC): ICD-10-CM

## 2022-07-22 NOTE — PROGRESS NOTES
Patient called in today and requested help getting her diabetic supplies , she said she was on hold with them for an hour,     Called the medical supply company , requested they call patient . Called patient back to inform. L/M will follow up with patient again. Patient had no other concerns at this time. Time spent with patient.         Total Time toward CCM - 10  min

## 2022-07-25 ENCOUNTER — TELEPHONE (OUTPATIENT)
Dept: INTERNAL MEDICINE CLINIC | Facility: CLINIC | Age: 52
End: 2022-07-25

## 2022-07-25 NOTE — TELEPHONE ENCOUNTER
Patient called back for medication adherence consult. Per insurance report, patient is past due for refill on losartan. Patient states she is still taking this medication 1 tablet daily. It does look like it was just refilled a few days ago from [facility]. Patient does admit to sometimes forgetting or missing doses. She tells me she used to have the medication packaged for her and she thought that worked better and helped her remember to take the medication each day. Did discuss option of changing to [address] [place] [address] [place] as I believe they package medications for patients. She will consider and discuss with PCP at upcoming visit. Did provide education and really stressed the importance of taking medication just like prescribed to get the most benefit. Recommended using a pillbox to organize medications and keeping it in a visible spot as a way to help remind her to take her medications more regularly. Patient confirmed understanding and denies any questions or concerns with medications at this time.

## 2022-08-09 RX ORDER — INSULIN GLARGINE 100 [IU]/ML
58 INJECTION, SOLUTION SUBCUTANEOUS NIGHTLY
Qty: 60 ML | Refills: 0 | Status: SHIPPED | OUTPATIENT
Start: 2022-08-09

## 2022-08-10 DIAGNOSIS — E11.49 TYPE 2 DIABETES MELLITUS WITH OTHER NEUROLOGIC COMPLICATION, WITH LONG-TERM CURRENT USE OF INSULIN (HCC): ICD-10-CM

## 2022-08-10 DIAGNOSIS — Z79.4 TYPE 2 DIABETES MELLITUS WITH OTHER NEUROLOGIC COMPLICATION, WITH LONG-TERM CURRENT USE OF INSULIN (HCC): ICD-10-CM

## 2022-08-11 RX ORDER — SEMAGLUTIDE 1.34 MG/ML
INJECTION, SOLUTION SUBCUTANEOUS
Refills: 0 | OUTPATIENT
Start: 2022-08-11

## 2022-08-19 DIAGNOSIS — Z79.4 TYPE 2 DIABETES MELLITUS WITH OTHER NEUROLOGIC COMPLICATION, WITH LONG-TERM CURRENT USE OF INSULIN (HCC): ICD-10-CM

## 2022-08-19 DIAGNOSIS — E11.49 TYPE 2 DIABETES MELLITUS WITH OTHER NEUROLOGIC COMPLICATION, WITH LONG-TERM CURRENT USE OF INSULIN (HCC): ICD-10-CM

## 2022-08-19 RX ORDER — ISOPROPYL ALCOHOL 70 ML/100ML
SWAB TOPICAL
Qty: 100 EACH | Refills: 0 | Status: SHIPPED | OUTPATIENT
Start: 2022-08-19

## 2022-08-20 RX ORDER — FLUTICASONE PROPIONATE 50 MCG
SPRAY, SUSPENSION (ML) NASAL
Qty: 32 G | Refills: 0 | Status: SHIPPED | OUTPATIENT
Start: 2022-08-20

## 2022-08-20 RX ORDER — ONDANSETRON 4 MG/1
4 TABLET, ORALLY DISINTEGRATING ORAL
Qty: 20 TABLET | Refills: 0 | Status: SHIPPED | OUTPATIENT
Start: 2022-08-20

## 2022-08-22 ENCOUNTER — MED REC SCAN ONLY (OUTPATIENT)
Dept: INTERNAL MEDICINE CLINIC | Facility: CLINIC | Age: 52
End: 2022-08-22

## 2022-08-22 ENCOUNTER — PATIENT OUTREACH (OUTPATIENT)
Dept: CASE MANAGEMENT | Age: 52
End: 2022-08-22

## 2022-08-22 NOTE — PROGRESS NOTES
Attempted [facility] monthly outreach,  left message for patient to call back ,can be reached at  [phone]. Will try back at a later time. Medical record reviewed including recent office visits and test results.     Chart review - 3 min  Time with patient - 2 min  Total time - 3 min

## 2022-09-06 NOTE — TELEPHONE ENCOUNTER
Refill Protocol Appointment Criteria  · Appointment scheduled in the past 12 months or in the next 3 months  Recent Outpatient Visits            5 months ago Uncontrolled type 2 diabetes mellitus with hyperglycemia, with long-term current use of insulin (H clipper

## 2022-09-08 ENCOUNTER — OFFICE VISIT (OUTPATIENT)
Dept: ENDOCRINOLOGY CLINIC | Facility: CLINIC | Age: 52
End: 2022-09-08
Payer: MEDICARE

## 2022-09-08 VITALS
WEIGHT: 181.19 LBS | HEART RATE: 68 BPM | DIASTOLIC BLOOD PRESSURE: 78 MMHG | SYSTOLIC BLOOD PRESSURE: 160 MMHG | BODY MASS INDEX: 33 KG/M2

## 2022-09-08 DIAGNOSIS — Z79.4 TYPE 2 DIABETES MELLITUS WITH OTHER NEUROLOGIC COMPLICATION, WITH LONG-TERM CURRENT USE OF INSULIN (HCC): Primary | ICD-10-CM

## 2022-09-08 DIAGNOSIS — E11.49 TYPE 2 DIABETES MELLITUS WITH OTHER NEUROLOGIC COMPLICATION, WITH LONG-TERM CURRENT USE OF INSULIN (HCC): Primary | ICD-10-CM

## 2022-09-08 LAB
CARTRIDGE LOT#: ABNORMAL NUMERIC
GLUCOSE BLOOD: 355
HEMOGLOBIN A1C: 9 % (ref 4.3–5.6)
TEST STRIP LOT #: NORMAL NUMERIC

## 2022-09-08 PROCEDURE — 99214 OFFICE O/P EST MOD 30 MIN: CPT

## 2022-09-08 PROCEDURE — 82947 ASSAY GLUCOSE BLOOD QUANT: CPT

## 2022-09-08 PROCEDURE — 83036 HEMOGLOBIN GLYCOSYLATED A1C: CPT

## 2022-09-08 NOTE — PATIENT INSTRUCTIONS
Continue Metformin 1000mg twice daily   Continue Ozempic 2mg subcutaneous weekly   Continue Lantus 58 units subcutaneous daily     INSULIN SLIDING SCALE  Base Value    35 units with breakfast     38 units with lunch      38 units with dinner   Ranges:   <100- - 2 units  100-140- + 0 units  141-180- + 2 units  181-220- + 4 units   221-260- + 5 units   261-300- + 6 units  301-340- + 7 units

## 2022-09-15 ENCOUNTER — MED REC SCAN ONLY (OUTPATIENT)
Dept: INTERNAL MEDICINE CLINIC | Facility: CLINIC | Age: 52
End: 2022-09-15

## 2022-09-23 DIAGNOSIS — I10 ESSENTIAL HYPERTENSION WITH GOAL BLOOD PRESSURE LESS THAN 140/90: ICD-10-CM

## 2022-09-23 NOTE — TELEPHONE ENCOUNTER
Please review. Protocol failed.    Future Appointments   Date Time Provider Joan Monterroso   10/3/2022 11:00 AM Blaine Xavier MD [facility] Aide   10/12/2022 11:00 AM Marielle Robledo MD [facility] [place] [address]   12/8/2022  1:30 PM GINGER Desai [facility]       Requested Prescriptions   Pending Prescriptions Disp Refills    HYDROCHLOROTHIAZIDE 25 MG Oral Tab [Pharmacy Med Name: HYDROCHLOROTHIAZIDE 25MG TABLETS] 90 tablet 1     Sig: TAKE 1 TABLET(25 MG) BY MOUTH DAILY        Hypertensive Medications Protocol Failed - 9/23/2022  1:44 PM        Failed - Last BP reading less than 140/90     BP Readings from Last 1 Encounters:  09/08/22 : 160/78                Passed - In person appointment in the past 12 or next 3 months       Recent Outpatient Visits              2 weeks ago Type 2 diabetes mellitus with other neurologic complication, with long-term current use of insulin [facility])    [address] [facility] GINGER Desai    Office Visit    3 months ago Essential hypertension with goal blood pressure less than 140/90    [address], [address] Bravo MD    Office Visit    3 months ago Type 2 diabetes mellitus with other neurologic complication, with long-term current use of insulin [facility])    [address] [facility] GINGER Desai    Office Visit    6 months ago Type 2 diabetes mellitus with complication, with long-term current use of insulin [facility])    [address] [facility] GINGER Desai    Office Visit    6 months ago Trigger finger, right ring finger    Orthopaedics - Marcela Mullen MD    Office Visit     Future Appointments         Provider Department Appt Notes    In 1 week Blaine Xavier MD [address] 4 months f/u    In 2 weeks Marielle Robledo MD [address], [address], [place] [employer] and want to be healthy    In 2 months Graciela Loera, Pam Rueda, [facility] Endocrinology                 Passed - CMP or BMP in past 6 months     Recent Results (from the past 4392 hour(s))   Basic Metabolic Panel (8)    Collection Time: 05/16/22  7:06 PM   Result Value Ref Range    Glucose 236 (H) 70 - 99 mg/dL    Sodium 135 (L) 136 - 145 mmol/L    Potassium 4.4 3.5 - 5.1 mmol/L    Chloride 103 98 - 112 mmol/L    CO2 26.0 21.0 - 32.0 mmol/L    Anion Gap 6 0 - 18 mmol/L    BUN 12 7 - 18 mg/dL    Creatinine 0.98 0.55 - 1.02 mg/dL    BUN/CREA Ratio 12.2 10.0 - 20.0    Calcium, Total 9.4 8.5 - 10.1 mg/dL    Calculated Osmolality 287 275 - 295 mOsm/kg    GFR, Non- 66 >=60    GFR, -American 76 >=60     *Note: Due to a large number of results and/or encounters for the requested time period, some results have not been displayed. A complete set of results can be found in Results Review.                  Passed - In person appointment or virtual visit in the past 6 months       Recent Outpatient Visits              2 weeks ago Type 2 diabetes mellitus with other neurologic complication, with long-term current use of insulin [facility])    [address] [facility] GINGER Galeano    Office Visit    3 months ago Essential hypertension with goal blood pressure less than 140/90    [address], [address] Bravo MD    Office Visit    3 months ago Type 2 diabetes mellitus with other neurologic complication, with long-term current use of insulin [facility])    [address] GINGER Guevara    Office Visit    6 months ago Type 2 diabetes mellitus with complication, with long-term current use of insulin [facility])    [address] GINGER Guevara    Office Visit    6 months ago Trigger finger, right ring finger    Orthopaedics - Nancy Morgan MD    Office Visit     Future Appointments         Provider Department Appt Notes    In 1 week Chon Lopez Burt Jah, [address], Abhis[address] 4 months f/u    In 2 weeks Ana Luisa Lindsay MD [address], [address], [place] [employer] and want to be healthy    In 2 months Jasmin Braxton [facility] Endocrinology 3m                [facility] or [facility] > 50     GFR Evaluation  GFRNAA: 66 , resulted on 5/16/2022                Future Appointments         Provider Department Appt Notes    In 1 week [facility], [facility] f/u    In 2 weeks Ana Luisa Lindsay MD [address], [address], [place] [employer] and want to be healthy    In 2 months NATALIIA Ken Endocrinology 3m           Recent Outpatient Visits              2 weeks ago Type 2 diabetes mellitus with other neurologic complication, with long-term current use of insulin [facility])    [facility] Endocrinology GINGER Ken    Office Visit    3 months ago Essential hypertension with goal blood pressure less than 140/90    [facility], [address] [facility] Brandy Jarvis MD    Office Visit    3 months ago Type 2 diabetes mellitus with other neurologic complication, with long-term current use of insulin [facility])    [facility] Endocrinology GINGER Ken    Office Visit    6 months ago Type 2 diabetes mellitus with complication, with long-term current use of insulin [facility])    [facility] Endocrinology GINGER Ken    Office Visit    6 months ago Trigger finger, right ring finger    Marisabel Martínez MD    Office Visit

## 2022-09-26 ENCOUNTER — PATIENT OUTREACH (OUTPATIENT)
Dept: CASE MANAGEMENT | Age: 52
End: 2022-09-26

## 2022-09-26 DIAGNOSIS — J30.9 ALLERGIC RHINITIS, UNSPECIFIED SEASONALITY, UNSPECIFIED TRIGGER: ICD-10-CM

## 2022-09-26 DIAGNOSIS — Z79.4 TYPE 2 DIABETES MELLITUS WITH COMPLICATION, WITH LONG-TERM CURRENT USE OF INSULIN (HCC): ICD-10-CM

## 2022-09-26 DIAGNOSIS — D50.9 IRON DEFICIENCY ANEMIA, UNSPECIFIED IRON DEFICIENCY ANEMIA TYPE: ICD-10-CM

## 2022-09-26 DIAGNOSIS — Z79.4 TYPE 2 DIABETES MELLITUS WITH OTHER NEUROLOGIC COMPLICATION, WITH LONG-TERM CURRENT USE OF INSULIN (HCC): ICD-10-CM

## 2022-09-26 DIAGNOSIS — N18.30 CKD STAGE 3 DUE TO TYPE 1 DIABETES MELLITUS (HCC): ICD-10-CM

## 2022-09-26 DIAGNOSIS — E11.8 TYPE 2 DIABETES MELLITUS WITH COMPLICATION, WITH LONG-TERM CURRENT USE OF INSULIN (HCC): ICD-10-CM

## 2022-09-26 DIAGNOSIS — E11.49 TYPE 2 DIABETES MELLITUS WITH OTHER NEUROLOGIC COMPLICATION, WITH LONG-TERM CURRENT USE OF INSULIN (HCC): ICD-10-CM

## 2022-09-26 DIAGNOSIS — E10.22 CKD STAGE 3 DUE TO TYPE 1 DIABETES MELLITUS (HCC): ICD-10-CM

## 2022-09-26 DIAGNOSIS — I10 ESSENTIAL HYPERTENSION WITH GOAL BLOOD PRESSURE LESS THAN 140/90: ICD-10-CM

## 2022-09-26 DIAGNOSIS — H25.13 AGE-RELATED NUCLEAR CATARACT OF BOTH EYES: ICD-10-CM

## 2022-09-26 DIAGNOSIS — D64.9 ANEMIA, UNSPECIFIED TYPE: ICD-10-CM

## 2022-09-26 DIAGNOSIS — R60.0 EDEMA OF BOTH LEGS: ICD-10-CM

## 2022-09-26 DIAGNOSIS — E78.00 PURE HYPERCHOLESTEROLEMIA: ICD-10-CM

## 2022-09-26 RX ORDER — HYDROCHLOROTHIAZIDE 25 MG/1
25 TABLET ORAL DAILY
Qty: 90 TABLET | Refills: 0 | Status: SHIPPED | OUTPATIENT
Start: 2022-09-26

## 2022-09-26 NOTE — PROGRESS NOTES
Called [employer] to obtain a preferred DME for BP cuff, given [facility] , [facility] and [facility]. called [facility] given by [employer], they do not carry BP Cuff    Time spent 10 minutes.      Total monthly minutes 52

## 2022-10-03 ENCOUNTER — OFFICE VISIT (OUTPATIENT)
Dept: INTERNAL MEDICINE CLINIC | Facility: CLINIC | Age: 52
End: 2022-10-03
Payer: MEDICARE

## 2022-10-03 ENCOUNTER — TELEPHONE (OUTPATIENT)
Dept: GASTROENTEROLOGY | Facility: CLINIC | Age: 52
End: 2022-10-03

## 2022-10-03 VITALS
BODY MASS INDEX: 34.08 KG/M2 | HEART RATE: 69 BPM | HEIGHT: 62 IN | SYSTOLIC BLOOD PRESSURE: 146 MMHG | DIASTOLIC BLOOD PRESSURE: 84 MMHG | WEIGHT: 185.19 LBS

## 2022-10-03 DIAGNOSIS — E78.00 PURE HYPERCHOLESTEROLEMIA: Chronic | ICD-10-CM

## 2022-10-03 DIAGNOSIS — Z80.0 FAMILY HISTORY OF COLON CANCER: Primary | ICD-10-CM

## 2022-10-03 DIAGNOSIS — E11.65 TYPE 2 DIABETES MELLITUS WITH HYPERGLYCEMIA, WITH LONG-TERM CURRENT USE OF INSULIN (HCC): ICD-10-CM

## 2022-10-03 DIAGNOSIS — I10 ESSENTIAL HYPERTENSION WITH GOAL BLOOD PRESSURE LESS THAN 140/90: Primary | ICD-10-CM

## 2022-10-03 DIAGNOSIS — Z79.4 TYPE 2 DIABETES MELLITUS WITH HYPERGLYCEMIA, WITH LONG-TERM CURRENT USE OF INSULIN (HCC): ICD-10-CM

## 2022-10-03 DIAGNOSIS — Z12.11 SCREEN FOR COLON CANCER: ICD-10-CM

## 2022-10-03 PROCEDURE — 90686 IIV4 VACC NO PRSV 0.5 ML IM: CPT | Performed by: INTERNAL MEDICINE

## 2022-10-03 PROCEDURE — 99214 OFFICE O/P EST MOD 30 MIN: CPT | Performed by: INTERNAL MEDICINE

## 2022-10-03 PROCEDURE — G0008 ADMIN INFLUENZA VIRUS VAC: HCPCS | Performed by: INTERNAL MEDICINE

## 2022-10-03 NOTE — TELEPHONE ENCOUNTER
Dr. Danielle Ge     Patient is due for colonoscopy 5 year recall. Attached previous operative and pathology report below, done with Dr. Epstein . Patient preference to be scheduled with female provider. Reconciled medications, preferred pharmacy and allergies. No current GI related symptoms. Last Procedure, Date, MD: Colonoscopy with Dr. Epstein  8/25/2017     Last Diagnosis: Colon polyps; Internal hemorrhoids   Recalled for (mth/yrs): 5 years given family hx colon cancer   Sedation used previously: MAC     Last Prep Used (if known): Golytely     Quality of prep (if known): Excellent    Anticoagulants: No   Diabetic Meds: Novolog; Lantus; Metformin 500 mg/BID; Ozempic (Saturday, weekly); Dr. 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 Issues/Oxygen Use/DURGA/COPD: No   Issues w/Anesthesia: No     Special comments/notes: None     Please advise on orders and prep.      Thank you

## 2022-10-03 NOTE — TELEPHONE ENCOUNTER
Patient calling to schedule her colonoscopy, patient informed of the 5 business day turnaround. Please call at [phone],HENNA.

## 2022-10-04 ENCOUNTER — TELEPHONE (OUTPATIENT)
Dept: ENDOCRINOLOGY CLINIC | Facility: CLINIC | Age: 52
End: 2022-10-04

## 2022-10-04 NOTE — TELEPHONE ENCOUNTER
Received fax from [facility]. Completed the attached CGM physician order form. Placed on desk for signature.

## 2022-10-05 RX ORDER — POLYETHYLENE GLYCOL 3350, SODIUM CHLORIDE, SODIUM BICARBONATE, POTASSIUM CHLORIDE 420; 11.2; 5.72; 1.48 G/4L; G/4L; G/4L; G/4L
POWDER, FOR SOLUTION ORAL
Qty: 1 EACH | Refills: 0 | Status: SHIPPED | OUTPATIENT
Start: 2022-10-05

## 2022-10-05 NOTE — TELEPHONE ENCOUNTER
1. Schedule colonoscopy with MAC at [facility] [Diagnosis: family history of colon CA, screening]    2.  bowel prep from pharmacy (split suprep or trilyte)    3. Continue all medications for procedure except  -Diabetes meds: Hold metformin day of procedure and day before procedure. If patient is on other diabetic medications/insulin please ask primary or endocrine to manage pre-procedure and day of procedure--  Dr. Maurisio Anne     ** If MAC @ [facility]/NE:    - NO alcohol, recreational drugs nor erectile dysfunction mediations 24 hours before procedure(s)   - NO herbal supplements or weight loss medications x 7 days prior to the procedure(s)    ** If MAC @ [facility] or IV twilight - continue all medications as prescribed      4. Read all bowel prep instructions carefully    5. AVOID seeds, nuts, popcorn, raw fruits and vegetables (cooked is okay) for 2-3 days before procedure        >>>Please note: if you were prescribed Suprep for the bowel prep and it is too expensive or not covered by insurance, it is okay to substitute Trilyte (or any similar generic prep). This can be done by notifying the pharmacy or calling our office.

## 2022-10-08 ENCOUNTER — HOSPITAL ENCOUNTER (OUTPATIENT)
Dept: MAMMOGRAPHY | Age: 52
Discharge: HOME | End: 2022-10-08
Attending: INTERNAL MEDICINE
Payer: MEDICARE

## 2022-10-08 ENCOUNTER — LAB ENCOUNTER (OUTPATIENT)
Dept: LAB | Age: 52
End: 2022-10-08
Attending: INTERNAL MEDICINE
Payer: MEDICARE

## 2022-10-08 DIAGNOSIS — E11.49 TYPE 2 DIABETES MELLITUS WITH OTHER NEUROLOGIC COMPLICATION, WITH LONG-TERM CURRENT USE OF INSULIN (HCC): ICD-10-CM

## 2022-10-08 DIAGNOSIS — Z79.4 TYPE 2 DIABETES MELLITUS WITH OTHER NEUROLOGIC COMPLICATION, WITH LONG-TERM CURRENT USE OF INSULIN (HCC): ICD-10-CM

## 2022-10-08 DIAGNOSIS — Z12.31 BREAST CANCER SCREENING BY MAMMOGRAM: ICD-10-CM

## 2022-10-08 LAB
ALBUMIN SERPL-MCNC: 3.7 G/DL (ref 3.4–5)
ALBUMIN/GLOB SERPL: 0.8 {RATIO} (ref 1–2)
ALP LIVER SERPL-CCNC: 112 U/L
ALT SERPL-CCNC: 25 U/L
ANION GAP SERPL CALC-SCNC: 5 MMOL/L (ref 0–18)
AST SERPL-CCNC: 12 U/L (ref 15–37)
BILIRUB SERPL-MCNC: 0.5 MG/DL (ref 0.1–2)
BUN BLD-MCNC: 18 MG/DL (ref 7–18)
BUN/CREAT SERPL: 23.7 (ref 10–20)
CALCIUM BLD-MCNC: 9.6 MG/DL (ref 8.5–10.1)
CHLORIDE SERPL-SCNC: 103 MMOL/L (ref 98–112)
CHOLEST SERPL-MCNC: 190 MG/DL (ref ?–200)
CO2 SERPL-SCNC: 29 MMOL/L (ref 21–32)
CREAT BLD-MCNC: 0.76 MG/DL
CREAT UR-SCNC: 98.2 MG/DL
FASTING PATIENT LIPID ANSWER: YES
FASTING STATUS PATIENT QL REPORTED: YES
GFR SERPLBLD BASED ON 1.73 SQ M-ARVRAT: 93 ML/MIN/1.73M2 (ref 60–?)
GLOBULIN PLAS-MCNC: 4.4 G/DL (ref 2.8–4.4)
GLUCOSE BLD-MCNC: 271 MG/DL (ref 70–99)
HDLC SERPL-MCNC: 36 MG/DL (ref 40–59)
LDLC SERPL CALC-MCNC: 129 MG/DL (ref ?–100)
MICROALBUMIN UR-MCNC: 17.3 MG/DL
MICROALBUMIN/CREAT 24H UR-RTO: 176.2 UG/MG (ref ?–30)
NONHDLC SERPL-MCNC: 154 MG/DL (ref ?–130)
OSMOLALITY SERPL CALC.SUM OF ELEC: 295 MOSM/KG (ref 275–295)
POTASSIUM SERPL-SCNC: 4.7 MMOL/L (ref 3.5–5.1)
PROT SERPL-MCNC: 8.1 G/DL (ref 6.4–8.2)
SODIUM SERPL-SCNC: 137 MMOL/L (ref 136–145)
TRIGL SERPL-MCNC: 138 MG/DL (ref 30–149)
TSI SER-ACNC: 2.77 MIU/ML (ref 0.36–3.74)
VIT D+METAB SERPL-MCNC: 11.8 NG/ML (ref 30–100)
VLDLC SERPL CALC-MCNC: 25 MG/DL (ref 0–30)

## 2022-10-08 PROCEDURE — 80061 LIPID PANEL: CPT

## 2022-10-08 PROCEDURE — 77063 BREAST TOMOSYNTHESIS BI: CPT | Performed by: INTERNAL MEDICINE

## 2022-10-08 PROCEDURE — 82306 VITAMIN D 25 HYDROXY: CPT

## 2022-10-08 PROCEDURE — 82043 UR ALBUMIN QUANTITATIVE: CPT

## 2022-10-08 PROCEDURE — 36415 COLL VENOUS BLD VENIPUNCTURE: CPT

## 2022-10-08 PROCEDURE — 84443 ASSAY THYROID STIM HORMONE: CPT

## 2022-10-08 PROCEDURE — 82570 ASSAY OF URINE CREATININE: CPT

## 2022-10-08 PROCEDURE — 80053 COMPREHEN METABOLIC PANEL: CPT

## 2022-10-08 PROCEDURE — 77067 SCR MAMMO BI INCL CAD: CPT | Performed by: INTERNAL MEDICINE

## 2022-10-12 ENCOUNTER — OFFICE VISIT (OUTPATIENT)
Dept: SURGERY | Facility: CLINIC | Age: 52
End: 2022-10-12
Payer: MEDICARE

## 2022-10-12 VITALS
BODY MASS INDEX: 37.7 KG/M2 | DIASTOLIC BLOOD PRESSURE: 75 MMHG | OXYGEN SATURATION: 95 % | WEIGHT: 187 LBS | HEART RATE: 69 BPM | SYSTOLIC BLOOD PRESSURE: 156 MMHG | HEIGHT: 59.2 IN

## 2022-10-12 DIAGNOSIS — E11.65 TYPE 2 DIABETES MELLITUS WITH HYPERGLYCEMIA, WITH LONG-TERM CURRENT USE OF INSULIN (HCC): Primary | ICD-10-CM

## 2022-10-12 DIAGNOSIS — R63.5 WEIGHT GAIN: ICD-10-CM

## 2022-10-12 DIAGNOSIS — E78.5 DYSLIPIDEMIA: ICD-10-CM

## 2022-10-12 DIAGNOSIS — E66.9 OBESITY (BMI 30-39.9): ICD-10-CM

## 2022-10-12 DIAGNOSIS — Z79.4 TYPE 2 DIABETES MELLITUS WITH HYPERGLYCEMIA, WITH LONG-TERM CURRENT USE OF INSULIN (HCC): Primary | ICD-10-CM

## 2022-10-12 PROCEDURE — 99204 OFFICE O/P NEW MOD 45 MIN: CPT | Performed by: INTERNAL MEDICINE

## 2022-10-13 ENCOUNTER — TELEPHONE (OUTPATIENT)
Dept: ENDOCRINOLOGY CLINIC | Facility: CLINIC | Age: 52
End: 2022-10-13

## 2022-10-13 DIAGNOSIS — E55.9 VITAMIN D DEFICIENCY: Primary | ICD-10-CM

## 2022-10-13 RX ORDER — ERGOCALCIFEROL (VITAMIN D2) 1250 MCG
50000 CAPSULE ORAL WEEKLY
Qty: 12 CAPSULE | Refills: 0 | Status: SHIPPED | OUTPATIENT
Start: 2022-10-13 | End: 2022-12-30

## 2022-10-13 NOTE — TELEPHONE ENCOUNTER
Pt is calling again and hasn't received any calls regarding any prep instructions or scheduling the procedure. She has the prep medication, but no instructions.

## 2022-10-13 NOTE — TELEPHONE ENCOUNTER
Patient says she stopped taking vitamin D a while ago. Patient notified of result note and she verbalizes understanding. Rx was sent and lab is ordered.

## 2022-10-14 ENCOUNTER — HOSPITAL ENCOUNTER (OUTPATIENT)
Dept: CT IMAGING | Facility: HOSPITAL | Age: 52
Discharge: HOME | End: 2022-10-14
Attending: Other
Payer: MEDICARE

## 2022-10-14 DIAGNOSIS — I63.30 CEREBRAL INFARCTION DUE TO THROMBOSIS OF CEREBRAL ARTERY (HCC): ICD-10-CM

## 2022-10-14 PROCEDURE — 70498 CT ANGIOGRAPHY NECK: CPT | Performed by: OTHER

## 2022-10-14 NOTE — TELEPHONE ENCOUNTER
On the day of prep:   - Decrease Lantus to 28   - Take Novolog- 30 units with breakfast and then no novolog the rest of the day.   -Ok to take metformin  -Take Ozempic as normally scheduled on whichever day she takes it. Day of procedure:  -Hold all diabetes medications until home from procedure. Then can resume at normal doses.

## 2022-10-14 NOTE — TELEPHONE ENCOUNTER
Patient contacted, verified and message below given. Patient wrote information down and voiced understanding.

## 2022-10-14 NOTE — TELEPHONE ENCOUNTER
Scheduled for:  Colonoscopy [id number]  Provider Name:  Dr. Loli Menon  Date:  11/12/2022  Location:  [facility]  Sedation:  MAC  Time:  10:30am, (pt is aware to arrive at 9:30am)  Prep:  Trilyte  Meds/Allergies Reconciled?:  Physician reviewed    Diagnosis with codes:  Family hx of Colon Cancer Z80.0, Colon Cancer Screening Z12.11  Was patient informed to call insurance with codes (Y/N):  Yes, I confirmed MEDICARE insurance with this patient. Referral sent?:  Referral was sent at the time of electronic surgical scheduling. [facility] or [address] notified?:  I sent an electronic request to Endo Scheduling and received a confirmation today. Medication Orders:  Hold metformin day of procedure and day before procedure, Hold Losartan morning of procedure. Misc Orders:  Patient was informed that they will need a COVID 19 test prior to their procedure. Patient verbally understood & will await a phone call from [facility] to schedule.      Further instructions given by staff:  I discussed the prep instructions with the patient which she verbally understood and is aware that I will send the instructions via [employer]

## 2022-10-14 NOTE — TELEPHONE ENCOUNTER
Dr. Kenney Page,  Please advise on insulin/diabetic orders based on the following diet modifications prior to procedure. Day before the procedure,patient will be on a clear liquid diet only after breakfast.   Patient is scheduled for a colonoscopy with Dr. Cora Keen on 11/12/2022. Thank you

## 2022-10-15 DIAGNOSIS — Z79.4 TYPE 2 DIABETES MELLITUS WITH OTHER NEUROLOGIC COMPLICATION, WITH LONG-TERM CURRENT USE OF INSULIN (HCC): ICD-10-CM

## 2022-10-15 DIAGNOSIS — E11.49 TYPE 2 DIABETES MELLITUS WITH OTHER NEUROLOGIC COMPLICATION, WITH LONG-TERM CURRENT USE OF INSULIN (HCC): ICD-10-CM

## 2022-10-18 RX ORDER — ISOPROPYL ALCOHOL 70 ML/100ML
SWAB TOPICAL
Qty: 200 EACH | Refills: 0 | Status: SHIPPED | OUTPATIENT
Start: 2022-10-18

## 2022-10-19 ENCOUNTER — PATIENT OUTREACH (OUTPATIENT)
Dept: CASE MANAGEMENT | Age: 52
End: 2022-10-19

## 2022-10-19 ENCOUNTER — NURSE TRIAGE (OUTPATIENT)
Dept: INTERNAL MEDICINE CLINIC | Facility: CLINIC | Age: 52
End: 2022-10-19

## 2022-10-19 ENCOUNTER — HOSPITAL ENCOUNTER (EMERGENCY)
Facility: HOSPITAL | Age: 52
Discharge: HOME | End: 2022-10-19
Attending: EMERGENCY MEDICINE
Payer: MEDICARE

## 2022-10-19 ENCOUNTER — TELEPHONE (OUTPATIENT)
Dept: INTERNAL MEDICINE CLINIC | Facility: CLINIC | Age: 52
End: 2022-10-19

## 2022-10-19 ENCOUNTER — APPOINTMENT (OUTPATIENT)
Dept: CT IMAGING | Facility: HOSPITAL | Age: 52
End: 2022-10-19
Payer: MEDICARE

## 2022-10-19 VITALS
OXYGEN SATURATION: 93 % | SYSTOLIC BLOOD PRESSURE: 120 MMHG | HEART RATE: 64 BPM | BODY MASS INDEX: 34.4 KG/M2 | WEIGHT: 186.94 LBS | RESPIRATION RATE: 20 BRPM | HEIGHT: 62 IN | DIASTOLIC BLOOD PRESSURE: 97 MMHG | TEMPERATURE: 98 F

## 2022-10-19 DIAGNOSIS — D64.9 ANEMIA, UNSPECIFIED TYPE: ICD-10-CM

## 2022-10-19 DIAGNOSIS — M79.602 PAIN OF LEFT UPPER EXTREMITY: ICD-10-CM

## 2022-10-19 DIAGNOSIS — E10.22 CKD STAGE 3 DUE TO TYPE 1 DIABETES MELLITUS (HCC): ICD-10-CM

## 2022-10-19 DIAGNOSIS — R51.9 ACUTE NONINTRACTABLE HEADACHE, UNSPECIFIED HEADACHE TYPE: Primary | ICD-10-CM

## 2022-10-19 DIAGNOSIS — I63.9 LEFT-SIDED CEREBROVASCULAR ACCIDENT (CVA) (HCC): ICD-10-CM

## 2022-10-19 DIAGNOSIS — E11.65 TYPE 2 DIABETES MELLITUS WITH HYPERGLYCEMIA, WITH LONG-TERM CURRENT USE OF INSULIN (HCC): ICD-10-CM

## 2022-10-19 DIAGNOSIS — E66.9 OBESITY (BMI 30-39.9): ICD-10-CM

## 2022-10-19 DIAGNOSIS — E78.5 DYSLIPIDEMIA: ICD-10-CM

## 2022-10-19 DIAGNOSIS — N18.30 CKD STAGE 3 DUE TO TYPE 1 DIABETES MELLITUS (HCC): ICD-10-CM

## 2022-10-19 DIAGNOSIS — I10 ESSENTIAL HYPERTENSION WITH GOAL BLOOD PRESSURE LESS THAN 140/90: ICD-10-CM

## 2022-10-19 DIAGNOSIS — Z79.4 TYPE 2 DIABETES MELLITUS WITH HYPERGLYCEMIA, WITH LONG-TERM CURRENT USE OF INSULIN (HCC): ICD-10-CM

## 2022-10-19 DIAGNOSIS — J30.9 ALLERGIC RHINITIS, UNSPECIFIED SEASONALITY, UNSPECIFIED TRIGGER: ICD-10-CM

## 2022-10-19 DIAGNOSIS — D50.9 IRON DEFICIENCY ANEMIA, UNSPECIFIED IRON DEFICIENCY ANEMIA TYPE: ICD-10-CM

## 2022-10-19 LAB
ALBUMIN SERPL-MCNC: 3.8 G/DL (ref 3.4–5)
ALBUMIN/GLOB SERPL: 0.8 {RATIO} (ref 1–2)
ALP LIVER SERPL-CCNC: 116 U/L
ALT SERPL-CCNC: 23 U/L
ANION GAP SERPL CALC-SCNC: 7 MMOL/L (ref 0–18)
AST SERPL-CCNC: 12 U/L (ref 15–37)
BASOPHILS # BLD AUTO: 0.07 X10(3) UL (ref 0–0.2)
BASOPHILS NFR BLD AUTO: 0.6 %
BILIRUB SERPL-MCNC: 0.3 MG/DL (ref 0.1–2)
BUN BLD-MCNC: 23 MG/DL (ref 7–18)
BUN/CREAT SERPL: 21.5 (ref 10–20)
CALCIUM BLD-MCNC: 10.2 MG/DL (ref 8.5–10.1)
CHLORIDE SERPL-SCNC: 101 MMOL/L (ref 98–112)
CHOLEST SERPL-MCNC: 161 MG/DL (ref ?–200)
CO2 SERPL-SCNC: 28 MMOL/L (ref 21–32)
CREAT BLD-MCNC: 1.07 MG/DL
DEPRECATED RDW RBC AUTO: 43 FL (ref 35.1–46.3)
EOSINOPHIL # BLD AUTO: 0.2 X10(3) UL (ref 0–0.7)
EOSINOPHIL NFR BLD AUTO: 1.8 %
ERYTHROCYTE [DISTWIDTH] IN BLOOD BY AUTOMATED COUNT: 15.2 % (ref 11–15)
GFR SERPLBLD BASED ON 1.73 SQ M-ARVRAT: 62 ML/MIN/1.73M2 (ref 60–?)
GLOBULIN PLAS-MCNC: 4.6 G/DL (ref 2.8–4.4)
GLUCOSE BLD-MCNC: 130 MG/DL (ref 70–99)
GLUCOSE BLDC GLUCOMTR-MCNC: 133 MG/DL (ref 70–99)
GLUCOSE BLDC GLUCOMTR-MCNC: 95 MG/DL (ref 70–99)
HCT VFR BLD AUTO: 42.7 %
HDLC SERPL-MCNC: 29 MG/DL (ref 40–59)
HGB BLD-MCNC: 13.5 G/DL
IMM GRANULOCYTES # BLD AUTO: 0.04 X10(3) UL (ref 0–1)
IMM GRANULOCYTES NFR BLD: 0.4 %
LDLC SERPL CALC-MCNC: 91 MG/DL (ref ?–100)
LYMPHOCYTES # BLD AUTO: 3.18 X10(3) UL (ref 1–4)
LYMPHOCYTES NFR BLD AUTO: 28.9 %
MCH RBC QN AUTO: 24.9 PG (ref 26–34)
MCHC RBC AUTO-ENTMCNC: 31.6 G/DL (ref 31–37)
MCV RBC AUTO: 78.6 FL
MONOCYTES # BLD AUTO: 0.8 X10(3) UL (ref 0.1–1)
MONOCYTES NFR BLD AUTO: 7.3 %
NEUTROPHILS # BLD AUTO: 6.71 X10 (3) UL (ref 1.5–7.7)
NEUTROPHILS # BLD AUTO: 6.71 X10(3) UL (ref 1.5–7.7)
NEUTROPHILS NFR BLD AUTO: 61 %
NONHDLC SERPL-MCNC: 132 MG/DL (ref ?–130)
OSMOLALITY SERPL CALC.SUM OF ELEC: 287 MOSM/KG (ref 275–295)
PLATELET # BLD AUTO: 277 10(3)UL (ref 150–450)
POTASSIUM SERPL-SCNC: 3.6 MMOL/L (ref 3.5–5.1)
PROT SERPL-MCNC: 8.4 G/DL (ref 6.4–8.2)
RBC # BLD AUTO: 5.43 X10(6)UL
SODIUM SERPL-SCNC: 136 MMOL/L (ref 136–145)
TRIGL SERPL-MCNC: 245 MG/DL (ref 30–149)
TROPONIN I HIGH SENSITIVITY: 63 NG/L
TROPONIN I HIGH SENSITIVITY: 68 NG/L
VLDLC SERPL CALC-MCNC: 40 MG/DL (ref 0–30)
WBC # BLD AUTO: 11 X10(3) UL (ref 4–11)

## 2022-10-19 PROCEDURE — 99285 EMERGENCY DEPT VISIT HI MDM: CPT

## 2022-10-19 PROCEDURE — 70450 CT HEAD/BRAIN W/O DYE: CPT | Performed by: EMERGENCY MEDICINE

## 2022-10-19 PROCEDURE — 84484 ASSAY OF TROPONIN QUANT: CPT | Performed by: EMERGENCY MEDICINE

## 2022-10-19 PROCEDURE — 82962 GLUCOSE BLOOD TEST: CPT

## 2022-10-19 PROCEDURE — 80061 LIPID PANEL: CPT

## 2022-10-19 PROCEDURE — 85025 COMPLETE CBC W/AUTO DIFF WBC: CPT

## 2022-10-19 PROCEDURE — 96374 THER/PROPH/DIAG INJ IV PUSH: CPT

## 2022-10-19 PROCEDURE — 80053 COMPREHEN METABOLIC PANEL: CPT | Performed by: EMERGENCY MEDICINE

## 2022-10-19 PROCEDURE — 80053 COMPREHEN METABOLIC PANEL: CPT

## 2022-10-19 PROCEDURE — 85025 COMPLETE CBC W/AUTO DIFF WBC: CPT | Performed by: EMERGENCY MEDICINE

## 2022-10-19 PROCEDURE — 84484 ASSAY OF TROPONIN QUANT: CPT

## 2022-10-19 PROCEDURE — 80061 LIPID PANEL: CPT | Performed by: EMERGENCY MEDICINE

## 2022-10-19 PROCEDURE — 93010 ELECTROCARDIOGRAM REPORT: CPT | Performed by: EMERGENCY MEDICINE

## 2022-10-19 PROCEDURE — 96375 TX/PRO/DX INJ NEW DRUG ADDON: CPT

## 2022-10-19 PROCEDURE — 93005 ELECTROCARDIOGRAM TRACING: CPT

## 2022-10-19 RX ORDER — DIPHENHYDRAMINE HYDROCHLORIDE 50 MG/ML
25 INJECTION INTRAMUSCULAR; INTRAVENOUS ONCE
Status: COMPLETED | OUTPATIENT
Start: 2022-10-19 | End: 2022-10-19

## 2022-10-19 RX ORDER — KETOROLAC TROMETHAMINE 15 MG/ML
15 INJECTION, SOLUTION INTRAMUSCULAR; INTRAVENOUS ONCE
Status: COMPLETED | OUTPATIENT
Start: 2022-10-19 | End: 2022-10-19

## 2022-10-19 RX ORDER — BUTALBITAL, ACETAMINOPHEN AND CAFFEINE 50; 325; 40 MG/1; MG/1; MG/1
1-2 TABLET ORAL EVERY 4 HOURS PRN
Qty: 20 TABLET | Refills: 0 | Status: SHIPPED | OUTPATIENT
Start: 2022-10-19

## 2022-10-19 NOTE — ED INITIAL ASSESSMENT (HPI)
PT WITH CAROTID ARTERY SURGERIES, STROKE, TAKING ASPIRIN DAILY ,CAME IN FOR HEADACHE, LEFT ARM PAIN X 5 DAYS. PT IS A/O X 4, BREATHING UNLABORED. PER PT SHE HAD CT OF THE HEAD DONE A WEEK AGO. PER SAGE PT'S SISTER THERE IS NO CHANGES IN PT'S SPEECH THAT SHE NOTICED.

## 2022-10-19 NOTE — PROGRESS NOTES
Spoke to provider office Mo Salvador - Neurology , stated they did receive the result of carotid CT , stated there should have been a CT head as well, she will inform povider of symptoms and call patient back.      Time spent -5 minutes

## 2022-10-20 ENCOUNTER — OFFICE VISIT (OUTPATIENT)
Dept: INTERNAL MEDICINE CLINIC | Facility: CLINIC | Age: 52
End: 2022-10-20
Payer: MEDICARE

## 2022-10-20 ENCOUNTER — TELEPHONE (OUTPATIENT)
Dept: INTERNAL MEDICINE CLINIC | Facility: CLINIC | Age: 52
End: 2022-10-20

## 2022-10-20 VITALS
DIASTOLIC BLOOD PRESSURE: 67 MMHG | HEIGHT: 62 IN | BODY MASS INDEX: 34.6 KG/M2 | SYSTOLIC BLOOD PRESSURE: 154 MMHG | WEIGHT: 188 LBS | HEART RATE: 101 BPM

## 2022-10-20 DIAGNOSIS — G43.119 INTRACTABLE MIGRAINE WITH AURA WITHOUT STATUS MIGRAINOSUS: ICD-10-CM

## 2022-10-20 DIAGNOSIS — R20.0 NUMBNESS AND TINGLING IN LEFT ARM: Primary | ICD-10-CM

## 2022-10-20 DIAGNOSIS — R20.2 NUMBNESS AND TINGLING IN LEFT ARM: Primary | ICD-10-CM

## 2022-10-20 PROCEDURE — 99214 OFFICE O/P EST MOD 30 MIN: CPT | Performed by: INTERNAL MEDICINE

## 2022-10-20 RX ORDER — SUMATRIPTAN 50 MG/1
50 TABLET, FILM COATED ORAL
Qty: 30 TABLET | Refills: 0 | Status: SHIPPED | OUTPATIENT
Start: 2022-10-20

## 2022-10-20 NOTE — TELEPHONE ENCOUNTER
Patient (name and  verified) calling to schedule ER follow up appt with provider available. Patient uses a ride share program through insurance. They may call to verify appt so they can pick the patient up.      Future Appointments   Date Time Provider Joan Kriss   10/20/2022  4:00 PM Lily Riley MD [facility]   2022 10:30 AM LATONYA, PROCEDURE [id number] None   2022  4:45 PM Bri Borrego MD [facility]   2022  1:30 PM GINGER Joshua [facility]   2023 10:40 AM Carlotta Xie MD [facility]   2023 11:30 AM Bri Borrego MD [address] [place] [address]

## 2022-10-25 RX ORDER — FLUTICASONE PROPIONATE 50 MCG
2 SPRAY, SUSPENSION (ML) NASAL DAILY
Qty: 48 G | Refills: 1 | Status: SHIPPED | OUTPATIENT
Start: 2022-10-25

## 2022-10-26 NOTE — TELEPHONE ENCOUNTER
Refill passed per [facility] protocol   Requested Prescriptions   Pending Prescriptions Disp Refills    FLUTICASONE PROPIONATE 50 MCG/ACT Nasal Suspension [Pharmacy Med Name: FLUTICASONE PROPIONATE 50 MCG/ACT Suspension] 48 g 0     Sig: USE 2 SPRAYS IN EACH NOSTRIL EVERY DAY        Allergy Medication Protocol Passed - 10/25/2022  4:24 PM        Passed - In person appointment or virtual visit in the past 12 mos or appointment in next 3 mos       Recent Outpatient Visits              5 days ago Numbness and tingling in left arm    [address], Nemo Guidry MD    Office Visit    1 week ago Type 2 diabetes mellitus with hyperglycemia, with long-term current use of insulin [facility])    Amparo Gutierrez MD    Office Visit    3 weeks ago Essential hypertension with goal blood pressure less than 140/90    [address] Laurene Keeler, MD    Office Visit    1 month ago Type 2 diabetes mellitus with other neurologic complication, with long-term current use of insulin [facility])    [address] [facility] GINGER Maxwell    Office Visit    4 months ago Essential hypertension with goal blood pressure less than 140/90    [address] Laurene Keeler, MD    Office Visit     Future Appointments         Provider Department Appt Notes    In 2 weeks LATONYA, PROCEDURE [address] @ [address] [place] [address]    In 1 month Alondra Valencia MD [address], [address], [place], [place] [phone], PRE OP F/U  [employer] [facility]    In 1 month Veronica Brar, [address] [place] [address], [address]     In 2 months Ruby Almaguer MD [address] 3 month follow up     In 3 months Alondra Valencia MD [address], [address], [place] [employer]  PRE OP F/U

## 2022-11-09 ENCOUNTER — TELEPHONE (OUTPATIENT)
Facility: CLINIC | Age: 52
End: 2022-11-09

## 2022-11-09 NOTE — TELEPHONE ENCOUNTER
Patient contacted and prep instructions reviewed with patient. I advised the patient that her prep instructions were sent to her through [employer] on 10/14/2022. Patient voiced understanding.

## 2022-11-12 ENCOUNTER — ANESTHESIA EVENT (OUTPATIENT)
Dept: ENDOSCOPY | Facility: HOSPITAL | Age: 52
End: 2022-11-12
Payer: MEDICARE

## 2022-11-12 ENCOUNTER — HOSPITAL ENCOUNTER (OUTPATIENT)
Facility: HOSPITAL | Age: 52
Setting detail: HOSPITAL OUTPATIENT SURGERY
Discharge: HOME | End: 2022-11-12
Attending: INTERNAL MEDICINE | Admitting: INTERNAL MEDICINE
Payer: MEDICARE

## 2022-11-12 ENCOUNTER — ANESTHESIA (OUTPATIENT)
Dept: ENDOSCOPY | Facility: HOSPITAL | Age: 52
End: 2022-11-12
Payer: MEDICARE

## 2022-11-12 VITALS
OXYGEN SATURATION: 99 % | RESPIRATION RATE: 15 BRPM | HEIGHT: 62 IN | DIASTOLIC BLOOD PRESSURE: 71 MMHG | HEART RATE: 71 BPM | TEMPERATURE: 98 F | SYSTOLIC BLOOD PRESSURE: 150 MMHG | WEIGHT: 187 LBS | BODY MASS INDEX: 34.41 KG/M2

## 2022-11-12 DIAGNOSIS — Z12.11 SCREEN FOR COLON CANCER: ICD-10-CM

## 2022-11-12 DIAGNOSIS — Z80.0 FAMILY HISTORY OF COLON CANCER: ICD-10-CM

## 2022-11-12 LAB — GLUCOSE BLDC GLUCOMTR-MCNC: 242 MG/DL (ref 70–99)

## 2022-11-12 PROCEDURE — 0DBH8ZX EXCISION OF CECUM, VIA NATURAL OR ARTIFICIAL OPENING ENDOSCOPIC, DIAGNOSTIC: ICD-10-PCS | Performed by: INTERNAL MEDICINE

## 2022-11-12 PROCEDURE — 0DBL8ZX EXCISION OF TRANSVERSE COLON, VIA NATURAL OR ARTIFICIAL OPENING ENDOSCOPIC, DIAGNOSTIC: ICD-10-PCS | Performed by: INTERNAL MEDICINE

## 2022-11-12 PROCEDURE — 45380 COLONOSCOPY AND BIOPSY: CPT | Performed by: INTERNAL MEDICINE

## 2022-11-12 PROCEDURE — 0DBM8ZX EXCISION OF DESCENDING COLON, VIA NATURAL OR ARTIFICIAL OPENING ENDOSCOPIC, DIAGNOSTIC: ICD-10-PCS | Performed by: INTERNAL MEDICINE

## 2022-11-12 PROCEDURE — 0DBK8ZX EXCISION OF ASCENDING COLON, VIA NATURAL OR ARTIFICIAL OPENING ENDOSCOPIC, DIAGNOSTIC: ICD-10-PCS | Performed by: INTERNAL MEDICINE

## 2022-11-12 RX ORDER — SODIUM CHLORIDE, SODIUM LACTATE, POTASSIUM CHLORIDE, CALCIUM CHLORIDE 600; 310; 30; 20 MG/100ML; MG/100ML; MG/100ML; MG/100ML
INJECTION, SOLUTION INTRAVENOUS CONTINUOUS
OUTPATIENT
Start: 2022-11-12

## 2022-11-12 RX ORDER — SODIUM CHLORIDE, SODIUM LACTATE, POTASSIUM CHLORIDE, CALCIUM CHLORIDE 600; 310; 30; 20 MG/100ML; MG/100ML; MG/100ML; MG/100ML
INJECTION, SOLUTION INTRAVENOUS CONTINUOUS
Status: DISCONTINUED | OUTPATIENT
Start: 2022-11-12 | End: 2022-11-12

## 2022-11-12 RX ORDER — DEXTROSE MONOHYDRATE 25 G/50ML
50 INJECTION, SOLUTION INTRAVENOUS
OUTPATIENT
Start: 2022-11-12

## 2022-11-12 RX ORDER — LIDOCAINE HYDROCHLORIDE 10 MG/ML
INJECTION, SOLUTION EPIDURAL; INFILTRATION; INTRACAUDAL; PERINEURAL AS NEEDED
Status: DISCONTINUED | OUTPATIENT
Start: 2022-11-12 | End: 2022-11-12 | Stop reason: SURG

## 2022-11-12 RX ORDER — NICOTINE POLACRILEX 4 MG
30 LOZENGE BUCCAL
OUTPATIENT
Start: 2022-11-12

## 2022-11-12 RX ORDER — NICOTINE POLACRILEX 4 MG
15 LOZENGE BUCCAL
OUTPATIENT
Start: 2022-11-12

## 2022-11-12 RX ORDER — NALOXONE HYDROCHLORIDE 0.4 MG/ML
80 INJECTION, SOLUTION INTRAMUSCULAR; INTRAVENOUS; SUBCUTANEOUS AS NEEDED
OUTPATIENT
Start: 2022-11-12 | End: 2022-11-12

## 2022-11-12 RX ADMIN — SODIUM CHLORIDE, SODIUM LACTATE, POTASSIUM CHLORIDE, CALCIUM CHLORIDE: 600; 310; 30; 20 INJECTION, SOLUTION INTRAVENOUS at 10:53:00

## 2022-11-12 RX ADMIN — SODIUM CHLORIDE, SODIUM LACTATE, POTASSIUM CHLORIDE, CALCIUM CHLORIDE: 600; 310; 30; 20 INJECTION, SOLUTION INTRAVENOUS at 10:37:00

## 2022-11-12 RX ADMIN — LIDOCAINE HYDROCHLORIDE 50 MG: 10 INJECTION, SOLUTION EPIDURAL; INFILTRATION; INTRACAUDAL; PERINEURAL at 10:37:00

## 2022-11-12 NOTE — DISCHARGE INSTRUCTIONS

## 2022-11-28 ENCOUNTER — PATIENT OUTREACH (OUTPATIENT)
Dept: CASE MANAGEMENT | Age: 52
End: 2022-11-28

## 2022-11-28 NOTE — PROGRESS NOTES
Attempted [facility] monthly outreach,  left message for patient to call back ,can be reached at  [phone]. Will try back at a later time. Medical record reviewed including recent office visits and test results.     Chart review - 3 min  Time with patient - 2 min  Total time - 5 min

## 2022-11-30 ENCOUNTER — NURSE TRIAGE (OUTPATIENT)
Dept: INTERNAL MEDICINE CLINIC | Facility: CLINIC | Age: 52
End: 2022-11-30

## 2022-12-02 ENCOUNTER — OFFICE VISIT (OUTPATIENT)
Dept: INTERNAL MEDICINE CLINIC | Facility: CLINIC | Age: 52
End: 2022-12-02
Payer: MEDICARE

## 2022-12-02 DIAGNOSIS — R11.0 NAUSEA: Primary | ICD-10-CM

## 2022-12-02 DIAGNOSIS — G43.909 MIGRAINE WITHOUT STATUS MIGRAINOSUS, NOT INTRACTABLE, UNSPECIFIED MIGRAINE TYPE: ICD-10-CM

## 2022-12-02 DIAGNOSIS — Z79.4 TYPE 2 DIABETES MELLITUS WITH OTHER NEUROLOGIC COMPLICATION, WITH LONG-TERM CURRENT USE OF INSULIN (HCC): ICD-10-CM

## 2022-12-02 DIAGNOSIS — K21.9 GASTROESOPHAGEAL REFLUX DISEASE WITHOUT ESOPHAGITIS: ICD-10-CM

## 2022-12-02 DIAGNOSIS — I10 ESSENTIAL HYPERTENSION: ICD-10-CM

## 2022-12-02 DIAGNOSIS — E11.49 TYPE 2 DIABETES MELLITUS WITH OTHER NEUROLOGIC COMPLICATION, WITH LONG-TERM CURRENT USE OF INSULIN (HCC): ICD-10-CM

## 2022-12-02 RX ORDER — ONDANSETRON 2 MG/ML
0.5 INJECTION INTRAMUSCULAR; INTRAVENOUS ONCE
Status: COMPLETED | OUTPATIENT
Start: 2022-12-02 | End: 2022-12-02

## 2022-12-02 RX ORDER — ONDANSETRON 4 MG/1
4 TABLET, ORALLY DISINTEGRATING ORAL EVERY 8 HOURS PRN
Qty: 30 TABLET | Refills: 0 | Status: SHIPPED | OUTPATIENT
Start: 2022-12-02

## 2022-12-02 RX ADMIN — ONDANSETRON 0.6 MG: 2 INJECTION INTRAMUSCULAR; INTRAVENOUS at 12:26:00

## 2022-12-03 ENCOUNTER — HOSPITAL ENCOUNTER (EMERGENCY)
Facility: HOSPITAL | Age: 52
Discharge: HOME | End: 2022-12-03
Attending: EMERGENCY MEDICINE
Payer: MEDICARE

## 2022-12-03 ENCOUNTER — APPOINTMENT (OUTPATIENT)
Dept: CT IMAGING | Facility: HOSPITAL | Age: 52
End: 2022-12-03
Attending: EMERGENCY MEDICINE
Payer: MEDICARE

## 2022-12-03 ENCOUNTER — TELEPHONE (OUTPATIENT)
Dept: INTERNAL MEDICINE CLINIC | Facility: CLINIC | Age: 52
End: 2022-12-03

## 2022-12-03 VITALS
WEIGHT: 184.06 LBS | TEMPERATURE: 98 F | BODY MASS INDEX: 34 KG/M2 | RESPIRATION RATE: 18 BRPM | OXYGEN SATURATION: 95 % | DIASTOLIC BLOOD PRESSURE: 72 MMHG | HEART RATE: 79 BPM | SYSTOLIC BLOOD PRESSURE: 157 MMHG

## 2022-12-03 VITALS
SYSTOLIC BLOOD PRESSURE: 149 MMHG | HEIGHT: 62 IN | DIASTOLIC BLOOD PRESSURE: 85 MMHG | WEIGHT: 184 LBS | HEART RATE: 88 BPM | TEMPERATURE: 98 F | BODY MASS INDEX: 33.86 KG/M2

## 2022-12-03 DIAGNOSIS — R11.2 NAUSEA AND VOMITING IN ADULT: Primary | ICD-10-CM

## 2022-12-03 LAB
ANION GAP SERPL CALC-SCNC: 5 MMOL/L (ref 0–18)
BASOPHILS # BLD AUTO: 0.04 X10(3) UL (ref 0–0.2)
BASOPHILS NFR BLD AUTO: 0.3 %
BILIRUB UR QL CFM: NEGATIVE
BUN BLD-MCNC: 23 MG/DL (ref 7–18)
BUN/CREAT SERPL: 18.9 (ref 10–20)
CALCIUM BLD-MCNC: 10.1 MG/DL (ref 8.5–10.1)
CHLORIDE SERPL-SCNC: 103 MMOL/L (ref 98–112)
CLARITY UR: CLEAR
CO2 SERPL-SCNC: 29 MMOL/L (ref 21–32)
COLOR UR: YELLOW
CREAT BLD-MCNC: 1.22 MG/DL
DEPRECATED RDW RBC AUTO: 43.3 FL (ref 35.1–46.3)
EOSINOPHIL # BLD AUTO: 0.35 X10(3) UL (ref 0–0.7)
EOSINOPHIL NFR BLD AUTO: 2.5 %
ERYTHROCYTE [DISTWIDTH] IN BLOOD BY AUTOMATED COUNT: 14.7 % (ref 11–15)
GFR SERPLBLD BASED ON 1.73 SQ M-ARVRAT: 53 ML/MIN/1.73M2 (ref 60–?)
GLUCOSE BLD-MCNC: 166 MG/DL (ref 70–99)
GLUCOSE BLDC GLUCOMTR-MCNC: 170 MG/DL (ref 70–99)
GLUCOSE UR-MCNC: NEGATIVE MG/DL
HCT VFR BLD AUTO: 48.8 %
HGB BLD-MCNC: 14.7 G/DL
HGB UR QL STRIP.AUTO: NEGATIVE
HYALINE CASTS #/AREA URNS AUTO: PRESENT /LPF
IMM GRANULOCYTES # BLD AUTO: 0.05 X10(3) UL (ref 0–1)
IMM GRANULOCYTES NFR BLD: 0.4 %
KETONES UR-MCNC: NEGATIVE MG/DL
LEUKOCYTE ESTERASE UR QL STRIP.AUTO: NEGATIVE
LYMPHOCYTES # BLD AUTO: 2.55 X10(3) UL (ref 1–4)
LYMPHOCYTES NFR BLD AUTO: 18.1 %
MCH RBC QN AUTO: 24.4 PG (ref 26–34)
MCHC RBC AUTO-ENTMCNC: 30.1 G/DL (ref 31–37)
MCV RBC AUTO: 80.9 FL
MONOCYTES # BLD AUTO: 1.16 X10(3) UL (ref 0.1–1)
MONOCYTES NFR BLD AUTO: 8.2 %
NEUTROPHILS # BLD AUTO: 9.92 X10 (3) UL (ref 1.5–7.7)
NEUTROPHILS # BLD AUTO: 9.92 X10(3) UL (ref 1.5–7.7)
NEUTROPHILS NFR BLD AUTO: 70.5 %
NITRITE UR QL STRIP.AUTO: NEGATIVE
OSMOLALITY SERPL CALC.SUM OF ELEC: 291 MOSM/KG (ref 275–295)
PH UR: 6 [PH] (ref 5–8)
PLATELET # BLD AUTO: 317 10(3)UL (ref 150–450)
POTASSIUM SERPL-SCNC: 3.8 MMOL/L (ref 3.5–5.1)
RBC # BLD AUTO: 6.03 X10(6)UL
SARS-COV-2 RNA RESP QL NAA+PROBE: NOT DETECTED
SODIUM SERPL-SCNC: 137 MMOL/L (ref 136–145)
SP GR UR STRIP: 1.01 (ref 1–1.03)
UROBILINOGEN UR STRIP-ACNC: 0.2
WBC # BLD AUTO: 14.1 X10(3) UL (ref 4–11)

## 2022-12-03 PROCEDURE — 80048 BASIC METABOLIC PNL TOTAL CA: CPT | Performed by: EMERGENCY MEDICINE

## 2022-12-03 PROCEDURE — 85060 BLOOD SMEAR INTERPRETATION: CPT | Performed by: EMERGENCY MEDICINE

## 2022-12-03 PROCEDURE — C9113 INJ PANTOPRAZOLE SODIUM, VIA: HCPCS | Performed by: EMERGENCY MEDICINE

## 2022-12-03 PROCEDURE — 81001 URINALYSIS AUTO W/SCOPE: CPT | Performed by: EMERGENCY MEDICINE

## 2022-12-03 PROCEDURE — 96375 TX/PRO/DX INJ NEW DRUG ADDON: CPT

## 2022-12-03 PROCEDURE — 81015 MICROSCOPIC EXAM OF URINE: CPT | Performed by: EMERGENCY MEDICINE

## 2022-12-03 PROCEDURE — 85025 COMPLETE CBC W/AUTO DIFF WBC: CPT | Performed by: EMERGENCY MEDICINE

## 2022-12-03 PROCEDURE — 96361 HYDRATE IV INFUSION ADD-ON: CPT

## 2022-12-03 PROCEDURE — 99284 EMERGENCY DEPT VISIT MOD MDM: CPT

## 2022-12-03 PROCEDURE — 96374 THER/PROPH/DIAG INJ IV PUSH: CPT

## 2022-12-03 PROCEDURE — 74177 CT ABD & PELVIS W/CONTRAST: CPT | Performed by: EMERGENCY MEDICINE

## 2022-12-03 PROCEDURE — 82962 GLUCOSE BLOOD TEST: CPT

## 2022-12-03 RX ORDER — ONDANSETRON 2 MG/ML
4 INJECTION INTRAMUSCULAR; INTRAVENOUS ONCE
Status: COMPLETED | OUTPATIENT
Start: 2022-12-03 | End: 2022-12-03

## 2022-12-03 RX ORDER — POLYETHYLENE GLYCOL 3350 17 G/17G
17 POWDER, FOR SOLUTION ORAL DAILY PRN
Qty: 14 EACH | Refills: 0 | Status: SHIPPED | OUTPATIENT
Start: 2022-12-03 | End: 2022-12-17

## 2022-12-03 RX ORDER — ASPIRIN 81 MG
100 TABLET, DELAYED RELEASE (ENTERIC COATED) ORAL 2 TIMES DAILY PRN
Qty: 60 TABLET | Refills: 1 | Status: SHIPPED | OUTPATIENT
Start: 2022-12-03

## 2022-12-03 RX ORDER — MAGNESIUM HYDROXIDE/ALUMINUM HYDROXICE/SIMETHICONE 120; 1200; 1200 MG/30ML; MG/30ML; MG/30ML
10 SUSPENSION ORAL 4 TIMES DAILY PRN
Qty: 200 ML | Refills: 0 | Status: SHIPPED | OUTPATIENT
Start: 2022-12-03 | End: 2022-12-08

## 2022-12-03 RX ORDER — ONDANSETRON 4 MG/1
4 TABLET, ORALLY DISINTEGRATING ORAL EVERY 8 HOURS PRN
Qty: 15 TABLET | Refills: 0 | Status: SHIPPED | OUTPATIENT
Start: 2022-12-03 | End: 2022-12-08

## 2022-12-03 NOTE — TELEPHONE ENCOUNTER
Please call patient in my opinion patient should go to emergency room, she is diabetic on insulisn, was vomiting for several days, she may be severely dehydrated    She may have electrolyte disbalance that needs to be corrected. I sent prescription for Colace.  She should be seen at immediate care center in [address] [place] [address] , they can run blood test and assess her properly, best if she goes to emergency room,

## 2022-12-03 NOTE — TELEPHONE ENCOUNTER
Patient advised of Patricia Hargrove notes below and verbalized that she does not want to wait for hours in the emergency room. Patient sounds very weak on the phone. Patient asking for nurse to talk to her sister. Patient' sister also reluctant to take her to the ER, but agrees.     [facility] called to notify patient will be coming in

## 2022-12-03 NOTE — ED INITIAL ASSESSMENT (HPI)
Pt to ED with c/o vomiting x4 days. Denies cough, fever, or diarrhea. Pt denies chest pain or sob. No respiratory distress noted. Pt with hx of diabetes.

## 2022-12-03 NOTE — TELEPHONE ENCOUNTER
Patient calling ( identified name and  ) Last office visit     Calling with update:     Reports headache has improved but the nausea is worse, despite taking Zofran as directed   she is no longer vomiting now but the smell of food makes her nauseated , feels like something is stuck in her throat  Cannot eat, has not eaten since Tuesday but is taking sips of ice water     She is feeling weak , tired, body aches    Last BM was last week, requesting refill of Colace     Med pended for your review / approval     Allergies reviewed and pharmacy confirmed ( allergy  to Reglan )     Please advise and thank you.       Routing as Dr. Jorge A Trevizo  is out of office  ( please and thank you )

## 2022-12-08 ENCOUNTER — OFFICE VISIT (OUTPATIENT)
Dept: ENDOCRINOLOGY CLINIC | Facility: CLINIC | Age: 52
End: 2022-12-08
Payer: MEDICARE

## 2022-12-08 ENCOUNTER — TELEPHONE (OUTPATIENT)
Dept: CASE MANAGEMENT | Age: 52
End: 2022-12-08

## 2022-12-08 VITALS
HEART RATE: 75 BPM | DIASTOLIC BLOOD PRESSURE: 83 MMHG | BODY MASS INDEX: 34 KG/M2 | SYSTOLIC BLOOD PRESSURE: 143 MMHG | WEIGHT: 187.38 LBS

## 2022-12-08 DIAGNOSIS — H02.88A MEIBOMIAN GLAND DYSFUNCTION (MGD), BILATERAL, BOTH UPPER AND LOWER LIDS: ICD-10-CM

## 2022-12-08 DIAGNOSIS — I63.9 LEFT-SIDED CEREBROVASCULAR ACCIDENT (CVA) (HCC): ICD-10-CM

## 2022-12-08 DIAGNOSIS — H25.13 AGE-RELATED NUCLEAR CATARACT OF BOTH EYES: ICD-10-CM

## 2022-12-08 DIAGNOSIS — E11.9 DIABETIC EYE EXAM (HCC): ICD-10-CM

## 2022-12-08 DIAGNOSIS — Z79.4 TYPE 2 DIABETES MELLITUS WITH OTHER NEUROLOGIC COMPLICATION, WITH LONG-TERM CURRENT USE OF INSULIN (HCC): Primary | ICD-10-CM

## 2022-12-08 DIAGNOSIS — Z01.00 DIABETIC EYE EXAM (HCC): ICD-10-CM

## 2022-12-08 DIAGNOSIS — Z01.818 PREOPERATIVE EXAMINATION: ICD-10-CM

## 2022-12-08 DIAGNOSIS — Z01.00 ROUTINE EYE EXAM: Primary | ICD-10-CM

## 2022-12-08 DIAGNOSIS — E11.49 TYPE 2 DIABETES MELLITUS WITH OTHER NEUROLOGIC COMPLICATION, WITH LONG-TERM CURRENT USE OF INSULIN (HCC): Primary | ICD-10-CM

## 2022-12-08 DIAGNOSIS — H02.88B MEIBOMIAN GLAND DYSFUNCTION (MGD), BILATERAL, BOTH UPPER AND LOWER LIDS: ICD-10-CM

## 2022-12-08 LAB
CARTRIDGE LOT#: ABNORMAL NUMERIC
GLUCOSE BLOOD: 161
HEMOGLOBIN A1C: 10.1 % (ref 4.3–5.6)
TEST STRIP LOT #: NORMAL NUMERIC

## 2022-12-08 PROCEDURE — 82947 ASSAY GLUCOSE BLOOD QUANT: CPT

## 2022-12-08 PROCEDURE — 99214 OFFICE O/P EST MOD 30 MIN: CPT

## 2022-12-08 PROCEDURE — 83036 HEMOGLOBIN GLYCOSYLATED A1C: CPT

## 2022-12-08 RX ORDER — PEN NEEDLE, DIABETIC 32GX 5/32"
NEEDLE, DISPOSABLE MISCELLANEOUS
Qty: 200 EACH | Refills: 1 | Status: SHIPPED | OUTPATIENT
Start: 2022-12-08

## 2022-12-08 RX ORDER — SEMAGLUTIDE 1.34 MG/ML
0.5 INJECTION, SOLUTION SUBCUTANEOUS WEEKLY
Qty: 1.5 ML | Refills: 0 | Status: SHIPPED | OUTPATIENT
Start: 2022-12-08 | End: 2022-12-30

## 2022-12-08 NOTE — TELEPHONE ENCOUNTER
Dr. Aylin Reinoso,    The patient is requesting a referral to   Dr. Mike Duncan for an annual eye exam.    Pended referral please review diagnosis and sign off if you agree. Thank you.   Raúl Mast

## 2022-12-08 NOTE — PATIENT INSTRUCTIONS
-Restart Ozempic 0.5mg subcutaneous weekly for 1 month and then increase back to 2mg subcutaneous weekly    - Increase Lantus to 46 units subcutaneous daily     - Increase Novolog 18-24-24 units TID with meals and continue CF-2 :40 >140

## 2022-12-09 ENCOUNTER — HOSPITAL ENCOUNTER (EMERGENCY)
Facility: HOSPITAL | Age: 52
Discharge: HOME | End: 2022-12-09
Attending: EMERGENCY MEDICINE
Payer: MEDICARE

## 2022-12-09 ENCOUNTER — APPOINTMENT (OUTPATIENT)
Dept: CT IMAGING | Facility: HOSPITAL | Age: 52
End: 2022-12-09
Attending: EMERGENCY MEDICINE
Payer: MEDICARE

## 2022-12-09 ENCOUNTER — TELEPHONE (OUTPATIENT)
Dept: INTERNAL MEDICINE CLINIC | Facility: CLINIC | Age: 52
End: 2022-12-09

## 2022-12-09 VITALS
TEMPERATURE: 99 F | DIASTOLIC BLOOD PRESSURE: 95 MMHG | HEART RATE: 71 BPM | WEIGHT: 187.38 LBS | OXYGEN SATURATION: 97 % | RESPIRATION RATE: 20 BRPM | BODY MASS INDEX: 34 KG/M2 | SYSTOLIC BLOOD PRESSURE: 152 MMHG

## 2022-12-09 DIAGNOSIS — K59.00 CONSTIPATION, UNSPECIFIED CONSTIPATION TYPE: Primary | ICD-10-CM

## 2022-12-09 LAB
ALBUMIN SERPL-MCNC: 4.1 G/DL (ref 3.4–5)
ALBUMIN/GLOB SERPL: 0.9 {RATIO} (ref 1–2)
ALP LIVER SERPL-CCNC: 119 U/L
ALT SERPL-CCNC: 20 U/L
ANION GAP SERPL CALC-SCNC: 6 MMOL/L (ref 0–18)
AST SERPL-CCNC: 8 U/L (ref 15–37)
BASOPHILS # BLD AUTO: 0.07 X10(3) UL (ref 0–0.2)
BASOPHILS NFR BLD AUTO: 0.5 %
BILIRUB SERPL-MCNC: 0.6 MG/DL (ref 0.1–2)
BILIRUB UR QL: NEGATIVE
BUN BLD-MCNC: 18 MG/DL (ref 7–18)
BUN/CREAT SERPL: 18.9 (ref 10–20)
CALCIUM BLD-MCNC: 10 MG/DL (ref 8.5–10.1)
CHLORIDE SERPL-SCNC: 98 MMOL/L (ref 98–112)
CO2 SERPL-SCNC: 30 MMOL/L (ref 21–32)
COLOR UR: YELLOW
CREAT BLD-MCNC: 0.95 MG/DL
DEPRECATED RDW RBC AUTO: 40 FL (ref 35.1–46.3)
EOSINOPHIL # BLD AUTO: 0.23 X10(3) UL (ref 0–0.7)
EOSINOPHIL NFR BLD AUTO: 1.8 %
ERYTHROCYTE [DISTWIDTH] IN BLOOD BY AUTOMATED COUNT: 14.1 % (ref 11–15)
GFR SERPLBLD BASED ON 1.73 SQ M-ARVRAT: 71 ML/MIN/1.73M2 (ref 60–?)
GLOBULIN PLAS-MCNC: 4.5 G/DL (ref 2.8–4.4)
GLUCOSE BLD-MCNC: 95 MG/DL (ref 70–99)
GLUCOSE BLDC GLUCOMTR-MCNC: 106 MG/DL (ref 70–99)
GLUCOSE BLDC GLUCOMTR-MCNC: 95 MG/DL (ref 70–99)
GLUCOSE UR-MCNC: NEGATIVE MG/DL
HCT VFR BLD AUTO: 45.8 %
HGB BLD-MCNC: 14.3 G/DL
HGB UR QL STRIP.AUTO: NEGATIVE
IMM GRANULOCYTES # BLD AUTO: 0.05 X10(3) UL (ref 0–1)
IMM GRANULOCYTES NFR BLD: 0.4 %
KETONES UR-MCNC: NEGATIVE MG/DL
LYMPHOCYTES # BLD AUTO: 3.26 X10(3) UL (ref 1–4)
LYMPHOCYTES NFR BLD AUTO: 25 %
MCH RBC QN AUTO: 24.4 PG (ref 26–34)
MCHC RBC AUTO-ENTMCNC: 31.2 G/DL (ref 31–37)
MCV RBC AUTO: 78.3 FL
MONOCYTES # BLD AUTO: 0.75 X10(3) UL (ref 0.1–1)
MONOCYTES NFR BLD AUTO: 5.8 %
NEUTROPHILS # BLD AUTO: 8.68 X10 (3) UL (ref 1.5–7.7)
NEUTROPHILS # BLD AUTO: 8.68 X10(3) UL (ref 1.5–7.7)
NEUTROPHILS NFR BLD AUTO: 66.5 %
NITRITE UR QL STRIP.AUTO: NEGATIVE
OSMOLALITY SERPL CALC.SUM OF ELEC: 280 MOSM/KG (ref 275–295)
PH UR: 6 [PH] (ref 5–8)
PLATELET # BLD AUTO: 276 10(3)UL (ref 150–450)
POTASSIUM SERPL-SCNC: 3.8 MMOL/L (ref 3.5–5.1)
PROT SERPL-MCNC: 8.6 G/DL (ref 6.4–8.2)
PROT UR-MCNC: NEGATIVE MG/DL
RBC # BLD AUTO: 5.85 X10(6)UL
SODIUM SERPL-SCNC: 134 MMOL/L (ref 136–145)
SP GR UR STRIP: 1.02 (ref 1–1.03)
UROBILINOGEN UR STRIP-ACNC: <2
VIT C UR-MCNC: NEGATIVE MG/DL
WBC # BLD AUTO: 13 X10(3) UL (ref 4–11)

## 2022-12-09 PROCEDURE — 74177 CT ABD & PELVIS W/CONTRAST: CPT | Performed by: EMERGENCY MEDICINE

## 2022-12-09 PROCEDURE — 99284 EMERGENCY DEPT VISIT MOD MDM: CPT

## 2022-12-09 PROCEDURE — 36415 COLL VENOUS BLD VENIPUNCTURE: CPT

## 2022-12-09 PROCEDURE — 81001 URINALYSIS AUTO W/SCOPE: CPT | Performed by: EMERGENCY MEDICINE

## 2022-12-09 PROCEDURE — 85025 COMPLETE CBC W/AUTO DIFF WBC: CPT

## 2022-12-09 PROCEDURE — 82962 GLUCOSE BLOOD TEST: CPT

## 2022-12-09 PROCEDURE — 80053 COMPREHEN METABOLIC PANEL: CPT

## 2022-12-09 PROCEDURE — 80053 COMPREHEN METABOLIC PANEL: CPT | Performed by: EMERGENCY MEDICINE

## 2022-12-09 PROCEDURE — 87086 URINE CULTURE/COLONY COUNT: CPT | Performed by: EMERGENCY MEDICINE

## 2022-12-09 PROCEDURE — 85025 COMPLETE CBC W/AUTO DIFF WBC: CPT | Performed by: EMERGENCY MEDICINE

## 2022-12-09 RX ORDER — ATORVASTATIN CALCIUM 80 MG/1
TABLET, FILM COATED ORAL
Qty: 90 TABLET | Refills: 0 | Status: SHIPPED | OUTPATIENT
Start: 2022-12-09

## 2022-12-09 RX ORDER — POLYETHYLENE GLYCOL 3350, SODIUM SULFATE ANHYDROUS, SODIUM BICARBONATE, SODIUM CHLORIDE, POTASSIUM CHLORIDE 227.1; 21.5; 6.36; 5.53; .754 G/L; G/L; G/L; G/L; G/L
1 POWDER, FOR SOLUTION ORAL ONCE
Qty: 1 EACH | Refills: 0 | Status: SHIPPED | OUTPATIENT
Start: 2022-12-09 | End: 2022-12-09

## 2022-12-09 NOTE — TELEPHONE ENCOUNTER
Patient contacts clinic. She continues to have issues with nausea, vomiting and abdominal pain. Has not had BM in over 1 week. Her abdomen is becoming distended. Has used miralax, dulcolax, colace without relief. Recent work up with PCP and ER were negative. I advised patient return to ER for progression of symptoms and rule out impaction/bowel obstruction.  She verbalized understanding and compliance

## 2022-12-10 NOTE — DISCHARGE INSTRUCTIONS
Return to the emergency department if you develop severe abdominal pain, severe nausea and vomiting to the point where you are unable to keep down fluids, if you develop chest pain or difficulty breathing, blood in your stool, dizziness or fainting, or if you develop any other new or concerning symptoms as these could be signs of more serious medical illness. Try to stay well-hydrated.

## 2022-12-20 DIAGNOSIS — I10 ESSENTIAL HYPERTENSION WITH GOAL BLOOD PRESSURE LESS THAN 140/90: ICD-10-CM

## 2022-12-21 ENCOUNTER — PATIENT OUTREACH (OUTPATIENT)
Dept: CASE MANAGEMENT | Age: 52
End: 2022-12-21

## 2022-12-21 DIAGNOSIS — I65.21 ATHEROSCLEROSIS OF RIGHT CAROTID ARTERY: ICD-10-CM

## 2022-12-21 DIAGNOSIS — H25.13 AGE-RELATED NUCLEAR CATARACT OF BOTH EYES: ICD-10-CM

## 2022-12-21 DIAGNOSIS — R41.82 ALTERED MENTAL STATUS, UNSPECIFIED ALTERED MENTAL STATUS TYPE: ICD-10-CM

## 2022-12-21 DIAGNOSIS — E78.5 DYSLIPIDEMIA: ICD-10-CM

## 2022-12-21 DIAGNOSIS — N18.30 CKD STAGE 3 DUE TO TYPE 1 DIABETES MELLITUS (HCC): ICD-10-CM

## 2022-12-21 DIAGNOSIS — K21.9 GASTROESOPHAGEAL REFLUX DISEASE WITHOUT ESOPHAGITIS: ICD-10-CM

## 2022-12-21 DIAGNOSIS — E11.8 TYPE 2 DIABETES MELLITUS WITH COMPLICATION, WITH LONG-TERM CURRENT USE OF INSULIN (HCC): ICD-10-CM

## 2022-12-21 DIAGNOSIS — E10.22 CKD STAGE 3 DUE TO TYPE 1 DIABETES MELLITUS (HCC): ICD-10-CM

## 2022-12-21 DIAGNOSIS — I10 ESSENTIAL HYPERTENSION: ICD-10-CM

## 2022-12-21 DIAGNOSIS — F32.A DEPRESSION, UNSPECIFIED DEPRESSION TYPE: ICD-10-CM

## 2022-12-21 DIAGNOSIS — Z79.4 TYPE 2 DIABETES MELLITUS WITH COMPLICATION, WITH LONG-TERM CURRENT USE OF INSULIN (HCC): ICD-10-CM

## 2022-12-21 DIAGNOSIS — D50.9 IRON DEFICIENCY ANEMIA, UNSPECIFIED IRON DEFICIENCY ANEMIA TYPE: ICD-10-CM

## 2022-12-21 DIAGNOSIS — E66.9 OBESITY (BMI 30-39.9): ICD-10-CM

## 2022-12-21 NOTE — TELEPHONE ENCOUNTER
Please review. Protocol failed / No protocol.    Requested Prescriptions   Pending Prescriptions Disp Refills    HYDROCHLOROTHIAZIDE 25 MG Oral Tab [Pharmacy Med Name: HYDROCHLOROTHIAZIDE 25MG TABLETS] 90 tablet 0     Sig: TAKE 1 TABLET(25 MG) BY MOUTH DAILY       Hypertensive Medications Protocol Failed - 12/20/2022  4:31 PM        Failed - Last BP reading less than 140/90     BP Readings from Last 1 Encounters:  12/09/22 : (!) 152/95              Passed - In person appointment in the past 12 or next 3 months     Recent Outpatient Visits              1 week ago Type 2 diabetes mellitus with other neurologic complication, with long-term current use of insulin [facility])    [address] [place], 602 [facility], Alla Starr APRN    Office Visit    2 weeks ago Nausea    [address] [facility], [address], [place] Giacomo Horvath MD    Office Visit    3 weeks ago Type 2 diabetes mellitus with hyperglycemia, with long-term current use of insulin [facility])    [address], Xiomara Gross MD    [employer] E[employer]    2 months ago Numbness and tingling in left arm    Norma Rain MD    Office Visit    2 months ago Type 2 diabetes mellitus with hyperglycemia, with long-term current use of insulin [facility])    Windell Kawasaki, MD    Office Visit          Future Appointments         Provider Department Appt Notes    In 2 weeks Giacomo Horvtah MD [address] [facility] 3 month follow up     In 1 month Rolando Sun MD [address], [address], [place] HUMANA MMAI  PRE OP F/U    In 2 months Alla Dawkins, [address] [facility], 602 [facility], [place] 3 mos               [address] or [facility] in past 6 months     Recent Results (from the past 4392 hour(s))   Comp Metabolic Panel (14)    Collection Time: 12/09/22  4:26 PM   Result Value Ref Range    Glucose 95 70 - 99 mg/dL    Sodium 134 (L) 136 - 145 mmol/L    Potassium 3.8 3.5 - 5.1 mmol/L    Chloride 98 98 - 112 mmol/L    CO2 30.0 21.0 - 32.0 mmol/L    Anion Gap 6 0 - 18 mmol/L    BUN 18 7 - 18 mg/dL    Creatinine 0.95 0.55 - 1.02 mg/dL    BUN/CREA Ratio 18.9 10.0 - 20.0    Calcium, Total 10.0 8.5 - 10.1 mg/dL    Calculated Osmolality 280 275 - 295 mOsm/kg    eGFR-Cr 71 >=60 mL/min/1.73m2    ALT 20 13 - 56 U/L    AST 8 (L) 15 - 37 U/L    Alkaline Phosphatase 119 (H) 41 - 108 U/L    Bilirubin, Total 0.6 0.1 - 2.0 mg/dL    Total Protein 8.6 (H) 6.4 - 8.2 g/dL    Albumin 4.1 3.4 - 5.0 g/dL    Globulin  4.5 (H) 2.8 - 4.4 g/dL    A/G Ratio 0.9 (L) 1.0 - 2.0     *Note: Due to a large number of results and/or encounters for the requested time period, some results have not been displayed. A complete set of results can be found in Results Review.                Passed - In person appointment or virtual visit in the past 6 months     Recent Outpatient Visits              1 week ago Type 2 diabetes mellitus with other neurologic complication, with long-term current use of insulin [facility])    [address] [place], [address] [place] [address], [address] [place] [address] GINGER Prescott    Office Visit    2 weeks ago Nausea    Ava Hanly, [place] Ginger Roe, MD    Office Visit    3 weeks ago Type 2 diabetes mellitus with hyperglycemia, with long-term current use of insulin [facility])    [address] [place] [address], Merna Barrera MD    [employer] E[employer]    2 months ago Numbness and tingling in left arm    Gayle Nettles MD    Office Visit    2 months ago Type 2 diabetes mellitus with hyperglycemia, with long-term current use of insulin [facility])    Sylvester Calvo, [address] [facility],[address], Merna Barrera MD    Office Visit          Future Appointments         Provider Department Appt Notes    In 2 weeks Mira Rangel MD [address], [address] [place], [employer] 3 month follow up     In 1 month Alondra Valencia MD [employer], [address], [address] OP F/U    In 2 months [facility], [facility], 602 [facility], [place] 3 mos               Passed - [facility] or GFRNAA > 50     GFR Evaluation  EGFRCR: 71 , resulted on 12/9/2022              Future Appointments         Provider Department Appt Notes    In 2 weeks Ruby Almaguer MD [address] [place] Abhishek Guajardo 3 month follow up     In 1 month Alondra Valencia MD [employer], [address], [place] HUMANA MMAI  PRE OP F/U    In 2 months Angelica Villareal, [address] [facility], 602 [facility], [employer] 3 mos           Recent Outpatient Visits              1 week ago Type 2 diabetes mellitus with other neurologic complication, with long-term current use of insulin [facility])    [address] [place] [address], 602 [facility], [address] [facility], APRN    Office Visit    2 weeks ago Nausea    Mevelyn Lot, [place] Ruby Almaguer MD    Office Visit    3 weeks ago Type 2 diabetes mellitus with hyperglycemia, with long-term current use of insulin [facility])    Shahab Parra MD    [employer] E[employer]    2 months ago Numbness and tingling in left arm    Stephanie Nelson MD    Office Visit    2 months ago Type 2 diabetes mellitus with hyperglycemia, with long-term current use of insulin [facility])    Shahab Parra MD    Office Visit

## 2022-12-21 NOTE — PROGRESS NOTES
Attempted [facility] monthly outreach,  left message for patient to call back ,can be reached at  [phone]. Will try back at a later time. Medical record reviewed including recent office visits and test results.     Chart review - 3 min  Time with patient - 2 min  Total time - 5 min

## 2022-12-22 ENCOUNTER — NURSE TRIAGE (OUTPATIENT)
Dept: INTERNAL MEDICINE CLINIC | Facility: CLINIC | Age: 52
End: 2022-12-22

## 2022-12-22 ENCOUNTER — TELEPHONE (OUTPATIENT)
Dept: INTERNAL MEDICINE CLINIC | Facility: CLINIC | Age: 52
End: 2022-12-22

## 2022-12-22 RX ORDER — ASPIRIN 81 MG
100 TABLET, DELAYED RELEASE (ENTERIC COATED) ORAL 2 TIMES DAILY PRN
Qty: 60 TABLET | Refills: 1 | Status: SHIPPED | OUTPATIENT
Start: 2022-12-22

## 2022-12-22 RX ORDER — HYDROCHLOROTHIAZIDE 25 MG/1
TABLET ORAL
Qty: 90 TABLET | Refills: 0 | Status: SHIPPED | OUTPATIENT
Start: 2022-12-22

## 2022-12-22 NOTE — TELEPHONE ENCOUNTER
Pharmacy called in for status on refill they said they sent over for Rx Docusate sodium 100MG.  Please advise

## 2022-12-22 NOTE — TELEPHONE ENCOUNTER
Impression: Peripheral retinal degeneration: H35.40. Plan: pt edu. tigroid appearance nasal to disc.  monitor in 6 mo with dfe for change. photo in 6 mo to compare. consider retina referral at that time. Spoke to patient   for CCM update. Patient is c/o stomach  pain located below her breast and above her navel, stated she feels her stomach is swollen , she states it is not bloating as she does not have flatulence, or constipation. Patient stated she is still very nauseous and has headaches. Patient reported she is not eating very well , everything gives her upset stomach. Patient not using the zofran very often but does have it. Patient stated she is also still getting headaches, she would like to know if PCP has any further recommendations at this time. Patient has not started ozempic again, NP advised patient to restart at decreased dose , glucose still running high . Please advise . Thank you .

## 2022-12-22 NOTE — TELEPHONE ENCOUNTER
On file at [facility], sent 12/03 for #60 with an additional refill. Unable to hold with pharmacy, message sent to prepare refill for patient.

## 2022-12-30 ENCOUNTER — TELEPHONE (OUTPATIENT)
Facility: CLINIC | Age: 52
End: 2022-12-30

## 2022-12-30 NOTE — TELEPHONE ENCOUNTER
----- Message from Jackelin Johnson MD sent at 12/29/2022  2:37 PM CST -----  GI staff: please place recall for colonoscopy in 3 years

## 2022-12-30 NOTE — TELEPHONE ENCOUNTER
Health maintenance updated. Last colonoscopy done 11/12/22   3 year recall placed into Pt Outreach   Next due on 11/12/25 per Dr. Giancarlo Lala.

## 2023-01-05 ENCOUNTER — OFFICE VISIT (OUTPATIENT)
Dept: INTERNAL MEDICINE CLINIC | Facility: CLINIC | Age: 53
End: 2023-01-05
Payer: MEDICARE

## 2023-01-05 VITALS
WEIGHT: 191 LBS | DIASTOLIC BLOOD PRESSURE: 83 MMHG | BODY MASS INDEX: 35.15 KG/M2 | HEART RATE: 69 BPM | SYSTOLIC BLOOD PRESSURE: 148 MMHG | HEIGHT: 62 IN

## 2023-01-05 DIAGNOSIS — E11.49 TYPE 2 DIABETES MELLITUS WITH OTHER NEUROLOGIC COMPLICATION, WITH LONG-TERM CURRENT USE OF INSULIN (HCC): ICD-10-CM

## 2023-01-05 DIAGNOSIS — H26.9 CATARACT OF BOTH EYES, UNSPECIFIED CATARACT TYPE: ICD-10-CM

## 2023-01-05 DIAGNOSIS — Z79.4 TYPE 2 DIABETES MELLITUS WITH OTHER NEUROLOGIC COMPLICATION, WITH LONG-TERM CURRENT USE OF INSULIN (HCC): ICD-10-CM

## 2023-01-05 DIAGNOSIS — I10 ESSENTIAL HYPERTENSION: Primary | ICD-10-CM

## 2023-01-05 PROCEDURE — 99214 OFFICE O/P EST MOD 30 MIN: CPT | Performed by: INTERNAL MEDICINE

## 2023-01-09 RX ORDER — ERGOCALCIFEROL 1.25 MG/1
CAPSULE ORAL
Qty: 12 CAPSULE | Refills: 0 | Status: SHIPPED | OUTPATIENT
Start: 2023-01-09

## 2023-01-09 RX ORDER — SEMAGLUTIDE 1.34 MG/ML
INJECTION, SOLUTION SUBCUTANEOUS
Qty: 1.5 ML | Refills: 0 | Status: SHIPPED | OUTPATIENT
Start: 2023-01-09

## 2023-01-09 NOTE — TELEPHONE ENCOUNTER
LOV: 12/08/22    RTC: 2weeks    F/U:03/09/2023     Pending Monthly Supply: orders pending, please approve if appropriate.

## 2023-01-11 ENCOUNTER — TELEPHONE (OUTPATIENT)
Dept: INTERNAL MEDICINE CLINIC | Facility: CLINIC | Age: 53
End: 2023-01-11

## 2023-01-11 ENCOUNTER — PATIENT OUTREACH (OUTPATIENT)
Dept: CASE MANAGEMENT | Age: 53
End: 2023-01-11

## 2023-01-11 DIAGNOSIS — I10 ESSENTIAL HYPERTENSION: ICD-10-CM

## 2023-01-11 DIAGNOSIS — E66.9 OBESITY (BMI 30-39.9): ICD-10-CM

## 2023-01-11 DIAGNOSIS — H02.88B MEIBOMIAN GLAND DYSFUNCTION (MGD), BILATERAL, BOTH UPPER AND LOWER LIDS: ICD-10-CM

## 2023-01-11 DIAGNOSIS — R60.0 EDEMA OF BOTH LEGS: ICD-10-CM

## 2023-01-11 DIAGNOSIS — Z80.0 FAMILY HISTORY OF COLON CANCER: ICD-10-CM

## 2023-01-11 DIAGNOSIS — K21.9 GASTROESOPHAGEAL REFLUX DISEASE WITHOUT ESOPHAGITIS: ICD-10-CM

## 2023-01-11 DIAGNOSIS — F32.A DEPRESSION, UNSPECIFIED DEPRESSION TYPE: ICD-10-CM

## 2023-01-11 DIAGNOSIS — Z79.4 TYPE 2 DIABETES MELLITUS WITH COMPLICATION, WITH LONG-TERM CURRENT USE OF INSULIN (HCC): ICD-10-CM

## 2023-01-11 DIAGNOSIS — I65.21 ATHEROSCLEROSIS OF RIGHT CAROTID ARTERY: ICD-10-CM

## 2023-01-11 DIAGNOSIS — E78.00 PURE HYPERCHOLESTEROLEMIA: ICD-10-CM

## 2023-01-11 DIAGNOSIS — R63.5 WEIGHT GAIN: ICD-10-CM

## 2023-01-11 DIAGNOSIS — K31.9 GASTROPATHY: ICD-10-CM

## 2023-01-11 DIAGNOSIS — G43.909 MIGRAINE WITHOUT STATUS MIGRAINOSUS, NOT INTRACTABLE, UNSPECIFIED MIGRAINE TYPE: ICD-10-CM

## 2023-01-11 DIAGNOSIS — H25.13 AGE-RELATED NUCLEAR CATARACT OF BOTH EYES: ICD-10-CM

## 2023-01-11 DIAGNOSIS — E78.5 DYSLIPIDEMIA: ICD-10-CM

## 2023-01-11 DIAGNOSIS — H02.88A MEIBOMIAN GLAND DYSFUNCTION (MGD), BILATERAL, BOTH UPPER AND LOWER LIDS: ICD-10-CM

## 2023-01-11 DIAGNOSIS — N18.30 CKD STAGE 3 DUE TO TYPE 1 DIABETES MELLITUS (HCC): ICD-10-CM

## 2023-01-11 DIAGNOSIS — D64.9 ANEMIA, UNSPECIFIED TYPE: ICD-10-CM

## 2023-01-11 DIAGNOSIS — R11.0 NAUSEA: ICD-10-CM

## 2023-01-11 DIAGNOSIS — M54.2 NECK PAIN, ACUTE: ICD-10-CM

## 2023-01-11 DIAGNOSIS — I63.9 LEFT-SIDED CEREBROVASCULAR ACCIDENT (CVA) (HCC): ICD-10-CM

## 2023-01-11 DIAGNOSIS — R41.82 ALTERED MENTAL STATUS, UNSPECIFIED ALTERED MENTAL STATUS TYPE: ICD-10-CM

## 2023-01-11 DIAGNOSIS — E11.8 TYPE 2 DIABETES MELLITUS WITH COMPLICATION, WITH LONG-TERM CURRENT USE OF INSULIN (HCC): ICD-10-CM

## 2023-01-11 DIAGNOSIS — E10.22 CKD STAGE 3 DUE TO TYPE 1 DIABETES MELLITUS (HCC): ICD-10-CM

## 2023-01-11 NOTE — TELEPHONE ENCOUNTER
Patient inquiring about referral for Ophthalmology placed 1/5/2023   Patient has appointment scheduled for January 23. Thank you .

## 2023-01-14 DIAGNOSIS — Z79.4 TYPE 2 DIABETES MELLITUS WITH OTHER NEUROLOGIC COMPLICATION, WITH LONG-TERM CURRENT USE OF INSULIN (HCC): ICD-10-CM

## 2023-01-14 DIAGNOSIS — E11.49 TYPE 2 DIABETES MELLITUS WITH OTHER NEUROLOGIC COMPLICATION, WITH LONG-TERM CURRENT USE OF INSULIN (HCC): ICD-10-CM

## 2023-01-14 RX ORDER — ISOPROPYL ALCOHOL 70 ML/100ML
1 SWAB TOPICAL AS DIRECTED
Qty: 100 EACH | Refills: 1 | Status: SHIPPED | OUTPATIENT
Start: 2023-01-14

## 2023-01-16 ENCOUNTER — NURSE TRIAGE (OUTPATIENT)
Dept: INTERNAL MEDICINE CLINIC | Facility: CLINIC | Age: 53
End: 2023-01-16

## 2023-01-16 NOTE — TELEPHONE ENCOUNTER
Patient continues to have headaches, she has been taking sumatriptan but is not helping ,s he also stated she is using zofran for nausea. She would like to know if there is anything else that she can take or if something different may be prescribed. She also continues to have neck pain . Also has a rash on her face, she would like to know if Dr Delio Preciado could refer patient to derm or if she should wait for appointment to discuss. Patient has still not started retaking ozempic, just wanted to inform. Thank you . I have scheduled patient for follow up . Future Appointments   Date Time Provider Joan Monterroso   1/30/2023 11:30 AM Thang Parks MD [address] [place] [address]   2/7/2023  1:00 PM Nai Zee MD [facility] [place]   3/9/2023  1:30 PM [facility]     Please advise. Thank you .

## 2023-01-16 NOTE — TELEPHONE ENCOUNTER
Please Review. Protocol Failed or has no protocol.        Requested Prescriptions   Pending Prescriptions Disp Refills    NOVOLOG FLEXPEN 100 UNIT/ML Subcutaneous Solution Pen-injector [Pharmacy Med Name: Tegan Miu 100 UNIT/ML Solution Pen-injector] 90 mL 0     Sig: INJECT 35 UNITS UNDER THE SKIN THREE TIMES DAILY AT MEALS AS DIRECTED WITH BASE DOSE AND SLIDING SCALE       Diabetes Medication Protocol Failed - 1/16/2023  4:16 PM        Failed - Last A1C < 7.5 and within past 6 months     Lab Results   Component Value Date    A1C 10.1 (A) 12/08/2022             Passed - In person appointment or virtual visit in the past 6 mos or appointment in next 3 mos     Recent Outpatient Visits              1 week ago Essential hypertension    [address], [facility] Linda Nieto MD    Office Visit    1 month ago Type 2 diabetes mellitus with other neurologic complication, with long-term current use of insulin [facility])    [address], 602 [facility], [address] [place] [address] Paul Bence, APRN    Office Visit    1 month ago Nausea    [facility], [address], [place] Linda Nieto MD    Office Visit    1 month ago Type 2 diabetes mellitus with hyperglycemia, with long-term current use of insulin ([facility] 75.)    [address], [address], Carmen Damico MD    [employer] E/M    2 months ago Numbness and tingling in left arm    [address], [address] [facility] Lacho Osei MD    Office Visit          Future Appointments         Provider Department Appt Notes    In 2 weeks Marielle Robledo MD [address], [address], [place] HU[employer] MMAI  PRE OP F/U    In 3 weeks Linda Nieto MD [address], [address], [place]     In 1 month [place], Paul Bence, APRN [facility], [place] [address] [place], [place] 3 mos               Passed - EGFRCR or GFRNAA > 50     GFR Evaluation  EGFRCR: 71 , resulted on 12/9/2022          Passed - GFR in the past 12 months           Recent Outpatient Visits              1 week ago Essential hypertension    [address], [address] [facility] Brant Kauffman MD    Office Visit    1 month ago Type 2 diabetes mellitus with other neurologic complication, with long-term current use of insulin [facility])    [facility], [address] [facility], [address] [place] [address] Martin GINGER Dill    Office Visit    1 month ago Nausea    [facility], [address], [place] Brant Kauffman MD    Office Visit    1 month ago Type 2 diabetes mellitus with hyperglycemia, with long-term current use of insulin ([facility]    [address], [address] [facility] [address], Dillon Hess MD    [employer] E/M    2 months ago Numbness and tingling in left arm    Melisa Harkins MD    Office Visit          Future Appointments         Provider Department Appt Notes    In 2 weeks Navi Soria MD [address], [address], [place] HU[employer]NA MMAI  PRE OP F/U    In 3 weeks Brant Kauffman MD [address], [address], [place]     In 1 month GINGER Sampson [address], [address] [facility] 3 mos

## 2023-01-17 ENCOUNTER — TELEMEDICINE (OUTPATIENT)
Facility: CLINIC | Age: 53
End: 2023-01-17
Payer: MEDICARE

## 2023-01-17 DIAGNOSIS — G43.909 MIGRAINE WITHOUT STATUS MIGRAINOSUS, NOT INTRACTABLE, UNSPECIFIED MIGRAINE TYPE: Primary | ICD-10-CM

## 2023-01-17 DIAGNOSIS — M54.2 NECK PAIN: ICD-10-CM

## 2023-01-17 PROCEDURE — 99214 OFFICE O/P EST MOD 30 MIN: CPT | Performed by: INTERNAL MEDICINE

## 2023-01-17 RX ORDER — RIZATRIPTAN BENZOATE 10 MG/1
10 TABLET ORAL AS NEEDED
Qty: 12 TABLET | Refills: 1 | Status: SHIPPED | OUTPATIENT
Start: 2023-01-17 | End: 2024-01-17

## 2023-01-17 NOTE — PATIENT INSTRUCTIONS
To schedule physical therapy at any of the [facility] facilities, please call [phone].

## 2023-01-19 RX ORDER — INSULIN ASPART 100 [IU]/ML
INJECTION, SOLUTION INTRAVENOUS; SUBCUTANEOUS
Qty: 90 ML | Refills: 0 | Status: SHIPPED | OUTPATIENT
Start: 2023-01-19

## 2023-01-23 ENCOUNTER — TELEPHONE (OUTPATIENT)
Dept: FAMILY MEDICINE CLINIC | Facility: CLINIC | Age: 53
End: 2023-01-23

## 2023-01-23 NOTE — TELEPHONE ENCOUNTER
Verified name and . Patient calling to follow up on referral for Dr. Fish Dodge office- states that office has not received referral.    Upon chart review, referral authorized. Referral faxed to Dr. Fish Dodge office vis [employer].     Dr. Lenny Mcleod office fax #: [phone]

## 2023-01-30 ENCOUNTER — OFFICE VISIT (OUTPATIENT)
Dept: SURGERY | Facility: CLINIC | Age: 53
End: 2023-01-30
Payer: MEDICARE

## 2023-01-30 VITALS
SYSTOLIC BLOOD PRESSURE: 151 MMHG | WEIGHT: 199.38 LBS | DIASTOLIC BLOOD PRESSURE: 72 MMHG | HEIGHT: 62 IN | BODY MASS INDEX: 36.69 KG/M2 | HEART RATE: 73 BPM | OXYGEN SATURATION: 96 %

## 2023-01-30 DIAGNOSIS — E66.9 OBESITY (BMI 30-39.9): ICD-10-CM

## 2023-01-30 DIAGNOSIS — Z79.4 TYPE 2 DIABETES MELLITUS WITH HYPERGLYCEMIA, WITH LONG-TERM CURRENT USE OF INSULIN (HCC): Primary | ICD-10-CM

## 2023-01-30 DIAGNOSIS — E11.65 TYPE 2 DIABETES MELLITUS WITH HYPERGLYCEMIA, WITH LONG-TERM CURRENT USE OF INSULIN (HCC): Primary | ICD-10-CM

## 2023-01-30 DIAGNOSIS — E78.5 DYSLIPIDEMIA: ICD-10-CM

## 2023-01-30 DIAGNOSIS — R63.5 WEIGHT GAIN: ICD-10-CM

## 2023-02-02 ENCOUNTER — OFFICE VISIT (OUTPATIENT)
Dept: OBGYN CLINIC | Facility: CLINIC | Age: 53
End: 2023-02-02

## 2023-02-02 VITALS
SYSTOLIC BLOOD PRESSURE: 163 MMHG | HEART RATE: 75 BPM | WEIGHT: 199 LBS | DIASTOLIC BLOOD PRESSURE: 83 MMHG | BODY MASS INDEX: 36.62 KG/M2 | HEIGHT: 62 IN

## 2023-02-02 DIAGNOSIS — Z12.31 ENCOUNTER FOR SCREENING MAMMOGRAM FOR MALIGNANT NEOPLASM OF BREAST: ICD-10-CM

## 2023-02-02 DIAGNOSIS — R32 URINARY INCONTINENCE, UNSPECIFIED TYPE: ICD-10-CM

## 2023-02-02 DIAGNOSIS — R39.15 URINARY URGENCY: ICD-10-CM

## 2023-02-02 DIAGNOSIS — R10.2 PELVIC PAIN: ICD-10-CM

## 2023-02-02 DIAGNOSIS — Z01.419 WELL WOMAN EXAM WITH ROUTINE GYNECOLOGICAL EXAM: Primary | ICD-10-CM

## 2023-02-02 PROCEDURE — G0101 CA SCREEN;PELVIC/BREAST EXAM: HCPCS | Performed by: NURSE PRACTITIONER

## 2023-02-07 ENCOUNTER — OFFICE VISIT (OUTPATIENT)
Dept: INTERNAL MEDICINE CLINIC | Facility: CLINIC | Age: 53
End: 2023-02-07

## 2023-02-07 VITALS
DIASTOLIC BLOOD PRESSURE: 84 MMHG | HEIGHT: 62 IN | BODY MASS INDEX: 35.7 KG/M2 | HEART RATE: 80 BPM | SYSTOLIC BLOOD PRESSURE: 152 MMHG | WEIGHT: 194 LBS

## 2023-02-07 DIAGNOSIS — Z79.4 TYPE 2 DIABETES MELLITUS WITH OTHER NEUROLOGIC COMPLICATION, WITH LONG-TERM CURRENT USE OF INSULIN (HCC): ICD-10-CM

## 2023-02-07 DIAGNOSIS — I10 ESSENTIAL HYPERTENSION: Primary | ICD-10-CM

## 2023-02-07 DIAGNOSIS — E11.49 TYPE 2 DIABETES MELLITUS WITH OTHER NEUROLOGIC COMPLICATION, WITH LONG-TERM CURRENT USE OF INSULIN (HCC): ICD-10-CM

## 2023-02-07 DIAGNOSIS — M79.646 THUMB PAIN, UNSPECIFIED LATERALITY: ICD-10-CM

## 2023-02-07 PROCEDURE — 99214 OFFICE O/P EST MOD 30 MIN: CPT | Performed by: INTERNAL MEDICINE

## 2023-02-07 RX ORDER — ESCITALOPRAM OXALATE 10 MG/1
10 TABLET ORAL DAILY
Qty: 90 TABLET | Refills: 0 | Status: SHIPPED | OUTPATIENT
Start: 2023-02-07

## 2023-02-09 ENCOUNTER — TELEPHONE (OUTPATIENT)
Dept: ENDOCRINOLOGY | Facility: HOSPITAL | Age: 53
End: 2023-02-09

## 2023-02-10 ENCOUNTER — TELEPHONE (OUTPATIENT)
Dept: ENDOCRINOLOGY | Facility: HOSPITAL | Age: 53
End: 2023-02-10

## 2023-02-10 DIAGNOSIS — E11.49 DIABETIC NEUROPATHY WITH NEUROLOGIC COMPLICATION (HCC): Primary | ICD-10-CM

## 2023-02-10 DIAGNOSIS — Z79.4 ENCOUNTER FOR LONG-TERM (CURRENT) USE OF INSULIN (HCC): ICD-10-CM

## 2023-02-10 DIAGNOSIS — E11.40 DIABETIC NEUROPATHY WITH NEUROLOGIC COMPLICATION (HCC): Primary | ICD-10-CM

## 2023-02-10 NOTE — TELEPHONE ENCOUNTER
Consent Verification   Assessment completed with: Patient   HIPPA verified? Yes     General: Introduction of Diabetes Navigator services was done. Direct contact information was given. Medication Adherence: Navigator review current medications. Patient has been adherent with all prescribed medication. Denies any side effects, the only concern patient has is the injection sites are becoming painful and hard. Navigator offered an appointment with educator to review education on how to inject correctly and nutrient material. Patient agreed,  navigator coordinated appointment for next Thursday. Patient explain transportation has been an issue. States she used to coordinate rides with [employer] provided by insurance. On several occasions the service would either take a longtime to pick  up or wouldn't arrive at all. Navigator explain she will now be the point of contact to schedule rides for follow up appointments. Patient was advised to call with any questions or concerns. Patient verbalized understanding all questions answered.      Plan: Monthly follow up

## 2023-02-14 ENCOUNTER — OFFICE VISIT (OUTPATIENT)
Dept: INTERNAL MEDICINE CLINIC | Facility: CLINIC | Age: 53
End: 2023-02-14

## 2023-02-14 VITALS
BODY MASS INDEX: 36.25 KG/M2 | SYSTOLIC BLOOD PRESSURE: 149 MMHG | HEIGHT: 62 IN | WEIGHT: 197 LBS | HEART RATE: 67 BPM | DIASTOLIC BLOOD PRESSURE: 74 MMHG

## 2023-02-14 DIAGNOSIS — I10 PRIMARY HYPERTENSION: ICD-10-CM

## 2023-02-14 DIAGNOSIS — L02.211 ABDOMINAL WALL ABSCESS: Primary | ICD-10-CM

## 2023-02-14 PROCEDURE — 99214 OFFICE O/P EST MOD 30 MIN: CPT | Performed by: INTERNAL MEDICINE

## 2023-02-14 RX ORDER — SULFAMETHOXAZOLE AND TRIMETHOPRIM 800; 160 MG/1; MG/1
1 TABLET ORAL 2 TIMES DAILY
Qty: 20 TABLET | Refills: 0 | Status: SHIPPED | OUTPATIENT
Start: 2023-02-14 | End: 2023-02-24

## 2023-02-14 RX ORDER — SACCHAROMYCES BOULARDII 250 MG
250 CAPSULE ORAL 2 TIMES DAILY
Qty: 20 CAPSULE | Refills: 0 | Status: SHIPPED | OUTPATIENT
Start: 2023-02-14

## 2023-02-16 ENCOUNTER — HOSPITAL ENCOUNTER (EMERGENCY)
Facility: HOSPITAL | Age: 53
Discharge: HOME | End: 2023-02-16
Attending: EMERGENCY MEDICINE
Payer: MEDICARE

## 2023-02-16 ENCOUNTER — OFFICE VISIT (OUTPATIENT)
Dept: ENDOCRINOLOGY CLINIC | Facility: CLINIC | Age: 53
End: 2023-02-16

## 2023-02-16 ENCOUNTER — APPOINTMENT (OUTPATIENT)
Dept: CT IMAGING | Facility: HOSPITAL | Age: 53
End: 2023-02-16
Attending: EMERGENCY MEDICINE
Payer: MEDICARE

## 2023-02-16 VITALS
BODY MASS INDEX: 36 KG/M2 | SYSTOLIC BLOOD PRESSURE: 130 MMHG | HEART RATE: 82 BPM | WEIGHT: 195 LBS | DIASTOLIC BLOOD PRESSURE: 80 MMHG

## 2023-02-16 VITALS
RESPIRATION RATE: 22 BRPM | HEIGHT: 62 IN | TEMPERATURE: 98 F | WEIGHT: 155 LBS | DIASTOLIC BLOOD PRESSURE: 89 MMHG | BODY MASS INDEX: 28.52 KG/M2 | OXYGEN SATURATION: 96 % | HEART RATE: 88 BPM | SYSTOLIC BLOOD PRESSURE: 163 MMHG

## 2023-02-16 DIAGNOSIS — E11.49 TYPE 2 DIABETES MELLITUS WITH OTHER NEUROLOGIC COMPLICATION, WITH LONG-TERM CURRENT USE OF INSULIN (HCC): Primary | ICD-10-CM

## 2023-02-16 DIAGNOSIS — L03.311 ABDOMINAL WALL CELLULITIS: ICD-10-CM

## 2023-02-16 DIAGNOSIS — L02.211 ABDOMINAL WALL ABSCESS: Primary | ICD-10-CM

## 2023-02-16 DIAGNOSIS — Z79.4 TYPE 2 DIABETES MELLITUS WITH OTHER NEUROLOGIC COMPLICATION, WITH LONG-TERM CURRENT USE OF INSULIN (HCC): Primary | ICD-10-CM

## 2023-02-16 LAB
ANION GAP SERPL CALC-SCNC: 8 MMOL/L (ref 0–18)
BASOPHILS # BLD AUTO: 0.05 X10(3) UL (ref 0–0.2)
BASOPHILS NFR BLD AUTO: 0.4 %
BUN BLD-MCNC: 22 MG/DL (ref 7–18)
BUN/CREAT SERPL: 16.9 (ref 10–20)
CALCIUM BLD-MCNC: 9.9 MG/DL (ref 8.5–10.1)
CARTRIDGE LOT#: ABNORMAL NUMERIC
CHLORIDE SERPL-SCNC: 99 MMOL/L (ref 98–112)
CO2 SERPL-SCNC: 28 MMOL/L (ref 21–32)
CREAT BLD-MCNC: 1.3 MG/DL
DEPRECATED RDW RBC AUTO: 44.3 FL (ref 35.1–46.3)
EOSINOPHIL # BLD AUTO: 0.06 X10(3) UL (ref 0–0.7)
EOSINOPHIL NFR BLD AUTO: 0.5 %
ERYTHROCYTE [DISTWIDTH] IN BLOOD BY AUTOMATED COUNT: 15.7 % (ref 11–15)
GFR SERPLBLD BASED ON 1.73 SQ M-ARVRAT: 49 ML/MIN/1.73M2 (ref 60–?)
GLUCOSE BLD-MCNC: 253 MG/DL (ref 70–99)
GLUCOSE BLOOD: 392
HCT VFR BLD AUTO: 41.6 %
HEMOGLOBIN A1C: 9.6 % (ref 4.3–5.6)
HGB BLD-MCNC: 13.2 G/DL
IMM GRANULOCYTES # BLD AUTO: 0.06 X10(3) UL (ref 0–1)
IMM GRANULOCYTES NFR BLD: 0.5 %
LYMPHOCYTES # BLD AUTO: 2.1 X10(3) UL (ref 1–4)
LYMPHOCYTES NFR BLD AUTO: 16.8 %
MCH RBC QN AUTO: 24.7 PG (ref 26–34)
MCHC RBC AUTO-ENTMCNC: 31.7 G/DL (ref 31–37)
MCV RBC AUTO: 77.8 FL
MONOCYTES # BLD AUTO: 0.86 X10(3) UL (ref 0.1–1)
MONOCYTES NFR BLD AUTO: 6.9 %
NEUTROPHILS # BLD AUTO: 9.34 X10 (3) UL (ref 1.5–7.7)
NEUTROPHILS # BLD AUTO: 9.34 X10(3) UL (ref 1.5–7.7)
NEUTROPHILS NFR BLD AUTO: 74.9 %
OSMOLALITY SERPL CALC.SUM OF ELEC: 292 MOSM/KG (ref 275–295)
PLATELET # BLD AUTO: 290 10(3)UL (ref 150–450)
POTASSIUM SERPL-SCNC: 3.8 MMOL/L (ref 3.5–5.1)
RBC # BLD AUTO: 5.35 X10(6)UL
SODIUM SERPL-SCNC: 135 MMOL/L (ref 136–145)
TEST STRIP LOT #: NORMAL NUMERIC
WBC # BLD AUTO: 12.5 X10(3) UL (ref 4–11)

## 2023-02-16 PROCEDURE — 87077 CULTURE AEROBIC IDENTIFY: CPT | Performed by: EMERGENCY MEDICINE

## 2023-02-16 PROCEDURE — 83036 HEMOGLOBIN GLYCOSYLATED A1C: CPT

## 2023-02-16 PROCEDURE — 87070 CULTURE OTHR SPECIMN AEROBIC: CPT | Performed by: EMERGENCY MEDICINE

## 2023-02-16 PROCEDURE — 87205 SMEAR GRAM STAIN: CPT | Performed by: EMERGENCY MEDICINE

## 2023-02-16 PROCEDURE — 80048 BASIC METABOLIC PNL TOTAL CA: CPT | Performed by: EMERGENCY MEDICINE

## 2023-02-16 PROCEDURE — 82947 ASSAY GLUCOSE BLOOD QUANT: CPT

## 2023-02-16 PROCEDURE — 96365 THER/PROPH/DIAG IV INF INIT: CPT

## 2023-02-16 PROCEDURE — 10060 I&D ABSCESS SIMPLE/SINGLE: CPT

## 2023-02-16 PROCEDURE — 99214 OFFICE O/P EST MOD 30 MIN: CPT

## 2023-02-16 PROCEDURE — 99284 EMERGENCY DEPT VISIT MOD MDM: CPT

## 2023-02-16 PROCEDURE — 96375 TX/PRO/DX INJ NEW DRUG ADDON: CPT

## 2023-02-16 PROCEDURE — 85025 COMPLETE CBC W/AUTO DIFF WBC: CPT | Performed by: EMERGENCY MEDICINE

## 2023-02-16 PROCEDURE — 74177 CT ABD & PELVIS W/CONTRAST: CPT | Performed by: EMERGENCY MEDICINE

## 2023-02-16 RX ORDER — CLINDAMYCIN HYDROCHLORIDE 300 MG/1
300 CAPSULE ORAL 3 TIMES DAILY
Qty: 21 CAPSULE | Refills: 0 | Status: SHIPPED | OUTPATIENT
Start: 2023-02-16 | End: 2023-02-23

## 2023-02-16 RX ORDER — SEMAGLUTIDE 1.34 MG/ML
1 INJECTION, SOLUTION SUBCUTANEOUS WEEKLY
Qty: 1 EACH | Refills: 3 | Status: SHIPPED | OUTPATIENT
Start: 2023-02-16

## 2023-02-16 RX ORDER — DIPHENHYDRAMINE HYDROCHLORIDE 50 MG/ML
25 INJECTION INTRAMUSCULAR; INTRAVENOUS ONCE
Status: COMPLETED | OUTPATIENT
Start: 2023-02-16 | End: 2023-02-16

## 2023-02-16 NOTE — ED QUICK NOTES
Within minutes of benadryl ivp administration, pt states she is feeling a little better. Pt noted to not be scratching body as much s/p rx administration.

## 2023-02-16 NOTE — PATIENT INSTRUCTIONS
Decrease Lantus to 56 units subcutaneous daily     Increase Humalog to 34 units three times daily with meals     Increase Ozempic to 1mg subcutaneous weekly

## 2023-02-19 DIAGNOSIS — I10 ESSENTIAL HYPERTENSION WITH GOAL BLOOD PRESSURE LESS THAN 140/90: ICD-10-CM

## 2023-02-19 DIAGNOSIS — Z98.890 HISTORY OF CRANIOTOMY: ICD-10-CM

## 2023-02-22 ENCOUNTER — PATIENT OUTREACH (OUTPATIENT)
Dept: CASE MANAGEMENT | Age: 53
End: 2023-02-22

## 2023-02-22 RX ORDER — LEVETIRACETAM 500 MG/1
TABLET ORAL
Qty: 180 TABLET | Refills: 1 | Status: SHIPPED | OUTPATIENT
Start: 2023-02-22

## 2023-02-22 RX ORDER — METOPROLOL SUCCINATE 25 MG/1
TABLET, EXTENDED RELEASE ORAL
Qty: 90 TABLET | Refills: 1 | Status: SHIPPED | OUTPATIENT
Start: 2023-02-22

## 2023-02-22 RX ORDER — METOPROLOL SUCCINATE 100 MG/1
TABLET, EXTENDED RELEASE ORAL
Qty: 90 TABLET | Refills: 1 | Status: SHIPPED | OUTPATIENT
Start: 2023-02-22

## 2023-02-22 RX ORDER — AMLODIPINE BESYLATE 5 MG/1
TABLET ORAL
Qty: 180 TABLET | Refills: 1 | Status: SHIPPED | OUTPATIENT
Start: 2023-02-22

## 2023-02-22 RX ORDER — LOSARTAN POTASSIUM 100 MG/1
100 TABLET ORAL
Qty: 90 TABLET | Refills: 1 | Status: SHIPPED | OUTPATIENT
Start: 2023-02-22

## 2023-02-22 NOTE — PROGRESS NOTES
Attempted [facility] monthly outreach,  left message for patient to call back ,can be reached at  [phone]. Will try back at a later time. Medical record reviewed including recent office visits and test results.     Chart review - 3 min  Time with patient - 2 min  Total time - 5 min

## 2023-02-23 ENCOUNTER — TELEPHONE (OUTPATIENT)
Dept: ENDOCRINOLOGY | Facility: HOSPITAL | Age: 53
End: 2023-02-23

## 2023-02-23 NOTE — TELEPHONE ENCOUNTER
Navigator called Julissa Connell to schedule transportation for upcoming appointments.      Appointment: 02/24/2023 Dr. Jose Ma ID #: [id number]  Appointment: 3/1/2023 [address] [place] [address] ID#: [id number]

## 2023-02-24 ENCOUNTER — OFFICE VISIT (OUTPATIENT)
Dept: INTERNAL MEDICINE CLINIC | Facility: CLINIC | Age: 53
End: 2023-02-24

## 2023-02-24 VITALS
HEART RATE: 63 BPM | HEIGHT: 62 IN | SYSTOLIC BLOOD PRESSURE: 137 MMHG | WEIGHT: 199 LBS | DIASTOLIC BLOOD PRESSURE: 73 MMHG | BODY MASS INDEX: 36.62 KG/M2

## 2023-02-24 DIAGNOSIS — I10 ESSENTIAL HYPERTENSION: ICD-10-CM

## 2023-02-24 DIAGNOSIS — L02.211 ABSCESS OF ABDOMINAL WALL: Primary | ICD-10-CM

## 2023-02-24 PROCEDURE — 99214 OFFICE O/P EST MOD 30 MIN: CPT | Performed by: INTERNAL MEDICINE

## 2023-02-24 RX ORDER — SULFAMETHOXAZOLE AND TRIMETHOPRIM 800; 160 MG/1; MG/1
1 TABLET ORAL 2 TIMES DAILY
Qty: 6 TABLET | Refills: 0 | Status: SHIPPED | OUTPATIENT
Start: 2023-02-24 | End: 2023-02-27

## 2023-03-01 ENCOUNTER — HOSPITAL ENCOUNTER (OUTPATIENT)
Dept: ULTRASOUND IMAGING | Facility: HOSPITAL | Age: 53
Discharge: HOME | End: 2023-03-01
Attending: NURSE PRACTITIONER
Payer: MEDICARE

## 2023-03-01 DIAGNOSIS — R10.2 PELVIC PAIN: ICD-10-CM

## 2023-03-01 PROCEDURE — 76830 TRANSVAGINAL US NON-OB: CPT | Performed by: NURSE PRACTITIONER

## 2023-03-01 PROCEDURE — 76856 US EXAM PELVIC COMPLETE: CPT | Performed by: NURSE PRACTITIONER

## 2023-03-13 ENCOUNTER — MED REC SCAN ONLY (OUTPATIENT)
Dept: INTERNAL MEDICINE CLINIC | Facility: CLINIC | Age: 53
End: 2023-03-13

## 2023-03-21 ENCOUNTER — PATIENT OUTREACH (OUTPATIENT)
Dept: CASE MANAGEMENT | Age: 53
End: 2023-03-21

## 2023-03-22 DIAGNOSIS — I10 ESSENTIAL HYPERTENSION WITH GOAL BLOOD PRESSURE LESS THAN 140/90: ICD-10-CM

## 2023-03-23 RX ORDER — HYDROCHLOROTHIAZIDE 25 MG/1
25 TABLET ORAL DAILY
Qty: 90 TABLET | Refills: 3 | Status: SHIPPED | OUTPATIENT
Start: 2023-03-23

## 2023-03-23 NOTE — TELEPHONE ENCOUNTER
Please review refill protocol failed/ no protocol  Requested Prescriptions   Pending Prescriptions Disp Refills    HYDROCHLOROTHIAZIDE 25 MG Oral Tab [Pharmacy Med Name: HYDROCHLOROTHIAZIDE 25MG TABLETS] 90 tablet 0     Sig: TAKE 1 TABLET(25 MG) BY MOUTH DAILY       Hypertensive Medications Protocol Failed - 3/22/2023  1:07 PM        Failed - EGFRCR or GFRNAA > 50     GFR Evaluation  EGFRCR: 49 , resulted on 2/16/2023          Passed - In person appointment in the past 12 or next 3 months     Recent Outpatient Visits              3 weeks ago [facility]cess of abdominal wall    [facility] [address], [place] Jonn Bales MD    Office Visit    1 month ago Type 2 diabetes mellitus with other neurologic complication, with long-term current use of insulin [facility])    [address], 602 [facility], [address] [place] [address], [facility]    Office Visit    1 month ago Abdominal wall abscess    [facility] [address], [place] Jonn Bales MD    Office Visit    1 month ago Essential hypertension    [address], [place] Jonn Bales MD    Office Visit    1 month ago Well woman exam with routine gynecological exam    [address], [address], 2021 [facility], APR    Office Visit          Future Appointments         Provider Department Appt Notes    In 5 days Carie Leahy, APRN [address], [address], [place] urinary incontenience, informed of policy    In 3 weeks Babatunde Esters, RD [address], [address], [address] [facility]    In 1 month Jonn Bales MD [address], [address], [place] follow up  Diabetic Eye Exam    In 1 month Pam Muñoz MD [address], [facility] HUMANA MMAI  PRE OP F/U               Passed - Last BP reading less than 140/90     BP Readings from Last 1 Encounters:  02/24/23 : 137/73              Passed - CMP or BMP in past 6 months     Recent Results (from the past 4392 hour(s))   Basic Metabolic Panel (8)    Collection Time: 02/16/23  2:24 PM   Result Value Ref Range    Glucose 253 (H) 70 - 99 mg/dL    Sodium 135 (L) 136 - 145 mmol/L    Potassium 3.8 3.5 - 5.1 mmol/L    Chloride 99 98 - 112 mmol/L    CO2 28.0 21.0 - 32.0 mmol/L    Anion Gap 8 0 - 18 mmol/L    BUN 22 (H) 7 - 18 mg/dL    Creatinine 1.30 (H) 0.55 - 1.02 mg/dL    BUN/CREA Ratio 16.9 10.0 - 20.0    Calcium, Total 9.9 8.5 - 10.1 mg/dL    Calculated Osmolality 292 275 - 295 mOsm/kg    eGFR-Cr 49 (L) >=60 mL/min/1.73m2     *Note: Due to a large number of results and/or encounters for the requested time period, some results have not been displayed. A complete set of results can be found in Results Review.                Passed - In person appointment or virtual visit in the past 6 months     Recent Outpatient Visits              3 weeks ago Abscess of abdominal wall    [facility], 12 [facility], [place] Tereasa Crochet, MD    Office Visit    1 month ago Type 2 diabetes mellitus with other neurologic complication, with long-term current use of insulin [facility])    [facility], [address] [facility] [place], [address] [place] [address], [facility], APRN    Office Visit    1 month ago Abdominal wall abscess    [address], [facility], [place] Tereasa Crochet, MD    Office Visit    1 month ago Essential hypertension    [address], [facility] [place] Tereasa Crochet, MD    Office Visit    1 month ago Well woman exam with routine gynecological exam    [address], [address] [facility],[address], [address] [facility], GINGER Matos    Office Visit          Future Appointments         Provider Department Appt Notes    In 5 days GINGER Trevizo [address], 59 [facility] urinary incontenience, informed of policy In 3 weeks Suzanne [place] RD [facility], [address] [facility] [address], José Rios RD [facility]    In 1 month Campbell Lomeli MD [address], [address], [place] follow up  Diabetic Eye Exam    In 1 month Mundo Cronin MD [address], [address] [facility] [address], [place] HUMANA MMAI  PRE OP F/U

## 2023-03-24 ENCOUNTER — TELEPHONE (OUTPATIENT)
Dept: ENDOCRINOLOGY | Facility: HOSPITAL | Age: 53
End: 2023-03-24

## 2023-03-24 NOTE — TELEPHONE ENCOUNTER
Navigator called Jordyn'elvi Connell to scheduled transportation for upcoming appt.    Appointment: Marion PAYAN 03/28/2023 @ 2:15 pm.  Anish Gaines #: [id number]

## 2023-03-28 ENCOUNTER — OFFICE VISIT (OUTPATIENT)
Dept: SURGERY | Facility: CLINIC | Age: 53
End: 2023-03-28

## 2023-03-28 VITALS
WEIGHT: 199 LBS | HEART RATE: 71 BPM | SYSTOLIC BLOOD PRESSURE: 174 MMHG | BODY MASS INDEX: 36.62 KG/M2 | DIASTOLIC BLOOD PRESSURE: 94 MMHG | HEIGHT: 62 IN

## 2023-03-28 DIAGNOSIS — N39.3 STRESS INCONTINENCE: Primary | ICD-10-CM

## 2023-03-28 DIAGNOSIS — N32.81 OAB (OVERACTIVE BLADDER): ICD-10-CM

## 2023-03-28 PROCEDURE — 99204 OFFICE O/P NEW MOD 45 MIN: CPT | Performed by: NURSE PRACTITIONER

## 2023-03-28 RX ORDER — DEXAMETHASONE 0.5 MG/5ML
ELIXIR ORAL
COMMUNITY
Start: 2023-02-13

## 2023-04-01 NOTE — TELEPHONE ENCOUNTER
LOV: 2/16/2023  RTC: , no upcoming  Last refill: 1/9/2023    TOBIN Anderson review and sign pended prescription if agreeable.

## 2023-04-03 RX ORDER — ERGOCALCIFEROL 1.25 MG/1
CAPSULE ORAL
Qty: 12 CAPSULE | Refills: 0 | Status: SHIPPED | OUTPATIENT
Start: 2023-04-03

## 2023-04-17 ENCOUNTER — TELEPHONE (OUTPATIENT)
Dept: SURGERY | Facility: CLINIC | Age: 53
End: 2023-04-17

## 2023-04-17 NOTE — TELEPHONE ENCOUNTER
Called patient and left a message for her regarding her insurance and surgeon's not being contracted with it for Bariatric surgery. Advised patient that her Dietitian appointment on Tuesday 4/18/23 would be updated to Non-Surgical appointment. Encouraged patient to call with any questions she may have.

## 2023-04-20 ENCOUNTER — TELEPHONE (OUTPATIENT)
Dept: SURGERY | Facility: CLINIC | Age: 53
End: 2023-04-20

## 2023-04-20 DIAGNOSIS — Z79.4 TYPE 2 DIABETES MELLITUS WITH OTHER NEUROLOGIC COMPLICATION, WITH LONG-TERM CURRENT USE OF INSULIN (HCC): ICD-10-CM

## 2023-04-20 DIAGNOSIS — E11.49 TYPE 2 DIABETES MELLITUS WITH OTHER NEUROLOGIC COMPLICATION, WITH LONG-TERM CURRENT USE OF INSULIN (HCC): ICD-10-CM

## 2023-04-20 RX ORDER — ISOPROPYL ALCOHOL 70 ML/100ML
1 SWAB TOPICAL AS DIRECTED
Qty: 100 EACH | Refills: 1 | Status: SHIPPED | OUTPATIENT
Start: 2023-04-20

## 2023-04-21 ENCOUNTER — PATIENT OUTREACH (OUTPATIENT)
Dept: CASE MANAGEMENT | Age: 53
End: 2023-04-21

## 2023-04-21 DIAGNOSIS — I10 ESSENTIAL HYPERTENSION: ICD-10-CM

## 2023-04-21 DIAGNOSIS — H25.13 AGE-RELATED NUCLEAR CATARACT OF BOTH EYES: ICD-10-CM

## 2023-04-21 DIAGNOSIS — Z79.4 TYPE 2 DIABETES MELLITUS WITH HYPERGLYCEMIA, WITH LONG-TERM CURRENT USE OF INSULIN (HCC): ICD-10-CM

## 2023-04-21 DIAGNOSIS — E11.65 TYPE 2 DIABETES MELLITUS WITH HYPERGLYCEMIA, WITH LONG-TERM CURRENT USE OF INSULIN (HCC): ICD-10-CM

## 2023-04-21 DIAGNOSIS — J30.9 ALLERGIC RHINITIS, UNSPECIFIED SEASONALITY, UNSPECIFIED TRIGGER: ICD-10-CM

## 2023-04-21 DIAGNOSIS — L02.211 ABDOMINAL WALL ABSCESS: ICD-10-CM

## 2023-04-21 DIAGNOSIS — F32.A DEPRESSION, UNSPECIFIED DEPRESSION TYPE: ICD-10-CM

## 2023-04-21 DIAGNOSIS — N93.9 ABNORMAL UTERINE BLEEDING (AUB): ICD-10-CM

## 2023-04-21 DIAGNOSIS — K31.9 GASTROPATHY: ICD-10-CM

## 2023-04-21 DIAGNOSIS — L02.211 ABSCESS OF ABDOMINAL WALL: ICD-10-CM

## 2023-04-21 DIAGNOSIS — D72.829 LEUKOCYTOSIS, UNSPECIFIED TYPE: ICD-10-CM

## 2023-04-21 DIAGNOSIS — Z91.89 NEED FOR DENTAL CARE: ICD-10-CM

## 2023-04-21 DIAGNOSIS — D64.9 ANEMIA, UNSPECIFIED TYPE: ICD-10-CM

## 2023-04-21 DIAGNOSIS — I65.21 ATHEROSCLEROSIS OF RIGHT CAROTID ARTERY: ICD-10-CM

## 2023-04-21 DIAGNOSIS — R60.0 EDEMA OF BOTH LEGS: ICD-10-CM

## 2023-04-21 DIAGNOSIS — N18.30 CKD STAGE 3 DUE TO TYPE 1 DIABETES MELLITUS (HCC): ICD-10-CM

## 2023-04-21 DIAGNOSIS — R11.0 NAUSEA: ICD-10-CM

## 2023-04-21 DIAGNOSIS — E10.22 CKD STAGE 3 DUE TO TYPE 1 DIABETES MELLITUS (HCC): ICD-10-CM

## 2023-04-21 DIAGNOSIS — G43.909 MIGRAINE WITHOUT STATUS MIGRAINOSUS, NOT INTRACTABLE, UNSPECIFIED MIGRAINE TYPE: ICD-10-CM

## 2023-04-21 DIAGNOSIS — I63.9 LEFT-SIDED CEREBROVASCULAR ACCIDENT (CVA) (HCC): ICD-10-CM

## 2023-04-21 DIAGNOSIS — E11.49 TYPE 2 DIABETES MELLITUS WITH OTHER NEUROLOGIC COMPLICATION, WITH LONG-TERM CURRENT USE OF INSULIN (HCC): ICD-10-CM

## 2023-04-21 DIAGNOSIS — K21.9 GASTROESOPHAGEAL REFLUX DISEASE WITHOUT ESOPHAGITIS: ICD-10-CM

## 2023-04-21 DIAGNOSIS — E78.00 PURE HYPERCHOLESTEROLEMIA: ICD-10-CM

## 2023-04-21 DIAGNOSIS — E66.9 OBESITY (BMI 30-39.9): ICD-10-CM

## 2023-04-21 DIAGNOSIS — Z80.0 FAMILY HISTORY OF COLON CANCER: ICD-10-CM

## 2023-04-21 DIAGNOSIS — Z98.890 HISTORY OF CRANIOTOMY: ICD-10-CM

## 2023-04-21 DIAGNOSIS — Z79.4 TYPE 2 DIABETES MELLITUS WITH OTHER NEUROLOGIC COMPLICATION, WITH LONG-TERM CURRENT USE OF INSULIN (HCC): ICD-10-CM

## 2023-04-21 DIAGNOSIS — E78.5 DYSLIPIDEMIA: ICD-10-CM

## 2023-04-24 NOTE — PROGRESS NOTES
Call back to patient to reschedule PCP appointment as they were not able to arrange her transportation . Time spent : 5 minutes. Total monthly time - 93 minutes.

## 2023-04-26 ENCOUNTER — TELEPHONE (OUTPATIENT)
Dept: ENDOCRINOLOGY | Facility: HOSPITAL | Age: 53
End: 2023-04-26

## 2023-04-26 DIAGNOSIS — Z01.818 PREOPERATIVE EXAMINATION: ICD-10-CM

## 2023-04-26 RX ORDER — PEN NEEDLE, DIABETIC 32GX 5/32"
NEEDLE, DISPOSABLE MISCELLANEOUS
Qty: 200 EACH | Refills: 1 | Status: SHIPPED | OUTPATIENT
Start: 2023-04-26

## 2023-04-26 NOTE — TELEPHONE ENCOUNTER
Consent Verification   Assessment completed with: Patient   HIPPA verified? Yes     Condition Update: Navigator called patient to confirmed transportation for upcoming appointments. Patient verbalized understanding. Appointment: 4/27/23 @ 2:15 Dr. Ernesto Jurado  Confirmation #: [id number]    Appointment: 5/03/23 @ 1:45 Dr. Philomena Piedra  Confirmation #: [id number]       Medication Adherence: Navigator reviewed current medications, per patient continues to be adherent with taking all prescribed medications. Patient states she has not received a refill on the pen needles for her insulin. Message will be sent to Shaka Blanco. Navigator encouraged patient to call with any questions or concerns. Patient verbalized understanding all questions answered.     Plan: Monthly follow up

## 2023-04-27 ENCOUNTER — OFFICE VISIT (OUTPATIENT)
Dept: SURGERY | Facility: CLINIC | Age: 53
End: 2023-04-27
Payer: MEDICARE

## 2023-04-27 VITALS
BODY MASS INDEX: 35.7 KG/M2 | OXYGEN SATURATION: 96 % | WEIGHT: 194 LBS | SYSTOLIC BLOOD PRESSURE: 138 MMHG | HEIGHT: 62 IN | DIASTOLIC BLOOD PRESSURE: 90 MMHG | HEART RATE: 85 BPM

## 2023-04-27 DIAGNOSIS — Z79.4 TYPE 2 DIABETES MELLITUS WITH HYPERGLYCEMIA, WITH LONG-TERM CURRENT USE OF INSULIN (HCC): Primary | ICD-10-CM

## 2023-04-27 DIAGNOSIS — E11.65 TYPE 2 DIABETES MELLITUS WITH HYPERGLYCEMIA, WITH LONG-TERM CURRENT USE OF INSULIN (HCC): Primary | ICD-10-CM

## 2023-04-27 DIAGNOSIS — E66.9 OBESITY (BMI 30-39.9): ICD-10-CM

## 2023-04-27 DIAGNOSIS — E78.5 DYSLIPIDEMIA: ICD-10-CM

## 2023-04-27 PROBLEM — E66.01 MORBID (SEVERE) OBESITY DUE TO EXCESS CALORIES (HCC): Status: ACTIVE | Noted: 2023-04-27

## 2023-04-27 PROCEDURE — 99214 OFFICE O/P EST MOD 30 MIN: CPT | Performed by: INTERNAL MEDICINE

## 2023-05-02 ENCOUNTER — TELEPHONE (OUTPATIENT)
Dept: ENDOCRINOLOGY | Facility: HOSPITAL | Age: 53
End: 2023-05-02

## 2023-05-02 NOTE — TELEPHONE ENCOUNTER
Navigator called [facility] to set up a last minute transportation for ophthalmology appointment with Dr. Floresita Clements at 5:00pm.   [facility] [phone]  Reservation #: [id number]    Navigator called patient to informed her transportation was successfully scheduled. Patient was very thankful as this was a very important appointment. Navigator encourage patient to call with any questions or concerns. Patient verbalized understanding.

## 2023-05-03 ENCOUNTER — OFFICE VISIT (OUTPATIENT)
Dept: INTERNAL MEDICINE CLINIC | Facility: CLINIC | Age: 53
End: 2023-05-03

## 2023-05-03 DIAGNOSIS — R06.81 APNEA: ICD-10-CM

## 2023-05-03 DIAGNOSIS — K31.9 GASTROPATHY: ICD-10-CM

## 2023-05-03 DIAGNOSIS — E11.49 TYPE 2 DIABETES MELLITUS WITH OTHER NEUROLOGIC COMPLICATION, WITH LONG-TERM CURRENT USE OF INSULIN (HCC): ICD-10-CM

## 2023-05-03 DIAGNOSIS — H25.13 AGE-RELATED NUCLEAR CATARACT OF BOTH EYES: ICD-10-CM

## 2023-05-03 DIAGNOSIS — Z79.4 TYPE 2 DIABETES MELLITUS WITH OTHER NEUROLOGIC COMPLICATION, WITH LONG-TERM CURRENT USE OF INSULIN (HCC): ICD-10-CM

## 2023-05-03 DIAGNOSIS — I65.21 ATHEROSCLEROSIS OF RIGHT CAROTID ARTERY: ICD-10-CM

## 2023-05-03 DIAGNOSIS — I63.9 LEFT-SIDED CEREBROVASCULAR ACCIDENT (CVA) (HCC): ICD-10-CM

## 2023-05-03 DIAGNOSIS — E78.5 DYSLIPIDEMIA: ICD-10-CM

## 2023-05-03 DIAGNOSIS — K21.9 GASTROESOPHAGEAL REFLUX DISEASE WITHOUT ESOPHAGITIS: ICD-10-CM

## 2023-05-03 DIAGNOSIS — I10 ESSENTIAL HYPERTENSION WITH GOAL BLOOD PRESSURE LESS THAN 130/80: Primary | ICD-10-CM

## 2023-05-03 DIAGNOSIS — F32.A DEPRESSION, UNSPECIFIED DEPRESSION TYPE: ICD-10-CM

## 2023-05-03 DIAGNOSIS — E55.9 VITAMIN D DEFICIENCY: ICD-10-CM

## 2023-05-03 DIAGNOSIS — J30.9 ALLERGIC RHINITIS, UNSPECIFIED SEASONALITY, UNSPECIFIED TRIGGER: ICD-10-CM

## 2023-05-03 DIAGNOSIS — Z98.890 HISTORY OF CRANIOTOMY: ICD-10-CM

## 2023-05-03 DIAGNOSIS — Z98.890 S/P CAROTID ENDARTERECTOMY: ICD-10-CM

## 2023-05-03 DIAGNOSIS — D50.9 IRON DEFICIENCY ANEMIA, UNSPECIFIED IRON DEFICIENCY ANEMIA TYPE: ICD-10-CM

## 2023-05-03 PROCEDURE — 99213 OFFICE O/P EST LOW 20 MIN: CPT | Performed by: INTERNAL MEDICINE

## 2023-05-03 PROCEDURE — G0439 PPPS, SUBSEQ VISIT: HCPCS | Performed by: INTERNAL MEDICINE

## 2023-05-05 RX ORDER — ATORVASTATIN CALCIUM 80 MG/1
TABLET, FILM COATED ORAL
Qty: 90 TABLET | Refills: 0 | Status: SHIPPED | OUTPATIENT
Start: 2023-05-05

## 2023-05-10 RX ORDER — ESCITALOPRAM OXALATE 10 MG/1
10 TABLET ORAL DAILY
Qty: 90 TABLET | Refills: 3 | Status: SHIPPED | OUTPATIENT
Start: 2023-05-10

## 2023-05-10 NOTE — TELEPHONE ENCOUNTER
Refill passed per [place] [employer], [facility] protocol.      Requested Prescriptions   Pending Prescriptions Disp Refills    ESCITALOPRAM 10 MG Oral Tab [Pharmacy Med Name: ESCITALOPRAM 10MG TABLETS] 90 tablet 0     Sig: TAKE 1 TABLET(10 MG) BY MOUTH DAILY       Psychiatric Non-Scheduled (Anti-Anxiety) Passed - 5/9/2023  1:22 PM        Passed - In person appointment or virtual visit in the past 6 mos or appointment in next 3 mos     Recent Outpatient Visits              1 week ago Essential hypertension with goal blood pressure less than 130/80    [facility], [address], [place] Mira Rangel MD    Office Visit    1 week ago Type 2 diabetes mellitus with hyperglycemia, with long-term current use of insulin ([facility]    [address], [address] [facility],3Rd Floor, Merna Barrera MD    Office Visit    1 month ago Stress incontinence    [facility], [address] [facility],3[address], [employer], [address], APRN    Office Visit    2 months ago Abscess of abdominal wall    [facility], [address] [place], [place] [address]juan m Rangel MD    Office Visit    2 months ago Type 2 diabetes mellitus with other neurologic complication, with long-term current use of insulin [facility])    [address], 602 [facility], [facility], APRN    Office Visit          Future Appointments         Provider Department Appt Notes    In 1 week Mira Rangel MD [address], [address], [place] 2 week f/u    In 1 week Vero Mclean MD [address], [address], [place] [phone], [address], Ne F/U  [address]    In 1 month Vero Mclean MD [address], [address], [place] [address]    In 2 months Festus Lombardi [address] in [place]    In 2 months Festus Lombardi & [address] in [place]    In 2 months Festus Lombardi & [address] in [place]    In 2 months Audery Mode, Fest[address] Polk [address] in [place]    In 2 months Audery Mode, [address] in [place]    In 3 months Audery Mode, [address] in [place]    In 3 months Audery Mode, [address] in [place]    In 3 months Audery Mode, [address] in [place]    In 3 months Audery Mode, [address] in [place]    In 3 months DO Fermin Crook, [address], [address]  by Dr. Sandra Hsu Surgery evaluation    In 4 months Audery Mode, [address] in [place]    In 4 months Audery Mode, [address] in [address] [place] [address] Visits              1 week ago Essential hypertension with goal blood pressure less than 130/80    [facility], [address], [place] Amanda Marshall MD    Office Visit    1 week ago Type 2 diabetes mellitus with hyperglycemia, with long-term current use of insulin [facility])    Fermin Taylor, [address] [facility] [address], Ailyn Vasquez MD    Office Visit    1 month ago Stress incontinence    Fermin Taylor, [address] [facility] [address], [facility], Bethany Jose R, APRN    Office Visit    2 months ago Abscess of abdominal wall    [facility], [place] Amanda Marshall MD    Office Visit    2 months ago Type 2 diabetes mellitus with other neurologic complication, with long-term current use of insulin [facility])    [facility], [address] [facility], APR    Office Visit             Future Appointments         Provider Department Appt Notes    In 1 week MD Fermin Bello, [address], [place] 2 week f/u    In 1 week MD Fermin Mcdaniels, [address], [place] [phone], PRE OP F/U  [facility]    In 1 month Thang Parks MD [address], [address], [address] [place] [address] 82    In 2 months Quintellcindy Smith, PT [employer] in [place]    In 2 months Festus Santana [address] in [place]    In 2 months Festus Santana [address] in [place]    In 2 months Fest[address] Santana [address] in [place]    In 2 months Festus Santana [address] in [place]    In 3 months Festus Santana [address] in [place]    In 3 months Fest[address] Santana [address] in [place]    In 3 months Festus Santana [address] in [place]    In 3 months Festus Santana [address] in [place]    In 3 months Mary Henderson DO [address], [address], [place] Rfd by Dr. Amina Lopez Surgery evaluation    In 4 months Fest[address] Sant[address] in [place]    In 4 months Fest[address] Santana [address] in [place]

## 2023-05-11 VITALS
WEIGHT: 194 LBS | BODY MASS INDEX: 35.7 KG/M2 | HEIGHT: 62 IN | HEART RATE: 83 BPM | DIASTOLIC BLOOD PRESSURE: 80 MMHG | SYSTOLIC BLOOD PRESSURE: 164 MMHG

## 2023-05-13 NOTE — TELEPHONE ENCOUNTER
LOV: 2/16/2023  RTC: , no upcoming/MyChart message sent  Last refill: 2/16/2023    Dr. Frederic Wahl review and sign pended prescription if agreeable.

## 2023-05-15 ENCOUNTER — TELEPHONE (OUTPATIENT)
Dept: ENDOCRINOLOGY | Facility: HOSPITAL | Age: 53
End: 2023-05-15

## 2023-05-15 NOTE — TELEPHONE ENCOUNTER
Navigator called [facility] to set up transportation for upcoming appointment with PCP.      Reservation #: [id number]  Dr. Jayy Cook   5/17/2023 @ 10:40 am

## 2023-05-18 ENCOUNTER — PATIENT OUTREACH (OUTPATIENT)
Dept: CASE MANAGEMENT | Age: 53
End: 2023-05-18

## 2023-05-18 NOTE — PROGRESS NOTES
Attempted [facility] monthly outreach,  left message for patient to call back ,can be reached at  [phone]. Will try back at a later time. Medical record reviewed including recent office visits and test results.     Chart review - 3 min  Time with patient - 2 min  Total time - 5 min

## 2023-05-19 ENCOUNTER — TELEPHONE (OUTPATIENT)
Dept: ENDOCRINOLOGY | Facility: HOSPITAL | Age: 53
End: 2023-05-19

## 2023-05-19 NOTE — TELEPHONE ENCOUNTER
Navigator called [facility] to set up transportation for upcoming appointments.   [facility]: [phone]  5/22/23  Dr. Merly Keen @ 2:20 pm  Reservation #: [id number]    5/23/23  Dr. Irene Henson @ 12:45 pm   Reservation #: [id number]

## 2023-05-22 ENCOUNTER — OFFICE VISIT (OUTPATIENT)
Dept: INTERNAL MEDICINE CLINIC | Facility: CLINIC | Age: 53
End: 2023-05-22

## 2023-05-22 VITALS
HEIGHT: 62 IN | BODY MASS INDEX: 36.62 KG/M2 | SYSTOLIC BLOOD PRESSURE: 159 MMHG | DIASTOLIC BLOOD PRESSURE: 83 MMHG | HEART RATE: 69 BPM | WEIGHT: 199 LBS

## 2023-05-22 DIAGNOSIS — K43.9 HERNIA OF ABDOMINAL WALL: ICD-10-CM

## 2023-05-22 DIAGNOSIS — I10 ESSENTIAL HYPERTENSION: Primary | ICD-10-CM

## 2023-05-22 PROCEDURE — 99214 OFFICE O/P EST MOD 30 MIN: CPT | Performed by: INTERNAL MEDICINE

## 2023-05-22 RX ORDER — HYDRALAZINE HYDROCHLORIDE 25 MG/1
25 TABLET, FILM COATED ORAL 2 TIMES DAILY
Qty: 60 TABLET | Refills: 0 | Status: SHIPPED | OUTPATIENT
Start: 2023-05-22

## 2023-05-22 NOTE — TELEPHONE ENCOUNTER
Navigator called [facility] to schedule transportation for today's appointment.    5/22/23  Dr. Conrado MohsLoria Ivans location   [address] [place] [address] Rd #:[id number]

## 2023-05-23 ENCOUNTER — TELEPHONE (OUTPATIENT)
Dept: ENDOCRINOLOGY | Facility: HOSPITAL | Age: 53
End: 2023-05-23

## 2023-05-23 ENCOUNTER — VIRTUAL PHONE E/M (OUTPATIENT)
Dept: SURGERY | Facility: CLINIC | Age: 53
End: 2023-05-23
Payer: MEDICARE

## 2023-05-23 ENCOUNTER — TELEPHONE (OUTPATIENT)
Dept: PHYSICAL THERAPY | Facility: HOSPITAL | Age: 53
End: 2023-05-23

## 2023-05-23 VITALS — HEIGHT: 62 IN | WEIGHT: 199 LBS | BODY MASS INDEX: 36.62 KG/M2

## 2023-05-23 DIAGNOSIS — Z79.4 TYPE 2 DIABETES MELLITUS WITH HYPERGLYCEMIA, WITH LONG-TERM CURRENT USE OF INSULIN (HCC): Primary | ICD-10-CM

## 2023-05-23 DIAGNOSIS — E66.9 OBESITY (BMI 30-39.9): ICD-10-CM

## 2023-05-23 DIAGNOSIS — E11.65 TYPE 2 DIABETES MELLITUS WITH HYPERGLYCEMIA, WITH LONG-TERM CURRENT USE OF INSULIN (HCC): Primary | ICD-10-CM

## 2023-05-23 DIAGNOSIS — E78.5 DYSLIPIDEMIA: ICD-10-CM

## 2023-05-23 PROBLEM — K43.9 HERNIA OF ABDOMINAL WALL: Status: ACTIVE | Noted: 2023-02-14

## 2023-05-23 PROCEDURE — 99214 OFFICE O/P EST MOD 30 MIN: CPT | Performed by: INTERNAL MEDICINE

## 2023-05-23 NOTE — TELEPHONE ENCOUNTER
Navigator called [facility] to schedule transportation for upcoming appointment.    Last minute add on.    5/24/2023   Dr. Fuentes Hidden 10:30am   [address] [facility],2Nd Floor Bariatric Surgery   [address] [address] #: [id number]

## 2023-05-24 ENCOUNTER — CLINICAL DOCUMENTATION (OUTPATIENT)
Dept: BARIATRICS/WEIGHT MGMT | Age: 53
End: 2023-05-24

## 2023-05-24 ENCOUNTER — APPOINTMENT (OUTPATIENT)
Dept: CT IMAGING | Facility: HOSPITAL | Age: 53
End: 2023-05-24
Attending: EMERGENCY MEDICINE
Payer: MEDICARE

## 2023-05-24 ENCOUNTER — HOSPITAL ENCOUNTER (EMERGENCY)
Facility: HOSPITAL | Age: 53
Discharge: HOME | End: 2023-05-24
Attending: EMERGENCY MEDICINE
Payer: MEDICARE

## 2023-05-24 ENCOUNTER — OFFICE VISIT (OUTPATIENT)
Dept: BARIATRICS/WEIGHT MGMT | Age: 53
End: 2023-05-24

## 2023-05-24 VITALS
SYSTOLIC BLOOD PRESSURE: 124 MMHG | TEMPERATURE: 97 F | BODY MASS INDEX: 36.62 KG/M2 | WEIGHT: 199 LBS | HEART RATE: 78 BPM | DIASTOLIC BLOOD PRESSURE: 55 MMHG | OXYGEN SATURATION: 95 % | RESPIRATION RATE: 15 BRPM | HEIGHT: 62 IN

## 2023-05-24 VITALS
BODY MASS INDEX: 36.44 KG/M2 | DIASTOLIC BLOOD PRESSURE: 96 MMHG | HEIGHT: 62 IN | OXYGEN SATURATION: 95 % | HEART RATE: 66 BPM | SYSTOLIC BLOOD PRESSURE: 156 MMHG | WEIGHT: 198 LBS

## 2023-05-24 DIAGNOSIS — Z79.4 TYPE 2 DIABETES MELLITUS WITH DIABETIC POLYNEUROPATHY, WITH LONG-TERM CURRENT USE OF INSULIN (CMD): ICD-10-CM

## 2023-05-24 DIAGNOSIS — D50.9 IRON DEFICIENCY ANEMIA, UNSPECIFIED IRON DEFICIENCY ANEMIA TYPE: ICD-10-CM

## 2023-05-24 DIAGNOSIS — E78.00 PURE HYPERCHOLESTEROLEMIA: Primary | ICD-10-CM

## 2023-05-24 DIAGNOSIS — E66.01 MORBID (SEVERE) OBESITY DUE TO EXCESS CALORIES (CMD): ICD-10-CM

## 2023-05-24 DIAGNOSIS — D72.828 OTHER ELEVATED WHITE BLOOD CELL (WBC) COUNT: ICD-10-CM

## 2023-05-24 DIAGNOSIS — R56.9 SEIZURES (CMD): ICD-10-CM

## 2023-05-24 DIAGNOSIS — M54.2 CERVICALGIA: ICD-10-CM

## 2023-05-24 DIAGNOSIS — R10.9 ABDOMINAL PAIN, ACUTE: Primary | ICD-10-CM

## 2023-05-24 DIAGNOSIS — R51.9 ACUTE NONINTRACTABLE HEADACHE, UNSPECIFIED HEADACHE TYPE: ICD-10-CM

## 2023-05-24 DIAGNOSIS — R60.0 EDEMA OF BOTH LEGS: ICD-10-CM

## 2023-05-24 DIAGNOSIS — N18.4 STAGE 4 CHRONIC KIDNEY DISEASE (CMD): ICD-10-CM

## 2023-05-24 DIAGNOSIS — E11.42 TYPE 2 DIABETES MELLITUS WITH DIABETIC POLYNEUROPATHY, WITH LONG-TERM CURRENT USE OF INSULIN (CMD): ICD-10-CM

## 2023-05-24 DIAGNOSIS — F34.1 PERSISTENT DEPRESSIVE DISORDER: ICD-10-CM

## 2023-05-24 DIAGNOSIS — G45.1 CAROTID ARTERY SYNDROME HEMISPHERIC: ICD-10-CM

## 2023-05-24 DIAGNOSIS — I10 PRIMARY HYPERTENSION: ICD-10-CM

## 2023-05-24 DIAGNOSIS — R06.81 APNEA: ICD-10-CM

## 2023-05-24 PROBLEM — F32.A DEPRESSION: Status: ACTIVE | Noted: 2017-08-01

## 2023-05-24 PROBLEM — E10.22 CKD STAGE 3 DUE TO TYPE 1 DIABETES MELLITUS (CMD): Status: ACTIVE | Noted: 2019-12-13

## 2023-05-24 PROBLEM — E78.5 DYSLIPIDEMIA: Status: ACTIVE | Noted: 2017-09-07

## 2023-05-24 PROBLEM — D64.9 ANEMIA: Status: ACTIVE | Noted: 2018-04-10

## 2023-05-24 PROBLEM — N18.30 CKD STAGE 3 DUE TO TYPE 1 DIABETES MELLITUS (CMD): Status: ACTIVE | Noted: 2019-12-13

## 2023-05-24 PROBLEM — E78.5 HYPERLIPIDEMIA: Status: ACTIVE | Noted: 2017-09-07

## 2023-05-24 PROBLEM — Z86.73 HISTORY OF CVA (CEREBROVASCULAR ACCIDENT): Status: ACTIVE | Noted: 2022-02-03

## 2023-05-24 PROBLEM — N18.9 CKD (CHRONIC KIDNEY DISEASE): Status: ACTIVE | Noted: 2023-05-24

## 2023-05-24 LAB
ALBUMIN SERPL-MCNC: 4 G/DL (ref 3.4–5)
ALBUMIN/GLOB SERPL: 0.8 {RATIO} (ref 1–2)
ALP LIVER SERPL-CCNC: 126 U/L
ALT SERPL-CCNC: 35 U/L
ANION GAP SERPL CALC-SCNC: 5 MMOL/L (ref 0–18)
AST SERPL-CCNC: 23 U/L (ref 15–37)
BASOPHILS # BLD AUTO: 0.06 X10(3) UL (ref 0–0.2)
BASOPHILS NFR BLD AUTO: 0.6 %
BILIRUB SERPL-MCNC: 0.3 MG/DL (ref 0.1–2)
BUN BLD-MCNC: 8 MG/DL (ref 7–18)
BUN/CREAT SERPL: 10 (ref 10–20)
CALCIUM BLD-MCNC: 9.7 MG/DL (ref 8.5–10.1)
CHLORIDE SERPL-SCNC: 103 MMOL/L (ref 98–112)
CO2 SERPL-SCNC: 30 MMOL/L (ref 21–32)
CREAT BLD-MCNC: 0.8 MG/DL
DEPRECATED RDW RBC AUTO: 39.8 FL (ref 35.1–46.3)
EOSINOPHIL # BLD AUTO: 0.2 X10(3) UL (ref 0–0.7)
EOSINOPHIL NFR BLD AUTO: 2 %
ERYTHROCYTE [DISTWIDTH] IN BLOOD BY AUTOMATED COUNT: 13.9 % (ref 11–15)
GFR SERPLBLD BASED ON 1.73 SQ M-ARVRAT: 87 ML/MIN/1.73M2 (ref 60–?)
GLOBULIN PLAS-MCNC: 4.8 G/DL (ref 2.8–4.4)
GLUCOSE BLD-MCNC: 148 MG/DL (ref 70–99)
HCT VFR BLD AUTO: 46 %
HGB BLD-MCNC: 14.2 G/DL
IMM GRANULOCYTES # BLD AUTO: 0.04 X10(3) UL (ref 0–1)
IMM GRANULOCYTES NFR BLD: 0.4 %
LIPASE SERPL-CCNC: 19 U/L (ref 13–75)
LYMPHOCYTES # BLD AUTO: 3.01 X10(3) UL (ref 1–4)
LYMPHOCYTES NFR BLD AUTO: 30.3 %
MCH RBC QN AUTO: 24.4 PG (ref 26–34)
MCHC RBC AUTO-ENTMCNC: 30.9 G/DL (ref 31–37)
MCV RBC AUTO: 79.2 FL
MONOCYTES # BLD AUTO: 0.44 X10(3) UL (ref 0.1–1)
MONOCYTES NFR BLD AUTO: 4.4 %
NEUTROPHILS # BLD AUTO: 6.18 X10 (3) UL (ref 1.5–7.7)
NEUTROPHILS # BLD AUTO: 6.18 X10(3) UL (ref 1.5–7.7)
NEUTROPHILS NFR BLD AUTO: 62.3 %
OSMOLALITY SERPL CALC.SUM OF ELEC: 287 MOSM/KG (ref 275–295)
PLATELET # BLD AUTO: 316 10(3)UL (ref 150–450)
POTASSIUM SERPL-SCNC: 3.8 MMOL/L (ref 3.5–5.1)
PROT SERPL-MCNC: 8.8 G/DL (ref 6.4–8.2)
RBC # BLD AUTO: 5.81 X10(6)UL
SODIUM SERPL-SCNC: 138 MMOL/L (ref 136–145)
WBC # BLD AUTO: 9.9 X10(3) UL (ref 4–11)

## 2023-05-24 PROCEDURE — 99284 EMERGENCY DEPT VISIT MOD MDM: CPT

## 2023-05-24 PROCEDURE — 96374 THER/PROPH/DIAG INJ IV PUSH: CPT

## 2023-05-24 PROCEDURE — 93005 ELECTROCARDIOGRAM TRACING: CPT

## 2023-05-24 PROCEDURE — 99205 OFFICE O/P NEW HI 60 MIN: CPT | Performed by: SURGERY

## 2023-05-24 PROCEDURE — 85025 COMPLETE CBC W/AUTO DIFF WBC: CPT | Performed by: EMERGENCY MEDICINE

## 2023-05-24 PROCEDURE — 83690 ASSAY OF LIPASE: CPT | Performed by: EMERGENCY MEDICINE

## 2023-05-24 PROCEDURE — 99285 EMERGENCY DEPT VISIT HI MDM: CPT

## 2023-05-24 PROCEDURE — 3077F SYST BP >= 140 MM HG: CPT | Performed by: SURGERY

## 2023-05-24 PROCEDURE — S0028 INJECTION, FAMOTIDINE, 20 MG: HCPCS | Performed by: EMERGENCY MEDICINE

## 2023-05-24 PROCEDURE — 96372 THER/PROPH/DIAG INJ SC/IM: CPT

## 2023-05-24 PROCEDURE — 3080F DIAST BP >= 90 MM HG: CPT | Performed by: SURGERY

## 2023-05-24 PROCEDURE — 96375 TX/PRO/DX INJ NEW DRUG ADDON: CPT

## 2023-05-24 PROCEDURE — 80053 COMPREHEN METABOLIC PANEL: CPT | Performed by: EMERGENCY MEDICINE

## 2023-05-24 PROCEDURE — 93010 ELECTROCARDIOGRAM REPORT: CPT

## 2023-05-24 PROCEDURE — 74177 CT ABD & PELVIS W/CONTRAST: CPT | Performed by: EMERGENCY MEDICINE

## 2023-05-24 RX ORDER — LOSARTAN POTASSIUM 100 MG/1
100 TABLET ORAL DAILY
COMMUNITY
Start: 2023-02-22

## 2023-05-24 RX ORDER — ATORVASTATIN CALCIUM 80 MG/1
80 TABLET, FILM COATED ORAL DAILY
COMMUNITY
Start: 2023-05-05

## 2023-05-24 RX ORDER — PANTOPRAZOLE SODIUM 40 MG/1
40 TABLET, DELAYED RELEASE ORAL DAILY
COMMUNITY

## 2023-05-24 RX ORDER — LEVETIRACETAM 500 MG/1
500 TABLET ORAL 2 TIMES DAILY
COMMUNITY
Start: 2023-02-22

## 2023-05-24 RX ORDER — ALBUTEROL SULFATE 90 UG/1
AEROSOL, METERED RESPIRATORY (INHALATION) PRN
COMMUNITY

## 2023-05-24 RX ORDER — ONDANSETRON 4 MG/1
4 TABLET, ORALLY DISINTEGRATING ORAL PRN
COMMUNITY
Start: 2022-12-02

## 2023-05-24 RX ORDER — DIPHENHYDRAMINE HYDROCHLORIDE 50 MG/ML
25 INJECTION INTRAMUSCULAR; INTRAVENOUS ONCE
Status: COMPLETED | OUTPATIENT
Start: 2023-05-24 | End: 2023-05-24

## 2023-05-24 RX ORDER — BUTALBITAL, ACETAMINOPHEN AND CAFFEINE 50; 325; 40 MG/1; MG/1; MG/1
TABLET ORAL PRN
COMMUNITY

## 2023-05-24 RX ORDER — PROCHLORPERAZINE EDISYLATE 5 MG/ML
10 INJECTION INTRAMUSCULAR; INTRAVENOUS ONCE
Status: COMPLETED | OUTPATIENT
Start: 2023-05-24 | End: 2023-05-24

## 2023-05-24 RX ORDER — DICYCLOMINE HYDROCHLORIDE 10 MG/ML
20 INJECTION INTRAMUSCULAR ONCE
Status: COMPLETED | OUTPATIENT
Start: 2023-05-24 | End: 2023-05-24

## 2023-05-24 RX ORDER — HYDRALAZINE HYDROCHLORIDE 25 MG/1
25 TABLET, FILM COATED ORAL DAILY
COMMUNITY
Start: 2023-05-22

## 2023-05-24 RX ORDER — FAMOTIDINE 20 MG/1
20 TABLET, FILM COATED ORAL 2 TIMES DAILY PRN
Qty: 30 TABLET | Refills: 0 | Status: SHIPPED | OUTPATIENT
Start: 2023-05-24 | End: 2023-06-23

## 2023-05-24 RX ORDER — GABAPENTIN 300 MG/1
300 CAPSULE ORAL
COMMUNITY
End: 2023-07-06 | Stop reason: ALTCHOICE

## 2023-05-24 RX ORDER — INSULIN ASPART 100 [IU]/ML
INJECTION, SOLUTION INTRAVENOUS; SUBCUTANEOUS 3 TIMES DAILY PRN
COMMUNITY
End: 2023-07-06 | Stop reason: SDUPTHER

## 2023-05-24 RX ORDER — FLUTICASONE PROPIONATE 50 MCG
SPRAY, SUSPENSION (ML) NASAL PRN
COMMUNITY

## 2023-05-24 RX ORDER — KETOROLAC TROMETHAMINE 15 MG/ML
15 INJECTION, SOLUTION INTRAMUSCULAR; INTRAVENOUS ONCE
Status: COMPLETED | OUTPATIENT
Start: 2023-05-24 | End: 2023-05-24

## 2023-05-24 RX ORDER — SERTRALINE HYDROCHLORIDE 25 MG/1
25 TABLET, FILM COATED ORAL DAILY
COMMUNITY

## 2023-05-24 RX ORDER — METOPROLOL SUCCINATE 25 MG/1
25 TABLET, EXTENDED RELEASE ORAL NIGHTLY
COMMUNITY
Start: 2023-02-22

## 2023-05-24 RX ORDER — DULOXETIN HYDROCHLORIDE 30 MG/1
30 CAPSULE, DELAYED RELEASE ORAL DAILY
COMMUNITY

## 2023-05-24 RX ORDER — INSULIN GLARGINE 100 [IU]/ML
30-40 INJECTION, SOLUTION SUBCUTANEOUS NIGHTLY
COMMUNITY

## 2023-05-24 RX ORDER — HYDROCHLOROTHIAZIDE 25 MG/1
25 TABLET ORAL DAILY
COMMUNITY
Start: 2023-03-23

## 2023-05-24 RX ORDER — METOPROLOL SUCCINATE 100 MG/1
100 TABLET, EXTENDED RELEASE ORAL DAILY
COMMUNITY
Start: 2023-02-22

## 2023-05-24 RX ORDER — AMLODIPINE BESYLATE 5 MG/1
5 TABLET ORAL DAILY
COMMUNITY
Start: 2023-05-05

## 2023-05-24 RX ORDER — OMEPRAZOLE 40 MG/1
40 CAPSULE, DELAYED RELEASE ORAL DAILY
COMMUNITY
Start: 2022-12-08

## 2023-05-24 RX ORDER — CYCLOBENZAPRINE HCL 10 MG
10 TABLET ORAL PRN
COMMUNITY

## 2023-05-24 RX ORDER — RIZATRIPTAN BENZOATE 10 MG/1
10 TABLET ORAL PRN
COMMUNITY
Start: 2023-01-17 | End: 2024-01-17

## 2023-05-24 RX ORDER — ASPIRIN 325 MG
325 TABLET ORAL DAILY
COMMUNITY

## 2023-05-24 RX ORDER — PSEUDOEPHEDRINE HCL 30 MG
TABLET ORAL PRN
COMMUNITY
End: 2023-09-05 | Stop reason: ALTCHOICE

## 2023-05-24 RX ORDER — ESCITALOPRAM OXALATE 10 MG/1
10 TABLET ORAL DAILY
COMMUNITY
Start: 2023-05-10

## 2023-05-24 RX ORDER — TRAMADOL HYDROCHLORIDE 50 MG/1
50 TABLET ORAL PRN
COMMUNITY

## 2023-05-24 RX ORDER — FAMOTIDINE 10 MG/ML
20 INJECTION, SOLUTION INTRAVENOUS ONCE
Status: COMPLETED | OUTPATIENT
Start: 2023-05-24 | End: 2023-05-24

## 2023-05-24 NOTE — ED INITIAL ASSESSMENT (HPI)
Pt arrived to ED from doctor's office c/o headache starting today at approximately 1230 and bilateral upper abdominal pain x approximately 1 week. Pt denies N/V/D or urinary symptoms.

## 2023-05-24 NOTE — TELEPHONE ENCOUNTER
Patient is called Navigator requesting a ride back home. Patient stated the appointment surpass the scheduled pick-up time. Navigator contacted [employer] to arrange transportation back home. Per representative a ride is scheduled to  patient from Dr. Leonardo Núñez office to take back home.      Reservation #: [id number]

## 2023-05-25 ENCOUNTER — PATIENT OUTREACH (OUTPATIENT)
Dept: CASE MANAGEMENT | Age: 53
End: 2023-05-25

## 2023-05-25 LAB
ATRIAL RATE: 69 BPM
P AXIS: 48 DEGREES
P-R INTERVAL: 152 MS
Q-T INTERVAL: 454 MS
QRS DURATION: 130 MS
QTC CALCULATION (BEZET): 486 MS
R AXIS: 40 DEGREES
T AXIS: 25 DEGREES
VENTRICULAR RATE: 69 BPM

## 2023-05-25 NOTE — DISCHARGE INSTRUCTIONS
Take tylenol at the first sign of pain. You can also take ibuprofen as needed. Take pepcid 20 mg every 12 hours for the next 5 days and then as needed thereafter. Push fluids and get rest.     See primary care in there next few days for a follow up appointment. Return to the ER if you develop worsening symptoms or emergent concerns.

## 2023-05-25 NOTE — ED QUICK NOTES
Pt is A&O x 4 speaking in complete coherent sentences. RR even and unlabored. Pt educated on discharge instructions and able to verbally teach back. Pt denies any further questions or concerns and ambulated out of ED with even steady gait.

## 2023-05-26 ENCOUNTER — TELEPHONE (OUTPATIENT)
Dept: ENDOCRINOLOGY | Facility: HOSPITAL | Age: 53
End: 2023-05-26

## 2023-05-26 NOTE — TELEPHONE ENCOUNTER
Navigator called [facility] to set up transportation for upcoming appointments.      June 2nd 1:00pm   Pulmonology Test, [address] [facility] #: [id number]    June 8th 11:00am   Dr. Schmitt Rater office   Reservation #: [id number]

## 2023-05-30 ENCOUNTER — OFFICE VISIT (OUTPATIENT)
Dept: SURGERY | Facility: CLINIC | Age: 53
End: 2023-05-30

## 2023-05-30 VITALS — BODY MASS INDEX: 36.62 KG/M2 | WEIGHT: 199 LBS | HEIGHT: 62 IN

## 2023-05-30 DIAGNOSIS — R10.12 LEFT UPPER QUADRANT ABDOMINAL PAIN: Primary | ICD-10-CM

## 2023-05-30 PROCEDURE — 99212 OFFICE O/P EST SF 10 MIN: CPT | Performed by: SURGERY

## 2023-05-31 DIAGNOSIS — E66.01 MORBID (SEVERE) OBESITY DUE TO EXCESS CALORIES (CMD): Primary | ICD-10-CM

## 2023-06-02 ENCOUNTER — HOSPITAL ENCOUNTER (OUTPATIENT)
Dept: RESPIRATORY THERAPY | Age: 53
Discharge: HOME | End: 2023-06-02
Attending: SURGERY

## 2023-06-02 DIAGNOSIS — E66.01 MORBID (SEVERE) OBESITY DUE TO EXCESS CALORIES (CMD): Primary | ICD-10-CM

## 2023-06-02 DIAGNOSIS — E66.01 MORBID (SEVERE) OBESITY DUE TO EXCESS CALORIES (CMD): ICD-10-CM

## 2023-06-02 PROCEDURE — 94726 PLETHYSMOGRAPHY LUNG VOLUMES: CPT

## 2023-06-02 PROCEDURE — 94729 DIFFUSING CAPACITY: CPT

## 2023-06-02 PROCEDURE — 94010 BREATHING CAPACITY TEST: CPT

## 2023-06-02 PROCEDURE — 94690 O2 UPTK REST INDIRECT: CPT

## 2023-06-08 ENCOUNTER — OFFICE VISIT (OUTPATIENT)
Dept: INTERNAL MEDICINE CLINIC | Facility: CLINIC | Age: 53
End: 2023-06-08

## 2023-06-08 VITALS
WEIGHT: 196 LBS | HEART RATE: 80 BPM | DIASTOLIC BLOOD PRESSURE: 86 MMHG | BODY MASS INDEX: 36.07 KG/M2 | SYSTOLIC BLOOD PRESSURE: 152 MMHG | HEIGHT: 62 IN

## 2023-06-08 DIAGNOSIS — K21.9 GASTROESOPHAGEAL REFLUX DISEASE WITHOUT ESOPHAGITIS: ICD-10-CM

## 2023-06-08 DIAGNOSIS — I10 ESSENTIAL HYPERTENSION: Primary | ICD-10-CM

## 2023-06-08 DIAGNOSIS — E11.49 TYPE 2 DIABETES MELLITUS WITH OTHER NEUROLOGIC COMPLICATION, WITH LONG-TERM CURRENT USE OF INSULIN (HCC): ICD-10-CM

## 2023-06-08 DIAGNOSIS — Z79.4 TYPE 2 DIABETES MELLITUS WITH OTHER NEUROLOGIC COMPLICATION, WITH LONG-TERM CURRENT USE OF INSULIN (HCC): ICD-10-CM

## 2023-06-08 PROCEDURE — 99214 OFFICE O/P EST MOD 30 MIN: CPT | Performed by: INTERNAL MEDICINE

## 2023-06-08 RX ORDER — HYDRALAZINE HYDROCHLORIDE 25 MG/1
50 TABLET, FILM COATED ORAL 2 TIMES DAILY
Qty: 120 TABLET | Refills: 0 | Status: SHIPPED | OUTPATIENT
Start: 2023-06-08

## 2023-06-22 ENCOUNTER — TELEPHONE (OUTPATIENT)
Dept: INTERNAL MEDICINE CLINIC | Facility: CLINIC | Age: 53
End: 2023-06-22

## 2023-06-22 DIAGNOSIS — R53.83 FATIGUE, UNSPECIFIED TYPE: Primary | ICD-10-CM

## 2023-06-22 DIAGNOSIS — L65.9 HAIR THINNING: ICD-10-CM

## 2023-06-22 NOTE — TELEPHONE ENCOUNTER
Patient was seen in the office recently on 6/8/23. She is calling today to ask why she's feeling so very tired. Per patient she's in [place] due to emergency with her mother and she stated, \"I feel so tired. Yesterday I slept so much and I feel like I have no energy. I walked around the pool yesterday a few times but I'm having no energy. \"    I advised her that she has outstanding labs ordered for TSH with Reflex to Free T4, and Lipid Panel. She was recently in the hospital and had a CBC and CMP drawn so I advised her to complete those labs still outstanding especially the Thyroid. I explained if the thyroid is not functioning well she could have these symptoms as well as hair thinning, weight gain, depression. I also mentioned Vitamin D, if low can have similar symptoms. Patient stated, \"my hair is so thin I can pull it out in the shower. \"       Advised her to complete the TSH and Lipid Panel, Pended a Vitamin D level if you  would like her to also complete that. Please advise. Routed to [facility] Holding Dr. Cristal Conway out of the office.

## 2023-06-22 NOTE — TELEPHONE ENCOUNTER
I would advise her to schedule an in person appt for evaluation of this new problem before any further work up is ordered, will sign off on vit D.  Thank you

## 2023-06-23 NOTE — TELEPHONE ENCOUNTER
Informed patient of advice per GINGER Cadet. Patient states she'll be FLA for another 2 weeks. Advised to seek ER/IC near where she's at regarding fatigue, and lower blood pressure. States she may do so. Also advised to schedule a follow up for when she's back. States understanding.

## 2023-06-28 ENCOUNTER — APPOINTMENT (OUTPATIENT)
Dept: BARIATRICS/WEIGHT MGMT | Age: 53
End: 2023-06-28

## 2023-06-30 ENCOUNTER — PATIENT OUTREACH (OUTPATIENT)
Dept: CASE MANAGEMENT | Age: 53
End: 2023-06-30

## 2023-07-05 ENCOUNTER — APPOINTMENT (OUTPATIENT)
Dept: BARIATRICS/WEIGHT MGMT | Age: 53
End: 2023-07-05

## 2023-07-05 RX ORDER — HYDRALAZINE HYDROCHLORIDE 25 MG/1
50 TABLET, FILM COATED ORAL 2 TIMES DAILY
Qty: 120 TABLET | Refills: 0 | Status: SHIPPED | OUTPATIENT
Start: 2023-07-05

## 2023-07-05 NOTE — TELEPHONE ENCOUNTER
Please review; protocol failed. Or has no protocol    Requested Prescriptions   Pending Prescriptions Disp Refills    hydrALAZINE 25 MG Oral Tab 120 tablet 0     Sig: Take 2 tablets (50 mg total) by mouth in the morning and 2 tablets (50 mg total) before bedtime.        Hypertensive Medications Protocol Failed - 7/5/2023  9:38 AM        Failed - Last BP reading less than 140/90     BP Readings from Last 1 Encounters:  06/08/23 : 152/86              Passed - In person appointment in the past 12 or next 3 months     Recent Outpatient Visits              2 weeks ago Type 2 diabetes mellitus with hyperglycemia, with long-term current use of insulin ([facility]    Aicha Cuff, [address] [facility],3Rd Floor, Yolanda Harris MD    [employer] E/M    3 weeks ago Essential hypertension    Lina Wylie MD    Office Visit    1 month ago Left upper quadrant abdominal pain    [place] MD Earl    Office Visit    1 month ago Type 2 diabetes mellitus with hyperglycemia, with long-term current use of insulin ([facility]    Mika Boggs MD    [employer] E/M    1 month ago Essential hypertension    Lina Wylie MD    Office Visit          Future Appointments         Provider Department Appt Notes    In 1 week Yuliya Paige MD Aicha Cuff, [address] [facility] 1 [facility]    In 1 week Festus Carl & Jessy [address] in [place]    In 2 weeks Festus Carl & Jessy [address] Dr[address] in [place]    In 3 weeks Festus [address], [address]ld[address] in [place]    In 4 weeks Festus Carl & Jessy [address] Dr[address] in [place]    In 1 month Festus Carl & Jess[address] in [place]    In 1 month Christkevin Ward, [address] Services in [place]    In 1 month Festus Lombardi & Jessy [address] in [place]    In 1 month Festus Lombardi & [address] in [place]    In 1 month DO Chaitanya Rey Chase, [address] [facility] [address], [place] Rfd by Dr. Claudio Brown Brain Surgery evaluation    In 2 months Mary Light, PT Jeanette in [place]    In 2 months Festus Lombardi & [address] in [place]    In 3 months [address] or [facility] in past 6 months     Recent Results (from the past 4392 hour(s))   Comp Metabolic Panel (14)    Collection Time: 05/24/23  6:17 PM   Result Value Ref Range    Glucose 148 (H) 70 - 99 mg/dL    Sodium 138 136 - 145 mmol/L    Potassium 3.8 3.5 - 5.1 mmol/L    Chloride 103 98 - 112 mmol/L    CO2 30.0 21.0 - 32.0 mmol/L    Anion Gap 5 0 - 18 mmol/L    BUN 8 7 - 18 mg/dL    Creatinine 0.80 0.55 - 1.02 mg/dL    BUN/CREA Ratio 10.0 10.0 - 20.0    Calcium, Total 9.7 8.5 - 10.1 mg/dL    Calculated Osmolality 287 275 - 295 mOsm/kg    eGFR-Cr 87 >=60 mL/min/1.73m2    ALT 35 13 - 56 U/L    AST 23 15 - 37 U/L    Alkaline Phosphatase 126 (H) 41 - 108 U/L    Bilirubin, Total 0.3 0.1 - 2.0 mg/dL    Total Protein 8.8 (H) 6.4 - 8.2 g/dL    Albumin 4.0 3.4 - 5.0 g/dL    Globulin  4.8 (H) 2.8 - 4.4 g/dL    A/G Ratio 0.8 (L) 1.0 - 2.0     *Note: Due to a large number of results and/or encounters for the requested time period, some results have not been displayed. A complete set of results can be found in Results Review.                Passed - In person appointment or virtual visit in the past 6 months     Recent Outpatient Visits              2 weeks ago Type 2 diabetes mellitus with hyperglycemia, with long-term current use of insulin ([facility]    Wilder Friend MD    [employer] E/M    3 weeks ago Essential hypertension Christy Becerra MD    Office Visit    1 month ago Left upper quadrant abdominal pain    [facility], [address], Wilber Alexis MD    Office Visit    1 month ago Type 2 diabetes mellitus with hyperglycemia, with long-term current use of insulin ([facility]    [address], [address], Carmen Damico MD    [employer] E/M    1 month ago Essential hypertension    [address], 148 [facility] Linda Nieto MD    Office Visit          Future Appointments         Provider Department Appt Notes    In 1 week Linda Nieto MD [address], [address] [place], [employer] 1 [facility]    In 1 week Festus Phillips [address] in [place]    In 2 weeks Festus Phillips [address] in [place]    In 3 weeks [address] in [place]    In 4 weeks Fest[address] Phillips [address] in [place]    In 1 month [address] in [place]    In 1 month Fest[address] Phillips [address] in [place]    In 1 month Fest[address] in [place]    In 1 month [address] in [place]    In 1 month [place], [address], [address], [place] Rfd by Dr. Mac Light Surgery evaluation    In 2 months Fest[address] in [place]    In 2 months Festus Phillips [address] in [place]    In 3 months [address] [facility] or [facility] > 50     GFR Evaluation  EGFRCR: 87 , resulted on 5/24/2023                Recent Outpatient Visits              2 weeks ago Type 2 diabetes mellitus with hyperglycemia, with long-term current use of insulin ([facility] Tricia Almaraz MD    [employer] E/M    3 weeks ago Essential hypertension    Ana Luisa Parekh [place] Luisa Ramey MD    Office Visit    1 month ago Left upper quadrant abdominal pain    [facility], [address], Erick Pascal MD    Office Visit    1 month ago Type 2 diabetes mellitus with hyperglycemia, with long-term current use of insulin ([address]    [address], [address], June Palmer MD    [employer] E/M    1 month ago Essential hypertension    [address], 148 [facility] Luisa Ramey MD    Office Visit           Future Appointments         Provider Department Appt Notes    In 1 week Luisa Ramey MD [address], 148 [facility], [place] 1 [facility]    In 1 week Festus Begum [address] in [place]    In 2 weeks Fest[address] Begum [address] in [place]    In 3 weeks Fest[address] Begum [address] in [place]    In 4 weeks Festus Begum [address] in [place]    In 1 month Festus Begum [address] in [place]    In 1 month [address] Begum [address] in [place]    In 1 month Fest[address] Beg[address] in [place]    In 1 month Fest[address] Begum [address] in [place]    In 1 month Kendrick Aponte, [address], [address], [address] by Dr. Cristiano Bernstein Surgery evaluation    In 2 months Festus Begum [address] in [place]    In 2 months Festus Begum & [address]Ariano [address] in [place]    In 3 months [address]

## 2023-07-05 NOTE — TELEPHONE ENCOUNTER
hydrALAZINE 25 MG Oral Tab, Take 2 tablets (50 mg total) by mouth in the morning and 2 tablets (50 mg total) before bedtime. , Disp: 120 tablet, Rfl: 0

## 2023-07-06 ENCOUNTER — OFFICE VISIT (OUTPATIENT)
Dept: BARIATRICS/WEIGHT MGMT | Age: 53
End: 2023-07-06

## 2023-07-06 VITALS
BODY MASS INDEX: 35.74 KG/M2 | OXYGEN SATURATION: 98 % | DIASTOLIC BLOOD PRESSURE: 89 MMHG | WEIGHT: 194.2 LBS | HEIGHT: 62 IN | SYSTOLIC BLOOD PRESSURE: 166 MMHG | HEART RATE: 62 BPM | TEMPERATURE: 98.1 F

## 2023-07-06 DIAGNOSIS — K21.9 GASTROESOPHAGEAL REFLUX DISEASE WITHOUT ESOPHAGITIS: ICD-10-CM

## 2023-07-06 DIAGNOSIS — Z79.4 TYPE 2 DIABETES MELLITUS WITH DIABETIC POLYNEUROPATHY, WITH LONG-TERM CURRENT USE OF INSULIN (CMD): ICD-10-CM

## 2023-07-06 DIAGNOSIS — E11.42 TYPE 2 DIABETES MELLITUS WITH DIABETIC POLYNEUROPATHY, WITH LONG-TERM CURRENT USE OF INSULIN (CMD): ICD-10-CM

## 2023-07-06 DIAGNOSIS — I10 PRIMARY HYPERTENSION: ICD-10-CM

## 2023-07-06 DIAGNOSIS — E66.01 SEVERE OBESITY (BMI 35.0-35.9 WITH COMORBIDITY) (CMD): Primary | ICD-10-CM

## 2023-07-06 DIAGNOSIS — R06.81 APNEA: ICD-10-CM

## 2023-07-06 DIAGNOSIS — E78.00 PURE HYPERCHOLESTEROLEMIA: ICD-10-CM

## 2023-07-06 PROCEDURE — 3077F SYST BP >= 140 MM HG: CPT | Performed by: STUDENT IN AN ORGANIZED HEALTH CARE EDUCATION/TRAINING PROGRAM

## 2023-07-06 PROCEDURE — 99213 OFFICE O/P EST LOW 20 MIN: CPT | Performed by: STUDENT IN AN ORGANIZED HEALTH CARE EDUCATION/TRAINING PROGRAM

## 2023-07-06 PROCEDURE — 3079F DIAST BP 80-89 MM HG: CPT | Performed by: STUDENT IN AN ORGANIZED HEALTH CARE EDUCATION/TRAINING PROGRAM

## 2023-07-06 ASSESSMENT — ENCOUNTER SYMPTOMS
CONSTITUTIONAL NEGATIVE: 1
PSYCHIATRIC NEGATIVE: 1
NEUROLOGICAL NEGATIVE: 1
GASTROINTESTINAL NEGATIVE: 1

## 2023-07-07 ENCOUNTER — TELEPHONE (OUTPATIENT)
Dept: ENDOCRINOLOGY CLINIC | Facility: CLINIC | Age: 53
End: 2023-07-07

## 2023-07-07 RX ORDER — BLOOD SUGAR DIAGNOSTIC
STRIP MISCELLANEOUS
Qty: 300 STRIP | Refills: 0 | Status: SHIPPED | OUTPATIENT
Start: 2023-07-07

## 2023-07-07 RX ORDER — BLOOD-GLUCOSE METER
EACH MISCELLANEOUS
Qty: 1 KIT | Refills: 0 | Status: SHIPPED | OUTPATIENT
Start: 2023-07-07

## 2023-07-07 RX ORDER — LANCETS
EACH MISCELLANEOUS
Qty: 300 EACH | Refills: 0 | Status: SHIPPED | OUTPATIENT
Start: 2023-07-07

## 2023-07-07 NOTE — TELEPHONE ENCOUNTER
Patient states she received a notice that her Dafne Serrano sensors has a recall. Patient states she used her mom's glucometer to check blood sugars while she was on vacation. Patient returned from vacation 7/5/2023 and doesn't have any way to check her blood sugars. Patient requesting new rx for Glucometer, test strips and lancets. Please call. Thank you.

## 2023-07-10 ENCOUNTER — TELEPHONE (OUTPATIENT)
Dept: ENDOCRINOLOGY | Facility: HOSPITAL | Age: 53
End: 2023-07-10

## 2023-07-10 ENCOUNTER — TELEPHONE (OUTPATIENT)
Dept: PHYSICAL THERAPY | Facility: HOSPITAL | Age: 53
End: 2023-07-10

## 2023-07-10 NOTE — TELEPHONE ENCOUNTER
Navigator called [facility] to set up transportation for upcoming appointments.         July 13th 10:40am   Dr. Castillo [place] office   [address] [place] [address] #: [id number]    July 13th 3:45pm   Physical Therapy   Josafat Herbert #: [id number]

## 2023-07-10 NOTE — TELEPHONE ENCOUNTER
Called patient back and discussed recall on the reader. She will restart Noman Fore today and will see how the reader will function. She will contact [employer] service if reader needs to be replaced. RN advised her to contact pharmacy to get  information about traditional glucometer. Pt voiced understanding and denied further questions/needs at this time.

## 2023-07-11 ENCOUNTER — TELEPHONE (OUTPATIENT)
Dept: PHYSICAL THERAPY | Facility: HOSPITAL | Age: 53
End: 2023-07-11

## 2023-07-11 ENCOUNTER — APPOINTMENT (OUTPATIENT)
Dept: PHYSICAL THERAPY | Age: 53
End: 2023-07-11
Attending: NURSE PRACTITIONER

## 2023-07-11 DIAGNOSIS — E11.49 TYPE 2 DIABETES MELLITUS WITH OTHER NEUROLOGIC COMPLICATION, WITH LONG-TERM CURRENT USE OF INSULIN (HCC): ICD-10-CM

## 2023-07-11 DIAGNOSIS — Z79.4 TYPE 2 DIABETES MELLITUS WITH OTHER NEUROLOGIC COMPLICATION, WITH LONG-TERM CURRENT USE OF INSULIN (HCC): ICD-10-CM

## 2023-07-13 ENCOUNTER — LAB ENCOUNTER (OUTPATIENT)
Dept: LAB | Age: 53
End: 2023-07-13
Attending: INTERNAL MEDICINE
Payer: MEDICARE

## 2023-07-13 ENCOUNTER — OFFICE VISIT (OUTPATIENT)
Dept: INTERNAL MEDICINE CLINIC | Facility: CLINIC | Age: 53
End: 2023-07-13

## 2023-07-13 ENCOUNTER — APPOINTMENT (OUTPATIENT)
Dept: SLEEP MEDICINE | Age: 53
End: 2023-07-13
Attending: SURGERY

## 2023-07-13 ENCOUNTER — APPOINTMENT (OUTPATIENT)
Dept: PHYSICAL THERAPY | Age: 53
End: 2023-07-13
Attending: NURSE PRACTITIONER

## 2023-07-13 VITALS
DIASTOLIC BLOOD PRESSURE: 83 MMHG | HEART RATE: 73 BPM | WEIGHT: 199.38 LBS | BODY MASS INDEX: 36.69 KG/M2 | HEIGHT: 62 IN | SYSTOLIC BLOOD PRESSURE: 158 MMHG

## 2023-07-13 DIAGNOSIS — E55.9 VITAMIN D DEFICIENCY: Primary | ICD-10-CM

## 2023-07-13 DIAGNOSIS — I10 ESSENTIAL HYPERTENSION: Primary | ICD-10-CM

## 2023-07-13 DIAGNOSIS — Z79.4 TYPE 2 DIABETES MELLITUS WITH OTHER NEUROLOGIC COMPLICATION, WITH LONG-TERM CURRENT USE OF INSULIN (HCC): ICD-10-CM

## 2023-07-13 DIAGNOSIS — E11.49 TYPE 2 DIABETES MELLITUS WITH OTHER NEUROLOGIC COMPLICATION, WITH LONG-TERM CURRENT USE OF INSULIN (HCC): ICD-10-CM

## 2023-07-13 LAB
ALBUMIN SERPL-MCNC: 3.8 G/DL (ref 3.4–5)
ALBUMIN/GLOB SERPL: 0.9 {RATIO} (ref 1–2)
ALP LIVER SERPL-CCNC: 110 U/L
ALT SERPL-CCNC: 30 U/L
ANION GAP SERPL CALC-SCNC: 8 MMOL/L (ref 0–18)
AST SERPL-CCNC: 20 U/L (ref 15–37)
BASOPHILS # BLD AUTO: 0.05 X10(3) UL (ref 0–0.2)
BASOPHILS NFR BLD AUTO: 0.5 %
BILIRUB SERPL-MCNC: 0.5 MG/DL (ref 0.1–2)
BILIRUB UR QL: NEGATIVE
BUN BLD-MCNC: 12 MG/DL (ref 7–18)
BUN/CREAT SERPL: 14.1 (ref 10–20)
CALCIUM BLD-MCNC: 9.4 MG/DL (ref 8.5–10.1)
CHLORIDE SERPL-SCNC: 105 MMOL/L (ref 98–112)
CHOLEST SERPL-MCNC: 177 MG/DL (ref ?–200)
CLARITY UR: CLEAR
CO2 SERPL-SCNC: 26 MMOL/L (ref 21–32)
CREAT BLD-MCNC: 0.85 MG/DL
CREAT UR-SCNC: 119 MG/DL
DEPRECATED RDW RBC AUTO: 43.6 FL (ref 35.1–46.3)
EOSINOPHIL # BLD AUTO: 0.22 X10(3) UL (ref 0–0.7)
EOSINOPHIL NFR BLD AUTO: 2.3 %
ERYTHROCYTE [DISTWIDTH] IN BLOOD BY AUTOMATED COUNT: 15.6 % (ref 11–15)
EST. AVERAGE GLUCOSE BLD GHB EST-MCNC: 269 MG/DL (ref 68–126)
FASTING PATIENT LIPID ANSWER: YES
FASTING STATUS PATIENT QL REPORTED: YES
GFR SERPLBLD BASED ON 1.73 SQ M-ARVRAT: 81 ML/MIN/1.73M2 (ref 60–?)
GLOBULIN PLAS-MCNC: 4.1 G/DL (ref 2.8–4.4)
GLUCOSE BLD-MCNC: 197 MG/DL (ref 70–99)
GLUCOSE UR-MCNC: 100 MG/DL
HBA1C MFR BLD: 11 % (ref ?–5.7)
HCT VFR BLD AUTO: 44.5 %
HDLC SERPL-MCNC: 36 MG/DL (ref 40–59)
HGB BLD-MCNC: 13.6 G/DL
HGB UR QL STRIP.AUTO: NEGATIVE
IMM GRANULOCYTES # BLD AUTO: 0.02 X10(3) UL (ref 0–1)
IMM GRANULOCYTES NFR BLD: 0.2 %
KETONES UR-MCNC: NEGATIVE MG/DL
LDLC SERPL CALC-MCNC: 115 MG/DL (ref ?–100)
LEUKOCYTE ESTERASE UR QL STRIP.AUTO: NEGATIVE
LYMPHOCYTES # BLD AUTO: 2.4 X10(3) UL (ref 1–4)
LYMPHOCYTES NFR BLD AUTO: 25.5 %
MCH RBC QN AUTO: 24 PG (ref 26–34)
MCHC RBC AUTO-ENTMCNC: 30.6 G/DL (ref 31–37)
MCV RBC AUTO: 78.6 FL
MICROALBUMIN UR-MCNC: 54.2 MG/DL
MICROALBUMIN/CREAT 24H UR-RTO: 455.5 UG/MG (ref ?–30)
MONOCYTES # BLD AUTO: 0.59 X10(3) UL (ref 0.1–1)
MONOCYTES NFR BLD AUTO: 6.3 %
NEUTROPHILS # BLD AUTO: 6.12 X10 (3) UL (ref 1.5–7.7)
NEUTROPHILS # BLD AUTO: 6.12 X10(3) UL (ref 1.5–7.7)
NEUTROPHILS NFR BLD AUTO: 65.2 %
NITRITE UR QL STRIP.AUTO: NEGATIVE
NONHDLC SERPL-MCNC: 141 MG/DL (ref ?–130)
OSMOLALITY SERPL CALC.SUM OF ELEC: 293 MOSM/KG (ref 275–295)
PH UR: 6 [PH] (ref 5–8)
PLATELET # BLD AUTO: 244 10(3)UL (ref 150–450)
POTASSIUM SERPL-SCNC: 4.2 MMOL/L (ref 3.5–5.1)
PROT SERPL-MCNC: 7.9 G/DL (ref 6.4–8.2)
PROT UR-MCNC: 70 MG/DL
RBC # BLD AUTO: 5.66 X10(6)UL
SODIUM SERPL-SCNC: 139 MMOL/L (ref 136–145)
SP GR UR STRIP: 1.02 (ref 1–1.03)
TRIGL SERPL-MCNC: 143 MG/DL (ref 30–149)
TSI SER-ACNC: 1.55 MIU/ML (ref 0.36–3.74)
UROBILINOGEN UR STRIP-ACNC: NORMAL
VIT D+METAB SERPL-MCNC: 23.4 NG/ML (ref 30–100)
VLDLC SERPL CALC-MCNC: 25 MG/DL (ref 0–30)
WBC # BLD AUTO: 9.4 X10(3) UL (ref 4–11)

## 2023-07-13 PROCEDURE — 80061 LIPID PANEL: CPT | Performed by: INTERNAL MEDICINE

## 2023-07-13 PROCEDURE — 82043 UR ALBUMIN QUANTITATIVE: CPT | Performed by: INTERNAL MEDICINE

## 2023-07-13 PROCEDURE — 83036 HEMOGLOBIN GLYCOSYLATED A1C: CPT | Performed by: INTERNAL MEDICINE

## 2023-07-13 PROCEDURE — 36415 COLL VENOUS BLD VENIPUNCTURE: CPT | Performed by: INTERNAL MEDICINE

## 2023-07-13 PROCEDURE — 3060F POS MICROALBUMINURIA REV: CPT | Performed by: INTERNAL MEDICINE

## 2023-07-13 PROCEDURE — 85025 COMPLETE CBC W/AUTO DIFF WBC: CPT | Performed by: INTERNAL MEDICINE

## 2023-07-13 PROCEDURE — 82306 VITAMIN D 25 HYDROXY: CPT | Performed by: NURSE PRACTITIONER

## 2023-07-13 PROCEDURE — 81001 URINALYSIS AUTO W/SCOPE: CPT | Performed by: INTERNAL MEDICINE

## 2023-07-13 PROCEDURE — 82570 ASSAY OF URINE CREATININE: CPT | Performed by: INTERNAL MEDICINE

## 2023-07-13 PROCEDURE — 84443 ASSAY THYROID STIM HORMONE: CPT | Performed by: INTERNAL MEDICINE

## 2023-07-13 PROCEDURE — 3046F HEMOGLOBIN A1C LEVEL >9.0%: CPT | Performed by: INTERNAL MEDICINE

## 2023-07-13 PROCEDURE — 99214 OFFICE O/P EST MOD 30 MIN: CPT | Performed by: INTERNAL MEDICINE

## 2023-07-13 PROCEDURE — 80053 COMPREHEN METABOLIC PANEL: CPT | Performed by: INTERNAL MEDICINE

## 2023-07-13 NOTE — PROGRESS NOTES
Subjective:   Patient ID: Alejandrina Shrestha is a 52year old female. Patient presents with:  Hypertension    Pt f/u visit  HTN    Patient in office today for HTN patient states she has been checking her blood pressure at home and is in good level usually systolic is in the 077L over 80s. Patient states she is feeling well denies any headaches dizziness or chest pain shortness of breath dyspnea on exertion. She did not bring the blood pressure monitor today but was checked previously and it was comparable to ours. Patient states her blood sugars elevated but improving patient states she cut down on the insulin she states that another doctor recommended her to cut down on insulin -? She is taking Lantus insulin about 25 to 30 units instead of 55 once at night   She is also taking Humalog about 15 units 3 times daily with meals instead of 2523 to 25 units. Patient is not taking Ozempic as she was advised patient is to schedule appointment with her endocrinologist and discuss -very elevated sugar patient is fasting today so we are going to check the sugars with A1c - her last A1c was 9.6 in February   patient states she had another A1c likely after that time 9.1. Patient states feeling well otherwise. Denies chest pain, shortnesss of breath, palpitations, or abdominal pain, denies UTI symptoms fever or chills. Diabetes  She presents for her follow-up diabetic visit. She has type 2 diabetes mellitus. Hypoglycemia symptoms include headaches (occasional sumatriptan  helps -  feels well today  ). Pertinent negatives for hypoglycemia include no dizziness, nervousness/anxiousness or seizures. Pertinent negatives for diabetes include no chest pain. Current diabetic treatment includes insulin injections. Meal planning includes avoidance of concentrated sweets. Her overall blood glucose range is 180-200 mg/dl. An ACE inhibitor/angiotensin II receptor blocker is being taken. Eye exam is current.    Hypertension  This is a chronic problem. The problem has been gradually improving since onset. Associated symptoms include headaches (occasional sumatriptan  helps -  feels well today  ). Pertinent negatives include no anxiety, chest pain, orthopnea, palpitations, peripheral edema, PND or shortness of breath. Risk factors for coronary artery disease include obesity, post-menopausal state and dyslipidemia. Past treatments include lifestyle changes, diuretics, beta blockers and central alpha agonists. The current treatment provides significant improvement. Compliance problems include diet (medications labs and  visits  ). Medication Request   headaches (occasional sumatriptan  helps -  feels well today  ). Pertinent negatives include no chest pain, congestion, coughing, nausea or vomiting. History/Other:   Review of Systems   HENT: Negative for congestion, sinus pressure, sinus pain and sneezing. Eyes: Negative for pain, redness and visual disturbance (has  cataracts bill   eyes - seen  Ophtalmologist  - 2/22/23   Respiratory: Negative for cough, chest tightness and shortness of breath. Cardiovascular: Negative for chest pain, palpitations, orthopnea, leg swelling and PND. Gastrointestinal: Negative for abdominal distention, anal bleeding, blood in stool, constipation, diarrhea, nausea and vomiting. Skin: Negative for color change and wound. Neurological: negative headache . Negative for dizziness, seizures and light-headedness. Psychiatric/Behavioral: Negative for decreased concentration and sleep disturbance. The patient is not nervous/anxious.       Current Outpatient Medications   Medication Sig Dispense Refill    Glucose Blood (ACCU-CHEK GUIDE) In Vitro Strip Use to check blood sugar three times a day 300 strip 0    Blood Glucose Monitoring Suppl (ACCU-CHEK GUIDE) w/Device Does not apply Kit Use to check blood sugar three times a day 1 kit 0    Accu-Chek FastClix Lancets Does not apply Misc Use to check blood sugar three times a day 300 each 0    hydrALAZINE 25 MG Oral Tab Take 2 tablets (50 mg total) by mouth in the morning and 2 tablets (50 mg total) before bedtime. 120 tablet 0    METFORMIN 500 MG Oral Tab TAKE 2 TABLETS(1000 MG) BY MOUTH TWICE DAILY WITH MEALS 360 tablet 0    escitalopram 10 MG Oral Tab Take 1 tablet (10 mg total) by mouth daily. 90 tablet 3    ATORVASTATIN 80 MG Oral Tab TAKE 1 TABLET EVERY NIGHT 90 tablet 0    Insulin Pen Needle (BD PEN NEEDLE ABDOULAYE 2ND GEN) 32G X 4 MM Does not apply Misc INJECT FOUR TIMES DAILY AS DIRECTED 200 each 1    Alcohol Swabs (DROPSAFE ALCOHOL PREP) 70 % Does not apply Pads Take 1 Bottle by mouth As Directed. 100 each 1    ERGOCALCIFEROL 1.25 MG (64585 UT) Oral Cap TAKE 1 CAPSULE BY MOUTH 1 TIME A WEEK FOR 12 DOSES 12 capsule 0    Dexamethasone 0.5 MG/5ML Oral Elixir       hydroCHLOROthiazide 25 MG Oral Tab Take 1 tablet (25 mg total) by mouth daily. 90 tablet 3    LOSARTAN 100 MG Oral Tab TAKE 1 TABLET (100 MG TOTAL) BY MOUTH ONCE DAILY. 90 tablet 1    LEVETIRACETAM 500 MG Oral Tab TAKE 1 TABLET TWICE DAILY 180 tablet 1    AMLODIPINE 5 MG Oral Tab TAKE 2 TABLETS EVERY  tablet 1    METOPROLOL SUCCINATE ER 25 MG Oral Tablet 24 Hr TAKE 1 TABLET EVERY NIGHT 90 tablet 1    METOPROLOL SUCCINATE  MG Oral Tablet 24 Hr TAKE 1 TABLET EVERY DAY 90 tablet 1    NOVOLOG FLEXPEN 100 UNIT/ML Subcutaneous Solution Pen-injector INJECT 35 UNITS UNDER THE SKIN THREE TIMES DAILY AT MEALS AS DIRECTED WITH BASE DOSE AND SLIDING SCALE 90 mL 0    Rizatriptan Benzoate 10 MG Oral Tab Take 1 tablet (10 mg total) by mouth as needed for Migraine. This may be repeated 2 hours later. 12 tablet 1    docusate sodium 100 MG Oral Tab Take 1 tablet (100 mg total) by mouth 2 (two) times daily as needed for constipation.  60 tablet 1    Omeprazole 40 MG Oral Capsule Delayed Release TAKE 1 CAPSULE EVERY DAY 30 TO 60 MINUTES BEFORE A MEAL 90 capsule 1    ondansetron 4 MG Oral Tablet Dispersible Take 1 tablet (4 mg total) by mouth every 8 (eight) hours as needed for Nausea. 30 tablet 0    LANTUS SOLOSTAR 100 UNIT/ML Subcutaneous Solution Pen-injector INJECT 58 UNITS INTO THE SKIN NIGHTLY. 60 mL 0    gabapentin 300 MG Oral Cap Take 1 capsule (300 mg total) by mouth nightly. 90 capsule 1    VENTOLIN  (90 Base) MCG/ACT Inhalation Aero Soln Inhale 2 puffs into the lungs every 6 (six) hours as needed for Wheezing. 18 g 1    aspirin 325 MG Oral Tab Take 1 tablet (325 mg total) by mouth daily. 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 fluticasone propionate 50 MCG/ACT Nasal Suspension 2 sprays by Nasal route daily. (Patient not taking: Reported on 7/13/2023) 48 g 1     Allergies:  Reglan [Metoclopram*    OTHER (SEE COMMENTS)    Comment:Sensitivity, with crawling sensation. Adhesive Tape               Comment:itching    Objective:     Physical Exam  Vitals and nursing note reviewed. Constitutional:       General: She is not in acute distress. Appearance: She is well-developed. She is obese. Interventions: Face mask in place. HENT:      Head: Normocephalic and atraumatic. Neck:      Thyroid: No thyromegaly. Vascular: No JVD. Cardiovascular:      Rate and Rhythm: Normal rate and regular rhythm. Heart sounds: Normal heart sounds. No murmur heard. Pulmonary:      Effort: Pulmonary effort is normal. No respiratory distress. Breath sounds: Normal breath sounds. No wheezing or rales. Abdominal:      Palpations: Abdomen is soft. There is no hernia     Tenderness: There is no abdominal tenderness. There is no right CVA tenderness, left CVA tenderness, guarding or rebound. Comments:      Musculoskeletal:      Cervical back: Neck supple. Right lower leg: No edema. Left lower leg: No edema. Lymphadenopathy:      Cervical: No cervical adenopathy. Skin:     General: Skin is warm and dry. Mental Status: She is alert and oriented to person, place, and time. Psychiatric:         Mood and Affect: Mood is not anxious or depressed      Behavior: Behavior normal.       Bilateral barefoot skin diabetic exam is normal, visualized feet and the appearance is normal.  Bilateral monofilament/sensation of both feet is normal.  Pulsation pedal pulse exam of both lower legs/feet is normal as well. Blood pressure 158/83, pulse 73, height 5' 2\" (1.575 m), weight 199 lb 6.4 oz (90.4 kg), not currently breastfeeding. Assessment & Plan:       1. Diabetes Mellitus Type 2, Uncontrolled    -HgA1c- 9.1%--> labs  today   using freestyle maximo   -Discussed importance of low CHO diet- recommend 135 grams total per day, 45 grams per meal.   Patient seen endocrinology  and have a medication adjusted :  - Increase Lantus to 50 units to take at night subcutaneous daily   - taking  Novolog 20-25 units bid with meals at night  Use  5 u -  Due to lower sugars  In the meddle of the morning - pe rpt  60s  -Continue with Freestyle maximo 2- Reviewed importance of bringing reader to all appointments.   -Continue Metformin 1000mg PO BID  food   Patient education continue with low sugar and carbohydrate diet keep with good water hydration patient verbalized understanding and compliance  Patient states she was advised by another doctor to decrease the dosage of the insulin so she is taking Lantus insulin about 25 to -30 units at night and she is also cutting down on the Humalog she used to take 20 to 25 units 3 times daily with meals she is taking now about 15 units with meals.   Patient is not taking Ozempic now advised patient that that was likely the regimen if she is taking Ozempic patient was advised to discuss with her endocrinologist regarding restarting Ozempic and continuing with  Insulin according to her blood sugars and the blood test today. Patient will schedule appointment with Endo as soon as possible    Labs  today           Essential hypertension with goal blood pressure less than 140/90  Take high blood pressure medication as perscribed   Low- sodium diet   Maintain walking - as tolerated   Track and record blood pressure and heart rate at home   The side effects of medication discussed with patient   Ilosartan 100 mg every day  metoprolol 100 +25 mg =125 mg  every day   Amlodipine 10 mg every day   hydrochlorothiazide 25 mg every day   hydralazine 2 tab 25 =50 mg  mg every day - bid to have    bp in 120- -130 /80  Range -at home    keep  heart rate in 60-80   Patient states her blood pressure at home is in 130/80 range pretty much all the time recommend patient to bring the blood pressure monitor next visit time  We can recheck it and if comparable to ours in office she can continue the same dosage medications.     patient agrees with plan verbalized understanding compliance   Recommend patient to follow-up in 3  weeks with a blood pressure readings from home patient states her blood   She will bring the blood pressure monitor   And all medications   next visit time recheck the blood pressure readings  Keep  bp in 120-130/80 hr 60 Range       Cataracts bilaterally  Diabetic  Eye exam per pt done recently Dr Bessie FRIEDMAN 2/23  patient to seen Dr. Bessie Ordonez   Diabetic  Eye exam   Patient education          Orders Placed This Encounter      Hemoglobin A1C      Microalb/Creat Ratio, Random Urine      Meds This Visit:  Requested Prescriptions      No prescriptions requested or ordered in this encounter       Imaging & Referrals:  None

## 2023-07-14 RX ORDER — ISOPROPYL ALCOHOL 70 ML/100ML
SWAB TOPICAL
Qty: 100 EACH | Refills: 0 | Status: SHIPPED | OUTPATIENT
Start: 2023-07-14

## 2023-07-17 ENCOUNTER — TELEPHONE (OUTPATIENT)
Dept: ENDOCRINOLOGY | Facility: HOSPITAL | Age: 53
End: 2023-07-17

## 2023-07-17 DIAGNOSIS — E55.9 VITAMIN D DEFICIENCY: ICD-10-CM

## 2023-07-17 NOTE — TELEPHONE ENCOUNTER
Navigator called [facility] to book transportation for upcoming appointment.      July 21st 2:40 pm   [address]   [address] [facility], [address] [facility], [address] [facility] number: [id number]

## 2023-07-17 NOTE — TELEPHONE ENCOUNTER
[facility] incoming RX fax  VITAMIN D3 50,000IU (KARYN)CAP  Take 1 capsule by mouth every 7 days    Message:  Plan does not cover med prescribed.

## 2023-07-18 DIAGNOSIS — Z01.818 PREOPERATIVE EXAMINATION: ICD-10-CM

## 2023-07-18 RX ORDER — SEMAGLUTIDE 1.34 MG/ML
1 INJECTION, SOLUTION SUBCUTANEOUS WEEKLY
Qty: 9 ML | Refills: 0 | Status: SHIPPED | OUTPATIENT
Start: 2023-07-18

## 2023-07-18 RX ORDER — PEN NEEDLE, DIABETIC 32GX 5/32"
NEEDLE, DISPOSABLE MISCELLANEOUS
Qty: 40 EACH | Refills: 1 | Status: SHIPPED | OUTPATIENT
Start: 2023-07-18

## 2023-07-18 RX ORDER — INSULIN ASPART 100 [IU]/ML
35 INJECTION, SOLUTION INTRAVENOUS; SUBCUTANEOUS
Qty: 90 ML | Refills: 0 | Status: SHIPPED | OUTPATIENT
Start: 2023-07-18

## 2023-07-18 RX ORDER — INSULIN GLARGINE 100 [IU]/ML
56 INJECTION, SOLUTION SUBCUTANEOUS NIGHTLY
Qty: 51 ML | Refills: 0 | Status: SHIPPED | OUTPATIENT
Start: 2023-07-18

## 2023-07-18 NOTE — TELEPHONE ENCOUNTER
Patient is calling to request a refill on her ozempic. Patient also mentioned she might need her insulins and pen needles.

## 2023-07-20 ENCOUNTER — APPOINTMENT (OUTPATIENT)
Dept: PHYSICAL THERAPY | Age: 53
End: 2023-07-20
Attending: NURSE PRACTITIONER

## 2023-07-21 ENCOUNTER — APPOINTMENT (OUTPATIENT)
Dept: SLEEP MEDICINE | Age: 53
End: 2023-07-21
Attending: SURGERY

## 2023-07-24 ENCOUNTER — PATIENT OUTREACH (OUTPATIENT)
Dept: CASE MANAGEMENT | Age: 53
End: 2023-07-24

## 2023-07-24 NOTE — PROGRESS NOTES
Attempted [facility] monthly outreach,  left message for patient to call back ,can be reached at  [phone]. Will try back at a later time. Medical record reviewed including recent office visits and test results.     Chart review - 3 min  Time with patient - 2 min  Total time - 5 min

## 2023-07-24 NOTE — PROGRESS NOTES
Spoke to [facility] for CCM. Updates to patient care team/comments: UTD  Patient reported changes in medications: Patient restarted ozempic   Med Adherence  Comment:Patient reported compliance. Health Maintenance: Discussed with patient . [facility] M Figueroa [place] - done -    Diabetes Care Dilated Eye Exam due on 06/28/2022  Zoster Vaccines(2 of 2) due on 07/12/2023  HTN: BP Follow-Up due on 08/13/2023  Mammogram due on 10/08/2023  Pneumococcal Vaccine: Birth to 64yrs(2 - PCV) due on 02/07/2024  COVID-19 Vaccine(3 - Cuellar Peter series) due on 02/07/2024  Influenza Vaccine(1) due on 10/01/2023  Diabetes Care A1C due on 10/13/2023  Diabetes Care: GFR due on 07/13/2024  Diabetes Care: Microalb/Creat Ratio due on 07/13/2024  Pap Smear due on 07/26/2024  Colorectal Cancer Screening due on 11/12/2025  DTaP,Tdap,and Td Vaccines(2 - Td or Tdap) due on 05/17/2033  MA Annual Health Assessment Completed  Annual Depression Screening Completed  Diabetes Care Foot Exam (Annual) Completed    Patient updates/concerns:   Patient reported she is doing well . Patient stated she did eat yestday , bread hummus. Patient reported glucose yesterday - 218.    7/22 - night -213- 6 pm 187   Patient is not feeling hungry at all. Patient stated she has not been able to eat today due to nausea. Encouraged patient to drink some virgen or mint tea. Encouraged patient to         Goals/Action Plan: Active goal from previous outreach: weight loss    Patient reported progress towards goals:                - What:  Patient is working on portion control. Patient restarted ozempic. - When: Patient restated ozempic , took first dose yesterday . - How:  patient is working on keeping active- doing some walking around the house. - How Often:  daily             - Where: backyard   Patient Reported Barriers and Concerns:  patient is not cooking own meals therefore she is eating whatever her family cooks.                     - Plan for overcoming barriers: avoiding rice/pasta and just eating vegetables w/protein. Care Managers Interventions:  ccm to send TE to PCP to update on Blood pressures . CCM encouraged patient to eat small meals a day to avoid further nausea symptoms. Encouraged patient to call with any questions or concerns. Future Appointments:   Future Appointments   Date Time Provider Joan Monterroso   8/9/2023  2:20 PM Juan Kaiser MD [facility] [place]   9/1/2023 11:00 AM DO Cherelle Arita [address]   9/7/2023  3:00 PM GINGER Farfan [facility]   10/12/2023  1:00 PM LMB STEPHEN RM LMB [facility] [place]         [facility] Care Manager Follow Up Date:      Reason For Follow Up: review progress and or barriers towards patient's goals. Time Spent This Encounter Total:   min medical record review, telephone communication, care plan updates where needed, education, goals, and action plan recreation/update. Provided acknowledgment and validation to patient's concerns.    Monthly Minute Total including today:    Physical assessment, complete health history, and need for CCM established by Leandra Bates MD.

## 2023-07-25 ENCOUNTER — TELEPHONE (OUTPATIENT)
Dept: INTERNAL MEDICINE CLINIC | Facility: CLINIC | Age: 53
End: 2023-07-25

## 2023-07-25 NOTE — TELEPHONE ENCOUNTER
Patient reported she discontinued the amlodipine when PCP prescribed new BP medication - hydralazine - she stated she  stopped about 1 week ago . Informed per PCP note did not state to discontinue any medications. Patient stated she is taking too many medications and didn't think she need it , informed I would notify PCP to advise . Patient denied any SOB , chest pain ,dizziness or headaches. Please see readings below. Patient reported BP readings:  read off machine , not recording , encouraged patient to record. 183/83 ,Pulse  -81   140/81 ,Pulse-69  149/77 pulse - 63   143/73 pulse - 63   157/82 pulse -71   163/80   159/86 pulse -66   155/80 pulse -66  163/82 pulse - 64     Please advise.

## 2023-07-26 NOTE — TELEPHONE ENCOUNTER
Pt need to take   Amlodipine as well in addition of other medications  ,otherwise will be  elevated bp and increased  risk of stoke .     F/u as scheduled 8/9

## 2023-07-26 NOTE — TELEPHONE ENCOUNTER
Left message to pt to call back. Also [employer] message with MD recommendation sent to pt.          Future Appointments   Date Time Provider Joan Monterroso   8/9/2023  2:20 PM Matt Alexander MD [facility] [place]

## 2023-07-26 NOTE — TELEPHONE ENCOUNTER
Spoke with patient, (  Name and  verified ) informed of Dr. Terrie Gilmore   instructions below    Verified upcoming appointment    Patient verbalizes understanding and agrees with plan.

## 2023-07-27 ENCOUNTER — APPOINTMENT (OUTPATIENT)
Dept: PHYSICAL THERAPY | Age: 53
End: 2023-07-27
Attending: NURSE PRACTITIONER

## 2023-08-02 NOTE — TELEPHONE ENCOUNTER
Refill Protocol Appointment Criteria  · Appointment scheduled in the past 6 months or in the next 3 months  Recent Visits       Provider Department Primary Dx    1 month ago Naa Perez MD [facility] [address], [place] Type 2 diabetes mell PAIN

## 2023-08-03 ENCOUNTER — APPOINTMENT (OUTPATIENT)
Dept: PHYSICAL THERAPY | Age: 53
End: 2023-08-03
Attending: NURSE PRACTITIONER

## 2023-08-08 ENCOUNTER — TELEPHONE (OUTPATIENT)
Dept: ENDOCRINOLOGY | Facility: HOSPITAL | Age: 53
End: 2023-08-08

## 2023-08-08 NOTE — TELEPHONE ENCOUNTER
Noted surgery is canceled     Recommend patient to take her medications regularly including amlodipine 5 mg and hydrochlorothiazide 25 mg daily     likely due to not taking her medications regularly blood pressure is elevated if patient is feeling fine she show

## 2023-08-08 NOTE — TELEPHONE ENCOUNTER
Navigator contacted [facility] to schedule transportation. Per representative patient is no longer covered for transportation. Navigator called [employer] to obtain coverage summary for transportation. After review patient has exhausted the limit of rides for the year, total rides for the year is 24 trips. Patient is now responsible for future rides. Consent Verification   Assessment completed with: Patient   HIPPA verified? Yes    General: Navigator called patient to explain she no longer has transportation services from [facility]. Patient is very upset and explain she does not have additional options and will not be able to attend to the appointment. Patient also stated sister is the only person who helps her when she can, sister recently had surgery and can not drive. Navigator apologized and advised her to contact her  and/or [place]. [facility]. Navigator encourage patient to call with any questions or concerns. Patient verbalized understanding. Message was sent to Dr. Prachi Maxwell staff to cancel or convert visit to a virtual visit. Per Dr. Prachi Maxwell agreed to switch visit to virtual visit.

## 2023-08-10 ENCOUNTER — APPOINTMENT (OUTPATIENT)
Dept: PHYSICAL THERAPY | Age: 53
End: 2023-08-10
Attending: NURSE PRACTITIONER

## 2023-08-11 DIAGNOSIS — E55.9 VITAMIN D DEFICIENCY: ICD-10-CM

## 2023-08-11 RX ORDER — ONDANSETRON 4 MG/1
4 TABLET, ORALLY DISINTEGRATING ORAL EVERY 8 HOURS PRN
Qty: 30 TABLET | Refills: 0 | Status: SHIPPED | OUTPATIENT
Start: 2023-08-11

## 2023-08-11 RX ORDER — ERGOCALCIFEROL 1.25 MG/1
50000 CAPSULE ORAL
Qty: 1 CAPSULE | Refills: 2 | Status: SHIPPED | OUTPATIENT
Start: 2023-08-11

## 2023-08-11 NOTE — TELEPHONE ENCOUNTER
Pharmacy is requesting refill       hydrALAZINE 25 MG Oral Tab, Take 2 tablets (50 mg total) by mouth in the morning and 2 tablets (50 mg total) before bedtime. , Disp: 120 tablet, Rfl: 0

## 2023-08-11 NOTE — TELEPHONE ENCOUNTER
Patient had visit 8/9. Had requested rx for zofran and vitamin D, nothing received at pharmacy. Both medications indicated in office visit note. Pended for signature, please advise. Patient will be traveling and needs them today if possible.

## 2023-08-11 NOTE — TELEPHONE ENCOUNTER
Noted ,sent Ondansetron medication   vitamin D  to take at once a month   not once a week, since the patient level vitamin D is improved and not needed once per week any more.

## 2023-08-14 RX ORDER — HYDRALAZINE HYDROCHLORIDE 25 MG/1
50 TABLET, FILM COATED ORAL 2 TIMES DAILY
Qty: 360 TABLET | Refills: 3 | Status: SHIPPED | OUTPATIENT
Start: 2023-08-14

## 2023-08-14 NOTE — TELEPHONE ENCOUNTER
Please review. Protocol failed / Has no protocol. Requested Prescriptions   Pending Prescriptions Disp Refills    hydrALAZINE 25 MG Oral Tab 120 tablet 0     Sig: Take 2 tablets (50 mg total) by mouth in the morning and 2 tablets (50 mg total) before bedtime.        Hypertensive Medications Protocol Failed - 8/14/2023 10:21 AM        Failed - Last BP reading less than 140/90     BP Readings from Last 1 Encounters:  07/13/23 : 158/83              Passed - In person appointment in the past 12 or next 3 months     Recent Outpatient Visits              5 days ago Essential hypertension    Misty Barber, [facility] [place]    [employer] E[facility]    1 month ago Essential hypertension    [facility]    Office Visit    1 month ago Type 2 diabetes mellitus with hyperglycemia, with long-term current use [facility]    Misty Barber, [address] [facility],[address], Riana Kearns MD    [employer] E/M    2 months ago Essential hypertension    [facility], [facility] AndrewLina kwong MD    Office Visit    2 months ago Left upper quadrant abdominal pain    Eddd Rome Preciado MD    Office Visit          Future Appointments         Provider Department Appt Notes    In 2 weeks DO Misty Voss, [address] [facility],[address], [place] Rfd by Dr. Joana Aponte evaluation    In 3 weeks Beth Coffey, [address], [place]     In 1 month [address] [place] [address] or [facility] in past 6 months     Recent Results (from the past 4392 hour(s))   COMP METABOLIC PANEL (14)    Collection Time: 07/13/23 11:21 AM   Result Value Ref Range    Glucose 197 (H) 70 - 99 mg/dL    Sodium 139 136 - 145 mmol/L    Potassium 4.2 3.5 - 5.1 mmol/L    Chloride 105 98 - 112 mmol/L    CO2 26.0 21.0 - 32.0 mmol/L    Anion Gap 8 0 - 18 mmol/L    BUN 12 7 - 18 mg/dL    Creatinine 0.85 0.55 - 1.02 mg/dL    BUN/CREA Ratio 14.1 10.0 - 20.0    Calcium, Total 9.4 8.5 - 10.1 mg/dL    Calculated Osmolality 293 275 - 295 mOsm/kg    eGFR-Cr 81 >=60 mL/min/1.73m2    ALT 30 13 - 56 U/L    AST 20 15 - 37 U/L    Alkaline Phosphatase 110 (H) 41 - 108 U/L    Bilirubin, Total 0.5 0.1 - 2.0 mg/dL    Total Protein 7.9 6.4 - 8.2 g/dL    Albumin 3.8 3.4 - 5.0 g/dL    Globulin  4.1 2.8 - 4.4 g/dL    A/G Ratio 0.9 (L) 1.0 - 2.0    Patient Fasting for CMP? Yes      *Note: Due to a large number of results and/or encounters for the requested time period, some results have not been displayed. A complete set of results can be found in Results Review.                Passed - In person appointment or virtual visit in the past 6 months     Recent Outpatient Visits              5 days ago Essential hypertension    Dominga Garza [facility], [place] Norbert Drop, MD    [employer] E[facility]    1 month ago Essential hypertension    [address] [facility] Lina Yoon MD    Office Visit    1 month ago Type 2 diabetes mellitus with hyperglycemia, with long-term current use of insulin ([facility]    Sarita Doherty MD    [employer] E[facility]    2 months ago Essential hypertension    [address] [facility] Lina Yoon MD    Office Visit    2 months ago Left upper quadrant abdominal pain    [address] [facility] [place] MD    Office Visit          Future Appointments         Provider Department Appt Notes    In 2 weeks Elif Valera, [facility] [address] by Dr. Melissa Briceño Surgery evaluation    In 3 weeks Jeremias Santos, [address], [place]     In 1 month [address] [facility] or [facility] > 50     GFR Evaluation  EGFRCR: 81 , resulted on 7/13/2023             Future Appointments         Provider Department Appt Notes    In 2 weeks DO Elias [facility], [address], [facility] Vonda [facility]cclellan evaluation    In 3 weeks Vangie Delgadillo, GINGER [place], [place] [address]     In 1 month [id number]            Recent Outpatient Visits              5 days ago Essential hypertension    Berry Sleek, [place] Everitt Bank, MD    Virtual [employer] E/M    1 month ago Essential hypertension    Lina Yost MD    Office Visit    1 month ago Type 2 diabetes mellitus with hyperglycemia, with long-term current use of insulin ([facility]    Annette Ordonez MD    [employer] E[employer]    2 months ago Essential hypertension    Lina Yost MD    Office Visit    2 months ago Left upper quadrant abdominal pain    Demarco Ordonez MD    Office Visit

## 2023-08-17 ENCOUNTER — TELEPHONE (OUTPATIENT)
Dept: INTERNAL MEDICINE CLINIC | Facility: CLINIC | Age: 53
End: 2023-08-17

## 2023-08-17 ENCOUNTER — APPOINTMENT (OUTPATIENT)
Dept: PHYSICAL THERAPY | Age: 53
End: 2023-08-17
Attending: NURSE PRACTITIONER

## 2023-08-24 ENCOUNTER — APPOINTMENT (OUTPATIENT)
Dept: PHYSICAL THERAPY | Age: 53
End: 2023-08-24
Attending: NURSE PRACTITIONER

## 2023-08-25 ENCOUNTER — PATIENT OUTREACH (OUTPATIENT)
Dept: CASE MANAGEMENT | Age: 53
End: 2023-08-25

## 2023-08-29 ENCOUNTER — MED REC SCAN ONLY (OUTPATIENT)
Dept: INTERNAL MEDICINE CLINIC | Facility: CLINIC | Age: 53
End: 2023-08-29

## 2023-08-31 ENCOUNTER — TELEPHONE (OUTPATIENT)
Dept: ENDOCRINOLOGY CLINIC | Facility: CLINIC | Age: 53
End: 2023-08-31

## 2023-08-31 ENCOUNTER — PATIENT OUTREACH (OUTPATIENT)
Dept: CASE MANAGEMENT | Age: 53
End: 2023-08-31

## 2023-08-31 ENCOUNTER — APPOINTMENT (OUTPATIENT)
Dept: PHYSICAL THERAPY | Age: 53
End: 2023-08-31
Attending: NURSE PRACTITIONER

## 2023-09-05 ENCOUNTER — V-VISIT (OUTPATIENT)
Dept: BARIATRICS/WEIGHT MGMT | Age: 53
End: 2023-09-05

## 2023-09-07 ENCOUNTER — APPOINTMENT (OUTPATIENT)
Dept: PHYSICAL THERAPY | Age: 53
End: 2023-09-07
Attending: NURSE PRACTITIONER

## 2023-09-13 ENCOUNTER — PATIENT OUTREACH (OUTPATIENT)
Dept: CASE MANAGEMENT | Age: 53
End: 2023-09-13

## 2023-09-13 DIAGNOSIS — Z79.4 TYPE 2 DIABETES MELLITUS WITH HYPERGLYCEMIA, WITH LONG-TERM CURRENT USE OF INSULIN (HCC): ICD-10-CM

## 2023-09-13 DIAGNOSIS — M54.2 NECK PAIN: ICD-10-CM

## 2023-09-13 DIAGNOSIS — E11.65 TYPE 2 DIABETES MELLITUS WITH HYPERGLYCEMIA, WITH LONG-TERM CURRENT USE OF INSULIN (HCC): ICD-10-CM

## 2023-09-13 DIAGNOSIS — E78.00 PURE HYPERCHOLESTEROLEMIA: ICD-10-CM

## 2023-09-13 DIAGNOSIS — E66.9 OBESITY (BMI 30-39.9): Primary | ICD-10-CM

## 2023-09-13 DIAGNOSIS — E10.22 CKD STAGE 3 DUE TO TYPE 1 DIABETES MELLITUS (HCC): ICD-10-CM

## 2023-09-13 DIAGNOSIS — N18.30 CKD STAGE 3 DUE TO TYPE 1 DIABETES MELLITUS (HCC): ICD-10-CM

## 2023-09-13 DIAGNOSIS — I10 ESSENTIAL HYPERTENSION: ICD-10-CM

## 2023-09-13 DIAGNOSIS — D50.9 IRON DEFICIENCY ANEMIA, UNSPECIFIED IRON DEFICIENCY ANEMIA TYPE: ICD-10-CM

## 2023-09-13 NOTE — PROGRESS NOTES
Spoke to [facility] for CCM. Updates to patient care team/comments:   UTD   Patient reported changes in medications: UTD    Med Adherence  Comment:  Patient reported compliance     Health Maintenance: Zoster Vaccines(2 of 2) due on 07/12/2023  HTN: BP Follow-Up due on 08/25/2023  Mammogram due on 10/08/2023  Diabetes Care A1C due on 10/13/2023  Pneumococcal Vaccine: Birth to 64yrs(2 - PCV) due on 02/07/2024  COVID-19 Vaccine(3 - Cuellar Peter series) due on 02/07/2024  Influenza Vaccine(1) due on 10/01/2023  Diabetes Care Dilated Eye Exam due on 02/22/2024  Diabetes Care: GFR due on 07/13/2024  Diabetes Care: Microalb/Creat Ratio due on 07/13/2024  Pap Smear due on 07/26/2024  Colorectal Cancer Screening due on 11/12/2025  DTaP,Tdap,and Td Vaccines(2 - Td or Tdap) due on 05/17/2033  MA Annual Health Assessment Completed  Annual Depression Screening Completed  Diabetes Care Foot Exam (Annual) Completed    Patient updates/concerns:     Patient reported she is doing okay . Patient c/o hypoglycemic episodes. Patient also having trouble finding rides to her appointments. She was working with [facility] but has not heard back on any solution for the ride program  as [facility] . Patient stated she had a follow up appointment with Augusta Tucker at [facility] she was not able to go through with that appointment as she was in [place] during the time of call . Patient no longer has abdominal pain when walking. Patient stated she is walking , she is doing her best to walk every day to stay active, she has not been able to weight herself . Patient reported she was having some neck pain with clicking , encouraged her to schedule appointment with PCP in person when she is back in town . Patient stated during her last appointment , PCP advised she take ozempic every other week.  Patient stated she has zofran to take for nausea, encouraged to eat low fat, low carb meal to avoid side effects . Patient reported having multiple hypoglycemic episodes. Patient stated she has had a few nights that her readings have been 38, 40 . Patient denied any falls but reports symptoms of weakness , shaking and blurry vision . Patient is reporting DM medication regimen as follows: (There are discrepancies with current med list)     ozempic every other week due to nausea- she stated she was instructed to cut back by PCP . Metformin 500 mg - 2 tablets (1000mg)twice daily   lantus -30-35 units if glucose is high , stated she has skipped the last few nights. Humalog - 15 units before meals only if her glucose is above 200   Reported having a few readings over 300-  when going out eat . Patient stated she is doing her best to eat a healthy diet. Patient did not share food diary with CCM . CCM encouraged to eat a healthy snack before bed to see if this helps. Goals/Action Plan: Active goal from previous outreach: Increase activity level     Patient reported progress towards goals:  patient stated she is doing better with walking daily                - What: walk            - When: daily            - How: n/a            - How Often: 30 minutes a day            - Where: outdoors   Patient Reported Barriers and Concerns:  patient stated she is having clicking of the neck , started a few weeks ago . - Plan for overcoming barriers:  patient encouraged to schedule an appointment when she is back in town. Care Managers Interventions:   TE to Endocrinologist regarding hypoglycemia , CCM will send patient information to apply for rides with [employer] assistance. Future Appointments:   Future Appointments   Date Time Provider Joan Monterroso   10/12/2023  1:00 PM LOCO [facility] [place]   10/24/2023 11:30 AM Charolett Simmonds, MD [address] [place] [address] Care Manager Follow Up Date:  1 month or sooner if needed.      Reason For Follow Up: review progress and or barriers towards patient's goals. Time Spent This Encounter Total:73  min medical record review, telephone communication, care plan updates where needed, education, goals, and action plan recreation/update. Provided acknowledgment and validation to patient's concerns.    Monthly Minute Total including today: 73 minutes   Physical assessment, complete health history, and need for CCM established by Tamia Eddy MD.

## 2023-09-13 NOTE — PROGRESS NOTES
Attempted [facility] monthly outreach,  left message for patient to call back ,can be reached at  [phone]. Will try back at a later time. Medical record reviewed including recent office visits and test results.     Chart review - 3 min  Time with patient - 2 min  Total time - 5 min

## 2023-09-14 ENCOUNTER — APPOINTMENT (OUTPATIENT)
Dept: PHYSICAL THERAPY | Age: 53
End: 2023-09-14
Attending: NURSE PRACTITIONER

## 2023-09-14 ENCOUNTER — TELEPHONE (OUTPATIENT)
Dept: ENDOCRINOLOGY CLINIC | Facility: CLINIC | Age: 53
End: 2023-09-14

## 2023-09-14 RX ORDER — CALCIUM CITRATE/VITAMIN D3 200MG-6.25
TABLET ORAL
Qty: 300 STRIP | Refills: 0 | Status: SHIPPED | OUTPATIENT
Start: 2023-09-14

## 2023-09-14 RX ORDER — GLUCOSAM/CHON-MSM1/C/MANG/BOSW 500-416.6
TABLET ORAL
Qty: 300 EACH | Refills: 0 | Status: SHIPPED | OUTPATIENT
Start: 2023-09-14

## 2023-09-14 RX ORDER — INSULIN GLARGINE 100 [IU]/ML
24 INJECTION, SOLUTION SUBCUTANEOUS NIGHTLY
COMMUNITY
Start: 2023-09-14

## 2023-09-14 RX ORDER — INSULIN ASPART 100 [IU]/ML
INJECTION, SOLUTION INTRAVENOUS; SUBCUTANEOUS
COMMUNITY
Start: 2023-09-14

## 2023-09-28 ENCOUNTER — TELEPHONE (OUTPATIENT)
Dept: ENDOCRINOLOGY CLINIC | Facility: CLINIC | Age: 53
End: 2023-09-28

## 2023-09-28 NOTE — TELEPHONE ENCOUNTER
Received completed [facility] medical form for CGM with providers signature. Faxed and waiting for confirmation.

## 2023-09-30 DIAGNOSIS — I10 ESSENTIAL HYPERTENSION WITH GOAL BLOOD PRESSURE LESS THAN 140/90: ICD-10-CM

## 2023-09-30 DIAGNOSIS — Z98.890 HISTORY OF CRANIOTOMY: ICD-10-CM

## 2023-10-01 RX ORDER — LEVETIRACETAM 500 MG/1
TABLET ORAL
Qty: 180 TABLET | Refills: 1 | Status: SHIPPED | OUTPATIENT
Start: 2023-10-01

## 2023-10-02 RX ORDER — ATORVASTATIN CALCIUM 80 MG/1
80 TABLET, FILM COATED ORAL NIGHTLY
Qty: 90 TABLET | Refills: 0 | Status: SHIPPED | OUTPATIENT
Start: 2023-10-02

## 2023-10-02 RX ORDER — LOSARTAN POTASSIUM 100 MG/1
100 TABLET ORAL DAILY
Qty: 90 TABLET | Refills: 1 | Status: SHIPPED | OUTPATIENT
Start: 2023-10-02

## 2023-10-02 RX ORDER — AMLODIPINE BESYLATE 5 MG/1
TABLET ORAL
Qty: 180 TABLET | Refills: 1 | Status: SHIPPED | OUTPATIENT
Start: 2023-10-02

## 2023-10-02 RX ORDER — METOPROLOL SUCCINATE 100 MG/1
100 TABLET, EXTENDED RELEASE ORAL DAILY
Qty: 90 TABLET | Refills: 1 | Status: SHIPPED | OUTPATIENT
Start: 2023-10-02

## 2023-10-02 RX ORDER — METOPROLOL SUCCINATE 25 MG/1
25 TABLET, EXTENDED RELEASE ORAL NIGHTLY
Qty: 90 TABLET | Refills: 1 | Status: SHIPPED | OUTPATIENT
Start: 2023-10-02

## 2023-10-02 NOTE — TELEPHONE ENCOUNTER
Please review. Protocol failed / No Protocol.     Requested Prescriptions   Pending Prescriptions Disp Refills    METOPROLOL SUCCINATE ER 25 MG Oral Tablet 24 Hr [Pharmacy Med Name: METOPROLOL SUCCINATE ER 25 MG Tablet Extended Release 24 Hour] 90 tablet 1     Sig: TAKE 1 TABLET EVERY NIGHT       Hypertensive Medications Protocol Failed - 9/30/2023  2:26 AM        Failed - Last BP reading less than 140/90     BP Readings from Last 1 Encounters:  07/13/23 : 158/83              Passed - In person appointment in the past 12 or next 3 months     Recent Outpatient Visits              1 month ago Essential hypertension    [address] [place], [place] [address]Maxim MD    [employer]    2 months ago Essential hypertension    Sara Wyatt MD    Office Visit    3 months ago Type 2 diabetes mellitus with hyperglycemia, with long-term current use of insulin ([facility] [address]    [facility]    [employer] [facility]    3 months ago Essential hypertension    Sara Wyatt MD    Office Visit    4 months ago Left upper quadrant abdominal pain    [address] [place], [place] [address]Erick MD    Office Visit          Future Appointments         Provider Department Appt Notes    In 1 week [address]     In 3 weeks MD Ila Schroeder [facility], [address] [place] [address], [place] Emily's patient                    Passed - CMP or BMP in past 6 months     Recent Results (from the past 4392 hour(s))   Comp Metabolic Panel (14)    Collection Time: 07/13/23 11:21 AM   Result Value Ref Range    Glucose 197 (H) 70 - 99 mg/dL    Sodium 139 136 - 145 mmol/L    Potassium 4.2 3.5 - 5.1 mmol/L    Chloride 105 98 - 112 mmol/L    CO2 26.0 21.0 - 32.0 mmol/L    Anion Gap 8 0 - 18 mmol/L    BUN 12 7 - 18 mg/dL    Creatinine 0.85 0.55 - 1.02 mg/dL    BUN/CREA Ratio 14.1 10.0 - 20.0    Calcium, Total 9.4 8.5 - 10.1 mg/dL    Calculated Osmolality 293 275 - 295 mOsm/kg    eGFR-Cr 81 >=60 mL/min/1.73m2    ALT 30 13 - 56 U/L    AST 20 15 - 37 U/L    Alkaline Phosphatase 110 (H) 41 - 108 U/L    Bilirubin, Total 0.5 0.1 - 2.0 mg/dL    Total Protein 7.9 6.4 - 8.2 g/dL    Albumin 3.8 3.4 - 5.0 g/dL    Globulin  4.1 2.8 - 4.4 g/dL    A/G Ratio 0.9 (L) 1.0 - 2.0    Patient Fasting for CMP? Yes      *Note: Due to a large number of results and/or encounters for the requested time period, some results have not been displayed. A complete set of results can be found in Results Review.                Passed - In person appointment or virtual visit in the past 6 months     Recent Outpatient Visits              1 month ago [facility]    [address]    [employer] [facility]    2 months ago Essential hypertension    [address] [facility], Bella Osullivan MD    Office Visit    3 months ago Type 2 diabetes mellitus with hyperglycemia, with long-term current use of insulin [facility]    [address] [facility], Daymon Gaucher, MD    [employer]    3 months ago Essential hypertension    [facility] [facility] Radha Taylor MD    Office Visit    4 months ago Left upper quadrant abdominal pain    [address] [facility], Georgine Cheadle, MD    Office Visit          Future Appointments         Provider Department Appt Notes    In 1 week [address]     In 3 weeks [facility], Lina Diaz's patient                    Passed - [facility] or [facility] > 50     GFR Evaluation  EGFRCR: 81 , resulted on 7/13/2023            METOPROLOL SUCCINATE  MG Oral Tablet 24 Hr [Pharmacy Med Name: METOPROLOL SUCCINATE  MG Tablet Extended Release 24 Hour] 90 tablet 1     Sig: TAKE 1 TABLET EVERY DAY       Hypertensive Medications Protocol Failed - 9/30/2023  2:26 AM        Failed - Last BP reading less than 140/90     BP Readings from Last 1 Encounters:  07/13/23 : 158/83              Passed - In person appointment in the past 12 or next 3 months     Recent Outpatient Visits              1 month ago Essential hypertension    [address] [facility], Kendal Devi MD    [employer]    2 months ago Essential hypertension    [facility]    Office Visit    3 months ago Type 2 diabetes mellitus with hyperglycemia, with long-term current use of insulin ([address]    [address] [facility], Berenice Land MD    [employer]    3 months ago Essential hypertension    [facility]    Office Visit    4 months ago Left upper quadrant abdominal pain    [address] [facility], Carlitos Mondragon MD    Office Visit          Future Appointments         Provider Department Appt Notes    In 1 week [address]     In 3 weeks MD [facility]as [facility], [place] [address] [facility] Emily's patient                    Passed - CMP or BMP in past 6 months     Recent Results (from the past 4392 hour(s))   Comp Metabolic Panel (14)    Collection Time: 07/13/23 11:21 AM   Result Value Ref Range    Glucose 197 (H) 70 - 99 mg/dL    Sodium 139 136 - 145 mmol/L    Potassium 4.2 3.5 - 5.1 mmol/L    Chloride 105 98 - 112 mmol/L    CO2 26.0 21.0 - 32.0 mmol/L    Anion Gap 8 0 - 18 mmol/L    BUN 12 7 - 18 mg/dL    Creatinine 0.85 0.55 - 1.02 mg/dL    BUN/CREA Ratio 14.1 10.0 - 20.0    Calcium, Total 9.4 8.5 - 10.1 mg/dL    Calculated Osmolality 293 275 - 295 mOsm/kg    eGFR-Cr 81 >=60 mL/min/1.73m2    ALT 30 13 - 56 U/L    AST 20 15 - 37 U/L    Alkaline Phosphatase 110 (H) 41 - 108 U/L    Bilirubin, Total 0.5 0.1 - 2.0 mg/dL    Total Protein 7.9 6.4 - 8.2 g/dL    Albumin 3.8 3.4 - 5.0 g/dL    Globulin  4.1 2.8 - 4.4 g/dL    A/G Ratio 0.9 (L) 1.0 - 2.0    Patient Fasting for CMP? Yes      *Note: Due to a large number of results and/or encounters for the requested time period, some results have not been displayed. A complete set of results can be found in Results Review.                Passed - In person appointment or virtual visit in the past 6 months     Recent Outpatient Visits              1 month ago Essential hypertension    [address] [facility], Rajan Padilla MD    [employer]    2 months ago Essential hypertension    Denise Hill MD    Office Visit    3 months ago Type 2 diabetes mellitus with hyperglycemia, with long-term current use of insulin ([facility] [address]    [address] [facility], Ute Vitale MD    [employer]    3 months ago Essential hypertension    Lina Hill MD    Office Visit    4 months ago Left upper quadrant abdominal pain    [address] [facility], Tej Frederick MD    Office Visit          Future Appointments         Provider Department Appt Notes    In 1 week [address]     In 3 weeks Christen Gardner MD [facility], [address] [place] [address], Streetermohit Diaz's patient                    Passed - [facility] or [facility] > 50     GFR Evaluation  EGFRCR: 81 , resulted on 7/13/2023            LOSARTAN 100 MG Oral Tab [Pharmacy Med Name: LOSARTAN POTASSIUM 100 MG Tablet] 90 tablet 1     Sig: TAKE 1 TABLET EVERY DAY       Hypertensive Medications Protocol Failed - 9/30/2023  2:26 AM        Failed - Last BP reading less than 140/90     BP Readings from Last 1 Encounters:  07/13/23 : 158/83              Passed - In person appointment in the past 12 or next 3 months     Recent Outpatient Visits              1 month ago Essential hypertension    [address], [facility], Kelly Baker MD    [employer]    2 months ago Essential hypertension    Lina Espinoza MD    Office Visit    3 months ago Type 2 diabetes mellitus with hyperglycemia, with long-term current use of insulin ([facility] [address]    [address] [facility], Rachna Roca MD    [employer] E/M    3 months ago Essential hypertension    Lina Espinoza MD    Office Visit    4 months ago Left upper quadrant abdominal pain    [address] [facility], Edwin Vargas MD    Office Visit          Future Appointments         Provider Department Appt Notes    In 1 week [address]     In 3 weeks Randal Renee MD [facility], [address] [place] [address], [facility]'s patient                    Passed - CMP or BMP in past 6 months     Recent Results (from the past 4392 hour(s))   Comp Metabolic Panel (14)    Collection Time: 07/13/23 11:21 AM   Result Value Ref Range    Glucose 197 (H) 70 - 99 mg/dL    Sodium 139 136 - 145 mmol/L    Potassium 4.2 3.5 - 5.1 mmol/L    Chloride 105 98 - 112 mmol/L    CO2 26.0 21.0 - 32.0 mmol/L    Anion Gap 8 0 - 18 mmol/L    BUN 12 7 - 18 mg/dL    Creatinine 0.85 0.55 - 1.02 mg/dL    BUN/CREA Ratio 14.1 10.0 - 20.0    Calcium, Total 9.4 8.5 - 10.1 mg/dL    Calculated Osmolality 293 275 - 295 mOsm/kg    eGFR-Cr 81 >=60 mL/min/1.73m2    ALT 30 13 - 56 U/L    AST 20 15 - 37 U/L    Alkaline Phosphatase 110 (H) 41 - 108 U/L    Bilirubin, Total 0.5 0.1 - 2.0 mg/dL    Total Protein 7.9 6.4 - 8.2 g/dL    Albumin 3.8 3.4 - 5.0 g/dL    Globulin  4.1 2.8 - 4.4 g/dL    A/G Ratio 0.9 (L) 1.0 - 2.0    Patient Fasting for CMP? Yes      *Note: Due to a large number of results and/or encounters for the requested time period, some results have not been displayed. A complete set of results can be found in Results Review.                Passed - In person appointment or virtual visit in the past 6 months     Recent Outpatient Visits              1 month ago Essential hypertension    [facility]    [employer] [facility]    2 months ago Essential hypertension    Ermias Farah MD    Office Visit    3 months ago Type 2 diabetes mellitus with hyperglycemia, with long-term current use of insulin ([facility] [address]    [address] [facility]    [employer] [facility]    3 months ago Essential hypertension    Lina Farah MD    Office Visit    4 months ago Left upper quadrant abdominal pain    [address] [place], [place] [address]Carlitos MD    Office Visit          Future Appointments         Provider Department Appt Notes    In 1 week [address]     In 3 weeks MD Geronimo [facility], [facility], [place] Emily's patient                    Passed - [facility] or [facility] > 50     GFR Evaluation  EGFRCR: 81 , resulted on 7/13/2023            LEVETIRACETAM 500 MG Oral Tab [Pharmacy Med Name: LEVETIRACETAM 500 MG Tablet] 180 tablet 1     Sig: TAKE 1 [facility]       Neurology Medications Passed - 9/30/2023  2:26 AM        Passed - In person appointment or virtual visit in the past 6 mos or appointment in next 3 mos     Recent Outpatient Visits              1 month ago Essential hypertension    [address], [facility], [place] Coralyn Marry, MD    [employer]    2 months ago Essential hypertension    Ermias Farah MD    Office Visit    3 months ago Type 2 diabetes mellitus with hyperglycemia, with long-term current use of insulin ([address]    Berenice Sauceda MD    [employer]    3 months ago Essential hypertension    Ermias Farah MD    Office Visit    4 months ago Left upper quadrant abdominal pain    Carlitos Sauceda MD    Office Visit          Future Appointments         Provider Department Appt Notes    In 1 week [address]     In 3 weeks Ana Smyth MD [facility], [address] [place] [address], [facility]'s patient                      AMLODIPINE 5 MG Oral Tab [Pharmacy Med Name: AMLODIPINE BESYLATE 5 MG Tablet] 180 tablet 1     Sig: TAKE 2 TABLETS EVERY DAY       Hypertensive Medications Protocol Failed - 9/30/2023  2:26 AM        Failed - Last BP reading less than 140/90     BP Readings from Last 1 Encounters:  07/13/23 : 158/83              Passed - In person appointment in the past 12 or next 3 months     Recent Outpatient Visits              1 month ago Essential hypertension    Denis Bearded, [place] Coralyn Marry, MD    [employer] [facility]    2 months ago Essential hypertension    Ermias Farah MD    Office Visit    3 months ago Type 2 diabetes mellitus with hyperglycemia, with long-term current use of insulin ([address]    Berenice Sauceda MD    [employer] E[employer]    3 months ago Essential hypertension    [employer] Larry Sánchez Seltjarnarnes, MD    Office Visit    4 months ago Left upper quadrant abdominal pain    Aaron Sanford MD    Office Visit          Future Appointments         Provider Department Appt Notes    In 1 week [address]     In 3 weeks [facility] [place] [address] [facility] Emily's patient                    Passed - CMP or BMP in past 6 months     Recent Results (from the past 4392 hour(s))   Comp Metabolic Panel (14)    Collection Time: 07/13/23 11:21 AM   Result Value Ref Range    Glucose 197 (H) 70 - 99 mg/dL    Sodium 139 136 - 145 mmol/L    Potassium 4.2 3.5 - 5.1 mmol/L    Chloride 105 98 - 112 mmol/L    CO2 26.0 21.0 - 32.0 mmol/L    Anion Gap 8 0 - 18 mmol/L    BUN 12 7 - 18 mg/dL    Creatinine 0.85 0.55 - 1.02 mg/dL    BUN/CREA Ratio 14.1 10.0 - 20.0    Calcium, Total 9.4 8.5 - 10.1 mg/dL    Calculated Osmolality 293 275 - 295 mOsm/kg    eGFR-Cr 81 >=60 mL/min/1.73m2    ALT 30 13 - 56 U/L    AST 20 15 - 37 U/L    Alkaline Phosphatase 110 (H) 41 - 108 U/L    Bilirubin, Total 0.5 0.1 - 2.0 mg/dL    Total Protein 7.9 6.4 - 8.2 g/dL    Albumin 3.8 3.4 - 5.0 g/dL    Globulin  4.1 2.8 - 4.4 g/dL    A/G Ratio 0.9 (L) 1.0 - 2.0    Patient Fasting for CMP? Yes      *Note: Due to a large number of results and/or encounters for the requested time period, some results have not been displayed. A complete set of results can be found in Results Review.                Passed - In person appointment or virtual visit in the past 6 months     Recent Outpatient Visits              1 month ago Essential hypertension    [address] [facility], [place] Jewell Guard, MD    [employer]    2 months ago Essential hypertension    Lina Campos MD    Office Visit    3 months ago Type 2 diabetes mellitus with hyperglycemia, with long-term current use of insulin (HCC)    Dominic Shields MD    [employer] E[employer]    3 months ago Essential hypertension    Lina Espinoza MD    Office Visit    4 months ago Left upper quadrant abdominal pain    Edwin Cervantes MD    Office Visit          Future Appointments         Provider Department Appt Notes    In 1 week [address]     In 3 weeks Randal Renee MD [address], [address] [facility], [place] Emily's patient                    Passed - [facility] or [facility] > 50     GFR Evaluation  EGFRCR: 81 , resulted on 7/13/2023

## 2023-10-05 ENCOUNTER — TELEPHONE (OUTPATIENT)
Dept: ENDOCRINOLOGY | Facility: HOSPITAL | Age: 53
End: 2023-10-05

## 2023-10-05 NOTE — TELEPHONE ENCOUNTER
Navigator schedule transportation for upcoming mammogram appointment.     10/12/2023 1:00 pm   [address] #: [id number]

## 2023-10-11 ENCOUNTER — TELEPHONE (OUTPATIENT)
Dept: ENDOCRINOLOGY CLINIC | Facility: CLINIC | Age: 53
End: 2023-10-11

## 2023-10-11 NOTE — TELEPHONE ENCOUNTER
Pt reported hypoglycemia 9/14/23 and medication was adjusted as below. RN reached out to patient to follow up. Called, no answer. Left detailed message to call back to give an update on her glucose. Seg[employer] message also sent.          Per 9/14/23 TE:     \"-decrease Lantus 30 --> 24 units at night     -continue with Humalog 15 (when pre-meal glucose is >200), 20 units when pre-meal glucose is >300 units      - continue with Ozempic 0.5 mg every other week     - continue with Metformin 1000 mg BID\"

## 2023-10-12 NOTE — TELEPHONE ENCOUNTER
Tried calling patient again but no answer. Noted below update. Pt will be seeing Dr. Paola Garcia as below.      Future Appointments   Date Time Provider Joan Monterroso   10/24/2023 11:30 AM Christen Gardner MD [facility]

## 2023-10-23 RX ORDER — SEMAGLUTIDE 1.34 MG/ML
1 INJECTION, SOLUTION SUBCUTANEOUS WEEKLY
Qty: 9 ML | Refills: 0 | Status: SHIPPED | OUTPATIENT
Start: 2023-10-23

## 2023-10-26 ENCOUNTER — NURSE TRIAGE (OUTPATIENT)
Dept: INTERNAL MEDICINE CLINIC | Facility: CLINIC | Age: 53
End: 2023-10-26

## 2023-10-26 ENCOUNTER — HOSPITAL ENCOUNTER (OUTPATIENT)
Age: 53
Discharge: HOME | End: 2023-10-26

## 2023-10-26 ENCOUNTER — PATIENT OUTREACH (OUTPATIENT)
Dept: CASE MANAGEMENT | Age: 53
End: 2023-10-26

## 2023-10-26 ENCOUNTER — TELEPHONE (OUTPATIENT)
Dept: ENDOCRINOLOGY CLINIC | Facility: CLINIC | Age: 53
End: 2023-10-26

## 2023-10-26 VITALS
RESPIRATION RATE: 22 BRPM | HEIGHT: 62 IN | DIASTOLIC BLOOD PRESSURE: 74 MMHG | OXYGEN SATURATION: 98 % | HEART RATE: 76 BPM | WEIGHT: 185 LBS | BODY MASS INDEX: 34.04 KG/M2 | SYSTOLIC BLOOD PRESSURE: 157 MMHG | TEMPERATURE: 99 F

## 2023-10-26 DIAGNOSIS — Z98.890 HISTORY OF CRANIOTOMY: ICD-10-CM

## 2023-10-26 DIAGNOSIS — R60.0 EDEMA OF BOTH LEGS: ICD-10-CM

## 2023-10-26 DIAGNOSIS — I10 ESSENTIAL HYPERTENSION: ICD-10-CM

## 2023-10-26 DIAGNOSIS — U07.1 COVID-19: ICD-10-CM

## 2023-10-26 DIAGNOSIS — E66.01 MORBID (SEVERE) OBESITY DUE TO EXCESS CALORIES (HCC): ICD-10-CM

## 2023-10-26 DIAGNOSIS — Z20.822 ENCOUNTER FOR LABORATORY TESTING FOR COVID-19 VIRUS: Primary | ICD-10-CM

## 2023-10-26 DIAGNOSIS — E78.00 PURE HYPERCHOLESTEROLEMIA: ICD-10-CM

## 2023-10-26 DIAGNOSIS — H66.002 NON-RECURRENT ACUTE SUPPURATIVE OTITIS MEDIA OF LEFT EAR WITHOUT SPONTANEOUS RUPTURE OF TYMPANIC MEMBRANE: ICD-10-CM

## 2023-10-26 DIAGNOSIS — R40.4 TRANSIENT ALTERATION OF AWARENESS: ICD-10-CM

## 2023-10-26 DIAGNOSIS — Z79.4 TYPE 2 DIABETES MELLITUS WITH OTHER NEUROLOGIC COMPLICATION, WITH LONG-TERM CURRENT USE OF INSULIN (HCC): Primary | ICD-10-CM

## 2023-10-26 DIAGNOSIS — E11.49 TYPE 2 DIABETES MELLITUS WITH OTHER NEUROLOGIC COMPLICATION, WITH LONG-TERM CURRENT USE OF INSULIN (HCC): Primary | ICD-10-CM

## 2023-10-26 DIAGNOSIS — Z79.4 TYPE 2 DIABETES MELLITUS WITH HYPERGLYCEMIA, WITH LONG-TERM CURRENT USE OF INSULIN (HCC): ICD-10-CM

## 2023-10-26 DIAGNOSIS — R10.12 LEFT UPPER QUADRANT ABDOMINAL PAIN: ICD-10-CM

## 2023-10-26 DIAGNOSIS — M54.2 NECK PAIN: ICD-10-CM

## 2023-10-26 DIAGNOSIS — E78.5 DYSLIPIDEMIA: ICD-10-CM

## 2023-10-26 DIAGNOSIS — N18.30 CKD STAGE 3 DUE TO TYPE 1 DIABETES MELLITUS (HCC): ICD-10-CM

## 2023-10-26 DIAGNOSIS — E11.65 TYPE 2 DIABETES MELLITUS WITH HYPERGLYCEMIA, WITH LONG-TERM CURRENT USE OF INSULIN (HCC): ICD-10-CM

## 2023-10-26 DIAGNOSIS — M72.2 PLANTAR FASCIITIS OF LEFT FOOT: ICD-10-CM

## 2023-10-26 DIAGNOSIS — E10.22 CKD STAGE 3 DUE TO TYPE 1 DIABETES MELLITUS (HCC): ICD-10-CM

## 2023-10-26 LAB — SARS-COV-2 RNA RESP QL NAA+PROBE: DETECTED

## 2023-10-26 RX ORDER — AMOXICILLIN AND CLAVULANATE POTASSIUM 875; 125 MG/1; MG/1
1 TABLET, FILM COATED ORAL 2 TIMES DAILY
Qty: 20 TABLET | Refills: 0 | Status: SHIPPED | OUTPATIENT
Start: 2023-10-26 | End: 2023-11-05

## 2023-10-26 RX ORDER — BENZONATATE 200 MG/1
200 CAPSULE ORAL 3 TIMES DAILY PRN
Qty: 30 CAPSULE | Refills: 0 | Status: SHIPPED | OUTPATIENT
Start: 2023-10-26

## 2023-10-26 NOTE — TELEPHONE ENCOUNTER
CCM spoke to patient for monthly outreach   Informed patient of missed appointment - she stated she missed appointment due to fever/cough/sore throat and was not able to attend, she is hoping to speak with someone about rescheduling. She requested CCM reach out to notify Dr. Rona Woody . Please advise.

## 2023-10-26 NOTE — TELEPHONE ENCOUNTER
Action Requested: Summary for Provider     []  Critical Lab, Recommendations Needed  [] Need Additional Advice  [x]   FYI    []   Need Orders  [] Need Medications Sent to Pharmacy  []  Other     SUMMARY: I called patient, she states she [place] about a month ago from [facility]. She sounds weak. She has periods of whimpering while speaking. She did not take Covid test yet. She reports the following: Date of ONSET: 10/23/23. CURRENTLY: Cough: present--> intermittent, dry. SOB/Chest tightness/Chest pain: denies; however sounds a bit dyspneic while talking. Headache: denies. Fever: upon onset for 2 days, denies any fever now. Body Ache: present--> Moderate to severe. Fatigue: denies. Nausea/Vomiting/Diarrhea: diarrhea present yesterday, has subsided. Some nausea present and denies vomiting. Nasal: nasal congestion present. Denies any rhinorrhea. Taste/Smell: denies loss of smell, has lost her taste. Throat: sore throat, she is able swallow. Ears: earache present bilaterally. Eyes: burning and watering. Skin: denies. Appetite/Hydrating: Appetite--> decreased; Hydration--> drinking about 72-96 oz, she will drink some half-strength sugar-free Gatorade, mouth is dry--> tongue is parched. Urine output is decreased. Supportive MEDICATIONS: tylenol OTC as directed. She was advised to go to IC now. She was agreeable, she states she will need to find a ride. I made her aware she needs to go tonight for possible dehydration and other symptoms reported. She was advised to go to ED if she is not able to make it in time to go to IC. Patient instructed any new or worsening symptoms [reviewed] seek immediate medical attention--> ED. Patient verbalized understanding. No further questions or concerns at this time.     Reason for call: Cough, Sore Throat, and Dehydration (/)  Onset: 10/23/23    Reason for Disposition   Patient sounds very sick or weak to the triager    Protocols used: Cough-A-OH

## 2023-10-26 NOTE — TELEPHONE ENCOUNTER
Spoke to patient for CCM -   Patient c/o fever , sore throat and cough , stated it started 2 or 3 days ago , Taking tylenol for fever/pain. Patient stated she is taking halls for cough but would like to know if PCP can prescribe cough medication . Patient was yawning and inattentive during call, reports she is not sleeping well at night due to cough . Also patient stopped ozempic- stated she does not recall when, she stated she is taking all other medications. Patient stated she is still having a hard time with appointments due to transportation . ([facility] assisted with providing patient resources for transportation but she was in [place] and has not been able to call to see if she qualifies. )    Please Advise .

## 2023-10-26 NOTE — PROGRESS NOTES
Spoke to [facility] for CCM. Updates to patient care team/comments: UTD  Patient reported changes in medications: unable to review- patient reported no changes. Med Adherence  Comment: Patient reported she stopped ozempic- she does not recall when she stated she is taking all other medications daily. Patient updates/concerns:      Patient c/o fever , sore throat and cough , stated it started 2 or 3 days ago , she is not sleeping well at night due to cough . Patient stated she is taking halls for cough but would like to know if PCP can prescribe cough medication . Patient stated she is still having a hard time with appointments due to transportation . Patient stated she had an appointment with dentist but was not able to attend due to feeling sick . CCM reminded patient that she is to reschedule appointment for follow up appointment with [place],    She is hoping she doesn't get charged. Requesting CCM reach out to inform office so they can reschedule. Patient stated she is not eating well she has not had much of an appetite due to cold. CCM encouraged patient to stay well hydrated. Patient stated she is checking glucose and it has been WNL , she was not able to report any readings at this time. Patient stated she stopped ozempic , she stated she does not remember when she stopped it . Goals/Action Plan: Active goal from previous outreach:     Patient reported progress towards goals:   stated glucose is WNL . Patient was not able to report any progress at this time                - What:  Patient will work on attending appointments           - Where/When/How:  apply for assistance , ccm reminded [facility] see if she qualifies for free rides.    Patient Reported Barriers and Concerns: patient not feeling well and stopped ozempic.                    - Plan for overcoming barriers: Patient plans to continue to perform glucose checks and report any elevated/low  readings outside of  parameters set forth by her physician . CCM will reach out to PCP to advise. Care Managers Interventions: CCM to notify PCP of patient symptoms. CCM to send TE to Dr. Es Parra to assist with rescheduling appointment. CCM listened and provided support. Encouraged patient:   Self care: Take the time to do the things you love. Nutrition:  Good nutrition helps us to maintain our weight, fight off infection, and help reduce the risk of developing other chronic issues. Future Appointments:   Future Appointments   Date Time Provider Joan Monterroso   12/16/2023  2:20 PM TYRA MITCHELL RM1 TYRA MITCHELL EM [facility] Care Manager Follow Up Date: 1 week     Reason For Follow Up: review progress and or barriers towards patient's goals. Time Spent This Encounter Total: 23 min medical record review, telephone communication, care plan updates where needed, education, goals, and action plan recreation/update. Provided acknowledgment and validation to patient's concerns.    Monthly Minute Total including today: 23  Physical assessment, complete health history, and need for CCM established by Bob Xavier MD.

## 2023-10-27 NOTE — DISCHARGE INSTRUCTIONS
Augmentin 1 tablet twice per day for 10 days with food for ear infection  Tessalon Perles 1 tablet every 8 hours as needed for cough  Steam showers  Push fluids  Please go to ER for chest pain, shortness of breath or worsening symptoms    You tested positive for COVID 19 today  Please quarantine for 5 days, beginning tomorrow.  After the 5 days, you can break quarantine as long as you can wear a mask at all times for an additional 5 days  For chest pain, shortness of breath or worsening symptoms, please go to ER

## 2023-11-08 ENCOUNTER — TELEPHONE (OUTPATIENT)
Dept: INTERNAL MEDICINE CLINIC | Facility: CLINIC | Age: 53
End: 2023-11-08

## 2023-11-08 NOTE — TELEPHONE ENCOUNTER
Pt requesting a sooner appt for IC Followup with Dr Cristal Conway only. Currently scheduled 12-14. Pt had covid .

## 2023-11-10 ENCOUNTER — OFFICE VISIT (OUTPATIENT)
Dept: INTERNAL MEDICINE CLINIC | Facility: CLINIC | Age: 53
End: 2023-11-10

## 2023-11-10 VITALS
HEART RATE: 65 BPM | BODY MASS INDEX: 35.51 KG/M2 | HEIGHT: 62 IN | SYSTOLIC BLOOD PRESSURE: 168 MMHG | DIASTOLIC BLOOD PRESSURE: 80 MMHG | WEIGHT: 193 LBS

## 2023-11-10 DIAGNOSIS — Z79.4 TYPE 2 DIABETES MELLITUS WITH OTHER NEUROLOGIC COMPLICATION, WITH LONG-TERM CURRENT USE OF INSULIN (HCC): ICD-10-CM

## 2023-11-10 DIAGNOSIS — I10 ESSENTIAL HYPERTENSION: Primary | ICD-10-CM

## 2023-11-10 DIAGNOSIS — R05.9 COUGH, UNSPECIFIED TYPE: ICD-10-CM

## 2023-11-10 DIAGNOSIS — E11.49 TYPE 2 DIABETES MELLITUS WITH OTHER NEUROLOGIC COMPLICATION, WITH LONG-TERM CURRENT USE OF INSULIN (HCC): ICD-10-CM

## 2023-11-10 PROCEDURE — 3008F BODY MASS INDEX DOCD: CPT | Performed by: INTERNAL MEDICINE

## 2023-11-10 PROCEDURE — 3077F SYST BP >= 140 MM HG: CPT | Performed by: INTERNAL MEDICINE

## 2023-11-10 PROCEDURE — 3079F DIAST BP 80-89 MM HG: CPT | Performed by: INTERNAL MEDICINE

## 2023-11-10 PROCEDURE — 99214 OFFICE O/P EST MOD 30 MIN: CPT | Performed by: INTERNAL MEDICINE

## 2023-11-10 RX ORDER — HYDRALAZINE HYDROCHLORIDE 25 MG/1
TABLET, FILM COATED ORAL
Qty: 180 TABLET | Refills: 0 | Status: SHIPPED | OUTPATIENT
Start: 2023-11-10

## 2023-11-10 NOTE — PROGRESS NOTES
Subjective:   Patient ID: Jose Antonio Russell is a 52year old female. Chief Complaint   Patient presents with    Urgent Care F/u     Seen at [address] [place] [address] Urgent Care on 10/26/23 due to cough and was tested positive for COVID. Stts today no fever, chills, chest congestion or wheezing. Stts she feels better. Pt f/u visit    Chief Complaint   Patient presents with    Urgent Care F/u     Seen at [address] [place] [address] Urgent Care on 10/26/23 due to cough and was tested positive for COVID. Stts today no fever, chills, chest congestion or wheezing. Stts she feels better. Patient stat feels better and denies cough or wheezing per pt bp at home is  good in systolic  245 s range    patient states she has been checking her blood pressure at home and is in good level usually systolic is in the 622P over 80s. Patient states she is feeling well denies any headaches dizziness or chest pain shortness of breath dyspnea on exertion. She did not bring the blood pressure monitor today but was checked previously and it was comparable to ours. Patient states her blood sugars elevated but improving patient states she cut down on the insulin she states that another doctor recommended her to cut down on insulin -? She is taking Lantus insulin about 25 to 30 units instead of 55 once at night   She is also taking Humalog about 15 units 3 times daily with meals instead of 20-23 to 25 units. Patient is not taking Ozempic as she was due to nausea and vomiting   Has  scheduled appointment with her endocrinologist in few days  -for elevated sugar patient is fasting today so we are going to check the sugars with A1c - her last A1c was 9.6 in February   patient states she had another A1c 11.0 7/23   Patient states feeling well otherwise. Denies chest pain, shortnesss of breath, palpitations, or abdominal pain, denies UTI symptoms fever or chills. Diabetes  She presents for her follow-up diabetic visit. She has type 2 diabetes mellitus. Hypoglycemia symptoms include headaches (occasional sumatriptan  helps -  feels well today  ). Pertinent negatives for hypoglycemia include no dizziness, nervousness/anxiousness or seizures. Pertinent negatives for diabetes include no chest pain. Current diabetic treatment includes insulin injections. Meal planning includes avoidance of concentrated sweets. Her overall blood glucose range is 180-200 mg/dl. An ACE inhibitor/angiotensin II receptor blocker is being taken. Eye exam is current. Hypertension  This is a chronic problem. The problem has been gradually improving since onset. Associated symptoms include headaches (occasional sumatriptan  helps -  feels well today  ). Pertinent negatives include no anxiety, chest pain, orthopnea, palpitations, peripheral edema, PND or shortness of breath. Risk factors for coronary artery disease include obesity, post-menopausal state and dyslipidemia. Past treatments include lifestyle changes, diuretics, beta blockers and central alpha agonists. The current treatment provides significant improvement. Compliance problems include diet (medications labs and  visits  ). Medication Request   headaches (occasional sumatriptan  helps -  feels well today  ). Pertinent negatives include no chest pain, congestion, coughing, nausea or vomiting. History/Other:   Review of Systems   HENT: Negative for congestion, sinus pressure, sinus pain and sneezing. Eyes: Negative for pain, redness and visual disturbance (has  cataracts bill   eyes - seen  Ophtalmologist  - 2/22/23   Respiratory: Negative for cough, chest tightness and shortness of breath. Cardiovascular: Negative for chest pain, palpitations, orthopnea, leg swelling and PND. Gastrointestinal: Negative for abdominal distention, anal bleeding, blood in stool, constipation, diarrhea, nausea and vomiting. Skin: Negative for color change and wound. Neurological: negative headache .  Negative for dizziness, seizures and light-headedness. Psychiatric/Behavioral: Negative for decreased concentration and sleep disturbance. The patient is not nervous/anxious. Current Outpatient Medications   Medication Sig Dispense Refill    hydrALAZINE 25 MG Oral Tab Take  orally    25  mg 3  tab = 75  mg  twice daily 180 tablet 0    metoprolol succinate ER 25 MG Oral Tablet 24 Hr Take 1 tablet (25 mg total) by mouth nightly. 90 tablet 1    metoprolol succinate  MG Oral Tablet 24 Hr Take 1 tablet (100 mg total) by mouth daily. 90 tablet 1    losartan 100 MG Oral Tab Take 1 tablet (100 mg total) by mouth daily. 90 tablet 1    atorvastatin 80 MG Oral Tab Take 1 tablet (80 mg total) by mouth nightly. 90 tablet 0    amLODIPine 5 MG Oral Tab TAKE 2 TABLETS EVERY  tablet 1    levETIRAcetam 500 MG Oral Tab TAKE 1 TABLET TWICE DAILY 180 tablet 1    Blood Glucose Monitoring Suppl (TRUE METRIX METER) w/Device Does not apply Kit Use to check blood sugar three times a day 1 kit 0    Glucose Blood (TRUE METRIX BLOOD GLUCOSE TEST) In Vitro Strip Use to check blood sugar three times a day 300 strip 0    TRUEplus Lancets 33G Does not apply Misc Use to check blood sugar three times a day 300 each 0    insulin glargine (LANTUS SOLOSTAR) 100 UNIT/ML Subcutaneous Solution Pen-injector Inject 20-25 Units into the skin nightly. insulin aspart (NOVOLOG FLEXPEN) 100 Units/mL Subcutaneous Solution Pen-injector Inject 10-15 Units into the skin 3 (three) times daily with meals. ondansetron 4 MG Oral Tablet Dispersible Take 1 tablet (4 mg total) by mouth every 8 (eight) hours as needed for Nausea. 30 tablet 0    ergocalciferol 1.25 MG (10690 UT) Oral Cap Take 1 capsule (50,000 Units total) by mouth every 30 (thirty) days.  For 3 months 1 capsule 2    Insulin Pen Needle (BD PEN NEEDLE ABDOULAYE 2ND GEN) 32G X 4 MM Does not apply Misc INJECT FOUR TIMES DAILY AS DIRECTED 40 each 1    Omeprazole 40 MG Oral Capsule Delayed Release TAKE 1 CAPSULE EVERY DAY 30 TO 60 MINUTES BEFORE A MEAL 90 capsule 3    Alcohol Swabs (DROPSAFE ALCOHOL PREP) 70 % Does not apply Pads USE AS DIRECTED 100 each 0    Blood Glucose Monitoring Suppl (ACCU-CHEK GUIDE) w/Device Does not apply Kit Use to check blood sugar three times a day 1 kit 0    Accu-Chek FastClix Lancets Does not apply Misc Use to check blood sugar three times a day 300 each 0    METFORMIN 500 MG Oral Tab TAKE 2 TABLETS(1000 MG) BY MOUTH TWICE DAILY WITH MEALS 360 tablet 0    escitalopram 10 MG Oral Tab Take 1 tablet (10 mg total) by mouth daily. 90 tablet 3    hydroCHLOROthiazide 25 MG Oral Tab Take 1 tablet (25 mg total) by mouth daily. 90 tablet 3    Rizatriptan Benzoate 10 MG Oral Tab Take 1 tablet (10 mg total) by mouth as needed for Migraine. This may be repeated 2 hours later. 12 tablet 1    fluticasone propionate 50 MCG/ACT Nasal Suspension 2 sprays by Nasal route daily. 48 g 1    VENTOLIN  (90 Base) MCG/ACT Inhalation Aero Soln Inhale 2 puffs into the lungs every 6 (six) hours as needed for Wheezing. 18 g 1    aspirin 325 MG Oral Tab Take 1 tablet (325 mg total) by mouth daily. semaglutide (OZEMPIC, 1 MG/DOSE,) 4 MG/3ML Subcutaneous Solution Pen-injector Inject 1 mg into the skin once a week. (Patient not taking: Reported on 11/10/2023) 9 mL 0    Glucose Blood (ACCU-CHEK GUIDE) In Vitro Strip Use to check blood sugar three times a day 300 strip 0    docusate sodium 100 MG Oral Tab Take 1 tablet (100 mg total) by mouth 2 (two) times daily as needed for constipation. (Patient not taking: Reported on 11/10/2023) 60 tablet 1    LANTUS SOLOSTAR 100 UNIT/ML Subcutaneous Solution Pen-injector INJECT 58 UNITS INTO THE SKIN NIGHTLY. (Patient not taking: Reported on 11/10/2023) 60 mL 0     Allergies: Allergies   Allergen Reactions    Reglan [Metoclopramide] OTHER (SEE COMMENTS)     Sensitivity, with crawling sensation.     Adhesive Tape      itching       Objective:     Physical Exam  Vitals and nursing note reviewed. Constitutional:       General: She is not in acute distress. Appearance: She is well-developed. She is obese. Interventions: Face mask in place. HENT:      Head: Normocephalic and atraumatic. Neck:      Thyroid: No thyromegaly. Vascular: No JVD. Cardiovascular:      Rate and Rhythm: Normal rate and regular rhythm. Heart sounds: Normal heart sounds. No murmur heard. Pulmonary:      Effort: Pulmonary effort is normal. No respiratory distress. Breath sounds: Normal breath sounds. No wheezing or rales. Abdominal:      Palpations: Abdomen is soft. There is no hernia     Tenderness: There is no abdominal tenderness. There is no right CVA tenderness, left CVA tenderness, guarding or rebound. Comments:      Musculoskeletal:      Cervical back: Neck supple. Right lower leg: No edema. Left lower leg: No edema. Lymphadenopathy:      Cervical: No cervical adenopathy. Skin:     General: Skin is warm and dry. Mental Status: She is alert and oriented to person, place, and time. Psychiatric:         Mood and Affect: Mood is not anxious or depressed      Behavior: Behavior normal.       Blood pressure (!) 168/80, pulse 65, height 5' 2\" (1.575 m), weight 193 lb (87.5 kg), not currently breastfeeding. Assessment & Plan:       1. Cough URI sy  COVID 19  Covid 19 + 10/24 /23  Ear pain -resolved    resolved  sy    Finish antibiotic augmentin bid   Avoid  decongestants otc due to elevated  BP   Rest fluids   Resolved sy      2. Diabetes Mellitus Type 2, Uncontrolled    -HgA1c- 9.1%-->11.0    labs  discussed    using freestyle maximo   -Discussed importance of low CHO diet- recommend 135 grams total per day, 45 grams per meal.   Patient seen endocrinology  and have a medication adjusted :  - Increase Lantus to 50 units to take at night subcutaneous daily   - taking  Novolog 20-25 units bid with meals   at night  Use  5 u -  Due to lower sugars  In the meddle of the morning - stable now  -Continue with Freestyle maximo 2- Reviewed importance of bringing reader to all appointments.   -Continue Metformin 1000mg PO BID  food   Patient education continue with low sugar and carbohydrate diet keep with good water hydration patient verbalized understanding and compliance  Patient states she was advised by another doctor to decrease the dosage of the insulin so she is taking Lantus insulin about 25 to -30 units at night and she is also cutting down on the Humalog she used to take 20 to 25 units 3 times daily with meals she is taking now about 15 units with meals.   Patient is not taking Ozempic - due to naused and GI sy -couldn't tolerate it   Recommend pt need to adjust her Insulin according to her blood sugars and the blood test -sugars regularly   Patient will schedule appointment with Endo in few days             Essential hypertension with goal blood pressure less than 140/90  Take high blood pressure medication as perscribed   Low- sodium diet   Maintain walking - as tolerated   Track and record blood pressure and heart rate at home   The side effects of medication discussed with patient   Ilosartan 100 mg every day  metoprolol 100 +25 mg =125 mg  every day   Amlodipine 10 mg every day   hydrochlorothiazide 25 mg every day   hydralazine 2 tab 25 =50 mg  mg every day - bid to 75 mg bid - 100 mg if bp still elevated in 1-2 weeks keep  bp 120-130/70-80  range HR 60s -70  Avoid OTC  decongestants       patient agrees with plan verbalized understanding compliance   Recommend patient to follow-up in 2  weeks with a blood pressure readings from home patient states her blood   She will bring the blood pressure monitor   And all medications   next visit time recheck the blood pressure readings  Keep  bp in 120-130/80 hr 60 Range   Call for bp readings in1 week   F/u sooner if any concerns or sy    Cataracts bilaterally  Diabetic  Eye exam per pt done recently Dr Nicole FRIEDMAN 2/23  patient to seen Dr. Quincy Lugo   Diabetic  Eye exam   Patient education          No orders of the defined types were placed in this encounter.       Meds This Visit:  Requested Prescriptions     Signed Prescriptions Disp Refills    hydrALAZINE 25 MG Oral Tab 180 tablet 0     Sig: Take  orally    25  mg 3  tab = 75  mg  twice daily       Imaging & Referrals:  None

## 2023-11-13 ENCOUNTER — MED REC SCAN ONLY (OUTPATIENT)
Dept: INTERNAL MEDICINE CLINIC | Facility: CLINIC | Age: 53
End: 2023-11-13

## 2023-11-15 ENCOUNTER — APPOINTMENT (OUTPATIENT)
Dept: BARIATRICS/WEIGHT MGMT | Age: 53
End: 2023-11-15

## 2023-11-30 ENCOUNTER — OFFICE VISIT (OUTPATIENT)
Dept: ENDOCRINOLOGY CLINIC | Facility: CLINIC | Age: 53
End: 2023-11-30

## 2023-11-30 VITALS
DIASTOLIC BLOOD PRESSURE: 73 MMHG | BODY MASS INDEX: 35.25 KG/M2 | HEART RATE: 84 BPM | HEIGHT: 62.01 IN | WEIGHT: 194 LBS | SYSTOLIC BLOOD PRESSURE: 147 MMHG

## 2023-11-30 DIAGNOSIS — E11.65 UNCONTROLLED TYPE 2 DIABETES MELLITUS WITH HYPERGLYCEMIA (HCC): Primary | ICD-10-CM

## 2023-11-30 LAB
CARTRIDGE LOT#: ABNORMAL NUMERIC
GLUCOSE BLOOD: 303
HEMOGLOBIN A1C: 10.2 % (ref 4.3–5.6)
TEST STRIP LOT #: NORMAL NUMERIC

## 2023-11-30 PROCEDURE — 82947 ASSAY GLUCOSE BLOOD QUANT: CPT

## 2023-11-30 PROCEDURE — 3046F HEMOGLOBIN A1C LEVEL >9.0%: CPT

## 2023-11-30 PROCEDURE — 3008F BODY MASS INDEX DOCD: CPT

## 2023-11-30 PROCEDURE — 99214 OFFICE O/P EST MOD 30 MIN: CPT

## 2023-11-30 PROCEDURE — 3077F SYST BP >= 140 MM HG: CPT

## 2023-11-30 PROCEDURE — 83036 HEMOGLOBIN GLYCOSYLATED A1C: CPT

## 2023-11-30 PROCEDURE — 3078F DIAST BP <80 MM HG: CPT

## 2023-11-30 RX ORDER — FLUCONAZOLE 150 MG/1
150 TABLET ORAL WEEKLY
Qty: 8 TABLET | Refills: 0 | Status: SHIPPED | OUTPATIENT
Start: 2023-11-30 | End: 2024-01-19

## 2023-11-30 RX ORDER — BLOOD SUGAR DIAGNOSTIC
STRIP MISCELLANEOUS
Qty: 300 STRIP | Refills: 0 | Status: SHIPPED | OUTPATIENT
Start: 2023-11-30

## 2023-11-30 RX ORDER — LANCETS
EACH MISCELLANEOUS
Qty: 300 EACH | Refills: 0 | Status: SHIPPED | OUTPATIENT
Start: 2023-11-30

## 2023-11-30 NOTE — PATIENT INSTRUCTIONS
Increase Lantus to 40 units subcutaneous daily     Increase Humalog to 30 units with breakfast and dinner and 20 units with lunch     Start Farxiga 10mg PO daily

## 2023-12-01 ENCOUNTER — TELEPHONE (OUTPATIENT)
Dept: ENDOCRINOLOGY CLINIC | Facility: CLINIC | Age: 53
End: 2023-12-01

## 2023-12-01 DIAGNOSIS — Z01.818 PREOPERATIVE EXAMINATION: ICD-10-CM

## 2023-12-01 RX ORDER — PEN NEEDLE, DIABETIC 32GX 5/32"
NEEDLE, DISPOSABLE MISCELLANEOUS
Qty: 40 EACH | Refills: 1 | Status: SHIPPED | OUTPATIENT
Start: 2023-12-01

## 2023-12-14 ENCOUNTER — PATIENT OUTREACH (OUTPATIENT)
Dept: CASE MANAGEMENT | Age: 53
End: 2023-12-14

## 2023-12-14 NOTE — PROGRESS NOTES
Attempted to contact pt for monthly outreach no answer left detailed message for pt to call back. Outreach to patient to complete monthly follow up on : diabetes, new medication, headaches, symptoms, weight loss. Reviewed pt's chart including recent visits.     -Care everywhere updated. -Immunization reconciliation completed. No updates available.     Pt is due for:     Health Maintenance   Topic Date Due    Zoster Vaccines (2 of 2) 07/12/2023    COVID-19 Vaccine (3 - 2023-24 season) 09/01/2023    Influenza Vaccine (1) 10/01/2023    Mammogram  10/08/2023    HTN: BP Follow-Up  12/30/2023    Pneumococcal Vaccine: Birth to 64yrs (2 - PCV) 02/07/2024 (Originally 12/21/2017)    Diabetes Care A1C  02/29/2024    Diabetes Care: GFR  07/13/2024    Diabetes Care: Microalb/Creat Ratio  07/13/2024    Pap Smear  07/26/2024    Diabetes Care Dilated Eye Exam  11/08/2024    Colorectal Cancer Screening  11/12/2025    DTaP,Tdap,and Td Vaccines (2 - Td or Tdap) 05/17/2033    MA Annual Health Assessment  Completed    Annual Depression Screening  Completed    Diabetes Care Foot Exam (Annual)  Completed         Future Appointments   Date Time Provider Joan Monterroso   12/16/2023  2:20 PM TYRA MITCHELL RM1 TYRA MITCHELL [facility] [place]   1/2/2024  2:00 PM Pernell Powell MD [facility] [place]   2/29/2024  3:45 PM GINGER Brantley [facility]       Total time -  5 min  Total Monthly time- 5 min

## 2023-12-16 ENCOUNTER — HOSPITAL ENCOUNTER (OUTPATIENT)
Dept: MAMMOGRAPHY | Age: 53
Discharge: HOME | End: 2023-12-16
Attending: NURSE PRACTITIONER
Payer: MEDICARE

## 2023-12-16 DIAGNOSIS — Z12.31 ENCOUNTER FOR SCREENING MAMMOGRAM FOR MALIGNANT NEOPLASM OF BREAST: ICD-10-CM

## 2023-12-16 PROCEDURE — 77067 SCR MAMMO BI INCL CAD: CPT | Performed by: NURSE PRACTITIONER

## 2023-12-16 PROCEDURE — 77063 BREAST TOMOSYNTHESIS BI: CPT | Performed by: NURSE PRACTITIONER

## 2023-12-19 RX ORDER — GLUCOSAM/CHON-MSM1/C/MANG/BOSW 500-416.6
TABLET ORAL
Qty: 300 EACH | Refills: 0 | Status: SHIPPED | OUTPATIENT
Start: 2023-12-19

## 2023-12-19 RX ORDER — CALCIUM CITRATE/VITAMIN D3 200MG-6.25
TABLET ORAL
Qty: 300 STRIP | Refills: 0 | Status: SHIPPED | OUTPATIENT
Start: 2023-12-19

## 2023-12-19 NOTE — TELEPHONE ENCOUNTER
Pt states that the wrong Test strips keep going to the pharmacy. She has bought wrong test strips twice now. She states that she needs Test strips for True Matrix, and Lancets. She states that she also  needs Pen needles. She is requesting Rx to be sent to [facility].

## 2023-12-24 NOTE — TELEPHONE ENCOUNTER
Failed Protocol/No Protocol.    Requested Prescriptions   Pending Prescriptions Disp Refills    HYDRALAZINE 25 MG Oral Tab [Pharmacy Med Name: HYDRALAZINE  25MG TABLETS(ORANGE)] 180 tablet 0     Sig: TAKE 3 TABLET BY MOUTH TWICE DAILY       Hypertensive Medications Protocol Failed - 12/22/2023  8:58 AM        Failed - Last BP reading less than 140/90     BP Readings from Last 1 Encounters:   12/18/23 141/66               Passed - In person appointment in the past 12 or next 3 months     Recent Outpatient Visits              3 weeks ago Uncontrolled type 2 diabetes mellitus with hyperglycemia (HCC)    [facility] Michelle Leger APRN    Office Visit    1 month ago Essential hypertension    [facility], [address], [place] Yves Yanez MD    Office Visit    4 months ago Essential hypertension    [facility], [address], [place] Yves Yanez MD    Virtual Phone E/M    5 months ago [facility]    [facility] Visit    6 months ago Type 2 diabetes mellitus with hyperglycemia, with long-term current use of insulin ([facility])    [facility] Honorio Anderson MD    Virtual Phone E/M          Future Appointments         Provider Department Appt Notes    In 1 week Yves Yanez MD [facility], [address], [place] IC followup - BP  Declined a sooner appt with another provider    In 2 months Michelle Leger APRN [facility] 3 months               Passed - CMP or BMP in past 6 months     Recent Results (from the past 4392 hour(s))   Comp Metabolic Panel (14)    Collection Time: 07/13/23 11:21 AM   Result Value Ref Range    Glucose 197 (H) 70 - 99 mg/dL    Sodium 139 136 - 145 mmol/L    Potassium 4.2 3.5 - 5.1 mmol/L    Chloride 105 98  - 112 mmol/L    CO2 26.0 21.0 - 32.0 mmol/L    Anion Gap 8 0 - 18 mmol/L    BUN 12 7 - 18 mg/dL    Creatinine 0.85 0.55 - 1.02 mg/dL    BUN/CREA Ratio 14.1 10.0 - 20.0    Calcium, Total 9.4 8.5 - 10.1 mg/dL    Calculated Osmolality 293 275 - 295 mOsm/kg    eGFR-Cr 81 >=60 mL/min/1.73m2    ALT 30 13 - 56 U/L    AST 20 15 - 37 U/L    Alkaline Phosphatase 110 (H) 41 - 108 U/L    Bilirubin, Total 0.5 0.1 - 2.0 mg/dL    Total Protein 7.9 6.4 - 8.2 g/dL    Albumin 3.8 3.4 - 5.0 g/dL    Globulin  4.1 2.8 - 4.4 g/dL    A/G Ratio 0.9 (L) 1.0 - 2.0    Patient Fasting for CMP? Yes      *Note: Due to a large number of results and/or encounters for the requested time period, some results have not been displayed. A complete set of results can be found in Results Review.               Passed - In person appointment or virtual visit in the past 6 months     Recent Outpatient Visits              3 weeks ago Uncontrolled type 2 diabetes mellitus with hyperglycemia (HCC)    [facility] Michelle Leger APRN    Office Visit    1 month ago Essential hypertension    [facility], [address], [place] Yves Yanez MD    Office Visit    4 months ago Essential hypertension    [facility], [address], [place] Yves Yanez MD    Virtual Phone E/M    5 months ago Essential hypertension    [facility] Yves Yanez MD    Office Visit    6 months ago Type 2 diabetes mellitus with hyperglycemia, with long-term current use of insulin (HCC)    [facility] Honorio Anderson MD    Virtual Phone E/M          Future Appointments         Provider Department Appt Notes    In 1 week Yves Yanez MD [facility], [address], [place] IC followup - BP  Declined a sooner appt with another provider    In 2 months Michelle Leger, APRN  [facility] 3 months               Passed - EGFRCR or GFRNAA > 50     GFR Evaluation  EGFRCR: 81 , resulted on 7/13/2023               Future Appointments         Provider Department Appt Notes    In 1 week Yves Yanez MD [facility], [address], [place] IC followup - BP  Declined a sooner appt with another provider    In 2 months Michelle Leger APRN [facility] 3 months          Recent Outpatient Visits              3 weeks ago Uncontrolled type 2 diabetes mellitus with hyperglycemia (HCC)    [facility] Michelle Leger APRN    Office Visit    1 month ago Essential hypertension    [facility], [address], [place] Yves Yanez MD    Office Visit    4 months ago Essential hypertension    [facility], [address], [place] Yves Yanez MD    Virtual Phone E/M    5 months ago Essential hypertension    [facility] Yves Yanez MD    Office Visit    6 months ago Type 2 diabetes mellitus with hyperglycemia, with long-term current use of insulin (HCC)    [facility] Honorio Anderson MD    Virtual Phone E/M

## 2023-12-30 RX ORDER — HYDRALAZINE HYDROCHLORIDE 25 MG/1
TABLET, FILM COATED ORAL
Qty: 540 TABLET | Refills: 0 | Status: SHIPPED | OUTPATIENT
Start: 2023-12-30 | End: 2024-02-14

## 2024-01-02 ENCOUNTER — OFFICE VISIT (OUTPATIENT)
Dept: INTERNAL MEDICINE CLINIC | Facility: CLINIC | Age: 54
End: 2024-01-02

## 2024-01-02 ENCOUNTER — HOSPITAL ENCOUNTER (OUTPATIENT)
Dept: GENERAL RADIOLOGY | Age: 54
Discharge: HOME | End: 2024-01-02
Attending: INTERNAL MEDICINE
Payer: MEDICARE

## 2024-01-02 VITALS
WEIGHT: 192.88 LBS | HEART RATE: 88 BPM | SYSTOLIC BLOOD PRESSURE: 152 MMHG | HEIGHT: 62.4 IN | DIASTOLIC BLOOD PRESSURE: 75 MMHG | BODY MASS INDEX: 35.05 KG/M2

## 2024-01-02 DIAGNOSIS — R09.82 POST-NASAL DRAINAGE: ICD-10-CM

## 2024-01-02 DIAGNOSIS — R05.3 CHRONIC COUGH: ICD-10-CM

## 2024-01-02 DIAGNOSIS — I10 ESSENTIAL HYPERTENSION: Primary | ICD-10-CM

## 2024-01-02 DIAGNOSIS — Z79.4 TYPE 2 DIABETES MELLITUS WITH OTHER NEUROLOGIC COMPLICATION, WITH LONG-TERM CURRENT USE OF INSULIN (HCC): ICD-10-CM

## 2024-01-02 DIAGNOSIS — M65.311 TRIGGER FINGER OF RIGHT THUMB: ICD-10-CM

## 2024-01-02 DIAGNOSIS — E11.49 TYPE 2 DIABETES MELLITUS WITH OTHER NEUROLOGIC COMPLICATION, WITH LONG-TERM CURRENT USE OF INSULIN (HCC): ICD-10-CM

## 2024-01-02 DIAGNOSIS — M54.2 NECK PAIN, BILATERAL POSTERIOR: ICD-10-CM

## 2024-01-02 PROCEDURE — 72040 X-RAY EXAM NECK SPINE 2-3 VW: CPT | Performed by: INTERNAL MEDICINE

## 2024-01-02 PROCEDURE — 3008F BODY MASS INDEX DOCD: CPT | Performed by: INTERNAL MEDICINE

## 2024-01-02 PROCEDURE — 71046 X-RAY EXAM CHEST 2 VIEWS: CPT | Performed by: INTERNAL MEDICINE

## 2024-01-02 PROCEDURE — 99214 OFFICE O/P EST MOD 30 MIN: CPT | Performed by: INTERNAL MEDICINE

## 2024-01-02 PROCEDURE — 3078F DIAST BP <80 MM HG: CPT | Performed by: INTERNAL MEDICINE

## 2024-01-02 PROCEDURE — 3077F SYST BP >= 140 MM HG: CPT | Performed by: INTERNAL MEDICINE

## 2024-01-02 RX ORDER — FEXOFENADINE HCL 180 MG/1
180 TABLET ORAL DAILY
Qty: 90 TABLET | Refills: 0 | Status: SHIPPED | OUTPATIENT
Start: 2024-01-02

## 2024-01-02 RX ORDER — CYCLOBENZAPRINE HCL 5 MG
5 TABLET ORAL NIGHTLY PRN
Qty: 20 TABLET | Refills: 0 | Status: SHIPPED | OUTPATIENT
Start: 2024-01-02

## 2024-01-02 RX ORDER — GLUCOSAM/CHON-MSM1/C/MANG/BOSW 500-416.6
TABLET ORAL
Qty: 300 EACH | Refills: 3 | Status: SHIPPED | OUTPATIENT
Start: 2024-01-02

## 2024-01-02 NOTE — PROGRESS NOTES
Subjective:   Patient ID: Anjel Pisano is a 53 year old female.  Chief Complaint   Patient presents with    Hypertension     BP check    Neck Pain     Neck pain all the way to shoulders for couple weeks now. Both arms from shoulder all the way to finger tips hurts. Hard time picking up items.     Cough     Cough for 2-3 months     Pt f/u visit    Chief Complaint   Patient presents with   Per patient At  home bp per pt is  good in good level usually systolic is in the 130s over 80s  today was 150 /80    Patient states she is feeling  neck pain for few weeks ,  going  down to shoulders has 1 right thumb getting stuck sometimes denies  headaches dizziness or chest pain shortness of breath dyspnea on exertion.    She still  did not bring the blood pressure monitor today but was checked previously and it was comparable to ours.      Patient states her blood sugars elevated but improving  Patient states feeling well otherwise. Denies chest pain, shortnesss of breath, palpitations, or abdominal pain, denies UTI symptoms fever or chills.        Hypertension  This is a chronic problem. The problem has been gradually improving since onset. Associated symptoms-  None   Pertinent negatives include no anxiety, chest pain, orthopnea, palpitations, peripheral edema, PND or shortness of breath. Risk factors for coronary artery disease include obesity, post-menopausal state and dyslipidemia. Past treatments include lifestyle changes, diuretics, beta blockers and central alpha agonists. The current treatment provides significant improvement. Compliance problems include diet (medications labs and  visits  ).    Medication Request   headaches (occasional sumatriptan  helps -  feels well today  ). Pertinent negatives include no chest pain, congestion, coughing, nausea or vomiting.       History/Other:   Review of Systems   HENT: Negative for congestion, sinus pressure, sinus pain and sneezing.    Eyes: Negative for pain, redness and  visual disturbance (has  cataracts bill   eyes - seen  Ophtalmologist  - 2/22/23   Respiratory: Negative for cough, chest tightness and shortness of breath.    Cardiovascular: Negative for chest pain, palpitations, orthopnea, leg swelling and PND.   Gastrointestinal: Negative for abdominal distention, anal bleeding, blood in stool, constipation, diarrhea, nausea and vomiting.     Skin: Negative for color change and wound.   Neurological: negative headache . Negative for dizziness, seizures and light-headedness.   Psychiatric/Behavioral: Negative for decreased concentration and sleep disturbance. The patient is not nervous/anxious.      Current Outpatient Medications   Medication Sig Dispense Refill    TRUEplus Lancets 33G Does not apply Misc Use to check blood sugar three times a day 300 each 3    fexofenadine (ALLEGRA ALLERGY) 180 MG Oral Tab Take 1 tablet (180 mg total) by mouth daily. Take 1 tablet once daily 90 tablet 0    hydrALAZINE 25 MG Oral Tab TAKE 3 TABLET BY MOUTH TWICE DAILY 540 tablet 0    Glucose Blood (TRUE METRIX BLOOD GLUCOSE TEST) In Vitro Strip Use to check blood sugar three times a day 300 strip 0    Insulin Pen Needle (BD PEN NEEDLE ABDOULAYE 2ND GEN) 32G X 4 MM Does not apply Misc INJECT FOUR TIMES DAILY AS DIRECTED 40 each 1    dapagliflozin (FARXIGA) 10 MG Oral Tab Take 1 tablet (10 mg total) by mouth daily. 30 tablet 3    fluconazole (DIFLUCAN) 150 MG Oral Tab Take 1 tablet (150 mg total) by mouth once a week for 8 doses. 8 tablet 0    Accu-Chek FastClix Lancets Does not apply Misc Use to check blood sugar three times a day 300 each 0    Glucose Blood (ACCU-CHEK GUIDE) In Vitro Strip Use to check blood sugar three times a day 300 strip 0    metoprolol succinate ER 25 MG Oral Tablet 24 Hr Take 1 tablet (25 mg total) by mouth nightly. 90 tablet 1    metoprolol succinate  MG Oral Tablet 24 Hr Take 1 tablet (100 mg total) by mouth daily. 90 tablet 1    losartan 100 MG Oral Tab Take 1 tablet  (100 mg total) by mouth daily. 90 tablet 1    atorvastatin 80 MG Oral Tab Take 1 tablet (80 mg total) by mouth nightly. 90 tablet 0    amLODIPine 5 MG Oral Tab TAKE 2 TABLETS EVERY  tablet 1    levETIRAcetam 500 MG Oral Tab TAKE 1 TABLET TWICE DAILY 180 tablet 1    Blood Glucose Monitoring Suppl (TRUE METRIX METER) w/Device Does not apply Kit Use to check blood sugar three times a day 1 kit 0    insulin glargine (LANTUS SOLOSTAR) 100 UNIT/ML Subcutaneous Solution Pen-injector Inject 20-25 Units into the skin nightly.      insulin aspart (NOVOLOG FLEXPEN) 100 Units/mL Subcutaneous Solution Pen-injector Inject 10-15 Units into the skin 3 (three) times daily with meals.      ondansetron 4 MG Oral Tablet Dispersible Take 1 tablet (4 mg total) by mouth every 8 (eight) hours as needed for Nausea. 30 tablet 0    ergocalciferol 1.25 MG (75419 UT) Oral Cap Take 1 capsule (50,000 Units total) by mouth every 30 (thirty) days. For 3 months 1 capsule 2    Omeprazole 40 MG Oral Capsule Delayed Release TAKE 1 CAPSULE EVERY DAY 30 TO 60 MINUTES BEFORE A MEAL 90 capsule 3    Alcohol Swabs (DROPSAFE ALCOHOL PREP) 70 % Does not apply Pads USE AS DIRECTED 100 each 0    Blood Glucose Monitoring Suppl (ACCU-CHEK GUIDE) w/Device Does not apply Kit Use to check blood sugar three times a day 1 kit 0    METFORMIN 500 MG Oral Tab TAKE 2 TABLETS(1000 MG) BY MOUTH TWICE DAILY WITH MEALS 360 tablet 0    escitalopram 10 MG Oral Tab Take 1 tablet (10 mg total) by mouth daily. 90 tablet 3    hydroCHLOROthiazide 25 MG Oral Tab Take 1 tablet (25 mg total) by mouth daily. 90 tablet 3    Rizatriptan Benzoate 10 MG Oral Tab Take 1 tablet (10 mg total) by mouth as needed for Migraine. This may be repeated 2 hours later. 12 tablet 1    docusate sodium 100 MG Oral Tab Take 1 tablet (100 mg total) by mouth 2 (two) times daily as needed for constipation. 60 tablet 1    fluticasone propionate 50 MCG/ACT Nasal Suspension 2 sprays by Nasal route daily.  48 g 1    LANTUS SOLOSTAR 100 UNIT/ML Subcutaneous Solution Pen-injector INJECT 58 UNITS INTO THE SKIN NIGHTLY. 60 mL 0    VENTOLIN  (90 Base) MCG/ACT Inhalation Aero Soln Inhale 2 puffs into the lungs every 6 (six) hours as needed for Wheezing. 18 g 1    aspirin 325 MG Oral Tab Take 1 tablet (325 mg total) by mouth daily.       Allergies:  Allergies   Allergen Reactions    Reglan [Metoclopramide] OTHER (SEE COMMENTS)     Sensitivity, with crawling sensation.    Adhesive Tape      itching       Objective:     Physical Exam  Vitals and nursing note reviewed.   Constitutional:       General: She is not in acute distress.     Appearance: She is well-developed. She is obese.   HENT:      Head: Normocephalic and atraumatic.   Mouth - oral cavity normal pharynx  normal +postnasal drainage  Neck:      Thyroid: No thyromegaly.      Vascular: No JVD.   Cardiovascular:      Rate and Rhythm: Normal rate and regular rhythm.      Heart sounds: Normal heart sounds. No murmur heard.  Pulmonary:      Effort: Pulmonary effort is normal. No respiratory distress.      Breath sounds: Normal breath sounds. No wheezing or rales.   Abdominal:      Palpations: Abdomen is soft. There is no hernia     Tenderness: There is no abdominal tenderness. There is no right CVA tenderness, left CVA tenderness, guarding or rebound.      Comments:      Musculoskeletal:      Cervical back: Neck supple. Rom neck  decreased 1  r finger - trigger  finger   Muscular spasm +parasternal cervical      Right lower leg: No edema.      Left lower leg: No edema.   Lymphadenopathy:      Cervical: No cervical adenopathy.   Skin:     General: Skin is warm and dry.        Mental Status: She is alert and oriented to person, place, and time.   Psychiatric:         Mood and Affect: Mood is not anxious or depressed      Behavior: Behavior normal.       Blood pressure 152/75, pulse 88, height 5' 2.4\" (1.585 m), weight 192 lb 14.4 oz (87.5 kg), not currently  breastfeeding.    Bp rechecked 157/80     Assessment & Plan:       Cough   Postnasal drainage   CXR pa lat  to complete  Start Allegra 180 mg every day   Hx COVID 19  Covid 19 + 10/24 /23          2.Diabetes Mellitus Type 2, Uncontrolled    -HgA1c- 9.1%-->11.0  -- 10 .2   labs  discussed    using freestyle maximo   -Discussed importance of low CHO diet- recommend 135 grams total per day, 45 grams per meal.   Patient seen endocrinology  and have a medication adjusted :  - Increase Lantus to -50  units  - per pt taking -subcutaneous daily   - Increase Novolog to 30-20-30 units TID plus CF 1:40>140    -Continue with Freestyle maximo 2- Reviewed importance of bringing reader to all appointments.   -Continue Metformin 1000mg PO BID  food   Patient education continue with low sugar and carbohydrate diet keep with good water hydration patient verbalized understanding and compliance      Patient is not taking Ozempic - due to naused and GI sy -couldn't tolerate it   Recommend pt need to adjust her Insulin according to her blood sugars and the blood test -sugars regularly   Patient will schedule appointment with Endo f/u  2/24             Essential hypertension with goal blood pressure less than 140/90  Take high blood pressure medication as perscribed   Low- sodium diet   Maintain walking - as tolerated   Track and record blood pressure and heart rate at home   The side effects of medication discussed with patient   losartan 100 mg every day  metoprolol 100 +25 mg =125 mg  every day   Amlodipine 10 mg every day   hydrochlorothiazide 25 mg every day   hydralazine taking 2 tab 25 =50 mg  bid -- > increase to  75 mg bid - 1    bp 120-130/70-80  range HR 60s -70   Avoid OTC  decongestants   F/u in   few weeks  BP check     patient agrees with plan verbalized understanding compliance   Recommend patient to follow-up blood pressure readings from home patient states her blood   She will bring the blood pressure monitor   And all  medications   next visit time recheck the blood pressure readings  Keep  bp in 120-130/80 hr 60 Range       Cataracts bilaterally  Diabetic  Eye exam per pt done recently Dr Golden FRIEDMAN 2/23  patient to seen Dr. Franks   Diabetic  Eye exam   Patient education       Neck pain, bilateral posterior  - XR CERVICAL SPINE (2-3 VIEWS) (CPT=72040); Future  - Physiatry Referral - In Network    3. Type 2 diabetes mellitus with other neurologic complication, with long-term current use of insulin (HCC)  - TRUEplus Lancets 33G Does not apply Misc; Use to check blood sugar three times a day  Dispense: 300 each; Refill: 3    - XR CHEST PA + LAT CHEST (CPT=71046); Future  - fexofenadine (ALLEGRA ALLERGY) 180 MG Oral Tab; Take 1 tablet (180 mg total) by mouth daily. Take 1 tablet once daily  Dispense: 90 tablet; Refill: 0     Trigger finger of right thumb  Refer to physiatry     Post-nasal drainage  - fexofenadine (ALLEGRA ALLERGY) 180 MG Oral Tab; Take 1 tablet (180 mg total) by mouth daily. Take 1 tablet once daily  Dispense: 90 tablet; Refill: 0       No orders of the defined types were placed in this encounter.      Meds This Visit:  Requested Prescriptions     Signed Prescriptions Disp Refills    TRUEplus Lancets 33G Does not apply Misc 300 each 3     Sig: Use to check blood sugar three times a day    fexofenadine (ALLEGRA ALLERGY) 180 MG Oral Tab 90 tablet 0     Sig: Take 1 tablet (180 mg total) by mouth daily. Take 1 tablet once daily       Imaging & Referrals:  PHYSIATRY - INTERNAL  XR CERVICAL SPINE (2-3 VIEWS) (CPT=72040)  XR CHEST PA + LAT CHEST (CPT=71046)

## 2024-01-04 ENCOUNTER — TELEPHONE (OUTPATIENT)
Dept: ENDOCRINOLOGY CLINIC | Facility: CLINIC | Age: 54
End: 2024-01-04

## 2024-01-04 NOTE — TELEPHONE ENCOUNTER
Delia from [facility] is following up on a CGM form faxed to the office on Dec 28th please follow up

## 2024-01-09 ENCOUNTER — TELEPHONE (OUTPATIENT)
Dept: INTERNAL MEDICINE CLINIC | Facility: CLINIC | Age: 54
End: 2024-01-09

## 2024-01-09 ENCOUNTER — PATIENT OUTREACH (OUTPATIENT)
Dept: CASE MANAGEMENT | Age: 54
End: 2024-01-09

## 2024-01-09 DIAGNOSIS — E11.65 TYPE 2 DIABETES MELLITUS WITH HYPERGLYCEMIA, WITH LONG-TERM CURRENT USE OF INSULIN (HCC): ICD-10-CM

## 2024-01-09 DIAGNOSIS — I10 PRIMARY HYPERTENSION: ICD-10-CM

## 2024-01-09 DIAGNOSIS — I10 ESSENTIAL HYPERTENSION: Primary | ICD-10-CM

## 2024-01-09 DIAGNOSIS — N18.30 CKD STAGE 3 DUE TO TYPE 1 DIABETES MELLITUS (HCC): ICD-10-CM

## 2024-01-09 DIAGNOSIS — F32.A DEPRESSION, UNSPECIFIED DEPRESSION TYPE: ICD-10-CM

## 2024-01-09 DIAGNOSIS — Z79.4 TYPE 2 DIABETES MELLITUS WITH HYPERGLYCEMIA, WITH LONG-TERM CURRENT USE OF INSULIN (HCC): ICD-10-CM

## 2024-01-09 DIAGNOSIS — E66.01 MORBID (SEVERE) OBESITY DUE TO EXCESS CALORIES (HCC): ICD-10-CM

## 2024-01-09 DIAGNOSIS — E78.5 DYSLIPIDEMIA: ICD-10-CM

## 2024-01-09 DIAGNOSIS — R06.81 APNEA: ICD-10-CM

## 2024-01-09 DIAGNOSIS — E11.49 TYPE 2 DIABETES MELLITUS WITH OTHER NEUROLOGIC COMPLICATION, WITH LONG-TERM CURRENT USE OF INSULIN (HCC): ICD-10-CM

## 2024-01-09 DIAGNOSIS — D64.9 ANEMIA, UNSPECIFIED TYPE: ICD-10-CM

## 2024-01-09 DIAGNOSIS — D50.9 IRON DEFICIENCY ANEMIA, UNSPECIFIED IRON DEFICIENCY ANEMIA TYPE: ICD-10-CM

## 2024-01-09 DIAGNOSIS — Z79.4 TYPE 2 DIABETES MELLITUS WITH OTHER NEUROLOGIC COMPLICATION, WITH LONG-TERM CURRENT USE OF INSULIN (HCC): ICD-10-CM

## 2024-01-09 DIAGNOSIS — M54.2 NECK PAIN: ICD-10-CM

## 2024-01-09 DIAGNOSIS — E10.22 CKD STAGE 3 DUE TO TYPE 1 DIABETES MELLITUS (HCC): ICD-10-CM

## 2024-01-09 NOTE — TELEPHONE ENCOUNTER
Spoke to patient for CCM update-     Reviewed results of xray - patient stated she still has a Cough- she has not yet picked up fexofenadine. CCM called [facility] and she can pick that up today - informed patient that this might help with cough but patient is  requesting I reach out to PCP to prescribe something else for cough  as she is not sleeping .     Pain -  Reviewed notes from PCP result- informed patient to contact Dr. Bhatt- she has not yet done so - provided patient with phone number.   Patient stated she is still getting headaches and shoulder pain . Also c/o  wrist and forearm weakness she stated has been going on for some time.     Patient also requesting a refill on stool softener, she stated she is having constipation with nausea,bloating , denied any abdominal pain at this time. Stated she requested during last visit but has not received refill authorization.    Please advise.

## 2024-01-09 NOTE — PROGRESS NOTES
Spoke to Anjel for CCM.       Updates to patient care team/comments: none   Patient reported changes in medications: none  Med Adherence  Comment:  Patient reported she has been compliant.         Patient updates/concerns:    Patient reported she had a follow up appointment with Dr. Franks- patient had cateract surgery , stated vision has improved and she is scheduled to have other eye on January 25.     Diabetes- Patient stated glucose this morning was 131. Patient did have a hypoglycemic episode over the weekend, she stated glucose went down to 50 . CCM encouraged patient eat on  a regular basis as she is skipping meals. Patient encouraged to add healthy carb to meals , such as fruit or vegetable to avoid hypoglycemic episodes.  CCM explained the risks of hypoglycemic episodes and how skipping meals is not ideal . Patient verbalized understanding . Patient reported she is aware of what to do when her glucose is low. Patient stated she was previously instructed to drink milk with turmeric. CCM encouraged patient to bring in log to next appointment.      Reviewed results of xray - patient stated she still has a Cough- she has not yet picked up fexofenadine. CCM called [facility] and she can pick that up today - informed patient that this might help with cough but patient is  requesting I reach out to PCP to prescribe something else for cough  as she is not sleeping . CCM will send TE to PCP .      Pain -  Reviewed notes from PCP result- informed patient to contact Dr. Bhatt- she has not yet done so - provided patient with phone number.   Patient stated she is still getting headaches and shoulder pain . Also c/o  wrist and forearm weakness she stated has been going on for some time.       Patient reported - patient BP better than what it was, she did not recall the reading this morning . CCM encouraged patient to keep a record of readings. Patient stated she had a Pulse of 109.      Patient also requesting a refill on  stool softener, she stated she is having constipation with nausea,bloating , denied any abdominal pain at this time. Stated she requested during last visit but has not received refill authorization.       Patient currently having difficulty obtaining rides. Patient encouraged to call : [employer] to see if she can qualify for assistance.   (1) Transportation Program: Call [phone] to enroll in or for more information on the [employer] [place] program.     CCM placed Call to [facility]'s pharmacy - patient to  prescription tomorrow  for fexofenadine .   Goals/Action Plan:    Active goal from previous outreach:  improve symptoms.     Patient reported progress towards goals: patient reported glucose has improved, however she is now having hypoglycemic episodes.                - What: patient to check glucose and bp Patient plans to continue to perform glucose checks and report any elevated/low  readings outside of  parameters set forth by her physician .            - Where/When/How: Daily readings to be recorded and provided to PCP at upcoming appointment   Patient Reported Barriers and Concerns: shoulder, thumb, neck pain.                    - Plan for overcoming barriers: patient to schedule appointment with Dr. Bhatt     Care Managers Interventions: TE to PCP to advise on cough , pain and refill.   CCM placed call to [employer] - patient no longer eligible for rides, they recommend patient call insurance to find out more information .   CCM listened and provided support.   [facility] updated patient records from Care Everywhere.   [facility] updated immunizations- no updates.   Continue to provide encouragement, support and education for healthy coping and diagnosis.          Future Appointments:   Future Appointments   Date Time Provider Department Center   2/7/2024  2:20 PM Vukomanovic, Emela, MD [id number] [facility] [place]   2/29/2024  3:45 PM Michelle Leger APRN [facility]O [facility] Care Manager Follow Up Date:  1 month     Reason For Follow Up: review progress and or barriers towards patient's goals.     Time Spent This Encounter Total: 64 min medical record review, telephone communication, care plan updates where needed, education, goals, and action plan recreation/update. Provided acknowledgment and validation to patient's concerns.   Monthly Minute Total including today: 64  Physical assessment, complete health history, and need for CCM established by Yves Yanez MD.

## 2024-01-10 RX ORDER — ASPIRIN 81 MG
100 TABLET, DELAYED RELEASE (ENTERIC COATED) ORAL 2 TIMES DAILY PRN
Qty: 60 TABLET | Refills: 1 | Status: SHIPPED | OUTPATIENT
Start: 2024-01-10

## 2024-01-10 NOTE — TELEPHONE ENCOUNTER
Spoke to patient she has not picked up the fexofenadine (ALLEGRA ALLERGY) 180 mg oral tablet. Spoke to [facility] to see if they received script. Script not going through because medication is over the counter. Patient advised to purchase medication over the counter and agrees. Patient will call back if any new or worsening symptoms.    Colace refill was also sent today. Please see refill encounter.

## 2024-01-10 NOTE — TELEPHONE ENCOUNTER
Refill passed per [facility] protocol.    Requested Prescriptions   Pending Prescriptions Disp Refills    docusate sodium 100 MG Oral Tab 60 tablet 1     Sig: Take 1 tablet (100 mg total) by mouth 2 (two) times daily as needed for constipation.       Gastrointestional Medication Protocol Passed - 1/10/2024 11:43 AM        Passed - In person appointment or virtual visit in the past 12 mos or appointment in next 3 mos     Recent Outpatient Visits              1 week ago Essential hypertension    [facility], [address], [place] Yves Yanez MD    Office Visit    1 month ago Uncontrolled type 2 diabetes mellitus with hyperglycemia (HCC)    [facility] Michelle Leger APRN    Office Visit    2 months [facility]    [facility] Visit    5 months ago Essential hypertension    [facility], [address], [place] Yves Yanez MD    Virtual Phone E/M    6 months [facility]          Future Appointments         Provider Department Appt Notes    In 4 weeks Yves Yanez MD [facility], [address], [place] IC followup - BP  Declined a sooner appt with another provider    In 1 month Michelle Leger APRN [facility] 3 months                    Recent Outpatient Visits              1 week ago [facility]    Office Visit    1 month ago Uncontrolled type 2 diabetes mellitus with hyperglycemia (HCC)    [facility] Michelle Leger APRN    Office Visit    2 months ago Essential hypertension    [facility], [address], [place] Yves Yanez MD    Office Visit    5 months ago Essential hypertension    [facility], [address], [place] Yves Yanez MD    Virtual Phone E/M    6 months ago Essential hypertension    [facility] Yves Yanez MD    Office Visit            Future Appointments         Provider Department Appt Notes    In 4 weeks Yves Yanez MD [facility], [address], [place] IC followup - BP  Declined a sooner appt with another provider    In 1 month Michelle Leger APRN [facility], [facility] 3 months

## 2024-01-11 NOTE — PROGRESS NOTES
Spoke to patient to provide information regarding ride after speaking with [employer]-   Patient no longer eligible for rides with [employer].   Spoke to Chely with [employer] - stated patient should call and verify benefits with insurance.     CCM called to notify patient, patient verbalizes understanding .     Patient requesting help to obtain financial assistance .     [facility] provided patient with [facility] senior services to request information , encouraged her to discuss rides as well.     Time spent with patient - 10 minutes.     Total monthly time - 74 min .

## 2024-01-22 ENCOUNTER — TELEPHONE (OUTPATIENT)
Dept: INTERNAL MEDICINE CLINIC | Facility: CLINIC | Age: 54
End: 2024-01-22

## 2024-01-22 NOTE — TELEPHONE ENCOUNTER
Patient called, verified Name and . States she is still having problem with having a bowel movement. Relayed that a prescription for stool softener was sent on 1/10. Reports that she still has not gotten the medication. Called [facility], verified patient's Name and . Docusate sodium is over the counter. Relayed to the patient. Also instructed to eat fiber-rich foods, drink lots of water, increase activity such as walking and take over-the-counter stool softener to facilitate bowel movement. Patient verbalized understanding and agreed with plan of care.

## 2024-01-22 NOTE — TELEPHONE ENCOUNTER
Per patient she is scheduled to have eye surgery on 1/25/24. Patient states the she advised she has had previous cough, per patient she was seen for this on 1/2/24 but it has since resolved.     Patient reports eye doctor is now asking for clearance to proceed with surgery. Patient will call eye doctor to obtain fax number and call back so message can be forwarded to Dr. Yanez to ask if she can be cleared based on visit 1/2/24.

## 2024-01-22 NOTE — TELEPHONE ENCOUNTER
Patient needs a medical clearance visit  for the procedure   Recommend patient to come back for presurgical clearance  She needs to have a blood pressure in normal range  <  140 /80 as well as the sugars A1c -below 8 to have the surgery.    Patient to contact her endocrinologist to get the sugars down in good  range for  surgery       Please , Schedule appointment for the preoperative clearance asap  with me or other provider   Patient likely will need to postpone the surgery her sugars are pretty elevated also the blood pressure on the last visit time was also elevated        You can see patient on Friday 12/26 - 11:00  - 40 min apt   please schedule preoperative clearance for patient    Or patient can see another provider sooner if  needed .

## 2024-01-22 NOTE — TELEPHONE ENCOUNTER
Patient called back, verified Name and . Dr. Franks's fax number provided [phone].    Dr. Yanez kindly see message below and advise.

## 2024-01-23 ENCOUNTER — TELEPHONE (OUTPATIENT)
Dept: INTERNAL MEDICINE CLINIC | Facility: CLINIC | Age: 54
End: 2024-01-23

## 2024-01-23 RX ORDER — INSULIN GLARGINE 100 [IU]/ML
40 INJECTION, SOLUTION SUBCUTANEOUS NIGHTLY
Qty: 36 ML | Refills: 0 | Status: SHIPPED | OUTPATIENT
Start: 2024-01-23

## 2024-01-23 NOTE — TELEPHONE ENCOUNTER
See telephone encounter Today's date. ANA Cadena reached out to Dr. Franks's office to inform preop clearance needed prior to surgery.     Called home # who is Sister's ph#. Gretel. She handed phone to Patient.    Patient notified of provider's recommendation. Patient not able to come in sooner that her surgery date due to weather. Advised she postpone her surgery until she can come in for preop clearance; needs better control of sugars. Explained she needs to come in for preop clearance prior to next surgery date. patient verbalized understanding.    Patient has appt with ENDO 2/29/24.    Patient will call DR Franks's office to postpone surgery. She will call back once she has another date.

## 2024-01-23 NOTE — TELEPHONE ENCOUNTER
Rec'd request from Dr. Franks's office for medical clearance for upcoming surgery on 1/25/24.  Informed Rosita at Dr. Franks's office that we've tried to reach out to pt to schedule a pre op exam but she has not reached back, or scheduled an appt.  Last office visit was for a regular office visit and not a pre op exam.  Per MD's message from previous telephone communication on 1/22/24 a pre op exam is needed.  Rosita verbalized understanding and will reach out to pt.

## 2024-01-24 NOTE — TELEPHONE ENCOUNTER
Received fax dated on 01/24/24 from [facility] attached is a CGM form for diabetes supplies, form placed in provider folder for review and signature.

## 2024-01-25 ENCOUNTER — HOSPITAL ENCOUNTER (EMERGENCY)
Facility: HOSPITAL | Age: 54
Discharge: HOME | End: 2024-01-26
Attending: EMERGENCY MEDICINE
Payer: MEDICARE

## 2024-01-25 ENCOUNTER — TELEPHONE (OUTPATIENT)
Dept: INTERNAL MEDICINE CLINIC | Facility: CLINIC | Age: 54
End: 2024-01-25

## 2024-01-25 ENCOUNTER — APPOINTMENT (OUTPATIENT)
Dept: GENERAL RADIOLOGY | Facility: HOSPITAL | Age: 54
End: 2024-01-25
Attending: EMERGENCY MEDICINE
Payer: MEDICARE

## 2024-01-25 DIAGNOSIS — M75.32 CALCIFIC TENDONITIS OF LEFT SHOULDER: Primary | ICD-10-CM

## 2024-01-25 LAB
ALBUMIN SERPL-MCNC: 4.6 G/DL (ref 3.2–4.8)
ALBUMIN/GLOB SERPL: 1.3 {RATIO} (ref 1–2)
ALP LIVER SERPL-CCNC: 117 U/L
ALT SERPL-CCNC: 34 U/L
ANION GAP SERPL CALC-SCNC: 8 MMOL/L (ref 0–18)
AST SERPL-CCNC: 17 U/L (ref ?–34)
BASOPHILS # BLD AUTO: 0.05 X10(3) UL (ref 0–0.2)
BASOPHILS NFR BLD AUTO: 0.4 %
BILIRUB SERPL-MCNC: 0.3 MG/DL (ref 0.3–1.2)
BUN BLD-MCNC: 14 MG/DL (ref 9–23)
BUN/CREAT SERPL: 16.1 (ref 10–20)
CALCIUM BLD-MCNC: 9.9 MG/DL (ref 8.7–10.4)
CHLORIDE SERPL-SCNC: 106 MMOL/L (ref 98–112)
CO2 SERPL-SCNC: 24 MMOL/L (ref 21–32)
CREAT BLD-MCNC: 0.87 MG/DL
DEPRECATED RDW RBC AUTO: 41.3 FL (ref 35.1–46.3)
EGFRCR SERPLBLD CKD-EPI 2021: 79 ML/MIN/1.73M2 (ref 60–?)
EOSINOPHIL # BLD AUTO: 0.11 X10(3) UL (ref 0–0.7)
EOSINOPHIL NFR BLD AUTO: 0.8 %
ERYTHROCYTE [DISTWIDTH] IN BLOOD BY AUTOMATED COUNT: 15.3 % (ref 11–15)
FLUAV + FLUBV RNA SPEC NAA+PROBE: NEGATIVE
FLUAV + FLUBV RNA SPEC NAA+PROBE: NEGATIVE
GLOBULIN PLAS-MCNC: 3.6 G/DL (ref 2.8–4.4)
GLUCOSE BLD-MCNC: 173 MG/DL (ref 70–99)
GLUCOSE BLDC GLUCOMTR-MCNC: 117 MG/DL (ref 70–99)
HCT VFR BLD AUTO: 40.9 %
HGB BLD-MCNC: 13 G/DL
IMM GRANULOCYTES # BLD AUTO: 0.03 X10(3) UL (ref 0–1)
IMM GRANULOCYTES NFR BLD: 0.2 %
LYMPHOCYTES # BLD AUTO: 2.43 X10(3) UL (ref 1–4)
LYMPHOCYTES NFR BLD AUTO: 18.1 %
MCH RBC QN AUTO: 24.3 PG (ref 26–34)
MCHC RBC AUTO-ENTMCNC: 31.8 G/DL (ref 31–37)
MCV RBC AUTO: 76.6 FL
MONOCYTES # BLD AUTO: 0.51 X10(3) UL (ref 0.1–1)
MONOCYTES NFR BLD AUTO: 3.8 %
NEUTROPHILS # BLD AUTO: 10.27 X10 (3) UL (ref 1.5–7.7)
NEUTROPHILS # BLD AUTO: 10.27 X10(3) UL (ref 1.5–7.7)
NEUTROPHILS NFR BLD AUTO: 76.7 %
OSMOLALITY SERPL CALC.SUM OF ELEC: 291 MOSM/KG (ref 275–295)
PLATELET # BLD AUTO: 285 10(3)UL (ref 150–450)
POTASSIUM SERPL-SCNC: 4 MMOL/L (ref 3.5–5.1)
PROT SERPL-MCNC: 8.2 G/DL (ref 5.7–8.2)
RBC # BLD AUTO: 5.34 X10(6)UL
RSV RNA SPEC NAA+PROBE: NEGATIVE
S PYO AG THROAT QL: NEGATIVE
SARS-COV-2 RNA RESP QL NAA+PROBE: NOT DETECTED
SODIUM SERPL-SCNC: 138 MMOL/L (ref 136–145)
WBC # BLD AUTO: 13.4 X10(3) UL (ref 4–11)

## 2024-01-25 PROCEDURE — 80053 COMPREHEN METABOLIC PANEL: CPT | Performed by: EMERGENCY MEDICINE

## 2024-01-25 PROCEDURE — 80053 COMPREHEN METABOLIC PANEL: CPT

## 2024-01-25 PROCEDURE — 0241U SARS-COV-2/FLU A AND B/RSV BY PCR (GENEXPERT): CPT | Performed by: EMERGENCY MEDICINE

## 2024-01-25 PROCEDURE — 85025 COMPLETE CBC W/AUTO DIFF WBC: CPT

## 2024-01-25 PROCEDURE — 82962 GLUCOSE BLOOD TEST: CPT

## 2024-01-25 PROCEDURE — 93010 ELECTROCARDIOGRAM REPORT: CPT

## 2024-01-25 PROCEDURE — 85025 COMPLETE CBC W/AUTO DIFF WBC: CPT | Performed by: EMERGENCY MEDICINE

## 2024-01-25 PROCEDURE — 96374 THER/PROPH/DIAG INJ IV PUSH: CPT

## 2024-01-25 PROCEDURE — 0241U SARS-COV-2/FLU A AND B/RSV BY PCR (GENEXPERT): CPT

## 2024-01-25 PROCEDURE — 73030 X-RAY EXAM OF SHOULDER: CPT | Performed by: EMERGENCY MEDICINE

## 2024-01-25 PROCEDURE — 93005 ELECTROCARDIOGRAM TRACING: CPT

## 2024-01-25 PROCEDURE — 99284 EMERGENCY DEPT VISIT MOD MDM: CPT

## 2024-01-25 PROCEDURE — 87880 STREP A ASSAY W/OPTIC: CPT

## 2024-01-25 RX ORDER — KETOROLAC TROMETHAMINE 15 MG/ML
15 INJECTION, SOLUTION INTRAMUSCULAR; INTRAVENOUS ONCE
Status: COMPLETED | OUTPATIENT
Start: 2024-01-25 | End: 2024-01-25

## 2024-01-25 NOTE — TELEPHONE ENCOUNTER
Talked to patient and she states that she don't know yet the new surgery date but she will call back as soon as she'll find out.

## 2024-01-26 ENCOUNTER — PATIENT OUTREACH (OUTPATIENT)
Dept: CASE MANAGEMENT | Age: 54
End: 2024-01-26

## 2024-01-26 VITALS
DIASTOLIC BLOOD PRESSURE: 67 MMHG | OXYGEN SATURATION: 95 % | TEMPERATURE: 98 F | HEIGHT: 62 IN | SYSTOLIC BLOOD PRESSURE: 129 MMHG | BODY MASS INDEX: 34.96 KG/M2 | RESPIRATION RATE: 14 BRPM | WEIGHT: 190 LBS | HEART RATE: 79 BPM

## 2024-01-26 LAB
ATRIAL RATE: 98 BPM
P AXIS: 55 DEGREES
P-R INTERVAL: 152 MS
Q-T INTERVAL: 398 MS
QRS DURATION: 120 MS
QTC CALCULATION (BEZET): 508 MS
R AXIS: 52 DEGREES
T AXIS: 25 DEGREES
VENTRICULAR RATE: 98 BPM

## 2024-01-26 RX ORDER — HYDROCODONE BITARTRATE AND ACETAMINOPHEN 5; 325 MG/1; MG/1
1-2 TABLET ORAL EVERY 6 HOURS PRN
Qty: 10 TABLET | Refills: 0 | Status: SHIPPED | OUTPATIENT
Start: 2024-01-26 | End: 2024-01-31

## 2024-01-26 NOTE — TELEPHONE ENCOUNTER
Patient paged on call provider due to having symptoms of aches and pains. She states she was having chest pain, jaw pain, shoulder pain and shortness of breath. I advised her to go to the ER. She agreed.

## 2024-01-26 NOTE — PROGRESS NOTES
2nd attempt ER f/up apt request  PCP -existing apt (2/7)    Dr. Lizzette RUBIO  [address] [facility] [address]  [facility] [address]  [phone]  Apt: feb 13 @1:40pm   On wait list    Confirmed w/ pt  Closing encounter

## 2024-01-26 NOTE — ED INITIAL ASSESSMENT (HPI)
Patient arrives complaining of severe body aches, dizziness, and shortness of breath that has gotten progressively worse since yesterday. Patient also endorses severe left shoulder pain with difficulty moving left arm. Upon assessment, no arm drift but patient's left hand  is weaker.

## 2024-01-26 NOTE — DISCHARGE INSTRUCTIONS
Take Tylenol as needed for pain.  Take Norco as needed for worsening pain.  Be sure to take an over-the-counter stool softener with Norco.  Follow-up with orthopedics for further evaluation and treatment.  Return to the emergency department if increasing pain or other new symptoms develop.

## 2024-01-26 NOTE — TELEPHONE ENCOUNTER
(Message Delivered)   D E L I V E R I E S :  2024 06:10p           CARLG         Delivered  ======================================================================  Paging    Message # [phone]         2024 09:07p   [[email or web address]]  To:  From:  CONSOLE  Primary MD:  Phone#:  ----------------------------------------------------------------------  EJ HOBBS [phone]   [birth year] RE; ACHES AND PAINS IN ENTIRE BODY; Paged at number :  PAGE: [phone] at :   21:07

## 2024-01-26 NOTE — PROGRESS NOTES
1st attempt ER f/up apt request  No answer, [facility] to schedule apt  PCP -existing apt (2/7)  ORTHO -unable to contact  Closing encounter

## 2024-01-26 NOTE — ED PROVIDER NOTES
Patient Seen in: [facility] Emergency Department      History     Chief Complaint   Patient presents with    Body ache and/or chills     Stated Complaint: body ache and sore throat    Subjective:   HPI    53-year-old female with history of hypertension, diabetes, hypercholesterolemia, coronary artery disease, prior CVA, anemia, and depression presents with complaints of bilateral upper extremity pain, left greater than right.  The patient reports pain radiating from the back of her neck to her left shoulder and down her left arm.  She reports having difficulty moving her left upper extremity secondary to shoulder pain and states she has a hard time picking things up with her left upper extremity.  She reports the pain has worsened over the past 2 days.  She took cyclobenzaprine that had been previously prescribed to her with minimal relief.  She denies any recent injuries.  She also complains of a sore throat and pain to her anterior neck as well.    Objective:   Past Medical History:   Diagnosis Date    Age-related nuclear cataract of both eyes 03/30/2016    Anemia     Apnea 10/20/2015    Cataract     Coronary atherosclerosis     Depression     DM type 2 (diabetes mellitus, type 2) ([facility])     Esophageal reflux     High blood pressure     High cholesterol     Meibomian gland dysfunction (MGD), bilateral, both upper and lower lids 03/30/2016    Migraine     Muscle weakness     left sided weakness uses walker and cane    Stenosis of right vertebral artery     Stroke ([facility]) 2014    Type II or unspecified type diabetes mellitus without mention of complication, not stated as uncontrolled               Past Surgical History:   Procedure Laterality Date    BRAIN SURGERY  2014    COLONOSCOPY N/A 8/25/2017    Procedure: COLONOSCOPY;  Surgeon: Sharmaine Burks MD;  Location: [facility] ENDOSCOPY    COLONOSCOPY      COLONOSCOPY N/A 11/12/2022    Procedure: COLONOSCOPY with Polypectomy;  Surgeon: Terry Weaver MD;   Location: [facility] ENDOSCOPY    EXCISE CAROTID BODY+CAROTID ARTERY  09/2017    INCISION AND DRAINAGE Right 04/19/16    lower abdominal skin    LAPAROSCOPIC CHOLECYSTECTOMY  2002                Social History     Socioeconomic History    Marital status: Legally     Number of children: 3   Occupational History    Occupation:      Comment: disabled    Occupation: phlebotomy   Tobacco Use    Smoking status: Former     Packs/day: 0.20     Years: 1.00     Additional pack years: 0.00     Total pack years: 0.20     Types: Cigarettes     Quit date: 12/16/2013     Years since quitting: 10.1    Smokeless tobacco: Never   Vaping Use    Vaping Use: Never used   Substance and Sexual Activity    Alcohol use: No     Alcohol/week: 0.0 standard drinks of alcohol    Drug use: No    Sexual activity: Not Currently     Social Determinants of Health     Financial Resource Strain: Low Risk  (4/21/2023)    Financial Resource Strain     Difficulty of Paying Living Expenses: Not hard at all     Med Affordability: No   Food Insecurity: No Food Insecurity (4/21/2023)    Food Insecurity     Food Insecurity: Never true   Transportation Needs: Unmet Transportation Needs (1/9/2024)    Transportation Needs     Lack of Transportation: Yes   Physical Activity: Inactive (4/21/2023)    Exercise Vital Sign     Days of Exercise per Week: 0 days     Minutes of Exercise per Session: 0 min   Stress: No Stress Concern Present (4/21/2023)    Stress     Feeling of Stress : No   Social Connections: Socially Integrated (4/21/2023)    Social Connections     Frequency of Socialization with Friends and Family: 2   Housing Stability: Low Risk  (4/21/2023)    Housing Stability     Housing Instability: No              Review of Systems    Positive for stated complaint: body ache and sore throat  Other systems are as noted in HPI.  Constitutional and vital signs reviewed.      All other systems reviewed and negative except as noted above.    Physical  Exam     ED Triage Vitals [01/25/24 2216]   BP (!) 175/94   Pulse 100   Resp 18   Temp 98.4 °F (36.9 °C)   Temp src    SpO2 99 %   O2 Device None (Room air)       Current:/67   Pulse 79   Temp 98.4 °F (36.9 °C)   Resp 14   Ht 157.5 cm (5' 2\")   Wt 86.2 kg   SpO2 95%   BMI 34.75 kg/m²         Physical Exam      General Appearance: alert, no distress  Eyes: pupils equal and round no pallor or injection  ENT, Mouth: mucous membranes moist  Respiratory: there are no retractions, lungs are clear to auscultation  Cardiovascular: regular rate and rhythm  Gastrointestinal:  abdomen is soft and non tender, no masses, bowel sounds normal  Neurological: Speech normal.  Motor and sensation seems intact and symmetric to bilateral upper extremities.  Exam is limited secondary to pain to the left shoulder.  General: Pt is alert. The patient was examined with the slit lamp.  Skin: warm and dry, no rashes.  Musculoskeletal: neck is supple non tender        Tenderness to range of motion of the left shoulder.  No erythema or warmth noted.  No specific elbow, wrist, or hand tenderness noted.  Mild tenderness with range of motion of the right shoulder as well.  No other joint tenderness is noted.  Bilateral radial pulses intact and symmetric.  Psychiatric: patient is oriented X 3, there is no agitation    DIFFERENTIAL DIAGNOSIS: After history and physical exam differential diagnosis was considered for cervical radiculopathy, difficult shoulder injury, or other        ED Course     Labs Reviewed   COMP METABOLIC PANEL (14) - Abnormal; Notable for the following components:       Result Value    Glucose 173 (*)     Alkaline Phosphatase 117 (*)     All other components within normal limits   POCT GLUCOSE - Abnormal; Notable for the following components:    POC Glucose  117 (*)     All other components within normal limits   CBC W/ DIFFERENTIAL - Abnormal; Notable for the following components:    WBC 13.4 (*)     RBC 5.34 (*)      MCV 76.6 (*)     MCH 24.3 (*)     RDW 15.3 (*)     Neutrophil Absolute Prelim 10.27 (*)     Neutrophil Absolute 10.27 (*)     All other components within normal limits   POCT RAPID STREP - Normal   SARS-COV-2/FLU A AND B/RSV BY PCR (GENEXPERT) - Normal    Narrative:     This test is intended for the qualitative detection and differentiation of SARS-CoV-2, influenza A, influenza B, and respiratory syncytial virus (RSV) viral RNA in nasopharyngeal or nares swabs from individuals suspected of respiratory viral infection consistent with COVID-19 by their healthcare provider. Signs and symptoms of respiratory viral infection due to SARS-CoV-2, influenza, and RSV can be similar.    Test performed using the Xpert Xpress SARS-CoV-2/FLU/RSV (real time RT-PCR)  assay on the [employer] instrument, [employer], [place] [address].   This test is being used under the Food and Drug Administration's Emergency Use Authorization.    The authorized Fact Sheet for Healthcare Providers for this assay is available upon request from the laboratory.   CBC WITH DIFFERENTIAL WITH PLATELET    Narrative:     The following orders were created for panel order CBC With Differential With Platelet.  Procedure                               Abnormality         Status                     ---------                               -----------         ------                     CBC W/ DIFFERENTIAL[[id number]]          Abnormal            Final result                 Please view results for these tests on the individual orders.     EKG    Rate, intervals and axes as noted on EKG Report.  Rate: 98  Rhythm: Sinus Rhythm  Axis: Normal  Reading: Nonspecific EKG                        MDM      Lab, EKG, and shoulder x-ray results noted.  X-ray consistent with calcific tendinitis.  Patient with improvement in pain post morphine.  Will discharge home with pain medications and recommendations to follow-up with orthopedics for further evaluation and treatment.  She  is advised to return if new or worsening symptoms develop.                                   Medical Decision Making      Disposition and Plan     Clinical Impression:  1. Calcific tendonitis of left shoulder         Disposition:  Discharge  1/26/2024  1:19 am    Follow-up:  Antione Peace MD  [facility] [address] [address]  [phone]    Follow up            Medications Prescribed:  Current Discharge Medication List        START taking these medications    Details   HYDROcodone-acetaminophen 5-325 MG Oral Tab Take 1-2 tablets by mouth every 6 (six) hours as needed for Pain.  Qty: 10 tablet, Refills: 0    Associated Diagnoses: Calcific tendonitis of left shoulder

## 2024-02-05 ENCOUNTER — APPOINTMENT (OUTPATIENT)
Dept: BARIATRICS/WEIGHT MGMT | Age: 54
End: 2024-02-05

## 2024-02-13 ENCOUNTER — TELEPHONE (OUTPATIENT)
Dept: ORTHOPEDICS CLINIC | Facility: CLINIC | Age: 54
End: 2024-02-13

## 2024-02-13 NOTE — TELEPHONE ENCOUNTER
Pt had scheduled appt today with Dr Crocker, but unfortunately clinic was cancelled today. Called pt to RS and she states her left shoulder pain is severe, she is moaning in pain. She states she was at the ER on 1/25/24 for the shoulder pain and they just told her to take tylenol and ibuprofen, but it doesn't help the pain. I offered her an appt on 2/14 with Dr Bucio but she declined as she has an appt with pcp at 340pm. I advised her I can give her a 2pm appt, but again she declined. I advised her the next available is 2/28 with Dr Bucio, she declined and states she is in severe pain. I advised if pain is severe, unlivable pain she should go to ER. She denies wanting to go to ER. I advised she should at least f/u with pcp or discuss at her appt on 2/14 if she can wait until then. She accepted appt on 2/28 with Dr Bucio and will add her to the wait list. Again, I advised her if pain is severe she should go to ER. She verbalized understanding.

## 2024-02-14 ENCOUNTER — OFFICE VISIT (OUTPATIENT)
Dept: INTERNAL MEDICINE CLINIC | Facility: CLINIC | Age: 54
End: 2024-02-14

## 2024-02-14 DIAGNOSIS — H25.092 OTHER AGE-RELATED INCIPIENT CATARACT OF LEFT EYE: ICD-10-CM

## 2024-02-14 DIAGNOSIS — E11.8 TYPE 2 DIABETES MELLITUS WITH COMPLICATION, WITH LONG-TERM CURRENT USE OF INSULIN (HCC): ICD-10-CM

## 2024-02-14 DIAGNOSIS — I10 PRIMARY HYPERTENSION: ICD-10-CM

## 2024-02-14 DIAGNOSIS — Z79.4 TYPE 2 DIABETES MELLITUS WITH COMPLICATION, WITH LONG-TERM CURRENT USE OF INSULIN (HCC): ICD-10-CM

## 2024-02-14 DIAGNOSIS — M25.512 LEFT SHOULDER PAIN, UNSPECIFIED CHRONICITY: Primary | ICD-10-CM

## 2024-02-14 PROCEDURE — 99215 OFFICE O/P EST HI 40 MIN: CPT | Performed by: INTERNAL MEDICINE

## 2024-02-14 PROCEDURE — 3079F DIAST BP 80-89 MM HG: CPT | Performed by: INTERNAL MEDICINE

## 2024-02-14 PROCEDURE — 3077F SYST BP >= 140 MM HG: CPT | Performed by: INTERNAL MEDICINE

## 2024-02-14 PROCEDURE — 3008F BODY MASS INDEX DOCD: CPT | Performed by: INTERNAL MEDICINE

## 2024-02-14 RX ORDER — HYDRALAZINE HYDROCHLORIDE 100 MG/1
100 TABLET, FILM COATED ORAL 2 TIMES DAILY
Qty: 180 TABLET | Refills: 0 | Status: SHIPPED | OUTPATIENT
Start: 2024-02-14

## 2024-02-15 NOTE — TELEPHONE ENCOUNTER
Patient is requesting to be seen sooner, for left should patient, which is getting worse, and tylenol is not helping, and now she is dropping things, and she is not able to raise her arm or wash dishes. Patient states that the pain is in the neck, shoulder and up to the elbow. Patient states that she is not able to sleep on her left side and would see any doctor available. Patient is on the Wait List.

## 2024-02-15 NOTE — TELEPHONE ENCOUNTER
Spoke with patient. Offered appointment on 2/20. Patient wanted something sooner and I advised that was the soonest we had and it was only available as someone had canceled. She asked about tomorrow. I advised there were no ortho doctors in the office on Friday. She then accepted the appointment on 2/20/24 at 10:50 am.

## 2024-02-16 VITALS
DIASTOLIC BLOOD PRESSURE: 80 MMHG | SYSTOLIC BLOOD PRESSURE: 178 MMHG | HEART RATE: 59 BPM | WEIGHT: 189 LBS | HEIGHT: 62 IN | BODY MASS INDEX: 34.78 KG/M2

## 2024-02-16 PROBLEM — H26.9 CATARACTA: Status: ACTIVE | Noted: 2024-02-16

## 2024-02-21 ENCOUNTER — PATIENT OUTREACH (OUTPATIENT)
Dept: CASE MANAGEMENT | Age: 54
End: 2024-02-21

## 2024-02-21 DIAGNOSIS — D64.9 ANEMIA, UNSPECIFIED TYPE: ICD-10-CM

## 2024-02-21 DIAGNOSIS — E78.5 DYSLIPIDEMIA: ICD-10-CM

## 2024-02-21 DIAGNOSIS — G43.909 MIGRAINE WITHOUT STATUS MIGRAINOSUS, NOT INTRACTABLE, UNSPECIFIED MIGRAINE TYPE: ICD-10-CM

## 2024-02-21 DIAGNOSIS — I10 PRIMARY HYPERTENSION: ICD-10-CM

## 2024-02-21 DIAGNOSIS — E11.8 TYPE 2 DIABETES MELLITUS WITH COMPLICATION, WITH LONG-TERM CURRENT USE OF INSULIN (HCC): ICD-10-CM

## 2024-02-21 DIAGNOSIS — Z79.4 TYPE 2 DIABETES MELLITUS WITH COMPLICATION, WITH LONG-TERM CURRENT USE OF INSULIN (HCC): ICD-10-CM

## 2024-02-21 DIAGNOSIS — I63.9 LEFT-SIDED CEREBROVASCULAR ACCIDENT (CVA) (HCC): ICD-10-CM

## 2024-02-21 DIAGNOSIS — E11.49 TYPE 2 DIABETES MELLITUS WITH OTHER NEUROLOGIC COMPLICATION, WITH LONG-TERM CURRENT USE OF INSULIN (HCC): ICD-10-CM

## 2024-02-21 DIAGNOSIS — E78.00 PURE HYPERCHOLESTEROLEMIA: ICD-10-CM

## 2024-02-21 DIAGNOSIS — Z79.4 TYPE 2 DIABETES MELLITUS WITH OTHER NEUROLOGIC COMPLICATION, WITH LONG-TERM CURRENT USE OF INSULIN (HCC): ICD-10-CM

## 2024-02-21 DIAGNOSIS — I10 ESSENTIAL HYPERTENSION: Primary | ICD-10-CM

## 2024-02-21 DIAGNOSIS — R60.0 EDEMA OF BOTH LEGS: ICD-10-CM

## 2024-02-21 DIAGNOSIS — R10.12 LEFT UPPER QUADRANT ABDOMINAL PAIN: ICD-10-CM

## 2024-02-21 NOTE — PROGRESS NOTES
Spoke to Anjel for CCM.      Updates to patient care team/comments:   no change  Patient reported changes in medications: Patient stated she is not taking Farxiga.  Med Adherence  Comment:  Patient stated she is compliant with other medications.       Patient updates/concerns:      Pt reports she is doing okay, still having a lot of pain in shoulder. Patient stated she has ortho scheduled for next week , appointment for yesterday was rescheduled due to transportation trouble, she is looking forward to getting some answers. States it is difficult to do ADL- shower, dress due to unable to lift arm above head or behind her back.   Patient is taking hydrocodone at night for pain , patient is also taking cyclobenzaprine at night to help with muscle pain. Pain is affecting sleep nightly .    HTN:   Pt stated she was last in to see PCP and had changes to BP medication .   Pt reported she is now taking hydralazine 100 mg bid. Pt stated she is checking BP daily.   Pt reported vitals:   BP :  Pulse:   2//75  58  2//80 63  2/19- 169/81     63  2//76 69  2//87  68  2/16- 154/77 68       Diabetes :   Patient reported Glucose readings have improved today's glucose was 141 but patient was woken up at 2 am this morning with hypoglycemic episode of 54 at 2 am . She is still having episodes, she has not been keeping log of glucose was not able to give me any more readings . Pt is checking glucose with glucometer. Patient stated she is not planning using sensor as she is having bilateral arm pain. Reviewed medication regimen with patient - she continues to take metformin 1000 mg bid, lantus- 58 units, novalog- 15 to 30 units  before meals , if still high takes an additional 5 units after meals.     Exercise:   Pt stated she is making an effort to continue to be active- she is walking around the house daily inside the house.     CCM encouraged patient to record all glucose readings as well as food log if  possible and BP log .   Goals/Action Plan:    Active goal from previous outreach: improve pain and continue monitoring BP/ Glucose    Patient reported progress towards goals: patient stated BP and glucose readigns have improved.                - What: Imrpove BP and diabetes-            - Where/When/How: daily tracking and logging of all glucose readings as well as food log if possible and BP log .   Patient Reported Barriers and Concerns: reading is difficult cannot see out of left eye.                    - Plan for overcoming barriers: pt stated she has upcoming appointment for cataract surgery     Care Managers Interventions:   CCM reviewed medication with patient , CCM will notify Endo patient is not taking Farxiga.   Encouraged patient:   Self care: Take the time to do the things you love.   Nutrition:  Good nutrition helps us to maintain our weight, fight off infection, and help reduce the risk of developing other chronic issues.   Physical activity:  Physical activity is important to help maintain independence and improve quality of life.   CCM listened and provided support.   CCM updated immunizations- no updates.   Continue to provide encouragement, support and education for healthy coping and diagnosis.          Future Appointments:   Future Appointments   Date Time Provider Department Center   2/26/2024  3:00 PM Juancho Reyes MD [facility]   2/29/2024  3:45 PM Michelle Leger APRN [facility]   3/6/2024  3:00 PM Yves Yanez MD [address]         [facility] Care Manager Follow Up Date: 1 month     Reason For Follow Up: review progress and or barriers towards patient's goals.     Time Spent This Encounter Total: 48 min medical record review, telephone communication, care plan updates where needed, education, goals, and action plan recreation/update. Provided acknowledgment and validation to patient's concerns.   Monthly Minute Total including today: 48  Physical  assessment, complete health history, and need for CCM established by Yves Yanez MD.

## 2024-02-22 DIAGNOSIS — E11.49 TYPE 2 DIABETES MELLITUS WITH OTHER NEUROLOGIC COMPLICATION, WITH LONG-TERM CURRENT USE OF INSULIN (HCC): ICD-10-CM

## 2024-02-22 DIAGNOSIS — Z79.4 TYPE 2 DIABETES MELLITUS WITH OTHER NEUROLOGIC COMPLICATION, WITH LONG-TERM CURRENT USE OF INSULIN (HCC): ICD-10-CM

## 2024-02-26 ENCOUNTER — OFFICE VISIT (OUTPATIENT)
Dept: ORTHOPEDICS CLINIC | Facility: CLINIC | Age: 54
End: 2024-02-26

## 2024-02-26 VITALS — HEART RATE: 82 BPM | DIASTOLIC BLOOD PRESSURE: 93 MMHG | SYSTOLIC BLOOD PRESSURE: 182 MMHG

## 2024-02-26 DIAGNOSIS — M75.32 CALCIFIC TENDINITIS OF LEFT SHOULDER: Primary | ICD-10-CM

## 2024-02-26 PROCEDURE — 3080F DIAST BP >= 90 MM HG: CPT | Performed by: ORTHOPAEDIC SURGERY

## 2024-02-26 PROCEDURE — 99203 OFFICE O/P NEW LOW 30 MIN: CPT | Performed by: ORTHOPAEDIC SURGERY

## 2024-02-26 PROCEDURE — 3077F SYST BP >= 140 MM HG: CPT | Performed by: ORTHOPAEDIC SURGERY

## 2024-02-26 PROCEDURE — 20610 DRAIN/INJ JOINT/BURSA W/O US: CPT | Performed by: ORTHOPAEDIC SURGERY

## 2024-02-26 RX ORDER — ATORVASTATIN CALCIUM 80 MG/1
80 TABLET, FILM COATED ORAL NIGHTLY
Qty: 90 TABLET | Refills: 3 | Status: SHIPPED | OUTPATIENT
Start: 2024-02-26

## 2024-02-26 RX ORDER — MELOXICAM 15 MG/1
15 TABLET ORAL DAILY
Qty: 30 TABLET | Refills: 0 | Status: SHIPPED | OUTPATIENT
Start: 2024-02-26

## 2024-02-26 RX ORDER — TRIAMCINOLONE ACETONIDE 40 MG/ML
40 INJECTION, SUSPENSION INTRA-ARTICULAR; INTRAMUSCULAR ONCE
Status: COMPLETED | OUTPATIENT
Start: 2024-02-26 | End: 2024-02-26

## 2024-02-26 RX ADMIN — TRIAMCINOLONE ACETONIDE 40 MG: 40 INJECTION, SUSPENSION INTRA-ARTICULAR; INTRAMUSCULAR at 16:50:00

## 2024-02-26 NOTE — PROGRESS NOTES
Per verbal order from Dr. Reyes, draw up and 4ml of 0.5% Marcaine and 1ml of Kenalog 40 for injection into left shoulder.Pamela Baldwin  Patient provided education handout for cortisone injection.

## 2024-02-26 NOTE — PROGRESS NOTES
NURSING INTAKE COMMENTS:   Chief Complaint   Patient presents with    Shoulder Pain     Consult Left shoulder pain 9/10 starts on neck and travels to elbow.  Onset  a month ago, no injury  Visit to ED to 1/25/24, has XR in Epic       HPI: This 53 year old female presents today with complaints of pain in the left shoulder began spontaneously about a month ago.  She has pain with any motion of the left shoulder.    Past Medical History:   Diagnosis Date    Age-related nuclear cataract of both eyes 03/30/2016    Anemia     Apnea 10/20/2015    Cataract     Coronary atherosclerosis     Depression     DM type 2 (diabetes mellitus, type 2) (HCC)     Esophageal reflux     High blood pressure     High cholesterol     Meibomian gland dysfunction (MGD), bilateral, both upper and lower lids 03/30/2016    Migraine     Muscle weakness     left sided weakness uses walker and cane    Stenosis of right vertebral artery     Stroke ([facility]) 2014    Type II or unspecified type diabetes mellitus without mention of complication, not stated as uncontrolled      Past Surgical History:   Procedure Laterality Date    BRAIN SURGERY  2014    COLONOSCOPY N/A 8/25/2017    Procedure: COLONOSCOPY;  Surgeon: Sharmaine Burks MD;  Location: [facility] ENDOSCOPY    COLONOSCOPY      COLONOSCOPY N/A 11/12/2022    Procedure: COLONOSCOPY with Polypectomy;  Surgeon: Terry Weaver MD;  Location: [facility] ENDOSCOPY    EXCISE CAROTID BODY+CAROTID ARTERY  09/2017    INCISION AND DRAINAGE Right 04/19/16    lower abdominal skin    LAPAROSCOPIC CHOLECYSTECTOMY  2002     Current Outpatient Medications   Medication Sig Dispense Refill    atorvastatin 80 MG Oral Tab Take 1 tablet (80 mg total) by mouth nightly. 90 tablet 3    Meloxicam 15 MG Oral Tab Take 1 tablet (15 mg total) by mouth daily. 30 tablet 0    hydrALAZINE 100 MG Oral Tab Take 1 tablet (100 mg total) by mouth 2 (two) times daily. 180 tablet 0    insulin glargine (LANTUS SOLOSTAR) 100 UNIT/ML Subcutaneous  Solution Pen-injector Inject 40 Units into the skin nightly. (Patient taking differently: Inject 58 Units into the skin nightly.) 36 mL 0    docusate sodium 100 MG Oral Tab Take 1 tablet (100 mg total) by mouth 2 (two) times daily as needed for constipation. 60 tablet 1    TRUEplus Lancets 33G Does not apply Misc Use to check blood sugar three times a day 300 each 3    fexofenadine (ALLEGRA ALLERGY) 180 MG Oral Tab Take 1 tablet (180 mg total) by mouth daily. Take 1 tablet once daily 90 tablet 0    cyclobenzaprine 5 MG Oral Tab Take 1 tablet (5 mg total) by mouth nightly as needed for Muscle spasms. 20 tablet 0    Glucose Blood (TRUE METRIX BLOOD GLUCOSE TEST) In Vitro Strip Use to check blood sugar three times a day 300 strip 0    Insulin Pen Needle (BD PEN NEEDLE ABDOULAYE 2ND GEN) 32G X 4 MM Does not apply Misc INJECT FOUR TIMES DAILY AS DIRECTED 40 each 1    dapagliflozin (FARXIGA) 10 MG Oral Tab Take 1 tablet (10 mg total) by mouth daily. 30 tablet 3    Accu-Chek FastClix Lancets Does not apply Misc Use to check blood sugar three times a day 300 each 0    Glucose Blood (ACCU-CHEK GUIDE) In Vitro Strip Use to check blood sugar three times a day 300 strip 0    metoprolol succinate ER 25 MG Oral Tablet 24 Hr Take 1 tablet (25 mg total) by mouth nightly. 90 tablet 1    metoprolol succinate  MG Oral Tablet 24 Hr Take 1 tablet (100 mg total) by mouth daily. 90 tablet 1    losartan 100 MG Oral Tab Take 1 tablet (100 mg total) by mouth daily. 90 tablet 1    amLODIPine 5 MG Oral Tab TAKE 2 TABLETS EVERY  tablet 1    levETIRAcetam 500 MG Oral Tab TAKE 1 TABLET TWICE DAILY 180 tablet 1    Blood Glucose Monitoring Suppl (TRUE METRIX METER) w/Device Does not apply Kit Use to check blood sugar three times a day 1 kit 0    insulin aspart (NOVOLOG FLEXPEN) 100 Units/mL Subcutaneous Solution Pen-injector Inject 10-15 Units into the skin 3 (three) times daily with meals.      ondansetron 4 MG Oral Tablet Dispersible Take  1 tablet (4 mg total) by mouth every 8 (eight) hours as needed for Nausea. 30 tablet 0    ergocalciferol 1.25 MG (35730 UT) Oral Cap Take 1 capsule (50,000 Units total) by mouth every 30 (thirty) days. For 3 months 1 capsule 2    Omeprazole 40 MG Oral Capsule Delayed Release TAKE 1 CAPSULE EVERY DAY 30 TO 60 MINUTES BEFORE A MEAL 90 capsule 3    Alcohol Swabs (DROPSAFE ALCOHOL PREP) 70 % Does not apply Pads USE AS DIRECTED 100 each 0    Blood Glucose Monitoring Suppl (ACCU-CHEK GUIDE) w/Device Does not apply Kit Use to check blood sugar three times a day 1 kit 0    METFORMIN 500 MG Oral Tab TAKE 2 TABLETS(1000 MG) BY MOUTH TWICE DAILY WITH MEALS 360 tablet 0    escitalopram 10 MG Oral Tab Take 1 tablet (10 mg total) by mouth daily. 90 tablet 3    hydroCHLOROthiazide 25 MG Oral Tab Take 1 tablet (25 mg total) by mouth daily. 90 tablet 3    fluticasone propionate 50 MCG/ACT Nasal Suspension 2 sprays by Nasal route daily. 48 g 1    VENTOLIN  (90 Base) MCG/ACT Inhalation Aero Soln Inhale 2 puffs into the lungs every 6 (six) hours as needed for Wheezing. 18 g 1    aspirin 325 MG Oral Tab Take 1 tablet (325 mg total) by mouth daily.       Allergies   Allergen Reactions    Reglan [Metoclopramide] OTHER (SEE COMMENTS)     Sensitivity, with crawling sensation.    Adhesive Tape      itching    Radiology Contrast Iodinated Dyes ITCHING     Family History   Problem Relation Age of Onset    Diabetes Father     Hypertension Father     Heart Disorder Father     Lipids Father     Obesity Father     Colon Cancer Father     Heart Disorder Mother     Lipids Mother     Obesity Mother     Lipids Brother     Obesity Brother     Glaucoma Neg     Macular degeneration Neg        Social History     Occupational History    Occupation:      Comment: disabled    Occupation: phlebotomy   Tobacco Use    Smoking status: Former     Packs/day: 0.20     Years: 1.00     Additional pack years: 0.00     Total pack years: 0.20      Types: Cigarettes     Quit date: 12/16/2013     Years since quitting: 10.2    Smokeless tobacco: Never   Vaping Use    Vaping Use: Never used   Substance and Sexual Activity    Alcohol use: No     Alcohol/week: 0.0 standard drinks of alcohol    Drug use: No    Sexual activity: Not Currently        Review of Systems:  GENERAL: feels generally well, no significant weight loss or weight gain  SKIN: no ulcerated or worrisome skin lesions  EYES:denies blurred vision or double vision  HEENT: denies new nasal congestion, sinus pain or ST  LUNGS: denies shortness of breath  CARDIOVASCULAR: denies chest pain  GI: no hematemesis, no worsening heartburn, no diarrhea  : no dysuria, no blood in urine, no difficulty urinating, no incontinence  MUSCULOSKELETAL: no other musculoskeletal complaints other than in HPI  NEURO: no numbness or tingling, no weakness or balance disorder  PSYCHE: no depression or anxiety  HEMATOLOGIC: no hx of blood dyscrasia  ENDOCRINE: no thyroid or diabetes issues  ALL/ASTHMA: no new hx of severe allergy or asthma    Physical Examination:    There were no vitals taken for this visit.  Constitutional: appears well hydrated, alert and responsive, no acute distress noted  Extremities: Dyssynchronous motion of the left shoulder.  Positive impingement sign.  No weakness resisted external rotation at neutral.  No limitations to passive range of motion.      Imaging: Consistent with calcific tendinitis of the rotator cuff.     Lab Results   Component Value Date    WBC 13.4 (H) 01/25/2024    HGB 13.0 01/25/2024    .0 01/25/2024      Lab Results   Component Value Date     (H) 01/25/2024    BUN 14 01/25/2024    CREATSERUM 0.87 01/25/2024    GFRNAA 66 05/16/2022    GFRAA 76 05/16/2022        Assessment and Plan:  Diagnoses and all orders for this visit:    Calcific tendinitis of left shoulder  -     arthrocentesis major joint  -     triamcinolone acetonide (Kenalog-40) 40 MG/ML injection 40  mg    Other orders  -     Meloxicam 15 MG Oral Tab; Take 1 tablet (15 mg total) by mouth daily.        Assessment: Calcific tendinitis of the left shoulder.  Procedure: The risks and benefits of a cortisone injection were discussed with the patient.  An informational sheet was also provided and the patient had ample time to review it.  Under sterile preparation, the left shoulder was injected with 40 mg of Kenalog and 4 cc 0.5% marcaine.  The patient tolerated the procedure well.      Plan: I injected the shoulder with cortisone, prescribed physical therapy and meloxicam.  We also talked about needle ostial chalasis and arthroscopic debridement if she does not respond to conservative management.    The above note was creating using Dragon speech recognition technology. Please excuse any typos.    TIM LEES MD

## 2024-02-29 ENCOUNTER — OFFICE VISIT (OUTPATIENT)
Dept: ENDOCRINOLOGY CLINIC | Facility: CLINIC | Age: 54
End: 2024-02-29

## 2024-02-29 ENCOUNTER — TELEPHONE (OUTPATIENT)
Dept: INTERNAL MEDICINE CLINIC | Facility: CLINIC | Age: 54
End: 2024-02-29

## 2024-02-29 VITALS
WEIGHT: 190 LBS | BODY MASS INDEX: 34.96 KG/M2 | DIASTOLIC BLOOD PRESSURE: 88 MMHG | HEIGHT: 62 IN | HEART RATE: 80 BPM | SYSTOLIC BLOOD PRESSURE: 138 MMHG

## 2024-02-29 DIAGNOSIS — E11.49 TYPE 2 DIABETES MELLITUS WITH OTHER NEUROLOGIC COMPLICATION, WITH LONG-TERM CURRENT USE OF INSULIN (HCC): Primary | ICD-10-CM

## 2024-02-29 DIAGNOSIS — Z79.4 TYPE 2 DIABETES MELLITUS WITH OTHER NEUROLOGIC COMPLICATION, WITH LONG-TERM CURRENT USE OF INSULIN (HCC): Primary | ICD-10-CM

## 2024-02-29 LAB
CARTRIDGE LOT#: ABNORMAL NUMERIC
GLUCOSE BLOOD: 285
HEMOGLOBIN A1C: 10.4 % (ref 4.3–5.6)
TEST STRIP LOT #: NORMAL NUMERIC

## 2024-02-29 PROCEDURE — 82947 ASSAY GLUCOSE BLOOD QUANT: CPT

## 2024-02-29 PROCEDURE — 3079F DIAST BP 80-89 MM HG: CPT

## 2024-02-29 PROCEDURE — 99214 OFFICE O/P EST MOD 30 MIN: CPT

## 2024-02-29 PROCEDURE — 3075F SYST BP GE 130 - 139MM HG: CPT

## 2024-02-29 PROCEDURE — 3008F BODY MASS INDEX DOCD: CPT

## 2024-02-29 PROCEDURE — 3046F HEMOGLOBIN A1C LEVEL >9.0%: CPT | Performed by: INTERNAL MEDICINE

## 2024-02-29 PROCEDURE — 83036 HEMOGLOBIN GLYCOSYLATED A1C: CPT

## 2024-02-29 RX ORDER — OFLOXACIN 3 MG/ML
2 SOLUTION/ DROPS OPHTHALMIC 4 TIMES DAILY
COMMUNITY
Start: 2024-02-26

## 2024-02-29 NOTE — TELEPHONE ENCOUNTER
Pt states that she is scheduled to have surgery with Dr. Franks/Ophthalmology on 3-7-24 and needs a pre op clearance. The first available appointment with Dr. Yanez is not  until 4-4-24. Pt would like to be seen sooner. Would the doctor like to add the patient to the schedule? If so, which date and time?

## 2024-02-29 NOTE — TELEPHONE ENCOUNTER
Patient called, informed of message to schedule pre op.  Scheduled with Dr Crawford:    Future Appointments   Date Time Provider Department Center   2/29/2024  3:45 PM [facility]   3/1/2024  2:40 PM Marce Crawford MD [email or web address]  [place]   3/6/2024  3:00 PM Yves Yanez MD [id number] [place]

## 2024-02-29 NOTE — PATIENT INSTRUCTIONS
Decrease Lantus to 54 units subcutaneous daily       Continue Humalog       Start Mounjaro 2.5mg subcutaneous weekly x 4 weeks and then increase to 5mg subcutaneous weekly

## 2024-02-29 NOTE — TELEPHONE ENCOUNTER
Call to patient, LVM to call office and schedule pre op with another provider as Dr. Bermudez won't be in office until 3/5/24. Will try again later in the day

## 2024-02-29 NOTE — PROGRESS NOTES
Name: Anjel Pisano  Date: 3/5/2024    Referring Physician: No ref. provider found    HISTORY OF PRESENT ILLNESS   Anjel Psiano is a 53 year old female who presents for diabetes mellitus, diagnosed over 10 years ago.  She was seen during hospitalization in 3/2015 and started on insulin therapy at that time.    Prior HbA, C or glycohemoglobin were 9.4% 3/2015; 7.6% 6/2015; 8.8% 10/2015; 8.0% 2/2016; 7.0% 6/2016; 7.8% 8/2016; 8.0% 2/2017; 8.3% 5/2017; 8.5% 9/2017; 8.2% 4/2018; 10.6% 9/2019; 10.2% 11/2019; 8.8% 12/2019; 10.0% 7/2021; 9.4% 10/2021; 9.4% 3/2022; 8.8% 6/2022; 9.0% 9/2022; 10.1% 12/2022; 9.6% 2/2023; 10.2% 11/2023;  10.4% POC today     Dietary compliance: Fair. She states she tries to follow low CHO diet. No significant diet changes since last visit but she has been frustrated with weight gain.    Breakfast - oatmeal or slice of bread with boiled egg and coffee and tea   Lunch- Vegetables with small amount of rice with meat  Dinner- 1/2 bread/roti, squash, or sometimes skips,   Snacks- fruit     Exercise: Has been doing more walking recently. Going up and down stairs more frequently   Polyuria/polydipsia: No   Blurred vision: Yes - has upcoming surgery for cataracts 12/2023    Episodes of hypoglycemia: Has two episodes in the past few months    Blood Glucose:    Per recall:     Fasting - 130-300   Before lunch- 150-180  Before dinner- 150-180  Before bed- 300's     Medications for DM   Lantus - 58 units SQ QHS   Novolog - 20-30 with breakfast, 20-30 with lunch units but has not been taking any with dinner   Metformin 1000mg PO BID    Ozempic- 0.5mg subcutaneous weekly --> stopped 3-4 months ago because of significant nausea/vomiting     Jardiance- stopped due to concerns of hair loss     REVIEW OF SYSTEMS  Eyes: Diabetic retinopathy present: No            Most recent visit to eye doctor in last 12 months: No- has follow up scheduled this month- [facility] has upcoming surgery 2/2023      CV: Cardiovascular disease present: No         Hypertension present: Yes         Hyperlipidemia present: Yes         Peripheral Vascular Disease present: No    : Nephropathy present: No    Neuro: Neuropathy present: No    Skin: Infection or ulceration: No    Osteoporosis: No    Thyroid disease: No      Medications:     Current Outpatient Medications:     ofloxacin 0.3 % Ophthalmic Solution, Place 2 drops into the right eye 4 (four) times daily., Disp: , Rfl:     atorvastatin 80 MG Oral Tab, Take 1 tablet (80 mg total) by mouth nightly., Disp: 90 tablet, Rfl: 3    Meloxicam 15 MG Oral Tab, Take 1 tablet (15 mg total) by mouth daily., Disp: 30 tablet, Rfl: 0    hydrALAZINE 100 MG Oral Tab, Take 1 tablet (100 mg total) by mouth 2 (two) times daily., Disp: 180 tablet, Rfl: 0    insulin glargine (LANTUS SOLOSTAR) 100 UNIT/ML Subcutaneous Solution Pen-injector, Inject 40 Units into the skin nightly. (Patient taking differently: Inject 58 Units into the skin nightly.), Disp: 36 mL, Rfl: 0    docusate sodium 100 MG Oral Tab, Take 1 tablet (100 mg total) by mouth 2 (two) times daily as needed for constipation., Disp: 60 tablet, Rfl: 1    TRUEplus Lancets 33G Does not apply Misc, Use to check blood sugar three times a day, Disp: 300 each, Rfl: 3    fexofenadine (ALLEGRA ALLERGY) 180 MG Oral Tab, Take 1 tablet (180 mg total) by mouth daily. Take 1 tablet once daily, Disp: 90 tablet, Rfl: 0    cyclobenzaprine 5 MG Oral Tab, Take 1 tablet (5 mg total) by mouth nightly as needed for Muscle spasms., Disp: 20 tablet, Rfl: 0    Glucose Blood (TRUE METRIX BLOOD GLUCOSE TEST) In Vitro Strip, Use to check blood sugar three times a day, Disp: 300 strip, Rfl: 0    Insulin Pen Needle (BD PEN NEEDLE ABDOULAYE 2ND GEN) 32G X 4 MM Does not apply Misc, INJECT FOUR TIMES DAILY AS DIRECTED, Disp: 40 each, Rfl: 1    dapagliflozin (FARXIGA) 10 MG Oral Tab, Take 1 tablet (10 mg total) by mouth daily., Disp: 30 tablet, Rfl: 3    Accu-Chek FastClix  Lancets Does not apply Misc, Use to check blood sugar three times a day, Disp: 300 each, Rfl: 0    Glucose Blood (ACCU-CHEK GUIDE) In Vitro Strip, Use to check blood sugar three times a day, Disp: 300 strip, Rfl: 0    metoprolol succinate ER 25 MG Oral Tablet 24 Hr, Take 1 tablet (25 mg total) by mouth nightly., Disp: 90 tablet, Rfl: 1    metoprolol succinate  MG Oral Tablet 24 Hr, Take 1 tablet (100 mg total) by mouth daily., Disp: 90 tablet, Rfl: 1    losartan 100 MG Oral Tab, Take 1 tablet (100 mg total) by mouth daily., Disp: 90 tablet, Rfl: 1    amLODIPine 5 MG Oral Tab, TAKE 2 TABLETS EVERY DAY, Disp: 180 tablet, Rfl: 1    levETIRAcetam 500 MG Oral Tab, TAKE 1 TABLET TWICE DAILY, Disp: 180 tablet, Rfl: 1    Blood Glucose Monitoring Suppl (TRUE METRIX METER) w/Device Does not apply Kit, Use to check blood sugar three times a day, Disp: 1 kit, Rfl: 0    insulin aspart (NOVOLOG FLEXPEN) 100 Units/mL Subcutaneous Solution Pen-injector, Inject 10-15 Units into the skin 3 (three) times daily with meals., Disp: , Rfl:     ondansetron 4 MG Oral Tablet Dispersible, Take 1 tablet (4 mg total) by mouth every 8 (eight) hours as needed for Nausea., Disp: 30 tablet, Rfl: 0    ergocalciferol 1.25 MG (00643 UT) Oral Cap, Take 1 capsule (50,000 Units total) by mouth every 30 (thirty) days. For 3 months, Disp: 1 capsule, Rfl: 2    Omeprazole 40 MG Oral Capsule Delayed Release, TAKE 1 CAPSULE EVERY DAY 30 TO 60 MINUTES BEFORE A MEAL, Disp: 90 capsule, Rfl: 3    Alcohol Swabs (DROPSAFE ALCOHOL PREP) 70 % Does not apply Pads, USE AS DIRECTED, Disp: 100 each, Rfl: 0    Blood Glucose Monitoring Suppl (ACCU-CHEK GUIDE) w/Device Does not apply Kit, Use to check blood sugar three times a day, Disp: 1 kit, Rfl: 0    METFORMIN 500 MG Oral Tab, TAKE 2 TABLETS(1000 MG) BY MOUTH TWICE DAILY WITH MEALS, Disp: 360 tablet, Rfl: 0    escitalopram 10 MG Oral Tab, Take 1 tablet (10 mg total) by mouth daily., Disp: 90 tablet, Rfl: 3     hydroCHLOROthiazide 25 MG Oral Tab, Take 1 tablet (25 mg total) by mouth daily., Disp: 90 tablet, Rfl: 3    fluticasone propionate 50 MCG/ACT Nasal Suspension, 2 sprays by Nasal route daily., Disp: 48 g, Rfl: 1    VENTOLIN  (90 Base) MCG/ACT Inhalation Aero Soln, Inhale 2 puffs into the lungs every 6 (six) hours as needed for Wheezing., Disp: 18 g, Rfl: 1    aspirin 325 MG Oral Tab, Take 1 tablet (325 mg total) by mouth daily., Disp: , Rfl:      Allergies:   Allergies   Allergen Reactions    Reglan [Metoclopramide] OTHER (SEE COMMENTS)     Sensitivity, with crawling sensation.    Adhesive Tape      itching    Radiology Contrast Iodinated Dyes ITCHING    Wound Dressing Adhesive RASH       Social History:   Social History     Socioeconomic History    Marital status: Legally     Number of children: 3   Occupational History    Occupation:      Comment: disabled    Occupation: phlebotomy   Tobacco Use    Smoking status: Former     Packs/day: 0.20     Years: 1.00     Additional pack years: 0.00     Total pack years: 0.20     Types: Cigarettes     Quit date: 12/16/2013     Years since quitting: 10.2    Smokeless tobacco: Never   Vaping Use    Vaping Use: Never used   Substance and Sexual Activity    Alcohol use: No     Alcohol/week: 0.0 standard drinks of alcohol    Drug use: No    Sexual activity: Not Currently       Medical History:   Past Medical History:   Diagnosis Date    Age-related nuclear cataract of both eyes 03/30/2016    Anemia     Apnea 10/20/2015    Cataract     Coronary atherosclerosis     Depression     DM type 2 (diabetes mellitus, type 2) ([facility])     Esophageal reflux     High blood pressure     High cholesterol     Meibomian gland dysfunction (MGD), bilateral, both upper and lower lids 03/30/2016    Migraine     Muscle weakness     left sided weakness uses walker and cane    Sleep apnea     Stenosis of right vertebral artery     Stroke ([facility]) 2014    Type II or unspecified type  diabetes mellitus without mention of complication, not stated as uncontrolled        Surgical history:   Past Surgical History:   Procedure Laterality Date    BRAIN SURGERY  2014    COLONOSCOPY N/A 8/25/2017    Procedure: COLONOSCOPY;  Surgeon: Sharmaine Burks MD;  Location: [facility] ENDOSCOPY    COLONOSCOPY      COLONOSCOPY N/A 11/12/2022    Procedure: COLONOSCOPY with Polypectomy;  Surgeon: Terry Weaver MD;  Location: [facility] ENDOSCOPY    EXCISE CAROTID BODY+CAROTID ARTERY  09/2017    INCISION AND DRAINAGE Right 04/19/16    lower abdominal skin    LAPAROSCOPIC CHOLECYSTECTOMY  2002         PHYSICAL EXAM  /88   Pulse 80   Ht 5' 2\" (1.575 m)   Wt 190 lb (86.2 kg)   BMI 34.75 kg/m²     General Appearance:  alert, well developed, in no acute distress  Eyes:  normal conjunctivae, sclera., normal sclera and normal pupils  Ears/Nose/Mouth/Throat/Neck:  no palpable thyroid nodules or cervical lymphadenopathy  Back: no kyphosis or back tenderness  Musculoskeletal:  normal muscle strength and tone  Skin:  normal moisture and skin texture  Hair & Nails:  normal scalp hair     Hematologic:  no excessive bruising  Neuro:  sensory grossly intact and motor grossly intact  Psychiatric:  oriented to time, self, and place  Nutritional:  no abnormal weight gain or loss       ASSESSMENT/PLAN:      1. Diabetes Mellitus Type 2, Uncontrolled  -Uncontrolled  -HgA1c- 10.2% - increased   -Reviewed at length today importance of improving glycemic control. Unfortunately has been unable to tolerate several medications and sugars continue to be above goal. Reviewed at length risk of complications of uncontrolled diabetes.   -Reviewed ABC's of diabetes  -Discussed importance of glycemic control to prevent complications of diabetes  -Discussed complications of diabetes include retinopathy, neuropathy, nephropathy and cardiovascular disease  -Discussed importance of SBGM- using freestyle maximo   -Discussed importance of low CHO  diet- recommend 135 grams total per day, 45 grams per meal.     - Of note, each time she returns for visit her insulin doses are largely different than what I previously recommended.     - Decrease Lantus to 54 nits subcutaneous daily - having fair amount of overnight hypoglycemia.   - Continue Novolog to 30-20-30 units TID plus CF 1:40>140. Reviewed importance of taking insulin with all three meals.     - Ozempic stopped due to persistent nausea and vomiting. She is willing to try alternative after lengthy discussion     -Start Mounjaro 2.5mg subcutaneous weekly x 4 weeks and then increase to 5mg subcutaneous weekly. Reviewed side effects and risks vs benefits of medication.     -Discussed option of trying SGLT-2 inhibitor again but she is worried about hair loss and previous yeast infection and is not willing to try medication at this time.     -Reviewed at length importance of adherence with DM medications. Reviewed importance of contacting clinic if sugars are <80 or persistently >200 so that medications can be adjusted.     -Continue with Freestyle maximo 2- Reviewed importance of bringing reader to all appointments.     -Continue Metformin 1000mg PO BID   -Reviewed timing and administration of insulin.     -normotensive   -Lipids-7/2023 - , on statin therapy- atorvastatin 80mg PO nightly. She admits to missing doses of medication. Reviewed importance of improved adherence with statin.     -elevated urine microalbumin 10/2022 and 7/2023- reviewed importance of good blood sugar control. On losartan    -Has optho follow up scheduled.   -normal foot exam 12/2023    RTC 2 months with me and 4 months with MD- given difficulty improving glycemic control despite trying several medications and insulin adjustments.  Michelle Leger, APRN  3/5/2024    A total of  35 minutes was spent on obtaining history, reviewing pertinent imaging/labs and specialists notes, evaluating patient, providing multiple treatment  options, reinforcing diet/exercise and compliance, and completing documentation.

## 2024-03-01 ENCOUNTER — OFFICE VISIT (OUTPATIENT)
Dept: INTERNAL MEDICINE CLINIC | Facility: CLINIC | Age: 54
End: 2024-03-01
Payer: MEDICARE

## 2024-03-01 VITALS
WEIGHT: 191 LBS | HEART RATE: 87 BPM | RESPIRATION RATE: 18 BRPM | DIASTOLIC BLOOD PRESSURE: 85 MMHG | SYSTOLIC BLOOD PRESSURE: 130 MMHG | BODY MASS INDEX: 35.15 KG/M2 | HEIGHT: 62 IN

## 2024-03-01 DIAGNOSIS — E11.8 TYPE 2 DIABETES MELLITUS WITH COMPLICATION, WITH LONG-TERM CURRENT USE OF INSULIN (HCC): ICD-10-CM

## 2024-03-01 DIAGNOSIS — Z79.4 TYPE 2 DIABETES MELLITUS WITH COMPLICATION, WITH LONG-TERM CURRENT USE OF INSULIN (HCC): ICD-10-CM

## 2024-03-01 DIAGNOSIS — I10 ESSENTIAL HYPERTENSION WITH GOAL BLOOD PRESSURE LESS THAN 140/90: ICD-10-CM

## 2024-03-01 DIAGNOSIS — H25.9 SENILE CATARACT OF LEFT EYE, UNSPECIFIED AGE-RELATED CATARACT TYPE: Primary | ICD-10-CM

## 2024-03-01 PROCEDURE — 3008F BODY MASS INDEX DOCD: CPT | Performed by: INTERNAL MEDICINE

## 2024-03-01 PROCEDURE — 3079F DIAST BP 80-89 MM HG: CPT | Performed by: INTERNAL MEDICINE

## 2024-03-01 PROCEDURE — 3075F SYST BP GE 130 - 139MM HG: CPT | Performed by: INTERNAL MEDICINE

## 2024-03-01 PROCEDURE — 99214 OFFICE O/P EST MOD 30 MIN: CPT | Performed by: INTERNAL MEDICINE

## 2024-03-01 NOTE — PROGRESS NOTES
Anjel Pisano is a 53 year old female.  Chief Complaint   Patient presents with    Pre-Op Exam     Patient having surgery on 3/7/24. Dinesh Franks MD. LEFT CATARACT EXTRACTION WITH LENS INSERTION  Fax: [phone]     HPI:    Patient presented today for pre op clearance. She states that she has been doing well overall. Her blood glucose is better than before. Usually 150's fasting. She was seen by endocrine yesterday and started on Mounjaro, she has not received it yet.    States her home blood glucose has been improved as well. She has been monitoring her salt intake. Usually home blood pressure is in 130's. No dizziness or any other complains.     Current Outpatient Medications   Medication Sig Dispense Refill    ofloxacin 0.3 % Ophthalmic Solution Place 2 drops into the right eye 4 (four) times daily.      atorvastatin 80 MG Oral Tab Take 1 tablet (80 mg total) by mouth nightly. 90 tablet 3    Meloxicam 15 MG Oral Tab Take 1 tablet (15 mg total) by mouth daily. 30 tablet 0    hydrALAZINE 100 MG Oral Tab Take 1 tablet (100 mg total) by mouth 2 (two) times daily. 180 tablet 0    insulin glargine (LANTUS SOLOSTAR) 100 UNIT/ML Subcutaneous Solution Pen-injector Inject 40 Units into the skin nightly. (Patient taking differently: Inject 58 Units into the skin nightly.) 36 mL 0    docusate sodium 100 MG Oral Tab Take 1 tablet (100 mg total) by mouth 2 (two) times daily as needed for constipation. 60 tablet 1    TRUEplus Lancets 33G Does not apply Misc Use to check blood sugar three times a day 300 each 3    fexofenadine (ALLEGRA ALLERGY) 180 MG Oral Tab Take 1 tablet (180 mg total) by mouth daily. Take 1 tablet once daily 90 tablet 0    cyclobenzaprine 5 MG Oral Tab Take 1 tablet (5 mg total) by mouth nightly as needed for Muscle spasms. 20 tablet 0    Glucose Blood (TRUE METRIX BLOOD GLUCOSE TEST) In Vitro Strip Use to check blood sugar three times a day 300 strip 0    Insulin Pen Needle (BD PEN NEEDLE ABDOULAYE 2ND  GEN) 32G X 4 MM Does not apply Misc INJECT FOUR TIMES DAILY AS DIRECTED 40 each 1    dapagliflozin (FARXIGA) 10 MG Oral Tab Take 1 tablet (10 mg total) by mouth daily. 30 tablet 3    Accu-Chek FastClix Lancets Does not apply Misc Use to check blood sugar three times a day 300 each 0    Glucose Blood (ACCU-CHEK GUIDE) In Vitro Strip Use to check blood sugar three times a day 300 strip 0    metoprolol succinate ER 25 MG Oral Tablet 24 Hr Take 1 tablet (25 mg total) by mouth nightly. 90 tablet 1    metoprolol succinate  MG Oral Tablet 24 Hr Take 1 tablet (100 mg total) by mouth daily. 90 tablet 1    losartan 100 MG Oral Tab Take 1 tablet (100 mg total) by mouth daily. 90 tablet 1    amLODIPine 5 MG Oral Tab TAKE 2 TABLETS EVERY  tablet 1    levETIRAcetam 500 MG Oral Tab TAKE 1 TABLET TWICE DAILY 180 tablet 1    Blood Glucose Monitoring Suppl (TRUE METRIX METER) w/Device Does not apply Kit Use to check blood sugar three times a day 1 kit 0    insulin aspart (NOVOLOG FLEXPEN) 100 Units/mL Subcutaneous Solution Pen-injector Inject 10-15 Units into the skin 3 (three) times daily with meals.      ondansetron 4 MG Oral Tablet Dispersible Take 1 tablet (4 mg total) by mouth every 8 (eight) hours as needed for Nausea. 30 tablet 0    ergocalciferol 1.25 MG (30290 UT) Oral Cap Take 1 capsule (50,000 Units total) by mouth every 30 (thirty) days. For 3 months 1 capsule 2    Omeprazole 40 MG Oral Capsule Delayed Release TAKE 1 CAPSULE EVERY DAY 30 TO 60 MINUTES BEFORE A MEAL 90 capsule 3    Alcohol Swabs (DROPSAFE ALCOHOL PREP) 70 % Does not apply Pads USE AS DIRECTED 100 each 0    Blood Glucose Monitoring Suppl (ACCU-CHEK GUIDE) w/Device Does not apply Kit Use to check blood sugar three times a day 1 kit 0    METFORMIN 500 MG Oral Tab TAKE 2 TABLETS(1000 MG) BY MOUTH TWICE DAILY WITH MEALS 360 tablet 0    escitalopram 10 MG Oral Tab Take 1 tablet (10 mg total) by mouth daily. 90 tablet 3    hydroCHLOROthiazide 25 MG  Oral Tab Take 1 tablet (25 mg total) by mouth daily. 90 tablet 3    fluticasone propionate 50 MCG/ACT Nasal Suspension 2 sprays by Nasal route daily. 48 g 1    VENTOLIN  (90 Base) MCG/ACT Inhalation Aero Soln Inhale 2 puffs into the lungs every 6 (six) hours as needed for Wheezing. 18 g 1    aspirin 325 MG Oral Tab Take 1 tablet (325 mg total) by mouth daily.        Past Medical History:   Diagnosis Date    Age-related nuclear cataract of both eyes 03/30/2016    Anemia     Apnea 10/20/2015    Cataract     Coronary atherosclerosis     Depression     DM type 2 (diabetes mellitus, type 2) ([facility])     Esophageal reflux     High blood pressure     High cholesterol     Meibomian gland dysfunction (MGD), bilateral, both upper and lower lids 03/30/2016    Migraine     Muscle weakness     left sided weakness uses walker and cane    Sleep apnea     Stenosis of right vertebral artery     Stroke ([facility]) 2014    Type II or unspecified type diabetes mellitus without mention of complication, not stated as uncontrolled       Past Surgical History:   Procedure Laterality Date    BRAIN SURGERY  2014    COLONOSCOPY N/A 8/25/2017    Procedure: COLONOSCOPY;  Surgeon: Sharmaine Burks MD;  Location: [facility] ENDOSCOPY    COLONOSCOPY      COLONOSCOPY N/A 11/12/2022    Procedure: COLONOSCOPY with Polypectomy;  Surgeon: Terry Weaver MD;  Location: [facility] ENDOSCOPY    EXCISE CAROTID BODY+CAROTID ARTERY  09/2017    INCISION AND DRAINAGE Right 04/19/16    lower abdominal skin    LAPAROSCOPIC CHOLECYSTECTOMY  2002      Social History:  Social History     Socioeconomic History    Marital status: Legally     Number of children: 3   Occupational History    Occupation:      Comment: disabled    Occupation: phlebotomy   Tobacco Use    Smoking status: Former     Packs/day: 0.20     Years: 1.00     Additional pack years: 0.00     Total pack years: 0.20     Types: Cigarettes     Quit date: 12/16/2013     Years since  quitting: 10.2    Smokeless tobacco: Never   Vaping Use    Vaping Use: Never used   Substance and Sexual Activity    Alcohol use: No     Alcohol/week: 0.0 standard drinks of alcohol    Drug use: No    Sexual activity: Not Currently     Social Determinants of Health     Financial Resource Strain: Low Risk  (4/21/2023)    Financial Resource Strain     Difficulty of Paying Living Expenses: Not hard at all     Med Affordability: No   Food Insecurity: No Food Insecurity (4/21/2023)    Food Insecurity     Food Insecurity: Never true   Transportation Needs: Unmet Transportation Needs (1/9/2024)    Transportation Needs     Lack of Transportation: Yes   Physical Activity: Inactive (4/21/2023)    Exercise Vital Sign     Days of Exercise per Week: 0 days     Minutes of Exercise per Session: 0 min   Stress: No Stress Concern Present (4/21/2023)    Stress     Feeling of Stress : No   Social Connections: Socially Integrated (4/21/2023)    Social Connections     Frequency of Socialization with Friends and Family: 2   Housing Stability: Low Risk  (4/21/2023)    Housing Stability     Housing Instability: No      Family History   Problem Relation Age of Onset    Diabetes Father     Hypertension Father     Heart Disorder Father     Lipids Father     Obesity Father     Colon Cancer Father     Heart Disorder Mother     Lipids Mother     Obesity Mother     Lipids Brother     Obesity Brother     Glaucoma Neg     Macular degeneration Neg       Allergies   Allergen Reactions    Reglan [Metoclopramide] OTHER (SEE COMMENTS)     Sensitivity, with crawling sensation.    Adhesive Tape      itching    Radiology Contrast Iodinated Dyes ITCHING    Wound Dressing Adhesive RASH        REVIEW OF SYSTEMS:   Review of Systems   Review of Systems   Constitutional: Negative for activity change, appetite change and fever.   HENT: Negative for congestion and voice change.    Respiratory: Negative for cough and shortness of breath.    Cardiovascular:  Negative for chest pain.   Gastrointestinal: Negative for abdominal distention, abdominal pain and vomiting.   Genitourinary: Negative for hematuria.   Skin: Negative for wound.   Psychiatric/Behavioral: Negative for behavioral problems.   Wt Readings from Last 5 Encounters:   03/01/24 191 lb (86.6 kg)   02/29/24 190 lb (86.2 kg)   02/14/24 189 lb (85.7 kg)   02/29/24 190 lb (86.2 kg)   01/25/24 190 lb (86.2 kg)     Body mass index is 34.93 kg/m².      EXAM:   /85   Pulse 87   Resp 18   Ht 5' 2\" (1.575 m)   Wt 191 lb (86.6 kg)   BMI 34.93 kg/m²   Physical Exam   Constitutional:       Appearance: Normal appearance.   HENT:      Head: Normocephalic.   Eyes:      Conjunctiva/sclera: Conjunctivae normal.   Cardiovascular:      Rate and Rhythm: Normal rate and regular rhythm.      Heart sounds: Normal heart sounds. No murmur heard.  Pulmonary:      Effort: Pulmonary effort is normal.      Breath sounds: Normal breath sounds. No rhonchi or rales.   Abdominal:      General: Bowel sounds are normal.      Palpations: Abdomen is soft.      Tenderness: There is no abdominal tenderness.   Musculoskeletal:      Cervical back: Neck supple.      Right lower leg: No edema.      Left lower leg: No edema.   Skin:     General: Skin is warm and dry.   Neurological:      General: No focal deficit present.      Mental Status: He is alert and oriented to person, place, and time. Mental status is at baseline.   Psychiatric:         Mood and Affect: Mood normal.         Behavior: Behavior normal.       ASSESSMENT AND PLAN:   1. Type 2 diabetes mellitus with complication, with long-term current use of insulin (HCC)  - stressed importance of diet and exercise  - continue current insulin and novolog regimen  - unable to tolerate ozempic but mounjaro ordered per endocrine  - monitor home blood glucose and keep track    2. Essential hypertension with goal blood pressure less than 140/90  - better blood pressure today  - continue  current meds  - low salt diet  - monitor blood pressure at home and keep a log      3. Senile cataract of left eye, unspecified age-related cataract type  - recent EKG and chest X ray reviewed  - counseled patient on low carb diet and exercise  - counseled patient on maintaining good pressure for eye surgery. Currently better controlled  - She is medically cleared for cataract surgery  - Ophthalmology Referral - In Network      The patient indicates understanding of these issues and agrees to the plan.  Follow up 2 weeks after surgery with pcp   Marce Crawford MD

## 2024-03-05 DIAGNOSIS — E11.49 TYPE 2 DIABETES MELLITUS WITH OTHER NEUROLOGIC COMPLICATION, WITH LONG-TERM CURRENT USE OF INSULIN (HCC): Primary | ICD-10-CM

## 2024-03-05 DIAGNOSIS — Z79.4 TYPE 2 DIABETES MELLITUS WITH OTHER NEUROLOGIC COMPLICATION, WITH LONG-TERM CURRENT USE OF INSULIN (HCC): Primary | ICD-10-CM

## 2024-03-05 NOTE — TELEPHONE ENCOUNTER
Pt calling for Rx of Mounjaro to be sent to [facility].  She states that this would be a new Rx, and that ORA stated that she would send it on 2/29/24.  Please call

## 2024-03-06 ENCOUNTER — TELEPHONE (OUTPATIENT)
Dept: ENDOCRINOLOGY CLINIC | Facility: CLINIC | Age: 54
End: 2024-03-06

## 2024-03-06 RX ORDER — TIRZEPATIDE 2.5 MG/.5ML
2.5 INJECTION, SOLUTION SUBCUTANEOUS WEEKLY
Qty: 2 ML | Refills: 0 | Status: SHIPPED | OUTPATIENT
Start: 2024-03-06

## 2024-03-06 RX ORDER — GLUCOSAM/CHON-MSM1/C/MANG/BOSW 500-416.6
TABLET ORAL
Qty: 300 EACH | Refills: 3 | Status: SHIPPED | OUTPATIENT
Start: 2024-03-06

## 2024-03-06 NOTE — TELEPHONE ENCOUNTER
Patient states that a Prior auth is needed to be sent to [employer] for the Mounjaro medication.

## 2024-03-06 NOTE — TELEPHONE ENCOUNTER
Medication PA Requested:     Mounjaro 2.5mg/0.5mL                                                     CoverMyMeds Used:  Key:  Quantity: 2mL  Day Supply: 30 days  Sig:  Inject 2.5 mg into the skin once a week.   DX Code:      E11.49

## 2024-03-06 NOTE — TELEPHONE ENCOUNTER
Patient has called to request Mounjaro.   Per LOV on 2/29/24   Start Mounjaro 2.5mg subcutaneous weekly x 4 weeks and then increase to 5mg subcutaneous weekly. Reviewed side effects and risks vs benefits of medication.    Please verify, RX pended.

## 2024-03-11 NOTE — TELEPHONE ENCOUNTER
Medication PA Requested:     Mounjaro 2.5mg/0.5mL                                                     [employer] Used:  Key:  Quantity: 2mL  Day Supply: 30 days  Sig:  Inject 2.5 mg into the skin once a week.   DX Code:      E11.49                                 ePA submitted with LOV and A1c 2/29/24  Awaiting determination

## 2024-03-18 ENCOUNTER — PATIENT OUTREACH (OUTPATIENT)
Dept: CASE MANAGEMENT | Age: 54
End: 2024-03-18

## 2024-03-19 NOTE — PROGRESS NOTES
Attempted to contact pt for monthly outreach no answer left detailed message for pt to call back.     Outreach to patient to complete monthly follow up     Reviewed pt's chart including recent visits.     -Care everywhere updated.    -Immunization reconciliation completed. No updates available.    Pt is due for:     Health Maintenance   Topic Date Due    Pneumococcal Vaccine: Birth to 64yrs (2 of 2 - PCV) 12/21/2017    Zoster Vaccines (2 of 2) 07/12/2023    MA Annual Health Assessment  01/01/2024    Annual Depression Screening  01/01/2024    Pap Smear  07/26/2024    Influenza Vaccine (1) 01/02/2025 (Originally 10/1/2023)    COVID-19 Vaccine (3 - 2023-24 season) 01/02/2025 (Originally 9/1/2023)    Diabetes Care A1C  05/29/2024    Diabetes Care: Microalb/Creat Ratio  07/13/2024    Diabetes Care Dilated Eye Exam  11/08/2024    Diabetes Care Foot Exam  11/30/2024    Mammogram  12/16/2024    Diabetes Care: GFR  01/25/2025    Colorectal Cancer Screening  11/12/2025    DTaP,Tdap,and Td Vaccines (2 - Td or Tdap) 05/17/2033         Future Appointments   Date Time Provider Department Center   5/2/2024  3:00 PM [place], Michelle Magalie, APRN [facility]   6/27/2024  5:00 PM Nilsa Caceres MD [facility]       Total time -  5 min  Total Monthly time- 5 min

## 2024-03-21 DIAGNOSIS — I10 ESSENTIAL HYPERTENSION WITH GOAL BLOOD PRESSURE LESS THAN 140/90: ICD-10-CM

## 2024-03-22 NOTE — TELEPHONE ENCOUNTER
Please review; protocol failed/ has no protocol    Requested Prescriptions   Pending Prescriptions Disp Refills    HYDROCHLOROTHIAZIDE 25 MG Oral Tab [Pharmacy Med Name: HYDROCHLOROTHIAZIDE 25MG TABLETS] 90 tablet 3     Sig: TAKE 1 TABLET(25 MG) BY MOUTH DAILY       Hypertension Medications Protocol Failed - 3/22/2024  3:46 PM        Failed - Last BP reading less than 140/90     BP Readings from Last 1 Encounters:   03/07/24 150/74               Passed - CMP or BMP in past 12 months        Passed - In person appointment or virtual visit in the past 12 mos or appointment in next 3 mos     Recent Outpatient Visits              3 weeks ago Senile cataract of left eye, unspecified age-related cataract type    [facility]    [facility] Visit    3 weeks ago Type 2 diabetes mellitus with other neurologic complication, with long-term current use of insulin (HCC)    [facility] Michelle Leger APRN    Office Visit    3 weeks ago Calcific tendinitis of left shoulder    [facility]    [facility] Visit    1 month ago Left shoulder pain, unspecified chronicity    [facility] [place] Vukomanovic, Emela, MD    Office Visit    2 months ago Essential hypertension    [facility] [place] Vukomanovic, Emela, MD    Office Visit          Future Appointments         Provider Department Appt Notes    In 1 month Michelle Leger APRN [facility] 2 months    In 3 months Nilsa Caceres MD [facility]                Passed - EGFRCR or GFRNAA > 50     GFR Evaluation  EGFRCR: 79 , resulted on 1/25/2024             Recent Outpatient Visits              3 weeks ago Senile cataract of left eye, unspecified age-related  cataract type    [facility]    [facility]    3 weeks ago Type 2 diabetes mellitus with other neurologic complication, with long-term current use of insulin (HCC)    [facility] Michelle Leger APRN    Office Visit    3 weeks ago Calcific tendinitis of left shoulder    [facility] [place] Juancho Reyes MD    Office Visit    1 month ago Left shoulder pain, unspecified chronicity    [facility] [place] Vukomanovic, Emela, MD    Office Visit    2 months ago Essential hypertension    [facility] [place] Vukomanovic, Emela, MD    Office Visit          Future Appointments         Provider Department Appt Notes    In 1 month Michelle Leger APRN [facility] [place] 2 months    In 3 months Nilsa Caceres MD [facility]

## 2024-03-23 RX ORDER — HYDROCHLOROTHIAZIDE 25 MG/1
25 TABLET ORAL DAILY
Qty: 90 TABLET | Refills: 3 | Status: SHIPPED | OUTPATIENT
Start: 2024-03-23

## 2024-04-01 RX ORDER — HYDRALAZINE HYDROCHLORIDE 25 MG/1
TABLET, FILM COATED ORAL
Qty: 540 TABLET | Refills: 0 | OUTPATIENT
Start: 2024-04-01

## 2024-04-01 NOTE — TELEPHONE ENCOUNTER
Refusing Hydralazine 25mg:  Office visit note 02/14/2024  hydralazine taking 2 tab 25 =50 mg  bid -- > increase to   100 mg bid -    bp 120-130/70-80  range HR 60s -70

## 2024-04-08 ENCOUNTER — PATIENT OUTREACH (OUTPATIENT)
Dept: CASE MANAGEMENT | Age: 54
End: 2024-04-08

## 2024-04-08 NOTE — PROGRESS NOTES
Attempted to contact pt for monthly outreach no answer left detailed message for pt to call back.     Outreach to patient to complete monthly follow up   Reviewed pt's chart including recent visits.     -Care everywhere updated.    -Immunization reconciliation completed. No updates available.    Pt is due for:     Health Maintenance   Topic Date Due    Pneumococcal Vaccine: Birth to 64yrs (2 of 2 - PCV) 12/21/2017    Zoster Vaccines (2 of 2) 07/12/2023    MA Annual Health Assessment  01/01/2024    Annual Depression Screening  01/01/2024    Pap Smear  07/26/2024    Influenza Vaccine (Season Ended) 01/02/2025 (Originally 10/1/2024)    COVID-19 Vaccine (3 - 2023-24 season) 01/02/2025 (Originally 9/1/2023)    Diabetes Care A1C  05/29/2024    Diabetes Care: Microalb/Creat Ratio  07/13/2024    Diabetes Care Dilated Eye Exam  11/08/2024    Diabetes Care Foot Exam  11/30/2024    Mammogram  12/16/2024    Diabetes Care: GFR  01/25/2025    Colorectal Cancer Screening  11/12/2025    DTaP,Tdap,and Td Vaccines (2 - Td or Tdap) 05/17/2033         Future Appointments   Date Time Provider Department Center   5/2/2024  3:00 PM [place], Michelle Magalie, APRN [facility]   6/27/2024  5:00 PM Nilsa Caceres MD [facility]       Total time -  5 min  Total Monthly time- 5 min

## 2024-04-09 ENCOUNTER — MED REC SCAN ONLY (OUTPATIENT)
Dept: INTERNAL MEDICINE CLINIC | Facility: CLINIC | Age: 54
End: 2024-04-09

## 2024-04-10 RX ORDER — MELOXICAM 15 MG/1
15 TABLET ORAL DAILY
Qty: 30 TABLET | Refills: 0 | Status: SHIPPED | OUTPATIENT
Start: 2024-04-10

## 2024-04-14 NOTE — TELEPHONE ENCOUNTER
LOV: 02/29/2024     Next office visit: 05/02/2024     Last filled: 03/06/2024       Order pended and routed to provider

## 2024-04-15 RX ORDER — INSULIN ASPART 100 [IU]/ML
INJECTION, SOLUTION INTRAVENOUS; SUBCUTANEOUS
Qty: 90 ML | Refills: 0 | Status: SHIPPED | OUTPATIENT
Start: 2024-04-15

## 2024-04-19 NOTE — TELEPHONE ENCOUNTER
Endocrine refill protocol for basal insulins     Protocol Criteria:  - Appointment with Endocrinology completed in the last 6 months or scheduled in the next 3 months    - A1c result completed in the last 6 months and is <8.5%     Verify appointment has been completed or scheduled in the appropriate timeline. If so can send a 90 day supply with 1 refill per provider protocol.    Verify A1c has been completed within the last 6 months and is below 8.5%     Last completed office visit: 2/29/24  Next scheduled Follow up: 5/2/24  Last A1c result:  10.4%

## 2024-04-23 RX ORDER — INSULIN GLARGINE 100 [IU]/ML
54 INJECTION, SOLUTION SUBCUTANEOUS NIGHTLY
Qty: 51 ML | Refills: 1 | Status: SHIPPED | OUTPATIENT
Start: 2024-04-23

## 2024-04-30 ENCOUNTER — TELEPHONE (OUTPATIENT)
Dept: ENDOCRINOLOGY CLINIC | Facility: CLINIC | Age: 54
End: 2024-04-30

## 2024-04-30 DIAGNOSIS — E11.49 TYPE 2 DIABETES MELLITUS WITH OTHER NEUROLOGIC COMPLICATION, WITH LONG-TERM CURRENT USE OF INSULIN (HCC): ICD-10-CM

## 2024-04-30 DIAGNOSIS — E11.65 UNCONTROLLED TYPE 2 DIABETES MELLITUS WITH HYPERGLYCEMIA (HCC): Primary | ICD-10-CM

## 2024-04-30 DIAGNOSIS — Z79.4 TYPE 2 DIABETES MELLITUS WITH OTHER NEUROLOGIC COMPLICATION, WITH LONG-TERM CURRENT USE OF INSULIN (HCC): ICD-10-CM

## 2024-04-30 DIAGNOSIS — Z01.818 PREOPERATIVE EXAMINATION: ICD-10-CM

## 2024-04-30 RX ORDER — TIRZEPATIDE 2.5 MG/.5ML
2.5 INJECTION, SOLUTION SUBCUTANEOUS WEEKLY
Qty: 2 ML | Refills: 0 | Status: SHIPPED | OUTPATIENT
Start: 2024-04-30

## 2024-04-30 RX ORDER — PEN NEEDLE, DIABETIC 32GX 5/32"
NEEDLE, DISPOSABLE MISCELLANEOUS
Qty: 50 EACH | Refills: 1 | Status: SHIPPED | OUTPATIENT
Start: 2024-04-30

## 2024-04-30 NOTE — TELEPHONE ENCOUNTER
Rx sent for strips and pen needles,   LVM for patient to call back. Need more detail regarding mounjaro. What dose? Tolerating ok?     Per LOV 2/29/24  Start Mounjaro 2.5mg subcutaneous weekly x 4 weeks and then increase to 5mg subcutaneous weekly. Reviewed side effects and risks vs benefits of medication.

## 2024-04-30 NOTE — TELEPHONE ENCOUNTER
Per pt she would like to stay on Mounjaro 2.5 mg for another month then possibly increase to 5 mg after. Rx sent to per protocol to indicated pharmacy.

## 2024-04-30 NOTE — TELEPHONE ENCOUNTER
Patient states that she has been trying to get refill on mounjaro and also needs kamilah metrix strips also pen needles please follow up

## 2024-05-13 ENCOUNTER — PATIENT OUTREACH (OUTPATIENT)
Dept: CASE MANAGEMENT | Age: 54
End: 2024-05-13

## 2024-05-13 DIAGNOSIS — E66.9 OBESITY (BMI 30-39.9): ICD-10-CM

## 2024-05-13 DIAGNOSIS — I10 ESSENTIAL HYPERTENSION: ICD-10-CM

## 2024-05-13 DIAGNOSIS — I63.9 LEFT-SIDED CEREBROVASCULAR ACCIDENT (CVA) (HCC): ICD-10-CM

## 2024-05-13 DIAGNOSIS — G43.909 MIGRAINE WITHOUT STATUS MIGRAINOSUS, NOT INTRACTABLE, UNSPECIFIED MIGRAINE TYPE: ICD-10-CM

## 2024-05-13 DIAGNOSIS — K43.9 HERNIA OF ABDOMINAL WALL: Primary | ICD-10-CM

## 2024-05-13 DIAGNOSIS — E10.22 CKD STAGE 3 DUE TO TYPE 1 DIABETES MELLITUS (HCC): ICD-10-CM

## 2024-05-13 DIAGNOSIS — D64.9 ANEMIA, UNSPECIFIED TYPE: ICD-10-CM

## 2024-05-13 DIAGNOSIS — E11.65 TYPE 2 DIABETES MELLITUS WITH HYPERGLYCEMIA, WITH LONG-TERM CURRENT USE OF INSULIN (HCC): ICD-10-CM

## 2024-05-13 DIAGNOSIS — M72.2 PLANTAR FASCIITIS OF LEFT FOOT: ICD-10-CM

## 2024-05-13 DIAGNOSIS — E78.5 DYSLIPIDEMIA: ICD-10-CM

## 2024-05-13 DIAGNOSIS — N18.30 CKD STAGE 3 DUE TO TYPE 1 DIABETES MELLITUS (HCC): ICD-10-CM

## 2024-05-13 DIAGNOSIS — Z98.890 S/P CAROTID ENDARTERECTOMY: ICD-10-CM

## 2024-05-13 DIAGNOSIS — E66.01 MORBID (SEVERE) OBESITY DUE TO EXCESS CALORIES (HCC): ICD-10-CM

## 2024-05-13 DIAGNOSIS — K21.9 GASTROESOPHAGEAL REFLUX DISEASE WITHOUT ESOPHAGITIS: ICD-10-CM

## 2024-05-13 DIAGNOSIS — Z79.4 TYPE 2 DIABETES MELLITUS WITH HYPERGLYCEMIA, WITH LONG-TERM CURRENT USE OF INSULIN (HCC): ICD-10-CM

## 2024-05-13 NOTE — TELEPHONE ENCOUNTER
Please review; protocol failed/No Protocol    Requested Prescriptions   Pending Prescriptions Disp Refills    HYDRALAZINE 100 MG Oral Tab [Pharmacy Med Name: HYDRALAZINE 100MG (HUNDRED MG) TABS] 180 tablet 0     Sig: TAKE 1 TABLET(100 MG) BY MOUTH TWICE DAILY       Hypertension Medications Protocol Failed - 5/11/2024  1:55 PM        Failed - Last BP reading less than 140/90     BP Readings from Last 1 Encounters:   03/07/24 150/74               Passed - CMP or BMP in past 12 months        Passed - In person appointment or virtual visit in the past 12 mos or appointment in next 3 mos     Recent Outpatient Visits              2 months ago Senile cataract of left eye, unspecified age-related cataract type    [facility]    Office Visit    2 months ago Type 2 diabetes mellitus with other neurologic complication, with long-term current use of insulin (HCC)    [facility] Michelle Leger APRN    Office Visit    2 months ago Calcific tendinitis of left shoulder    [facility] Juancho Reyes MD    Office Visit    2 months ago Left shoulder pain, unspecified chronicity    [facility] [place] Vukomanovic, Emela, MD    Office Visit    4 months ago Essential hypertension    [facility] [place] Vukomanovic, Emela, MD    Office Visit          Future Appointments         Provider Department Appt Notes    In 1 month Nilsa Caceres MD [facility]                     Passed - EGFRCR or GFRNAA > 50     GFR Evaluation  EGFRCR: 79 , resulted on 1/25/2024             Future Appointments         Provider Department Appt Notes    In 1 month Nilsa Caceres MD [facility]           Recent Outpatient Visits              2 months  ago Senile cataract of left eye, unspecified age-related cataract type    [facility] [place]    Office Visit    2 months ago Type 2 diabetes mellitus with other neurologic complication, with long-term current use of insulin (HCC)    [facility] [place] [place] [facility]    Office Visit    2 months ago Calcific tendinitis of left shoulder    [facility] [place] Juancho Reyes MD    Office Visit    2 months ago Left shoulder pain, unspecified chronicity    [facility] [place] Vukomanovic, Emela, MD    Office Visit    4 months ago Essential hypertension    [facility] [place] Vukomanovic, Emela, MD    Office Visit

## 2024-05-14 RX ORDER — HYDRALAZINE HYDROCHLORIDE 100 MG/1
100 TABLET, FILM COATED ORAL 2 TIMES DAILY
Qty: 180 TABLET | Refills: 3 | Status: SHIPPED | OUTPATIENT
Start: 2024-05-14

## 2024-05-15 NOTE — PROGRESS NOTES
Spoke to Wahed for CCM.      Updates to patient care team/comments:  UTD   Patient reported changes in medications:  UTD  Med Adherence  Comment: Pt reports compliance.  Questions or concerns: none  Do you have any  medications? no (ex: inhaler, neb solutions, eye drops, creams/lotions)  Do you need any refills? no       Health Maintenance: Encouraged patient to discuss with PCP .  Health Maintenance   Topic Date Due    Pneumococcal Vaccine: Birth to 64yrs (2 of 2 - PCV) 2017    Zoster Vaccines (2 of 2) 2023    MA Annual Health Assessment  2024    Annual Depression Screening  2024    Diabetes Care A1C  2024    Pap Smear  2024    Influenza Vaccine (Season Ended) 2025 (Originally 10/1/2024)    COVID-19 Vaccine (3 - - season) 2025 (Originally 2023)    Diabetes Care: Microalb/Creat Ratio  2024    Diabetes Care Dilated Eye Exam  2024    Diabetes Care Foot Exam  2024    Mammogram  2024    Diabetes Care: GFR  2025    Colorectal Cancer Screening  2025    DTaP,Tdap,and Td Vaccines (2 - Td or Tdap) 2033       Patient updates/concerns:   Spoke to patient for CCM   Patient stated she is doing well - no new symptoms.   Patient stated she started Mounjaro last week after not being able to obtain due to insurance issue , patient was not able to  in April. Resumed last week .   Patient stated glucose has improved. Glucose readings have been average of 192-200- previously near 300 .   Glucose now -312- Tea- brown sugar -1/2 tsp -  stated she was using raw honey .  Patient states she is hoping to cut back on sugar but is doing gradually .   Patient stated she is doing her best to eat as she stated she is not hungry . Patient encouraged to eat fruits and vegetables, greek yogurt  instead of bread when hungry.   Patient has not had any headaches and BP has been well controlled- patient is checking BP regularly , she did not  have readings handy . CCM encouraged patient schedule follow up as last visit was in March with Dr. Crawford who recommended she follow up after cateract surgery .   Patient agreed , ccm scheduled.   Future Appointments   Date Time Provider Department [place]   6/5/2024 11:20 AM Yves Yanez MD [id number] [place]   6/27/2024  5:00 PM Nilsa Caceres MD [facility]       Patient stated she has been paying for testing strips , she feels this is an error as it was $30 each time. She does not recall exactly.   Goals/Action Plan:  Active goal from previous outreach: improve pain and continue monitoring BP/ Glucose     Patient reported progress towards goals: patient stated BP and glucose readigns have improved.                - What: decrease A1C            - Where/When/How: daily tracking and logging of all glucose readings as well as food log if possible and BP log .   Patient Reported Barriers and Concerns: patient was not able to obtain medications and glucose was high                   - Plan for overcoming barriers: CCM encouraged patient to call when she has difficulty with obtaining medications or questions.          Care Managers Interventions:   Scheduled follow up appointment .   Call to [facility] regarding prescriptions.   CCM listened and provided support.   Reminded patient of overdue Health Maintenance.   CCM updated patient records from Care Everywhere.   CCM updated immunizations- no updates.   Continue to provide encouragement, support and education for healthy coping and diagnosis.          Future Appointments:   Future Appointments   Date Time Provider Department [place]   6/27/2024  5:00 PM Nilsa Caceres MD [facility]         Next Care Manager Follow Up Date: 1 month     Reason For Follow Up: review progress and or barriers towards patient's goals.     Time Spent This Encounter Total: 40  min medical record review, telephone communication, care plan updates where needed,  education, goals, and action plan recreation/update. Provided acknowledgment and validation to patient's concerns.   Monthly Minute Total including today: 42  Physical assessment, complete health history, and need for CCM established by Yves Yanez MD.

## 2024-05-15 NOTE — PROGRESS NOTES
CCM Spoke to Penny at [facility] at patient request regarding cost of test strips.       Trumetrix testing strips-patient medicare part D - no longer covering .  Part B requesting chart audit - [facility] will be sending CMN form via fax to Michelle Leger office with request for office notes.     [facility] informed patient , encouraged patient to call her insurance if she feels she should not have a copay - as she stated she did not have copay for medication in the past.

## 2024-05-20 ENCOUNTER — TELEPHONE (OUTPATIENT)
Dept: ENDOCRINOLOGY CLINIC | Facility: CLINIC | Age: 54
End: 2024-05-20

## 2024-05-20 ENCOUNTER — TELEPHONE (OUTPATIENT)
Dept: CASE MANAGEMENT | Age: 54
End: 2024-05-20

## 2024-05-20 DIAGNOSIS — Z79.4 TYPE 2 DIABETES MELLITUS WITH HYPERGLYCEMIA, WITH LONG-TERM CURRENT USE OF INSULIN (HCC): Primary | ICD-10-CM

## 2024-05-20 DIAGNOSIS — E11.65 TYPE 2 DIABETES MELLITUS WITH HYPERGLYCEMIA, WITH LONG-TERM CURRENT USE OF INSULIN (HCC): Primary | ICD-10-CM

## 2024-05-20 NOTE — TELEPHONE ENCOUNTER
Patient requesting prescription for True Metrix Test Strips.  Please call.  Thank you.    Current Outpatient Medications   Medication Sig Dispense Refill    Glucose Blood (TRUE METRIX BLOOD GLUCOSE TEST) In Vitro Strip Use to check blood sugar three times a day 300 strip 0

## 2024-05-20 NOTE — TELEPHONE ENCOUNTER
Dr. Yanez,     Patient called requesting referral to Dr. Caceres.    Pended referral please review diagnosis and sign off if you agree.    Thank you.  Litzy Alicia  [facility]

## 2024-05-21 RX ORDER — CALCIUM CITRATE/VITAMIN D3 200MG-6.25
3 TABLET ORAL DAILY
Qty: 100 STRIP | Refills: 5 | Status: SHIPPED | OUTPATIENT
Start: 2024-05-21

## 2024-05-22 ENCOUNTER — OFFICE VISIT (OUTPATIENT)
Dept: INTERNAL MEDICINE CLINIC | Facility: CLINIC | Age: 54
End: 2024-05-22

## 2024-05-22 VITALS
DIASTOLIC BLOOD PRESSURE: 81 MMHG | BODY MASS INDEX: 34.23 KG/M2 | HEART RATE: 61 BPM | SYSTOLIC BLOOD PRESSURE: 138 MMHG | HEIGHT: 62 IN | WEIGHT: 186 LBS

## 2024-05-22 DIAGNOSIS — Z79.4 TYPE 2 DIABETES MELLITUS WITH HYPERGLYCEMIA, WITH LONG-TERM CURRENT USE OF INSULIN (HCC): ICD-10-CM

## 2024-05-22 DIAGNOSIS — M79.672 CHRONIC PAIN OF BOTH FEET: ICD-10-CM

## 2024-05-22 DIAGNOSIS — I10 PRIMARY HYPERTENSION: Primary | ICD-10-CM

## 2024-05-22 DIAGNOSIS — E11.65 TYPE 2 DIABETES MELLITUS WITH HYPERGLYCEMIA, WITH LONG-TERM CURRENT USE OF INSULIN (HCC): ICD-10-CM

## 2024-05-22 DIAGNOSIS — E78.00 PURE HYPERCHOLESTEROLEMIA: ICD-10-CM

## 2024-05-22 DIAGNOSIS — M79.671 CHRONIC PAIN OF BOTH FEET: ICD-10-CM

## 2024-05-22 DIAGNOSIS — M54.9 MID BACK PAIN: ICD-10-CM

## 2024-05-22 DIAGNOSIS — I10 ESSENTIAL HYPERTENSION WITH GOAL BLOOD PRESSURE LESS THAN 140/90: ICD-10-CM

## 2024-05-22 DIAGNOSIS — G89.29 CHRONIC PAIN OF BOTH FEET: ICD-10-CM

## 2024-05-22 DIAGNOSIS — Z01.419 ENCOUNTER FOR GYNECOLOGICAL EXAMINATION: ICD-10-CM

## 2024-05-22 DIAGNOSIS — M54.2 NECK PAIN: ICD-10-CM

## 2024-05-22 PROCEDURE — 3079F DIAST BP 80-89 MM HG: CPT | Performed by: INTERNAL MEDICINE

## 2024-05-22 PROCEDURE — 3008F BODY MASS INDEX DOCD: CPT | Performed by: INTERNAL MEDICINE

## 2024-05-22 PROCEDURE — 99214 OFFICE O/P EST MOD 30 MIN: CPT | Performed by: INTERNAL MEDICINE

## 2024-05-22 PROCEDURE — 3075F SYST BP GE 130 - 139MM HG: CPT | Performed by: INTERNAL MEDICINE

## 2024-05-22 PROCEDURE — G2211 COMPLEX E/M VISIT ADD ON: HCPCS | Performed by: INTERNAL MEDICINE

## 2024-05-22 RX ORDER — METOPROLOL SUCCINATE 25 MG/1
25 TABLET, EXTENDED RELEASE ORAL NIGHTLY
Qty: 90 TABLET | Refills: 1 | Status: SHIPPED | OUTPATIENT
Start: 2024-05-22

## 2024-05-22 RX ORDER — METOPROLOL SUCCINATE 100 MG/1
100 TABLET, EXTENDED RELEASE ORAL DAILY
Qty: 90 TABLET | Refills: 1 | Status: SHIPPED | OUTPATIENT
Start: 2024-05-22

## 2024-05-22 NOTE — PROGRESS NOTES
Subjective:   Patient ID: Anjel Pisano is a 54 year old female.  Chief Complaint   Patient presents with    Hypertension     HTN   Chief Complaint   Patient presents with     Chief Complaint   Patient presents with    Hypertension     Per patient At  home bp per pt is  good in good level usually systolic is in the 130s over 80s     denies  headaches dizziness or chest pain shortness of breath dyspnea on exertion.    Patient states her blood sugars improving   130 in AM   after  meal sin 200 - but that is also getting better seeing Dr Caceres  tomorrow    Patient states feeling well otherwise. Denies chest pain, shortnesss of breath, palpitations, or abdominal pain, denies UTI symptoms fever or chills.        Hypertension  This is a chronic problem. The problem has been gradually improving since onset. Associated symptoms-  None   Pertinent negatives include no anxiety, chest pain, orthopnea, palpitations, peripheral edema, PND or shortness of breath. Risk factors for coronary artery disease include obesity, post-menopausal state and dyslipidemia. Past treatments include lifestyle changes, diuretics, beta blockers and central alpha agonists. The current treatment provides significant improvement. Compliance problems include diet (medications labs and  visits  ).    Medication Request   headaches (occasional sumatriptan  helps -  feels well today  ). Pertinent negatives include no chest pain, congestion, coughing, nausea or vomiting.       History/Other:   Review of Systems   HENT: Negative for congestion, sinus pressure, sinus pain and sneezing.    Eyes: Negative for pain, redness and visual disturbance (has  cataracts removal  bill   eyes scheduled - seen  Ophtalmologist  -3/7 /24   Respiratory: Negative for cough, chest tightness and shortness of breath.    Cardiovascular: Negative for chest pain, palpitations, orthopnea, leg swelling and PND.   Gastrointestinal: Negative for abdominal distention, anal bleeding,  blood in stool, constipation, diarrhea, nausea and vomiting.     Skin: Negative for color change and wound.   Neurological: negative headache . Negative for dizziness, seizures and light-headedness.   Psychiatric/Behavioral: Negative for decreased concentration and sleep disturbance. The patient is not nervous/anxious.      Current Outpatient Medications   Medication Sig Dispense Refill    metoprolol succinate ER 25 MG Oral Tablet 24 Hr Take 1 tablet (25 mg total) by mouth nightly. 90 tablet 1    metoprolol succinate  MG Oral Tablet 24 Hr Take 1 tablet (100 mg total) by mouth daily. 90 tablet 1    Glucose Blood (TRUE METRIX BLOOD GLUCOSE TEST) In Vitro Strip 3 each by In Vitro route daily. 100 strip 5    hydrALAZINE 100 MG Oral Tab Take 1 tablet (100 mg total) by mouth 2 (two) times daily. 180 tablet 3    Insulin Pen Needle (BD PEN NEEDLE ABDOULAYE 2ND GEN) 32G X 4 MM Does not apply Misc INJECT FOUR TIMES DAILY AS DIRECTED 50 each 1    Blood Glucose Monitoring Suppl (TRUE METRIX METER) w/Device Does not apply Kit Use to check blood sugar three times a day 1 kit 0    MOUNJARO 2.5 MG/0.5ML Subcutaneous Solution Pen-injector Inject 2.5 mg into the skin once a week. 2 mL 0    insulin glargine (LANTUS SOLOSTAR) 100 UNIT/ML Subcutaneous Solution Pen-injector Inject 54 Units into the skin nightly. 51 mL 1    Meloxicam 15 MG Oral Tab Take 1 tablet (15 mg total) by mouth daily. 30 tablet 0    hydroCHLOROthiazide 25 MG Oral Tab Take 1 tablet (25 mg total) by mouth daily. 90 tablet 3    TRUEplus Lancets 33G Does not apply Misc USE TO CHECK BLOOD SUGAR THREE TIMES DAILY 300 each 3    atorvastatin 80 MG Oral Tab Take 1 tablet (80 mg total) by mouth nightly. 90 tablet 3    cyclobenzaprine 5 MG Oral Tab Take 1 tablet (5 mg total) by mouth nightly as needed for Muscle spasms. 20 tablet 0    Glucose Blood (TRUE METRIX BLOOD GLUCOSE TEST) In Vitro Strip Use to check blood sugar three times a day 300 strip 0    Accu-Chek FastClix  Lancets Does not apply Misc Use to check blood sugar three times a day 300 each 0    Glucose Blood (ACCU-CHEK GUIDE) In Vitro Strip Use to check blood sugar three times a day 300 strip 0    losartan 100 MG Oral Tab Take 1 tablet (100 mg total) by mouth daily. 90 tablet 1    amLODIPine 5 MG Oral Tab TAKE 2 TABLETS EVERY  tablet 1    levETIRAcetam 500 MG Oral Tab TAKE 1 TABLET TWICE DAILY 180 tablet 1    ondansetron 4 MG Oral Tablet Dispersible Take 1 tablet (4 mg total) by mouth every 8 (eight) hours as needed for Nausea. 30 tablet 0    Omeprazole 40 MG Oral Capsule Delayed Release TAKE 1 CAPSULE EVERY DAY 30 TO 60 MINUTES BEFORE A MEAL 90 capsule 3    Alcohol Swabs (DROPSAFE ALCOHOL PREP) 70 % Does not apply Pads USE AS DIRECTED 100 each 0    Blood Glucose Monitoring Suppl (ACCU-CHEK GUIDE) w/Device Does not apply Kit Use to check blood sugar three times a day 1 kit 0    METFORMIN 500 MG Oral Tab TAKE 2 TABLETS(1000 MG) BY MOUTH TWICE DAILY WITH MEALS 360 tablet 0    escitalopram 10 MG Oral Tab Take 1 tablet (10 mg total) by mouth daily. 90 tablet 3    fluticasone propionate 50 MCG/ACT Nasal Suspension 2 sprays by Nasal route daily. 48 g 1    VENTOLIN  (90 Base) MCG/ACT Inhalation Aero Soln Inhale 2 puffs into the lungs every 6 (six) hours as needed for Wheezing. 18 g 1    aspirin 325 MG Oral Tab Take 1 tablet (325 mg total) by mouth daily.      NOVOLOG FLEXPEN 100 UNIT/ML Subcutaneous Solution Pen-injector INJECT 35 UNITS INTO THE SKIN THREE TIMES DAILY WITH MEALS PLUS SLIDING SCALE (Patient not taking: Reported on 5/22/2024) 90 mL 0    ergocalciferol 1.25 MG (83686 UT) Oral Cap Take 1 capsule (50,000 Units total) by mouth every 30 (thirty) days. For 3 months (Patient not taking: Reported on 5/22/2024) 1 capsule 2     Allergies:  Allergies   Allergen Reactions    Reglan [Metoclopramide] OTHER (SEE COMMENTS)     Sensitivity, with crawling sensation.    Adhesive Tape      itching    Radiology Contrast  Iodinated Dyes ITCHING    Wound Dressing Adhesive RASH       Objective:     Physical Exam  Vitals and nursing note reviewed.   Constitutional:       General: She is not in acute distress.     Appearance: She is well-developed. She is obese.   HENT:      Head: Normocephalic and atraumatic.   Mouth - oral cavity normal pharynx  normal +postnasal drainage  Neck:      Thyroid: No thyromegaly.      Vascular: No JVD.   Cardiovascular:      Rate and Rhythm: Normal rate and regular rhythm.      Heart sounds: Normal heart sounds. No murmur heard.  Pulmonary:      Effort: Pulmonary effort is normal. No respiratory distress.      Breath sounds: Normal breath sounds. No wheezing or rales.   Abdominal:      Palpations: Abdomen is soft. There is no hernia     Tenderness: There is no abdominal tenderness. There is no right CVA tenderness, left CVA tenderness, guarding or rebound.      Comments:      Musculoskeletal:      Cervical back: Neck supple. Rom neck  decreased   Decreased range of motion left shoulder-very poor and full in any motions  Tenderness around the whole pain area  Muscular spasm +parasternal cervical      Right lower leg: No edema.      Left lower leg: No edema.   Lymphadenopathy:      Cervical: No cervical adenopathy.   Skin:     General: Skin is warm and dry.        Mental Status: She is alert and oriented to person, place, and time.   Psychiatric:         Mood and Affect: Mood is not anxious or depressed      Behavior: Behavior normal.       Blood pressure 138/81, pulse 61, height 5' 2\" (1.575 m), weight 186 lb (84.4 kg), not currently breastfeeding.    Assessment & Plan:         Diabetes Mellitus Type 2, Uncontrolled    -HgA1c- 9.1%-->11.0  -- 10 .2 11/30/24  -- > 10.4 2/24    labs  discussed recommend patient to-complete labs   using freestyle maximo   -Discussed importance of low CHO diet- recommend 135 grams total per day, 45 grams per meal.   Patient seen endocrinology  and have a medication adjusted :  -  Increased Lantus to -  58  u  -per pt   she  reduced  to 30 units  -at  night  per pt taking -subcutaneous daily   -  Novolog to 30-20-30    patient  taking -10-15 -10  - by herself state due to lower sugars   -Continue with Freestyle maximo 2- Reviewed importance of bringing reader to all appointments.   -Continue Metformin 1000mg PO BID  food   Patient education continue with low sugar and carbohydrate diet keep with good water hydration patient verbalized understanding and compliance    Patient is taking Manjaro inj   2.5 mg   q  week - Dr Caceres    due to naused and GI sy -couldn't tolerate it   Recommend pt need to adjust her Insulin according to her blood sugars and the blood test -sugars regularly   Patient will schedule appointment with Endo f/u  5/23 Dr Caceres             Essential hypertension with goal blood pressure less than 140/90  Take high blood pressure medication as perscribed   Low- sodium diet   Maintain walking - as tolerated   Track and record blood pressure and heart rate at home   The side effects of medication discussed with patient   losartan 100 mg every day  metoprolol 100 +25 mg =125 mg  every day   Amlodipine 10 mg every day   hydrochlorothiazide 25 mg every day   hydralazine taking    -- > increase to   100 mg bid -    bp 120-130/70-80  range HR 60s -70   Avoid OTC  decongestants   F/u in   2 weeks  BP check    If the blood pressure elevated at home recommend to follow-up sooner  patient agrees with plan verbalized understanding compliance   Recommend patient to follow-up blood pressure readings from home patient states her blood   She will bring the blood pressure monitor   And all medications   next visit time recheck the blood pressure readings  Keep  bp in 120-130/80 hr 60 Range           )    - metoprolol succinate ER 25 MG Oral Tablet 24 Hr; Take 1 tablet (25 mg total) by mouth nightly.  Dispense: 90 tablet; Refill: 1  - metoprolol succinate  MG Oral Tablet 24 Hr; Take 1  tablet (100 mg total) by mouth daily.  Dispense: 90 tablet; Refill: 1    5. Encounter for gynecological examination  Pap  smear  with Dr Mckenna   - OBG Referral - In Network    6. Chronic pain of both feet  Rest  elevate   Shoes with arch support   - Podiatry Referral - In Network    7. Neck pain  8. Mid back pain  - Physiatry Referral - In Network  Did not schedule apt  yet since last visit   - Physiatry Referral - In Network  Pt -  Dr Bhatt        Follow-up in 3  months or sooner if any concerns or symptoms or if-persisitntly elevated blood pressure or sugars.            No orders of the defined types were placed in this encounter.      Meds This Visit:  Requested Prescriptions     Signed Prescriptions Disp Refills    metoprolol succinate ER 25 MG Oral Tablet 24 Hr 90 tablet 1     Sig: Take 1 tablet (25 mg total) by mouth nightly.    metoprolol succinate  MG Oral Tablet 24 Hr 90 tablet 1     Sig: Take 1 tablet (100 mg total) by mouth daily.       Imaging & Referrals:  OBG - INTERNAL

## 2024-05-23 ENCOUNTER — OFFICE VISIT (OUTPATIENT)
Dept: ENDOCRINOLOGY CLINIC | Facility: CLINIC | Age: 54
End: 2024-05-23

## 2024-05-23 ENCOUNTER — LAB ENCOUNTER (OUTPATIENT)
Dept: LAB | Facility: HOSPITAL | Age: 54
End: 2024-05-23
Attending: INTERNAL MEDICINE

## 2024-05-23 VITALS
HEIGHT: 62.01 IN | DIASTOLIC BLOOD PRESSURE: 84 MMHG | HEART RATE: 76 BPM | WEIGHT: 186 LBS | BODY MASS INDEX: 33.8 KG/M2 | SYSTOLIC BLOOD PRESSURE: 130 MMHG

## 2024-05-23 DIAGNOSIS — Z79.4 TYPE 2 DIABETES MELLITUS WITH OTHER NEUROLOGIC COMPLICATION, WITH LONG-TERM CURRENT USE OF INSULIN (HCC): ICD-10-CM

## 2024-05-23 DIAGNOSIS — E11.49 TYPE 2 DIABETES MELLITUS WITH OTHER NEUROLOGIC COMPLICATION, WITH LONG-TERM CURRENT USE OF INSULIN (HCC): ICD-10-CM

## 2024-05-23 LAB
GLUCOSE BLOOD: 215
HEMOGLOBIN A1C: 10.2 % (ref 4.3–5.6)
TEST STRIP LOT #: NORMAL NUMERIC

## 2024-05-23 PROCEDURE — 80061 LIPID PANEL: CPT | Performed by: INTERNAL MEDICINE

## 2024-05-23 PROCEDURE — 83036 HEMOGLOBIN GLYCOSYLATED A1C: CPT | Performed by: INTERNAL MEDICINE

## 2024-05-23 PROCEDURE — 84443 ASSAY THYROID STIM HORMONE: CPT | Performed by: INTERNAL MEDICINE

## 2024-05-23 PROCEDURE — 3008F BODY MASS INDEX DOCD: CPT | Performed by: INTERNAL MEDICINE

## 2024-05-23 PROCEDURE — 82947 ASSAY GLUCOSE BLOOD QUANT: CPT | Performed by: INTERNAL MEDICINE

## 2024-05-23 PROCEDURE — 99214 OFFICE O/P EST MOD 30 MIN: CPT | Performed by: INTERNAL MEDICINE

## 2024-05-23 PROCEDURE — 3075F SYST BP GE 130 - 139MM HG: CPT | Performed by: INTERNAL MEDICINE

## 2024-05-23 PROCEDURE — 3046F HEMOGLOBIN A1C LEVEL >9.0%: CPT | Performed by: INTERNAL MEDICINE

## 2024-05-23 PROCEDURE — 82570 ASSAY OF URINE CREATININE: CPT | Performed by: INTERNAL MEDICINE

## 2024-05-23 PROCEDURE — 3060F POS MICROALBUMINURIA REV: CPT | Performed by: INTERNAL MEDICINE

## 2024-05-23 PROCEDURE — 3079F DIAST BP 80-89 MM HG: CPT | Performed by: INTERNAL MEDICINE

## 2024-05-23 PROCEDURE — 82043 UR ALBUMIN QUANTITATIVE: CPT | Performed by: INTERNAL MEDICINE

## 2024-05-23 PROCEDURE — 36415 COLL VENOUS BLD VENIPUNCTURE: CPT | Performed by: INTERNAL MEDICINE

## 2024-05-23 PROCEDURE — 80053 COMPREHEN METABOLIC PANEL: CPT | Performed by: INTERNAL MEDICINE

## 2024-05-23 RX ORDER — TIRZEPATIDE 5 MG/.5ML
5 INJECTION, SOLUTION SUBCUTANEOUS WEEKLY
Qty: 6 ML | Refills: 2 | Status: SHIPPED | OUTPATIENT
Start: 2024-05-23

## 2024-05-23 RX ORDER — CALCIUM CITRATE/VITAMIN D3 200MG-6.25
TABLET ORAL
Qty: 300 STRIP | Refills: 1 | Status: SHIPPED | OUTPATIENT
Start: 2024-05-23

## 2024-05-23 RX ORDER — GLUCOSAM/CHON-MSM1/C/MANG/BOSW 500-416.6
TABLET ORAL
Qty: 300 EACH | Refills: 3 | Status: SHIPPED | OUTPATIENT
Start: 2024-05-23

## 2024-05-23 NOTE — PROGRESS NOTES
Name: Anjel Pisano  Date: 2024    Referring Physician: Yves Yanez    HISTORY OF PRESENT ILLNESS   Anjel Pisano is a 54 year old female who presents for diabetes mellitus, diagnosed over 10 years ago.  She was seen during hospitalization in 3/2015 and started on insulin therapy at that time.    Prior HbA, C or glycohemoglobin were 9.4% 3/2015; 7.6% 2015; 8.8% 10/2015; 8.0% 2016; 7.0% 2016; 7.8% 2016; 8.0% 2017; 8.3% 2017; 8.5% 2017; 8.2% 2018; 10.6% 2019; 10.2% 2019; 8.8% 2019; 10.0% 2021; 9.4% 10/2021; 9.4% 3/2022; 8.8% 2022; 9.0% 2022; 10.1% 2022; 9.6% 2023; 10.2% 2023;  10.4% 3/2024; 10.2% POC Today     Dietary compliance: Fair. She states she tries to follow low CHO diet. No significant diet changes since last visit but she has been frustrated with weight gain.    Exercise: Has been doing more walking recently. Going up and down stairs more frequently   Polyuria/polydipsia: No   Blurred vision: Yes - has upcoming surgery for cataracts 2023    Episodes of hypoglycemia: Has two episodes in the past few months    Blood Glucose:    Blood Glucose:   Checking 1-2 time per day  Fastin-130   Afternoon: 180-190    Medications for DM   Lantus - 30 units SQ QHS   Novolog - 10-15 units subcutaneous TID with meals   Metformin 1000mg PO BID  Mounjaro 2.5mg subcutaneous weekly (just started last week)   Metformin 1000mg PO BID     Ozempic- 0.5mg subcutaneous weekly --> stopped 3-4 months ago because of significant nausea/vomiting     Jardiance- stopped due to concerns of hair loss     REVIEW OF SYSTEMS  Eyes: Diabetic retinopathy present: No            Most recent visit to eye doctor in last 12 months: No- has follow up scheduled this month- [facility] has upcoming surgery 2023     CV: Cardiovascular disease present: No         Hypertension present: Yes         Hyperlipidemia present: Yes         Peripheral Vascular Disease present: No    :  Nephropathy present: No    Neuro: Neuropathy present: No    Skin: Infection or ulceration: No    Osteoporosis: No    Thyroid disease: No      Medications:     Current Outpatient Medications:     MOUNJARO 2.5 MG/0.5ML Subcutaneous Solution Pen-injector, Inject 2.5 mg into the skin once a week., Disp: 2 mL, Rfl: 0    insulin glargine (LANTUS SOLOSTAR) 100 UNIT/ML Subcutaneous Solution Pen-injector, Inject 54 Units into the skin nightly. (Patient taking differently: Inject 30 Units into the skin nightly.), Disp: 51 mL, Rfl: 1    METFORMIN 500 MG Oral Tab, TAKE 2 TABLETS(1000 MG) BY MOUTH TWICE DAILY WITH MEALS, Disp: 360 tablet, Rfl: 0    metoprolol succinate ER 25 MG Oral Tablet 24 Hr, Take 1 tablet (25 mg total) by mouth nightly., Disp: 90 tablet, Rfl: 1    metoprolol succinate  MG Oral Tablet 24 Hr, Take 1 tablet (100 mg total) by mouth daily., Disp: 90 tablet, Rfl: 1    Glucose Blood (TRUE METRIX BLOOD GLUCOSE TEST) In Vitro Strip, 3 each by In Vitro route daily., Disp: 100 strip, Rfl: 5    hydrALAZINE 100 MG Oral Tab, Take 1 tablet (100 mg total) by mouth 2 (two) times daily., Disp: 180 tablet, Rfl: 3    Insulin Pen Needle (BD PEN NEEDLE ABDOULAYE 2ND GEN) 32G X 4 MM Does not apply Misc, INJECT FOUR TIMES DAILY AS DIRECTED, Disp: 50 each, Rfl: 1    Blood Glucose Monitoring Suppl (TRUE METRIX METER) w/Device Does not apply Kit, Use to check blood sugar three times a day, Disp: 1 kit, Rfl: 0    NOVOLOG FLEXPEN 100 UNIT/ML Subcutaneous Solution Pen-injector, INJECT 35 UNITS INTO THE SKIN THREE TIMES DAILY WITH MEALS PLUS SLIDING SCALE (Patient not taking: Reported on 5/22/2024), Disp: 90 mL, Rfl: 0    Meloxicam 15 MG Oral Tab, Take 1 tablet (15 mg total) by mouth daily., Disp: 30 tablet, Rfl: 0    hydroCHLOROthiazide 25 MG Oral Tab, Take 1 tablet (25 mg total) by mouth daily., Disp: 90 tablet, Rfl: 3    TRUEplus Lancets 33G Does not apply Misc, USE TO CHECK BLOOD SUGAR THREE TIMES DAILY, Disp: 300 each, Rfl: 3     atorvastatin 80 MG Oral Tab, Take 1 tablet (80 mg total) by mouth nightly., Disp: 90 tablet, Rfl: 3    cyclobenzaprine 5 MG Oral Tab, Take 1 tablet (5 mg total) by mouth nightly as needed for Muscle spasms., Disp: 20 tablet, Rfl: 0    Glucose Blood (TRUE METRIX BLOOD GLUCOSE TEST) In Vitro Strip, Use to check blood sugar three times a day, Disp: 300 strip, Rfl: 0    Accu-Chek FastClix Lancets Does not apply Misc, Use to check blood sugar three times a day, Disp: 300 each, Rfl: 0    Glucose Blood (ACCU-CHEK GUIDE) In Vitro Strip, Use to check blood sugar three times a day, Disp: 300 strip, Rfl: 0    losartan 100 MG Oral Tab, Take 1 tablet (100 mg total) by mouth daily., Disp: 90 tablet, Rfl: 1    amLODIPine 5 MG Oral Tab, TAKE 2 TABLETS EVERY DAY, Disp: 180 tablet, Rfl: 1    levETIRAcetam 500 MG Oral Tab, TAKE 1 TABLET TWICE DAILY, Disp: 180 tablet, Rfl: 1    ondansetron 4 MG Oral Tablet Dispersible, Take 1 tablet (4 mg total) by mouth every 8 (eight) hours as needed for Nausea., Disp: 30 tablet, Rfl: 0    ergocalciferol 1.25 MG (41327 UT) Oral Cap, Take 1 capsule (50,000 Units total) by mouth every 30 (thirty) days. For 3 months (Patient not taking: Reported on 5/22/2024), Disp: 1 capsule, Rfl: 2    Omeprazole 40 MG Oral Capsule Delayed Release, TAKE 1 CAPSULE EVERY DAY 30 TO 60 MINUTES BEFORE A MEAL, Disp: 90 capsule, Rfl: 3    Alcohol Swabs (DROPSAFE ALCOHOL PREP) 70 % Does not apply Pads, USE AS DIRECTED, Disp: 100 each, Rfl: 0    Blood Glucose Monitoring Suppl (ACCU-CHEK GUIDE) w/Device Does not apply Kit, Use to check blood sugar three times a day, Disp: 1 kit, Rfl: 0    escitalopram 10 MG Oral Tab, Take 1 tablet (10 mg total) by mouth daily., Disp: 90 tablet, Rfl: 3    fluticasone propionate 50 MCG/ACT Nasal Suspension, 2 sprays by Nasal route daily., Disp: 48 g, Rfl: 1    VENTOLIN  (90 Base) MCG/ACT Inhalation Aero Soln, Inhale 2 puffs into the lungs every 6 (six) hours as needed for Wheezing., Disp:  18 g, Rfl: 1    aspirin 325 MG Oral Tab, Take 1 tablet (325 mg total) by mouth daily., Disp: , Rfl:      Allergies:   Allergies   Allergen Reactions    Reglan [Metoclopramide] OTHER (SEE COMMENTS)     Sensitivity, with crawling sensation.    Adhesive Tape      itching    Radiology Contrast Iodinated Dyes ITCHING    Wound Dressing Adhesive RASH       Social History:   Social History     Socioeconomic History    Marital status: Legally     Number of children: 3   Occupational History    Occupation:      Comment: disabled    Occupation: phlebotomy   Tobacco Use    Smoking status: Former     Current packs/day: 0.00     Average packs/day: 0.2 packs/day for 1 year (0.2 ttl pk-yrs)     Types: Cigarettes     Start date: 12/16/2012     Quit date: 12/16/2013     Years since quitting: 10.4    Smokeless tobacco: Never   Vaping Use    Vaping status: Never Used   Substance and Sexual Activity    Alcohol use: No     Alcohol/week: 0.0 standard drinks of alcohol    Drug use: No    Sexual activity: Not Currently       Medical History:   Past Medical History:    Age-related nuclear cataract of both eyes    Anemia    Apnea    Cataract    Coronary atherosclerosis    Depression    DM type 2 (diabetes mellitus, type 2) (HCC)    Esophageal reflux    High blood pressure    High cholesterol    Meibomian gland dysfunction (MGD), bilateral, both upper and lower lids    Migraine    Muscle weakness    left sided weakness uses walker and cane    Sleep apnea    Stenosis of right vertebral artery    Stroke (HCC)    Type II or unspecified type diabetes mellitus without mention of complication, not stated as uncontrolled       Surgical history:   Past Surgical History:   Procedure Laterality Date    Brain surgery  2014    Colonoscopy N/A 8/25/2017    Procedure: COLONOSCOPY;  Surgeon: Sharmaine Burks MD;  Location: [facility] ENDOSCOPY    Colonoscopy      Colonoscopy N/A 11/12/2022    Procedure: COLONOSCOPY with  Polypectomy;  Surgeon: Terry Weaver MD;  Location: [facility] END[facility]    Excise carotid body+carotid artery  09/2017    Incision and drainage Right 04/19/16    lower abdominal skin    Laparoscopic cholecystectomy  2002         PHYSICAL EXAM  /84   Pulse 76   Ht 5' 2.01\" (1.575 m)   Wt 186 lb (84.4 kg)   BMI 34.01 kg/m²     General Appearance:  alert, well developed, in no acute distress  Eyes:  normal conjunctivae, sclera., normal sclera and normal pupils  Ears/Nose/Mouth/Throat/Neck:  no palpable thyroid nodules or cervical lymphadenopathy  Back: no kyphosis or back tenderness  Musculoskeletal:  normal muscle strength and tone  Skin:  normal moisture and skin texture  Hair & Nails:  normal scalp hair     Hematologic:  no excessive bruising  Neuro:  sensory grossly intact and motor grossly intact  Psychiatric:  oriented to time, self, and place  Nutritional:  no abnormal weight gain or loss       ASSESSMENT/PLAN:      1. Diabetes Mellitus Type 2, Uncontrolled  -Uncontrolled  -HgA1c- 10.2% - increased   -Reviewed at length today importance of improving glycemic control. Unfortunately has been unable to tolerate several medications and sugars continue to be above goal. Reviewed at length risk of complications of uncontrolled diabetes.   -Reviewed ABC's of diabetes  -Discussed importance of glycemic control to prevent complications of diabetes  -Discussed complications of diabetes include retinopathy, neuropathy, nephropathy and cardiovascular disease  -Discussed importance of SBGM- using freestyle maximo   -Discussed importance of low CHO diet- recommend 135 grams total per day, 45 grams per meal.   -She self adjusted insulin dose significantly due to hypoglycemia   -Discussed importance of taking higher insulin dose  -Increase Lantus to 40 units subcutaneous daily   -Increase Novolog to 14 units subcutaneous TID with meals  -Add CF !:40>140  -Increase Mounjaro to 5mg subcutaneous weekly, verbalized understanding  of risks and benefits   -Intolerant of Ozempic     -Discussed option of trying SGLT-2 inhibitor again but she is worried about hair loss and previous yeast infection and is not willing to try medication at this time.     -Reviewed at length importance of adherence with DM medications. Reviewed importance of contacting clinic if sugars are <80 or persistently >300 so that medications can be adjusted.   -Continue Metformin 1000mg PO BID   -Reviewed timing and administration of insulin.     -normotensive   -Lipids-7/2023 - , on statin therapy- atorvastatin 80mg PO nightly. She admits to missing doses of medication. Reviewed importance of improved adherence with statin.     -elevated urine microalbumin 10/2022 and 7/2023- reviewed importance of good blood sugar control. On losartan  -Recheck yearly labs CMP, lipids, MAB, TSH     -Has optho follow up scheduled.   -normal foot exam 12/2023    RTC 4 months     5/23/2024

## 2024-05-23 NOTE — PATIENT INSTRUCTIONS
INCREASE Mounjaro to 5mg subcutaneous weekly     INCREASE Lantus to 40 units subcutaneous daily     Novolog   INSULIN SLIDING SCALE  Base Values  Breakfast: 14  Lunch: 14  Dinner: 14  Ranges:  80-99: -2  100-119: 0  120-139: 0  140-159: 1  160-179: 1  180-199: 2  200-219: 2  220-239: 3  240-259: 3  260-279: 4  280-299: 4  300-319: 5  320-339: 5  340-359: 6  360-379: 6

## 2024-05-28 NOTE — TELEPHONE ENCOUNTER
Pharmacy sending second refill request for       Meloxicam 15 MG Oral Tab, Take 1 tablet (15 mg total) by mouth daily., Disp: 30 tablet, Rfl: 0

## 2024-05-29 RX ORDER — MELOXICAM 15 MG/1
15 TABLET ORAL DAILY
Qty: 30 TABLET | Refills: 0 | Status: SHIPPED | OUTPATIENT
Start: 2024-05-29

## 2024-05-29 NOTE — TELEPHONE ENCOUNTER
Meloxicam refill request 15mg    Last Rx: 4/10/24, #30, 0 refills  Last Rx: 2/26/24    Please review and sign if you approve

## 2024-06-13 ENCOUNTER — HOSPITAL ENCOUNTER (OUTPATIENT)
Dept: GENERAL RADIOLOGY | Age: 54
Discharge: HOME | End: 2024-06-13
Attending: PHYSICAL MEDICINE & REHABILITATION
Payer: MEDICARE

## 2024-06-13 ENCOUNTER — OFFICE VISIT (OUTPATIENT)
Dept: PHYSICAL MEDICINE AND REHAB | Facility: CLINIC | Age: 54
End: 2024-06-13
Payer: MEDICARE

## 2024-06-13 VITALS — HEIGHT: 62 IN | WEIGHT: 186 LBS | RESPIRATION RATE: 18 BRPM | BODY MASS INDEX: 34.23 KG/M2

## 2024-06-13 DIAGNOSIS — E10.22 CKD STAGE 3 DUE TO TYPE 1 DIABETES MELLITUS (HCC): ICD-10-CM

## 2024-06-13 DIAGNOSIS — M79.18 CHRONIC MYOFASCIAL PAIN: ICD-10-CM

## 2024-06-13 DIAGNOSIS — I63.9 LEFT-SIDED CEREBROVASCULAR ACCIDENT (CVA) (HCC): ICD-10-CM

## 2024-06-13 DIAGNOSIS — G89.29 CHRONIC MYOFASCIAL PAIN: ICD-10-CM

## 2024-06-13 DIAGNOSIS — G89.29 CHRONIC MYOFASCIAL PAIN: Primary | ICD-10-CM

## 2024-06-13 DIAGNOSIS — E11.8 TYPE 2 DIABETES MELLITUS WITH COMPLICATION, WITH LONG-TERM CURRENT USE OF INSULIN (HCC): ICD-10-CM

## 2024-06-13 DIAGNOSIS — M79.18 CHRONIC MYOFASCIAL PAIN: Primary | ICD-10-CM

## 2024-06-13 DIAGNOSIS — Z98.890 S/P CAROTID ENDARTERECTOMY: ICD-10-CM

## 2024-06-13 DIAGNOSIS — I10 ESSENTIAL HYPERTENSION: ICD-10-CM

## 2024-06-13 DIAGNOSIS — E66.01 MORBID (SEVERE) OBESITY DUE TO EXCESS CALORIES (HCC): ICD-10-CM

## 2024-06-13 DIAGNOSIS — Z79.4 TYPE 2 DIABETES MELLITUS WITH COMPLICATION, WITH LONG-TERM CURRENT USE OF INSULIN (HCC): ICD-10-CM

## 2024-06-13 DIAGNOSIS — N18.30 CKD STAGE 3 DUE TO TYPE 1 DIABETES MELLITUS (HCC): ICD-10-CM

## 2024-06-13 PROCEDURE — 72072 X-RAY EXAM THORAC SPINE 3VWS: CPT | Performed by: PHYSICAL MEDICINE & REHABILITATION

## 2024-06-13 PROCEDURE — 72050 X-RAY EXAM NECK SPINE 4/5VWS: CPT | Performed by: PHYSICAL MEDICINE & REHABILITATION

## 2024-06-13 PROCEDURE — 99204 OFFICE O/P NEW MOD 45 MIN: CPT | Performed by: PHYSICAL MEDICINE & REHABILITATION

## 2024-06-13 PROCEDURE — 3008F BODY MASS INDEX DOCD: CPT | Performed by: PHYSICAL MEDICINE & REHABILITATION

## 2024-06-13 RX ORDER — PREGABALIN 50 MG/1
50 CAPSULE ORAL 2 TIMES DAILY
Qty: 60 CAPSULE | Refills: 0 | Status: SHIPPED | OUTPATIENT
Start: 2024-06-13

## 2024-06-13 NOTE — PATIENT INSTRUCTIONS
-Start Lyrica twice daily  -Ice/Heat as tolerated  -Start PT and home exercises  -Xray of the neck and thoracic spine today  -Follow up in 4 weeks

## 2024-06-13 NOTE — PROGRESS NOTES
[facility]  NEW PATIENT EVALUATION    Consultation as a request of Dr. Yanez      HISTORY OF PRESENT ILLNESS:     Chief Complaint   Patient presents with    New Patient     New R handed patient Rfd by pcp for neck and mid back pain. Onset 2-3 months ago after falling at home. Reports she fell down stairs landing on her back. Since then her symptoms wax/wan. Imaging completed Jan, 2024. + radiation to shoulder as well as arms. Denies N/T to area. Denies PT. Trial muscle relaxer with no relief. CPL 9/10.       The patient is a 54 year old female with significant past medical history of diabetes, depression, CAD, GERD, hyperlipidemia, stroke, anemia who presents with neck and mid back pain.  Patient states the neck pain has been ongoing for several months however the mid back pain started after a fall down some stairs.  She was walking after shower on a plastic piece on her carpet and slipped and fell down the stairs landing on her back.  Since then the pain has been aggravated.  She describes a burning pain between her shoulder blades and also pain in the upper traps which radiates to the arms.  She denies further radiating symptoms in the hands any numbness tingling or weakness.  She has a difficult time sleeping at nighttime as result of the pain has a hard time sleeping on either shoulder and has difficulty sleeping on her back as well.  Currently the pain is 9 out of 10.  She has tried muscle relaxer with no relief.  She has not had any of the other dedicated treatment.  Patient is on disability.  However since her CVA, she experiences residual left-sided hemiparesis.  She does report urine urgency but denies any loss of bowel bladder control.      PHYSICAL EXAM:   Resp 18   Ht 62\"   Wt 186 lb (84.4 kg)   BMI 34.02 kg/m²     Gait: Normal    CERVICAL SPINE:  Inspection: no erythema, swelling, or obvious deformity  Palpation: no ttp over spinous process.   Tenderness to palpation of the cervical paraspinals, upper trap and levator scapulae bilaterally with tenderness along the thoracic paraspinals as well  ROM: intact to all planes of motion of cervical spine including side-bend bilaterally, rotation bilaterally, flexion, and extension but pain with end range of motion  Strength: 5/5 in upper extremities bilaterally except 4 out of 5 diffusely throughout the left upper extremity  Sensation: Intact to light touch in all dermatomes of the bilateral upper extremities  Reflexes: 1/4 at C5, C6, C7 bilaterally  Spurling Test: negative for radicular symptoms down either extremity bilaterally  Alan's sign: negative bilaterally    IMAGING:     No imaging    All imaging results were reviewed and discussed with patient.      ASSESSMENT/PLAN:     1. Chronic myofascial pain    2. Type 2 diabetes mellitus with complication, with long-term current use of insulin ([facility])    3. Left-sided cerebrovascular accident (CVA) ([facility])    4. CKD stage 3 due to type 1 diabetes mellitus ([facility])    5. Morbid (severe) obesity due to excess calories ([facility])    6. Essential hypertension    7. S/P carotid endarterectomy        Anjel Pisano is a 54-year-old female with multiple comorbidities as noted above who presents today for evaluation of chronic neck and mid back pain which I believe is more myofascial in nature.  She may have a cervical herniated disc which could be causing some of the nerve pain as well.  I recommended starting Lyrica 50 mg twice daily and PT program with home exercises.  Recommend she obtain x-ray imaging of the cervical spine and thoracic spine and follow-up after.  If pain is no better we will obtain MRI imaging of the cervical and thoracic spine.  Recommend she use topical treatment with ice and heat as tolerated.      The patient verbalized understanding with the plan and was in agreement. All questions/concerns were addressed and there were no barriers to learning.  Please  note Dragon dictation software was used to dictate this note and can result in inadvertent typos.    Elis Bhatt D.O. FAAPMR & CAQSM  Physical Medicine and Rehabilitation  Sports and Spine Medicine      PAST MEDICAL HISTORY:     Past Medical History:    Age-related nuclear cataract of both eyes    Anemia    Apnea    Cataract    Coronary atherosclerosis    Depression    DM type 2 (diabetes mellitus, type 2) (HCC)    Esophageal reflux    High blood pressure    High cholesterol    Meibomian gland dysfunction (MGD), bilateral, both upper and lower lids    Migraine    Muscle weakness    left sided weakness uses walker and cane    Sleep apnea    Stenosis of right vertebral artery    Stroke (HCC)    Type II or unspecified type diabetes mellitus without mention of complication, not stated as uncontrolled         PAST SURGICAL HISTORY:     Past Surgical History:   Procedure Laterality Date    Brain surgery  2014    Colonoscopy N/A 8/25/2017    Procedure: COLONOSCOPY;  Surgeon: Sharmaine Burks MD;  Location: [facility] ENDOSCOPY    Colonoscopy      Colonoscopy N/A 11/12/2022    Procedure: COLONOSCOPY with Polypectomy;  Surgeon: Terry Weaver MD;  Location: [facility] ENDOSCOPY    Excise carotid body+carotid artery  09/2017    Incision and drainage Right 04/19/16    lower abdominal skin    Laparoscopic cholecystectomy  2002         CURRENT MEDICATIONS:     Current Outpatient Medications   Medication Sig Dispense Refill    pregabalin (LYRICA) 50 MG Oral Cap Take 1 capsule (50 mg total) by mouth 2 (two) times daily. 60 capsule 0    Tirzepatide (MOUNJARO) 5 MG/0.5ML Subcutaneous Solution Pen-injector Inject 5 mg into the skin once a week. 6 mL 2    Glucose Blood (TRUE METRIX BLOOD GLUCOSE TEST) In Vitro Strip Use to check blood sugar three times a day 300 strip 1    TRUEplus Lancets 33G Does not apply Misc USE TO CHECK BLOOD SUGAR THREE TIMES DAILY 300 each 3    metoprolol succinate ER 25 MG Oral Tablet 24 Hr Take 1 tablet (25 mg  total) by mouth nightly. 90 tablet 1    metoprolol succinate  MG Oral Tablet 24 Hr Take 1 tablet (100 mg total) by mouth daily. 90 tablet 1    Glucose Blood (TRUE METRIX BLOOD GLUCOSE TEST) In Vitro Strip 3 each by In Vitro route daily. 100 strip 5    hydrALAZINE 100 MG Oral Tab Take 1 tablet (100 mg total) by mouth 2 (two) times daily. 180 tablet 3    Insulin Pen Needle (BD PEN NEEDLE ABDOULAYE 2ND GEN) 32G X 4 MM Does not apply Misc INJECT FOUR TIMES DAILY AS DIRECTED 50 each 1    Blood Glucose Monitoring Suppl (TRUE METRIX METER) w/Device Does not apply Kit Use to check blood sugar three times a day 1 kit 0    MOUNJARO 2.5 MG/0.5ML Subcutaneous Solution Pen-injector Inject 2.5 mg into the skin once a week. 2 mL 0    insulin glargine (LANTUS SOLOSTAR) 100 UNIT/ML Subcutaneous Solution Pen-injector Inject 54 Units into the skin nightly. (Patient taking differently: Inject 30 Units into the skin nightly.) 51 mL 1    NOVOLOG FLEXPEN 100 UNIT/ML Subcutaneous Solution Pen-injector INJECT 35 UNITS INTO THE SKIN THREE TIMES DAILY WITH MEALS PLUS SLIDING SCALE 90 mL 0    hydroCHLOROthiazide 25 MG Oral Tab Take 1 tablet (25 mg total) by mouth daily. 90 tablet 3    atorvastatin 80 MG Oral Tab Take 1 tablet (80 mg total) by mouth nightly. 90 tablet 3    cyclobenzaprine 5 MG Oral Tab Take 1 tablet (5 mg total) by mouth nightly as needed for Muscle spasms. 20 tablet 0    Accu-Chek FastClix Lancets Does not apply Misc Use to check blood sugar three times a day 300 each 0    Glucose Blood (ACCU-CHEK GUIDE) In Vitro Strip Use to check blood sugar three times a day 300 strip 0    losartan 100 MG Oral Tab Take 1 tablet (100 mg total) by mouth daily. 90 tablet 1    amLODIPine 5 MG Oral Tab TAKE 2 TABLETS EVERY  tablet 1    levETIRAcetam 500 MG Oral Tab TAKE 1 TABLET TWICE DAILY 180 tablet 1    ondansetron 4 MG Oral Tablet Dispersible Take 1 tablet (4 mg total) by mouth every 8 (eight) hours as needed for Nausea. 30 tablet 0     ergocalciferol 1.25 MG (01171 UT) Oral Cap Take 1 capsule (50,000 Units total) by mouth every 30 (thirty) days. For 3 months 1 capsule 2    Omeprazole 40 MG Oral Capsule Delayed Release TAKE 1 CAPSULE EVERY DAY 30 TO 60 MINUTES BEFORE A MEAL 90 capsule 3    Alcohol Swabs (DROPSAFE ALCOHOL PREP) 70 % Does not apply Pads USE AS DIRECTED 100 each 0    Blood Glucose Monitoring Suppl (ACCU-CHEK GUIDE) w/Device Does not apply Kit Use to check blood sugar three times a day 1 kit 0    METFORMIN 500 MG Oral Tab TAKE 2 TABLETS(1000 MG) BY MOUTH TWICE DAILY WITH MEALS 360 tablet 0    escitalopram 10 MG Oral Tab Take 1 tablet (10 mg total) by mouth daily. 90 tablet 3    fluticasone propionate 50 MCG/ACT Nasal Suspension 2 sprays by Nasal route daily. 48 g 1    VENTOLIN  (90 Base) MCG/ACT Inhalation Aero Soln Inhale 2 puffs into the lungs every 6 (six) hours as needed for Wheezing. 18 g 1    aspirin 325 MG Oral Tab Take 1 tablet (325 mg total) by mouth daily.      MELOXICAM 15 MG Oral Tab TAKE 1 TABLET(15 MG) BY MOUTH DAILY (Patient not taking: Reported on 6/13/2024) 30 tablet 0         ALLERGIES:     Allergies   Allergen Reactions    Reglan [Metoclopramide] OTHER (SEE COMMENTS)     Sensitivity, with crawling sensation.    Adhesive Tape      itching    Radiology Contrast Iodinated Dyes ITCHING    Wound Dressing Adhesive RASH         FAMILY HISTORY:     Family History   Problem Relation Age of Onset    Diabetes Father     Hypertension Father     Heart Disorder Father     Lipids Father     Obesity Father     Colon Cancer Father     Heart Disorder Mother     Lipids Mother     Obesity Mother     Lipids Brother     Obesity Brother     Glaucoma Neg     Macular degeneration Neg           SOCIAL HISTORY:     Social History     Socioeconomic History    Marital status: Legally     Number of children: 3   Occupational History    Occupation:      Comment: disabled    Occupation: phlebotomy   Tobacco Use     Smoking status: Former     Current packs/day: 0.00     Average packs/day: 0.2 packs/day for 1 year (0.2 ttl pk-yrs)     Types: Cigarettes     Start date: 12/16/2012     Quit date: 12/16/2013     Years since quitting: 10.4    Smokeless tobacco: Never   Vaping Use    Vaping status: Never Used   Substance and Sexual Activity    Alcohol use: No     Alcohol/week: 0.0 standard drinks of alcohol    Drug use: No    Sexual activity: Not Currently     Social Determinants of Health     Financial Resource Strain: Low Risk  (4/21/2023)    Financial Resource Strain     Difficulty of Paying Living Expenses: Not hard at all     Med Affordability: No   Transportation Needs: Unmet Transportation Needs (1/9/2024)    Transportation Needs     Lack of Transportation: Yes   Physical Activity: Inactive (4/21/2023)    Exercise Vital Sign     Days of Exercise per Week: 0 days     Minutes of Exercise per Session: 0 min   Stress: No Stress Concern Present (4/21/2023)    Stress     Feeling of Stress : No    Received from [facility] Social Needs Screening - Social Connection   Housing Stability: Low Risk  (4/21/2023)    Housing Stability     Housing Instability: No          REVIEW OF SYSTEMS:   No patient-reported data collected this visit.       PHYSICAL EXAM:     General: No immediate distress  Head: Normocephalic/ Atraumatic  Eyes: Extra-occular movements intact.   Ears: No auricular hematoma or deformities  Mouth: No lesions or ulcerations  Heart: peripheral pulses intact. Normal capillary refill.   Lungs: Non-labored respirations  Abdomen: No abdominal guarding  Extremities: No lower extremity edema bilaterally   Skin: No lesions noted   Cognition: alert & oriented x 3, attentive, able to follow 2 step commands, comprehention intact, spontaneous speech intact  Psychiatric: Mood and affect appropriate    LABS:     Lab Results   Component Value Date     (H) 05/23/2024    A1C 10.2 (A) 05/23/2024     Lab Results    Component Value Date    WBC 13.4 (H) 01/25/2024    RBC 5.34 (H) 01/25/2024    HGB 13.0 01/25/2024    HCT 40.9 01/25/2024    MCV 76.6 (L) 01/25/2024    MCH 24.3 (L) 01/25/2024    MCHC 31.8 01/25/2024    RDW 15.3 (H) 01/25/2024    .0 01/25/2024    MPV 10.1 08/20/2018     Lab Results   Component Value Date     (H) 05/23/2024    BUN 13 05/23/2024    BUNCREA 13.0 05/23/2024    CREATSERUM 1.00 05/23/2024    ANIONGAP 6 05/23/2024    GFRNAA 66 05/16/2022    GFRAA 76 05/16/2022    CA 10.0 05/23/2024    OSMOCALC 297 (H) 05/23/2024    ALKPHO 109 05/23/2024    AST 14 05/23/2024    ALT 12 05/23/2024    ALKPHOS 84 09/26/2016    BILT 0.7 05/23/2024    TP 8.0 05/23/2024    ALB 4.6 05/23/2024    GLOBULIN 3.4 05/23/2024    AGRATIO 0.9 (L) 09/26/2016     05/23/2024    K 4.2 05/23/2024     05/23/2024    CO2 28.0 05/23/2024     Lab Results   Component Value Date    PTP 12.5 08/31/2017    PT 12.8 09/26/2016    INR 1.0 08/31/2017     Lab Results   Component Value Date    VITD 23.4 (L) 07/13/2023

## 2024-06-19 ENCOUNTER — PATIENT OUTREACH (OUTPATIENT)
Dept: CASE MANAGEMENT | Age: 54
End: 2024-06-19

## 2024-06-19 NOTE — PROGRESS NOTES
Attempted [facility] monthly outreach,  left message for patient to call back ,can be reached at  [phone]. Will try back at a later time.      Medical record reviewed including recent office visits and test results.    Chart review - 3 min  Time with patient - 2 min  Total time - 5 min

## 2024-07-08 RX ORDER — INSULIN ASPART 100 [IU]/ML
INJECTION, SOLUTION INTRAVENOUS; SUBCUTANEOUS
Qty: 90 ML | Refills: 0 | Status: SHIPPED | OUTPATIENT
Start: 2024-07-08

## 2024-07-08 NOTE — TELEPHONE ENCOUNTER
Endocrine refill protocol for basal insulins     Protocol Criteria:pass  - Appointment with Endocrinology completed in the last 6 months or scheduled in the next 3 months    - A1c result completed in the last 6 months and is below 8.5%     Verify appointment has been completed or scheduled in the appropriate timeline. If so can send a 90 day supply with 1 refill per provider protocol.    Verify A1c has been completed within the last 6 months and is below 8.5%     Last completed office visit:5/23/2024 Nilsa Caceres MD   Next scheduled Follow up:09/23/24      Last A1c result: Last A1c value was 10.2% done 5/23/2024.

## 2024-07-10 ENCOUNTER — TELEPHONE (OUTPATIENT)
Dept: ENDOCRINOLOGY CLINIC | Facility: CLINIC | Age: 54
End: 2024-07-10

## 2024-07-10 ENCOUNTER — PATIENT OUTREACH (OUTPATIENT)
Dept: CASE MANAGEMENT | Age: 54
End: 2024-07-10

## 2024-07-10 DIAGNOSIS — E11.49 TYPE 2 DIABETES MELLITUS WITH OTHER NEUROLOGIC COMPLICATION, WITH LONG-TERM CURRENT USE OF INSULIN (HCC): ICD-10-CM

## 2024-07-10 DIAGNOSIS — E78.00 PURE HYPERCHOLESTEROLEMIA: ICD-10-CM

## 2024-07-10 DIAGNOSIS — E11.65 UNCONTROLLED TYPE 2 DIABETES MELLITUS WITH HYPERGLYCEMIA (HCC): ICD-10-CM

## 2024-07-10 DIAGNOSIS — N18.30 CKD STAGE 3 DUE TO TYPE 1 DIABETES MELLITUS (HCC): ICD-10-CM

## 2024-07-10 DIAGNOSIS — E10.22 CKD STAGE 3 DUE TO TYPE 1 DIABETES MELLITUS (HCC): ICD-10-CM

## 2024-07-10 DIAGNOSIS — Z79.4 TYPE 2 DIABETES MELLITUS WITH OTHER NEUROLOGIC COMPLICATION, WITH LONG-TERM CURRENT USE OF INSULIN (HCC): ICD-10-CM

## 2024-07-10 DIAGNOSIS — E66.9 OBESITY (BMI 30-39.9): ICD-10-CM

## 2024-07-10 DIAGNOSIS — E78.5 DYSLIPIDEMIA: ICD-10-CM

## 2024-07-10 DIAGNOSIS — I10 ESSENTIAL HYPERTENSION: Primary | ICD-10-CM

## 2024-07-10 DIAGNOSIS — E11.8 TYPE 2 DIABETES MELLITUS WITH COMPLICATION, WITH LONG-TERM CURRENT USE OF INSULIN (HCC): ICD-10-CM

## 2024-07-10 DIAGNOSIS — Z79.4 TYPE 2 DIABETES MELLITUS WITH COMPLICATION, WITH LONG-TERM CURRENT USE OF INSULIN (HCC): ICD-10-CM

## 2024-07-10 RX ORDER — CALCIUM CITRATE/VITAMIN D3 200MG-6.25
TABLET ORAL
Qty: 400 STRIP | Refills: 1 | Status: SHIPPED | OUTPATIENT
Start: 2024-07-10

## 2024-07-10 RX ORDER — GLUCOSAM/CHON-MSM1/C/MANG/BOSW 500-416.6
TABLET ORAL
Qty: 400 EACH | Refills: 1 | Status: SHIPPED | OUTPATIENT
Start: 2024-07-10

## 2024-07-10 NOTE — PROGRESS NOTES
Spoke to [place] for Chronic Care Management.      Patient updates/concerns:       Spoke to patient for CCM , patient concerned as she is having multiple stressors financially due to insurance changes. Patient not able to afford medications, now has copays .       Patient shares her glucometer strips keep showing error , requesting a new glucose monitor / Strips /Lancets be sent to pharmacy .   Stated she is having difficulty obtaining refill on test strips and she is running out.     [facility] previously provided patient with phone number to ride share dupage- patient stated she cannot afford to pay for rides at this time and that was not an option .    Patient would like to know if she qualifies for more income or financial assistance , encouraged her to call  with Medicaid and SSI - phone number provided today .     Patient shares her insurance changed and she is not able to afford specialist copay - $40 .   CCM encouraged her to call insurance company to find out if there is a any other options .    CCM provided patient with multiple resources will follow up with patient next month to continue to offer support. Encouraged to call with any question or concerns.     Goals/Action Plan:  Active goal from previous outreach: improve pain and continue monitoring BP/ Glucose     Patient reported progress towards goals: patient stated BP and glucose readigns have improved.                - What: decrease A1C            - Where/When/How: daily tracking and logging of all glucose readings as well as food log if possible and BP log .     Patient Reported Barriers and Concerns: Multiple barriers with transportation - patient encouraged to call insurance to verify if she has benefits                   - Plan for overcoming barriers: CCM encouraged patient to call when she has difficulty with obtaining medications or questions.     Care Managers Interventions:   CCM placed call to [facility].   CCM sending  referral to Dr. Caceres for test strips/ monitor /lancets .   CCM listened and provided support.   Reminded patient of overdue Health Maintenance.   CCM updated patient records from Care Everywhere.   CCM updated immunizations- no updates.   Continue to provide encouragement, support and education for healthy coping and diagnosis.          Future Appointments:   Future Appointments   Date Time Provider Department Center   7/11/2024 10:00 AM Elis Bhatt,  [facility] [place] [facility] [place]   8/5/2024  2:10 PM Mila Sanford DPM [facility]   8/29/2024  3:00 PM Rohini Mckenna MD E[facility]   9/3/2024  1:00 PM Yves Yanez MD [id number] [facility] [place]   9/23/2024  3:00 PM Nilsa Caceres MD [facility] Care Manager Follow Up Date: 1 month - sooner if needed.     Reason For Follow Up: review progress and or barriers towards patient's goals.     Time Spent This Encounter Total:  54 min medical record review, telephone communication, care plan updates where needed, education, goals, and action plan recreation/update. Provided acknowledgment and validation to patient's concerns.   Monthly Minute Total including today: 54  Physical assessment, complete health history, and need for CCM established by Yves Yanez MD.

## 2024-07-10 NOTE — TELEPHONE ENCOUNTER
Emily MILES,    Patient requesting refill for metformin.     Rx pended, please sign if appropriate.    thanks      Failed Endocrine Refill protocol for metformin    Protocol Criteria:    -Appointment with Endocrinology completed in the last 6 months or scheduled in the next 3 months    -GFR greater than or equal to 40 in the past 12 months     -A1c result below 8.5% in the past 6 months      Verify the above has been completed or scheduled in the appropriate timeline. If so can send a 90 day supply with 1 refill.     Last completed office visit:5/23/2024 Nilsa Caceres MD   Next scheduled Follow up:   9/23/2024  3:00 PM Nilsa Caceres MD [facility]       Last GFR result:   66 on 5/23/24    Last A1c result:  Lab Results   Component Value Date     (H) 05/23/2024    A1C 10.2 (A) 05/23/2024           Passed for meter, test strips and lancets - Endocrine Refill protocol for Glucose testing supplies       Protocol Criteria:  Appointment with Endocrinology completed in the last 12 months or scheduled in the next 6 months     Verify appointment has been completed or scheduled in the appropriate timeline. If so can send a 90 day supply with 1 refill.        Last completed office visit: 5/23/2024 Nilsa Caceres MD   Next scheduled Follow up:   9/23/2024  3:00 PM Nilsa Caceres MD [facility]

## 2024-07-10 NOTE — TELEPHONE ENCOUNTER
Spoke to patient for CCM ,     Patient shares her glucometer strips keep showing error , requesting a new glucose monitor / Strips /Lancets be sent to pharmacy .   Stated she is having difficulty obtaining refill on test strips and she is running out .     Please advise. Thank you .    Pain is partially gone after the meds

## 2024-07-11 ENCOUNTER — OFFICE VISIT (OUTPATIENT)
Dept: PHYSICAL MEDICINE AND REHAB | Facility: CLINIC | Age: 54
End: 2024-07-11
Payer: MEDICARE

## 2024-07-11 VITALS — HEIGHT: 62 IN | RESPIRATION RATE: 18 BRPM | WEIGHT: 186 LBS | BODY MASS INDEX: 34.23 KG/M2

## 2024-07-11 DIAGNOSIS — I10 ESSENTIAL HYPERTENSION: ICD-10-CM

## 2024-07-11 DIAGNOSIS — E11.8 TYPE 2 DIABETES MELLITUS WITH COMPLICATION, WITH LONG-TERM CURRENT USE OF INSULIN (HCC): ICD-10-CM

## 2024-07-11 DIAGNOSIS — I63.9 LEFT-SIDED CEREBROVASCULAR ACCIDENT (CVA) (HCC): ICD-10-CM

## 2024-07-11 DIAGNOSIS — M67.911 TENDINOPATHY OF ROTATOR CUFF, RIGHT: ICD-10-CM

## 2024-07-11 DIAGNOSIS — Z98.890 S/P CAROTID ENDARTERECTOMY: ICD-10-CM

## 2024-07-11 DIAGNOSIS — G89.29 CHRONIC MYOFASCIAL PAIN: Primary | ICD-10-CM

## 2024-07-11 DIAGNOSIS — E10.22 CKD STAGE 3 DUE TO TYPE 1 DIABETES MELLITUS (HCC): ICD-10-CM

## 2024-07-11 DIAGNOSIS — E66.01 MORBID (SEVERE) OBESITY DUE TO EXCESS CALORIES (HCC): ICD-10-CM

## 2024-07-11 DIAGNOSIS — Z79.4 TYPE 2 DIABETES MELLITUS WITH COMPLICATION, WITH LONG-TERM CURRENT USE OF INSULIN (HCC): ICD-10-CM

## 2024-07-11 DIAGNOSIS — N18.30 CKD STAGE 3 DUE TO TYPE 1 DIABETES MELLITUS (HCC): ICD-10-CM

## 2024-07-11 DIAGNOSIS — M79.18 CHRONIC MYOFASCIAL PAIN: Primary | ICD-10-CM

## 2024-07-11 PROCEDURE — 99214 OFFICE O/P EST MOD 30 MIN: CPT | Performed by: PHYSICAL MEDICINE & REHABILITATION

## 2024-07-11 PROCEDURE — 3008F BODY MASS INDEX DOCD: CPT | Performed by: PHYSICAL MEDICINE & REHABILITATION

## 2024-07-11 RX ORDER — ERGOCALCIFEROL 1.25 MG/1
50000 CAPSULE ORAL WEEKLY
Qty: 12 CAPSULE | Refills: 1 | Status: SHIPPED | OUTPATIENT
Start: 2024-07-11

## 2024-07-11 NOTE — PROGRESS NOTES
[facility]  OFFICE FOLLOW UP EVALUATION      HISTORY OF PRESENT ILLNESS:     Chief Complaint   Patient presents with    Follow - Up     Returning patient here for follow up. LOV 6/13/24. Denies change in symptoms. Did not start PT. States Lyrica is making her sleepy but continues to use nightly. CPL 9/10.         Patient is following up for neck pain.  Patient states since last visit she continues to have persistent pain.  Pain is localized in the right axial spine but does have pain radiating down the right arm.  Pain is also worsened with shoulder movement overhead.  She rates pain to be 9 out of 10.  She cannot tolerate Lyrica due to side effects but continues to take it.  She cannot start physical therapy.  She denies any changes in strength or bowel bladder.    PHYSICAL EXAM:   Resp 18   Ht 62\"   Wt 186 lb (84.4 kg)   BMI 34.02 kg/m²     Gait: Normal     CERVICAL SPINE:  Inspection: no erythema, swelling, or obvious deformity  Palpation: no ttp over spinous process.  Tenderness to palpation of the cervical paraspinals, upper trap and levator scapulae bilaterally with tenderness along the thoracic paraspinals as well  ROM: intact to all planes of motion of cervical spine including side-bend bilaterally, rotation bilaterally, flexion, and extension but pain with end range of motion  Strength: 5/5 in upper extremities bilaterally except 4 out of 5 diffusely throughout the left upper extremity  Sensation: Intact to light touch in all dermatomes of the bilateral upper extremities  Reflexes: 1/4 at C5, C6, C7 bilaterally  Spurling Test: negative for radicular symptoms down either extremity bilaterally  Alan's sign: negative bilaterally  Positive Neer's and Landin test.  Restrict range of motion of the shoulder overhead with abduction range of flexion       IMAGING:     X-ray cervical and thoracic spine completed 6/13/2024 is notable for multilevel degenerative  change of the cervical spine with no other acute osseous abnormality    All imaging results were reviewed and discussed with patient.      ASSESSMENT/PLAN:     1. Chronic myofascial pain    2. Left-sided cerebrovascular accident (CVA) ([facility])    3. Type 2 diabetes mellitus with complication, with long-term current use of insulin ([facility])    4. CKD stage 3 due to type 1 diabetes mellitus ([facility])    5. Morbid (severe) obesity due to excess calories ([facility])    6. Essential hypertension    7. S/P carotid endarterectomy        Anjel Pisano is a 54 year old female following up for neck and persistent shoulder pain.  I recommend MRI imaging of the cervical spine and follow-up after.  I have also recommended MRI imaging of the shoulder as starting the PT program with home exercises.  Recommend ultrasound-guided subacromial bursa injection with corticosteroid.  Recommend lidocaine patch topically until follow-up after imaging is complete    The patient verbalized understanding with the plan and was in agreement. All questions/concerns were addressed and there were no barriers to learning.  Please note Dragon dictation software was used to dictate this note and may result in inadvertent typos.    Elis Bhatt DO, FAAPMR & CAQSM  Physical Medicine and Rehabilitation  Sports and Spine Medicine    PAST MEDICAL HISTORY:     Past Medical History:    Age-related nuclear cataract of both eyes    Anemia    Apnea    Cataract    Coronary atherosclerosis    Depression    DM type 2 (diabetes mellitus, type 2) ([facility])    Esophageal reflux    High blood pressure    High cholesterol    Meibomian gland dysfunction (MGD), bilateral, both upper and lower lids    Migraine    Muscle weakness    left sided weakness uses walker and cane    Sleep apnea    Stenosis of right vertebral artery    Stroke ([facility])    Type II or unspecified type diabetes mellitus without mention of complication, not stated as uncontrolled         PAST SURGICAL HISTORY:     Past  Surgical History:   Procedure Laterality Date    Brain surgery  2014    Colonoscopy N/A 8/25/2017    Procedure: COLONOSCOPY;  Surgeon: Sharmaine Burks MD;  Location: [facility] ENDOSCOPY    Colonoscopy      Colonoscopy N/A 11/12/2022    Procedure: COLONOSCOPY with Polypectomy;  Surgeon: Terry Weaver MD;  Location: [facility] ENDOSCOPY    Excise carotid body+carotid artery  09/2017    Incision and drainage Right 04/19/16    lower abdominal skin    Laparoscopic cholecystectomy  2002         CURRENT MEDICATIONS:     Current Outpatient Medications   Medication Sig Dispense Refill    metFORMIN 500 MG Oral Tab Take 2 tablets (1,000 mg total) by mouth 2 (two) times daily with meals. 360 tablet 1    Blood Glucose Monitoring Suppl (TRUE METRIX METER) w/Device Does not apply Kit Use to check blood sugar four times a day 1 kit 0    Glucose Blood (TRUE METRIX BLOOD GLUCOSE TEST) In Vitro Strip Check blood sugar four times a day 400 strip 1    TRUEplus Lancets 33G Does not apply Misc USE TO CHECK BLOOD SUGAR FOUR TIMES DAILY 400 each 1    NOVOLOG FLEXPEN 100 UNIT/ML Subcutaneous Solution Pen-injector INJECT 35 UNITS INTO THE SKIN THREE TIMES DAILY WITH MEAKS PLUS SLIDING SCALE 90 mL 0    pregabalin (LYRICA) 50 MG Oral Cap Take 1 capsule (50 mg total) by mouth 2 (two) times daily. 60 capsule 0    MELOXICAM 15 MG Oral Tab TAKE 1 TABLET(15 MG) BY MOUTH DAILY 30 tablet 0    Tirzepatide (MOUNJARO) 5 MG/0.5ML Subcutaneous Solution Pen-injector Inject 5 mg into the skin once a week. 6 mL 2    Glucose Blood (TRUE METRIX BLOOD GLUCOSE TEST) In Vitro Strip Use to check blood sugar three times a day 300 strip 1    metoprolol succinate ER 25 MG Oral Tablet 24 Hr Take 1 tablet (25 mg total) by mouth nightly. 90 tablet 1    metoprolol succinate  MG Oral Tablet 24 Hr Take 1 tablet (100 mg total) by mouth daily. 90 tablet 1    hydrALAZINE 100 MG Oral Tab Take 1 tablet (100 mg total) by mouth 2 (two) times daily. 180 tablet 3    Insulin Pen  Needle (BD PEN NEEDLE ABDOULAYE 2ND GEN) 32G X 4 MM Does not apply Misc INJECT FOUR TIMES DAILY AS DIRECTED 50 each 1    MOUNJARO 2.5 MG/0.5ML Subcutaneous Solution Pen-injector Inject 2.5 mg into the skin once a week. 2 mL 0    insulin glargine (LANTUS SOLOSTAR) 100 UNIT/ML Subcutaneous Solution Pen-injector Inject 54 Units into the skin nightly. 51 mL 1    hydroCHLOROthiazide 25 MG Oral Tab Take 1 tablet (25 mg total) by mouth daily. 90 tablet 3    atorvastatin 80 MG Oral Tab Take 1 tablet (80 mg total) by mouth nightly. 90 tablet 3    cyclobenzaprine 5 MG Oral Tab Take 1 tablet (5 mg total) by mouth nightly as needed for Muscle spasms. 20 tablet 0    Accu-Chek FastClix Lancets Does not apply Misc Use to check blood sugar three times a day 300 each 0    Glucose Blood (ACCU-CHEK GUIDE) In Vitro Strip Use to check blood sugar three times a day 300 strip 0    losartan 100 MG Oral Tab Take 1 tablet (100 mg total) by mouth daily. 90 tablet 1    amLODIPine 5 MG Oral Tab TAKE 2 TABLETS EVERY  tablet 1    levETIRAcetam 500 MG Oral Tab TAKE 1 TABLET TWICE DAILY 180 tablet 1    ondansetron 4 MG Oral Tablet Dispersible Take 1 tablet (4 mg total) by mouth every 8 (eight) hours as needed for Nausea. 30 tablet 0    Omeprazole 40 MG Oral Capsule Delayed Release TAKE 1 CAPSULE EVERY DAY 30 TO 60 MINUTES BEFORE A MEAL 90 capsule 3    Alcohol Swabs (DROPSAFE ALCOHOL PREP) 70 % Does not apply Pads USE AS DIRECTED 100 each 0    Blood Glucose Monitoring Suppl (ACCU-CHEK GUIDE) w/Device Does not apply Kit Use to check blood sugar three times a day 1 kit 0    escitalopram 10 MG Oral Tab Take 1 tablet (10 mg total) by mouth daily. 90 tablet 3    fluticasone propionate 50 MCG/ACT Nasal Suspension 2 sprays by Nasal route daily. 48 g 1    VENTOLIN  (90 Base) MCG/ACT Inhalation Aero Soln Inhale 2 puffs into the lungs every 6 (six) hours as needed for Wheezing. 18 g 1    aspirin 325 MG Oral Tab Take 1 tablet (325 mg total) by mouth  daily.      ergocalciferol 1.25 MG (73982 UT) Oral Cap Take 1 capsule (50,000 Units total) by mouth every 30 (thirty) days. For 3 months (Patient not taking: Reported on 7/11/2024) 1 capsule 2         ALLERGIES:     Allergies   Allergen Reactions    Reglan [Metoclopramide] OTHER (SEE COMMENTS)     Sensitivity, with crawling sensation.    Adhesive Tape      itching    Radiology Contrast Iodinated Dyes ITCHING    Wound Dressing Adhesive RASH         FAMILY HISTORY:     Family History   Problem Relation Age of Onset    Diabetes Father     Hypertension Father     Heart Disorder Father     Lipids Father     Obesity Father     Colon Cancer Father     Heart Disorder Mother     Lipids Mother     Obesity Mother     Lipids Brother     Obesity Brother     Glaucoma Neg     Macular degeneration Neg           SOCIAL HISTORY:     Social History     Socioeconomic History    Marital status: Legally     Number of children: 3   Occupational History    Occupation:      Comment: disabled    Occupation: phlebotomy   Tobacco Use    Smoking status: Former     Current packs/day: 0.00     Average packs/day: 0.2 packs/day for 1 year (0.2 ttl pk-yrs)     Types: Cigarettes     Start date: 12/16/2012     Quit date: 12/16/2013     Years since quitting: 10.5    Smokeless tobacco: Never   Vaping Use    Vaping status: Never Used   Substance and Sexual Activity    Alcohol use: No     Alcohol/week: 0.0 standard drinks of alcohol    Drug use: No    Sexual activity: Not Currently     Social Determinants of Health     Financial Resource Strain: Low Risk  (4/21/2023)    Financial Resource Strain     Difficulty of Paying Living Expenses: Not hard at all     Med Affordability: No   Transportation Needs: Unmet Transportation Needs (1/9/2024)    Transportation Needs     Lack of Transportation: Yes   Physical Activity: Inactive (4/21/2023)    Exercise Vital Sign     Days of Exercise per Week: 0 days     Minutes of Exercise per Session: 0  min   Stress: No Stress Concern Present (4/21/2023)    Stress     Feeling of Stress : No    Received from [facility] Short Social Needs Screening - [facility]   Housing Stability: Low Risk  (4/21/2023)    Housing Stability     Housing Instability: No          REVIEW OF SYSTEMS:   A comprehensive 10 point review of systems was completed.  Pertinent positives and negatives noted in the the HPI.      LABS:     Lab Results   Component Value Date     (H) 05/23/2024    A1C 10.2 (A) 05/23/2024     Lab Results   Component Value Date    WBC 13.4 (H) 01/25/2024    RBC 5.34 (H) 01/25/2024    HGB 13.0 01/25/2024    HCT 40.9 01/25/2024    MCV 76.6 (L) 01/25/2024    MCH 24.3 (L) 01/25/2024    MCHC 31.8 01/25/2024    RDW 15.3 (H) 01/25/2024    .0 01/25/2024    MPV 10.1 08/20/2018     Lab Results   Component Value Date     (H) 05/23/2024    BUN 13 05/23/2024    BUNCREA 13.0 05/23/2024    CREATSERUM 1.00 05/23/2024    ANIONGAP 6 05/23/2024    GFRNAA 66 05/16/2022    GFRAA 76 05/16/2022    CA 10.0 05/23/2024    OSMOCALC 297 (H) 05/23/2024    ALKPHO 109 05/23/2024    AST 14 05/23/2024    ALT 12 05/23/2024    ALKPHOS 84 09/26/2016    BILT 0.7 05/23/2024    TP 8.0 05/23/2024    ALB 4.6 05/23/2024    GLOBULIN 3.4 05/23/2024    AGRATIO 0.9 (L) 09/26/2016     05/23/2024    K 4.2 05/23/2024     05/23/2024    CO2 28.0 05/23/2024     Lab Results   Component Value Date    PTP 12.5 08/31/2017    PT 12.8 09/26/2016    INR 1.0 08/31/2017     Lab Results   Component Value Date    VITD 23.4 (L) 07/13/2023

## 2024-07-11 NOTE — PATIENT INSTRUCTIONS
-Lidocaine patch  -MRI cervical spine and shoulder and follow up after  -My office will call once injection is approved  -Start PT and home exercises

## 2024-07-11 NOTE — TELEPHONE ENCOUNTER
Endocrine Refill protocol for Ergocalciferol       Protocol Criteria: fail   Vitamin D, 25OH, Total (ng/mL)   Date Value   07/13/2023 23.4 (L)     Appointment with Endocrinology completed in the last 6 months or scheduled in the next 3 months     Verify appointment has been completed or scheduled in the appropriate timeline. If so can send a 90 day supply with 1 refill.   Vitamin D level must have been completed within the last 6 months  Vitamin D level must be within the normal range      (32.0-100.0)       Last completed office visit:5/23/2024 Nilsa Caceres MD   Next scheduled Follow up:   Future Appointments   Date Time Provider Department Center   8/5/2024  2:10 PM Mila Sanford DPM [facility]  [facility]   8/29/2024  3:00 PM Rohini Mckenna MD [facility]OBGYN [facility]   9/3/2024  1:00 PM Yves Yanez MD ECLMBIMGt [facility] [place]   9/12/2024  6:00 PM [facility] MRI RM2 (3T WIDE) [facility] MRI  [facility] [place]   9/12/2024  6:45 PM [facility] MRI RM2 (3T WIDE) [facility] [place]   9/23/2024  3:00 PM [facility]

## 2024-07-15 ENCOUNTER — TELEPHONE (OUTPATIENT)
Dept: PHYSICAL MEDICINE AND REHAB | Facility: CLINIC | Age: 54
End: 2024-07-15

## 2024-07-15 NOTE — TELEPHONE ENCOUNTER
Initiated ultrasound guided right subacromial bursa corticosteroid injection CPT Code: 36332,  & Dx: M67.911 to be done in office with site Zev ROMERO with Lauren ROCHA   Status: Approved  Auth#: [id number]

## 2024-07-15 NOTE — PROGRESS NOTES
JUAN JOSE Spoke to Leann -diabetes navigator who stated she will reach out to patient to discuss resources available for rides   CCM left message to  notify patient   Total monthly time : 60 minutes.

## 2024-07-16 ENCOUNTER — TELEPHONE (OUTPATIENT)
Dept: PHYSICAL MEDICINE AND REHAB | Facility: CLINIC | Age: 54
End: 2024-07-16

## 2024-07-16 NOTE — TELEPHONE ENCOUNTER
Pt can not get in to get an MRI until Sept and is wondering where she can go to get in sooner and will she need an external order for that.

## 2024-07-17 ENCOUNTER — TELEPHONE (OUTPATIENT)
Dept: ENDOCRINOLOGY | Facility: HOSPITAL | Age: 54
End: 2024-07-17

## 2024-08-05 ENCOUNTER — OFFICE VISIT (OUTPATIENT)
Dept: PODIATRY CLINIC | Facility: CLINIC | Age: 54
End: 2024-08-05
Payer: MEDICARE

## 2024-08-05 ENCOUNTER — HOSPITAL ENCOUNTER (OUTPATIENT)
Dept: GENERAL RADIOLOGY | Age: 54
Discharge: HOME | End: 2024-08-05
Attending: STUDENT IN AN ORGANIZED HEALTH CARE EDUCATION/TRAINING PROGRAM
Payer: MEDICARE

## 2024-08-05 DIAGNOSIS — G89.29 TOE PAIN, CHRONIC, RIGHT: Primary | ICD-10-CM

## 2024-08-05 DIAGNOSIS — M79.674 TOE PAIN, CHRONIC, RIGHT: ICD-10-CM

## 2024-08-05 DIAGNOSIS — M79.674 TOE PAIN, CHRONIC, RIGHT: Primary | ICD-10-CM

## 2024-08-05 DIAGNOSIS — G89.29 TOE PAIN, CHRONIC, RIGHT: ICD-10-CM

## 2024-08-05 DIAGNOSIS — R25.2 FOOT CRAMPS: ICD-10-CM

## 2024-08-05 PROCEDURE — 99203 OFFICE O/P NEW LOW 30 MIN: CPT | Performed by: STUDENT IN AN ORGANIZED HEALTH CARE EDUCATION/TRAINING PROGRAM

## 2024-08-05 PROCEDURE — 73630 X-RAY EXAM OF FOOT: CPT | Performed by: STUDENT IN AN ORGANIZED HEALTH CARE EDUCATION/TRAINING PROGRAM

## 2024-08-05 NOTE — PROGRESS NOTES
[facility] Podiatry  Progress Note      Anjel Pisano is a 54 year old female.   Chief Complaint   Patient presents with    Foot Pain     Consult- bilateral foot- onset- yrs ago- denies injury- rates pain 8-9/10 on and off- R is worse than L- last A1c=10.2 on 5/23/2024- FBS this nm=865             HPI:     Patient is a pleasant 54-year-old female presents to clinic for evaluation of right great toe pain.  She relates that several months ago she fell injuring her right great toe.  Denies having any previous x-rays.  Patient also admits to foot cramps and calf cramps especially at night.    Allergies: Reglan [metoclopramide], Adhesive tape, Radiology contrast iodinated dyes, and Wound dressing adhesive    Current Outpatient Medications   Medication Sig Dispense Refill    Insulin Lispro, 1 Unit Dial, (HUMALOG KWIKPEN) 100 UNIT/ML Subcutaneous Solution Pen-injector INJECT 14 UNITS INTO THE SKIN THREE TIMES DAILY WITH MEALS PLUS SLIDING SCALE. Max dialy dose: 60 units. 54 mL 1    ERGOCALCIFEROL 1.25 MG (04186 UT) Oral Cap TAKE 1 CAPSULE BY MOUTH 1 TIME A WEEK FOR 12 DOSES 12 capsule 1    metFORMIN 500 MG Oral Tab Take 2 tablets (1,000 mg total) by mouth 2 (two) times daily with meals. 360 tablet 1    Blood Glucose Monitoring Suppl (TRUE METRIX METER) w/Device Does not apply Kit Use to check blood sugar four times a day 1 kit 0    Glucose Blood (TRUE METRIX BLOOD GLUCOSE TEST) In Vitro Strip Check blood sugar four times a day 400 strip 1    TRUEplus Lancets 33G Does not apply Misc USE TO CHECK BLOOD SUGAR FOUR TIMES DAILY 400 each 1    NOVOLOG FLEXPEN 100 UNIT/ML Subcutaneous Solution Pen-injector INJECT 35 UNITS INTO THE SKIN THREE TIMES DAILY WITH MEAKS PLUS SLIDING SCALE 90 mL 0    pregabalin (LYRICA) 50 MG Oral Cap Take 1 capsule (50 mg total) by mouth 2 (two) times daily. 60 capsule 0    MELOXICAM 15 MG Oral Tab TAKE 1 TABLET(15 MG) BY MOUTH DAILY 30 tablet 0    Tirzepatide (MOUNJARO) 5 MG/0.5ML Subcutaneous  Solution Pen-injector Inject 5 mg into the skin once a week. 6 mL 2    Glucose Blood (TRUE METRIX BLOOD GLUCOSE TEST) In Vitro Strip Use to check blood sugar three times a day 300 strip 1    metoprolol succinate ER 25 MG Oral Tablet 24 Hr Take 1 tablet (25 mg total) by mouth nightly. 90 tablet 1    metoprolol succinate  MG Oral Tablet 24 Hr Take 1 tablet (100 mg total) by mouth daily. 90 tablet 1    hydrALAZINE 100 MG Oral Tab Take 1 tablet (100 mg total) by mouth 2 (two) times daily. 180 tablet 3    Insulin Pen Needle (BD PEN NEEDLE ABDOULAYE 2ND GEN) 32G X 4 MM Does not apply Misc INJECT FOUR TIMES DAILY AS DIRECTED 50 each 1    MOUNJARO 2.5 MG/0.5ML Subcutaneous Solution Pen-injector Inject 2.5 mg into the skin once a week. 2 mL 0    insulin glargine (LANTUS SOLOSTAR) 100 UNIT/ML Subcutaneous Solution Pen-injector Inject 54 Units into the skin nightly. 51 mL 1    hydroCHLOROthiazide 25 MG Oral Tab Take 1 tablet (25 mg total) by mouth daily. 90 tablet 3    atorvastatin 80 MG Oral Tab Take 1 tablet (80 mg total) by mouth nightly. 90 tablet 3    cyclobenzaprine 5 MG Oral Tab Take 1 tablet (5 mg total) by mouth nightly as needed for Muscle spasms. 20 tablet 0    Accu-Chek FastClix Lancets Does not apply Misc Use to check blood sugar three times a day 300 each 0    Glucose Blood (ACCU-CHEK GUIDE) In Vitro Strip Use to check blood sugar three times a day 300 strip 0    losartan 100 MG Oral Tab Take 1 tablet (100 mg total) by mouth daily. 90 tablet 1    amLODIPine 5 MG Oral Tab TAKE 2 TABLETS EVERY  tablet 1    levETIRAcetam 500 MG Oral Tab TAKE 1 TABLET TWICE DAILY 180 tablet 1    ondansetron 4 MG Oral Tablet Dispersible Take 1 tablet (4 mg total) by mouth every 8 (eight) hours as needed for Nausea. 30 tablet 0    Omeprazole 40 MG Oral Capsule Delayed Release TAKE 1 CAPSULE EVERY DAY 30 TO 60 MINUTES BEFORE A MEAL 90 capsule 3    Alcohol Swabs (DROPSAFE ALCOHOL PREP) 70 % Does not apply Pads USE AS DIRECTED 100  each 0    Blood Glucose Monitoring Suppl (ACCU-CHEK GUIDE) w/Device Does not apply Kit Use to check blood sugar three times a day 1 kit 0    escitalopram 10 MG Oral Tab Take 1 tablet (10 mg total) by mouth daily. 90 tablet 3    fluticasone propionate 50 MCG/ACT Nasal Suspension 2 sprays by Nasal route daily. 48 g 1    VENTOLIN  (90 Base) MCG/ACT Inhalation Aero Soln Inhale 2 puffs into the lungs every 6 (six) hours as needed for Wheezing. 18 g 1    aspirin 325 MG Oral Tab Take 1 tablet (325 mg total) by mouth daily.        Past Medical History:    Age-related nuclear cataract of both eyes    Anemia    Apnea    Cataract    Coronary atherosclerosis    Depression    DM type 2 (diabetes mellitus, type 2) ([facility])    Esophageal reflux    High blood pressure    High cholesterol    Meibomian gland dysfunction (MGD), bilateral, both upper and lower lids    Migraine    Muscle weakness    left sided weakness uses walker and cane    Sleep apnea    Stenosis of right vertebral artery    Stroke ([facility])    Type II or unspecified type diabetes mellitus without mention of complication, not stated as uncontrolled      Past Surgical History:   Procedure Laterality Date    Brain surgery  2014    Colonoscopy N/A 8/25/2017    Procedure: COLONOSCOPY;  Surgeon: Sharmaine Burks MD;  Location: [facility] ENDOSCOPY    Colonoscopy      Colonoscopy N/A 11/12/2022    Procedure: COLONOSCOPY with Polypectomy;  Surgeon: Terry Weaver MD;  Location: [facility] ENDOSCOPY    Excise carotid body+carotid artery  09/2017    Incision and drainage Right 04/19/16    lower abdominal skin    Laparoscopic cholecystectomy  2002      Family History   Problem Relation Age of Onset    Diabetes Father     Hypertension Father     Heart Disorder Father     Lipids Father     Obesity Father     Colon Cancer Father     Heart Disorder Mother     Lipids Mother     Obesity Mother     Lipids Brother     Obesity Brother     Glaucoma Neg     Macular degeneration Neg        Social History     Socioeconomic History    Marital status: Legally     Number of children: 3   Occupational History    Occupation:      Comment: disabled    Occupation: phlebotomy   Tobacco Use    Smoking status: Former     Current packs/day: 0.00     Average packs/day: 0.2 packs/day for 1 year (0.2 ttl pk-yrs)     Types: Cigarettes     Start date: 12/16/2012     Quit date: 12/16/2013     Years since quitting: 10.6    Smokeless tobacco: Never   Vaping Use    Vaping status: Never Used   Substance and Sexual Activity    Alcohol use: No     Alcohol/week: 0.0 standard drinks of alcohol    Drug use: No    Sexual activity: Not Currently           REVIEW OF SYSTEMS:     Denies nause, fever, chills  No calf pain  Denies chest pain or SOB      EXAM:   There were no vitals taken for this visit.  GENERAL: well developed, well nourished, in no apparent distress  EXTREMITIES:   1. Integument: Normal skin temperature and turgor.  Mild ecchymosis noted to the right hallux toenail  2. Vascular: Dorsalis pedis two out of four bilateral and posterior tibial pulses two out of   four bilateral, capillary refill normal.   3. Musculoskeletal: All muscle groups are graded 5 out of 5 in the foot and ankle.  Tenderness with palpation to right great toe   4. Neurological: Normal sharp dull sensation; reflexes normal.             ASSESSMENT AND PLAN:   Diagnoses and all orders for this visit:    Toe pain, chronic, right  -     XR FOOT, COMPLETE (MIN 3 VIEWS), RIGHT (CPT=73630); Future    Foot cramps        Plan:       Patient seen and examined and findings discussed with patient.  Informed patient that I would obtain an x-ray to rule out a possible fracture of the right great toe.  Advised her that it is most likely a bone bruise and might take several months to completely heal.  I advised patient to take over-the-counter magnesium oxide for foot cramps.  Drink plenty of water and stay hydrated    The patient  indicates understanding of these issues and agrees to the plan.        Mila Sanford DPM

## 2024-08-08 ENCOUNTER — TELEPHONE (OUTPATIENT)
Dept: PHYSICAL MEDICINE AND REHAB | Facility: CLINIC | Age: 54
End: 2024-08-08

## 2024-08-08 ENCOUNTER — MED REC SCAN ONLY (OUTPATIENT)
Dept: PHYSICAL MEDICINE AND REHAB | Facility: CLINIC | Age: 54
End: 2024-08-08

## 2024-08-08 NOTE — TELEPHONE ENCOUNTER
MRI of Right shoulder  report placed on Dr Bhatt's folder in his office for review. A copy of the report placed for scanning.

## 2024-08-15 ENCOUNTER — OFFICE VISIT (OUTPATIENT)
Dept: PHYSICAL MEDICINE AND REHAB | Facility: CLINIC | Age: 54
End: 2024-08-15
Payer: MEDICARE

## 2024-08-15 ENCOUNTER — PATIENT OUTREACH (OUTPATIENT)
Dept: CASE MANAGEMENT | Age: 54
End: 2024-08-15

## 2024-08-15 DIAGNOSIS — M75.101 NONTRAUMATIC TEAR OF RIGHT ROTATOR CUFF, UNSPECIFIED TEAR EXTENT: ICD-10-CM

## 2024-08-15 DIAGNOSIS — E11.49 TYPE 2 DIABETES MELLITUS WITH OTHER NEUROLOGIC COMPLICATION, WITH LONG-TERM CURRENT USE OF INSULIN (HCC): ICD-10-CM

## 2024-08-15 DIAGNOSIS — E66.01 MORBID (SEVERE) OBESITY DUE TO EXCESS CALORIES (HCC): ICD-10-CM

## 2024-08-15 DIAGNOSIS — N18.30 CKD STAGE 3 DUE TO TYPE 1 DIABETES MELLITUS (HCC): ICD-10-CM

## 2024-08-15 DIAGNOSIS — M54.12 CERVICAL RADICULOPATHY: Primary | ICD-10-CM

## 2024-08-15 DIAGNOSIS — E78.00 PURE HYPERCHOLESTEROLEMIA: ICD-10-CM

## 2024-08-15 DIAGNOSIS — Z79.4 TYPE 2 DIABETES MELLITUS WITH OTHER NEUROLOGIC COMPLICATION, WITH LONG-TERM CURRENT USE OF INSULIN (HCC): ICD-10-CM

## 2024-08-15 DIAGNOSIS — E10.22 CKD STAGE 3 DUE TO TYPE 1 DIABETES MELLITUS (HCC): ICD-10-CM

## 2024-08-15 DIAGNOSIS — I10 ESSENTIAL HYPERTENSION: ICD-10-CM

## 2024-08-15 DIAGNOSIS — E78.5 DYSLIPIDEMIA: ICD-10-CM

## 2024-08-15 DIAGNOSIS — I63.9 LEFT-SIDED CEREBROVASCULAR ACCIDENT (CVA) (HCC): Primary | ICD-10-CM

## 2024-08-15 DIAGNOSIS — K21.9 GASTROESOPHAGEAL REFLUX DISEASE WITHOUT ESOPHAGITIS: ICD-10-CM

## 2024-08-15 DIAGNOSIS — Z79.4 TYPE 2 DIABETES MELLITUS WITH COMPLICATION, WITH LONG-TERM CURRENT USE OF INSULIN (HCC): ICD-10-CM

## 2024-08-15 DIAGNOSIS — E11.8 TYPE 2 DIABETES MELLITUS WITH COMPLICATION, WITH LONG-TERM CURRENT USE OF INSULIN (HCC): ICD-10-CM

## 2024-08-15 DIAGNOSIS — D64.9 ANEMIA, UNSPECIFIED TYPE: ICD-10-CM

## 2024-08-15 RX ORDER — PREGABALIN 75 MG/1
75 CAPSULE ORAL 2 TIMES DAILY
Qty: 60 CAPSULE | Refills: 0 | Status: SHIPPED | OUTPATIENT
Start: 2024-08-15

## 2024-08-15 NOTE — PATIENT INSTRUCTIONS
-Start PT and home exercises  -Lyrica 75mg twice daily  -Ice/Heat as tolerated for neck and shoulder  -Follow up in 2 months  -If pain is no better, will consider neck injection

## 2024-08-15 NOTE — PROGRESS NOTES
Attempted [facility] monthly outreach,  left message for patient to call back ,can be reached at  [phone]. Will try back at a later time.      Medical record reviewed including recent office visits and test results.    Chart review - 3 min  Time with patient - 2 min  Total time - 5 min

## 2024-08-15 NOTE — PROGRESS NOTES
[facility]  OFFICE FOLLOW UP EVALUATION      HISTORY OF PRESENT ILLNESS:     No chief complaint on file.      Patient is following up for neck pain and right shoulder pain.  She continues to have neck pain with radiation in the periscapular region.  She has pain radiating down the arm into the hand with numbness tingling sensation as well.  She states this is in digits 2 through 4.  She is here for the shoulder injection today.  She denies any changes in strength or bowel bladder.  She had MRI imaging of the shoulder and cervical spine which she is here to review today    PHYSICAL EXAM:   There were no vitals taken for this visit.    Gait: Normal     CERVICAL SPINE:  Inspection: no erythema, swelling, or obvious deformity  Palpation: no ttp over spinous process.  Tenderness to palpation of the cervical paraspinals, upper trap and levator scapulae bilaterally with tenderness along the thoracic paraspinals as well  ROM: intact to all planes of motion of cervical spine including side-bend bilaterally, rotation bilaterally, flexion, and extension but pain with end range of motion  Strength: 5/5 in upper extremities bilaterally except 4 out of 5 diffusely throughout the right upper extremity  Sensation: Intact to light touch in all dermatomes of the bilateral upper extremities  Reflexes: 1/4 at C5, C6, C7 bilaterally  Spurling Test: negative for radicular symptoms down either extremity bilaterally  Alan's sign: negative bilaterally  Positive Neer's and Landin test.  Restrict range of motion of the shoulder overhead with abduction range of flexion       IMAGING:   MRI cervical spine completed 8/7/2024 was personally reviewed which is notable for small central disc extrusion at C6-7 with cephalad extrusion resulting in mild central spinal canal narrowing and indentation of the ventral aspect of the spinal cord with no significant foraminal narrowing.  At C3-4 patient has  a broad-based central left paracentral disc herniation resulting in mild central canal stenosis and symmetric stenosis to the left.    MRI of the right shoulder completed    All imaging results were reviewed and discussed with patient.      ASSESSMENT/PLAN:     1. Cervical radiculopathy    2. Nontraumatic tear of right rotator cuff, unspecified tear extent        Anjel Pisano is a 54 year old female following up for neck and persistent shoulder pain.  I performed a ultrasound-guided subacromial bursa injection.  Please see procedure note for further details.  Recommend she start a PT program for the shoulder as well as cervical spine.  Recommend she start Lyrica 75 mg twice daily as well.  If she does not have sufficient improvement with this plan we may consider C7-T1 interlaminar epidural steroid injection.  She may need neurosurgical evaluation as well.    The patient verbalized understanding with the plan and was in agreement. All questions/concerns were addressed and there were no barriers to learning.  Please note Dragon dictation software was used to dictate this note and may result in inadvertent typos.    Elis Bhatt DO, FAAPMR & CAQSM  Physical Medicine and Rehabilitation  Sports and Spine Medicine    PAST MEDICAL HISTORY:     Past Medical History:    Age-related nuclear cataract of both eyes    Anemia    Apnea    Cataract    Coronary atherosclerosis    Depression    DM type 2 (diabetes mellitus, type 2) (HCC)    Esophageal reflux    High blood pressure    High cholesterol    Meibomian gland dysfunction (MGD), bilateral, both upper and lower lids    Migraine    Muscle weakness    left sided weakness uses walker and cane    Sleep apnea    Stenosis of right vertebral artery    Stroke (HCC)    Type II or unspecified type diabetes mellitus without mention of complication, not stated as uncontrolled         PAST SURGICAL HISTORY:     Past Surgical History:   Procedure Laterality Date    Brain surgery  2014     Colonoscopy N/A 8/25/2017    Procedure: COLONOSCOPY;  Surgeon: Sharmaine Burks MD;  Location: [facility] ENDOSCOPY    Colonoscopy      Colonoscopy N/A 11/12/2022    Procedure: COLONOSCOPY with Polypectomy;  Surgeon: Terry Weaver MD;  Location: [facility] ENDOSCOPY    Excise carotid body+carotid artery  09/2017    Incision and drainage Right 04/19/16    lower abdominal skin    Laparoscopic cholecystectomy  2002         CURRENT MEDICATIONS:     Current Outpatient Medications   Medication Sig Dispense Refill    pregabalin (LYRICA) 75 MG Oral Cap Take 1 capsule (75 mg total) by mouth 2 (two) times daily. 60 capsule 0    Insulin Lispro, 1 Unit Dial, (HUMALOG KWIKPEN) 100 UNIT/ML Subcutaneous Solution Pen-injector INJECT 14 UNITS INTO THE SKIN THREE TIMES DAILY WITH MEALS PLUS SLIDING SCALE. Max dialy dose: 60 units. 54 mL 1    ERGOCALCIFEROL 1.25 MG (92371 UT) Oral Cap TAKE 1 CAPSULE BY MOUTH 1 TIME A WEEK FOR 12 DOSES 12 capsule 1    metFORMIN 500 MG Oral Tab Take 2 tablets (1,000 mg total) by mouth 2 (two) times daily with meals. 360 tablet 1    Blood Glucose Monitoring Suppl (TRUE METRIX METER) w/Device Does not apply Kit Use to check blood sugar four times a day 1 kit 0    Glucose Blood (TRUE METRIX BLOOD GLUCOSE TEST) In Vitro Strip Check blood sugar four times a day 400 strip 1    TRUEplus Lancets 33G Does not apply Misc USE TO CHECK BLOOD SUGAR FOUR TIMES DAILY 400 each 1    NOVOLOG FLEXPEN 100 UNIT/ML Subcutaneous Solution Pen-injector INJECT 35 UNITS INTO THE SKIN THREE TIMES DAILY WITH MEAKS PLUS SLIDING SCALE 90 mL 0    pregabalin (LYRICA) 50 MG Oral Cap Take 1 capsule (50 mg total) by mouth 2 (two) times daily. 60 capsule 0    MELOXICAM 15 MG Oral Tab TAKE 1 TABLET(15 MG) BY MOUTH DAILY 30 tablet 0    Tirzepatide (MOUNJARO) 5 MG/0.5ML Subcutaneous Solution Pen-injector Inject 5 mg into the skin once a week. 6 mL 2    Glucose Blood (TRUE METRIX BLOOD GLUCOSE TEST) In Vitro Strip Use to check blood sugar three  times a day 300 strip 1    metoprolol succinate ER 25 MG Oral Tablet 24 Hr Take 1 tablet (25 mg total) by mouth nightly. 90 tablet 1    metoprolol succinate  MG Oral Tablet 24 Hr Take 1 tablet (100 mg total) by mouth daily. 90 tablet 1    hydrALAZINE 100 MG Oral Tab Take 1 tablet (100 mg total) by mouth 2 (two) times daily. 180 tablet 3    Insulin Pen Needle (BD PEN NEEDLE ABDOULAYE 2ND GEN) 32G X 4 MM Does not apply Misc INJECT FOUR TIMES DAILY AS DIRECTED 50 each 1    MOUNJARO 2.5 MG/0.5ML Subcutaneous Solution Pen-injector Inject 2.5 mg into the skin once a week. 2 mL 0    insulin glargine (LANTUS SOLOSTAR) 100 UNIT/ML Subcutaneous Solution Pen-injector Inject 54 Units into the skin nightly. 51 mL 1    hydroCHLOROthiazide 25 MG Oral Tab Take 1 tablet (25 mg total) by mouth daily. 90 tablet 3    atorvastatin 80 MG Oral Tab Take 1 tablet (80 mg total) by mouth nightly. 90 tablet 3    cyclobenzaprine 5 MG Oral Tab Take 1 tablet (5 mg total) by mouth nightly as needed for Muscle spasms. 20 tablet 0    Accu-Chek FastClix Lancets Does not apply Misc Use to check blood sugar three times a day 300 each 0    Glucose Blood (ACCU-CHEK GUIDE) In Vitro Strip Use to check blood sugar three times a day 300 strip 0    losartan 100 MG Oral Tab Take 1 tablet (100 mg total) by mouth daily. 90 tablet 1    amLODIPine 5 MG Oral Tab TAKE 2 TABLETS EVERY  tablet 1    levETIRAcetam 500 MG Oral Tab TAKE 1 TABLET TWICE DAILY 180 tablet 1    ondansetron 4 MG Oral Tablet Dispersible Take 1 tablet (4 mg total) by mouth every 8 (eight) hours as needed for Nausea. 30 tablet 0    Omeprazole 40 MG Oral Capsule Delayed Release TAKE 1 CAPSULE EVERY DAY 30 TO 60 MINUTES BEFORE A MEAL 90 capsule 3    Alcohol Swabs (DROPSAFE ALCOHOL PREP) 70 % Does not apply Pads USE AS DIRECTED 100 each 0    Blood Glucose Monitoring Suppl (ACCU-CHEK GUIDE) w/Device Does not apply Kit Use to check blood sugar three times a day 1 kit 0    escitalopram 10 MG  Oral Tab Take 1 tablet (10 mg total) by mouth daily. 90 tablet 3    fluticasone propionate 50 MCG/ACT Nasal Suspension 2 sprays by Nasal route daily. 48 g 1    VENTOLIN  (90 Base) MCG/ACT Inhalation Aero Soln Inhale 2 puffs into the lungs every 6 (six) hours as needed for Wheezing. 18 g 1    aspirin 325 MG Oral Tab Take 1 tablet (325 mg total) by mouth daily.           ALLERGIES:     Allergies   Allergen Reactions    Reglan [Metoclopramide] OTHER (SEE COMMENTS)     Sensitivity, with crawling sensation.    Adhesive Tape      itching    Radiology Contrast Iodinated Dyes ITCHING    Wound Dressing Adhesive RASH         FAMILY HISTORY:     Family History   Problem Relation Age of Onset    Diabetes Father     Hypertension Father     Heart Disorder Father     Lipids Father     Obesity Father     Colon Cancer Father     Heart Disorder Mother     Lipids Mother     Obesity Mother     Lipids Brother     Obesity Brother     Glaucoma Neg     Macular degeneration Neg           SOCIAL HISTORY:     Social History     Socioeconomic History    Marital status: Legally     Number of children: 3   Occupational History    Occupation:      Comment: disabled    Occupation: phlebotomy   Tobacco Use    Smoking status: Former     Current packs/day: 0.00     Average packs/day: 0.2 packs/day for 1 year (0.2 ttl pk-yrs)     Types: Cigarettes     Start date: 12/16/2012     Quit date: 12/16/2013     Years since quitting: 10.6    Smokeless tobacco: Never   Vaping Use    Vaping status: Never Used   Substance and Sexual Activity    Alcohol use: No     Alcohol/week: 0.0 standard drinks of alcohol    Drug use: No    Sexual activity: Not Currently     Social Determinants of Health     Financial Resource Strain: Low Risk  (4/21/2023)    Financial Resource Strain     Difficulty of Paying Living Expenses: Not hard at all     Med Affordability: No   Transportation Needs: Unmet Transportation Needs (1/9/2024)    Transportation  Needs     Lack of Transportation: Yes   Physical Activity: Inactive (4/21/2023)    Exercise Vital Sign     Days of Exercise per Week: 0 days     Minutes of Exercise per Session: 0 min   Stress: No Stress Concern Present (4/21/2023)    Stress     Feeling of Stress : No    Received from [facility] Social Needs Screening - Social Connection   Housing Stability: Low Risk  (4/21/2023)    Housing Stability     Housing Instability: No          REVIEW OF SYSTEMS:   A comprehensive 10 point review of systems was completed.  Pertinent positives and negatives noted in the the HPI.      LABS:     Lab Results   Component Value Date     (H) 05/23/2024    A1C 10.2 (A) 05/23/2024     Lab Results   Component Value Date    WBC 13.4 (H) 01/25/2024    RBC 5.34 (H) 01/25/2024    HGB 13.0 01/25/2024    HCT 40.9 01/25/2024    MCV 76.6 (L) 01/25/2024    MCH 24.3 (L) 01/25/2024    MCHC 31.8 01/25/2024    RDW 15.3 (H) 01/25/2024    .0 01/25/2024    MPV 10.1 08/20/2018     Lab Results   Component Value Date     (H) 05/23/2024    BUN 13 05/23/2024    BUNCREA 13.0 05/23/2024    CREATSERUM 1.00 05/23/2024    ANIONGAP 6 05/23/2024    GFRNAA 66 05/16/2022    GFRAA 76 05/16/2022    CA 10.0 05/23/2024    OSMOCALC 297 (H) 05/23/2024    ALKPHO 109 05/23/2024    AST 14 05/23/2024    ALT 12 05/23/2024    ALKPHOS 84 09/26/2016    BILT 0.7 05/23/2024    TP 8.0 05/23/2024    ALB 4.6 05/23/2024    GLOBULIN 3.4 05/23/2024    AGRATIO 0.9 (L) 09/26/2016     05/23/2024    K 4.2 05/23/2024     05/23/2024    CO2 28.0 05/23/2024     Lab Results   Component Value Date    PTP 12.5 08/31/2017    PT 12.8 09/26/2016    INR 1.0 08/31/2017     Lab Results   Component Value Date    VITD 23.4 (L) 07/13/2023

## 2024-08-16 ENCOUNTER — TELEPHONE (OUTPATIENT)
Dept: ENDOCRINOLOGY | Facility: HOSPITAL | Age: 54
End: 2024-08-16

## 2024-08-16 ENCOUNTER — TELEPHONE (OUTPATIENT)
Dept: ENDOCRINOLOGY CLINIC | Facility: CLINIC | Age: 54
End: 2024-08-16

## 2024-08-16 ENCOUNTER — TELEPHONE (OUTPATIENT)
Dept: INTERNAL MEDICINE CLINIC | Facility: CLINIC | Age: 54
End: 2024-08-16

## 2024-08-16 DIAGNOSIS — Z79.4 TYPE 2 DIABETES MELLITUS WITH OTHER NEUROLOGIC COMPLICATION, WITH LONG-TERM CURRENT USE OF INSULIN (HCC): ICD-10-CM

## 2024-08-16 DIAGNOSIS — E11.49 TYPE 2 DIABETES MELLITUS WITH OTHER NEUROLOGIC COMPLICATION, WITH LONG-TERM CURRENT USE OF INSULIN (HCC): ICD-10-CM

## 2024-08-16 NOTE — TELEPHONE ENCOUNTER
CCM provided patient with dates she started ozempic , and dates she started mounjaro .     Patient requesting refill on mounjaro - Patient stated she is having nausea and gas . Patient also requesting medication for nausea .     Patient also requesting testing strips, ccm checked with pharmacy - they stated they have sent a request for chart notes for medicare audit.   Patient informed she may have to purchase if she does not have any , patient stated she cannot afford at this time and is requesting samples if possible.     Please advise.

## 2024-08-16 NOTE — PROGRESS NOTES
Spoke to Anjel for Chronic Care Management.      Updates to patient care team/comments: no change  Patient reported changes in medications: removed mounjaro dosage 2.5 - due to increase   Med Adherence  Comment: patient reported compliance.     Health Maintenance:   Health Maintenance   Topic Date Due    Pneumococcal Vaccine: Birth to 64yrs (2 of 2 - PCV) 12/21/2017    Zoster Vaccines (2 of 2) 07/12/2023    MA Annual Health Assessment  01/01/2024    Annual Depression Screening  01/01/2024    Pap Smear  07/26/2024    Diabetes Care A1C  08/23/2024    Diabetes Care Dilated Eye Exam  11/08/2024    COVID-19 Vaccine (3 - 2023-24 season) 01/02/2025 (Originally 9/1/2023)    Influenza Vaccine (1) 10/01/2024    Diabetes Care Foot Exam  11/30/2024    Mammogram  12/16/2024    Diabetes Care: GFR  05/23/2025    Diabetes Care: Microalb/Creat Ratio  05/23/2025    Colorectal Cancer Screening  11/12/2025    DTaP,Tdap,and Td Vaccines (2 - Td or Tdap) 05/17/2033       Patient updates/concerns:    Patient reported she has had some dizziness for the last 3 days. Patient stated last bp reading she took was 151/98 . Patient asked to recheck during call and it was 178/93.  Patient denied any chest pain or shortness of breath. Patient stated she is taking medications as instructed and checking blood pressure every night. Patient encouraged to check often and keep BP log. Patient has been under more stress financially and thinks this is the reason BP is elevated.   BP medication reviewed.   AM :   Metoprolol 100 mg   Hydralazine 100 mg   Hyrdrochlorithiazide . 25  Losartan 100 mg .  Amlodipine 5 mg     PM :  Metoprolol   Hydroclothorithizide.     Diabetes:    Patient stated she is out glucose testing strips . Patient stated she has been out for 2 days. She has reached out to Dr. Caceres office.      Patient requested dates she started ozempic and mounjaro - [facility] provided dates.   Patient stated she is out of mounjaro and will need refill .  Patient stated she does not have much of an appetite and is having bloating, and flatulence. [facility] will provide update to Dr. Caceres .    Pain :   Patient was in to see Dr. Miller murcia for shoulder injection , she is hoping it will give her some relief. Patient continues to have shoulder and neck pain . It was also recommended that she start physical therapy - patient has order and will call , stated she will need to line up transportation .    Patient stated she is not sleeping well  .   Patient stated she was instructed to follow up in 2 months, and if pain has not improved patient will need to then see neurosurgeon.    CCM placed call to [facility]- pharmacist stated they just spoke to office and are waiting for office notes to fax to medicare for approval- she stated she spoke to RN from office today as well .      Goals/Action Plan:    Active goal from previous outreach: decrease A1C     Patient reported progress towards goals:  patient reproted she is not eating well due to decreased appetite .                 - What:  improve dizziness           - Where/When/How : monitor BP and glucose : Check daily , keep log     Patient Reported Barriers and Concerns:  financial barriers                     - Plan for overcoming barriers: Patient will try to call phone numbers provided to apply for assistance.     Care Managers Interventions:   Sent update refills request to Dr. Caceres for mounjaro .   Sending TE to pcp regarding BP readings.   [facility] provided patient with phone number to senior taylor: [phone]  [facility] - [phone]  [facility]  [phone]      CCM listened and provided support.   Reminded patient of overdue Health Maintenance.   [facility] updated patient records from Care Everywhere.   CCM updated immunizations- no updates.   Continue to provide encouragement, support and education for healthy coping and diagnosis.          Future Appointments:   Future Appointments   Date Time Provider Department Center    8/29/2024  3:00 PM Rohini Mckenna MD [email or web address]  [facility]   9/3/2024  1:00 PM Yves Yanez MD [id number]  [place]   9/23/2024  3:00 PM Nilsa Caceres MD [facility]  [facility]   10/17/2024 10:30 AM Elis Bhatt, DO [facility] [place] [facility]         [facility] Care Manager Follow Up Date: 1 month       Reason For Follow Up: review progress and or barriers towards patient's goals.     Time Spent This Encounter Total: 69 min medical record review, telephone communication, care plan updates where needed, education, goals, and action plan recreation/update. Provided acknowledgment and validation to patient's concerns.   Monthly Minute Total including today: 69  Physical assessment, complete health history, and need for CCM established by Yves Yanez MD.

## 2024-08-16 NOTE — TELEPHONE ENCOUNTER
Spoke to pharmacist - last 6 months of OV notes needed - OV notes dtd 5/23/24 and 2/29/24 faxed to [facility] at [phone]    Spoke to pharmacist who confirmed receipt of chart notes - pharmacist stated info has been forwarded to Medicare and may take 1-2 business days for completion - patient can call to check status or will be notified by pharmacy    MC sent updating patient

## 2024-08-16 NOTE — TELEPHONE ENCOUNTER
Spoke to patient for CCM - patient would like assistance with obtaining rides, CCM provided patient with information to [facility], [facility] , she is wondering if Diabetes navigator would be able to assist further , having some financial barriers as well .   Patient requesting call from Diabetes navigator if appropriate . Thank you so much .

## 2024-08-17 NOTE — TELEPHONE ENCOUNTER
Dr. Caceres - please advise on alternative to Mounjaro as pt feels she cannot continue with it due to side effects.  Do you also want pt to come in for maximo pro insertion to further evaluate BG?  Pt reports to self adjusting insulin.  Not able to provide additional BG readings.  Thank you.     Teresa RN:  Kindly follow up with patient on Monday.  Thank you.    Called pt regarding below. Pt confirmed she's only taking Mounjaro 5 mg weekly.  However, since she started Mounjaro she has been experiencing abdominal cramps and nausea and body aches and becoming intolerable.  Today, symptoms are manageable.  Pt denies eating high fat food that would trigger symptoms.      Of note, pt reported her BG a few days ago was 500-600 because she could not check her BG to take her novolog.  Was off novolog 2-4 days (just resumed yesterday).  Explained the dangers of this very high levels.  She was able to buy strips out of pocket yesterday and BG this morning was \"almost 300\" and just injected 15 units of Novolog as she's preparing to eat.  Pt unable to provide other BG numbers.     DM Meds confirmed and admits to taking them religiously:  - Metformin 1000 mg BID  - Mounjaro 5 mg weekly   - Lantus 35-40 units daily >> advised against self adjusting and to take 40 units moving forward consistently per LOV note.   - Novolog 15 units TID before meals + scale 1:40 > 140 (was off x2-4 days).  Just resumed yesterday.    Advised the ff:   - Discussed novolog MOA, page on call today if BG after 3-4 hours remains at 300 or above to get further instruction OR go to ER especially if BG is in the 400s and symptomatic.  Reviewed symptoms that would prompt ER visit.   - Limit carb intake  - Hydrate/walk.   - Will be contacted next week regarding Mounjaro given she's having symptoms.     Future Appointments   Date Time Provider Department Center   9/23/2024  3:00 PM Nilsa Caceres MD [facility]

## 2024-08-18 NOTE — TELEPHONE ENCOUNTER
Ok to stop Mounjaro and start Trulicity 1.5mg subcutaneous weekly Disp 4, refill 1.  Restart her normal insulin protocol per office visit note.  Check BG levels 3 times daily and call with update in 3 days. Thanks.

## 2024-08-19 NOTE — TELEPHONE ENCOUNTER
See TE from 8/16/2024. Requested documentation was sent to [facility] for medicare coverage of test strips. RN had called and confirmed receipt of fax. I called [facility] to check status of medicare coverage of test strips. Confirmed the paperwork was received on Friday afternoon and forwarded to [facility] medicare. It should be processed either today or tomorrow. They are not requesting any additional paperwork or forms at this time.     Called the patient to discuss update on test strip coverage and Dr. Caceres's instructions.     Left message to call back. [employer] message also sent.

## 2024-08-20 ENCOUNTER — APPOINTMENT (OUTPATIENT)
Dept: MRI IMAGING | Facility: HOSPITAL | Age: 54
End: 2024-08-20
Attending: EMERGENCY MEDICINE
Payer: MEDICARE

## 2024-08-20 ENCOUNTER — NURSE TRIAGE (OUTPATIENT)
Dept: INTERNAL MEDICINE CLINIC | Facility: CLINIC | Age: 54
End: 2024-08-20

## 2024-08-20 ENCOUNTER — HOSPITAL ENCOUNTER (EMERGENCY)
Facility: HOSPITAL | Age: 54
Discharge: HOME | End: 2024-08-20
Attending: EMERGENCY MEDICINE
Payer: MEDICARE

## 2024-08-20 VITALS
OXYGEN SATURATION: 98 % | DIASTOLIC BLOOD PRESSURE: 87 MMHG | SYSTOLIC BLOOD PRESSURE: 173 MMHG | RESPIRATION RATE: 18 BRPM | TEMPERATURE: 98 F | HEART RATE: 58 BPM

## 2024-08-20 DIAGNOSIS — I10 ACCELERATED HYPERTENSION: ICD-10-CM

## 2024-08-20 DIAGNOSIS — R20.2 PARESTHESIAS: ICD-10-CM

## 2024-08-20 DIAGNOSIS — R73.9 HYPERGLYCEMIA: Primary | ICD-10-CM

## 2024-08-20 LAB
ALBUMIN SERPL-MCNC: 4.7 G/DL (ref 3.2–4.8)
ALBUMIN/GLOB SERPL: 1.4 {RATIO} (ref 1–2)
ALP LIVER SERPL-CCNC: 119 U/L
ALT SERPL-CCNC: 16 U/L
ANION GAP SERPL CALC-SCNC: 3 MMOL/L (ref 0–18)
AST SERPL-CCNC: 13 U/L (ref ?–34)
ATRIAL RATE: 67 BPM
BASOPHILS # BLD AUTO: 0.06 X10(3) UL (ref 0–0.2)
BASOPHILS NFR BLD AUTO: 0.4 %
BILIRUB SERPL-MCNC: 0.4 MG/DL (ref 0.3–1.2)
BILIRUB UR QL: NEGATIVE
BUN BLD-MCNC: 26 MG/DL (ref 9–23)
BUN/CREAT SERPL: 19.7 (ref 10–20)
CALCIUM BLD-MCNC: 10.3 MG/DL (ref 8.7–10.4)
CHLORIDE SERPL-SCNC: 101 MMOL/L (ref 98–112)
CLARITY UR: CLEAR
CO2 SERPL-SCNC: 28 MMOL/L (ref 21–32)
COLOR UR: COLORLESS
CREAT BLD-MCNC: 1.32 MG/DL
DEPRECATED RDW RBC AUTO: 44.3 FL (ref 35.1–46.3)
EGFRCR SERPLBLD CKD-EPI 2021: 47 ML/MIN/1.73M2 (ref 60–?)
EOSINOPHIL # BLD AUTO: 0.02 X10(3) UL (ref 0–0.7)
EOSINOPHIL NFR BLD AUTO: 0.1 %
ERYTHROCYTE [DISTWIDTH] IN BLOOD BY AUTOMATED COUNT: 15.9 % (ref 11–15)
GLOBULIN PLAS-MCNC: 3.4 G/DL (ref 2–3.5)
GLUCOSE BLD-MCNC: 461 MG/DL (ref 70–99)
GLUCOSE BLDC GLUCOMTR-MCNC: 303 MG/DL (ref 70–99)
GLUCOSE BLDC GLUCOMTR-MCNC: 424 MG/DL (ref 70–99)
GLUCOSE UR-MCNC: >1000 MG/DL
HCT VFR BLD AUTO: 43.9 %
HGB BLD-MCNC: 13.4 G/DL
HGB UR QL STRIP.AUTO: NEGATIVE
IMM GRANULOCYTES # BLD AUTO: 0.07 X10(3) UL (ref 0–1)
IMM GRANULOCYTES NFR BLD: 0.5 %
KETONES UR-MCNC: NEGATIVE MG/DL
LEUKOCYTE ESTERASE UR QL STRIP.AUTO: NEGATIVE
LYMPHOCYTES # BLD AUTO: 2.2 X10(3) UL (ref 1–4)
LYMPHOCYTES NFR BLD AUTO: 15.4 %
MCH RBC QN AUTO: 23.8 PG (ref 26–34)
MCHC RBC AUTO-ENTMCNC: 30.5 G/DL (ref 31–37)
MCV RBC AUTO: 78 FL
MONOCYTES # BLD AUTO: 0.75 X10(3) UL (ref 0.1–1)
MONOCYTES NFR BLD AUTO: 5.3 %
NEUTROPHILS # BLD AUTO: 11.18 X10 (3) UL (ref 1.5–7.7)
NEUTROPHILS # BLD AUTO: 11.18 X10(3) UL (ref 1.5–7.7)
NEUTROPHILS NFR BLD AUTO: 78.3 %
NITRITE UR QL STRIP.AUTO: NEGATIVE
OSMOLALITY SERPL CALC.SUM OF ELEC: 299 MOSM/KG (ref 275–295)
P AXIS: 48 DEGREES
P-R INTERVAL: 142 MS
PH UR: 7 [PH] (ref 5–8)
PLATELET # BLD AUTO: 282 10(3)UL (ref 150–450)
POTASSIUM SERPL-SCNC: 4.7 MMOL/L (ref 3.5–5.1)
PROT SERPL-MCNC: 8.1 G/DL (ref 5.7–8.2)
PROT UR-MCNC: NEGATIVE MG/DL
Q-T INTERVAL: 406 MS
QRS DURATION: 126 MS
QTC CALCULATION (BEZET): 429 MS
R AXIS: 15 DEGREES
RBC # BLD AUTO: 5.63 X10(6)UL
SARS-COV-2 RNA RESP QL NAA+PROBE: NOT DETECTED
SODIUM SERPL-SCNC: 132 MMOL/L (ref 136–145)
SP GR UR STRIP: 1.02 (ref 1–1.03)
T AXIS: 41 DEGREES
UROBILINOGEN UR STRIP-ACNC: NORMAL
VENTRICULAR RATE: 67 BPM
WBC # BLD AUTO: 14.3 X10(3) UL (ref 4–11)

## 2024-08-20 PROCEDURE — 81003 URINALYSIS AUTO W/O SCOPE: CPT | Performed by: EMERGENCY MEDICINE

## 2024-08-20 PROCEDURE — 93010 ELECTROCARDIOGRAM REPORT: CPT

## 2024-08-20 PROCEDURE — 85025 COMPLETE CBC W/AUTO DIFF WBC: CPT

## 2024-08-20 PROCEDURE — 82962 GLUCOSE BLOOD TEST: CPT

## 2024-08-20 PROCEDURE — 85060 BLOOD SMEAR INTERPRETATION: CPT | Performed by: EMERGENCY MEDICINE

## 2024-08-20 PROCEDURE — 80053 COMPREHEN METABOLIC PANEL: CPT

## 2024-08-20 PROCEDURE — 96361 HYDRATE IV INFUSION ADD-ON: CPT

## 2024-08-20 PROCEDURE — 99285 EMERGENCY DEPT VISIT HI MDM: CPT

## 2024-08-20 PROCEDURE — 85025 COMPLETE CBC W/AUTO DIFF WBC: CPT | Performed by: EMERGENCY MEDICINE

## 2024-08-20 PROCEDURE — 80053 COMPREHEN METABOLIC PANEL: CPT | Performed by: EMERGENCY MEDICINE

## 2024-08-20 PROCEDURE — 93005 ELECTROCARDIOGRAM TRACING: CPT

## 2024-08-20 PROCEDURE — 96360 HYDRATION IV INFUSION INIT: CPT

## 2024-08-20 PROCEDURE — 70551 MRI BRAIN STEM W/O DYE: CPT | Performed by: EMERGENCY MEDICINE

## 2024-08-20 RX ORDER — INSULIN ASPART 100 [IU]/ML
10 INJECTION, SOLUTION INTRAVENOUS; SUBCUTANEOUS ONCE
Status: COMPLETED | OUTPATIENT
Start: 2024-08-20 | End: 2024-08-20

## 2024-08-20 NOTE — TELEPHONE ENCOUNTER
Action Requested: Summary for Provider     []  Critical Lab, Recommendations Needed  [] Need Additional Advice  []   FYI    []   Need Orders  [] Need Medications Sent to Pharmacy  []  Other     SUMMARY: Patient calling back. She is having ongoing dizziness. Last week had episode of not feeling well, weakness more on left side. Since has been having hard time getting around, dizzy, elevated b/p readings as per below. Today while on the phone b/p 167/80 pulse 68  Blood sugar this morning 270's - she says it always runs high in AM    No CP or SOB, just feeling \"weak\".   Patient advised ER now. Sister is home with her. She tried to call her name at home but no answer. I called sister cell number, explained and assisted by explaining situation with patient consent. Sister and patient agreeable and will take her to [place] now for eval and if any symptoms change or worsen, she will call 911 for transport.         Reason for call: Dizziness  Onset: for about one week continued and new symptoms since.      Reason for Disposition   Spinning or tilting sensation (vertigo) present now and one or more stroke risk factors (i.e., hypertension, diabetes mellitus, prior stroke/TIA, heart attack, age over 60) (Exception: Prior physician evaluation for this AND no different/worse than usual.)    Protocols used: Dizziness-A-OH

## 2024-08-20 NOTE — ED PROVIDER NOTES
Patient Seen in: [facility] Emergency Department      History     Chief Complaint   Patient presents with    Dizziness    Hypertension    Fatigue     Stated Complaint: hypertension    Subjective:   HPI    54-year-old female with poorly controlled diabetes and multiple other medical problems with a reported history of a stroke or clot requiring right-sided brain surgery about 10 years ago who has some residual left-sided weakness who comes with increasing dizziness and left-sided weakness over the last week.  No nausea.  No chest pain.  She does have some hypertension complaints but no overt symptoms.  Patient states she has never had a seizure but takes medication for seizure prophylaxis.    Objective:   Past Medical History:    Age-related nuclear cataract of both eyes    Anemia    Apnea    Cataract    Coronary atherosclerosis    Depression    DM type 2 (diabetes mellitus, type 2) (HCC)    Esophageal reflux    High blood pressure    High cholesterol    Meibomian gland dysfunction (MGD), bilateral, both upper and lower lids    Migraine    Muscle weakness    left sided weakness uses walker and cane    Sleep apnea    Stenosis of right vertebral artery    Stroke (HCC)    Type II or unspecified type diabetes mellitus without mention of complication, not stated as uncontrolled              Past Surgical History:   Procedure Laterality Date    Brain surgery  2014    Colonoscopy N/A 8/25/2017    Procedure: COLONOSCOPY;  Surgeon: Sharmaine Burks MD;  Location: [facility] ENDOSCOPY    Colonoscopy      Colonoscopy N/A 11/12/2022    Procedure: COLONOSCOPY with Polypectomy;  Surgeon: Terry Weaver MD;  Location: [facility] ENDOSCOPY    Excise carotid body+carotid artery  09/2017    Incision and drainage Right 04/19/16    lower abdominal skin    Laparoscopic cholecystectomy  2002                Social History     Socioeconomic History    Marital status: Legally     Number of children: 3   Occupational History     Occupation:      Comment: disabled    Occupation: phlebotomy   Tobacco Use    Smoking status: Former     Current packs/day: 0.00     Average packs/day: 0.2 packs/day for 1 year (0.2 ttl pk-yrs)     Types: Cigarettes     Start date: 12/16/2012     Quit date: 12/16/2013     Years since quitting: 10.6    Smokeless tobacco: Never   Vaping Use    Vaping status: Never Used   Substance and Sexual Activity    Alcohol use: No     Alcohol/week: 0.0 standard drinks of alcohol    Drug use: No    Sexual activity: Not Currently     Social Determinants of Health     Financial Resource Strain: Low Risk  (4/21/2023)    Financial Resource Strain     Difficulty of Paying Living Expenses: Not hard at all     Med Affordability: No   Transportation Needs: Unmet Transportation Needs (1/9/2024)    Transportation Needs     Lack of Transportation: Yes   Physical Activity: Inactive (4/21/2023)    Exercise Vital Sign     Days of Exercise per Week: 0 days     Minutes of Exercise per Session: 0 min   Stress: No Stress Concern Present (4/21/2023)    Stress     Feeling of Stress : No    Received from [facility] Social Needs Screening - Social Connection   Housing Stability: Low Risk  (4/21/2023)    Housing Stability     Housing Instability: No              Review of Systems    Positive for stated Chief Complaint: Dizziness, Hypertension, and Fatigue    Other systems are as noted in HPI.  Constitutional and vital signs reviewed.      All other systems reviewed and negative except as noted above.    Physical Exam     ED Triage Vitals [08/20/24 1255]   BP (!) 166/90   Pulse 71   Resp 18   Temp 97.6 °F (36.4 °C)   Temp src Temporal   SpO2 99 %   O2 Device None (Room air)       Current Vitals:   Vital Signs  BP: 160/74  Pulse: 62  Resp: 18  Temp: 97.6 °F (36.4 °C)  Temp src: Temporal  MAP (mmHg): 100    Oxygen Therapy  SpO2: 96 %  O2 Device: None (Room air)            Physical Exam    Constitutional: Oriented to person,  place, and time.  Appears well-developed. No distress.   Head: Normocephalic and atraumatic.   Eyes: Conjunctivae are normal. Pupils are equal, round, and reactive to light.   Neck: Normal range of motion. Neck supple.   Cardiovascular: Normal rate, regular rhythm and intact and equal x 4 distal pulses.    Pulmonary/Chest: Effort normal. No respiratory distress.   Abdominal: Soft. There is no tenderness. There is no guarding.   Musculoskeletal: Normal range of motion. No edema or tenderness.   Neurological: Alert and oriented to person, place, and time.  Questionable occasional dysarthria.  No significant facial droop.  Left-sided  strength and plantarflexion strength deficit.  Patient states this seems new and worse over the last week.  Skin: Skin is warm and dry.   Psychiatric: Normal mood and affect.  Behavior is normal.   Nursing note and vitals reviewed.    Differential diagnosis includes acute CVA, intracranial mass or hemorrhage, atypical seizure, posterior circulation syndrome, dehydration, anemia, UTI, atypical ACS.      ED Course     Labs Reviewed   CBC WITH DIFFERENTIAL WITH PLATELET - Abnormal; Notable for the following components:       Result Value    WBC 14.3 (*)     RBC 5.63 (*)     MCV 78.0 (*)     MCH 23.8 (*)     MCHC 30.5 (*)     RDW 15.9 (*)     Neutrophil Absolute Prelim 11.18 (*)     Neutrophil Absolute 11.18 (*)     All other components within normal limits   COMP METABOLIC PANEL (14) - Abnormal; Notable for the following components:    Glucose 461 (*)     Sodium 132 (*)     BUN 26 (*)     Creatinine 1.32 (*)     Calculated Osmolality 299 (*)     eGFR-Cr 47 (*)     Alkaline Phosphatase 119 (*)     All other components within normal limits   URINALYSIS WITH CULTURE REFLEX - Abnormal; Notable for the following components:    Urine Color Colorless (*)     Glucose Urine >1000 (*)     All other components within normal limits   POCT GLUCOSE - Abnormal; Notable for the following components:     POC Glucose  424 (*)     All other components within normal limits   POCT GLUCOSE - Abnormal; Notable for the following components:    POC Glucose  303 (*)     All other components within normal limits   RAPID SARS-COV-2 BY PCR - Normal   MD BLOOD SMEAR CONSULT   RAINBOW DRAW LAVENDER   RAINBOW DRAW LIGHT GREEN   RAINBOW DRAW BLUE   RAINBOW DRAW GOLD     EKG    Rate, intervals and axes as noted on EKG Report.  Rate: 67  Rhythm: Sinus Rhythm  Reading: Right bundle branch block which looks old, no gross acute ischemic changes                 MRI BRAIN WO ACUTE (3) SEQUENCE (CPT=70551)    Result Date: 8/20/2024  PROCEDURE: MRI BRAIN WO ACUTE (3) SEQUENCE(CPT=70551)  COMPARISON: [facility], CT BRAIN OR HEAD (CPT=70450), 10/19/2022, 7:47 PM.  INDICATIONS: h/o stroke/surgery, 1 wk worsening L arm/leg weakness, dizziness  TECHNIQUE: A variety of imaging planes and parameters were utilized for visualization of suspected pathology.  Images were performed without contrast.  FINDINGS:  CEREBRUM: There is a band of encephalomalacia and volume loss with surrounding gliosis in the lateral aspect of the posterior right frontal lobe near the vertex compatible prior infarct.  Significantly smaller focus of old in the posterior-lateral right temporal-occipital lobe junction.  Scattered FLAIR signal hyperintensities in the superficial deep cerebral white matter compatible microvascular white matter ischemic change.  Linear bands of susceptibility in the posterior-medial left occipital lobe compatible with site previous hemorrhage. CEREBELLUM: No edema, hemorrhage, mass, acute infarction, or inappropriate atrophy.  BRAINSTEM: No edema, hemorrhage, mass, acute infarction, or inappropriate atrophy.  CSF SPACES: Ventricles, cisterns, and sulci are appropriate for age.  No hydrocephalus, subarachnoid hemorrhage, or mass.  SKULL: Ability in the right lateral parietal bone compatible with prior craniotomy site.  SINUSES: Limited views demonstrate no significant mucosal thickening or fluid.  ORBITS: Limited views are unremarkable.  OTHER: Negative.          CONCLUSION:  1.  No acute intracranial process. 2. There are mild microvascular white matter ischemic changes, likely related to long-standing hypertension and/or diabetes. 3.  Old infarcts in the right temporal-occipital lobe and in the right frontal lobe. 4.  Linear bands of susceptibility of in the left occipital lobe compatible with site of remote hemorrhage.   Dictated by (CST): Augusto Ellsworth MD on 8/20/2024 at 3:25 PM     Finalized by (CST): Augusto Ellsworth MD on 8/20/2024 at 3:29 PM          MRI SPINE CERVICAL (CPT=72141)    Result Date: 8/8/2024  Exam: MRI CERVICAL SP W/O CONTRAST CPT Code(s): 14464 - MRI NECK SPINE W/O DYE CLINICAL HISTORY: Chronic neck pain, cracking with right radiculopathy. History of fall down flight of stairs. TECHNIQUE: Non-infused, multiplanar and multi-sequential ultrahigh field 3.0 Mayra MRI of the cervical spine was performed. COMPARISON: Outside x-ray report dated 6/13/2024 is reviewed. FINDINGS: Mild repetitive patient motion somewhat limits this exam. There is straightening of the cervical lordosis. The vertebral bodies otherwise maintain normal height and alignment. A probable hemangioma is seen in the C7 vertebral body. No worrisome bone lesion is identified. Diffuse degenerative disc disease is present with mild loss of disc space height at C4-5 through C6-C7, most pronounced at C5-6. The visualized spinal cord, cervicomedullary junction and paraspinal soft tissues are grossly unremarkable. There is mild mucosal thickening in the visualized paranasal sinuses. At C2-3, there is mild facet arthropathy without significant disc bulge or stenosis. At C3-4, minor disc bulge with a shallow broad-based central/left paracentral disc protrusion and mild facet arthropathy cause mild central spinal canal narrowing, slightly asymmetric towards the  left. At C4-5, minimal disc bulge and mild to moderate facet arthropathy cause minimal to mild central spinal canal and right foraminal narrowing. At C5-6, disc bulge, endplate spurring and minimal facet arthropathy cause mild central spinal canal and right foraminal narrowing. At C6-C7, a central disc extrusion extends 3 mm cephalad to the disc space, results in mild central spinal canal narrowing and indents the anterior aspect of the spinal cord. The neural foramen are widely patent. At C7-T1, minimal bulging disc results in no significant stenosis. IMPRESSION: 1. A small central disc extrusion results in mild central spinal canal stenosis and indents the anterior aspect of the spinal cord at C6-7. 2. There is a shallow broad-based central/left paracentral disc herniation at C3-4 with mild central spinal canal stenosis, asymmetric towards the left. 3. There is minimal to mild stenosis at C4-5 and C5-6. Diffuse degenerative disc disease is most pronounced at C5-6. Please see above for complete level by level details. 4. There is straightening of cervical lordosis which can be seen with muscle spasm or may be related to positioning. 5. Mild paranasal sinus disease is incompletely imaged. This report was performed utilizing speech recognition software technology. Despite thorough proofreading, speech recognition errors could escape detection. If a word or phrase is confusing or out of context, please do not hesitate to call for clarification. Interpreting Radiologist: Byron Davila M.D. Electronically Signed: 08/08/2024 08:54 AM    MRI SHOULDER, RIGHT (CPT=73221)    Result Date: 8/8/2024  Exam: MRI SHOULDER RT W/O CONTRAST CPT Code(s): 47131 - MRI JOINT UPR EXTREM W/O DYE EXAM: MRI right shoulder without contrast 8/7/2024. COMPARISON:  None. INDICATION: Chronic myofascial pain. Tendinopathy of rotator cuff, right. Chronic neck pain and cracking sounds when turning head left and right. Pain radiates down into right  shoulder with weakness and limited range of motion. Status post fall down stairs. TECHNIQUE:  Multiplanar multisequence MR images of the right shoulder were acquired on a wide bore ultra high field 3.0 T Siemens Skyra MR scanner without intravenous contrast. FINDINGS:  Intermediate to high-grade partial-thickness bursal surface tear of the anterior fibers of the supraspinatus tendon with 5 mm retraction, with tendinosis of the posterior fibers. Mild edema within the infraspinatus tendon is compatible tendinosis, with globular T1/PD hyperintensity compatible with calcific tendinosis. Mild edema within the subscapularis tendon is compatible with tendinosis. The teres minor tendon appears intact. Mild edema within the intra-articular segment of the long  head of the biceps tendon is compatible tendinosis, with slight indistinctness of the tendon margins at the junction of the rotator interval and superior extra-articular segment suggesting partial tear/fraying. The extra-articular segment remains anatomically positioned within the bicipital groove. No significant fatty atrophy of the visualized musculature. Small glenohumeral joint effusion. Mild edema along the anterior band of the inferior joint capsule/inferior glenohumeral ligament may represent sprain or adhesive capsulitis, in the appropriate clinical setting. Heterogeneity within the posterior superior to superior labrum extends slightly laterally, suspicious for tear , but is slightly suboptimally assessed due to lack of arthrographic technique. Nonspecific heterogeneity of the anterior to anterior inferior labrum. No acute fracture. Slight irregularity the posterolateral humeral head may be degenerative in etiology. Mild fluid and edema in the subacromial subdeltoid bursal space. Mild spurring at the acromioclavicular joint. Type II acromion. The coracoclavicular ligament appears intact. IMPRESSION: 1. Intermediate to high-grade partial-thickness bursal  surface tear of the articular cartilage of the supraspinatus tendon with 5 mm retraction. 2. Mild infraspinatus calcific tendinosis. Mild subscapularis tendinosis. Mild biceps tendinosis with suggestion of partial tear/fraying at the rotator interval to superior extra articular segment. 3. Findings suspicious for posterior superior to superior labral tear. Nonspecific heterogeneity of the anterior to anterior inferior labrum. This could be further assessed by MR arthrogram, if clinically warranted. 4. Mild sprain of the anterior band of the inferior joint capsule/inferior glenohumeral ligament versus adhesive capsulitis, in the appropriate clinical setting. Mild subacromial subdeltoid bursitis. 5. Mild osteoarthritic changes of the acromioclavicular joint. This report was performed utilizing speech recognition software technology. Despite thorough proofreading, speech recognition errors could escape detection. If a word or phrase is confusing or out of context, please do not hesitate to call for clarification. Interpreting Radiologist: Lani Benton M.D. Electronically Signed: 08/08/2024 08:37 AM    XR FOOT, COMPLETE (MIN 3 VIEWS), RIGHT (CPT=73630)    Result Date: 8/5/2024  PROCEDURE: XR FOOT, COMPLETE (MIN 3 VIEWS), RIGHT (CPT=73630)  COMPARISON: [facility] [place] [facility], X FOOT MINISTERIO, 10/11/2016, 11:20 AM.  INDICATIONS: Right foot 1st digit pain post injury x4 months.  TECHNIQUE: 3 views were obtained.   FINDINGS:  BONES: There is a small enthesophyte at the insertion of the Achilles tendon. There is a plantar calcaneal enthesophyte. The joint spaces are otherwise maintained. There is no acute fracture or dislocation. SOFT TISSUES: Calcification seen within the plantar fascia, suggestive of remote injury. EFFUSION: None visible. OTHER: Negative.         CONCLUSION:   Calcaneal enthesopathy.  Chronic plantar fasciitis.  No acute fracture or dislocation within the right foot.  elm-remote     Dictated  by (CST): Serjio Baca MD on 8/05/2024 at 6:13 PM     Finalized by (CST): Serjio Baca MD on 8/05/2024 at 6:14 PM                  [facility]                                         Medical Decision Making  MRI reassuring.  Blood pressure a little elevated.  Sugar trending downward without signs of acidosis.  No indication for admission.  She will go and she needs to control her blood pressure and blood sugar better to prevent long-term effects.  She should drink plenty of water.  She was given a second liter here.  She will need to follow-up with her doctor and endocrinologist come back with any worsening or change.    Problems Addressed:  Accelerated hypertension: chronic illness or injury with exacerbation, progression, or side effects of treatment  Hyperglycemia: chronic illness or injury with severe exacerbation, progression, or side effects of treatment  Paresthesias: undiagnosed new problem with uncertain prognosis    Amount and/or Complexity of Data Reviewed  External Data Reviewed: ECG.     Details: January 25, 2024 EKG reviewed, right bundle branch block was present at that time  Labs: ordered. Decision-making details documented in ED Course.  Radiology: ordered and independent interpretation performed. Decision-making details documented in ED Course.     Details: By my gross review of the plain brain MRI do not appreciate obvious evidence of mass or midline shift  ECG/medicine tests: ordered and independent interpretation performed. Decision-making details documented in ED Course.    Risk  OTC drugs.  Prescription drug management.  Decision regarding hospitalization.        Disposition and Plan     Clinical Impression:  1. Hyperglycemia    2. Paresthesias    3. Accelerated hypertension         Disposition:  Discharge  8/20/2024  3:50 pm    Follow-up:  Yves Yanez MD  [address] [place] [address]  [phone]    Call            Medications Prescribed:  Current Discharge Medication List

## 2024-08-20 NOTE — ED INITIAL ASSESSMENT (HPI)
Pt presents to ED with multiple complaints. Pt c/o of dizziness,hypertension and L side weakness for about 2 weeks. Pt also c/o of fatigue and feeling fatigue even to have a conversation. Pt states she has hard time to sleep. Pt also c/o of urinary frequency and poor appetite. Denies numbness or tingling.

## 2024-08-20 NOTE — TELEPHONE ENCOUNTER
Left message to call back. MyChart sent. Transfer to triage.       Future Appointments   Date Time Provider Department Center   8/29/2024  3:00 PM Rohini Mckenna MD E[facility]   9/3/2024  1:00 PM Yves Yanez MD [id number] [facility] [place]   9/23/2024  3:00 PM [facility]   10/17/2024 10:30 AM Elis Bhatt Y, DO PM&R [place] [facility] [place]

## 2024-08-20 NOTE — TELEPHONE ENCOUNTER
Hello, Pt should be seen in clinic by an available provider for evaluation. Bring BP log to visit. If her symptoms persist or worsen/any new sx, needs to go to ER for evaluation.

## 2024-08-21 NOTE — TELEPHONE ENCOUNTER
Spoke with patient, Date of Birth verified  She was informed of Jessica MILES recommendation, also she stated she was in [place] ER yesterday for dizziness, hypertension, fatigue.   She will call back for follow up ER appt as soon as she talk to her sister as she needed a ride.           Future Appointments   Date Time Provider Department Center   8/29/2024  3:00 PM Rohini Mckenna MD [facility]   9/3/2024  1:00 PM Yves Yanez MD [id number] [facility] [place]   9/23/2024  3:00 PM Nilsa Caceres MD [facility]O [facility]   10/17/2024 10:30 AM Elis Bhatt Y, DO PM[facility]R [place] [facility] [place]

## 2024-08-21 NOTE — TELEPHONE ENCOUNTER
Patient called back, she spoke with her sister and able to bring her to office visit tomorrow--> scheduled with GINGER as PCP out of the office. Patient instructed any new or worsening symptoms [reviewed] seek immediate medical attention-->Emergency Department/911. Patient verbalized understanding. No further questions or concerns at this time.    Future Appointments   Date Time Provider Department Center   8/22/2024  9:40 AM Jaimie Rivera APRN ECSCHIM [facility] [place]

## 2024-08-22 ENCOUNTER — LAB ENCOUNTER (OUTPATIENT)
Dept: LAB | Age: 54
End: 2024-08-22
Attending: NURSE PRACTITIONER
Payer: MEDICARE

## 2024-08-22 ENCOUNTER — OFFICE VISIT (OUTPATIENT)
Dept: INTERNAL MEDICINE CLINIC | Facility: CLINIC | Age: 54
End: 2024-08-22

## 2024-08-22 VITALS
DIASTOLIC BLOOD PRESSURE: 85 MMHG | WEIGHT: 191 LBS | HEIGHT: 62 IN | HEART RATE: 57 BPM | RESPIRATION RATE: 16 BRPM | SYSTOLIC BLOOD PRESSURE: 131 MMHG | BODY MASS INDEX: 35.15 KG/M2

## 2024-08-22 DIAGNOSIS — I10 ESSENTIAL HYPERTENSION: ICD-10-CM

## 2024-08-22 DIAGNOSIS — H53.8 BLURRY VISION, BILATERAL: ICD-10-CM

## 2024-08-22 DIAGNOSIS — Z09 HOSPITAL DISCHARGE FOLLOW-UP: Primary | ICD-10-CM

## 2024-08-22 DIAGNOSIS — Z79.4 TYPE 2 DIABETES MELLITUS WITH OTHER NEUROLOGIC COMPLICATION, WITH LONG-TERM CURRENT USE OF INSULIN (HCC): ICD-10-CM

## 2024-08-22 DIAGNOSIS — E11.49 TYPE 2 DIABETES MELLITUS WITH OTHER NEUROLOGIC COMPLICATION, WITH LONG-TERM CURRENT USE OF INSULIN (HCC): ICD-10-CM

## 2024-08-22 LAB
ANION GAP SERPL CALC-SCNC: 5 MMOL/L (ref 0–18)
BUN BLD-MCNC: 22 MG/DL (ref 9–23)
BUN/CREAT SERPL: 17.6 (ref 10–20)
CALCIUM BLD-MCNC: 10.7 MG/DL (ref 8.7–10.4)
CHLORIDE SERPL-SCNC: 106 MMOL/L (ref 98–112)
CO2 SERPL-SCNC: 31 MMOL/L (ref 21–32)
CREAT BLD-MCNC: 1.25 MG/DL
DEPRECATED RDW RBC AUTO: 44.6 FL (ref 35.1–46.3)
EGFRCR SERPLBLD CKD-EPI 2021: 51 ML/MIN/1.73M2 (ref 60–?)
ERYTHROCYTE [DISTWIDTH] IN BLOOD BY AUTOMATED COUNT: 16.2 % (ref 11–15)
FASTING STATUS PATIENT QL REPORTED: NO
GLUCOSE BLD-MCNC: 65 MG/DL (ref 70–99)
HCT VFR BLD AUTO: 45.2 %
HGB BLD-MCNC: 14.2 G/DL
MCH RBC QN AUTO: 24.4 PG (ref 26–34)
MCHC RBC AUTO-ENTMCNC: 31.4 G/DL (ref 31–37)
MCV RBC AUTO: 77.8 FL
OSMOLALITY SERPL CALC.SUM OF ELEC: 295 MOSM/KG (ref 275–295)
PLATELET # BLD AUTO: 305 10(3)UL (ref 150–450)
POTASSIUM SERPL-SCNC: 4.7 MMOL/L (ref 3.5–5.1)
RBC # BLD AUTO: 5.81 X10(6)UL
SODIUM SERPL-SCNC: 142 MMOL/L (ref 136–145)
WBC # BLD AUTO: 16.8 X10(3) UL (ref 4–11)

## 2024-08-22 PROCEDURE — 36415 COLL VENOUS BLD VENIPUNCTURE: CPT | Performed by: NURSE PRACTITIONER

## 2024-08-22 PROCEDURE — 80048 BASIC METABOLIC PNL TOTAL CA: CPT | Performed by: NURSE PRACTITIONER

## 2024-08-22 PROCEDURE — 85027 COMPLETE CBC AUTOMATED: CPT | Performed by: NURSE PRACTITIONER

## 2024-08-22 PROCEDURE — 3079F DIAST BP 80-89 MM HG: CPT | Performed by: NURSE PRACTITIONER

## 2024-08-22 PROCEDURE — 3075F SYST BP GE 130 - 139MM HG: CPT | Performed by: NURSE PRACTITIONER

## 2024-08-22 PROCEDURE — 99214 OFFICE O/P EST MOD 30 MIN: CPT | Performed by: NURSE PRACTITIONER

## 2024-08-22 PROCEDURE — 3008F BODY MASS INDEX DOCD: CPT | Performed by: NURSE PRACTITIONER

## 2024-08-22 RX ORDER — BLOOD-GLUCOSE METER
EACH MISCELLANEOUS
Qty: 1 KIT | Refills: 0 | Status: SHIPPED | OUTPATIENT
Start: 2024-08-22 | End: 2024-08-24

## 2024-08-22 RX ORDER — BLOOD SUGAR DIAGNOSTIC
STRIP MISCELLANEOUS
Qty: 100 STRIP | Refills: 0 | Status: SHIPPED | OUTPATIENT
Start: 2024-08-22 | End: 2024-08-24

## 2024-08-22 NOTE — PROGRESS NOTES
Anjel Pisano is a 54 year old female.  Chief Complaint   Patient presents with    ER F/U     HPI:   She presents to the office for ER follow up. She went to the ER on 8/20 due to having hyperglycemia and HTN. She had an MRI of the brain completed which showed no acute intracranial process.    She is checking her blood sugar at home and it was 191 this morning. States her blood sugar can vary. She is not sure what caused her blood sugar to be elevated.     She has seen Dr Franks for ophthalmology. She saw him last year. She is having blurry vision.    Her last A1c was 10.2.   She was seen in by Dr. Caceres in May 2023. She was started on Mounjaro but could not tolerate the medication.      Current Outpatient Medications   Medication Sig Dispense Refill    Glucose Blood (ONETOUCH ULTRA) In Vitro Strip Please provide test strips that are covered by patient's insurance. 100 strip 0    Blood Glucose Monitoring Suppl (ONETOUCH ULTRA 2) w/Device Does not apply Kit Please provide what is covered by patient's insurance 1 kit 0    Insulin Lispro, 1 Unit Dial, (HUMALOG KWIKPEN) 100 UNIT/ML Subcutaneous Solution Pen-injector INJECT 14 UNITS INTO THE SKIN THREE TIMES DAILY WITH MEALS PLUS SLIDING SCALE. Max dialy dose: 60 units. 54 mL 1    ERGOCALCIFEROL 1.25 MG (65312 UT) Oral Cap TAKE 1 CAPSULE BY MOUTH 1 TIME A WEEK FOR 12 DOSES 12 capsule 1    metFORMIN 500 MG Oral Tab Take 2 tablets (1,000 mg total) by mouth 2 (two) times daily with meals. 360 tablet 1    Blood Glucose Monitoring Suppl (TRUE METRIX METER) w/Device Does not apply Kit Use to check blood sugar four times a day 1 kit 0    Glucose Blood (TRUE METRIX BLOOD GLUCOSE TEST) In Vitro Strip Check blood sugar four times a day 400 strip 1    TRUEplus Lancets 33G Does not apply Misc USE TO CHECK BLOOD SUGAR FOUR TIMES DAILY 400 each 1    NOVOLOG FLEXPEN 100 UNIT/ML Subcutaneous Solution Pen-injector INJECT 35 UNITS INTO THE SKIN THREE TIMES DAILY WITH MEAKS PLUS SLIDING  SCALE 90 mL 0    pregabalin (LYRICA) 50 MG Oral Cap Take 1 capsule (50 mg total) by mouth 2 (two) times daily. 60 capsule 0    Glucose Blood (TRUE METRIX BLOOD GLUCOSE TEST) In Vitro Strip Use to check blood sugar three times a day 300 strip 1    metoprolol succinate ER 25 MG Oral Tablet 24 Hr Take 1 tablet (25 mg total) by mouth nightly. 90 tablet 1    metoprolol succinate  MG Oral Tablet 24 Hr Take 1 tablet (100 mg total) by mouth daily. 90 tablet 1    hydrALAZINE 100 MG Oral Tab Take 1 tablet (100 mg total) by mouth 2 (two) times daily. 180 tablet 3    Insulin Pen Needle (BD PEN NEEDLE ABDOULAYE 2ND GEN) 32G X 4 MM Does not apply Misc INJECT FOUR TIMES DAILY AS DIRECTED 50 each 1    insulin glargine (LANTUS SOLOSTAR) 100 UNIT/ML Subcutaneous Solution Pen-injector Inject 54 Units into the skin nightly. 51 mL 1    hydroCHLOROthiazide 25 MG Oral Tab Take 1 tablet (25 mg total) by mouth daily. 90 tablet 3    atorvastatin 80 MG Oral Tab Take 1 tablet (80 mg total) by mouth nightly. 90 tablet 3    Accu-Chek FastClix Lancets Does not apply Misc Use to check blood sugar three times a day 300 each 0    Glucose Blood (ACCU-CHEK GUIDE) In Vitro Strip Use to check blood sugar three times a day 300 strip 0    losartan 100 MG Oral Tab Take 1 tablet (100 mg total) by mouth daily. 90 tablet 1    amLODIPine 5 MG Oral Tab TAKE 2 TABLETS EVERY  tablet 1    levETIRAcetam 500 MG Oral Tab TAKE 1 TABLET TWICE DAILY 180 tablet 1    Omeprazole 40 MG Oral Capsule Delayed Release TAKE 1 CAPSULE EVERY DAY 30 TO 60 MINUTES BEFORE A MEAL 90 capsule 3    Alcohol Swabs (DROPSAFE ALCOHOL PREP) 70 % Does not apply Pads USE AS DIRECTED 100 each 0    Blood Glucose Monitoring Suppl (ACCU-CHEK GUIDE) w/Device Does not apply Kit Use to check blood sugar three times a day 1 kit 0    escitalopram 10 MG Oral Tab Take 1 tablet (10 mg total) by mouth daily. 90 tablet 3    fluticasone propionate 50 MCG/ACT Nasal Suspension 2 sprays by Nasal route  daily. 48 g 1    VENTOLIN  (90 Base) MCG/ACT Inhalation Aero Soln Inhale 2 puffs into the lungs every 6 (six) hours as needed for Wheezing. 18 g 1    aspirin 325 MG Oral Tab Take 1 tablet (325 mg total) by mouth daily.      pregabalin (LYRICA) 75 MG Oral Cap Take 1 capsule (75 mg total) by mouth 2 (two) times daily. (Patient not taking: Reported on 8/22/2024) 60 capsule 0      Past Medical History:    Age-related nuclear cataract of both eyes    Anemia    Apnea    Cataract    Coronary atherosclerosis    Depression    DM type 2 (diabetes mellitus, type 2) (HCC)    Esophageal reflux    High blood pressure    High cholesterol    Meibomian gland dysfunction (MGD), bilateral, both upper and lower lids    Migraine    Muscle weakness    left sided weakness uses walker and cane    Sleep apnea    Stenosis of right vertebral artery    Stroke ([facility])    Type II or unspecified type diabetes mellitus without mention of complication, not stated as uncontrolled      Past Surgical History:   Procedure Laterality Date    Brain surgery  2014    Colonoscopy N/A 8/25/2017    Procedure: COLONOSCOPY;  Surgeon: Sharmaine Burks MD;  Location: [facility] ENDOSCOPY    Colonoscopy      Colonoscopy N/A 11/12/2022    Procedure: COLONOSCOPY with Polypectomy;  Surgeon: Terry Weaver MD;  Location: [facility] ENDOSCOPY    Excise carotid body+carotid artery  09/2017    Incision and drainage Right 04/19/16    lower abdominal skin    Laparoscopic cholecystectomy  2002      Social History:  Social History     Socioeconomic History    Marital status: Legally     Number of children: 3   Occupational History    Occupation:      Comment: disabled    Occupation: phlebotomy   Tobacco Use    Smoking status: Former     Current packs/day: 0.00     Average packs/day: 0.2 packs/day for 1 year (0.2 ttl pk-yrs)     Types: Cigarettes     Start date: 12/16/2012     Quit date: 12/16/2013     Years since quitting: 10.6    Smokeless tobacco:  Never   Vaping Use    Vaping status: Never Used   Substance and Sexual Activity    Alcohol use: No     Alcohol/week: 0.0 standard drinks of alcohol    Drug use: No    Sexual activity: Not Currently     Social Determinants of Health     Financial Resource Strain: Low Risk  (4/21/2023)    Financial Resource Strain     Difficulty of Paying Living Expenses: Not hard at all     Med Affordability: No   Transportation Needs: Unmet Transportation Needs (1/9/2024)    Transportation Needs     Lack of Transportation: Yes   Physical Activity: Inactive (4/21/2023)    Exercise Vital Sign     Days of Exercise per Week: 0 days     Minutes of Exercise per Session: 0 min   Stress: No Stress Concern Present (4/21/2023)    Stress     Feeling of Stress : No    Received from [facility] Social Needs Screening - Social Connection   Housing Stability: Low Risk  (4/21/2023)    Housing Stability     Housing Instability: No      Family History   Problem Relation Age of Onset    Diabetes Father     Hypertension Father     Heart Disorder Father     Lipids Father     Obesity Father     Colon Cancer Father     Heart Disorder Mother     Lipids Mother     Obesity Mother     Lipids Brother     Obesity Brother     Glaucoma Neg     Macular degeneration Neg       Allergies   Allergen Reactions    Reglan [Metoclopramide] OTHER (SEE COMMENTS)     Sensitivity, with crawling sensation.    Adhesive Tape      itching    Radiology Contrast Iodinated Dyes ITCHING    Wound Dressing Adhesive RASH        REVIEW OF SYSTEMS:     Review of Systems   Constitutional:  Negative for fever.   HENT: Negative.     Respiratory:  Negative for cough, shortness of breath and wheezing.    Cardiovascular:  Negative for chest pain.   Gastrointestinal: Negative.    Genitourinary: Negative.    Musculoskeletal:  Positive for arthralgias.   Skin: Negative.    Neurological: Negative.    Psychiatric/Behavioral: Negative.        Wt Readings from Last 5 Encounters:    08/22/24 191 lb (86.6 kg)   07/11/24 186 lb (84.4 kg)   06/13/24 186 lb (84.4 kg)   05/23/24 186 lb (84.4 kg)   05/22/24 186 lb (84.4 kg)     Body mass index is 34.93 kg/m².      EXAM:   /85   Pulse 57   Resp 16   Ht 5' 2\" (1.575 m)   Wt 191 lb (86.6 kg)   BMI 34.93 kg/m²     Physical Exam  Vitals reviewed.   Constitutional:       Appearance: Normal appearance.   HENT:      Head: Normocephalic.   Cardiovascular:      Rate and Rhythm: Normal rate and regular rhythm.      Pulses: Normal pulses.   Pulmonary:      Breath sounds: Normal breath sounds. No wheezing.   Musculoskeletal:         General: No swelling. Normal range of motion.   Skin:     General: Skin is warm and dry.   Neurological:      Mental Status: She is alert and oriented to person, place, and time.   Psychiatric:         Mood and Affect: Mood normal.         Behavior: Behavior normal.            ASSESSMENT AND PLAN:   1. Hospital discharge follow-up  - hospital notes, imaging and labs reviewed     2. Type 2 diabetes mellitus with other neurologic complication, with long-term current use of insulin (HCC)  - dw patient to follow up with endocrinology  - she was prescribed Mounjaro but unable to tolerate the medication.   - patient has not been able to check her blood sugar at home due to running out of supplies.   - Glucose Blood (ONETOUCH ULTRA) In Vitro Strip; Please provide test strips that are covered by patient's insurance.  Dispense: 100 strip; Refill: 0  - Blood Glucose Monitoring Suppl (ONETOUCH ULTRA 2) w/Device Does not apply Kit; Please provide what is covered by patient's insurance  Dispense: 1 kit; Refill: 0  - Ophthalmology Referral - In Network  - Basic Metabolic Panel (8) [E]  - CBC, Platelet; No Differential    3. Essential hypertension  - BP stable in office   - CPM      4. Blurry vision, bilateral  - likely related to uncontrolled blood sugar  -  patient to follow up endocrinology   - Ophthalmology Referral - In  Network    - patient was requesting tramadol at the end of the visit. I discussed with patient she was prescribed lyrica by Dr Bhatt. She states she did not get the prescription. I discussed with her to check with her pharmacy. The prescription was sent on 8/15. I declined the request for tramadol.    The patient indicates understanding of these issues and agrees to the plan.  No follow-ups on file.

## 2024-08-22 NOTE — PATIENT INSTRUCTIONS
Contact Dr Franks for follow up appt and to address visual medication     Contact Dr Caceres regarding your blood sugar and hospitalization

## 2024-08-24 ENCOUNTER — TELEPHONE (OUTPATIENT)
Dept: ENDOCRINOLOGY CLINIC | Facility: CLINIC | Age: 54
End: 2024-08-24

## 2024-08-24 ENCOUNTER — TELEPHONE (OUTPATIENT)
Dept: INTERNAL MEDICINE CLINIC | Facility: CLINIC | Age: 54
End: 2024-08-24

## 2024-08-24 DIAGNOSIS — Z79.4 TYPE 2 DIABETES MELLITUS WITH OTHER NEUROLOGIC COMPLICATION, WITH LONG-TERM CURRENT USE OF INSULIN (HCC): ICD-10-CM

## 2024-08-24 DIAGNOSIS — E11.49 TYPE 2 DIABETES MELLITUS WITH OTHER NEUROLOGIC COMPLICATION, WITH LONG-TERM CURRENT USE OF INSULIN (HCC): ICD-10-CM

## 2024-08-24 RX ORDER — BLOOD SUGAR DIAGNOSTIC
STRIP MISCELLANEOUS
Qty: 100 STRIP | Refills: 0 | Status: SHIPPED | OUTPATIENT
Start: 2024-08-24

## 2024-08-24 RX ORDER — BLOOD-GLUCOSE METER
EACH MISCELLANEOUS
Qty: 1 KIT | Refills: 0 | Status: SHIPPED | OUTPATIENT
Start: 2024-08-24

## 2024-08-24 NOTE — TELEPHONE ENCOUNTER
I spoke with the patient as the physician on-call.  She is requesting refill or actually new prescription for the One Touch glucose monitoring system and test trips.  It appears to been sent to the [facility] in [place] by the nurse practitioner a couple of days ago.  We will send it to the [facility] in [place].

## 2024-08-24 NOTE — TELEPHONE ENCOUNTER
Received request from Dr Serra to reach out to pt regarding prescription    Reviewed chart which shows PCP on call sent in for test strips. Additionally, ongoing communication between our office and [facility] for test strip coverage    Called over to [facility]. Stated rx from PCP needs more info. Advised to switch over to Dr Caceres name. Advised like previous rx, testing 4x daily, lancets 4x daily, and preferred meter. Preferred testing supplies is One Touch Ultra. Verbal order provided. Had pharmacist run it and it went through for $0 should be ready in 1 hr    Attempted to call pt M x2, unable to leave message  Called H x1 and LVOM 1:59pm  Presentt message sent

## 2024-08-29 ENCOUNTER — OFFICE VISIT (OUTPATIENT)
Dept: OBGYN CLINIC | Facility: CLINIC | Age: 54
End: 2024-08-29

## 2024-08-29 VITALS
HEART RATE: 58 BPM | DIASTOLIC BLOOD PRESSURE: 77 MMHG | WEIGHT: 189 LBS | SYSTOLIC BLOOD PRESSURE: 124 MMHG | BODY MASS INDEX: 35 KG/M2

## 2024-08-29 DIAGNOSIS — Z12.4 SCREENING FOR MALIGNANT NEOPLASM OF CERVIX: ICD-10-CM

## 2024-08-29 DIAGNOSIS — Z01.419 ENCOUNTER FOR GYNECOLOGICAL EXAMINATION WITHOUT ABNORMAL FINDING: Primary | ICD-10-CM

## 2024-08-29 DIAGNOSIS — Z12.31 VISIT FOR SCREENING MAMMOGRAM: ICD-10-CM

## 2024-08-29 PROCEDURE — 3074F SYST BP LT 130 MM HG: CPT | Performed by: OBSTETRICS & GYNECOLOGY

## 2024-08-29 PROCEDURE — G0101 CA SCREEN;PELVIC/BREAST EXAM: HCPCS | Performed by: OBSTETRICS & GYNECOLOGY

## 2024-08-29 PROCEDURE — 3078F DIAST BP <80 MM HG: CPT | Performed by: OBSTETRICS & GYNECOLOGY

## 2024-08-29 NOTE — PROGRESS NOTES
Anjel Pisano is a 54 year old female  No LMP recorded. (Menstrual status: Menopause).   Chief Complaint   Patient presents with    Gyn Exam     Annual -- last seen in . Saw Katherin [place] . No  bleed   .  She has no complaints.  Denies postmenopausal bleeding, urinary or sexual issues.      OBSTETRICS HISTORY:     OB History    Para Term  AB Living   3 3 3 0 0 3   SAB IAB Ectopic Multiple Live Births   0 0 0 0 3      # Outcome Date GA Lbr Hector/2nd Weight Sex Type Anes PTL Lv   3 Term     F NORMAL SPONT   FORREST   2 Term     M NORMAL SPONT   FORREST   1 Term     M NORMAL SPONT   FORREST       GYNE HISTORY:     Periods none due to menopause        Latest Ref Rng & Units 2021    11:32 AM   RECENT PAP RESULTS   Thinprep Pap Negative for intraepithelial lesion or malignancy Negative for intraepithelial lesion or malignancy    HPV Negative Negative          MEDICAL HISTORY:     Past Medical History:    Age-related nuclear cataract of both eyes    Anemia    Apnea    Cataract    Coronary atherosclerosis    Depression    DM type 2 (diabetes mellitus, type 2) (HCC)    Esophageal reflux    High blood pressure    High cholesterol    Meibomian gland dysfunction (MGD), bilateral, both upper and lower lids    Migraine    Muscle weakness    left sided weakness uses walker and cane    Sleep apnea    Stenosis of right vertebral artery    Stroke (HCC)    Type II or unspecified type diabetes mellitus without mention of complication, not stated as uncontrolled     Past Surgical History:   Procedure Laterality Date    Brain surgery      Colonoscopy N/A 2017    Procedure: COLONOSCOPY;  Surgeon: Sharmaine Burks MD;  Location: [facility] ENDOSCOPY    Colonoscopy      Colonoscopy N/A 2022    Procedure: COLONOSCOPY with Polypectomy;  Surgeon: Terry Weaver MD;  Location: [facility] ENDOSCOPY    Excise carotid body+carotid artery  2017    Incision and drainage Right 16    lower abdominal skin     Laparoscopic cholecystectomy       OB History    Para Term  AB Living   3 3 3 0 0 3   SAB IAB Ectopic Multiple Live Births   0 0 0 0 3        SOCIAL HISTORY:     Social History     Socioeconomic History    Marital status: Legally      Spouse name: Not on file    Number of children: 3    Years of education: Not on file    Highest education level: Not on file   Occupational History    Occupation:      Comment: disabled    Occupation: phlebotomy   Tobacco Use    Smoking status: Former     Current packs/day: 0.00     Average packs/day: 0.2 packs/day for 1 year (0.2 ttl pk-yrs)     Types: Cigarettes     Start date: 2012     Quit date: 2013     Years since quitting: 10.7    Smokeless tobacco: Never   Vaping Use    Vaping status: Never Used   Substance and Sexual Activity    Alcohol use: No     Alcohol/week: 0.0 standard drinks of alcohol    Drug use: No    Sexual activity: Not Currently   Other Topics Concern    Not on file   Social History Narrative    Not on file     Social Determinants of Health     Financial Resource Strain: Low Risk  (2023)    Financial Resource Strain     Difficulty of Paying Living Expenses: Not hard at all     Med Affordability: No   Food Insecurity: Not on file (2024)   Transportation Needs: Unmet Transportation Needs (2024)    Transportation Needs     Lack of Transportation: Yes   Physical Activity: Inactive (2023)    Exercise Vital Sign     Days of Exercise per Week: 0 days     Minutes of Exercise per Session: 0 min   Stress: No Stress Concern Present (2023)    Stress     Feeling of Stress : No   Social Connections: Unknown (5/15/2024)    Received from [facility] Short Social Needs Screening - Social Connection     Would you like help with any of the following needs: food, medicine/medical supplies, transportation, loneliness, housing or utilities?: Not on file   Housing Stability: Low Risk  (2023)     Housing Stability     Housing Instability: No     Housing Instability Emergency: Not on file       FAMILY HISTORY:     Family History   Problem Relation Age of Onset    Diabetes Father     Hypertension Father     Heart Disorder Father     Lipids Father     Obesity Father     Colon Cancer Father     Heart Disorder Mother     Lipids Mother     Obesity Mother     Lipids Brother     Obesity Brother     Glaucoma Neg     Macular degeneration Neg        MEDICATIONS:       Current Outpatient Medications:     Glucose Blood (ONETOUCH ULTRA) In Vitro Strip, Please provide test strips that are covered by patient's insurance., Disp: 100 strip, Rfl: 0    Blood Glucose Monitoring Suppl (ONETOUCH ULTRA 2) w/Device Does not apply Kit, Please provide what is covered by patient's insurance, Disp: 1 kit, Rfl: 0    pregabalin (LYRICA) 75 MG Oral Cap, Take 1 capsule (75 mg total) by mouth 2 (two) times daily., Disp: 60 capsule, Rfl: 0    Insulin Lispro, 1 Unit Dial, (HUMALOG KWIKPEN) 100 UNIT/ML Subcutaneous Solution Pen-injector, INJECT 14 UNITS INTO THE SKIN THREE TIMES DAILY WITH MEALS PLUS SLIDING SCALE. Max dialy dose: 60 units., Disp: 54 mL, Rfl: 1    ERGOCALCIFEROL 1.25 MG (32023 UT) Oral Cap, TAKE 1 CAPSULE BY MOUTH 1 TIME A WEEK FOR 12 DOSES, Disp: 12 capsule, Rfl: 1    metFORMIN 500 MG Oral Tab, Take 2 tablets (1,000 mg total) by mouth 2 (two) times daily with meals., Disp: 360 tablet, Rfl: 1    Blood Glucose Monitoring Suppl (TRUE METRIX METER) w/Device Does not apply Kit, Use to check blood sugar four times a day, Disp: 1 kit, Rfl: 0    Glucose Blood (TRUE METRIX BLOOD GLUCOSE TEST) In Vitro Strip, Check blood sugar four times a day, Disp: 400 strip, Rfl: 1    TRUEplus Lancets 33G Does not apply Misc, USE TO CHECK BLOOD SUGAR FOUR TIMES DAILY, Disp: 400 each, Rfl: 1    NOVOLOG FLEXPEN 100 UNIT/ML Subcutaneous Solution Pen-injector, INJECT 35 UNITS INTO THE SKIN THREE TIMES DAILY WITH MEAKS PLUS SLIDING SCALE, Disp: 90 mL,  Rfl: 0    Glucose Blood (TRUE METRIX BLOOD GLUCOSE TEST) In Vitro Strip, Use to check blood sugar three times a day, Disp: 300 strip, Rfl: 1    metoprolol succinate ER 25 MG Oral Tablet 24 Hr, Take 1 tablet (25 mg total) by mouth nightly., Disp: 90 tablet, Rfl: 1    metoprolol succinate  MG Oral Tablet 24 Hr, Take 1 tablet (100 mg total) by mouth daily., Disp: 90 tablet, Rfl: 1    hydrALAZINE 100 MG Oral Tab, Take 1 tablet (100 mg total) by mouth 2 (two) times daily., Disp: 180 tablet, Rfl: 3    Insulin Pen Needle (BD PEN NEEDLE ABDOULAYE 2ND GEN) 32G X 4 MM Does not apply Misc, INJECT FOUR TIMES DAILY AS DIRECTED, Disp: 50 each, Rfl: 1    insulin glargine (LANTUS SOLOSTAR) 100 UNIT/ML Subcutaneous Solution Pen-injector, Inject 54 Units into the skin nightly., Disp: 51 mL, Rfl: 1    hydroCHLOROthiazide 25 MG Oral Tab, Take 1 tablet (25 mg total) by mouth daily., Disp: 90 tablet, Rfl: 3    atorvastatin 80 MG Oral Tab, Take 1 tablet (80 mg total) by mouth nightly., Disp: 90 tablet, Rfl: 3    Accu-Chek FastClix Lancets Does not apply Misc, Use to check blood sugar three times a day, Disp: 300 each, Rfl: 0    Glucose Blood (ACCU-CHEK GUIDE) In Vitro Strip, Use to check blood sugar three times a day, Disp: 300 strip, Rfl: 0    losartan 100 MG Oral Tab, Take 1 tablet (100 mg total) by mouth daily., Disp: 90 tablet, Rfl: 1    amLODIPine 5 MG Oral Tab, TAKE 2 TABLETS EVERY DAY, Disp: 180 tablet, Rfl: 1    levETIRAcetam 500 MG Oral Tab, TAKE 1 TABLET TWICE DAILY, Disp: 180 tablet, Rfl: 1    Omeprazole 40 MG Oral Capsule Delayed Release, TAKE 1 CAPSULE EVERY DAY 30 TO 60 MINUTES BEFORE A MEAL, Disp: 90 capsule, Rfl: 3    Alcohol Swabs (DROPSAFE ALCOHOL PREP) 70 % Does not apply Pads, USE AS DIRECTED, Disp: 100 each, Rfl: 0    Blood Glucose Monitoring Suppl (ACCU-CHEK GUIDE) w/Device Does not apply Kit, Use to check blood sugar three times a day, Disp: 1 kit, Rfl: 0    escitalopram 10 MG Oral Tab, Take 1 tablet (10 mg total)  by mouth daily., Disp: 90 tablet, Rfl: 3    fluticasone propionate 50 MCG/ACT Nasal Suspension, 2 sprays by Nasal route daily., Disp: 48 g, Rfl: 1    VENTOLIN  (90 Base) MCG/ACT Inhalation Aero Soln, Inhale 2 puffs into the lungs every 6 (six) hours as needed for Wheezing., Disp: 18 g, Rfl: 1    aspirin 325 MG Oral Tab, Take 1 tablet (325 mg total) by mouth daily., Disp: , Rfl:     pregabalin (LYRICA) 50 MG Oral Cap, Take 1 capsule (50 mg total) by mouth 2 (two) times daily. (Patient not taking: Reported on 8/29/2024), Disp: 60 capsule, Rfl: 0    ALLERGIES:       Allergies   Allergen Reactions    Reglan [Metoclopramide] OTHER (SEE COMMENTS)     Sensitivity, with crawling sensation.    Adhesive Tape      itching    Radiology Contrast Iodinated Dyes ITCHING    Wound Dressing Adhesive RASH         REVIEW OF SYSTEMS:     Constitutional:    denies fever / chills  Eyes:     denies blurred or double vision  Cardiovascular:  denies chest pain or palpitations  Respiratory:    denies shortness of breath  Gastrointestinal:  denies severe abdominal pain, frequent diarrhea or constipation, nausea / vomiting  Genitourinary:    denies dysuria, bothersome incontinence  Skin/Breast:   denies any breast pain, lumps, or discharge  Neurological:    denies frequent severe headaches  Psychiatric:   denies depression or anxiety, thoughts of harming self or others  Heme/Lymph:    denies easy bruising or bleeding    PHYSICAL EXAM:   Blood pressure 124/77, pulse 58, weight 189 lb (85.7 kg), not currently breastfeeding.  Constitutional:  well developed, well nourished  Head/Face:  normocephalic  Neck/Thyroid:  thyroid symmetric, no thyromegaly, no nodules, no adenopathy  Lymphatic: no abnormal supraclavicular or axillary adenopathy is noted  Breast:   normal without palpable masses, tenderness, asymmetry, nipple discharge, nipple retraction or skin changes  Respiratory:   nonlabored breathing  Cardiovascular: regular rate and  rhythm  Abdomen:   soft, nontender, nondistended, no masses  Skin/Hair: no unusual rashes or bruises  Extremities:  no edema, no cyanosis  Psychiatric:   Oriented to time, place, person and situation. Appropriate mood and affect    Pelvic Exam:  External Genitalia:  normal appearance, hair distribution, and no lesions  Urethral Meatus:   normal in size, location, without lesions and prolapse  Bladder:    no fullness, masses or tenderness  Vagina:    normal appearance without lesions, no abnormal discharge  Cervix:    normal without tenderness on motion  Uterus:    normal in size, contour, position, mobility, without tenderness  Adnexa:   normal without masses or tenderness  Perineum:   normal  Anus:    no hemorroids    ASSESSMENT & PLAN:     Anjel was seen today for gyn exam.    Diagnoses and all orders for this visit:    Encounter for gynecological examination without abnormal finding    Visit for screening mammogram  -     Mammogram Screen 3D Digital Bilateral; Future          SUMMARY:    Pap: Next cotest today per ASCCP guidelines.    Mammogram: ordered placed    BCM:      STD screening: declines    Colon cancer screening: UTD    Misc: Calcium needs reviewed (1500 mg diet + supplement). Weight bearing exercise encouraged. Call if any VB (if perimenopausal, reviewed abn VB patterns)    HM updated    Depression screen:   Depression Screening (PHQ-2/PHQ-9): Over the LAST 2 WEEKS   Little interest or pleasure in doing things (over the last two weeks)?: Not at all    Feeling down, depressed, or hopeless (over the last two weeks)?: Not at all    PHQ-2 SCORE: 0          FOLLOW-UP     Return in about 1 year (around 8/29/2025) for annual gyne exam.    Note to patient and family:  The 21st Century Cures Act makes medical notes available to patients in the interest of transparency.  However, please be advised that this is a medical document.  It is intended as a peer to peer communication.  It is written in medical language  and may contain abbreviations or verbiage that are technical and unfamiliar.  It may appear blunt or direct.  Medical documents are intended to carry relevant information, facts as evident, and the clinical opinion of the practitioner.

## 2024-08-30 LAB — HPV E6+E7 MRNA CVX QL NAA+PROBE: NEGATIVE

## 2024-09-11 ENCOUNTER — OFFICE VISIT (OUTPATIENT)
Dept: INTERNAL MEDICINE CLINIC | Facility: CLINIC | Age: 54
End: 2024-09-11

## 2024-09-11 VITALS
HEIGHT: 62 IN | BODY MASS INDEX: 34.96 KG/M2 | HEART RATE: 55 BPM | DIASTOLIC BLOOD PRESSURE: 70 MMHG | OXYGEN SATURATION: 97 % | SYSTOLIC BLOOD PRESSURE: 137 MMHG | WEIGHT: 190 LBS

## 2024-09-11 DIAGNOSIS — R42 DIZZINESS: ICD-10-CM

## 2024-09-11 DIAGNOSIS — R10.13 EPIGASTRIC PAIN: ICD-10-CM

## 2024-09-11 DIAGNOSIS — E11.8 TYPE 2 DIABETES MELLITUS WITH COMPLICATION, WITH LONG-TERM CURRENT USE OF INSULIN (HCC): ICD-10-CM

## 2024-09-11 DIAGNOSIS — I10 PRIMARY HYPERTENSION: ICD-10-CM

## 2024-09-11 DIAGNOSIS — I10 ESSENTIAL HYPERTENSION WITH GOAL BLOOD PRESSURE LESS THAN 140/90: ICD-10-CM

## 2024-09-11 DIAGNOSIS — E16.2 HYPOGLYCEMIA: Primary | ICD-10-CM

## 2024-09-11 DIAGNOSIS — N18.30 STAGE 3 CHRONIC KIDNEY DISEASE, UNSPECIFIED WHETHER STAGE 3A OR 3B CKD (HCC): ICD-10-CM

## 2024-09-11 DIAGNOSIS — I63.9 LEFT-SIDED CEREBROVASCULAR ACCIDENT (CVA) (HCC): ICD-10-CM

## 2024-09-11 DIAGNOSIS — Z79.4 TYPE 2 DIABETES MELLITUS WITH COMPLICATION, WITH LONG-TERM CURRENT USE OF INSULIN (HCC): ICD-10-CM

## 2024-09-11 LAB
GLUCOSE BLOOD: 106
TEST STRIP LOT #: NORMAL NUMERIC

## 2024-09-11 PROCEDURE — 99215 OFFICE O/P EST HI 40 MIN: CPT | Performed by: INTERNAL MEDICINE

## 2024-09-11 PROCEDURE — 3075F SYST BP GE 130 - 139MM HG: CPT | Performed by: INTERNAL MEDICINE

## 2024-09-11 PROCEDURE — 82947 ASSAY GLUCOSE BLOOD QUANT: CPT | Performed by: INTERNAL MEDICINE

## 2024-09-11 PROCEDURE — G2211 COMPLEX E/M VISIT ADD ON: HCPCS | Performed by: INTERNAL MEDICINE

## 2024-09-11 PROCEDURE — 3008F BODY MASS INDEX DOCD: CPT | Performed by: INTERNAL MEDICINE

## 2024-09-11 PROCEDURE — 3078F DIAST BP <80 MM HG: CPT | Performed by: INTERNAL MEDICINE

## 2024-09-11 RX ORDER — LOSARTAN POTASSIUM 100 MG/1
100 TABLET ORAL DAILY
Qty: 90 TABLET | Refills: 3 | Status: SHIPPED | OUTPATIENT
Start: 2024-09-11

## 2024-09-11 RX ORDER — ESCITALOPRAM OXALATE 10 MG/1
10 TABLET ORAL DAILY
Qty: 90 TABLET | Refills: 3 | Status: SHIPPED | OUTPATIENT
Start: 2024-09-11

## 2024-09-11 RX ORDER — AMLODIPINE BESYLATE 10 MG/1
10 TABLET ORAL DAILY
Qty: 90 TABLET | Refills: 3 | Status: SHIPPED | OUTPATIENT
Start: 2024-09-11

## 2024-09-11 NOTE — PROGRESS NOTES
Subjective:   Patient ID: Anjel Pisano is a 54 year old female.  Chief Complaint   Patient presents with    Diabetes   Patient has scheduled a physical exam but states she was not feeling well states she likely had hypoglycemic episode she was sweating and feeling dizzy while waiting to check in.  Patient states she went downstairs and bought herself juice that she started drinking.  Patient states she feels better but needed to be lay down on the table.  While laying down on the table patient was very sleepy and sweaty patient drank two thirds of the juice and feeling better her sugars were checked and was 101 after drinking the juice.  Patient states lately she has been experiencing those episodes of sweating feeling shaky and dizzy and noted her sugars are going down.  Patient states she is taking insulin  38 units Lantus insulin at night and 15 units with each meal breakfast lunch and dinner that is bringing her sugars down        Hypertension  This is a chronic problem. The problem has been gradually improving since onset. Associated symptoms-  None   Pertinent negatives include no anxiety, chest pain, orthopnea, palpitations, peripheral edema, PND or shortness of breath. Risk factors for coronary artery disease include obesity, post-menopausal state and dyslipidemia. Past treatments include lifestyle changes, diuretics, beta blockers and central alpha agonists. The current treatment provides significant improvement. Compliance problems include diet (medications labs and  visits  ).    Medication Request   headaches (occasional sumatriptan  helps -  feels well today  ). Pertinent negatives include no chest pain, congestion, coughing, nausea or vomiting.       History/Other:   Review of Systems   HENT: Negative for congestion, sinus pressure, sinus pain and sneezing.    Eyes: Negative for pain, redness and visual disturbance (has  cataracts removal  bill   eyes scheduled - seen  Ophtalmologist    Respiratory:  Negative for cough, chest tightness and shortness of breath.    Cardiovascular: Negative for chest pain, palpitations, orthopnea, leg swelling and PND.   Gastrointestinal: Feels her upper abdomen bigger -sometimes feels pain in the upper abdomen, no anal bleeding, blood in stool, constipation, diarrhea,   Had nausea-with the injectables Ozempic and Mounjaro but not using it now- and vomiting.     Skin: Negative for color change and wound.   Neurological: negative headache . +dizziness chronic  lately worsening with especially lower sugars , no seizures nut taking medications  regularly .  Psychiatric/Behavioral: Negative for decreased concentration and sleep disturbance. The patient today is nervous/anxious.      Current Outpatient Medications   Medication Sig Dispense Refill    losartan 100 MG Oral Tab Take 1 tablet (100 mg total) by mouth daily. 90 tablet 3    escitalopram 10 MG Oral Tab Take 1 tablet (10 mg total) by mouth daily. 90 tablet 3    amLODIPine 10 MG Oral Tab Take 1 tablet (10 mg total) by mouth daily. 90 tablet 3    Glucose Blood (ONETOUCH ULTRA) In Vitro Strip Please provide test strips that are covered by patient's insurance. 100 strip 0    Blood Glucose Monitoring Suppl (ONETOUCH ULTRA 2) w/Device Does not apply Kit Please provide what is covered by patient's insurance 1 kit 0    pregabalin (LYRICA) 75 MG Oral Cap Take 1 capsule (75 mg total) by mouth 2 (two) times daily. 60 capsule 0    Insulin Lispro, 1 Unit Dial, (HUMALOG KWIKPEN) 100 UNIT/ML Subcutaneous Solution Pen-injector INJECT 14 UNITS INTO THE SKIN THREE TIMES DAILY WITH MEALS PLUS SLIDING SCALE. Max dialy dose: 60 units. 54 mL 1    ERGOCALCIFEROL 1.25 MG (47697 UT) Oral Cap TAKE 1 CAPSULE BY MOUTH 1 TIME A WEEK FOR 12 DOSES 12 capsule 1    metFORMIN 500 MG Oral Tab Take 2 tablets (1,000 mg total) by mouth 2 (two) times daily with meals. 360 tablet 1    Blood Glucose Monitoring Suppl (TRUE METRIX METER) w/Device Does not apply Kit Use to  check blood sugar four times a day 1 kit 0    Glucose Blood (TRUE METRIX BLOOD GLUCOSE TEST) In Vitro Strip Check blood sugar four times a day 400 strip 1    TRUEplus Lancets 33G Does not apply Misc USE TO CHECK BLOOD SUGAR FOUR TIMES DAILY 400 each 1    NOVOLOG FLEXPEN 100 UNIT/ML Subcutaneous Solution Pen-injector INJECT 35 UNITS INTO THE SKIN THREE TIMES DAILY WITH MEAKS PLUS SLIDING SCALE (Patient taking differently: INJECT 15  UNITS INTO THE SKIN THREE TIMES DAILY WITH MEAKS PLUS SLIDING SCALE) 90 mL 0    pregabalin (LYRICA) 50 MG Oral Cap Take 1 capsule (50 mg total) by mouth 2 (two) times daily. (Patient not taking: Reported on 8/29/2024) 60 capsule 0    Glucose Blood (TRUE METRIX BLOOD GLUCOSE TEST) In Vitro Strip Use to check blood sugar three times a day 300 strip 1    metoprolol succinate ER 25 MG Oral Tablet 24 Hr Take 1 tablet (25 mg total) by mouth nightly. 90 tablet 1    metoprolol succinate  MG Oral Tablet 24 Hr Take 1 tablet (100 mg total) by mouth daily. 90 tablet 1    hydrALAZINE 100 MG Oral Tab Take 1 tablet (100 mg total) by mouth 2 (two) times daily. 180 tablet 3    Insulin Pen Needle (BD PEN NEEDLE ABDOULAYE 2ND GEN) 32G X 4 MM Does not apply Misc INJECT FOUR TIMES DAILY AS DIRECTED 50 each 1    insulin glargine (LANTUS SOLOSTAR) 100 UNIT/ML Subcutaneous Solution Pen-injector Inject 54 Units into the skin nightly. (Patient taking differently: Inject 30 Units into the skin nightly.) 51 mL 1    hydroCHLOROthiazide 25 MG Oral Tab Take 1 tablet (25 mg total) by mouth daily. 90 tablet 3    atorvastatin 80 MG Oral Tab Take 1 tablet (80 mg total) by mouth nightly. 90 tablet 3    Accu-Chek FastClix Lancets Does not apply Misc Use to check blood sugar three times a day 300 each 0    Glucose Blood (ACCU-CHEK GUIDE) In Vitro Strip Use to check blood sugar three times a day 300 strip 0    levETIRAcetam 500 MG Oral Tab TAKE 1 TABLET TWICE DAILY 180 tablet 1    Omeprazole 40 MG Oral Capsule Delayed Release  TAKE 1 CAPSULE EVERY DAY 30 TO 60 MINUTES BEFORE A MEAL 90 capsule 3    Alcohol Swabs (DROPSAFE ALCOHOL PREP) 70 % Does not apply Pads USE AS DIRECTED 100 each 0    Blood Glucose Monitoring Suppl (ACCU-CHEK GUIDE) w/Device Does not apply Kit Use to check blood sugar three times a day 1 kit 0    fluticasone propionate 50 MCG/ACT Nasal Suspension 2 sprays by Nasal route daily. 48 g 1    VENTOLIN  (90 Base) MCG/ACT Inhalation Aero Soln Inhale 2 puffs into the lungs every 6 (six) hours as needed for Wheezing. 18 g 1    aspirin 325 MG Oral Tab Take 1 tablet (325 mg total) by mouth daily.       Allergies:  Allergies   Allergen Reactions    Reglan [Metoclopramide] OTHER (SEE COMMENTS)     Sensitivity, with crawling sensation.    Adhesive Tape      itching    Radiology Contrast Iodinated Dyes ITCHING    Wound Dressing Adhesive RASH       Objective:     Physical Exam  Vitals and nursing note reviewed.   Constitutional:       General: She is not in acute distress-      Appearance: She is well-developed. She is obese.   HENT:      Head: Normocephalic and atraumatic.   Neck:      Thyroid: No thyromegaly.      Vascular: No JVD.   Cardiovascular:      Rate and Rhythm: Normal rate and regular rhythm.      Heart sounds: Normal heart sounds. No murmur heard.  Pulmonary:      Effort: Pulmonary effort is normal. No respiratory distress.      Breath sounds: Normal breath sounds. No wheezing or rales.   Abdominal:      Palpations: Abdomen is soft. There is no hernia     Tenderness: There is no abdominal tenderness. There is no right CVA tenderness, left CVA tenderness, guarding or rebound.      Comments:    Neuro -no acute findings non focal Musculoskeletal:      Cervical back: Neck supple. Rom neck  decreased      Right lower leg: No edema.      Left lower leg: No edema.   Lymphadenopathy:      Cervical: No cervical adenopathy.   Skin:     General: Skin is warm and dry.        Mental Status: She is alert and oriented to person,  place, and time.   Psychiatric:         Mood and Affect: Mood i+ anxious or depressed           Blood pressure 137/70, pulse 55, height 5' 2\" (1.575 m), weight 190 lb (86.2 kg), SpO2 97%, not currently breastfeeding.    Assessment & Plan:       Hypoglycemia  -Finger stick glucose 106  Today  patient  was on her way to her appointment at  developed hypoglycemic episode patient drink almost a whole bottle of juice and her sugar was 101 she was shaky dizzy and sweaty sleepy she was laid down in the room for 20 minutes and observed carefully examined her vitals are normal blood pressure pulse respiration as well as her exam.  Patient felt better and exam continued.  Patient  Answered questions appropriately she complained of multiply symptoms today   which some most importantly addressed her diabetes as well as hypertension blood test results recent ER visit with hyperglycemia for poorly-managed diabetes mellitus.    Patient also states she needed referrals, feels epigastric area upper abdominal area enlarged bloated she is concerned of hernia states somebody told her she might have it.  Recent CT of the abdomen 2022 reviewed with patient discussed no evidence of hernia  X-ray abdominal provided to patient.      Diabetes Mellitus Type 2, Uncontrolled    -HgA1c- 9.1%-->11.0  -- 10 .2 11/30/24  -- > 10.4 2/24    labs  discussed recommend patient to-complete labs   using freestyle maximo   -Discussed importance of low CHO diet- recommend 135 grams total per day, 45 grams per meal.   Patient seen endocrinology  and have a medication adjusted :  Lantus to -  reduce 38- _> 30 units  -at  night  per pt taking -subcutaneous regularly   -  Novolog - patient  taking -15 -15-15   with hypoglycemia --> will decrease 8-8-8 units with each meal     pt state she is not  eating well now and that is likely  her sugars are down   Recently was in emergency room due to hyperglycemia sugars of  400 - poorly controled   DM2  -Continue with Freestyle maximo 2- Reviewed importance of bringing reader to all appointments.   -Continue Metformin 1000mg    1 /2  tab PO BID  food   Patient education continue with low sugar and carbohydrate diet keep with good water hydration patient verbalized understanding and compliance    Patient is taking Manjaro inj   2.5 mg   q or  trulicity   F/u   visit  in  2 week - Dr Caceres  9/23/24   due to naused and GI sy -couldn't tolerate injections   Recommend pt need to adjust her Insulin according to her blood sugars and the blood test -sugars regularly   Patient will schedule appointment with Endo f/u  9/23/24 Dr Caceres           Essential hypertension with goal blood pressure less than 140/90  Take high blood pressure medication as perscribed   Low- sodium diet   Maintain walking - as tolerated   Track and record blood pressure and heart rate at home   The side effects of medication discussed with patient   losartan 100 mg every day  metoprolol 100 +25 mg =125 mg  every day   Amlodipine 10 mg every day   hydrochlorothiazide 25 mg every day   hydralazine taking    -- > increase to   100 mg bid -    bp 120-130/70-80  range HR 60s -70   Avoid OTC  decongestants   F/u in   2 weeks  BP check    If the blood pressure elevated at home recommend to follow-up sooner  patient agrees with plan verbalized understanding compliance   Recommend patient to follow-up blood pressure readings from home patient states her blood   She will bring the blood pressure monitor   And all medications   next visit time recheck the blood pressure readings  Keep  bp in 120-130/80 hr 60 Range       - Comp Metabolic Panel (14)  - TSH W Reflex To Free T4  - Lipid Panel  - POC HemoCue Glucose 201 (Finger stick glucose)    - losartan 100 MG Oral Tab; Take 1 tablet (100 mg total) by mouth daily.  Dispense: 90 tablet; Refill: 3  - Neuro Referral - In Network  - Comp Metabolic Panel (14)  - TSH W Reflex To Free T4  - Lipid  Panel    cerebrovascular accident (CVA) (HCC) in the right  occipital temporal frontal araa  Left sided weakness    Hx  carotid endarterectomy   - Neuro Referral - In Network  Dizziness   MRI in ER - 8/24 - no acute findings     CONCLUSION:  1.  No acute intracranial process.  2. There are mild microvascular white matter ischemic changes, likely related to long-standing hypertension and/or diabetes.  3.  Old infarcts in the right temporal-occipital lobe and in the right frontal lobe.  4.  Linear bands of susceptibility of in the left occipital lobe compatible with site of remote hemorrhage.      Walker -requested   For  dizzy feeling , unsteady walk   She  will benefit from walker   Patient stopped seeing her Neurologist -unknown reasons patient did not schedule her follow-up appointments yet  Referred to Dr. Hicks for further evaluation and treatment  Refer to Neurology          Stage 3 chronic kidney disease, unspecified whether stage 3a or 3b CKD (HCC)  - Nephrology Referral - In Network  Labs discussed with pt     Epigastric upper abdomen fullness pain   Ct addomen 2022 - no hearnia   Refer  to gastro  for eval  Dr Cheung or NP PA -gastro  for eval and th   - XR ABDOMEN 3 OR MORE VIEWS (CPT=74021); Future  - Gastro Referral - In Network    Dizziness  Hx CVA   MRI  brain stable   - Walker (Rollator)  Refer  to Neurology        Anxiety  depression   pt   anxious today   Refill on Lexapro 10 mg  every day   Patient appeared unhappy on the end of the visit she keeps adding complains   Expaned patient might need another apt  soon for her other complains ,   Spent about 1 hour with patient caring for her acute problem hypoglycemia and addressing her other issues need for refills referrals  Hypertension history CVA ER test results and hyperglycemia most recent  Patient complains of upper abdominal fullness she is concerned of hernia previous test results CT abdomen reviewed with patient referral provided to patient x-ray  of the abdomen provided to patient patient education  Walker requested for her history of CVA and dizziness...    Due to other patient complains Recommend patient for her other complaints follow-up soon ,patient is living office visibly upset.  Support and reassurance provided to patient but during 1 hour of visit in office, but she still look upset .  Upon asking of her questions she is keeps saying she is fine .  living the office brought patient until the  .  Asked patient if she needed anything else or even called ambulance but patient refused and states she is fine.                - metoprolol succinate ER 25 MG Oral Tablet 24 Hr; Take 1 tablet (25 mg total) by mouth nightly.  Dispense: 90 tablet; Refill: 1  - metoprolol succinate  MG Oral Tablet 24 Hr; Take 1 tablet (100 mg total) by mouth daily.  Dispense: 90 tablet; Refill: 1      Follow-up in few weeks  or sooner if any concerns or symptoms or if-persisitntly elevated blood pressure or sugars.            Orders Placed This Encounter   Procedures    Comp Metabolic Panel (14)    TSH W Reflex To Free T4    Lipid Panel    POC HemoCue Glucose 201 (Finger stick glucose)       Meds This Visit:  Requested Prescriptions     Signed Prescriptions Disp Refills    losartan 100 MG Oral Tab 90 tablet 3     Sig: Take 1 tablet (100 mg total) by mouth daily.    escitalopram 10 MG Oral Tab 90 tablet 3     Sig: Take 1 tablet (10 mg total) by mouth daily.    amLODIPine 10 MG Oral Tab 90 tablet 3     Sig: Take 1 tablet (10 mg total) by mouth daily.       Total time spent caring for the patient on the day of the encounter:  over 60 min  This includes pre-charting, reviewing and obtaining results, exam, plan, notes, and counseling.      Imaging & Referrals:  NEURO - INTERNAL  NEPHROLOGY - INTERNAL  GASTRO - INTERNAL  EXT DME WALKER ROLLATOR  XR ABDOMEN 3 OR MORE VIEWS (CPT=74021)

## 2024-09-12 ENCOUNTER — TELEPHONE (OUTPATIENT)
Dept: INTERNAL MEDICINE CLINIC | Facility: CLINIC | Age: 54
End: 2024-09-12

## 2024-09-12 NOTE — TELEPHONE ENCOUNTER
Initiating [place] order for rollator walker    Please add to office note from 09/11/2024 the walker criteria. Will need to send office note for insurance coverage.

## 2024-09-13 ENCOUNTER — TELEPHONE (OUTPATIENT)
Dept: PODIATRY CLINIC | Facility: CLINIC | Age: 54
End: 2024-09-13

## 2024-09-13 ENCOUNTER — HOSPITAL ENCOUNTER (OUTPATIENT)
Dept: GENERAL RADIOLOGY | Age: 54
Discharge: HOME | End: 2024-09-13
Attending: INTERNAL MEDICINE
Payer: MEDICARE

## 2024-09-13 DIAGNOSIS — R10.13 EPIGASTRIC PAIN: ICD-10-CM

## 2024-09-13 PROCEDURE — 74021 RADEX ABDOMEN 3+ VIEWS: CPT | Performed by: INTERNAL MEDICINE

## 2024-09-13 NOTE — TELEPHONE ENCOUNTER
Spoke with patient and there developed a language barrier in trying to describe what happened with her foot. I called her back with GABRIELA Regalado for Czech interpretation. She indicated that when she was entering her house there is a step by the door, the toe of her shoe hit the step and her shoe came off and she hit the area between her toes and the ball of her foot hit the ridge very hard. I advised she go to urgent care to be assessed to make sure there is no fracture and to follow up with us if recommended or needed. Verbalized understanding and agreeable to plan. Advised there was an urgent care in [place] in the same building she saw Dr. Mccracken in on the ground floor. Address given. She stated she would head there today.

## 2024-09-13 NOTE — TELEPHONE ENCOUNTER
Patient is a patient of Dr. Zaragoza and has swollen left foot and says bottom arch hurts/cramps. Please call at [phone],thanks.

## 2024-09-13 NOTE — TELEPHONE ENCOUNTER
[place] order done for rollator.  Patient notified via [employer].    In this order (1)    Rollator    Rollator Not Specific Color -     Quantity  1  Supplier  [employer]  Catalog ID  [id number]

## 2024-09-17 ENCOUNTER — MED REC SCAN ONLY (OUTPATIENT)
Dept: INTERNAL MEDICINE CLINIC | Facility: CLINIC | Age: 54
End: 2024-09-17

## 2024-09-17 NOTE — TELEPHONE ENCOUNTER
Pt sister is calling f/u on test strip state that pt is completely out of test strip requesting a call back please Chest Pain    Chest pain can be caused by many different conditions which may or may not be dangerous. Causes include heartburn, lung infections, heart attack, blood clot in lungs, skin infections, strain or damage to muscle, cartilage, or bones, etc. Lab tests or other studies including an electrocardiogram (EKG) may have been performed to find the cause of your pain. Make sure to follow up with a cardiologist or as instructed by your health care professional.    SEEK IMMEDIATE MEDICAL CARE IF YOU HAVE THE FOLLOWING SYMPTOMS: worsening chest pain, coughing up blood, unexplained back/neck/jaw pain, severe abdominal pain, dizziness or lightheadedness, shortness of breath, sweaty or clammy skin, vomiting, or racing heart beat. These symptoms may represent a serious problem that is an emergency. Do not wait to see if the symptoms will go away. Get medical help right away. Call your local emergency services (911 in the [place] Do not drive yourself to the hospital.

## 2024-09-19 ENCOUNTER — TELEPHONE (OUTPATIENT)
Dept: INTERNAL MEDICINE CLINIC | Facility: CLINIC | Age: 54
End: 2024-09-19

## 2024-09-19 ENCOUNTER — TELEPHONE (OUTPATIENT)
Dept: PHYSICAL MEDICINE AND REHAB | Facility: CLINIC | Age: 54
End: 2024-09-19

## 2024-09-19 ENCOUNTER — PATIENT OUTREACH (OUTPATIENT)
Dept: CASE MANAGEMENT | Age: 54
End: 2024-09-19

## 2024-09-19 DIAGNOSIS — E66.01 MORBID (SEVERE) OBESITY DUE TO EXCESS CALORIES (HCC): ICD-10-CM

## 2024-09-19 DIAGNOSIS — E11.65 TYPE 2 DIABETES MELLITUS WITH HYPERGLYCEMIA, WITH LONG-TERM CURRENT USE OF INSULIN (HCC): ICD-10-CM

## 2024-09-19 DIAGNOSIS — E78.5 DYSLIPIDEMIA: ICD-10-CM

## 2024-09-19 DIAGNOSIS — I63.9 LEFT-SIDED CEREBROVASCULAR ACCIDENT (CVA) (HCC): ICD-10-CM

## 2024-09-19 DIAGNOSIS — Z98.890 S/P CAROTID ENDARTERECTOMY: ICD-10-CM

## 2024-09-19 DIAGNOSIS — I10 ESSENTIAL HYPERTENSION: Primary | ICD-10-CM

## 2024-09-19 DIAGNOSIS — E11.9 ENCOUNTER FOR DIABETIC FOOT EXAM (HCC): ICD-10-CM

## 2024-09-19 DIAGNOSIS — F32.A DEPRESSION, UNSPECIFIED DEPRESSION TYPE: ICD-10-CM

## 2024-09-19 DIAGNOSIS — Z79.4 TYPE 2 DIABETES MELLITUS WITH HYPERGLYCEMIA, WITH LONG-TERM CURRENT USE OF INSULIN (HCC): ICD-10-CM

## 2024-09-19 DIAGNOSIS — M75.101 NONTRAUMATIC TEAR OF RIGHT ROTATOR CUFF, UNSPECIFIED TEAR EXTENT: ICD-10-CM

## 2024-09-19 DIAGNOSIS — I10 ESSENTIAL HYPERTENSION WITH GOAL BLOOD PRESSURE LESS THAN 140/90: ICD-10-CM

## 2024-09-19 DIAGNOSIS — M67.911 TENDINOPATHY OF ROTATOR CUFF, RIGHT: ICD-10-CM

## 2024-09-19 DIAGNOSIS — M54.12 CERVICAL RADICULOPATHY: Primary | ICD-10-CM

## 2024-09-19 DIAGNOSIS — M79.18 CHRONIC MYOFASCIAL PAIN: ICD-10-CM

## 2024-09-19 DIAGNOSIS — R42 DIZZINESS: Primary | ICD-10-CM

## 2024-09-19 DIAGNOSIS — I69.993 CVA, OLD, ATAXIA: ICD-10-CM

## 2024-09-19 DIAGNOSIS — G89.29 CHRONIC MYOFASCIAL PAIN: ICD-10-CM

## 2024-09-19 DIAGNOSIS — M54.2 NECK PAIN: ICD-10-CM

## 2024-09-19 NOTE — PROGRESS NOTES
Attempted [facility] monthly outreach,  left message for patient to call back ,can be reached at  [phone]. Will try back at a later time.      Medical record reviewed including recent office visits and test results.    Chart review - 3 min  Time with patient - 2 min  Total time - 5 min

## 2024-09-19 NOTE — PROGRESS NOTES
Spoke to Anjel for Chronic Care Management.      Updates to patient care team/comments:   Patient reported changes in medications: reviewed with patient today - no changes  Med Adherence  Comment: patient only taking lyrica once a day .     Health Maintenance:   Health Maintenance   Topic Date Due    Pneumococcal Vaccine: Birth to 64yrs (2 of 2 - PCV) 12/21/2017    Zoster Vaccines (2 of 2) 07/12/2023    MA Annual Health Assessment  01/01/2024    Diabetes Care A1C  08/23/2024    COVID-19 Vaccine (3 - 2023-24 season) 09/01/2024    Diabetes Care Dilated Eye Exam  11/08/2024    Mammogram  12/16/2024    Influenza Vaccine (1) 10/01/2024    Diabetes Care Foot Exam  11/30/2024    Diabetes Care: Microalb/Creat Ratio  05/23/2025    Diabetes Care: GFR  08/22/2025    Gyne Pelvic Exam  08/29/2025    Colorectal Cancer Screening  11/12/2025    Pap Smear  08/29/2027    DTaP,Tdap,and Td Vaccines (2 - Td or Tdap) 05/17/2033    Annual Depression Screening  Completed       Patient updates/concerns:   Spoke to patient for CCM .   Patient stated she continues to experience severe cervical/shoulder pain . Patient stated shoulder injection did not offer any relief.  Patient stated she was prescribed Lyrica 75mg two times daily - patient stated she is only taking one tablet nightly , she stated this is not helping the pain - unable to sleep and  is making her extremely drowsy.   Patient stated she cannot tolerate ice or heat. [facility] encouraged patient call and schedule physical therapy  as she has not yet scheduled per Dr. Bhatt .Patient would like to know if Dr. Bhatt has any further recommendations   [facility] will send message to Dr. Bhatt to please advise.      Foot pain :   Patient stated she was instructed to go to urgent care for a foot xray on 9/13/2024 by RN with Podiatrist office. Patient stated they did not have orders, she did explain why she needed an xray but was not seen by urgent care provider. Patient stated  was provided  but still did not get an xray on foot.   Patient would like to know if she can have an xray for left foot. CCM encouraged patient to schedule appointment  with podiatrist to discuss . Patient would like assistance with scheduling of this appointment.     Dizziness:   Patient discussed with PCP , stated she continues to feel  dizzy - CCM discussed the importance of tracking BP readings. Patient stated she is not tracking only has access to memory on machine.   CCM reviewed medication  regimen with patient - patient stated she is compliant with all of her medications.   Patient stated she was wondering if this could be related to anemia or anything blood related.  CCM reviewed last lab at patient request. Patient still unsure why she is having dizziness.   Patient checked Blood pressure while on the call with care manager    Patient reported vitals:   BP reading today - 150/77 pulse:  55  Second reading 1 minute apart - 155/77 , pulse : 77    Patient stated she is not currently dizzy today - CCM encouraged patient to continue to monitor BP .     Constipation / Abdominal fullness: Patient stated she is still dealing with abdominal bloating / constipation . Patient requesting refill on docusate- stated the OTC does not work as good. Patient stated she is doing her best to eat a healthy diet with fiber. Patient stated she has not yet scheduled with gastro- CCM provided patient with information to schedule. Patient stated she will call to schedule appointment .     Patient requesting CCM review result of Abdomen xray - [facility] informed Rebyoohart message was sent : relayed the following message.      Dear Anjel,  Good news    Your  x-ray of the abdomen does not show obstruction of the bowels   there is mild volume of the stool in the right colon- no significant constipation-   there is no free air     There are no kidney stones in your kidneys  There is signs of the cholecystectomy surgery In the past     But there are no acute  findings in the abdomen     I recommend you to continue present management and have a follow up visit within few weeks or sooner if there are any symptoms or concerns.     Best Regards,  RG Yanez M.D.    Goals/Action Plan:    Active goal from previous outreach:  improve dizziness  Patient reported progress towards goals:  patient continues to have dizziness- intermittent , she is checking blood pressure.                - What:  improve dizziness / Pain            - Where/When/How : schedule physical therapy   Patient Reported Barriers and Concerns:  financial barriers                     - Plan for overcoming barriers: Patient was notified by navigator about rides. Patient stated it is difficult as she cannot afford the rides.         Care Managers Interventions:   TE to PCP   TE to Dr. Bhatt .   CCM listened and provided support.   Reminded patient of overdue Health Maintenance.   [facility] updated patient records from Care Everywhere.   [facility] updated immunizations- no updates.   Continue to provide encouragement, support and education for healthy coping and diagnosis.          Future Appointments:   Future Appointments   Date Time Provider Department Center   9/23/2024  3:00 PM Nilsa Caceres MD [id number] [facility]   9/25/2024  2:00 PM Melissa Guerrero MD [id number] [facility]   10/16/2024  3:00 PM Yves Yanez MD [id number] [facility] [place]   10/17/2024 10:30 AM Elis Bhatt,  PM&R [place] [facility] [place]   9/3/2025  3:20 PM Rohini Mckenna MD [id number] [facility]         Next Care Manager Follow Up Date: 1 month     Reason For Follow Up: review progress and or barriers towards patient's goals.     Time Spent This Encounter Total: 80 min medical record review, telephone communication, care plan updates where needed, education, goals, and action plan recreation/update. Provided acknowledgment and validation to patient's concerns.   Monthly Minute Total including today: 80  Physical assessment, complete health history,  and need for CCM established by Yves Yanez MD.

## 2024-09-19 NOTE — TELEPHONE ENCOUNTER
Spoke to patient for CCM .      Patient stated she continues to experience severe cervical/shoulder pain . Patient stated shoulder injection did not offer any relief.   Patient stated she was prescribed Lyrica 75mg two times daily - patient stated she is only taking one tablet nightly , she stated this is not helping the pain - unable to sleep and  is making her extremely drowsy.      CCM encouraged patient call and schedule physical therapy  as she has not yet scheduled per Dr. Bhatt .     Patient would like to know if Dr. Bhatt has any further recommendations   Please advise.

## 2024-09-19 NOTE — TELEPHONE ENCOUNTER
Patient had  RIGHT subacromial bursa aspiration and injection with corticosteroid  done on 8/15/2024 with Dr Bhatt with 0% relief.     Patient reports worsening in pain on her neck and right shoulder. She is not able to sleep at night due to pain. Pain level 10/10. Denies any numbness or tingling.     Patient is taking Lyrica 75mg  and is making patient drowsy. She is only taking at one time at night, even though is prescribed BID. Patient stated that she has taken Lyrica for about one month.     Patient was taken Gabapentin for many years. She was taking 1200 mg TID and she was very drowsy on it as well and she stopped it.     Patient taking Tylenol 500 mg two times a day without relief.     Denies starting PT. Patient stated she called to schedule but there is no space for her at PT.     Asking for additional recommendations.     Per Dr Bhatt's note \"If she does not have sufficient improvement with this plan we may consider C7-T1 interlaminar epidural steroid injection.  She may need neurosurgical evaluation as well.\"    Pended an order for injection and an order for neurosurgery referral and routing the encounter to Dr Bhatt as high priority to advise.     Educated patient that if pain is unbearable she should go to ED.

## 2024-09-19 NOTE — TELEPHONE ENCOUNTER
Spoke to patient for CCM :      Patient requesting refill on docusate- stated the OTC does not work as well.  Reminded patient to schedule with Gastro     Patient also continues to complain of dizziness , she stated she is not dizzy during our call  .   Patient reported BP :       Patient reported vitals:   BP reading today - 150/77 pulse:  55  Second reading 1 minute apart - 155/77 , pulse : 77  Patient stated BP are ranging from systolic 120-160 .     Patient also complaining about  left foot pain but her podiatrist is on maternity leave.   Please advise.

## 2024-09-19 NOTE — TELEPHONE ENCOUNTER
Recommend to see her Doctor -Neurosurgeon - asap for  hx  CVA and dizziness   See  referral  below   Phone   Dwaine Gillis  [address]  [address]  [place] [address] [phone]       Patient BP  was normal - last   2  visits -  Recommend to check  bp regularly   keep  mary low salt  diet   take medications as  prescribed and f/u in   office  soon .      For podiatrist patient can see Dr. Mitchell in [place]-referral placed in the system

## 2024-09-20 ENCOUNTER — TELEPHONE (OUTPATIENT)
Dept: PHYSICAL MEDICINE AND REHAB | Facility: CLINIC | Age: 54
End: 2024-09-20

## 2024-09-20 ENCOUNTER — TELEPHONE (OUTPATIENT)
Dept: ENDOCRINOLOGY | Facility: HOSPITAL | Age: 54
End: 2024-09-20

## 2024-09-20 DIAGNOSIS — M54.12 CERVICAL RADICULOPATHY: Primary | ICD-10-CM

## 2024-09-20 NOTE — TELEPHONE ENCOUNTER
Status of Anticoag  [x] Clearance pending to hold ASA 325mg for 7 days prior to C7-T1 Interlaminar epidural steroid injection faxed to Dr. Yanez F: [phone]  [] Clearance approved  [] Clearance denied

## 2024-09-20 NOTE — TELEPHONE ENCOUNTER
Condition Update; Navigator called [facility] to schedule rides for two upcoming appointments. Patient was scheduled and confirmed for transportation. Navigator called patient to inform of current status. Navigator encourage patient to call with any questions or concerns. Patient verbalized understanding all questions answered.     [facility]: [phone]    Endocrinology: 9/23/24 3pm Dr. Caceres Confirmation #: [id number]  Nephrology: 9/25/24 2pm Dr. Guerrero   Confirmation #: [id number]

## 2024-09-20 NOTE — TELEPHONE ENCOUNTER
Initiated authorization for C7-T1 interlaminar epidural steroid injection under fluoroscopy guidance. CPT/HCPCS 96167 dx:M54.12 to be done at [facility] (Medicaid secondary) with [employer]    Status: Approved  Reference/Authorization # [id number]  Valid: 9/20/24-12/19/24

## 2024-09-23 ENCOUNTER — OFFICE VISIT (OUTPATIENT)
Dept: ENDOCRINOLOGY CLINIC | Facility: CLINIC | Age: 54
End: 2024-09-23

## 2024-09-23 ENCOUNTER — TELEPHONE (OUTPATIENT)
Dept: ENDOCRINOLOGY CLINIC | Facility: CLINIC | Age: 54
End: 2024-09-23

## 2024-09-23 VITALS
DIASTOLIC BLOOD PRESSURE: 79 MMHG | SYSTOLIC BLOOD PRESSURE: 144 MMHG | BODY MASS INDEX: 35 KG/M2 | HEART RATE: 62 BPM | WEIGHT: 193 LBS

## 2024-09-23 DIAGNOSIS — E11.49 TYPE 2 DIABETES MELLITUS WITH OTHER NEUROLOGIC COMPLICATION, WITH LONG-TERM CURRENT USE OF INSULIN (HCC): Primary | ICD-10-CM

## 2024-09-23 DIAGNOSIS — Z79.4 TYPE 2 DIABETES MELLITUS WITH OTHER NEUROLOGIC COMPLICATION, WITH LONG-TERM CURRENT USE OF INSULIN (HCC): Primary | ICD-10-CM

## 2024-09-23 LAB
GLUCOSE BLOOD: 233
HEMOGLOBIN A1C: 10.6 % (ref 4.3–5.6)
TEST STRIP LOT #: NORMAL NUMERIC

## 2024-09-23 PROCEDURE — 99214 OFFICE O/P EST MOD 30 MIN: CPT | Performed by: INTERNAL MEDICINE

## 2024-09-23 PROCEDURE — 3046F HEMOGLOBIN A1C LEVEL >9.0%: CPT | Performed by: INTERNAL MEDICINE

## 2024-09-23 PROCEDURE — 82947 ASSAY GLUCOSE BLOOD QUANT: CPT | Performed by: INTERNAL MEDICINE

## 2024-09-23 PROCEDURE — 83036 HEMOGLOBIN GLYCOSYLATED A1C: CPT | Performed by: INTERNAL MEDICINE

## 2024-09-23 PROCEDURE — 3078F DIAST BP <80 MM HG: CPT | Performed by: INTERNAL MEDICINE

## 2024-09-23 PROCEDURE — 3077F SYST BP >= 140 MM HG: CPT | Performed by: INTERNAL MEDICINE

## 2024-09-23 RX ORDER — ACYCLOVIR 400 MG/1
1 TABLET ORAL
Qty: 9 EACH | Refills: 1 | Status: SHIPPED | OUTPATIENT
Start: 2024-09-23

## 2024-09-23 RX ORDER — ACYCLOVIR 400 MG/1
1 TABLET ORAL
Qty: 9 EACH | Refills: 1 | Status: SHIPPED | OUTPATIENT
Start: 2024-09-23 | End: 2024-09-23

## 2024-09-23 NOTE — PATIENT INSTRUCTIONS
INCREASE Lantus to 30 units subcutaneous daily     Continue Metformin     Novolog  INSULIN SLIDING SCALE  Base Values  Breakfast: 10  Lunch: 10  Dinner: 10  Ranges:  80-99: -2  100-119: 0  120-139: 0  140-159: 1  160-179: 1  180-199: 2  200-219: 2  220-239: 3  240-259: 3  260-279: 4  280-299: 4  300-319: 5  320-339: 5  340-359: 6  360-379: 6  380-399: 7  400-400: 7

## 2024-09-23 NOTE — PROGRESS NOTES
Dexcom G7 Start    Anjel Psiano   3/30/1970        Patient seen to start on new Dexcom device.  Patient given verbal instructions and demonstrated and completed teach back in the correct order before placing first sensor on left arm.    Reviewed the following topics:    Functions of the sensor, phone/reader, and overlap patches  Test blood glucose if symptoms do not match sensor reading and calibrate system  How to handle problems like MRI, CT scans, travel, etc.  Explained how to change enter sensor code every 10 days  Showed pt how to change high/low alert as well as to see when sensor expires.  Reviewed how to remove sensor in 10 days.  Reviewed sensor expiration alerts.  Instructed Anjel Pisano not to inject insulin within 3 inches of sensor  Instructed patient they will have to return to fingerstick blood glucose testing temporarily if out of sensors/reader  Reviewed packing/supplies  Confirmed that patient is getting supplies from [facility]. Changed to the [place] location per patient's request.   Handout with instructions and  phone number given.       [place]:  - Showed Anjel HUFFMAN Norris how to charge reader  - Sample reader given to patient.       Phone:  Patient has iphone 6, not compatible with current dexcom or maximo apps.       Follow Up:   10/15/24 for report review.      9/23/2024  Mirna HERNANDEZ RN [facility]  Diabetes Care &      A total of 25 minutes was spent with the patient including chart review, discussion and education / advisement pertinent to patient and provider specified concerns as documented above.     Note to patient: The 21 Century Cures Act makes medical notes like these available to patients in the interest of transparency. However, be advised this is a medical document. It is intended as peer to peer communication. It is written in medical language and may contain abbreviations or verbiage that are unfamiliar. It may appear blunt or  direct. Medical documents are intended to carry relevant information, facts as evident, and the clinical opinion of the practitioner.

## 2024-09-23 NOTE — PROGRESS NOTES
Name: Anjel Pisano  Date: 2024    Referring Physician: Yves Yanez    HISTORY OF PRESENT ILLNESS   Anjel Pisano is a 54 year old female who presents for diabetes mellitus, diagnosed over 10 years ago.  She was seen during hospitalization in 3/2015 and started on insulin therapy at that time.    Prior HbA, C or glycohemoglobin were 9.4% 3/2015; 7.6% 2015; 8.8% 10/2015; 8.0% 2016; 7.0% 2016; 7.8% 2016; 8.0% 2017; 8.3% 2017; 8.5% 2017; 8.2% 2018; 10.6% 2019; 10.2% 2019; 8.8% 2019; 10.0% 2021; 9.4% 10/2021; 9.4% 3/2022; 8.8% 2022; 9.0% 2022; 10.1% 2022; 9.6% 2023; 10.2% 2023;  10.4% 3/2024; 10.2% 2024; 10.6% POC Today     Dietary compliance: Fair. She states she tries to follow low CHO diet. No significant diet changes since last visit but she has been frustrated with weight gain.    Exercise: Has been doing more walking recently. Going up and down stairs more frequently   Polyuria/polydipsia: No   Blurred vision: Yes     Episodes of hypoglycemia: Has two episodes in the past few months    Blood Glucose:    Blood Glucose:   Checking 1-2 time per day  Fastin-130   Afternoon: 180-190    Medications for DM   Lantus - 15 units SQ QHS   Novolog - 10 units subcutaneous TID with meals   Metformin 1000mg PO BID    Mounjaro stopped due to GI side effects -->stopped 2 months ago    Ozempic stopped due to GI Side effects     Jardiance- stopped due to concerns of hair loss     REVIEW OF SYSTEMS  Eyes: Diabetic retinopathy present: No            Most recent visit to eye doctor in last 12 months: No- has follow up scheduled this month- [facility] has upcoming surgery 2023     CV: Cardiovascular disease present: No         Hypertension present: Yes         Hyperlipidemia present: Yes         Peripheral Vascular Disease present: No    : Nephropathy present: No    Neuro: Neuropathy present: No    Skin: Infection or ulceration: No    Osteoporosis:  No    Thyroid disease: No      Medications:     Current Outpatient Medications:     losartan 100 MG Oral Tab, Take 1 tablet (100 mg total) by mouth daily., Disp: 90 tablet, Rfl: 3    escitalopram 10 MG Oral Tab, Take 1 tablet (10 mg total) by mouth daily., Disp: 90 tablet, Rfl: 3    amLODIPine 10 MG Oral Tab, Take 1 tablet (10 mg total) by mouth daily., Disp: 90 tablet, Rfl: 3    Glucose Blood (ONETOUCH ULTRA) In Vitro Strip, Please provide test strips that are covered by patient's insurance., Disp: 100 strip, Rfl: 0    Blood Glucose Monitoring Suppl (ONETOUCH ULTRA 2) w/Device Does not apply Kit, Please provide what is covered by patient's insurance, Disp: 1 kit, Rfl: 0    pregabalin (LYRICA) 75 MG Oral Cap, Take 1 capsule (75 mg total) by mouth 2 (two) times daily., Disp: 60 capsule, Rfl: 0    Insulin Lispro, 1 Unit Dial, (HUMALOG KWIKPEN) 100 UNIT/ML Subcutaneous Solution Pen-injector, INJECT 14 UNITS INTO THE SKIN THREE TIMES DAILY WITH MEALS PLUS SLIDING SCALE. Max dialy dose: 60 units., Disp: 54 mL, Rfl: 1    ERGOCALCIFEROL 1.25 MG (85072 UT) Oral Cap, TAKE 1 CAPSULE BY MOUTH 1 TIME A WEEK FOR 12 DOSES, Disp: 12 capsule, Rfl: 1    metFORMIN 500 MG Oral Tab, Take 2 tablets (1,000 mg total) by mouth 2 (two) times daily with meals., Disp: 360 tablet, Rfl: 1    Blood Glucose Monitoring Suppl (TRUE METRIX METER) w/Device Does not apply Kit, Use to check blood sugar four times a day, Disp: 1 kit, Rfl: 0    Glucose Blood (TRUE METRIX BLOOD GLUCOSE TEST) In Vitro Strip, Check blood sugar four times a day, Disp: 400 strip, Rfl: 1    TRUEplus Lancets 33G Does not apply Misc, USE TO CHECK BLOOD SUGAR FOUR TIMES DAILY, Disp: 400 each, Rfl: 1    NOVOLOG FLEXPEN 100 UNIT/ML Subcutaneous Solution Pen-injector, INJECT 35 UNITS INTO THE SKIN THREE TIMES DAILY WITH MEAKS PLUS SLIDING SCALE (Patient taking differently: INJECT 15  UNITS INTO THE SKIN THREE TIMES DAILY WITH MEAKS PLUS SLIDING SCALE), Disp: 90 mL, Rfl: 0     pregabalin (LYRICA) 50 MG Oral Cap, Take 1 capsule (50 mg total) by mouth 2 (two) times daily. (Patient not taking: Reported on 8/29/2024), Disp: 60 capsule, Rfl: 0    Glucose Blood (TRUE METRIX BLOOD GLUCOSE TEST) In Vitro Strip, Use to check blood sugar three times a day, Disp: 300 strip, Rfl: 1    metoprolol succinate ER 25 MG Oral Tablet 24 Hr, Take 1 tablet (25 mg total) by mouth nightly., Disp: 90 tablet, Rfl: 1    metoprolol succinate  MG Oral Tablet 24 Hr, Take 1 tablet (100 mg total) by mouth daily., Disp: 90 tablet, Rfl: 1    hydrALAZINE 100 MG Oral Tab, Take 1 tablet (100 mg total) by mouth 2 (two) times daily., Disp: 180 tablet, Rfl: 3    Insulin Pen Needle (BD PEN NEEDLE ABDOULAYE 2ND GEN) 32G X 4 MM Does not apply Misc, INJECT FOUR TIMES DAILY AS DIRECTED, Disp: 50 each, Rfl: 1    insulin glargine (LANTUS SOLOSTAR) 100 UNIT/ML Subcutaneous Solution Pen-injector, Inject 54 Units into the skin nightly. (Patient taking differently: Inject 30 Units into the skin nightly.), Disp: 51 mL, Rfl: 1    hydroCHLOROthiazide 25 MG Oral Tab, Take 1 tablet (25 mg total) by mouth daily., Disp: 90 tablet, Rfl: 3    atorvastatin 80 MG Oral Tab, Take 1 tablet (80 mg total) by mouth nightly., Disp: 90 tablet, Rfl: 3    Accu-Chek FastClix Lancets Does not apply Misc, Use to check blood sugar three times a day, Disp: 300 each, Rfl: 0    Glucose Blood (ACCU-CHEK GUIDE) In Vitro Strip, Use to check blood sugar three times a day, Disp: 300 strip, Rfl: 0    levETIRAcetam 500 MG Oral Tab, TAKE 1 TABLET TWICE DAILY, Disp: 180 tablet, Rfl: 1    Omeprazole 40 MG Oral Capsule Delayed Release, TAKE 1 CAPSULE EVERY DAY 30 TO 60 MINUTES BEFORE A MEAL, Disp: 90 capsule, Rfl: 3    Alcohol Swabs (DROPSAFE ALCOHOL PREP) 70 % Does not apply Pads, USE AS DIRECTED, Disp: 100 each, Rfl: 0    Blood Glucose Monitoring Suppl (ACCU-CHEK GUIDE) w/Device Does not apply Kit, Use to check blood sugar three times a day, Disp: 1 kit, Rfl: 0     fluticasone propionate 50 MCG/ACT Nasal Suspension, 2 sprays by Nasal route daily., Disp: 48 g, Rfl: 1    VENTOLIN  (90 Base) MCG/ACT Inhalation Aero Soln, Inhale 2 puffs into the lungs every 6 (six) hours as needed for Wheezing., Disp: 18 g, Rfl: 1    aspirin 325 MG Oral Tab, Take 1 tablet (325 mg total) by mouth daily., Disp: , Rfl:      Allergies:   Allergies   Allergen Reactions    Reglan [Metoclopramide] OTHER (SEE COMMENTS)     Sensitivity, with crawling sensation.    Adhesive Tape      itching    Radiology Contrast Iodinated Dyes ITCHING    Wound Dressing Adhesive RASH       Social History:   Social History     Socioeconomic History    Marital status: Legally     Number of children: 3   Occupational History    Occupation:      Comment: disabled    Occupation: phlebotomy   Tobacco Use    Smoking status: Former     Current packs/day: 0.00     Average packs/day: 0.2 packs/day for 1 year (0.2 ttl pk-yrs)     Types: Cigarettes     Start date: 12/16/2012     Quit date: 12/16/2013     Years since quitting: 10.7    Smokeless tobacco: Never   Vaping Use    Vaping status: Never Used   Substance and Sexual Activity    Alcohol use: No     Alcohol/week: 0.0 standard drinks of alcohol    Drug use: No    Sexual activity: Not Currently       Medical History:   Past Medical History:    Age-related nuclear cataract of both eyes    Anemia    Apnea    Cataract    Coronary atherosclerosis    Depression    DM type 2 (diabetes mellitus, type 2) (HCC)    Esophageal reflux    High blood pressure    High cholesterol    Meibomian gland dysfunction (MGD), bilateral, both upper and lower lids    Migraine    Muscle weakness    left sided weakness uses walker and cane    Sleep apnea    Stenosis of right vertebral artery    Stroke (HCC)    Type II or unspecified type diabetes mellitus without mention of complication, not stated as uncontrolled       Surgical history:   Past Surgical History:   Procedure  Laterality Date    Brain surgery  2014    Colonoscopy N/A 8/25/2017    Procedure: COLONOSCOPY;  Surgeon: Sharmaine Burks MD;  Location: [facility] ENDOSCOPY    Colonoscopy      Colonoscopy N/A 11/12/2022    Procedure: COLONOSCOPY with Polypectomy;  Surgeon: Terry Weaver MD;  Location: [facility] ENDOSCOPY    Excise carotid body+carotid artery  09/2017    Incision and drainage Right 04/19/16    lower abdominal skin    Laparoscopic cholecystectomy  2002         PHYSICAL EXAM  /79   Pulse 62   Wt 193 lb (87.5 kg)   BMI 35.30 kg/m²     General Appearance:  alert, well developed, in no acute distress  Eyes:  normal conjunctivae, sclera., normal sclera and normal pupils  Ears/Nose/Mouth/Throat/Neck:  no palpable thyroid nodules or cervical lymphadenopathy  Back: no kyphosis or back tenderness  Musculoskeletal:  normal muscle strength and tone  Skin:  normal moisture and skin texture  Hair & Nails:  normal scalp hair     Hematologic:  no excessive bruising  Neuro:  sensory grossly intact and motor grossly intact  Psychiatric:  oriented to time, self, and place  Nutritional:  no abnormal weight gain or loss   Bilateral barefoot skin diabetic exam is normal, visualized feet and the appearance is normal.  Bilateral monofilament/sensation of both feet is normal.  Pulsation pedal pulse exam of both lower legs/feet is normal as well.        ASSESSMENT/PLAN:      1. Diabetes Mellitus Type 2, Uncontrolled  -Uncontrolled  -HgA1c- 10.2% - increased   -Reviewed at length today importance of improving glycemic control. Unfortunately has been unable to tolerate several medications and sugars continue to be above goal. Reviewed at length risk of complications of uncontrolled diabetes.   -Reviewed ABC's of diabetes  -Discussed importance of glycemic control to prevent complications of diabetes  -Discussed complications of diabetes include retinopathy, neuropathy, nephropathy and cardiovascular disease  -Discussed importance of  SBGM- using freestyle maximo   -Discussed importance of low CHO diet- recommend 135 grams total per day, 45 grams per meal.   -She self adjusted insulin dose significantly due to hypoglycemia   -Discussed importance of taking higher insulin dose  -Increase Lantus to 30 units subcutaneous daily   -Increase Novolog to 10 units subcutaneous TID with meals  -Add CF 1:40>140  -Intolerant of Ozempic and Mounjaro     -Discussed option of trying SGLT-2 inhibitor again but she is worried about hair loss and previous yeast infection and is not willing to try medication at this time.     -Reviewed at length importance of adherence with DM medications. Reviewed importance of contacting clinic if sugars are <80 or persistently >300 so that medications can be adjusted.   -Continue Metformin 1000mg PO BID   -Reviewed timing and administration of insulin.     -normotensive   -Lipids-7/2023 - , on statin therapy- atorvastatin 80mg PO nightly. She admits to missing doses of medication. Reviewed importance of improved adherence with statin.     -elevated urine microalbumin 10/2022 and 7/2023- reviewed importance of good blood sugar control. On losartan  -Start CGM during OV today - Dexcom G7     -Has optho follow up scheduled.   -normal foot exam performed today     A total of 30 minutes was spent on obtaining history, reviewing pertinent labs, evaluating patient, providing multiple treatment options, reinforcing diet/exercise and compliance, and completing documentation.       RTC 3 months; 4 weeks with CDE     9/23/2024

## 2024-09-24 ENCOUNTER — MED REC SCAN ONLY (OUTPATIENT)
Dept: PHYSICAL MEDICINE AND REHAB | Facility: CLINIC | Age: 54
End: 2024-09-24

## 2024-09-25 ENCOUNTER — OFFICE VISIT (OUTPATIENT)
Facility: CLINIC | Age: 54
End: 2024-09-25

## 2024-09-25 VITALS
BODY MASS INDEX: 35.51 KG/M2 | SYSTOLIC BLOOD PRESSURE: 144 MMHG | DIASTOLIC BLOOD PRESSURE: 70 MMHG | WEIGHT: 193 LBS | HEART RATE: 65 BPM | HEIGHT: 62 IN

## 2024-09-25 DIAGNOSIS — N18.30 STAGE 3 CHRONIC KIDNEY DISEASE, UNSPECIFIED WHETHER STAGE 3A OR 3B CKD (HCC): Primary | ICD-10-CM

## 2024-09-25 DIAGNOSIS — E11.21 TYPE 2 DIABETES MELLITUS WITH NEPHROPATHY (HCC): ICD-10-CM

## 2024-09-25 DIAGNOSIS — I1A.0 RESISTANT HYPERTENSION: ICD-10-CM

## 2024-09-25 PROCEDURE — 3077F SYST BP >= 140 MM HG: CPT | Performed by: INTERNAL MEDICINE

## 2024-09-25 PROCEDURE — 3078F DIAST BP <80 MM HG: CPT | Performed by: INTERNAL MEDICINE

## 2024-09-25 PROCEDURE — 99204 OFFICE O/P NEW MOD 45 MIN: CPT | Performed by: INTERNAL MEDICINE

## 2024-09-25 PROCEDURE — 3008F BODY MASS INDEX DOCD: CPT | Performed by: INTERNAL MEDICINE

## 2024-09-25 NOTE — PROGRESS NOTES
Consult Requested By:     Reason for Consult:     HPI:     54 female with history of hypertension 23+ yrs, diabetes approx 23 yrs complicated by diabetic retinopathy / neuropathy, hypercholesterolemia,  prior CVA, carotid surg, anemia here for initial evaluation for elevated cr   Reviewed labs- cr normal prior however in aug elevated and states she was not well during that time    Denies any nsaid use, no UTIs, no kidney stones  Strong FH of DM,    Tried jardiance , ozempic and mounjaro and didn't suit her      HISTORY:  Past Medical History:    Age-related nuclear cataract of both eyes    Anemia    Apnea    Cataract    Coronary atherosclerosis    Depression    DM type 2 (diabetes mellitus, type 2) ([facility])    Esophageal reflux    High blood pressure    High cholesterol    Meibomian gland dysfunction (MGD), bilateral, both upper and lower lids    Migraine    Muscle weakness    left sided weakness uses walker and cane    Sleep apnea    Stenosis of right vertebral artery    Stroke ([facility])    Type II or unspecified type diabetes mellitus without mention of complication, not stated as uncontrolled      Past Surgical History:   Procedure Laterality Date    Brain surgery  2014    Colonoscopy N/A 8/25/2017    Procedure: COLONOSCOPY;  Surgeon: Sharmaine Burks MD;  Location: [facility] ENDOSCOPY    Colonoscopy      Colonoscopy N/A 11/12/2022    Procedure: COLONOSCOPY with Polypectomy;  Surgeon: Terry Weaver MD;  Location: [facility] ENDOSCOPY    Excise carotid body+carotid artery  09/2017    Incision and drainage Right 04/19/16    lower abdominal skin    Laparoscopic cholecystectomy  2002      Family History   Problem Relation Age of Onset    Diabetes Father     Hypertension Father     Heart Disorder Father     Lipids Father     Obesity Father     Colon Cancer Father     Heart Disorder Mother     Lipids Mother     Obesity Mother     Lipids Brother     Obesity Brother     Glaucoma Neg     Macular degeneration Neg       Social  History:   Social History     Socioeconomic History    Marital status: Legally     Number of children: 3   Occupational History    Occupation:      Comment: disabled    Occupation: phlebotomy   Tobacco Use    Smoking status: Former     Current packs/day: 0.00     Average packs/day: 0.2 packs/day for 1 year (0.2 ttl pk-yrs)     Types: Cigarettes     Start date: 12/16/2012     Quit date: 12/16/2013     Years since quitting: 10.7    Smokeless tobacco: Never   Vaping Use    Vaping status: Never Used   Substance and Sexual Activity    Alcohol use: No     Alcohol/week: 0.0 standard drinks of alcohol    Drug use: No    Sexual activity: Not Currently     Social Determinants of Health     Financial Resource Strain: Low Risk  (4/21/2023)    Financial Resource Strain     Difficulty of Paying Living Expenses: Not hard at all     Med Affordability: No   Transportation Needs: Unmet Transportation Needs (1/9/2024)    Transportation Needs     Lack of Transportation: Yes   Physical Activity: Inactive (4/21/2023)    Exercise Vital Sign     Days of Exercise per Week: 0 days     Minutes of Exercise per Session: 0 min   Stress: No Stress Concern Present (4/21/2023)    Stress     Feeling of Stress : No    Received from [facility] Social Needs Screening - [facility]   Housing Stability: Low Risk  (4/21/2023)    Housing Stability     Housing Instability: No        Medications (Active prior to today's visit):  Current Outpatient Medications   Medication Sig Dispense Refill    Continuous Glucose Sensor (DEXCOM G7 SENSOR) Does not apply Misc 1 each Every 10 days. 9 each 1    losartan 100 MG Oral Tab Take 1 tablet (100 mg total) by mouth daily. 90 tablet 3    escitalopram 10 MG Oral Tab Take 1 tablet (10 mg total) by mouth daily. 90 tablet 3    amLODIPine 10 MG Oral Tab Take 1 tablet (10 mg total) by mouth daily. 90 tablet 3    Glucose Blood (ONETOUCH ULTRA) In Vitro Strip Please provide test  strips that are covered by patient's insurance. 100 strip 0    Blood Glucose Monitoring Suppl (ONETOUCH ULTRA 2) w/Device Does not apply Kit Please provide what is covered by patient's insurance 1 kit 0    pregabalin (LYRICA) 75 MG Oral Cap Take 1 capsule (75 mg total) by mouth 2 (two) times daily. 60 capsule 0    ERGOCALCIFEROL 1.25 MG (10076 UT) Oral Cap TAKE 1 CAPSULE BY MOUTH 1 TIME A WEEK FOR 12 DOSES 12 capsule 1    metFORMIN 500 MG Oral Tab Take 2 tablets (1,000 mg total) by mouth 2 (two) times daily with meals. 360 tablet 1    Blood Glucose Monitoring Suppl (TRUE METRIX METER) w/Device Does not apply Kit Use to check blood sugar four times a day 1 kit 0    Glucose Blood (TRUE METRIX BLOOD GLUCOSE TEST) In Vitro Strip Check blood sugar four times a day 400 strip 1    TRUEplus Lancets 33G Does not apply Misc USE TO CHECK BLOOD SUGAR FOUR TIMES DAILY 400 each 1    NOVOLOG FLEXPEN 100 UNIT/ML Subcutaneous Solution Pen-injector INJECT 35 UNITS INTO THE SKIN THREE TIMES DAILY WITH MEAKS PLUS SLIDING SCALE (Patient taking differently: INJECT 15  UNITS INTO THE SKIN THREE TIMES DAILY WITH MEAKS PLUS SLIDING SCALE) 90 mL 0    Glucose Blood (TRUE METRIX BLOOD GLUCOSE TEST) In Vitro Strip Use to check blood sugar three times a day 300 strip 1    metoprolol succinate ER 25 MG Oral Tablet 24 Hr Take 1 tablet (25 mg total) by mouth nightly. 90 tablet 1    metoprolol succinate  MG Oral Tablet 24 Hr Take 1 tablet (100 mg total) by mouth daily. 90 tablet 1    hydrALAZINE 100 MG Oral Tab Take 1 tablet (100 mg total) by mouth 2 (two) times daily. 180 tablet 3    Insulin Pen Needle (BD PEN NEEDLE ABDOULAYE 2ND GEN) 32G X 4 MM Does not apply Misc INJECT FOUR TIMES DAILY AS DIRECTED 50 each 1    insulin glargine (LANTUS SOLOSTAR) 100 UNIT/ML Subcutaneous Solution Pen-injector Inject 54 Units into the skin nightly. (Patient taking differently: Inject 30 Units into the skin nightly.) 51 mL 1    hydroCHLOROthiazide 25 MG Oral Tab  Take 1 tablet (25 mg total) by mouth daily. 90 tablet 3    atorvastatin 80 MG Oral Tab Take 1 tablet (80 mg total) by mouth nightly. 90 tablet 3    Accu-Chek FastClix Lancets Does not apply Misc Use to check blood sugar three times a day 300 each 0    Glucose Blood (ACCU-CHEK GUIDE) In Vitro Strip Use to check blood sugar three times a day 300 strip 0    levETIRAcetam 500 MG Oral Tab TAKE 1 TABLET TWICE DAILY 180 tablet 1    Omeprazole 40 MG Oral Capsule Delayed Release TAKE 1 CAPSULE EVERY DAY 30 TO 60 MINUTES BEFORE A MEAL 90 capsule 3    Alcohol Swabs (DROPSAFE ALCOHOL PREP) 70 % Does not apply Pads USE AS DIRECTED 100 each 0    Blood Glucose Monitoring Suppl (ACCU-CHEK GUIDE) w/Device Does not apply Kit Use to check blood sugar three times a day 1 kit 0    fluticasone propionate 50 MCG/ACT Nasal Suspension 2 sprays by Nasal route daily. 48 g 1    VENTOLIN  (90 Base) MCG/ACT Inhalation Aero Soln Inhale 2 puffs into the lungs every 6 (six) hours as needed for Wheezing. 18 g 1    aspirin 325 MG Oral Tab Take 1 tablet (325 mg total) by mouth daily.      Insulin Lispro, 1 Unit Dial, (HUMALOG KWIKPEN) 100 UNIT/ML Subcutaneous Solution Pen-injector INJECT 14 UNITS INTO THE SKIN THREE TIMES DAILY WITH MEALS PLUS SLIDING SCALE. Max dialy dose: 60 units. (Patient not taking: Reported on 9/25/2024) 54 mL 1    pregabalin (LYRICA) 50 MG Oral Cap Take 1 capsule (50 mg total) by mouth 2 (two) times daily. (Patient not taking: Reported on 8/29/2024) 60 capsule 0       Allergies:  Allergies   Allergen Reactions    Reglan [Metoclopramide] OTHER (SEE COMMENTS)     Sensitivity, with crawling sensation.    Adhesive Tape      itching    Radiology Contrast Iodinated Dyes ITCHING    Wound Dressing Adhesive RASH         ROS:     Constitutional:  Negative for decreased activity, fever, irritability and lethargy  ENMT:  Negative for ear drainage, hearing loss and nasal drainage  Eyes:  Negative for eye discharge and vision  loss  Cardiovascular:  Negative for chest pain, sobs  Respiratory:  Negative for cough, dyspnea and wheezing  Gastrointestinal:  Negative for abdominal pain, constipation  Genitourinary:  Negative for dysuria and hematuria  Endocrine:  Negative for abnormal sleep patterns, increased activity  Hema/Lymph:  Negative for easy bleeding and easy bruising  Integumentary:  Negative for pruritus and rash  Musculoskeletal:  Negative for bone/joint symptoms  Neurological:  Negative for gait disturbance  Psychiatric:  Negative for inappropriate interaction and psychiatric symptoms      Vitals:    09/25/24 1346   BP: 144/70   Pulse: 65         PHYSICAL EXAM:   Constitutional: appears well hydrated alert and responsive no acute distress noted  Head/Face: normocephalic  Eyes/Vision: normal extraocular motion is intact  Nose/Mouth/Throat:mucous membranes are moist   Neck/Thyroid: neck is supple without adenopathy  Lymphatic: no abnormal cervical, supraclavicular adenopathy is noted  Respiratory:  lungs are clear to auscultation bilaterally  Cardiovascular: regular rate and rhythm  Abdomen: soft, non-tender, non-distended, BS normal  Skin/Hair: no unusual rashes present  Back/Spine: no abnormalities noted  Musculoskeletal:  no deformities  Extremities: no edema  Neurological:  Grossly normal    Lab Results   Component Value Date     08/22/2024     (L) 08/20/2024     05/23/2024    K 4.7 08/22/2024    K 4.7 08/20/2024    K 4.2 05/23/2024     08/22/2024     08/20/2024     05/23/2024    CO2 31.0 08/22/2024    CO2 28.0 08/20/2024    CO2 28.0 05/23/2024    BUN 22 08/22/2024    BUN 26 (H) 08/20/2024    BUN 13 05/23/2024    CREATSERUM 1.25 (H) 08/22/2024    CREATSERUM 1.32 (H) 08/20/2024    CREATSERUM 1.00 05/23/2024    CA 10.7 (H) 08/22/2024    CA 10.3 08/20/2024    CA 10.0 05/23/2024    EGFRCR 51 (L) 08/22/2024    EGFRCR 47 (L) 08/20/2024    EGFRCR 66 05/23/2024    ALB 4.7 08/20/2024    ALB 4.6  05/23/2024    ALB 4.6 01/25/2024      ASSESSMENT/PLAN:   Assessment   1. Stage 3 chronic kidney disease, unspecified whether stage 3a or 3b CKD (HCC)    2. Resistant hypertension    3. Type 2 diabetes mellitus with nephropathy (HCC)       CKD 3 vs KISHAN  Cr elevated in August labs  Repeat labs today and UA  Avoid nsaids    Resistant HTN  Check renal US with doppler  Check BP at home and maintain a log    DM2 with nephropathy  Microalbuminuria noted  On ARB  Cannot tolerate SGLT2i  Heri better control of her DM    PLAN   Labs today  Renal US  Fu in 4 weeks       Orders This Visit:  No orders of the defined types were placed in this encounter.      Meds This Visit:  Requested Prescriptions      No prescriptions requested or ordered in this encounter       Imaging & Referrals:  None     Melissa Guerrero MD  9/25/2024

## 2024-09-26 ENCOUNTER — LAB ENCOUNTER (OUTPATIENT)
Dept: LAB | Facility: HOSPITAL | Age: 54
End: 2024-09-26
Attending: INTERNAL MEDICINE
Payer: MEDICARE

## 2024-09-26 DIAGNOSIS — N18.30 STAGE 3 CHRONIC KIDNEY DISEASE, UNSPECIFIED WHETHER STAGE 3A OR 3B CKD (HCC): ICD-10-CM

## 2024-09-26 LAB
ALBUMIN SERPL-MCNC: 4.9 G/DL (ref 3.2–4.8)
ALBUMIN/GLOB SERPL: 1.4 {RATIO} (ref 1–2)
ALP LIVER SERPL-CCNC: 127 U/L
ALT SERPL-CCNC: 15 U/L
ANION GAP SERPL CALC-SCNC: 6 MMOL/L (ref 0–18)
AST SERPL-CCNC: 13 U/L (ref ?–34)
BASOPHILS # BLD AUTO: 0.06 X10(3) UL (ref 0–0.2)
BASOPHILS NFR BLD AUTO: 0.5 %
BILIRUB SERPL-MCNC: 0.6 MG/DL (ref 0.3–1.2)
BILIRUB UR QL: NEGATIVE
BUN BLD-MCNC: 26 MG/DL (ref 9–23)
BUN/CREAT SERPL: 22.2 (ref 10–20)
CALCIUM BLD-MCNC: 10.5 MG/DL (ref 8.7–10.4)
CHLORIDE SERPL-SCNC: 103 MMOL/L (ref 98–112)
CHOLEST SERPL-MCNC: 180 MG/DL (ref ?–200)
CO2 SERPL-SCNC: 30 MMOL/L (ref 21–32)
CREAT BLD-MCNC: 1.17 MG/DL
CREAT UR-SCNC: 86 MG/DL
DEPRECATED RDW RBC AUTO: 47.4 FL (ref 35.1–46.3)
EGFRCR SERPLBLD CKD-EPI 2021: 55 ML/MIN/1.73M2 (ref 60–?)
EOSINOPHIL # BLD AUTO: 0.06 X10(3) UL (ref 0–0.7)
EOSINOPHIL NFR BLD AUTO: 0.5 %
ERYTHROCYTE [DISTWIDTH] IN BLOOD BY AUTOMATED COUNT: 17.8 % (ref 11–15)
FASTING PATIENT LIPID ANSWER: NO
FASTING STATUS PATIENT QL REPORTED: NO
GLOBULIN PLAS-MCNC: 3.6 G/DL (ref 2–3.5)
GLUCOSE BLD-MCNC: 103 MG/DL (ref 70–99)
GLUCOSE UR-MCNC: NORMAL MG/DL
HCT VFR BLD AUTO: 44.3 %
HDLC SERPL-MCNC: 39 MG/DL (ref 40–59)
HGB BLD-MCNC: 13.8 G/DL
HGB UR QL STRIP.AUTO: NEGATIVE
IMM GRANULOCYTES # BLD AUTO: 0.04 X10(3) UL (ref 0–1)
IMM GRANULOCYTES NFR BLD: 0.3 %
KETONES UR-MCNC: NEGATIVE MG/DL
LDLC SERPL CALC-MCNC: 117 MG/DL (ref ?–100)
LEUKOCYTE ESTERASE UR QL STRIP.AUTO: 500
LYMPHOCYTES # BLD AUTO: 3 X10(3) UL (ref 1–4)
LYMPHOCYTES NFR BLD AUTO: 24.7 %
MCH RBC QN AUTO: 23.9 PG (ref 26–34)
MCHC RBC AUTO-ENTMCNC: 31.2 G/DL (ref 31–37)
MCV RBC AUTO: 76.8 FL
MONOCYTES # BLD AUTO: 0.83 X10(3) UL (ref 0.1–1)
MONOCYTES NFR BLD AUTO: 6.8 %
NEUTROPHILS # BLD AUTO: 8.18 X10 (3) UL (ref 1.5–7.7)
NEUTROPHILS # BLD AUTO: 8.18 X10(3) UL (ref 1.5–7.7)
NEUTROPHILS NFR BLD AUTO: 67.2 %
NITRITE UR QL STRIP.AUTO: NEGATIVE
NONHDLC SERPL-MCNC: 141 MG/DL (ref ?–130)
OSMOLALITY SERPL CALC.SUM OF ELEC: 293 MOSM/KG (ref 275–295)
PH UR: 6 [PH] (ref 5–8)
PHOSPHATE SERPL-MCNC: 4.2 MG/DL (ref 2.4–5.1)
PLATELET # BLD AUTO: 279 10(3)UL (ref 150–450)
POTASSIUM SERPL-SCNC: 4.2 MMOL/L (ref 3.5–5.1)
PROT SERPL-MCNC: 8.5 G/DL (ref 5.7–8.2)
PROT UR-MCNC: 10.7 MG/DL (ref ?–14)
PROT UR-MCNC: NEGATIVE MG/DL
PROT/CREAT UR-RTO: 0.12
PTH-INTACT SERPL-MCNC: 38.5 PG/ML (ref 18.5–88)
RBC # BLD AUTO: 5.77 X10(6)UL
SODIUM SERPL-SCNC: 139 MMOL/L (ref 136–145)
SP GR UR STRIP: 1.02 (ref 1–1.03)
TRIGL SERPL-MCNC: 136 MG/DL (ref 30–149)
TSI SER-ACNC: 1.35 MIU/ML (ref 0.55–4.78)
UROBILINOGEN UR STRIP-ACNC: NORMAL
VLDLC SERPL CALC-MCNC: 24 MG/DL (ref 0–30)
WBC # BLD AUTO: 12.2 X10(3) UL (ref 4–11)

## 2024-09-26 PROCEDURE — 80053 COMPREHEN METABOLIC PANEL: CPT | Performed by: INTERNAL MEDICINE

## 2024-09-26 PROCEDURE — 36415 COLL VENOUS BLD VENIPUNCTURE: CPT | Performed by: INTERNAL MEDICINE

## 2024-09-26 PROCEDURE — 81001 URINALYSIS AUTO W/SCOPE: CPT | Performed by: INTERNAL MEDICINE

## 2024-09-26 PROCEDURE — 82570 ASSAY OF URINE CREATININE: CPT

## 2024-09-26 PROCEDURE — 83970 ASSAY OF PARATHORMONE: CPT | Performed by: INTERNAL MEDICINE

## 2024-09-26 PROCEDURE — 85025 COMPLETE CBC W/AUTO DIFF WBC: CPT | Performed by: INTERNAL MEDICINE

## 2024-09-26 PROCEDURE — 84443 ASSAY THYROID STIM HORMONE: CPT | Performed by: INTERNAL MEDICINE

## 2024-09-26 PROCEDURE — 84156 ASSAY OF PROTEIN URINE: CPT

## 2024-09-26 PROCEDURE — 84100 ASSAY OF PHOSPHORUS: CPT | Performed by: INTERNAL MEDICINE

## 2024-09-26 PROCEDURE — 80061 LIPID PANEL: CPT | Performed by: INTERNAL MEDICINE

## 2024-10-01 ENCOUNTER — TELEPHONE (OUTPATIENT)
Facility: CLINIC | Age: 54
End: 2024-10-01

## 2024-10-01 DIAGNOSIS — E83.52 HYPERCALCEMIA: ICD-10-CM

## 2024-10-01 DIAGNOSIS — N18.30 STAGE 3 CHRONIC KIDNEY DISEASE, UNSPECIFIED WHETHER STAGE 3A OR 3B CKD (HCC): Primary | ICD-10-CM

## 2024-10-01 NOTE — TELEPHONE ENCOUNTER
Can you please ask her to take half a tablet of hydrochlorothiazide ( instead of 25mg--> take 12.5 mg/day)    Monitor BP at home    Get labs 3-4 days before next appt

## 2024-10-02 ENCOUNTER — TELEPHONE (OUTPATIENT)
Dept: ENDOCRINOLOGY | Facility: HOSPITAL | Age: 54
End: 2024-10-02

## 2024-10-02 ENCOUNTER — HOSPITAL ENCOUNTER (OUTPATIENT)
Dept: ULTRASOUND IMAGING | Facility: HOSPITAL | Age: 54
Discharge: HOME | End: 2024-10-02
Attending: INTERNAL MEDICINE
Payer: MEDICARE

## 2024-10-02 ENCOUNTER — PATIENT OUTREACH (OUTPATIENT)
Dept: CASE MANAGEMENT | Age: 54
End: 2024-10-02

## 2024-10-02 ENCOUNTER — TELEPHONE (OUTPATIENT)
Dept: ENDOCRINOLOGY CLINIC | Facility: CLINIC | Age: 54
End: 2024-10-02

## 2024-10-02 ENCOUNTER — TELEPHONE (OUTPATIENT)
Dept: INTERNAL MEDICINE CLINIC | Facility: CLINIC | Age: 54
End: 2024-10-02

## 2024-10-02 DIAGNOSIS — N18.30 STAGE 3 CHRONIC KIDNEY DISEASE, UNSPECIFIED WHETHER STAGE 3A OR 3B CKD (HCC): ICD-10-CM

## 2024-10-02 DIAGNOSIS — E11.65 TYPE 2 DIABETES MELLITUS WITH HYPERGLYCEMIA, WITH LONG-TERM CURRENT USE OF INSULIN (HCC): ICD-10-CM

## 2024-10-02 DIAGNOSIS — E11.8 TYPE 2 DIABETES MELLITUS WITH COMPLICATION, WITH LONG-TERM CURRENT USE OF INSULIN (HCC): ICD-10-CM

## 2024-10-02 DIAGNOSIS — E66.9 OBESITY (BMI 30-39.9): ICD-10-CM

## 2024-10-02 DIAGNOSIS — I10 ESSENTIAL HYPERTENSION: Primary | ICD-10-CM

## 2024-10-02 DIAGNOSIS — F32.A DEPRESSION, UNSPECIFIED DEPRESSION TYPE: ICD-10-CM

## 2024-10-02 DIAGNOSIS — E78.5 DYSLIPIDEMIA: ICD-10-CM

## 2024-10-02 DIAGNOSIS — I1A.0 RESISTANT HYPERTENSION: ICD-10-CM

## 2024-10-02 DIAGNOSIS — I10 PRIMARY HYPERTENSION: ICD-10-CM

## 2024-10-02 DIAGNOSIS — E66.01 MORBID (SEVERE) OBESITY DUE TO EXCESS CALORIES (HCC): ICD-10-CM

## 2024-10-02 DIAGNOSIS — Z79.4 TYPE 2 DIABETES MELLITUS WITH COMPLICATION, WITH LONG-TERM CURRENT USE OF INSULIN (HCC): ICD-10-CM

## 2024-10-02 DIAGNOSIS — D50.9 IRON DEFICIENCY ANEMIA, UNSPECIFIED IRON DEFICIENCY ANEMIA TYPE: ICD-10-CM

## 2024-10-02 DIAGNOSIS — Z79.4 TYPE 2 DIABETES MELLITUS WITH HYPERGLYCEMIA, WITH LONG-TERM CURRENT USE OF INSULIN (HCC): ICD-10-CM

## 2024-10-02 PROCEDURE — 76775 US EXAM ABDO BACK WALL LIM: CPT | Performed by: INTERNAL MEDICINE

## 2024-10-02 PROCEDURE — 93975 VASCULAR STUDY: CPT | Performed by: INTERNAL MEDICINE

## 2024-10-02 NOTE — TELEPHONE ENCOUNTER
Spoke with patient. She has received 24 hr warning to change sensor. Reviewed with patient changing dexcom G7 sensor every 10 days and that there is a 12 hr bony period to change sensor. Patient states she had one day of low glucose readings in the 70s and 60s, but can't remember which day and what time of the day. She was sweating and felt hot on her back. Drank OJ and ate a meal and felt better. Patient is using reader and needs to come to clinic to review cgm report. States glucose at this time is 223. Reviewed how to take insulin, using sliding scale, and to take rapid insulin 10-15 minutes before meals. Has follow up with [facility] on 10/15/24. Offered to reschedule appointment to an earlier date, but patient unable to reschedule.  Instructed patient to call back if she experiences hypoglycemia again.

## 2024-10-02 NOTE — TELEPHONE ENCOUNTER
Spoke to patient for [facility]     Patient has some questions for clinical staff regarding when to change sensor/ placement .   Patient requesting a call back.   Please advise. Thank you.

## 2024-10-02 NOTE — PROGRESS NOTES
Spoke to Anjel for Chronic Care Management.      Updates to patient care team/comments: Added Dr. Guerrero  Patient reported changes in medications: no change  Med Adherence  Comment:   patient reported compliance.   Health Maintenance:   Health Maintenance   Topic Date Due    Pneumococcal Vaccine: Birth to 64yrs (2 of 2 - PCV) 12/21/2017    Zoster Vaccines (2 of 2) 07/12/2023    MA Annual Health Assessment  01/01/2024    COVID-19 Vaccine (3 - 2023-24 season) 09/01/2024    Influenza Vaccine (1) 10/01/2024    HTN: BP Follow-Up  10/25/2024    Diabetes Care Dilated Eye Exam  11/08/2024    Mammogram  12/16/2024    Diabetes Care A1C  12/23/2024    Diabetes Care: Microalb/Creat Ratio  05/23/2025    Gyne Pelvic Exam  08/29/2025    Diabetes Care Foot Exam  09/23/2025    Diabetes Care: GFR  09/26/2025    Colorectal Cancer Screening  11/12/2025    Pap Smear  08/29/2027    DTaP,Tdap,and Td Vaccines (2 - Td or Tdap) 05/17/2033    Annual Depression Screening  Completed       Patient updates/concerns:   Spoke to patient for CCM , she was able to establish with Dr. Guerrero - Nephrology . Patient had labs/ doppler done today . Patient is to follow up with Dr. Guerrero this month , ccm informed patient nurse did call patient , encouraged her to call back to discuss.     Patient stated she was in to see Dr. Caceres on 9/23/2024- had CGM placed, tolerating well.   Patient stated the sensor is not bothering her at this time , she is able to check glucose , she states the glucose is improving. Patient due to change her sensor today . Patient stated she does have some questions regarding when to change and placement , [facility] will send message to Endo to advise.       Patient continues to have dizziness. Patient informed she was referred to Neurosurgery - Dr. Dwaine Gillis. Patient provided with phone number to schedule. Patient verbalized understanding and will call to schedule.     Patient continues to have neck pain , due to follow up with Dr. Bhatt  later this month .  Patient has not yet scheduled with physical therapy , patient provided with phone number to schedule physical therapy , discussed the importance to help with neck pain , follow thorough with treatment plan.     Patient also informed CCM of insurance change:  patient stated as of 10/1/2024.     to [facility] MMAI  - Stated we have this information - recently scanned in to chart .       Patient also requesting referral be faxed to Dr. Dwaine Gillis- Neurosurgery so she can  proceed with scheduling . CCM will call for fax number and request PCP fax referral.      Goals/Action Plan:    Active goal from previous outreach:  improve dizziness  Patient reported progress towards goals:  patient continues to have dizziness- intermittent , she is checking blood pressure/ continues to monitor glucose.                - What:  improve dizziness / Pain            - Where/When/How : schedule physical therapy   Patient Reported Barriers and Concerns:  no new barriers.                   - Plan for overcoming barriers: CCM sending message to navigator to help .          Care Managers Interventions:  TE to Dr. Caceres.  TE to PCP regarding update to insurance.   CCM listened and provided support.   Reminded patient of overdue Health Maintenance.   CCM updated patient records from Care Everywhere.   CCM updated immunizations- no updates.   Continue to provide encouragement, support and education for healthy coping and diagnosis.          Future Appointments:   Future Appointments   Date Time Provider Department Center   10/15/2024  3:30 PM [facility]WMO ENDO CDE ECWMOENDO [facility]   10/16/2024  3:00 PM Yves Yanez MD [id number] [facility] [place]   10/17/2024 10:30 AM Elis Bhatt Y, DO PM&R [place] [facility]   10/28/2024  2:00 PM Melissa Guerrero MD [id number] [facility]   9/3/2025  3:20 PM Rohini Mckenna MD ECCFHOBGYN [facility]         Next Care Manager Follow Up Date: 1 month , sooner if needed.     Reason For Follow Up: review  progress and or barriers towards patient's goals.     Time Spent This Encounter Total: 40  min medical record review, telephone communication, care plan updates where needed, education, goals, and action plan recreation/update. Provided acknowledgment and validation to patient's concerns.   Monthly Minute Total including today: 40  Physical assessment, complete health history, and need for CCM established by Yves Yanez MD.

## 2024-10-02 NOTE — TELEPHONE ENCOUNTER
Spoke to patient for CCM - patient stated as of 10/1/2024.   Insurance has changed to [employer]  - Stated we have this information - recently scanned in to chart .       Patient also requesting referral be faxed to Dr. Dwaine Gillis- Neurosurgery so she can  proceed with scheduling .   Please fax to [phone]    Please update. Thank you for you help.

## 2024-10-02 NOTE — TELEPHONE ENCOUNTER
Elfego Noriega,   Spoke to patient - she is requesting call back . Patient has recently had insurance change- has upcoming appointments and would like to discuss . Thank you

## 2024-10-03 RX ORDER — HYDROCHLOROTHIAZIDE 25 MG/1
12.5 TABLET ORAL DAILY
COMMUNITY

## 2024-10-03 NOTE — TELEPHONE ENCOUNTER
Called pt to help schedule 3 month appt. Pt did not want to make appt today. Pt will make appt when she comes in on OCT 15

## 2024-10-04 RX ORDER — INSULIN GLARGINE 100 [IU]/ML
30 INJECTION, SOLUTION SUBCUTANEOUS NIGHTLY
Qty: 27 ML | Refills: 1 | Status: SHIPPED | OUTPATIENT
Start: 2024-10-04

## 2024-10-04 NOTE — TELEPHONE ENCOUNTER
Emily can you please sign for lantus refill on behalf of Dr. Caceres? Last A1C elevated but updated dose per chart review. Patient recently seen an up to date on appts.

## 2024-10-04 NOTE — TELEPHONE ENCOUNTER
Endocrine refill protocol for basal insulins     Protocol Criteria: FAILED Reason: Elevated A1C    If all below requirements are met, send a 90-day supply with 1 refill per provider protocol.       Verify Appointment with Endocrinology completed in the last 6 months or scheduled in the next 3 months.  Verify A1C has been completed within the last 6 months and is below 8.5%     Last completed office visit:9/23/2024 Nilsa Caceres MD   Next scheduled Follow up:   Future Appointments   Date Time Provider Department Center   10/15/2024  3:30 PM [facility]WMO ENDO CDE ECWMOENDO [facility]   10/16/2024  3:00 PM Yves Yanez MD [id number] [facility] [place]   10/17/2024 10:30 AM Elis Bhatt Y, DO PM&R [place] [facility] [place]   10/28/2024  2:00 PM Melissa Guerrero MD [id number] [facility]   9/3/2025  3:20 PM Rohini Mckenna MD ECCFHOBGYN [facility]      Last A1c result: Last A1c value was 10.6% done 9/23/2024.

## 2024-10-15 ENCOUNTER — NURSE ONLY (OUTPATIENT)
Dept: ENDOCRINOLOGY CLINIC | Facility: CLINIC | Age: 54
End: 2024-10-15

## 2024-10-15 ENCOUNTER — TELEPHONE (OUTPATIENT)
Dept: ENDOCRINOLOGY CLINIC | Facility: CLINIC | Age: 54
End: 2024-10-15

## 2024-10-15 DIAGNOSIS — E11.49 TYPE 2 DIABETES MELLITUS WITH OTHER NEUROLOGIC COMPLICATION, WITH LONG-TERM CURRENT USE OF INSULIN (HCC): Primary | ICD-10-CM

## 2024-10-15 DIAGNOSIS — Z79.4 TYPE 2 DIABETES MELLITUS WITH OTHER NEUROLOGIC COMPLICATION, WITH LONG-TERM CURRENT USE OF INSULIN (HCC): Primary | ICD-10-CM

## 2024-10-15 LAB
GLUCOSE BLOOD: 80
TEST STRIP LOT #: NORMAL NUMERIC

## 2024-10-15 PROCEDURE — 82947 ASSAY GLUCOSE BLOOD QUANT: CPT | Performed by: INTERNAL MEDICINE

## 2024-10-15 NOTE — TELEPHONE ENCOUNTER
Patient is requesting referral to [facility] for continuing diabetes education. Referral pended for provider.

## 2024-10-15 NOTE — PROGRESS NOTES
Continuous Glucose Monitor Download - Dexcom G7    Anjel Pisano  3/30/1970        Medical Background        Lab Results   Component Value Date    A1C 10.6 (A) 09/23/2024    A1C 10.2 (A) 05/23/2024    A1C 9.9 (H) 05/23/2024    A1C 10.4 (A) 02/29/2024    A1C 10.2 (A) 11/30/2023         Patient has T2DM      Care Gaps       Up to date    Medication Review      DM Meds: Insulin Lispro (1 Unit Dial) Sopn - 100 UNIT/ML; Lantus SoloStar Sopn - 100 UNIT/ML; metFORMIN Tabs - 500 MG; NovoLOG FlexPen Sopn - 100 UNIT/ML     At appointment on 9/23/24:  She self adjusted insulin dose significantly due to hypoglycemia   -Discussed importance of taking higher insulin dose  -Increase Lantus to 30 units subcutaneous daily   -Increase Novolog to 10 units subcutaneous TID with meals  -Add CF 1:40>140  -Intolerant of Ozempic and Mounjaro   -Reviewed at length importance of adherence with DM medications. Reviewed importance of contacting clinic if sugars are <80 or persistently >300 so that medications can be adjusted.   -Continue Metformin 1000mg PO BID   -Reviewed timing and administration of insulin.   INSULIN SLIDING SCALE  Base Values  Breakfast: 10  Lunch: 10  Dinner: 10  Ranges:  80-99: -2  100-119: 0  120-139: 0  140-159: 1  160-179: 1  180-199: 2  200-219: 2  220-239: 3  240-259: 3  260-279: 4  280-299: 4  300-319: 5  320-339: 5  340-359: 6  360-379: 6  380-399: 7  400-400: 7    Patient Currently Taking:   Lantus 30 units daily  Novolog 15 units 10-15 minutes before each meal  Metformin 1000 mg twice daily after breakfast and after dinner    Patient explained she has been taking 15 units of novolog before meals instead of using sliding scale, but is open to take insulin as recommended by provider.       Blood Glucose Review      Uses Dexcom reader.   Patient states she had a couple of episodes of hypoglycemia with shaking and sweating. Drank orange juice and had a snack afterwards.     Interpretation of Data:  Patient is 25%  in range, 75% high and very high. GMI at 8.9  Reviewed report with patient. Patient sometimes takes novolog and doesn't eat because she doesn't have an appetite. This may explain the decreases seen in the report.                             Patient Concerns      Patient complains of blurry vision, pain to above left knee and left hip. Had brain surgery in the past with left sided weakness. Started taking Acetaminophen arthritis as needed and it helps slightly with discomfort. Pain worse at night.     Hasn't had a good night of sleep in a \"very very long time.\"     Meals and Dosing Timing:    Time Food Insulin    Breakfast 1-2 slides of bread with home made gravy with potatoes and tomatoes, 1 boiled eggs    Lunch Rice with gravy, meat, vegetables. Does not eat chicken. Salad    Dinner 1/2 felicia bread with gravy and pickles, salad, grapes    Snacks Tea with milk, whole wheat crackers, carrots, cucumbers, almonds, pistachios      Drinks tea twice daily with milk    Walks 15 minutes every day.         Recommendations / Plan    Needs to schedule f/u appt with Dr. Caceres - patient states she will schedule later   Requests referral to [facility] -pended for provider.   We reviewed her CGM report together, explained to patient importance of taking novolog only if she is going to eat. We reviewed how lantus and novolog work to lower blood glucose levels. Explained danger of taking novolog without food, and how novolog helps to control blood glucose for carbohydrates eaten. Patient took 15 units of novolog around 2023-0947 today 10/15/24 and did not eat. Recommended per sliding scale would've been 13 and eat lunch. Patient verbalized understanding of importance of eating when taking novolog and to not take novolog if she is not going to eat. Per patient's dexcom reader glucose at 90 at the end of appointment. POC showed glucose at 80. Gave patient orange juice and crackers. She is going home after appointment. Will eat in the car.  Denies any hypoglycemia symptoms at this time.   Patient will increase lantus to 34 units at bedtime and take novolog as recommended by provider of 10 units before meals plus sliding scale. Patient will not take novolog if not going to eat. Patient will follow up in 2 weeks for CGM report review.   Nutrition education also given, reviewed healthy plate, low carb, low saturated fat diet. Patient will [facility].         Next Follow up scheduled for 10/30/24      Routed to provider for review.      10/15/2024  Mirna HERNANDEZ RN [facility]  Diabetes Care &      A total of 75 minutes was spent with the patient including chart review, discussion and education / advisement pertinent to patient and provider specified concerns as documented above.     Note to patient: The 21 Century Cures Act makes medical notes like these available to patients in the interest of transparency. However, be advised this is a medical document. It is intended as peer to peer communication. It is written in medical language and may contain abbreviations or verbiage that are unfamiliar. It may appear blunt or direct. Medical documents are intended to carry relevant information, facts as evident, and the clinical opinion of the practitioner.

## 2024-10-15 NOTE — PATIENT INSTRUCTIONS
Increase Lantus to 34 units at bedtime    Novolog 10 units before meals plus use sliding scale. Take novolog only if you're going to eat.   INSULIN SLIDING SCALE  Base Values  Breakfast: 10  Lunch: 10  Dinner: 10  Ranges:  80-99: -2  100-119: 0  120-139: 0  140-159: 1  160-179: 1  180-199: 2  200-219: 2  220-239: 3  240-259: 3  260-279: 4  280-299: 4  300-319: 5  320-339: 5  340-359: 6  360-379: 6  380-399: 7  400-400: 7      Call for an appointment at the [facility]: [phone]    Contact Dr. Yanez about your prescriptions, your left leg pain, and sleep apnea.

## 2024-10-16 ENCOUNTER — OFFICE VISIT (OUTPATIENT)
Dept: INTERNAL MEDICINE CLINIC | Facility: CLINIC | Age: 54
End: 2024-10-16

## 2024-10-16 VITALS
DIASTOLIC BLOOD PRESSURE: 75 MMHG | HEART RATE: 60 BPM | HEIGHT: 62 IN | WEIGHT: 193 LBS | BODY MASS INDEX: 35.51 KG/M2 | SYSTOLIC BLOOD PRESSURE: 120 MMHG

## 2024-10-16 DIAGNOSIS — E10.22 CKD STAGE 3 DUE TO TYPE 1 DIABETES MELLITUS (HCC): ICD-10-CM

## 2024-10-16 DIAGNOSIS — E78.00 PURE HYPERCHOLESTEROLEMIA: ICD-10-CM

## 2024-10-16 DIAGNOSIS — I10 PRIMARY HYPERTENSION: Primary | ICD-10-CM

## 2024-10-16 DIAGNOSIS — N18.30 CKD STAGE 3 DUE TO TYPE 1 DIABETES MELLITUS (HCC): ICD-10-CM

## 2024-10-16 PROCEDURE — 99214 OFFICE O/P EST MOD 30 MIN: CPT | Performed by: INTERNAL MEDICINE

## 2024-10-16 PROCEDURE — G2211 COMPLEX E/M VISIT ADD ON: HCPCS | Performed by: INTERNAL MEDICINE

## 2024-10-16 NOTE — PROGRESS NOTES
Subjective:   Patient ID: Anjel Pisano is a 54 year old female.  Chief Complaint   Patient presents with    Hypertension   Patient has   Patient presents today for   HTN , states doing well otherwise,denies complains today  denies chest pain, shortness of breath, dyspnea on exertion or heart palpitations also denies headache or dizziness , nausea, vomiting, diarrhea, constipation or abdominal pain. No fever, chills, or UTI symptoms.   The rest of the review of systems, see below.     Need  refills on medications      Hypertension  This is a chronic problem. The problem has been gradually improving since onset. Associated symptoms-  None   Pertinent negatives include no anxiety, chest pain, orthopnea, palpitations, peripheral edema, PND or shortness of breath. Risk factors for coronary artery disease include obesity, post-menopausal state and dyslipidemia. Past treatments include lifestyle changes, diuretics, beta blockers and central alpha agonists. The current treatment provides significant improvement. Compliance problems include diet (medications labs and  visits  ).    Medication Request   headaches (occasional sumatriptan  helps -  feels well today  ). Pertinent negatives include no chest pain, congestion, coughing, nausea or vomiting.       History/Other:   Review of Systems   HENT: Negative for congestion, sinus pressure, sinus pain and sneezing.    Eyes: Negative for pain, redness and visual disturbance   Respiratory: Negative for cough, chest tightness and shortness of breath.    Cardiovascular: Negative for chest pain, palpitations, orthopnea, leg swelling and PND.   Gastrointestinal denies abdominal pain , no anal bleeding, blood in stool, constipation, diarrhea   Skin: Negative for color change and wound.   Neurological: negative headache . Denies dizziness   Psychiatric/Behavioral: Negative for decreased concentration and sleep disturbance. The patient today is nervous/anxious.      Current Outpatient  Medications   Medication Sig Dispense Refill    insulin glargine (LANTUS SOLOSTAR) 100 UNIT/ML Subcutaneous Solution Pen-injector Inject 30 Units into the skin nightly. 27 mL 1    hydroCHLOROthiazide 25 MG Oral Tab Take 0.5 tablets (12.5 mg total) by mouth daily.      losartan 100 MG Oral Tab Take 1 tablet (100 mg total) by mouth daily. 90 tablet 3    escitalopram 10 MG Oral Tab Take 1 tablet (10 mg total) by mouth daily. 90 tablet 3    amLODIPine 10 MG Oral Tab Take 1 tablet (10 mg total) by mouth daily. 90 tablet 3    Glucose Blood (ONETOUCH ULTRA) In Vitro Strip Please provide test strips that are covered by patient's insurance. 100 strip 0    Blood Glucose Monitoring Suppl (ONETOUCH ULTRA 2) w/Device Does not apply Kit Please provide what is covered by patient's insurance 1 kit 0    pregabalin (LYRICA) 75 MG Oral Cap Take 1 capsule (75 mg total) by mouth 2 (two) times daily. 60 capsule 0    ERGOCALCIFEROL 1.25 MG (14387 UT) Oral Cap TAKE 1 CAPSULE BY MOUTH 1 TIME A WEEK FOR 12 DOSES 12 capsule 1    metFORMIN 500 MG Oral Tab Take 2 tablets (1,000 mg total) by mouth 2 (two) times daily with meals. 360 tablet 1    Glucose Blood (TRUE METRIX BLOOD GLUCOSE TEST) In Vitro Strip Check blood sugar four times a day 400 strip 1    TRUEplus Lancets 33G Does not apply Misc USE TO CHECK BLOOD SUGAR FOUR TIMES DAILY 400 each 1    NOVOLOG FLEXPEN 100 UNIT/ML Subcutaneous Solution Pen-injector INJECT 35 UNITS INTO THE SKIN THREE TIMES DAILY WITH MEAKS PLUS SLIDING SCALE (Patient taking differently: INJECT 10  UNITS INTO THE SKIN THREE TIMES DAILY WITH MEAKS PLUS SLIDING SCALE) 90 mL 0    Glucose Blood (TRUE METRIX BLOOD GLUCOSE TEST) In Vitro Strip Use to check blood sugar three times a day 300 strip 1    metoprolol succinate ER 25 MG Oral Tablet 24 Hr Take 1 tablet (25 mg total) by mouth nightly. 90 tablet 1    metoprolol succinate  MG Oral Tablet 24 Hr Take 1 tablet (100 mg total) by mouth daily. 90 tablet 1     hydrALAZINE 100 MG Oral Tab Take 1 tablet (100 mg total) by mouth 2 (two) times daily. 180 tablet 3    Insulin Pen Needle (BD PEN NEEDLE ABDOULAYE 2ND GEN) 32G X 4 MM Does not apply Misc INJECT FOUR TIMES DAILY AS DIRECTED 50 each 1    atorvastatin 80 MG Oral Tab Take 1 tablet (80 mg total) by mouth nightly. 90 tablet 3    Accu-Chek FastClix Lancets Does not apply Misc Use to check blood sugar three times a day 300 each 0    Glucose Blood (ACCU-CHEK GUIDE) In Vitro Strip Use to check blood sugar three times a day 300 strip 0    levETIRAcetam 500 MG Oral Tab TAKE 1 TABLET TWICE DAILY 180 tablet 1    Omeprazole 40 MG Oral Capsule Delayed Release TAKE 1 CAPSULE EVERY DAY 30 TO 60 MINUTES BEFORE A MEAL 90 capsule 3    Alcohol Swabs (DROPSAFE ALCOHOL PREP) 70 % Does not apply Pads USE AS DIRECTED 100 each 0    Blood Glucose Monitoring Suppl (ACCU-CHEK GUIDE) w/Device Does not apply Kit Use to check blood sugar three times a day 1 kit 0    fluticasone propionate 50 MCG/ACT Nasal Suspension 2 sprays by Nasal route daily. 48 g 1    VENTOLIN  (90 Base) MCG/ACT Inhalation Aero Soln Inhale 2 puffs into the lungs every 6 (six) hours as needed for Wheezing. 18 g 1    aspirin 325 MG Oral Tab Take 1 tablet (325 mg total) by mouth daily.      Continuous Glucose Sensor (DEXCOM G7 SENSOR) Does not apply Misc 1 each Every 10 days. 9 each 1    Blood Glucose Monitoring Suppl (TRUE METRIX METER) w/Device Does not apply Kit Use to check blood sugar four times a day 1 kit 0     Allergies:  Allergies   Allergen Reactions    Reglan [Metoclopramide] OTHER (SEE COMMENTS)     Sensitivity, with crawling sensation.    Adhesive Tape      itching    Radiology Contrast Iodinated Dyes ITCHING    Wound Dressing Adhesive RASH       Objective:     Physical Exam  Vitals and nursing note reviewed.   Constitutional:       General: She is not in acute distress-      Appearance: She is well-developed. She is obese.   HENT:      Head: Normocephalic and  atraumatic.   Neck:      Thyroid: No thyromegaly.      Vascular: No JVD.   Cardiovascular:      Rate and Rhythm: Normal rate and regular rhythm.      Heart sounds: Normal heart sounds. No murmur heard.  Pulmonary:      Effort: Pulmonary effort is normal. No respiratory distress.      Breath sounds: Normal breath sounds. No wheezing or rales.   Abdominal:      Palpations: Abdomen is soft. There is no hernia     Tenderness: There is no abdominal tenderness. There is no right CVA tenderness, left CVA tenderness, guarding or rebound.      Comments:    Neuro -no acute findings non focal Musculoskeletal:      Cervical back: Neck supple. Rom neck  decreased      Right lower leg: No edema.      Left lower leg: No edema.   Lymphadenopathy:      Cervical: No cervical adenopathy.   Skin:     General: Skin is warm and dry.        Mental Status: She is alert and oriented to person, place, and time.   Psychiatric:         Mood and Affect: Mood - anxious or depressed           Blood pressure 120/75, pulse 60, height 5' 2\" (1.575 m), weight 193 lb (87.5 kg), not currently breastfeeding.    Assessment & Plan:       Essential hypertension with goal blood pressure less than 140/90  Take high blood pressure medication as perscribed   Low- sodium diet   Maintain walking - as tolerated   Track and record blood pressure and heart rate at home   The side effects of medication discussed with patient   losartan 100 mg every day  metoprolol 100 +25 mg =125 mg  every day   Amlodipine 10 mg every day   hydrochlorothiazide 100 mg bid    bring the blood pressure monitor   Keep  bp in 120-130/80 hr 60 Range       - Comp Metabolic Panel (14)  - TSH W Reflex To Free T4  - Lipid Panel  - POC HemoCue Glucose 201 (Finger stick glucose)    - losartan 100 MG Oral Tab; Take 1 tablet (100 mg total) by mouth daily.  Dispense: 90 tablet; Refill: 3  - Neuro Referral - In Network  - Comp Metabolic Panel (14)  - TSH W Reflex To Free T4  - Lipid Panel    HX  cerebrovascular accident (CVA) (HCC) in the right  occipital temporal frontal araa   Hx  carotid endarterectomy    - Neuro Referral -DR Gillis   pt  state has apt with her  Neurologist    Denies today headaches or  dizziness   Stable cpm         Stage 3 chronic kidney disease, unspecified whether stage 3a or 3b CKD (HCC)  - seeing Nephrology   Labs discussed with pt   Cpm stable     Anxiety  depression   Lexapro 10 mg  every day   Stable cpm     Diabetes Mellitus Type 2, Uncontrolled  Improving   Under care of  Dr Caceres           No orders of the defined types were placed in this encounter.      Meds This Visit:  Requested Prescriptions      No prescriptions requested or ordered in this encounter         Imaging & Referrals:  None

## 2024-10-16 NOTE — TELEPHONE ENCOUNTER
Spoke to pt. Pt notified of [facility] referral signed and provider number to schedule. Verbalized understanding.

## 2024-10-17 ENCOUNTER — OFFICE VISIT (OUTPATIENT)
Dept: PHYSICAL MEDICINE AND REHAB | Facility: CLINIC | Age: 54
End: 2024-10-17
Payer: MEDICARE

## 2024-10-17 ENCOUNTER — TELEPHONE (OUTPATIENT)
Dept: PHYSICAL MEDICINE AND REHAB | Facility: CLINIC | Age: 54
End: 2024-10-17

## 2024-10-17 VITALS — WEIGHT: 193 LBS | BODY MASS INDEX: 35.51 KG/M2 | HEIGHT: 62 IN

## 2024-10-17 DIAGNOSIS — M54.12 CERVICAL RADICULOPATHY: Primary | ICD-10-CM

## 2024-10-17 DIAGNOSIS — M47.812 ARTHROPATHY OF CERVICAL FACET JOINT: ICD-10-CM

## 2024-10-17 PROCEDURE — 99214 OFFICE O/P EST MOD 30 MIN: CPT | Performed by: PHYSICAL MEDICINE & REHABILITATION

## 2024-10-18 NOTE — TELEPHONE ENCOUNTER
Initiated authorization for Right C5-6, C6-7 Facet joint injection under fluoroscopy guidance under local anesthesia. CPT/HCPCS 88388, 78426 dx:M47.812 to be done at [facility] with [employer]    Status: Approved  Reference/Authorization # [id number]  Valid: 10/18/24-12/17/24

## 2024-10-21 PROBLEM — M54.12 CERVICAL RADICULOPATHY: Status: ACTIVE | Noted: 2024-10-21

## 2024-10-21 PROBLEM — M47.812 ARTHROPATHY OF CERVICAL FACET JOINT: Status: ACTIVE | Noted: 2024-10-21

## 2024-10-21 NOTE — PROGRESS NOTES
[facility]  OFFICE FOLLOW UP EVALUATION      HISTORY OF PRESENT ILLNESS:     Chief Complaint   Patient presents with    Follow - Up     LOV 8/15/24. F/U for neck and shoulder pain with right shoulder being worse. Pain 9/10. Denies N/T. Denies weakness. Feels swelling. Patient takes Lyrica 2x daily with some improvement but increased faitgue. No PT yet but HEP 3x weekly with no improvement. Icing area helps somewhat.       Patient is following up for neck pain and right shoulder pain.  Since last visit she has noted good improvement in the shoulder pain but continues to have axial neck pain.  She has a hard time with any rotation of the cervical spine which recreates pain.  She rates it to be 9 out of 10.  Is any numbness tingling or radiating symptoms in the arm.  She is taking Lyrica twice daily which she is tolerating well.  She has been doing home exercises.  She is icing the area which is somewhat helpful.    PHYSICAL EXAM:   Ht 62\"   Wt 193 lb (87.5 kg)   BMI 35.30 kg/m²     Gait: Normal     CERVICAL SPINE:  Inspection: no erythema, swelling, or obvious deformity  Palpation: no ttp over spinous process.  Tenderness to palpation of the cervical paraspinals, upper trap and levator scapulae bilaterally with tenderness along the thoracic paraspinals as well  ROM: intact to all planes of motion of cervical spine including side-bend bilaterally, rotation bilaterally, flexion, and extension but pain with end range of motion  Strength: 5/5 in upper extremities bilaterally  Sensation: Intact to light touch in all dermatomes of the bilateral upper extremities  Reflexes: 1/4 at C5, C6, C7 bilaterally  Spurling Test: negative for radicular symptoms down either extremity bilaterally  Alan's sign: negative bilaterally  Positive Neer's and Landin test.  Restricted range of motion of the shoulder overhead with abduction range of flexion       IMAGING:   MRI cervical spine  completed 8/7/2024 was personally reviewed which is notable for small central disc extrusion at C6-7 with cephalad extrusion resulting in mild central spinal canal narrowing and indentation of the ventral aspect of the spinal cord with no significant foraminal narrowing.  At C3-4 patient has a broad-based central left paracentral disc herniation resulting in mild central canal stenosis and symmetric stenosis to the left.    MRI of the right shoulder completed    All imaging results were reviewed and discussed with patient.      ASSESSMENT/PLAN:     1. Cervical radiculopathy    2. Arthropathy of cervical facet joint        Anjel Pisano is a 54 year old female following up for right-sided neck and shoulder pain.  She has responded well to the subacromial bursa injection but continues to have neck pain right-sided with cervical facet arthropathy.  I recommended a right sided cervical facet joint injections under fluoroscopy guidance.  Advised patient my office reach out to her once this is approved.    The patient verbalized understanding with the plan and was in agreement. All questions/concerns were addressed and there were no barriers to learning.  Please note Dragon dictation software was used to dictate this note and may result in inadvertent typos.    Elis Bhatt DO, FAAPMR & CAQSM  Physical Medicine and Rehabilitation  Sports and Spine Medicine    PAST MEDICAL HISTORY:     Past Medical History:    Age-related nuclear cataract of both eyes    Anemia    Apnea    Cataract    Coronary atherosclerosis    Depression    DM type 2 (diabetes mellitus, type 2) ([facility])    Esophageal reflux    High blood pressure    High cholesterol    Meibomian gland dysfunction (MGD), bilateral, both upper and lower lids    Migraine    Muscle weakness    left sided weakness uses walker and cane    Sleep apnea    Stenosis of right vertebral artery    Stroke ([facility])    Type II or unspecified type diabetes mellitus without mention of  complication, not stated as uncontrolled         PAST SURGICAL HISTORY:     Past Surgical History:   Procedure Laterality Date    Brain surgery  2014    Colonoscopy N/A 8/25/2017    Procedure: COLONOSCOPY;  Surgeon: Sharmaine Burks MD;  Location: [facility] ENDOSCOPY    Colonoscopy      Colonoscopy N/A 11/12/2022    Procedure: COLONOSCOPY with Polypectomy;  Surgeon: Terry Weaver MD;  Location: [facility] ENDOSCOPY    Excise carotid body+carotid artery  09/2017    Incision and drainage Right 04/19/16    lower abdominal skin    Laparoscopic cholecystectomy  2002         CURRENT MEDICATIONS:     Current Outpatient Medications   Medication Sig Dispense Refill    insulin glargine (LANTUS SOLOSTAR) 100 UNIT/ML Subcutaneous Solution Pen-injector Inject 30 Units into the skin nightly. 27 mL 1    hydroCHLOROthiazide 25 MG Oral Tab Take 0.5 tablets (12.5 mg total) by mouth daily.      Continuous Glucose Sensor (DEXCOM G7 SENSOR) Does not apply Misc 1 each Every 10 days. 9 each 1    losartan 100 MG Oral Tab Take 1 tablet (100 mg total) by mouth daily. 90 tablet 3    escitalopram 10 MG Oral Tab Take 1 tablet (10 mg total) by mouth daily. 90 tablet 3    amLODIPine 10 MG Oral Tab Take 1 tablet (10 mg total) by mouth daily. 90 tablet 3    Glucose Blood (ONETOUCH ULTRA) In Vitro Strip Please provide test strips that are covered by patient's insurance. 100 strip 0    Blood Glucose Monitoring Suppl (ONETOUCH ULTRA 2) w/Device Does not apply Kit Please provide what is covered by patient's insurance 1 kit 0    pregabalin (LYRICA) 75 MG Oral Cap Take 1 capsule (75 mg total) by mouth 2 (two) times daily. 60 capsule 0    ERGOCALCIFEROL 1.25 MG (66859 UT) Oral Cap TAKE 1 CAPSULE BY MOUTH 1 TIME A WEEK FOR 12 DOSES 12 capsule 1    metFORMIN 500 MG Oral Tab Take 2 tablets (1,000 mg total) by mouth 2 (two) times daily with meals. 360 tablet 1    Blood Glucose Monitoring Suppl (TRUE METRIX METER) w/Device Does not apply Kit Use to check blood  sugar four times a day 1 kit 0    Glucose Blood (TRUE METRIX BLOOD GLUCOSE TEST) In Vitro Strip Check blood sugar four times a day 400 strip 1    TRUEplus Lancets 33G Does not apply Misc USE TO CHECK BLOOD SUGAR FOUR TIMES DAILY 400 each 1    NOVOLOG FLEXPEN 100 UNIT/ML Subcutaneous Solution Pen-injector INJECT 35 UNITS INTO THE SKIN THREE TIMES DAILY WITH MEAKS PLUS SLIDING SCALE (Patient taking differently: INJECT 10  UNITS INTO THE SKIN THREE TIMES DAILY WITH MEAKS PLUS SLIDING SCALE) 90 mL 0    Glucose Blood (TRUE METRIX BLOOD GLUCOSE TEST) In Vitro Strip Use to check blood sugar three times a day 300 strip 1    metoprolol succinate ER 25 MG Oral Tablet 24 Hr Take 1 tablet (25 mg total) by mouth nightly. 90 tablet 1    metoprolol succinate  MG Oral Tablet 24 Hr Take 1 tablet (100 mg total) by mouth daily. 90 tablet 1    hydrALAZINE 100 MG Oral Tab Take 1 tablet (100 mg total) by mouth 2 (two) times daily. 180 tablet 3    Insulin Pen Needle (BD PEN NEEDLE ABDOULAYE 2ND GEN) 32G X 4 MM Does not apply Misc INJECT FOUR TIMES DAILY AS DIRECTED 50 each 1    atorvastatin 80 MG Oral Tab Take 1 tablet (80 mg total) by mouth nightly. 90 tablet 3    Accu-Chek FastClix Lancets Does not apply Misc Use to check blood sugar three times a day 300 each 0    Glucose Blood (ACCU-CHEK GUIDE) In Vitro Strip Use to check blood sugar three times a day 300 strip 0    levETIRAcetam 500 MG Oral Tab TAKE 1 TABLET TWICE DAILY 180 tablet 1    Omeprazole 40 MG Oral Capsule Delayed Release TAKE 1 CAPSULE EVERY DAY 30 TO 60 MINUTES BEFORE A MEAL 90 capsule 3    Alcohol Swabs (DROPSAFE ALCOHOL PREP) 70 % Does not apply Pads USE AS DIRECTED 100 each 0    Blood Glucose Monitoring Suppl (ACCU-CHEK GUIDE) w/Device Does not apply Kit Use to check blood sugar three times a day 1 kit 0    fluticasone propionate 50 MCG/ACT Nasal Suspension 2 sprays by Nasal route daily. 48 g 1    VENTOLIN  (90 Base) MCG/ACT Inhalation Aero Soln Inhale 2 puffs  into the lungs every 6 (six) hours as needed for Wheezing. 18 g 1    aspirin 325 MG Oral Tab Take 1 tablet (325 mg total) by mouth daily.           ALLERGIES:     Allergies   Allergen Reactions    Reglan [Metoclopramide] OTHER (SEE COMMENTS)     Sensitivity, with crawling sensation.    Adhesive Tape      itching    Radiology Contrast Iodinated Dyes ITCHING    Wound Dressing Adhesive RASH         FAMILY HISTORY:     Family History   Problem Relation Age of Onset    Diabetes Father     Hypertension Father     Heart Disorder Father     Lipids Father     Obesity Father     Colon Cancer Father     Heart Disorder Mother     Lipids Mother     Obesity Mother     Lipids Brother     Obesity Brother     Glaucoma Neg     Macular degeneration Neg           SOCIAL HISTORY:     Social History     Socioeconomic History    Marital status: Legally     Number of children: 3   Occupational History    Occupation:      Comment: disabled    Occupation: phlebotomy   Tobacco Use    Smoking status: Former     Current packs/day: 0.00     Average packs/day: 0.2 packs/day for 1 year (0.2 ttl pk-yrs)     Types: Cigarettes     Start date: 12/16/2012     Quit date: 12/16/2013     Years since quitting: 10.8    Smokeless tobacco: Never   Vaping Use    Vaping status: Never Used   Substance and Sexual Activity    Alcohol use: No     Alcohol/week: 0.0 standard drinks of alcohol    Drug use: No    Sexual activity: Not Currently     Social Drivers of Health     Financial Resource Strain: Low Risk  (4/21/2023)    Financial Resource Strain     Difficulty of Paying Living Expenses: Not hard at all     Med Affordability: No   Transportation Needs: Unmet Transportation Needs (1/9/2024)    Transportation Needs     Lack of Transportation: Yes   Physical Activity: Inactive (4/21/2023)    Exercise Vital Sign     Days of Exercise per Week: 0 days     Minutes of Exercise per Session: 0 min   Stress: No Stress Concern Present (4/21/2023)     Stress     Feeling of Stress : No    Received from [facility] Short Social Needs Screening - Social Connection   Housing Stability: Low Risk  (4/21/2023)    Housing Stability     Housing Instability: No          REVIEW OF SYSTEMS:   A comprehensive 10 point review of systems was completed.  Pertinent positives and negatives noted in the the HPI.      LABS:     Lab Results   Component Value Date     (H) 05/23/2024    A1C 10.6 (A) 09/23/2024     Lab Results   Component Value Date    WBC 12.2 (H) 09/26/2024    RBC 5.77 (H) 09/26/2024    HGB 13.8 09/26/2024    HCT 44.3 09/26/2024    MCV 76.8 (L) 09/26/2024    MCH 23.9 (L) 09/26/2024    MCHC 31.2 09/26/2024    RDW 17.8 (H) 09/26/2024    .0 09/26/2024    MPV 10.1 08/20/2018     Lab Results   Component Value Date     (H) 09/26/2024    BUN 26 (H) 09/26/2024    BUNCREA 22.2 (H) 09/26/2024    CREATSERUM 1.17 (H) 09/26/2024    ANIONGAP 6 09/26/2024    GFRNAA 66 05/16/2022    GFRAA 76 05/16/2022    CA 10.5 (H) 09/26/2024    OSMOCALC 293 09/26/2024    ALKPHO 127 (H) 09/26/2024    AST 13 09/26/2024    ALT 15 09/26/2024    ALKPHOS 84 09/26/2016    BILT 0.6 09/26/2024    TP 8.5 (H) 09/26/2024    ALB 4.9 (H) 09/26/2024    GLOBULIN 3.6 (H) 09/26/2024    AGRATIO 0.9 (L) 09/26/2016     09/26/2024    K 4.2 09/26/2024     09/26/2024    CO2 30.0 09/26/2024     Lab Results   Component Value Date    PTP 12.5 08/31/2017    PT 12.8 09/26/2016    INR 1.0 08/31/2017     Lab Results   Component Value Date    VITD 23.4 (L) 07/13/2023

## 2024-10-21 NOTE — TELEPHONE ENCOUNTER
Status of Anticoag  [x] Clearance pending to hold ASA 325mg for 7 days prior to Right C5-6 and C6-7 facet joint injections faxed to Dr. Yanez F: [phone]  [] Clearance approved  [] Clearance denied

## 2024-10-24 ENCOUNTER — TELEPHONE (OUTPATIENT)
Dept: INTERNAL MEDICINE CLINIC | Facility: CLINIC | Age: 54
End: 2024-10-24

## 2024-10-24 DIAGNOSIS — I10 PRIMARY HYPERTENSION: ICD-10-CM

## 2024-10-24 DIAGNOSIS — I69.993 CVA, OLD, ATAXIA: Primary | ICD-10-CM

## 2024-10-24 DIAGNOSIS — Z98.890 S/P CAROTID ENDARTERECTOMY: ICD-10-CM

## 2024-10-24 NOTE — TELEPHONE ENCOUNTER
Noted  , as advised her many times  before -  patient to see  her Neurosurgeon -  Dr Gillis  for  clearance - referral placed in system   .

## 2024-10-24 NOTE — TELEPHONE ENCOUNTER
Advised patient of Dr. Yanez's note. Patient verbalized understanding. Indicated that referral needed to be faxed to Dr Gillis. Patient did not have fax number and office currently closed.     Please call Dr Gillis's office tomorrow for fax number and fax referral.

## 2024-10-24 NOTE — TELEPHONE ENCOUNTER
Spoke with patient to clarify if she needs to see neurology or neurosurgery. She stated she's needs to see neurology per Dr. Elis Bhatt prior to getting injection at Dr. Bhatt's office.     Patient stated she cannot move her leg or arm. She states she is always in pain and needs to be seen as soon as possible.      She stated she called the neurologist and they did not have any availability until 2025 but she feels she needs to be seen sooner.     Patient has new insurance plan so referrals placed in September are not valid, she needs new ones.

## 2024-10-25 NOTE — TELEPHONE ENCOUNTER
Called Dr. Gillis's office [phone], spoke with Amanda.  Copy of referral right faxed to Dr. Gillis's office at F [phone]    [facility] [address]  [place]  [address]  Phone  [phone]  Fax  [phone]

## 2024-10-28 ENCOUNTER — TELEPHONE (OUTPATIENT)
Dept: INTERNAL MEDICINE CLINIC | Facility: CLINIC | Age: 54
End: 2024-10-28

## 2024-10-28 ENCOUNTER — LAB ENCOUNTER (OUTPATIENT)
Dept: LAB | Facility: HOSPITAL | Age: 54
End: 2024-10-28
Attending: INTERNAL MEDICINE
Payer: MEDICARE

## 2024-10-28 ENCOUNTER — OFFICE VISIT (OUTPATIENT)
Facility: CLINIC | Age: 54
End: 2024-10-28

## 2024-10-28 VITALS
HEART RATE: 57 BPM | BODY MASS INDEX: 36 KG/M2 | SYSTOLIC BLOOD PRESSURE: 127 MMHG | DIASTOLIC BLOOD PRESSURE: 75 MMHG | WEIGHT: 195 LBS

## 2024-10-28 DIAGNOSIS — N18.30 STAGE 3 CHRONIC KIDNEY DISEASE, UNSPECIFIED WHETHER STAGE 3A OR 3B CKD (HCC): ICD-10-CM

## 2024-10-28 DIAGNOSIS — E11.21 TYPE 2 DIABETES MELLITUS WITH NEPHROPATHY (HCC): ICD-10-CM

## 2024-10-28 DIAGNOSIS — I1A.0 RESISTANT HYPERTENSION: ICD-10-CM

## 2024-10-28 DIAGNOSIS — Z98.890 HISTORY OF CRANIOTOMY: ICD-10-CM

## 2024-10-28 DIAGNOSIS — N18.30 STAGE 3 CHRONIC KIDNEY DISEASE (HCC): Primary | ICD-10-CM

## 2024-10-28 DIAGNOSIS — N18.30 STAGE 3 CHRONIC KIDNEY DISEASE, UNSPECIFIED WHETHER STAGE 3A OR 3B CKD (HCC): Primary | ICD-10-CM

## 2024-10-28 DIAGNOSIS — E83.52 HYPERCALCEMIA: ICD-10-CM

## 2024-10-28 LAB
ANION GAP SERPL CALC-SCNC: 7 MMOL/L (ref 0–18)
BUN BLD-MCNC: 13 MG/DL (ref 9–23)
BUN/CREAT SERPL: 12.3 (ref 10–20)
CALCIUM BLD-MCNC: 9.7 MG/DL (ref 8.7–10.4)
CHLORIDE SERPL-SCNC: 102 MMOL/L (ref 98–112)
CO2 SERPL-SCNC: 30 MMOL/L (ref 21–32)
CREAT BLD-MCNC: 1.06 MG/DL
EGFRCR SERPLBLD CKD-EPI 2021: 62 ML/MIN/1.73M2 (ref 60–?)
FASTING STATUS PATIENT QL REPORTED: NO
GLUCOSE BLD-MCNC: 297 MG/DL (ref 70–99)
IGA SERPL-MCNC: 400.8 MG/DL (ref 40–350)
IGM SERPL-MCNC: 73.8 MG/DL (ref 50–300)
IMMUNOGLOBULIN PNL SER-MCNC: 1081 MG/DL (ref 650–1600)
OSMOLALITY SERPL CALC.SUM OF ELEC: 299 MOSM/KG (ref 275–295)
POTASSIUM SERPL-SCNC: 4.4 MMOL/L (ref 3.5–5.1)
PROT UR-MCNC: 20.4 MG/DL (ref ?–14)
SODIUM SERPL-SCNC: 139 MMOL/L (ref 136–145)

## 2024-10-28 PROCEDURE — 82784 ASSAY IGA/IGD/IGG/IGM EACH: CPT

## 2024-10-28 PROCEDURE — 86334 IMMUNOFIX E-PHORESIS SERUM: CPT

## 2024-10-28 PROCEDURE — 83521 IG LIGHT CHAINS FREE EACH: CPT

## 2024-10-28 PROCEDURE — 80048 BASIC METABOLIC PNL TOTAL CA: CPT | Performed by: INTERNAL MEDICINE

## 2024-10-28 PROCEDURE — 84165 PROTEIN E-PHORESIS SERUM: CPT

## 2024-10-28 PROCEDURE — 84166 PROTEIN E-PHORESIS/URINE/CSF: CPT

## 2024-10-28 PROCEDURE — 84156 ASSAY OF PROTEIN URINE: CPT

## 2024-10-28 PROCEDURE — 36415 COLL VENOUS BLD VENIPUNCTURE: CPT

## 2024-10-28 PROCEDURE — 86335 IMMUNFIX E-PHORSIS/URINE/CSF: CPT

## 2024-10-28 PROCEDURE — 82542 COL CHROMOTOGRAPHY QUAL/QUAN: CPT

## 2024-10-28 RX ORDER — TORSEMIDE 10 MG/1
10 TABLET ORAL DAILY
Qty: 30 TABLET | Refills: 1 | Status: SHIPPED | OUTPATIENT
Start: 2024-10-28

## 2024-10-28 RX ORDER — AMLODIPINE BESYLATE 10 MG/1
10 TABLET ORAL DAILY
Qty: 90 TABLET | Refills: 3 | Status: SHIPPED | OUTPATIENT
Start: 2024-10-28

## 2024-10-28 RX ORDER — INSULIN LISPRO 100 [IU]/ML
INJECTION, SOLUTION INTRAVENOUS; SUBCUTANEOUS
COMMUNITY
Start: 2024-10-26

## 2024-10-28 NOTE — TELEPHONE ENCOUNTER
Dr Hernández, please advise on amlodipine refill request?    Patient (name and  verified) calling to state she needs a Rx for the amlodipine. States the pharmacy is saying she picked up the medication but patient states she does not have it. Verified with pharmacy below.  Explained to patient that she may have an issue with the insurance covering another Rx so soon. Instructed patient to make sure that a family member did not  the Rx for her on 10/4/24 as reported by the pharmacy.       Patient (name and  verified) calling for a refill for amlodipine.   Noted last refill was 24 with 3 refills.  Informed patient of this and she states that the pharmacy does not have the Rx.    Called [facility] to confirm information. Spoke with Terrie. Pharmacy states that the amlodipine was picked up from another location, [facility] in [place] [facility] [address] [facility] on 10/4/24.      Authorizing Provider Encounter Provider   Yves Yanez MD Vukomanovic, Emela, MD     Medication Detail    Medication Quantity Refills Start End   amLODIPine 10 MG Oral Tab 90 tablet 3 2024 --   Sig:   Take 1 tablet (10 mg total) by mouth daily.

## 2024-10-28 NOTE — PROGRESS NOTES
[facility]    Progress Note     Anjel Pisano    54 female with history of hypertension 23+ yrs, diabetes approx 23 yrs complicated by diabetic retinopathy / neuropathy, hypercholesterolemia,  prior CVA, carotid surg, anemia here for initial evaluation for elevated cr   Reviewed labs- cr normal prior however in aug elevated and states she was not well during that time     Denies any nsaid use, no UTIs, no kidney stones  Strong FH of DM,     Tried jardiance , ozempic and mounjaro and didn't suit her      HISTORY:  Past Medical History:    Age-related nuclear cataract of both eyes    Anemia    Apnea    Cataract    Coronary atherosclerosis    Depression    DM type 2 (diabetes mellitus, type 2) (HCC)    Esophageal reflux    High blood pressure    High cholesterol    Meibomian gland dysfunction (MGD), bilateral, both upper and lower lids    Migraine    Muscle weakness    left sided weakness uses walker and cane    Sleep apnea    Stenosis of right vertebral artery    Stroke ([facility])    Type II or unspecified type diabetes mellitus without mention of complication, not stated as uncontrolled      Past Surgical History:   Procedure Laterality Date    Brain surgery  2014    Colonoscopy N/A 8/25/2017    Procedure: COLONOSCOPY;  Surgeon: Sharmaine Burks MD;  Location: [facility] ENDOSCOPY    Colonoscopy      Colonoscopy N/A 11/12/2022    Procedure: COLONOSCOPY with Polypectomy;  Surgeon: Terry Weaver MD;  Location: [facility] ENDOSCOPY    Excise carotid body+carotid artery  09/2017    Incision and drainage Right 04/19/16    lower abdominal skin    Laparoscopic cholecystectomy  2002           Medications (Active prior to today's visit):  Current Outpatient Medications   Medication Sig Dispense Refill    amLODIPine 10 MG Oral Tab Take 1 tablet (10 mg total) by mouth daily. 90 tablet 3    Insulin Lispro, 1 Unit Dial, 100 UNIT/ML Subcutaneous Solution Pen-injector       torsemide 10 MG Oral Tab Take 1 tablet (10 mg  total) by mouth daily. 30 tablet 1    insulin glargine (LANTUS SOLOSTAR) 100 UNIT/ML Subcutaneous Solution Pen-injector Inject 30 Units into the skin nightly. 27 mL 1    hydroCHLOROthiazide 25 MG Oral Tab Take 0.5 tablets (12.5 mg total) by mouth daily.      Continuous Glucose Sensor (DEXCOM G7 SENSOR) Does not apply Misc 1 each Every 10 days. 9 each 1    losartan 100 MG Oral Tab Take 1 tablet (100 mg total) by mouth daily. 90 tablet 3    escitalopram 10 MG Oral Tab Take 1 tablet (10 mg total) by mouth daily. 90 tablet 3    Glucose Blood (ONETOUCH ULTRA) In Vitro Strip Please provide test strips that are covered by patient's insurance. 100 strip 0    Blood Glucose Monitoring Suppl (ONETOUCH ULTRA 2) w/Device Does not apply Kit Please provide what is covered by patient's insurance 1 kit 0    pregabalin (LYRICA) 75 MG Oral Cap Take 1 capsule (75 mg total) by mouth 2 (two) times daily. 60 capsule 0    ERGOCALCIFEROL 1.25 MG (39144 UT) Oral Cap TAKE 1 CAPSULE BY MOUTH 1 TIME A WEEK FOR 12 DOSES 12 capsule 1    metFORMIN 500 MG Oral Tab Take 2 tablets (1,000 mg total) by mouth 2 (two) times daily with meals. 360 tablet 1    Blood Glucose Monitoring Suppl (TRUE METRIX METER) w/Device Does not apply Kit Use to check blood sugar four times a day 1 kit 0    Glucose Blood (TRUE METRIX BLOOD GLUCOSE TEST) In Vitro Strip Check blood sugar four times a day 400 strip 1    TRUEplus Lancets 33G Does not apply Misc USE TO CHECK BLOOD SUGAR FOUR TIMES DAILY 400 each 1    NOVOLOG FLEXPEN 100 UNIT/ML Subcutaneous Solution Pen-injector INJECT 35 UNITS INTO THE SKIN THREE TIMES DAILY WITH MEAKS PLUS SLIDING SCALE 90 mL 0    Glucose Blood (TRUE METRIX BLOOD GLUCOSE TEST) In Vitro Strip Use to check blood sugar three times a day 300 strip 1    metoprolol succinate ER 25 MG Oral Tablet 24 Hr Take 1 tablet (25 mg total) by mouth nightly. 90 tablet 1    metoprolol succinate  MG Oral Tablet 24 Hr Take 1 tablet (100 mg total) by mouth  daily. 90 tablet 1    hydrALAZINE 100 MG Oral Tab Take 1 tablet (100 mg total) by mouth 2 (two) times daily. 180 tablet 3    Insulin Pen Needle (BD PEN NEEDLE ABDOULAYE 2ND GEN) 32G X 4 MM Does not apply Misc INJECT FOUR TIMES DAILY AS DIRECTED 50 each 1    atorvastatin 80 MG Oral Tab Take 1 tablet (80 mg total) by mouth nightly. 90 tablet 3    Accu-Chek FastClix Lancets Does not apply Misc Use to check blood sugar three times a day 300 each 0    Glucose Blood (ACCU-CHEK GUIDE) In Vitro Strip Use to check blood sugar three times a day 300 strip 0    levETIRAcetam 500 MG Oral Tab TAKE 1 TABLET TWICE DAILY 180 tablet 1    Omeprazole 40 MG Oral Capsule Delayed Release TAKE 1 CAPSULE EVERY DAY 30 TO 60 MINUTES BEFORE A MEAL 90 capsule 3    Alcohol Swabs (DROPSAFE ALCOHOL PREP) 70 % Does not apply Pads USE AS DIRECTED 100 each 0    Blood Glucose Monitoring Suppl (ACCU-CHEK GUIDE) w/Device Does not apply Kit Use to check blood sugar three times a day 1 kit 0    fluticasone propionate 50 MCG/ACT Nasal Suspension 2 sprays by Nasal route daily. 48 g 1    VENTOLIN  (90 Base) MCG/ACT Inhalation Aero Soln Inhale 2 puffs into the lungs every 6 (six) hours as needed for Wheezing. 18 g 1    aspirin 325 MG Oral Tab Take 1 tablet (325 mg total) by mouth daily. (Patient not taking: Reported on 10/28/2024)         Allergies:  Allergies[1]      ROS:     Constitutional:  Negative for decreased activity, fever, irritability and lethargy  ENMT:  Negative for ear drainage, hearing loss and nasal drainage  Eyes:  Negative for eye discharge and vision loss  Cardiovascular:  Negative for chest pain, sob  Respiratory:  Negative for cough, dyspnea and wheezing  Gastrointestinal:  Negative for abdominal pain, constipation  Genitourinary:  Negative for dysuria and hematuria  Endocrine:  Negative for abnormal sleep patterns  Hema/Lymph:  Negative for easy bleeding and easy bruising  Integumentary:  Negative for pruritus and  rash  Musculoskeletal:  Negative for bone/joint symptoms  Neurological:  Negative for gait disturbance  Psychiatric:  Negative for inappropriate interaction and psychiatric symptoms      Vitals:    10/28/24 1402   BP: 127/75   Pulse: 57       PHYSICAL EXAM:   Constitutional: appears well hydrated alert and responsive   Head/Face: normocephalic  Eyes/Vision: normal extraocular motion is intact  Nose/Mouth/Throat:mucous membranes are moist   Neck/Thyroid: neck is supple without adenopathy  Respiratory:  lungs are clear to auscultation bilaterally  Cardiovascular: regular rate and rhythm   Abdomen: soft, non-tender, non-distended, BS normal  Skin/Hair: no unusual rashes present, no abnormal bruising noted  Musculoskeletal: no deformities  Extremities: 1+  edema  Neurological:  Grossly normal      Lab Results   Component Value Date     09/26/2024     08/22/2024     (L) 08/20/2024    K 4.2 09/26/2024    K 4.7 08/22/2024    K 4.7 08/20/2024     09/26/2024     08/22/2024     08/20/2024    CO2 30.0 09/26/2024    CO2 31.0 08/22/2024    CO2 28.0 08/20/2024    BUN 26 (H) 09/26/2024    BUN 22 08/22/2024    BUN 26 (H) 08/20/2024    CREATSERUM 1.17 (H) 09/26/2024    CREATSERUM 1.25 (H) 08/22/2024    CREATSERUM 1.32 (H) 08/20/2024    CA 10.5 (H) 09/26/2024    CA 10.7 (H) 08/22/2024    CA 10.3 08/20/2024    EGFRCR 55 (L) 09/26/2024    EGFRCR 51 (L) 08/22/2024    EGFRCR 47 (L) 08/20/2024    ALB 4.9 (H) 09/26/2024    ALB 4.7 08/20/2024    ALB 4.6 05/23/2024    PHOS 4.2 09/26/2024    PTH 38.5 09/26/2024          ASSESSMENT/PLAN:   Assessment   1. Stage 3 chronic kidney disease, unspecified whether stage 3a or 3b CKD (HCC)    2. Hypercalcemia    3. Resistant hypertension    4. Type 2 diabetes mellitus with nephropathy (HCC)       CKD 3  Cr elevated in August labs  Likely sec to uncontrolled HTN/DM  Renal US with left kidney inc echogenicity- prior obstruction/ pyelo, medical disease or less likely  prior infarct. Doppler negative  Repeat cr 1.1 mg/dl, eGFR 55 ml/min  Avoid nsaids  Hypercalcemia noted, ipth appropriately low. Electrophoresis and pthrp. Stop hydrochlorothiazide.   ++ edema start torsemide. UPC normal . Low salt diet. May need echo     Resistant HTN  Doppler negative  Well controlled.   + edema stop hydrochlorothiazide and start torsemide. Per pt she has been on amlodipine for a long time     DM2 with nephropathy  Microalbuminuria noted-upc 0.1 g/g  On ARB  Cannot tolerate SGLT2i  Need better control of her DM      PLAN  No calcium supplements  Stop hydrochlorothiazide  Start torsemide 10 mg daily  Do labs today  Labs and fu in 4 weeks  May need an echo       Orders This Visit:  No orders of the defined types were placed in this encounter.      Meds This Visit:  Requested Prescriptions     Signed Prescriptions Disp Refills    torsemide 10 MG Oral Tab 30 tablet 1     Sig: Take 1 tablet (10 mg total) by mouth daily.       Imaging & Referrals:  None       Melissa Guerrero MD  10/28/2024            [1]   Allergies  Allergen Reactions    Reglan [Metoclopramide] OTHER (SEE COMMENTS)     Sensitivity, with crawling sensation.    Adhesive Tape      itching    Radiology Contrast Iodinated Dyes ITCHING    Wound Dressing Adhesive RASH

## 2024-10-28 NOTE — TELEPHONE ENCOUNTER
Patient called to request a refill on below medications. Deidre on fileverified.       Medication Quantity Refills Start End   levETIRAcetam 500 MG Oral Tab 180 tablet 1 10/1/2023 --   Sig:   TAKE 1 TABLET TWICE DAILY     Route:   (none)     Order #:   [id number]

## 2024-10-29 ENCOUNTER — APPOINTMENT (OUTPATIENT)
Dept: GENERAL RADIOLOGY | Age: 54
End: 2024-10-29
Attending: NURSE PRACTITIONER
Payer: MEDICARE

## 2024-10-29 ENCOUNTER — HOSPITAL ENCOUNTER (OUTPATIENT)
Age: 54
Discharge: HOME | End: 2024-10-29
Payer: MEDICARE

## 2024-10-29 VITALS
OXYGEN SATURATION: 99 % | HEART RATE: 67 BPM | SYSTOLIC BLOOD PRESSURE: 123 MMHG | RESPIRATION RATE: 18 BRPM | TEMPERATURE: 98 F | DIASTOLIC BLOOD PRESSURE: 49 MMHG

## 2024-10-29 DIAGNOSIS — M79.671 RIGHT FOOT PAIN: ICD-10-CM

## 2024-10-29 DIAGNOSIS — S93.402A MILD SPRAIN OF LEFT ANKLE, INITIAL ENCOUNTER: Primary | ICD-10-CM

## 2024-10-29 LAB
ALBUMIN SERPL ELPH-MCNC: 3.84 G/DL (ref 3.75–5.21)
ALBUMIN/GLOB SERPL: 1.18 {RATIO} (ref 1–2)
ALPHA1 GLOB SERPL ELPH-MCNC: 0.33 G/DL (ref 0.19–0.46)
ALPHA2 GLOB SERPL ELPH-MCNC: 0.84 G/DL (ref 0.48–1.05)
B-GLOBULIN SERPL ELPH-MCNC: 1.04 G/DL (ref 0.68–1.23)
GAMMA GLOB SERPL ELPH-MCNC: 1.04 G/DL (ref 0.62–1.7)
KAPPA LC FREE SER-MCNC: 2.79 MG/DL (ref 0.33–1.94)
KAPPA LC FREE/LAMBDA FREE SER NEPH: 1.36 {RATIO} (ref 0.26–1.65)
LAMBDA LC FREE SERPL-MCNC: 2.05 MG/DL (ref 0.57–2.63)
PROT SERPL-MCNC: 7.1 G/DL (ref 5.7–8.2)

## 2024-10-29 PROCEDURE — 99213 OFFICE O/P EST LOW 20 MIN: CPT | Performed by: NURSE PRACTITIONER

## 2024-10-29 PROCEDURE — 73610 X-RAY EXAM OF ANKLE: CPT | Performed by: NURSE PRACTITIONER

## 2024-10-29 PROCEDURE — 73630 X-RAY EXAM OF FOOT: CPT | Performed by: NURSE PRACTITIONER

## 2024-10-29 NOTE — ED PROVIDER NOTES
Patient Seen in: [facility]    History   CC: foot/ankle pain  HPI: Anjel Pisano 54 year old female w/ DMII, HTN, Hyperlipidemia, CVA, CAD who presents c/o left ankle pain s/p injury in which pt states she rolled her ankle 2-3 days ago walking into her basement. Denies fall associated. Denies open wounds. Denies numbness/tingling/weakness or limitation in ROM.   States she injured her right foot 1mo ago after jamming her foot into the door stopper however has had continued pain since. Denies numbness, tingling, weakness or limitation range of motion associated.    Past Medical History:    Age-related nuclear cataract of both eyes    Anemia    Apnea    Cataract    Coronary atherosclerosis    Depression    DM type 2 (diabetes mellitus, type 2) (HCC)    Esophageal reflux    High blood pressure    High cholesterol    Meibomian gland dysfunction (MGD), bilateral, both upper and lower lids    Migraine    Muscle weakness    left sided weakness uses walker and cane    Sleep apnea    Stenosis of right vertebral artery    Stroke (HCC)    Type II or unspecified type diabetes mellitus without mention of complication, not stated as uncontrolled       Past Surgical History:   Procedure Laterality Date    Brain surgery  2014    Colonoscopy N/A 8/25/2017    Procedure: COLONOSCOPY;  Surgeon: Sharmaine Burks MD;  Location: [facility] ENDOSCOPY    Colonoscopy      Colonoscopy N/A 11/12/2022    Procedure: COLONOSCOPY with Polypectomy;  Surgeon: Terry Weaver MD;  Location: [facility] ENDOSCOPY    Excise carotid body+carotid artery  09/2017    Incision and drainage Right 04/19/16    lower abdominal skin    Laparoscopic cholecystectomy  2002       Family History   Problem Relation Age of Onset    Diabetes Father     Hypertension Father     Heart Disorder Father     Lipids Father     Obesity Father     Colon Cancer Father     Heart Disorder Mother     Lipids Mother     Obesity Mother     Lipids Brother     Obesity Brother      Glaucoma Neg     Macular degeneration Neg        Social History     Socioeconomic History    Marital status: Legally     Number of children: 3   Occupational History    Occupation:      Comment: disabled    Occupation: phlebotomy   Tobacco Use    Smoking status: Former     Current packs/day: 0.00     Average packs/day: 0.2 packs/day for 1 year (0.2 ttl pk-yrs)     Types: Cigarettes     Start date: 12/16/2012     Quit date: 12/16/2013     Years since quitting: 10.8    Smokeless tobacco: Never   Vaping Use    Vaping status: Never Used   Substance and Sexual Activity    Alcohol use: No     Alcohol/week: 0.0 standard drinks of alcohol    Drug use: No    Sexual activity: Not Currently     Social Drivers of Health     Financial Resource Strain: Low Risk  (4/21/2023)    Financial Resource Strain     Difficulty of Paying Living Expenses: Not hard at all     Med Affordability: No   Transportation Needs: Unmet Transportation Needs (1/9/2024)    Transportation Needs     Lack of Transportation: Yes   Physical Activity: Inactive (4/21/2023)    Exercise Vital Sign     Days of Exercise per Week: 0 days     Minutes of Exercise per Session: 0 min   Stress: No Stress Concern Present (4/21/2023)    Stress     Feeling of Stress : No    Received from [facility] Short Social Needs Screening - Social Connection   Housing Stability: Low Risk  (4/21/2023)    Housing Stability     Housing Instability: No       ROS:  Systems reviewed: All pertinent positives noted in HPI. Unless otherwise noted, additional systems reviewed are negative.   Vital signs reviewed.    Positive for stated complaint: bi lat leg ankle pain  Other systems are as noted in HPI.  Constitutional and vital signs reviewed.      All other systems reviewed and negative except as noted above.    PSFH elements reviewed from today and agreed except as otherwise stated in HPI.             Constitutional and vital signs reviewed.        Physical  Exam     ED Triage Vitals [10/29/24 1724]   /49   Pulse 67   Resp 18   Temp 97.9 °F (36.6 °C)   Temp src Temporal   SpO2 99 %   O2 Device None (Room air)       Current:/49   Pulse 67   Temp 97.9 °F (36.6 °C) (Temporal)   Resp 18   SpO2 99%         PE:  General - Appears well, non-toxic and in NAD  Head - Appears symmetrical without deformity/swelling cranium, scalp, or facial bones  Skin - no rashes or petechiae noted, pink warm and dry throughout, mmm, no obvious signs of swelling/trauma/deformity, cap refill <2 seconds distal LE  Neuro - A&O x4, sensation equal to both medial and lateral aspects of lower extremities, steady gait  MSK - makes purposeful movements of lower extremities with full ROM noted, foot press/dorsiflexion strength equal bilat, pedal pulses 2+ bilat.  + Tenderness is elicited with palpation to the left lateral ankle overlying the lateral malleolus as well as the right plantar sole of the foot.  No ankle or foot tenderness throughout.  No shin, knee, calf, thigh or hip tenderness bilat  Psych - Interactive and appropriate      ED Course   Labs Reviewed - No data to display    MDM     XR ANKLE (MIN 3 VIEWS), LEFT (CPT=73610)   Final Result   PROCEDURE: XR ANKLE (MIN 3 VIEWS), LEFT (CPT=73610)       COMPARISON: None.       INDICATIONS: Left lateral ankle pain post injury x 2 months ago.       TECHNIQUE: 4. views were obtained.         FINDINGS:    BONES: There is diffuse osseous demineralization, which limits sensitivity    for detection of osseous pathology.  No osseous malalignment.  Ankle    mortise is maintained.  There is a 2 mm os ossific irregularity without    lucent fracture line of the tip of the    lateral malleolus.  There is a well corticated ossification along the    base of the cuboid measuring 4 mm.  Plantar calcaneal and Achilles tendon    attachment enthesophytes.   SOFT TISSUES: Negative. No visible soft tissue swelling.    EFFUSION: None visible.     OTHER: Negative.                    =====   CONCLUSION:        No acute fracture or dislocation.       A 2 mm ossification of the tip of the lateral malleolus could represent    sequela of subacute to chronic avulsion fracture or an osteophyte.       4 mm Ossification at the base of the cuboid, likely an accessory ossicle.     Differential would be sequela of avulsion injury.             Dictated by (CST): Antonieta Hart MD on 10/29/2024 at 6:00 PM        Finalized by (CST): Antonieta Hart MD on 10/29/2024 at 6:01 PM               XR FOOT, COMPLETE (MIN 3 VIEWS), RIGHT (CPT=73630)   Final Result   PROCEDURE: XR FOOT, COMPLETE (MIN 3 VIEWS), RIGHT (CPT=73630)       COMPARISON: [facility], XR FOOT, COMPLETE (MIN 3 VIEWS),    RIGHT (CPT=73630), 8/05/2024, 2:47 PM.  [facility] [place] [facility], X FOOT MINISTERIO, 10/11/2016, 11:20 AM.       INDICATIONS: Right foot pain post injury x 2 months ago.       TECHNIQUE: 3. views were obtained.         FINDINGS:    BONES: There is a small enthesophyte at the insertion of the Achilles    tendon. There is a plantar calcaneal enthesophyte. The joint spaces are    otherwise maintained. There is no acute fracture or dislocation.  Chronic    ossification along the base of the    cuboid may represent an accessory ossicle or sequela of prior avulsion    fracture.   SOFT TISSUES: Calcification seen within the plantar fascia, suggestive of    remote injury.   EFFUSION: None visible.    OTHER: Negative.                    =====   CONCLUSION:        No acute fracture or dislocation.       Chronic plantar fasciitis.       Ossification along the base of the cuboid, may represent an accessory    ossicle or sequela of prior/remote trauma.                       Dictated by (CST): Antonieta Hart MD on 10/29/2024 at 6:01 PM        Finalized by (CST): Antonieta Hart MD on 10/29/2024 at 6:04 PM                 DDx: fx v contusion v sprain    X-ray results as  noted above and independently reviewed by this provider.  Questionable subacute/chronic fractures as noted above.  Discussed Ace wrap and stirrup splint on the left with postop shoe on the right.  Patient ambulates after placement with improvement.  Advised rest, ice, elevation, Tylenol or Motrin as needed for discomfort, follow-up with primary care or foot and ankle specialist, one of the two provided as well as strict return/ED precautions reviewed.  Patient is historian and demonstrates understanding of all instruction and agrees with plan of care.      Disposition and Plan     Clinical Impression:  1. Mild sprain of left ankle, initial encounter    2. Right foot pain        Disposition:  Discharge    Follow-up:  Jazzy Cooper, HANY  [address] [address]  [phone]    Go in 1 week  As needed    Pamela Snowden DO  [address] [place] [address]  [phone]    Go in 1 week  As needed      Medications Prescribed:  Discharge Medication List as of 10/29/2024  6:24 PM

## 2024-10-29 NOTE — ED INITIAL ASSESSMENT (HPI)
Pt c/o missing a step going down to her basement and twisting ankle a few days ago. C/o left ankle pain/swelling. Denies falling or hitting head. Also c/o right ankle pain and foot pain but states she hurt the foot a month ago or so.

## 2024-10-29 NOTE — DISCHARGE INSTRUCTIONS
Rest from exacerbating activities for the next week. Apply ice/cold compress for 20min at a time 4-6x daily for the next 2-3 days. Keep the feet elevated when resting as much as possible. You may take Motrin every 6 hours as needed for pain. Take this with food. If additional pain control is needed, you may also take Tylenol every 6 hours. Follow up with your primary provider or the orthopedic specialist provided in your discharge instructions in the next 2-3 days. Seek additional care in the ER for new or worsening symptoms, fever or increasing pain.

## 2024-10-29 NOTE — PROGRESS NOTES
Continuous Glucose Monitor Download - Dexcom G7    Anjel Pisano  3/30/1970    Last visit for diabetes education 10/15/24:  Needs to schedule f/u appt with Dr. Caceres - patient states she will schedule later   Requests referral to [facility] -pended for provider.   We reviewed her CGM report together, explained to patient importance of taking novolog only if she is going to eat. We reviewed how lantus and novolog work to lower blood glucose levels. Explained danger of taking novolog without food, and how novolog helps to control blood glucose for carbohydrates eaten. Patient took 15 units of novolog around 0470-7305 today 10/15/24 and did not eat. Recommended per sliding scale would've been 13 and eat lunch. Patient verbalized understanding of importance of eating when taking novolog and to not take novolog if she is not going to eat. Per patient's dexcom reader glucose at 90 at the end of appointment. POC showed glucose at 80. Gave patient orange juice and crackers. She is going home after appointment. Will eat in the car. Denies any hypoglycemia symptoms at this time.   Patient will increase lantus to 34 units at bedtime and take novolog as recommended by provider of 10 units before meals plus sliding scale. Patient will not take novolog if not going to eat. Patient will follow up in 2 weeks for CGM report review.   Nutrition education also given, reviewed healthy plate, low carb, low saturated fat diet. Patient will follow up at the [facility].   Medical Background        Lab Results   Component Value Date    A1C 10.6 (A) 09/23/2024    A1C 10.2 (A) 05/23/2024    A1C 9.9 (H) 05/23/2024    A1C 10.4 (A) 02/29/2024    A1C 10.2 (A) 11/30/2023             Medication Review      DM Meds: Insulin Lispro (1 Unit Dial) Sopn - 100 UNIT/ML; Lantus SoloStar Sopn - 100 UNIT/ML; metFORMIN Tabs - 500 MG; NovoLOG FlexPen Sopn - 100 UNIT/ML         Medications at last endocrinology appointment:     Increase Lantus to 34 units at bedtime      Novolog 10 units before meals plus use sliding scale. Take novolog only if you're going to eat.   INSULIN SLIDING SCALE  Base Values  Breakfast: 10  Lunch: 10  Dinner: 10  Ranges:  80-99: -2  100-119: 0  120-139: 0  140-159: 1  160-179: 1  180-199: 2  200-219: 2  220-239: 3  240-259: 3  260-279: 4  280-299: 4  300-319: 5  320-339: 5  340-359: 6  360-379: 6  380-399: 7  400-400: 7         Patient Currently Taking:   Did not increase lantus to 34 units. Still taking 30 units.   Is taking Novolog as instructed. 10 units three times daily plus correction scale.       Blood Glucose Review      Patient's CGM reading 329 and steady. Patient took 12 units of novolog one hour ago. Patient denies any N/V, headaches, or other concerns.    Interpretation of Data:  No significant changes from previous report. Time in range at 21%, GMI 8.8. No hypoglycemia noted on report. Patient will adjust lantus as recommended.                     Recommendations / Plan / Goals   Increase lantus to 34 units  Continue taking novolog 10 units three times daily before meals plus correction scale.   May use correction scale only if glucose levels are still elevated 3-4 hrs after meals.   Patient will return in 1-2 wks for glucose levels evaluation.         Next Follow up scheduled for 11/12/24      Routed to provider for review.      10/29/2024  Mirna HERNANDEZ RN [facility]  Diabetes Care &      A total of 40 minutes was spent with the patient including chart review, discussion and education / advisement pertinent to patient and provider specified concerns as documented above.     Note to patient: The 21 Century Cures Act makes medical notes like these available to patients in the interest of transparency. However, be advised this is a medical document. It is intended as peer to peer communication. It is written in medical language and may contain abbreviations or verbiage that are unfamiliar. It may appear blunt or  direct. Medical documents are intended to carry relevant information, facts as evident, and the clinical opinion of the practitioner.

## 2024-10-30 ENCOUNTER — TELEPHONE (OUTPATIENT)
Facility: CLINIC | Age: 54
End: 2024-10-30

## 2024-10-30 ENCOUNTER — NURSE ONLY (OUTPATIENT)
Dept: ENDOCRINOLOGY CLINIC | Facility: CLINIC | Age: 54
End: 2024-10-30
Payer: MEDICARE

## 2024-10-30 DIAGNOSIS — Z79.4 TYPE 2 DIABETES MELLITUS WITH OTHER NEUROLOGIC COMPLICATION, WITH LONG-TERM CURRENT USE OF INSULIN (HCC): Primary | ICD-10-CM

## 2024-10-30 DIAGNOSIS — E11.49 TYPE 2 DIABETES MELLITUS WITH OTHER NEUROLOGIC COMPLICATION, WITH LONG-TERM CURRENT USE OF INSULIN (HCC): Primary | ICD-10-CM

## 2024-10-30 DIAGNOSIS — N18.30 STAGE 3 CHRONIC KIDNEY DISEASE, UNSPECIFIED WHETHER STAGE 3A OR 3B CKD (HCC): Primary | ICD-10-CM

## 2024-10-30 PROCEDURE — 99211 OFF/OP EST MAY X REQ PHY/QHP: CPT | Performed by: INTERNAL MEDICINE

## 2024-10-30 NOTE — TELEPHONE ENCOUNTER
Refill passed per [facility] protocol.    Please advise if refill is appropriate. Patient would have been out of medication around April 2024- No dispense history in the last 6 months-patient requesting this refill.     Requested Prescriptions   Pending Prescriptions Disp Refills    levETIRAcetam 500 MG Oral Tab 180 tablet 1     Sig: TAKE 1 TABLET TWICE DAILY       Neurology Medications Passed - 10/30/2024 12:02 PM        Passed - In person appointment or virtual visit in the past 6 mos or appointment in next 3 mos     Recent Outpatient Visits              2 days ago Stage 3 chronic kidney disease, unspecified whether stage 3a or 3b CKD ([facility])    [facility] Melissa Guerrero MD    Office Visit    1 week ago Cervical radiculopathy    [facility], [address], [place] Elis Bhatt DO    Office Visit    2 weeks ago Primary hypertension    [facility], [address], [place] Yves Yanez MD    Office Visit    2 weeks ago Type 2 diabetes mellitus with other neurologic complication, with long-term current use of insulin ([facility])    [facility]    Nurse Only    1 month ago Stage 3 chronic kidney disease, unspecified whether stage 3a or 3b CKD ([facility])    [facility] Melissa Guerrero MD    Office Visit          Future Appointments         Provider Department Appt Notes    Today [facility] CDE [facility] CGM report    In 1 month Melissa Guerrero MD [facility] 6 weeks    In 2 months Yves Yanez MD [facility], [address], [place] 3 mo f/u    In 10 months Rohini Mckenna MD [facility] - OB/GYN annual                       Future Appointments         Provider Department Appt Notes    Today [facility] CDE  [facility] CGM report    In 1 month Melissa Guerrero MD [facility] 6 weeks    In 2 months Yves Yanez MD [facility], [address], [place] 3 mo f/u    In 10 months Rohini Mckenna MD [facility] - OB/GYN annual          Recent Outpatient Visits              2 days ago Stage 3 chronic kidney disease, unspecified whether stage 3a or 3b CKD ([facility])    [facility] Melissa Guerrero MD    Office Visit    1 week ago Cervical radiculopathy    [facility], [address], [place] Elis Bhatt DO    Office Visit    2 weeks ago Primary hypertension    [facility], [address], [place] Yves Yanez MD    Office Visit    2 weeks ago Type 2 diabetes mellitus with other neurologic complication, with long-term current use of insulin ([facility])    [facility] [place]    Nurse Only    1 month ago Stage 3 chronic kidney disease, unspecified whether stage 3a or 3b CKD ([facility])    [facility], [place] Melissa Guerrero MD    Office Visit

## 2024-10-30 NOTE — TELEPHONE ENCOUNTER
Please let patient know  Kidney fx better than last appt    Continue torsemide    Will order labs before her next appt     thanks

## 2024-10-30 NOTE — PATIENT INSTRUCTIONS
Increase Lantus to 34 units at bedtime     Novolog 10 units before meals plus use sliding scale. Take novolog only if you're going to eat.   INSULIN SLIDING SCALE  Base Values  Breakfast: 10  Lunch: 10  Dinner: 10  Ranges:  80-99: -2  100-119: 0  120-139: 0  140-159: 1  160-179: 1  180-199: 2  200-219: 2  220-239: 3  240-259: 3  260-279: 4  280-299: 4  300-319: 5  320-339: 5  340-359: 6  360-379: 6  380-399: 7  400-400: 7

## 2024-11-01 RX ORDER — LEVETIRACETAM 500 MG/1
500 TABLET ORAL 2 TIMES DAILY
Qty: 180 TABLET | Refills: 0 | Status: SHIPPED | OUTPATIENT
Start: 2024-11-01

## 2024-11-07 ENCOUNTER — PATIENT OUTREACH (OUTPATIENT)
Dept: CASE MANAGEMENT | Age: 54
End: 2024-11-07

## 2024-11-07 NOTE — PROGRESS NOTES
Attempted to contact pt for [facility]  monthly outreach. No answer , left detailed message for pt to call back.     Reviewed pt's chart including recent visits.     -Care everywhere updated.    -Immunization reconciliation completed. No updates available.    Pt is due for:     Health Maintenance   Topic Date Due    Pneumococcal Vaccine: Birth to 64yrs (2 of 2 - PCV) 12/21/2017    Zoster Vaccines (2 of 2) 07/12/2023    Annual Physical  05/03/2024    COVID-19 Vaccine (3 - 2023-24 season) 09/01/2024    Influenza Vaccine (1) 10/01/2024    Diabetes Care Dilated Eye Exam  11/08/2024    Mammogram  12/16/2024    Diabetes Care A1C  12/23/2024    Diabetes Care: Microalb/Creat Ratio  05/23/2025    Gyne Pelvic Exam  08/29/2025    Diabetes Care Foot Exam  09/23/2025    Diabetes Care: GFR  10/28/2025    Colorectal Cancer Screening  11/12/2025    Pap Smear  08/29/2027    DTaP,Tdap,and Td Vaccines (2 - Td or Tdap) 05/17/2033    Annual Depression Screening  Completed         Future Appointments   Date Time Provider Department Center   11/12/2024  3:30 PM [facility] CDE [facility]   12/10/2024  2:00 PM Melissa Guerrero MD [id number]   1/15/2025  3:20 PM Yves Yanez MD [id number] [place]   2/10/2025  4:00 PM Nilsa Caceres MD [facility]   9/3/2025  3:20 PM [facility]ini [facility]ck[id number]       Total time -  5 min  Total Monthly time- 5 min

## 2024-11-08 LAB — PTH-RELATED PEPTIDE: <2 PMOL/L

## 2024-11-12 ENCOUNTER — NURSE ONLY (OUTPATIENT)
Dept: ENDOCRINOLOGY CLINIC | Facility: CLINIC | Age: 54
End: 2024-11-12

## 2024-11-12 DIAGNOSIS — E11.49 TYPE 2 DIABETES MELLITUS WITH OTHER NEUROLOGIC COMPLICATION, WITH LONG-TERM CURRENT USE OF INSULIN (HCC): Primary | ICD-10-CM

## 2024-11-12 DIAGNOSIS — Z79.4 TYPE 2 DIABETES MELLITUS WITH OTHER NEUROLOGIC COMPLICATION, WITH LONG-TERM CURRENT USE OF INSULIN (HCC): Primary | ICD-10-CM

## 2024-11-12 PROCEDURE — 99211 OFF/OP EST MAY X REQ PHY/QHP: CPT | Performed by: INTERNAL MEDICINE

## 2024-11-12 NOTE — PROGRESS NOTES
Continuous Glucose Monitor Download - Dexcom G7    Anjel Pisano  3/30/1970      Medical Background        Lab Results   Component Value Date    A1C 10.6 (A) 09/23/2024    A1C 10.2 (A) 05/23/2024    A1C 9.9 (H) 05/23/2024    A1C 10.4 (A) 02/29/2024    A1C 10.2 (A) 11/30/2023       At last visit for diabetes education:  Increase lantus to 34 units  Continue taking novolog 10 units three times daily before meals plus correction scale.   May use correction scale only if glucose levels are still elevated 3-4 hrs after meals.   Patient will return in 1-2 wks for glucose levels evaluation.       Care Gaps     Diabetes eye exam      Medication Review      DM Meds: Insulin Lispro (1 Unit Dial) Sopn - 100 UNIT/ML; Lantus SoloStar Sopn - 100 UNIT/ML; metFORMIN Tabs - 500 MG; NovoLOG FlexPen Sopn - 100 UNIT/ML         Medications at last endocrinology appointment:  Lantus 34 units daily  Novolog 10 units TID + CF 1:40>140  Metformin 1000 mg twice daily      Patient Currently Taking:   Steroids? no  Chemotherapy? no  Lantus 34 units daily at bedtime  Novolog 10 units three times daily before meals  Metformin 1000 mg twice daily    Has tried Mounjaro, Ozempic, and Jardiance in the past. Stopped due to side effects.   Patient states she is taking novolog before meals and using sliding scale correctly.     Blood Glucose Review          Interpretation of Data:    Patient is 36% in range, and improvement from 21% from last report. 28% very high. GMI at 8.4 vs 8.8 with last report. Two episodes of hypoglycemia at random times, once after lunch and once early morning. Reviewed with patient how to take insulin and use correction scale.                                                 Meals and Dosing Timing:    Had plain oatmeal last night with salt and virgen garlic after dinner around 8704-1835. Is hungry after dinner sometimes and eats snack.   Planning Healthy Meals handout reviewed with patient.       - Discussed basic meal  planning guidelines for diabetes regular mealtime   - Defined the term macronutrient and helped patient to Identify foods that are carbohydrates, protein, non-starchy vegetables, and fats    - Reviewed importance of fiber in healthy diet   - Reinforced the importance of carbohydrate consistency in each meal, and importance of not skipping meals   - Recommended carbohydrate targets of 30-45 grams at meals and 15-30 grams at snacks   - Educated on label reading emphasizing most important areas of focus    - Educated on portion management; including use of measuring cups, plate visualization or food scale   - Provided suggestions for lower carb, higher protein / fiber snacks    Recommendations / Plan / Goals   Lantus 34 units daily at bedtime  Novolog 10 units before breakfast and lunch, and 12 units before dinner. Use sliding scale with all meals.   Metformin 1000 mg twice daily  Patient to notify clinic if she experiences any low blood glucose level.         Next Follow up scheduled for 2/10/24      Routed to provider for review.      11/12/2024  Mirna HERNANDEZ RN [facility]  Diabetes Care &      A total of 60 minutes was spent with the patient including chart review, discussion and education / advisement pertinent to patient and provider specified concerns as documented above.     Note to patient: The 21 Century Cures Act makes medical notes like these available to patients in the interest of transparency. However, be advised this is a medical document. It is intended as peer to peer communication. It is written in medical language and may contain abbreviations or verbiage that are unfamiliar. It may appear blunt or direct. Medical documents are intended to carry relevant information, facts as evident, and the clinical opinion of the practitioner.

## 2024-11-12 NOTE — PATIENT INSTRUCTIONS
Lantus 34 units daily at bedtime    Novolog 10 units before breakfast and lunch. 12 units before dinner.  Continue using correction scale.     Metformin 1000 mg twice daily

## 2024-11-15 ENCOUNTER — TELEPHONE (OUTPATIENT)
Dept: ENDOCRINOLOGY CLINIC | Facility: CLINIC | Age: 54
End: 2024-11-15

## 2024-11-15 ENCOUNTER — TELEPHONE (OUTPATIENT)
Dept: ENDOCRINOLOGY | Facility: HOSPITAL | Age: 54
End: 2024-11-15

## 2024-11-15 NOTE — TELEPHONE ENCOUNTER
Provider suggesting inpen or insulin pump. Called patient to discuss options and schedule follow up appointment for diabetes education. No answer. Left message for patient to call back. [employer] message also sent.

## 2024-11-19 ENCOUNTER — MED REC SCAN ONLY (OUTPATIENT)
Dept: INTERNAL MEDICINE CLINIC | Facility: CLINIC | Age: 54
End: 2024-11-19

## 2024-11-22 ENCOUNTER — ORDER TRANSCRIPTION (OUTPATIENT)
Dept: ADMINISTRATIVE | Facility: HOSPITAL | Age: 54
End: 2024-11-22

## 2024-11-22 DIAGNOSIS — R06.02 SOB (SHORTNESS OF BREATH): Primary | ICD-10-CM

## 2024-11-25 NOTE — TELEPHONE ENCOUNTER
Spoke with patient. She is open to explore inpen or insulin pump. Has family in town. Scheduled appointment for CGM and insulin pump review for 12/12/24.   Reviewed with patient insulin doses of lantus 34 units at bedtime and novolog 10 units before breakfast and lunch and 12 units before dinner. Patient was still taking novolog 10 units three times daily, but will increase dinner dose to 12 units. Continue using correction scale.

## 2024-12-05 DIAGNOSIS — I10 ESSENTIAL HYPERTENSION WITH GOAL BLOOD PRESSURE LESS THAN 140/90: ICD-10-CM

## 2024-12-09 ENCOUNTER — TELEPHONE (OUTPATIENT)
Dept: NEPHROLOGY | Facility: CLINIC | Age: 54
End: 2024-12-09

## 2024-12-10 ENCOUNTER — OFFICE VISIT (OUTPATIENT)
Facility: CLINIC | Age: 54
End: 2024-12-10

## 2024-12-10 ENCOUNTER — LAB ENCOUNTER (OUTPATIENT)
Dept: LAB | Facility: HOSPITAL | Age: 54
End: 2024-12-10
Attending: INTERNAL MEDICINE
Payer: MEDICARE

## 2024-12-10 VITALS
SYSTOLIC BLOOD PRESSURE: 138 MMHG | WEIGHT: 194 LBS | HEART RATE: 72 BPM | BODY MASS INDEX: 35 KG/M2 | DIASTOLIC BLOOD PRESSURE: 80 MMHG

## 2024-12-10 DIAGNOSIS — N25.81 SECONDARY HYPERPARATHYROIDISM OF RENAL ORIGIN (HCC): ICD-10-CM

## 2024-12-10 DIAGNOSIS — N18.30 STAGE 3 CHRONIC KIDNEY DISEASE, UNSPECIFIED WHETHER STAGE 3A OR 3B CKD (HCC): Primary | ICD-10-CM

## 2024-12-10 DIAGNOSIS — I1A.0 RESISTANT HYPERTENSION: ICD-10-CM

## 2024-12-10 DIAGNOSIS — N18.30 STAGE 3 CHRONIC KIDNEY DISEASE, UNSPECIFIED WHETHER STAGE 3A OR 3B CKD (HCC): ICD-10-CM

## 2024-12-10 DIAGNOSIS — E11.21 TYPE 2 DIABETES MELLITUS WITH NEPHROPATHY (HCC): ICD-10-CM

## 2024-12-10 DIAGNOSIS — E83.52 HYPERCALCEMIA: ICD-10-CM

## 2024-12-10 LAB
ALBUMIN SERPL-MCNC: 4.8 G/DL (ref 3.2–4.8)
ANION GAP SERPL CALC-SCNC: 9 MMOL/L (ref 0–18)
BASOPHILS # BLD AUTO: 0.05 X10(3) UL (ref 0–0.2)
BASOPHILS NFR BLD AUTO: 0.4 %
BILIRUB UR QL: NEGATIVE
BUN BLD-MCNC: 12 MG/DL (ref 9–23)
BUN/CREAT SERPL: 14.5 (ref 10–20)
CALCIUM BLD-MCNC: 10.1 MG/DL (ref 8.7–10.4)
CHLORIDE SERPL-SCNC: 105 MMOL/L (ref 98–112)
CLARITY UR: CLEAR
CO2 SERPL-SCNC: 28 MMOL/L (ref 21–32)
CREAT BLD-MCNC: 0.83 MG/DL
CREAT UR-SCNC: 79.6 MG/DL
DEPRECATED RDW RBC AUTO: 46.6 FL (ref 35.1–46.3)
EGFRCR SERPLBLD CKD-EPI 2021: 83 ML/MIN/1.73M2 (ref 60–?)
EOSINOPHIL # BLD AUTO: 0.1 X10(3) UL (ref 0–0.7)
EOSINOPHIL NFR BLD AUTO: 0.8 %
ERYTHROCYTE [DISTWIDTH] IN BLOOD BY AUTOMATED COUNT: 15.9 % (ref 11–15)
GLUCOSE BLD-MCNC: 184 MG/DL (ref 70–99)
GLUCOSE UR-MCNC: 300 MG/DL
HCT VFR BLD AUTO: 40 %
HGB BLD-MCNC: 12.4 G/DL
HGB UR QL STRIP.AUTO: NEGATIVE
IMM GRANULOCYTES # BLD AUTO: 0.06 X10(3) UL (ref 0–1)
IMM GRANULOCYTES NFR BLD: 0.5 %
KETONES UR-MCNC: NEGATIVE MG/DL
LEUKOCYTE ESTERASE UR QL STRIP.AUTO: NEGATIVE
LYMPHOCYTES # BLD AUTO: 2.64 X10(3) UL (ref 1–4)
LYMPHOCYTES NFR BLD AUTO: 22.4 %
MCH RBC QN AUTO: 25.2 PG (ref 26–34)
MCHC RBC AUTO-ENTMCNC: 31 G/DL (ref 31–37)
MCV RBC AUTO: 81.3 FL
MONOCYTES # BLD AUTO: 0.69 X10(3) UL (ref 0.1–1)
MONOCYTES NFR BLD AUTO: 5.9 %
NEUTROPHILS # BLD AUTO: 8.24 X10 (3) UL (ref 1.5–7.7)
NEUTROPHILS # BLD AUTO: 8.24 X10(3) UL (ref 1.5–7.7)
NEUTROPHILS NFR BLD AUTO: 70 %
NITRITE UR QL STRIP.AUTO: NEGATIVE
OSMOLALITY SERPL CALC.SUM OF ELEC: 299 MOSM/KG (ref 275–295)
PH UR: 7 [PH] (ref 5–8)
PHOSPHATE SERPL-MCNC: 2.5 MG/DL (ref 2.4–5.1)
PLATELET # BLD AUTO: 333 10(3)UL (ref 150–450)
POTASSIUM SERPL-SCNC: 4.4 MMOL/L (ref 3.5–5.1)
PROT UR-MCNC: 20 MG/DL
PROT UR-MCNC: 30.6 MG/DL (ref ?–14)
PROT/CREAT UR-RTO: 0.38
PTH-INTACT SERPL-MCNC: 110.3 PG/ML (ref 18.5–88)
RBC # BLD AUTO: 4.92 X10(6)UL
SODIUM SERPL-SCNC: 142 MMOL/L (ref 136–145)
SP GR UR STRIP: 1.02 (ref 1–1.03)
UROBILINOGEN UR STRIP-ACNC: NORMAL
WBC # BLD AUTO: 11.8 X10(3) UL (ref 4–11)

## 2024-12-10 PROCEDURE — 82570 ASSAY OF URINE CREATININE: CPT

## 2024-12-10 PROCEDURE — 84156 ASSAY OF PROTEIN URINE: CPT

## 2024-12-10 PROCEDURE — 99214 OFFICE O/P EST MOD 30 MIN: CPT | Performed by: INTERNAL MEDICINE

## 2024-12-10 PROCEDURE — 85025 COMPLETE CBC W/AUTO DIFF WBC: CPT | Performed by: INTERNAL MEDICINE

## 2024-12-10 PROCEDURE — 81001 URINALYSIS AUTO W/SCOPE: CPT | Performed by: INTERNAL MEDICINE

## 2024-12-10 PROCEDURE — 83970 ASSAY OF PARATHORMONE: CPT | Performed by: INTERNAL MEDICINE

## 2024-12-10 PROCEDURE — 36415 COLL VENOUS BLD VENIPUNCTURE: CPT | Performed by: INTERNAL MEDICINE

## 2024-12-10 PROCEDURE — 80069 RENAL FUNCTION PANEL: CPT | Performed by: INTERNAL MEDICINE

## 2024-12-10 RX ORDER — METOPROLOL SUCCINATE 25 MG/1
25 TABLET, EXTENDED RELEASE ORAL NIGHTLY
Qty: 90 TABLET | Refills: 3 | Status: SHIPPED | OUTPATIENT
Start: 2024-12-10

## 2024-12-10 RX ORDER — TORSEMIDE 10 MG/1
10 TABLET ORAL DAILY
Qty: 30 TABLET | Refills: 1 | Status: SHIPPED | OUTPATIENT
Start: 2024-12-10

## 2024-12-10 RX ORDER — METOPROLOL SUCCINATE 100 MG/1
100 TABLET, EXTENDED RELEASE ORAL DAILY
Qty: 90 TABLET | Refills: 3 | Status: SHIPPED | OUTPATIENT
Start: 2024-12-10

## 2024-12-10 NOTE — PATIENT INSTRUCTIONS
Stop omeprazole and try pepcid 20 to 40  mg daily   If you have heart burn symptoms than go back to omeprazole  Labs today  Continue torsemide 10 mg once a day

## 2024-12-10 NOTE — TELEPHONE ENCOUNTER
Refill passed per [facility] protocols.    Requested Prescriptions   Pending Prescriptions Disp Refills    metoprolol succinate  MG Oral Tablet 24 Hr 90 tablet 3     Sig: Take 1 tablet (100 mg total) by mouth daily.       Hypertension Medications Protocol Passed - 12/10/2024 11:54 AM        Passed - CMP or BMP in past 12 months        Passed - Last BP reading less than 140/90     BP Readings from Last 1 Encounters:   10/29/24 123/49               Passed - In person appointment or virtual visit in the past 12 mos or appointment in next 3 mos     Recent Outpatient Visits              4 weeks ago Type 2 diabetes mellitus with other neurologic complication, with long-term current use of insulin (formerly [facility])    [facility]    Nurse Only    1 month ago Type 2 diabetes mellitus with other neurologic complication, with long-term current use of insulin (formerly [facility])    [facility]    Nurse Only    1 month ago Stage 3 chronic kidney disease, unspecified whether stage 3a or 3b CKD (formerly [facility])    [facility] Melissa Guerrero MD    Office Visit    1 month ago Cervical radiculopathy    [facility] [address], [place] Elis Bhatt DO    Office Visit    1 month ago Primary hypertension    [facility] [address], [place] Yves Yanez MD    Office Visit          Future Appointments         Provider Department Appt Notes    Today Melissa Guerrero MD [facility] 6 weeks    In 2 days [facility] insulin pump review    In 1 month Yves Yanez MD [facility] [address], [place] 3 mo f/u    In 2 months Nilsa Caceres MD [facility] 2 months    In 8 months Rohini Mckenna MD [facility]  [facility] - OB/GYN annual                    Passed - EGFRCR or GFRNAA > 50     GFR Evaluation  EGFRCR: 62 , resulted on 10/28/2024            metoprolol succinate ER 25 MG Oral Tablet 24 Hr 90 tablet 3     Sig: Take 1 tablet (25 mg total) by mouth nightly.       Hypertension Medications Protocol Passed - 12/10/2024 11:54 AM        Passed - CMP or BMP in past 12 months        Passed - Last BP reading less than 140/90     BP Readings from Last 1 Encounters:   10/29/24 123/49               Passed - In person appointment or virtual visit in the past 12 mos or appointment in next 3 mos     Recent Outpatient Visits              4 weeks ago Type 2 diabetes mellitus with other neurologic complication, with long-term current use of insulin ([facility])    [facility]    Nurse Only    1 month ago Type 2 diabetes mellitus with other neurologic complication, with long-term current use of insulin ([facility])    [facility]    Nurse Only    1 month ago Stage 3 chronic kidney disease, unspecified whether stage 3a or 3b CKD ([facility])    [facility] Melissa Guerrero MD    Office Visit    1 month ago Cervical radiculopathy    [facility] [address], [place] Elis Bhatt DO    Office Visit    1 month ago Primary hypertension    [facility] [address], [place] Yves Yanez MD    Office Visit          Future Appointments         Provider Department Appt Notes    Today Melissa Guerrero MD [facility] 6 weeks    In 2 days [facility] insulin pump review    In 1 month Yves Yanez MD [facility] [address], [place] 3 mo f/u    In 2 months Nilsa Caceres MD [facility] 2 months    In 8  Rohini Ayala MD [facility] - OB/GYN annual                    Passed - EGFRCR or GFRNAA > 50     GFR Evaluation  EGFRCR: 62 , resulted on 10/28/2024

## 2024-12-10 NOTE — TELEPHONE ENCOUNTER
Please Review. Protocol Failed; No Protocol   Please advise medication is patient external reported or historical.     Requested Prescriptions   Pending Prescriptions Disp Refills    HYDROCHLOROTHIAZIDE 25 MG Oral Tab [Pharmacy Med Name: HYDROCHLOROTHIAZIDE 25MG TABLETS] 90 tablet 0     Sig: TAKE 1 TABLET(25 MG) BY MOUTH DAILY       Hypertension Medications Protocol Passed - 12/10/2024  8:30 AM        Passed - CMP or BMP in past 12 months        Passed - Last BP reading less than 140/90     BP Readings from Last 1 Encounters:   10/29/24 123/49               Passed - In person appointment or virtual visit in the past 12 mos or appointment in next 3 mos     Recent Outpatient Visits              4 weeks ago Type 2 diabetes mellitus with other neurologic complication, with long-term current use of insulin ([facility])    [facility]    Nurse Only    1 month ago Type 2 diabetes mellitus with other neurologic complication, with long-term current use of insulin ([facility])    [facility]    Nurse Only    1 month ago Stage 3 chronic kidney disease, unspecified whether stage 3a or 3b CKD ([facility])    [facility] Melissa Guerrero MD    Office Visit    1 month ago Cervical radiculopathy    [facility] [address], [place] Elis Bhatt DO    Office Visit    1 month ago Primary hypertension    [facility] [address], [place] Yves Yanez MD    Office Visit          Future Appointments         Provider Department Appt Notes    Today Melissa Guerrero MD [facility] 6 weeks    In 2 days [facility] insulin pump review    In 1 month Yves Yanez MD [facility] [address], [place] 3 mo f/u    In 2 months Nilsa Caceres MD [facility]  [facility] 2 months    In 8 months Rohini Mckenna MD [facility] - OB/GYN annual                    Passed - EGFRCR or GFRNAA > 50     GFR Evaluation  EGFRCR: 62 , resulted on 10/28/2024                 Future Appointments         Provider Department Appt Notes    Today Melissa Guerrero MD [facility] 6 weeks    In 2 days [facility] insulin pump review    In 1 month Yves Yanez MD [facility] [address], [place] 3 mo f/u    In 2 months Nilsa Caceres MD [facility] 2 months    In 8 months Rohini Mckenna MD [facility] - OB/GYN annual          Recent Outpatient Visits              4 weeks ago Type 2 diabetes mellitus with other neurologic complication, with long-term current use of insulin ([facility])    [facility]    Nurse Only    1 month ago Type 2 diabetes mellitus with other neurologic complication, with long-term current use of insulin ([facility])    [facility]    Nurse Only    1 month ago Stage 3 chronic kidney disease, unspecified whether stage 3a or 3b CKD ([facility])    [facility] Melissa Guerrero MD    Office Visit    1 month ago Cervical radiculopathy    [facility] [address], [place] Elis Bhatt DO    Office Visit    1 month ago Primary hypertension    [facility] [address], [place] Yves Yanez MD    Office Visit

## 2024-12-10 NOTE — PROGRESS NOTES
[facility]    Progress Note     Anjel Pisano    54 female with history of hypertension 23+ yrs, diabetes approx 23 yrs complicated by diabetic retinopathy / neuropathy, hypercholesterolemia,  prior CVA, carotid surg, anemia here for initial evaluation for elevated cr   Reviewed labs- cr normal prior however in aug elevated and states she was not well during that time     Denies any nsaid use, no UTIs, no kidney stones  Strong FH of DM,     Tried jardiance , ozempic and mounjaro and didn't suit her    Since lov in oct  12/10:  saw her ophthalmologist  Bp at home 120-130/ 70 mmhg      HISTORY:  Past Medical History:    Age-related nuclear cataract of both eyes    Anemia    Apnea    Cataract    Coronary atherosclerosis    Depression    DM type 2 (diabetes mellitus, type 2) (HCC)    Esophageal reflux    High blood pressure    High cholesterol    Meibomian gland dysfunction (MGD), bilateral, both upper and lower lids    Migraine    Muscle weakness    left sided weakness uses walker and cane    Sleep apnea    Stenosis of right vertebral artery    Stroke (HCC)    Type II or unspecified type diabetes mellitus without mention of complication, not stated as uncontrolled      Past Surgical History:   Procedure Laterality Date    Brain surgery  2014    Colonoscopy N/A 8/25/2017    Procedure: COLONOSCOPY;  Surgeon: Sharmaine Burks MD;  Location: [facility] ENDOSCOPY    Colonoscopy      Colonoscopy N/A 11/12/2022    Procedure: COLONOSCOPY with Polypectomy;  Surgeon: Terry Weaver MD;  Location: [facility] ENDOSCOPY    Excise carotid body+carotid artery  09/2017    Incision and drainage Right 04/19/16    lower abdominal skin    Laparoscopic cholecystectomy  2002           Medications (Active prior to today's visit):  Current Outpatient Medications   Medication Sig Dispense Refill    metoprolol succinate  MG Oral Tablet 24 Hr Take 1 tablet (100 mg total) by mouth daily. 90 tablet 3    metoprolol succinate ER  25 MG Oral Tablet 24 Hr Take 1 tablet (25 mg total) by mouth nightly. 90 tablet 3    atorvastatin 80 MG Oral Tab Take 1 tablet (80 mg total) by mouth nightly. 90 tablet 3    levETIRAcetam 500 MG Oral Tab Take 1 tablet (500 mg total) by mouth 2 (two) times daily. 180 tablet 0    amLODIPine 10 MG Oral Tab Take 1 tablet (10 mg total) by mouth daily. 90 tablet 3    Insulin Lispro, 1 Unit Dial, 100 UNIT/ML Subcutaneous Solution Pen-injector       torsemide 10 MG Oral Tab Take 1 tablet (10 mg total) by mouth daily. 30 tablet 1    insulin glargine (LANTUS SOLOSTAR) 100 UNIT/ML Subcutaneous Solution Pen-injector Inject 30 Units into the skin nightly. 27 mL 1    losartan 100 MG Oral Tab Take 1 tablet (100 mg total) by mouth daily. 90 tablet 3    escitalopram 10 MG Oral Tab Take 1 tablet (10 mg total) by mouth daily. 90 tablet 3    ERGOCALCIFEROL 1.25 MG (28737 UT) Oral Cap TAKE 1 CAPSULE BY MOUTH 1 TIME A WEEK FOR 12 DOSES 12 capsule 1    metFORMIN 500 MG Oral Tab Take 2 tablets (1,000 mg total) by mouth 2 (two) times daily with meals. 360 tablet 1    NOVOLOG FLEXPEN 100 UNIT/ML Subcutaneous Solution Pen-injector INJECT 35 UNITS INTO THE SKIN THREE TIMES DAILY WITH MEAKS PLUS SLIDING SCALE 90 mL 0    hydrALAZINE 100 MG Oral Tab Take 1 tablet (100 mg total) by mouth 2 (two) times daily. 180 tablet 3    Omeprazole 40 MG Oral Capsule Delayed Release TAKE 1 CAPSULE EVERY DAY 30 TO 60 MINUTES BEFORE A MEAL 90 capsule 3    fluticasone propionate 50 MCG/ACT Nasal Suspension 2 sprays by Nasal route daily. 48 g 1    VENTOLIN  (90 Base) MCG/ACT Inhalation Aero Soln Inhale 2 puffs into the lungs every 6 (six) hours as needed for Wheezing. 18 g 1    aspirin 325 MG Oral Tab Take 1 tablet (325 mg total) by mouth daily.      hydroCHLOROthiazide 25 MG Oral Tab Take 0.5 tablets (12.5 mg total) by mouth daily.      Continuous Glucose Sensor (DEXCOM G7 SENSOR) Does not apply Misc 1 each Every 10 days. 9 each 1    Glucose Blood (ONETOUCH  ULTRA) In Vitro Strip Please provide test strips that are covered by patient's insurance. 100 strip 0    Blood Glucose Monitoring Suppl (ONETOUCH ULTRA 2) w/Device Does not apply Kit Please provide what is covered by patient's insurance 1 kit 0    pregabalin (LYRICA) 75 MG Oral Cap Take 1 capsule (75 mg total) by mouth 2 (two) times daily. (Patient not taking: Reported on 12/10/2024) 60 capsule 0    Blood Glucose Monitoring Suppl (TRUE METRIX METER) w/Device Does not apply Kit Use to check blood sugar four times a day 1 kit 0    Glucose Blood (TRUE METRIX BLOOD GLUCOSE TEST) In Vitro Strip Check blood sugar four times a day 400 strip 1    TRUEplus Lancets 33G Does not apply Misc USE TO CHECK BLOOD SUGAR FOUR TIMES DAILY 400 each 1    Glucose Blood (TRUE METRIX BLOOD GLUCOSE TEST) In Vitro Strip Use to check blood sugar three times a day 300 strip 1    Insulin Pen Needle (BD PEN NEEDLE ABDOULAYE 2ND GEN) 32G X 4 MM Does not apply Misc INJECT FOUR TIMES DAILY AS DIRECTED 50 each 1    Accu-Chek FastClix Lancets Does not apply Misc Use to check blood sugar three times a day 300 each 0    Glucose Blood (ACCU-CHEK GUIDE) In Vitro Strip Use to check blood sugar three times a day 300 strip 0    Alcohol Swabs (DROPSAFE ALCOHOL PREP) 70 % Does not apply Pads USE AS DIRECTED 100 each 0    Blood Glucose Monitoring Suppl (ACCU-CHEK GUIDE) w/Device Does not apply Kit Use to check blood sugar three times a day 1 kit 0       Allergies:  Allergies[1]      ROS:     Constitutional:  Negative for decreased activity, fever, irritability and lethargy  ENMT:  Negative for ear drainage, hearing loss and nasal drainage  Eyes:  Negative for eye discharge and vision loss  Cardiovascular:  Negative for chest pain, sob  Respiratory:  Negative for cough, dyspnea and wheezing  Gastrointestinal:  Negative for abdominal pain, constipation  Genitourinary:  Negative for dysuria and hematuria  Endocrine:  Negative for abnormal sleep patterns  Hema/Lymph:   Negative for easy bleeding and easy bruising  Integumentary:  Negative for pruritus and rash  Musculoskeletal:  Negative for bone/joint symptoms  Neurological:  Negative for gait disturbance  Psychiatric:  Negative for inappropriate interaction and psychiatric symptoms      Vitals:    12/10/24 1345   BP: 158/81   Pulse: 72         PHYSICAL EXAM:   Constitutional: appears well hydrated alert and responsive   Head/Face: normocephalic  Eyes/Vision: normal extraocular motion is intact  Nose/Mouth/Throat:mucous membranes are moist   Neck/Thyroid: neck is supple without adenopathy  Respiratory:  lungs are clear to auscultation bilaterally  Cardiovascular: regular rate and rhythm   Abdomen: soft, non-tender, non-distended, BS normal  Skin/Hair: no unusual rashes present, no abnormal bruising noted  Musculoskeletal: no deformities  Extremities: 1+  edema--> improved  Neurological:  Grossly normal      Lab Results   Component Value Date     10/28/2024     09/26/2024     08/22/2024    K 4.4 10/28/2024    K 4.2 09/26/2024    K 4.7 08/22/2024     10/28/2024     09/26/2024     08/22/2024    CO2 30.0 10/28/2024    CO2 30.0 09/26/2024    CO2 31.0 08/22/2024    BUN 13 10/28/2024    BUN 26 (H) 09/26/2024    BUN 22 08/22/2024    CREATSERUM 1.06 (H) 10/28/2024    CREATSERUM 1.17 (H) 09/26/2024    CREATSERUM 1.25 (H) 08/22/2024    CA 9.7 10/28/2024    CA 10.5 (H) 09/26/2024    CA 10.7 (H) 08/22/2024    EGFRCR 62 10/28/2024    EGFRCR 55 (L) 09/26/2024    EGFRCR 51 (L) 08/22/2024    ALB 3.84 10/28/2024    ALB 4.9 (H) 09/26/2024    ALB 4.7 08/20/2024    PHOS 4.2 09/26/2024    PTH 38.5 09/26/2024          ASSESSMENT/PLAN:   Assessment   1. Stage 3 chronic kidney disease, unspecified whether stage 3a or 3b CKD (HCC)    2. Hypercalcemia    3. Resistant hypertension    4. Type 2 diabetes mellitus with nephropathy (HCC)    5. Secondary hyperparathyroidism of renal origin (HCC)         CKD 3  Cr elevated in  August labs  Likely sec to uncontrolled HTN/DM  Renal US with left kidney inc echogenicity- prior obstruction/ pyelo, medical disease or less likely prior infarct. Doppler negative  Repeat cr 1.1 mg/dl, eGFR 55 ml/min  Avoid nsaids  Hypercalcemia noted, ipth appropriately low. Electrophoresis and pthrp normal . Stop hydrochlorothiazide.   ++ edema start torsemide. UPC normal . Low salt diet. May need echo     Resistant HTN  Doppler negative  Well controlled.   + edema stop hydrochlorothiazide and start torsemide. Per pt she has been on amlodipine for a long time     DM2 with nephropathy  Microalbuminuria noted-upc 0.1 g/g  On ARB  Cannot tolerate SGLT2i  Need better control of her DM      PLAN  Stop omeprazole and try pepcid 20 to 40  mg daily   If you have heart burn symptoms than go back to omeprazole  Continue torsemide 10 mg once a day        Orders This Visit:  No orders of the defined types were placed in this encounter.      Meds This Visit:  Requested Prescriptions      No prescriptions requested or ordered in this encounter       Imaging & Referrals:  None       Melissa Guerrero MD  12/10/2024            [1]   Allergies  Allergen Reactions    Reglan [Metoclopramide] OTHER (SEE COMMENTS)     Sensitivity, with crawling sensation.    Adhesive Tape      itching    Radiology Contrast Iodinated Dyes ITCHING    Wound Dressing Adhesive RASH

## 2024-12-11 ENCOUNTER — TELEPHONE (OUTPATIENT)
Facility: CLINIC | Age: 54
End: 2024-12-11

## 2024-12-11 DIAGNOSIS — N18.30 STAGE 3 CHRONIC KIDNEY DISEASE, UNSPECIFIED WHETHER STAGE 3A OR 3B CKD (HCC): Primary | ICD-10-CM

## 2024-12-11 NOTE — PROGRESS NOTES
Insulin Pump Therapy Les Pisano  3/30/1970    Last A1c value was 10.6% done 9/23/2024.      Overview     Medications on 11/15/24  Lantus 34 units bedtime  Novolog 12 units before breakfast and lunch and 12 units before dinner    Currently taking  Lantus 34 units at bedtime  Novolog 12 units before all three meals.   14-12-12  Lantus 40 units        Patient is 26% in range, 43% high and  and 31% very high. GMI at 8.7. Patient had one episode of hypoglycemia on 12/5/24.  Reviewed with patient insulin timing, when to take meal time insulin, importance of eating after taking novolog. Gvoke pen education given. Will pend for provider.                           Education     Discussion included:  - Overview of Medtronic, Omnipod and Tandem pump options, and iLet Bionic Pancreas  - Basal / Bolus insulin  - Automated Delivery System operations  - Pros and Cons of each device  - CGM integration  - Social and scientific aspects which might effect choice  - Insurance and coverage considerations of each device      Patient most interested in minimed 780G system and iLet bionic pancreas. Will let pump reps know to contact patient. She is interested in trying pumps before committing to one.       Adjust insulin:  Lantus 34--> 40 units at bedtime  Novolog 12--> 14 units before breakfast  12 units before lunch  12 units before dinner  Take insulin 15 minutes before meals.     Notify clinic if glucose is < 80  Gvoke pen pended for provider.     Follow up 1/7/25 for glucose review    Email sent to reps for medtronic and iLet to contact patient with pump information.     12/11/2024  Mirna CHRISTOPHERN RN [facility]  Diabetes Care &        A total of 80 minutes was spent with the patient including chart review, discussion and education / advisement pertinent to patient and provider specified concerns as documented above.     Note to patient: The 21 Century Cures Act makes medical notes like these  available to patients in the interest of transparency. However, be advised this is a medical document. It is intended as peer to peer communication. It is written in medical language and may contain abbreviations or verbiage that are unfamiliar. It may appear blunt or direct. Medical documents are intended to carry relevant information, facts as evident, and the clinical opinion of the practitioner.

## 2024-12-12 ENCOUNTER — NURSE ONLY (OUTPATIENT)
Dept: ENDOCRINOLOGY CLINIC | Facility: CLINIC | Age: 54
End: 2024-12-12
Payer: MEDICARE

## 2024-12-12 ENCOUNTER — TELEPHONE (OUTPATIENT)
Dept: INTERNAL MEDICINE CLINIC | Facility: CLINIC | Age: 54
End: 2024-12-12

## 2024-12-12 ENCOUNTER — PATIENT OUTREACH (OUTPATIENT)
Dept: CASE MANAGEMENT | Age: 54
End: 2024-12-12

## 2024-12-12 ENCOUNTER — TELEPHONE (OUTPATIENT)
Dept: ENDOCRINOLOGY CLINIC | Facility: CLINIC | Age: 54
End: 2024-12-12

## 2024-12-12 DIAGNOSIS — Z79.4 TYPE 2 DIABETES MELLITUS WITH OTHER NEUROLOGIC COMPLICATION, WITH LONG-TERM CURRENT USE OF INSULIN (HCC): Primary | ICD-10-CM

## 2024-12-12 DIAGNOSIS — D64.9 ANEMIA, UNSPECIFIED TYPE: ICD-10-CM

## 2024-12-12 DIAGNOSIS — R42 DIZZINESS: ICD-10-CM

## 2024-12-12 DIAGNOSIS — I10 PRIMARY HYPERTENSION: ICD-10-CM

## 2024-12-12 DIAGNOSIS — E66.9 OBESITY (BMI 30-39.9): ICD-10-CM

## 2024-12-12 DIAGNOSIS — N18.30 CKD STAGE 3 DUE TO TYPE 1 DIABETES MELLITUS (HCC): ICD-10-CM

## 2024-12-12 DIAGNOSIS — R41.82 ALTERED MENTAL STATUS, UNSPECIFIED ALTERED MENTAL STATUS TYPE: ICD-10-CM

## 2024-12-12 DIAGNOSIS — M54.12 CERVICAL RADICULOPATHY: Primary | ICD-10-CM

## 2024-12-12 DIAGNOSIS — E11.49 TYPE 2 DIABETES MELLITUS WITH OTHER NEUROLOGIC COMPLICATION, WITH LONG-TERM CURRENT USE OF INSULIN (HCC): Primary | ICD-10-CM

## 2024-12-12 DIAGNOSIS — I69.993 CVA, OLD, ATAXIA: Primary | ICD-10-CM

## 2024-12-12 DIAGNOSIS — K31.9 GASTROPATHY: ICD-10-CM

## 2024-12-12 DIAGNOSIS — I65.21 ATHEROSCLEROSIS OF RIGHT CAROTID ARTERY: ICD-10-CM

## 2024-12-12 DIAGNOSIS — D50.9 IRON DEFICIENCY ANEMIA, UNSPECIFIED IRON DEFICIENCY ANEMIA TYPE: ICD-10-CM

## 2024-12-12 DIAGNOSIS — Z98.890 S/P CAROTID ENDARTERECTOMY: ICD-10-CM

## 2024-12-12 DIAGNOSIS — K21.9 GASTROESOPHAGEAL REFLUX DISEASE WITHOUT ESOPHAGITIS: ICD-10-CM

## 2024-12-12 DIAGNOSIS — E10.22 CKD STAGE 3 DUE TO TYPE 1 DIABETES MELLITUS (HCC): ICD-10-CM

## 2024-12-12 DIAGNOSIS — E66.01 MORBID (SEVERE) OBESITY DUE TO EXCESS CALORIES (HCC): ICD-10-CM

## 2024-12-12 DIAGNOSIS — Z98.890 HISTORY OF CRANIOTOMY: ICD-10-CM

## 2024-12-12 PROCEDURE — 99211 OFF/OP EST MAY X REQ PHY/QHP: CPT | Performed by: INTERNAL MEDICINE

## 2024-12-12 RX ORDER — GLUCAGON INJECTION, SOLUTION 1 MG/.2ML
1 INJECTION, SOLUTION SUBCUTANEOUS ONCE AS NEEDED
Qty: 1 EACH | Refills: 0 | Status: SHIPPED | OUTPATIENT
Start: 2024-12-12 | End: 2024-12-12

## 2024-12-12 RX ORDER — HYDROCHLOROTHIAZIDE 12.5 MG/1
12.5 TABLET ORAL DAILY
Qty: 90 TABLET | Refills: 3 | OUTPATIENT
Start: 2024-12-12

## 2024-12-12 NOTE — PATIENT INSTRUCTIONS
Increase lantus to 40 units at bedtime    Novolo units before breakfast  12 units before lunch and dinner.     Take novolog 15-20 minutes before meals.    Reps for insulin pumps will contact you.     Follow up on 25 at 3:30 p.m. for diabetes education.

## 2024-12-12 NOTE — TELEPHONE ENCOUNTER
This is Order Only, authorized for tracking purposes only.     Patient can call and schedule appointment.    Faxed order to specialist.     Thank you

## 2024-12-12 NOTE — TELEPHONE ENCOUNTER
Dr Yanez - referral pended.   See CCM's message below   Please sign if okay     Managed care -- please assist with referral.

## 2024-12-12 NOTE — PROGRESS NOTES
CCM received call back from Margarita with Dr. Castro office- she stated Dr. Gillis does not manage the ASA  and has not seen patient since 2021.   She stated patient was referred to Dr. Selin Bhatt - neurology for further management . Patient last visit with Dr. Bhatt was in September 2022- she stated patient should follow up with Dr. Bhatt for Dizziness / Clearance of ASA use.       Patient will need referral to follow up with Dr. Robert Bhatt if appropriate:      [facility] [place] [facility] [place]  [address]  [place]  [address]  Phone  [phone]  Fax  [phone]        CCM will send to PCP to advise .       Total time : 10 minutes     Total Monthly Time - 35  min

## 2024-12-12 NOTE — TELEPHONE ENCOUNTER
[facility] is working on assisting patient with scheduling of neurosurgery follow up Dr. iGllis as she stated she was not able to get appointment.   [facility] spoke to April with Dr. Gillis office for appointment - stated she will send message sent to RN for assistance with scheduling .    [facility] received call back from Margarita with Dr. Castro office- she stated Dr. Gillis does not manage the ASA  and has not seen patient since 2021.   She stated patient was referred to Dr. Selin Bhatt - neurology for further management . Patient last visit with Dr. Bhatt was in September 2022- she stated patient should follow up with Dr. Bhatt for Dizziness / Clearance of ASA use.      Patient will need referral to follow up with Dr. Robert Bhatt if appropriate:     [facility] [place] [facility] [place]  [address]  [place]  [address]  Phone  [phone]  Fax  [phone]      Please advise . Thank you .

## 2024-12-12 NOTE — TELEPHONE ENCOUNTER
System auto authorized.  Managed care, please advise if any review is necessary or if patient may call to schedule.

## 2024-12-12 NOTE — PROGRESS NOTES
Spoke to Anjel for Chronic Care Management.      Updates to patient care team/comments: no change  Patient reported changes in medications: removed hydrochlorothiazide.  Med Adherence  Comment: patient reported compliance.        Patient updates/concerns:    Spoke to patient for CCM , stated she is doing okay .   Patient was in to see Dr. Guerrero on 12/10 - made changes to medications- patient has not done so:   Reviewed:     Stop omeprazole and try pepcid 20 to 40  mg daily   If you have heart burn symptoms than go back to omeprazole  Labs today  Continue torsemide 10 mg once a day     CCM confirmed patient is no longer taking hydrochlorothiazide - removed from medication list. Patient stated she was instructed to try famotidine otc.     Patient reported foot pain has improved.   Patient stated she has pain in the neck - CCM reviwed notes from Dr. Bhatt -patient was to have clearance from Neurosurgeon to stop asprin in order to proceed with injection .   Patient stated she was not able to schedule an appointment - CCM offered to assist  .     Goals/Action Plan:    Active goal from previous outreach:  improve pain   Patient reported progress towards goals:  patient continues to have neck pain - was not able to schedule with neurosurgeon. Foot pain has improved.               - What:  improve Pain            - Where/When/How : schedule appointment with neurosurgeon.  Patient Reported Barriers and Concerns:  financial barriers                     - Plan for overcoming barriers: Patient was notified by navigator about rides. Patient stated it is difficult as she cannot afford the rides.      Care Managers Interventions:   CCM to call  Dr. Gillis office to assist with scheduling appointment .     Spoke to April -with Dr. Gillis office- [facility] scheduled appointment for patient - she was able to see referral in system- [facility] asked if earlier appointment is available- she will  send message to provider to see if they can get  her in earlier and call CCM .   Appointment scheduled :   February 3 , 2024 -    1:30 pm   [address]  [place]  [address]      Future Appointments:   Future Appointments   Date Time Provider Department Center   12/12/2024  2:00 PM ECWMO ENDO CDE [facility]O [facility]   1/15/2025  3:20 PM Yves Yanez MD [id number] [facility] [place]   2/10/2025  4:00 PM Nilsa Caceres MD [facility]WMOENDO [facility]   6/10/2025  2:00 PM Melissa Guerrero MD [id number] [facility]   9/3/2025  3:20 PM Rohini Mckenna MD ECCFHOBGYN [facility] Care Manager Follow Up Date: 1 month     Reason For Follow Up: review progress and or barriers towards patient's goals.     Time Spent This Encounter Total: 25 min medical record review, telephone communication, care plan updates where needed, education, goals, and action plan recreation/update. Provided acknowledgment and validation to patient's concerns.   Monthly Minute Total including today: 25  Physical assessment, complete health history, and need for CCM established by Yves Yanez MD.

## 2024-12-13 ENCOUNTER — TELEPHONE (OUTPATIENT)
Dept: ENDOCRINOLOGY CLINIC | Facility: CLINIC | Age: 54
End: 2024-12-13

## 2024-12-13 NOTE — TELEPHONE ENCOUNTER
Email from Aissatou with [employer] patient is interested in proceeding with iLet pump. Please place order on [place] to [employer].

## 2024-12-13 NOTE — TELEPHONE ENCOUNTER
Attempted to call, Unable to leave voice mail on patients mobile phone.     Patients home phone listed is her sisters number and [place].

## 2024-12-13 NOTE — PROGRESS NOTES
CCM called patient to assist with scheduling /  follow up -     patient stated Dr. Bhatt office has called and she has scheduled appointment with   Dr. Robert Bhatt -  January 9th 2025.   [facility] [place] [facility]  [address]  [place]  [address]    Time spent : 5 min     Total monthly time : 40 minutes.

## 2024-12-13 NOTE — TELEPHONE ENCOUNTER
Noted.   Reviewed self and infant care w / mom, she verbalizes understanding of instructions reviewed. Encourage to follow up w/ MDs as directed and w/ questions/concerns. Pumping now, enc to join ct

## 2024-12-13 NOTE — TELEPHONE ENCOUNTER
[facility] called to follow up - patient stated Dr. Bhatt office has called and she has scheduled appointment with   Dr. Robert Bhatt -  January 9th 2025.   [facility] [place] [facility] [place]  [address]  [place]  [address]

## 2024-12-23 DIAGNOSIS — E11.65 UNCONTROLLED TYPE 2 DIABETES MELLITUS WITH HYPERGLYCEMIA (HCC): ICD-10-CM

## 2024-12-23 DIAGNOSIS — Z01.818 PREOPERATIVE EXAMINATION: ICD-10-CM

## 2024-12-24 RX ORDER — PEN NEEDLE, DIABETIC 32GX 5/32"
NEEDLE, DISPOSABLE MISCELLANEOUS
Qty: 100 EACH | Refills: 1 | Status: SHIPPED | OUTPATIENT
Start: 2024-12-24

## 2024-12-24 NOTE — TELEPHONE ENCOUNTER
Endocrine Refill protocol for Glucose testing supplies     Protocol Criteria: PASSED Reason: N/A    If below requirement is met, send a 90-day supply with 1 refill per provider protocol.    Verify appointment with Endocrinology completed in the last 6 months or scheduled in the next 3 months.    Last completed office visit: 9/23/2024 Nilsa Caceres MD   Next scheduled Follow up:   Future Appointments   Date Time Provider Department Center   1/7/2025  3:30 PM ECWMO ENDO CDE ECWMOENDO [facility]   1/15/2025  3:20 PM Yves Yanez MD [id number] [facility] [place]   2/10/2025  4:00 PM Nilsa Caceres MD ECWMOENDO [facility]   6/10/2025  2:00 PM Melissa Guerrero MD [id number] [facility]   9/3/2025  3:20 PM Rohini Mckenna MD ECCFHOBGYN [facility]

## 2024-12-27 ENCOUNTER — HOSPITAL ENCOUNTER (OUTPATIENT)
Dept: MAMMOGRAPHY | Age: 54
Discharge: HOME | End: 2024-12-27
Attending: NURSE PRACTITIONER
Payer: MEDICARE

## 2024-12-27 DIAGNOSIS — Z12.31 ENCOUNTER FOR SCREENING MAMMOGRAM FOR MALIGNANT NEOPLASM OF BREAST: ICD-10-CM

## 2024-12-27 PROCEDURE — 77063 BREAST TOMOSYNTHESIS BI: CPT | Performed by: NURSE PRACTITIONER

## 2024-12-27 PROCEDURE — 77067 SCR MAMMO BI INCL CAD: CPT | Performed by: NURSE PRACTITIONER

## 2025-01-03 ENCOUNTER — OFFICE VISIT (OUTPATIENT)
Dept: PODIATRY CLINIC | Facility: CLINIC | Age: 55
End: 2025-01-03

## 2025-01-03 DIAGNOSIS — L60.0 INGROWN TOENAIL: ICD-10-CM

## 2025-01-03 DIAGNOSIS — L85.3 DRY SKIN: ICD-10-CM

## 2025-01-03 DIAGNOSIS — Z79.4 TYPE 2 DIABETES MELLITUS WITH DIABETIC POLYNEUROPATHY, WITH LONG-TERM CURRENT USE OF INSULIN (HCC): Primary | ICD-10-CM

## 2025-01-03 DIAGNOSIS — E11.42 TYPE 2 DIABETES MELLITUS WITH DIABETIC POLYNEUROPATHY, WITH LONG-TERM CURRENT USE OF INSULIN (HCC): Primary | ICD-10-CM

## 2025-01-03 DIAGNOSIS — M25.472 EDEMA OF LEFT ANKLE: ICD-10-CM

## 2025-01-03 PROCEDURE — 99214 OFFICE O/P EST MOD 30 MIN: CPT | Performed by: STUDENT IN AN ORGANIZED HEALTH CARE EDUCATION/TRAINING PROGRAM

## 2025-01-03 RX ORDER — GABAPENTIN 100 MG/1
100 CAPSULE ORAL 3 TIMES DAILY
Qty: 270 CAPSULE | Refills: 0 | Status: SHIPPED | OUTPATIENT
Start: 2025-01-03 | End: 2025-04-03

## 2025-01-03 RX ORDER — AMMONIUM LACTATE 12 G/100G
1 LOTION TOPICAL AS NEEDED
Qty: 225 G | Refills: 3 | Status: SHIPPED | OUTPATIENT
Start: 2025-01-03

## 2025-01-03 NOTE — PROGRESS NOTES
[facility]  Progress Note      Anjel Pisano is a 54 year old female.   Chief Complaint   Patient presents with    Foot Pain     B/l foot -Pt states pain in ankle down to her foot. Pain onset for a few months.States numbness and tingling in both feet. Pt is diabetic. FBS- 200    Diabetic Foot Care     R hallux - Pain due to nail ingrown.          HPI:     Patient is a pleasant 54-year-old diabetic female presents to clinic for bilateral foot evaluation evaluation.  Admits to burning, numbness and tingling to bilateral lower extremity.  Patient admits to having an ingrown right medial nail border.  Denies any signs of infection.  She also admits to tenderness on the plantar midfoot along her arch.  Relates to left lateral ankle swelling after she sustained an injury long time ago.  Denies any recent pedal injuries or trauma.  Denies any signs of infection.      Allergies: Reglan [metoclopramide], Adhesive tape, Radiology contrast iodinated dyes, and Wound dressing adhesive    Current Outpatient Medications   Medication Sig Dispense Refill    gabapentin 100 MG Oral Cap Take 1 capsule (100 mg total) by mouth 3 (three) times daily. 270 capsule 0    ammonium lactate (AMLACTIN DAILY) 12 % External Lotion Apply 1 Application topically as needed for Dry Skin. 225 g 3    Insulin Pen Needle (BD PEN NEEDLE ABDOULAYE 2ND GEN) 32G X 4 MM Does not apply Misc USE FOUR TIMES DAILY AS DIRECTED 100 each 1    metoprolol succinate  MG Oral Tablet 24 Hr Take 1 tablet (100 mg total) by mouth daily. 90 tablet 3    metoprolol succinate ER 25 MG Oral Tablet 24 Hr Take 1 tablet (25 mg total) by mouth nightly. 90 tablet 3    torsemide 10 MG Oral Tab Take 1 tablet (10 mg total) by mouth daily. 30 tablet 1    atorvastatin 80 MG Oral Tab Take 1 tablet (80 mg total) by mouth nightly. 90 tablet 3    levETIRAcetam 500 MG Oral Tab Take 1 tablet (500 mg total) by mouth 2 (two) times daily. 180 tablet 0    amLODIPine 10 MG Oral Tab  Take 1 tablet (10 mg total) by mouth daily. 90 tablet 3    Insulin Lispro, 1 Unit Dial, 100 UNIT/ML Subcutaneous Solution Pen-injector       insulin glargine (LANTUS SOLOSTAR) 100 UNIT/ML Subcutaneous Solution Pen-injector Inject 30 Units into the skin nightly. 27 mL 1    Continuous Glucose Sensor (DEXCOM G7 SENSOR) Does not apply Misc 1 each Every 10 days. 9 each 1    losartan 100 MG Oral Tab Take 1 tablet (100 mg total) by mouth daily. 90 tablet 3    escitalopram 10 MG Oral Tab Take 1 tablet (10 mg total) by mouth daily. 90 tablet 3    Glucose Blood (ONETOUCH ULTRA) In Vitro Strip Please provide test strips that are covered by patient's insurance. 100 strip 0    Blood Glucose Monitoring Suppl (ONETOUCH ULTRA 2) w/Device Does not apply Kit Please provide what is covered by patient's insurance 1 kit 0    pregabalin (LYRICA) 75 MG Oral Cap Take 1 capsule (75 mg total) by mouth 2 (two) times daily. 60 capsule 0    ERGOCALCIFEROL 1.25 MG (34690 UT) Oral Cap TAKE 1 CAPSULE BY MOUTH 1 TIME A WEEK FOR 12 DOSES 12 capsule 1    metFORMIN 500 MG Oral Tab Take 2 tablets (1,000 mg total) by mouth 2 (two) times daily with meals. 360 tablet 1    Blood Glucose Monitoring Suppl (TRUE METRIX METER) w/Device Does not apply Kit Use to check blood sugar four times a day 1 kit 0    Glucose Blood (TRUE METRIX BLOOD GLUCOSE TEST) In Vitro Strip Check blood sugar four times a day 400 strip 1    TRUEplus Lancets 33G Does not apply Misc USE TO CHECK BLOOD SUGAR FOUR TIMES DAILY 400 each 1    NOVOLOG FLEXPEN 100 UNIT/ML Subcutaneous Solution Pen-injector INJECT 35 UNITS INTO THE SKIN THREE TIMES DAILY WITH MEAKS PLUS SLIDING SCALE 90 mL 0    Glucose Blood (TRUE METRIX BLOOD GLUCOSE TEST) In Vitro Strip Use to check blood sugar three times a day 300 strip 1    hydrALAZINE 100 MG Oral Tab Take 1 tablet (100 mg total) by mouth 2 (two) times daily. 180 tablet 3    Accu-Chek FastClix Lancets Does not apply Misc Use to check blood sugar three  times a day 300 each 0    Glucose Blood (ACCU-CHEK GUIDE) In Vitro Strip Use to check blood sugar three times a day 300 strip 0    Omeprazole 40 MG Oral Capsule Delayed Release TAKE 1 CAPSULE EVERY DAY 30 TO 60 MINUTES BEFORE A MEAL 90 capsule 3    Alcohol Swabs (DROPSAFE ALCOHOL PREP) 70 % Does not apply Pads USE AS DIRECTED 100 each 0    Blood Glucose Monitoring Suppl (ACCU-CHEK GUIDE) w/Device Does not apply Kit Use to check blood sugar three times a day 1 kit 0    fluticasone propionate 50 MCG/ACT Nasal Suspension 2 sprays by Nasal route daily. 48 g 1    VENTOLIN  (90 Base) MCG/ACT Inhalation Aero Soln Inhale 2 puffs into the lungs every 6 (six) hours as needed for Wheezing. 18 g 1    aspirin 325 MG Oral Tab Take 1 tablet (325 mg total) by mouth daily.        Past Medical History:    Age-related nuclear cataract of both eyes    Anemia    Apnea    Cataract    Coronary atherosclerosis    Depression    DM type 2 (diabetes mellitus, type 2) ([facility])    Esophageal reflux    High blood pressure    High cholesterol    Meibomian gland dysfunction (MGD), bilateral, both upper and lower lids    Migraine    Muscle weakness    left sided weakness uses walker and cane    Sleep apnea    Stenosis of right vertebral artery    Stroke ([facility])    Type II or unspecified type diabetes mellitus without mention of complication, not stated as uncontrolled      Past Surgical History:   Procedure Laterality Date    Brain surgery  2014    Colonoscopy N/A 8/25/2017    Procedure: COLONOSCOPY;  Surgeon: Sharmaine Burks MD;  Location: [facility] ENDOSCOPY    Colonoscopy      Colonoscopy N/A 11/12/2022    Procedure: COLONOSCOPY with Polypectomy;  Surgeon: Terry Weaver MD;  Location: [facility] ENDOSCOPY    Excise carotid body+carotid artery  09/2017    Incision and drainage Right 04/19/16    lower abdominal skin    Laparoscopic cholecystectomy  2002      Family History   Problem Relation Age of Onset    Diabetes Father     Hypertension Father      Heart Disorder Father     Lipids Father     Obesity Father     Colon Cancer Father     Heart Disorder Mother     Lipids Mother     Obesity Mother     Lipids Brother     Obesity Brother     Glaucoma Neg     Macular degeneration Neg       Social History     Socioeconomic History    Marital status: Legally     Number of children: 3   Occupational History    Occupation:      Comment: disabled    Occupation: phlebotomy   Tobacco Use    Smoking status: Former     Current packs/day: 0.00     Average packs/day: 0.2 packs/day for 1 year (0.2 ttl pk-yrs)     Types: Cigarettes     Start date: 2012     Quit date: 2013     Years since quittin.0    Smokeless tobacco: Never   Vaping Use    Vaping status: Never Used   Substance and Sexual Activity    Alcohol use: No     Alcohol/week: 0.0 standard drinks of alcohol    Drug use: No    Sexual activity: Not Currently           REVIEW OF SYSTEMS:     Denies nause, fever, chills  No calf pain  Denies chest pain or SOB      EXAM:   There were no vitals taken for this visit.  GENERAL: well developed, well nourished, in no apparent distress  EXTREMITIES:   1. Integument: Normal skin temperature and turgor.  Incurvated right medial hallux nail border with no signs of infection.  2. Vascular: Dorsalis pedis two out of four bilateral and posterior tibial pulses two out of   four bilateral, capillary refill normal.   3. Musculoskeletal: All muscle groups are graded 5 out of 5 in the foot and ankle.   4. Neurological: decreased protective sensation to bilateral feet      Bilateral barefoot skin diabetic exam is abnormal with diabetic monofilament/sensation testing abnormal plan [facility] ROWENA 2D+3D SCREENING BILAT (CPT=77067/02007)    Result Date: 2024  CONCLUSION:   No mammographic evidence for malignancy.  As long as the patient's clinical breast exam remains unchanged, annual screening mammogram is recommended.   BI-RADS CATEGORY:    DIAGNOSTIC CATEGORY 1 - NEGATIVE ASSESSMENT.   RECOMMENDATIONS:  ROUTINE MAMMOGRAM AND CLINICAL EVALUATION IN 12 MONTHS.       PLEASE NOTE: NORMAL MAMMOGRAM DOES NOT EXCLUDE THE POSSIBILITY OF BREAST CANCER.  A CLINICALLY SUSPICIOUS PALPABLE LUMP SHOULD BE BIOPSIED.   For patients over the age of 40, the target due date for the patient's next mammogram has been entered into a reminder system.   Patient received a discharge summary from the technologist after completion of exam.  Breast marker legend used on images  Triangle = Palpable lump Hector = Skin tag or mole BB = Nipple Linear naveed = Scar Square = Pain    Dictated by (CST): Cali Jordan MD on 12/27/2024 at 1:15 PM     Finalized by (CST): Cali Jordan MD on 12/27/2024 at 1:17 PM      [address], [address], [place] [address] [phone]      ASSESSMENT AND PLAN:   Diagnoses and all orders for this visit:    Type 2 diabetes mellitus with diabetic polyneuropathy, with long-term current use of insulin (HCC)  -     DME - External    Ingrown toenail    Dry skin    Edema of left ankle    Other orders  -     gabapentin 100 MG Oral Cap; Take 1 capsule (100 mg total) by mouth 3 (three) times daily.  -     ammonium lactate (AMLACTIN DAILY) 12 % External Lotion; Apply 1 Application topically as needed for Dry Skin.        Plan:       -Patient examined, chart history reviewed.  -Discussed importance of proper pedal hygiene, regular foot checks, and tight glucose control.  -slant back performed to right medial hallux nail border providing patient with temporary relief.  Advised patient to schedule for 20-minute visit to have a right medial hallux nail avulsion with chemical matricectomy.  -Advised patient to purchase over-the-counter 15-20 mmHg knee-high compression socks.  To wear the compression socks during the day first thing in the morning and take them off at night before bed.  -Provided patient with referral  for diabetic shoes with inserts  -Ambulate with supportive shoes and inserts and avoid walking barefoot.  -Educated patient on acute signs of infection advised patient to seek immediate medical attention if symptoms arise.      The patient indicates understanding of these issues and agrees to the plan.        Mila Sanford DPM

## 2025-01-07 ENCOUNTER — TELEPHONE (OUTPATIENT)
Dept: ENDOCRINOLOGY CLINIC | Facility: CLINIC | Age: 55
End: 2025-01-07

## 2025-01-07 ENCOUNTER — NURSE ONLY (OUTPATIENT)
Dept: ENDOCRINOLOGY CLINIC | Facility: CLINIC | Age: 55
End: 2025-01-07

## 2025-01-07 DIAGNOSIS — E11.65 UNCONTROLLED TYPE 2 DIABETES MELLITUS WITH HYPERGLYCEMIA (HCC): Primary | ICD-10-CM

## 2025-01-07 PROCEDURE — 99211 OFF/OP EST MAY X REQ PHY/QHP: CPT | Performed by: INTERNAL MEDICINE

## 2025-01-07 NOTE — PROGRESS NOTES
Continuous Glucose Monitor Download - dexcom G7    Anjel Pisano  3/30/1970      Medical Background        Lab Results   Component Value Date    A1C 10.6 (A) 09/23/2024    A1C 10.2 (A) 05/23/2024    A1C 9.9 (H) 05/23/2024    A1C 10.4 (A) 02/29/2024    A1C 10.2 (A) 11/30/2023           Medication Review      DM Meds: Insulin Lispro (1 Unit Dial) Sopn - 100 UNIT/ML; Lantus SoloStar Sopn - 100 UNIT/ML; metFORMIN Tabs - 500 MG; NovoLOG FlexPen Sopn - 100 UNIT/ML         Medications at last endocrinology appointment: 12/12/24  Adjust insulin:  Lantus 34--> 40 units at bedtime  Novolog 12--> 14 units before breakfast  12 units before lunch  12 units before dinner  Take insulin 15 minutes before meals.      Notify clinic if glucose is < 80  Gvoke pen pended for provider.       Patient Currently Taking:   Lantus 34 units daily --> 40 units  Novolog 15 units three times daily plus sliding scale  Metformin 1000 mg twice daily      Blood Glucose Review      Uses dexcom with reader    Interpretation of Data:  Patient is 17% in range, versus 26% at last appointment. GMI 9.2                            Patient Concerns      Has not received insulin pump. Per [place] order review, there has been no movement on order since it was placed on 12/16/24. Called [employer] and they will follow up on the order and initiate the process.     Meals and Dosing Timing:    Reviewed with patient sweetners and quantity to use on her tea. Patient had eggs and broccoli only for lunch. Glucose per dexcom at 138.       Recommendations / Plan / Goals    - Send referral to [facility]  - Increase lantus to 40 units at bedtime  - Continue novolog 15 units three times daily before meals  - Insulin pump being processed through [employer] - they will contact patient.         Next Follow up scheduled for 2/10/25      Routed to provider for review.      1/7/2025  Mirna HERNANDEZ RN [facility]  Diabetes Care &      A total of 55 minutes was  spent with the patient including chart review, discussion and education / advisement pertinent to patient and provider specified concerns as documented above.     Note to patient: The 21 Century Cures Act makes medical notes like these available to patients in the interest of transparency. However, be advised this is a medical document. It is intended as peer to peer communication. It is written in medical language and may contain abbreviations or verbiage that are unfamiliar. It may appear blunt or direct. Medical documents are intended to carry relevant information, facts as evident, and the clinical opinion of the practitioner.

## 2025-01-07 NOTE — PATIENT INSTRUCTIONS
Increase lantus to 40 units at bedtime     Novolog:  15 units before three times daily before meals     Take novolog 15-20 minutes before meals.    Follow up with Dr. Caceres on 2/10/25

## 2025-01-08 ENCOUNTER — TELEPHONE (OUTPATIENT)
Dept: ENDOCRINOLOGY | Facility: HOSPITAL | Age: 55
End: 2025-01-08

## 2025-01-08 NOTE — TELEPHONE ENCOUNTER
Rcvd order from [facility] for iLet insulin pump training. Called pt and LVM to schedule appt. Provided office phone number.

## 2025-01-09 ENCOUNTER — ORDER TRANSCRIPTION (OUTPATIENT)
Dept: ADMINISTRATIVE | Facility: HOSPITAL | Age: 55
End: 2025-01-09

## 2025-01-09 DIAGNOSIS — I63.349: Primary | ICD-10-CM

## 2025-01-13 ENCOUNTER — MED REC SCAN ONLY (OUTPATIENT)
Dept: INTERNAL MEDICINE CLINIC | Facility: CLINIC | Age: 55
End: 2025-01-13

## 2025-01-13 DIAGNOSIS — E11.65 UNCONTROLLED TYPE 2 DIABETES MELLITUS WITH HYPERGLYCEMIA (HCC): ICD-10-CM

## 2025-01-13 NOTE — TELEPHONE ENCOUNTER
metFORMIN 500 MG Oral Tab Take 2 tablets (1,000 mg total) by mouth 2 (two) times daily with meals. 360 tablet 1

## 2025-01-13 NOTE — TELEPHONE ENCOUNTER
Endocrine Refill protocol for metformin    Protocol Criteria:  FAILED  Reason: Elevated A1C    If all below requirements are met, send a 90-day supply with 1 refill per provider protocol.     Verify appointment with Endocrinology completed in the last 6 months or scheduled in the next 3 months.  Verify A1C has been completed within the last 6 months and is below 8.5%  Verify last GFR is greater than or equal to 40 in the past 12 months    Last completed office visit:9/23/2024 Nilsa Caceres MD   Next scheduled Follow up:   Future Appointments   Date Time Provider Department Center   2/10/2025  4:00 PM Nilsa Caceres MD ECWMOENDO [facility]       Last GFR result:    Lab Results   Component Value Date    EGFRCR 83 12/10/2024     Last A1c result: Last A1C result: 10.6% done 9/23/2024.

## 2025-01-14 ENCOUNTER — TELEPHONE (OUTPATIENT)
Dept: ENDOCRINOLOGY CLINIC | Facility: CLINIC | Age: 55
End: 2025-01-14

## 2025-01-14 NOTE — TELEPHONE ENCOUNTER
Called patient for follow up on glucose levels and insulin pump choice clarification. At last visit with [facility] patient to take,  Lantus 34 units daily --> 40 units  Novolog 15 units three times daily plus sliding scale  Metformin 1000 mg twice daily  No answer. Left message for patient to call back.

## 2025-01-15 ENCOUNTER — TELEPHONE (OUTPATIENT)
Dept: ENDOCRINOLOGY CLINIC | Facility: CLINIC | Age: 55
End: 2025-01-15

## 2025-01-15 NOTE — TELEPHONE ENCOUNTER
Jayshree from [employer] is following up on status of paperwork that was sent on Januarty 10th for insulin pump please call

## 2025-01-15 NOTE — TELEPHONE ENCOUNTER
Called patient for follow up. She had breakfast 30 minutes ago and glucose 236 with arrow pointing straight.    Patient uses dexcom reader and read 14 day report:  average glucose - 247  Very high - 48%   High - 33%  Time in range - 19%   0% low  0% very log  GMI 9.2%    Patient states she feels \"good\" about her glucose levels at this time. Will work on increasing time in range closer to 50%.  Currently taking,  Lantus 40 units --> 48 units  Novolog 15 units three times daily - not using sliding scale. Will start again. Teaching reinforced on how to use scale to give more insulin before meals if needed per glucose levels.  Metformin 1000 mg twice daily.     -Patient has CT scan on 1/24/25. States she was told to remove CGM sensor. She will remove it before CT and call dexcom for a replacement sensor.     - Clarified with patient on insulin pump options of [employer] vs iLet. She states she wants to use the pump where she has minimal interaction, wants the pump that she has to \"tell what meals I'm having.\" Will proceed with iLet order. Samir notified. Patient has first appointment at [facility] for pump training on 1/27/25.    -Instructed patient to notify clinic of any glucose < 80    - Will follow up with patient in one week for glucose levels review.

## 2025-01-16 NOTE — TELEPHONE ENCOUNTER
Fax received from "[employer] forms placed in provider folder for review and signature.

## 2025-01-21 ENCOUNTER — MED REC SCAN ONLY (OUTPATIENT)
Dept: ENDOCRINOLOGY CLINIC | Facility: CLINIC | Age: 55
End: 2025-01-21

## 2025-01-22 ENCOUNTER — PATIENT OUTREACH (OUTPATIENT)
Dept: CASE MANAGEMENT | Age: 55
End: 2025-01-22

## 2025-01-22 ENCOUNTER — TELEPHONE (OUTPATIENT)
Dept: INTERNAL MEDICINE CLINIC | Facility: CLINIC | Age: 55
End: 2025-01-22

## 2025-01-22 DIAGNOSIS — R40.4 TRANSIENT ALTERATION OF AWARENESS: ICD-10-CM

## 2025-01-22 DIAGNOSIS — E11.49 TYPE 2 DIABETES MELLITUS WITH OTHER NEUROLOGIC COMPLICATION, WITH LONG-TERM CURRENT USE OF INSULIN (HCC): ICD-10-CM

## 2025-01-22 DIAGNOSIS — E78.5 DYSLIPIDEMIA: ICD-10-CM

## 2025-01-22 DIAGNOSIS — I10 PRIMARY HYPERTENSION: ICD-10-CM

## 2025-01-22 DIAGNOSIS — Z79.4 TYPE 2 DIABETES MELLITUS WITH OTHER NEUROLOGIC COMPLICATION, WITH LONG-TERM CURRENT USE OF INSULIN (HCC): ICD-10-CM

## 2025-01-22 DIAGNOSIS — G43.909 MIGRAINE WITHOUT STATUS MIGRAINOSUS, NOT INTRACTABLE, UNSPECIFIED MIGRAINE TYPE: ICD-10-CM

## 2025-01-22 DIAGNOSIS — E78.00 PURE HYPERCHOLESTEROLEMIA: ICD-10-CM

## 2025-01-22 DIAGNOSIS — E66.9 OBESITY (BMI 30-39.9): Primary | ICD-10-CM

## 2025-01-22 DIAGNOSIS — I10 ESSENTIAL HYPERTENSION: ICD-10-CM

## 2025-01-22 DIAGNOSIS — H91.93 HEARING DECREASED, BILATERAL: Primary | ICD-10-CM

## 2025-01-22 NOTE — TELEPHONE ENCOUNTER
Recommend patient to contact her Neurologist ,if headaches are getting persistent or worsening   continue blood pressure recheck  Follow-up in office if blood pressure persistently elevated above 140/90    Patient can see our ENT for exam and further recommendation.  Referral placed in the system

## 2025-01-22 NOTE — PROGRESS NOTES
Spoke to Anjel for Chronic Care Management.      Updates to patient care team/comments: no change  Patient reported changes in medications: no change  Med Adherence  Comment:  patient reported compliance.       Health Maintenance:   Health Maintenance   Topic Date Due    Pneumococcal Vaccine: 50+ Years (2 of 2 - PCV) 12/21/2017    Zoster Vaccines (2 of 2) 07/12/2023    Annual Physical  05/03/2024    COVID-19 Vaccine (3 - 2024-25 season) 09/01/2024    Influenza Vaccine (1) 10/01/2024    Diabetes Care A1C  12/23/2024    Annual Depression Screening  01/01/2025    Diabetes Care: Microalb/Creat Ratio (Annual)  01/01/2025    Gyne Pelvic Exam  08/29/2025    Colorectal Cancer Screening  11/12/2025    Diabetes Care Dilated Eye Exam  11/18/2025    Diabetes Care: GFR  12/10/2025    Mammogram  12/27/2025    Pap Smear  08/29/2027    DTaP,Tdap,and Td Vaccines (2 - Td or Tdap) 05/17/2033    Diabetes Care: Foot Exam (Annual)  Completed    Meningococcal B Vaccine  Aged Out       Patient updates/concerns:       Patient reported diabetes  is not controlled - she notes some readings between 140-150  and goes up to high of 230 on average.   Patient denies any recent hypoglycemia.   Patient continues to have nausea and not eating very much .     Meals  -  Skipped breakfast :  sometimes banana .   Lunch : 2 slices of breath with spread and water.   Dinner : patient stated she has balanced dinner .     Patient also still having acid reflux- stopped omeprazole but stated she did not remember which medication Dr. Guerrero recommended. CCM reviewed recommendation : Per Dr. Guerrero:   Stop omeprazole and try pepcid 20 to 40  mg daily   If you have heart burn symptoms than go back to omeprazole  Labs today  Continue torsemide 10 mg once a day            Patient stated she continues to have constipation was previously taking docusate but stated the otc strength is not helping.   Patient reminded of PCP recommendation to follow up with  Dr. Weaver -  patient did not establish with provider, [facility] encouraged patient to schedule appointment  . [facility] provided patient with phone number to call.   [facility] encouaged patient to avoid bananas when constipated.     Foot pain :   Patient did follow up with podiatry - [facility] informed patient she is to schedule appointment for follow up as she is still having pain due to ingrown toenail. Patient stated she is using compression stockings.   Patient has not heard from [facility] regarding diabetic shoes. [facility] attempted to call - no answer - [facility] will send patient information for referral via [email or web address].      Patient stated she was in to see Neurologist Dr. Robert Bhatt .  [facility] reviewed patient records from Care Everywhere.     [facility] copy and pasted most recent note from  Dr. PATRICA Bhatt - 1/9/2025    ate Recorded Systolic blood pressure Diastolic blood pressure Provider Name and Address Organization Details Last Updated DateTime   01/09/2025 180 mm[Hg] 91 mm[Hg] [facility] 01/09/2025 10:15:38   Vitals  Date Recorded Systolic blood pressure Diastolic blood pressure Provider Name and Address Organization Details Last Updated DateTime   01/09/2025 164 mm[Hg] 93 mm[Hg] [facility] 01/09/2025 10:17:42     Recommendations:  1. Continue aspirin 325 mg p.o. daily    2. Continue atorvastatin 40 mg p.o. daily.    3. Continue levetiracetam 500 mg p.o. b.i.d.    4. Repeat CT angiogram of head and neck with and without contrast.Check BUN and creatinine before giving contrast. She will call for results.    5. Strict diabetes control.    6. We will try to obtain report for her MRI of cervical spine.    7. She is taking gabapentin teen and pregabalin from a different physician.    8. Continue to follow-up with pain clinic.    9. Her blood pressure was elevated today. She is asymptomatic. She will discuss with PCP regarding this. Goal BP less than 120/80    10. Return to clinic after  above tests    [facility] 11[facility]56[facility]12      CCM reviewed Dr. Bhatt recommendations.   Patient is scheduled for CT scan .     Patient stated she has had a headache for a few days- stated her BP is typically average 130-140 (systolic)  Patient checked BP during call today  :     BP : 162/82 - Pulse - 79  Second reading : 160/90 p - 81.        Patient also stated she went to [facility] and had hearing test - was told her Right ear is only about 30% and left is only 60 % - patient would like to know if PCP can recommend next step to discuss hearing aids     Patient is hoping to follow up with Dr. Jennifer Bhatt for pain after getting clearance from neurology to come off of Asprin.     Goals/Action Plan:    Active goal from previous outreach:  improve pain   Patient reported progress towards goals:  patient continues to have headache / neck pain -did follow up with neurology as recommended.                - What:  improve Pain            - Where/When/How : schedule appointment gastroenterologist,   Patient Reported Barriers and Concerns:  BP elevated today                    - Plan for overcoming barriers: patient will continue to monitor BP .    Care Managers Interventions:  CCM sending message to PCP to Advise on symptoms.   CCM Reminded patient to schedule appointment with Dr. Weaver  CCM reminded patient to purchase OTC famotidine - Per dr. Guerrero .   Patient encouraged to eat high fiber foods to help with constipation.   CCM listened and provided support.   Reminded patient of overdue Health Maintenance.   CCM updated patient records from Care Everywhere.   CCM updated immunizations- no updates.   Continue to provide encouragement, support and education for healthy coping and diagnosis.        Future Appointments:   Future Appointments   Date Time Provider Department Center   1/24/2025  1:00 PM  CT MAIN RM3  CT [facility]   1/27/2025  2:15 PM Alejandrina Torre, MARYAM [facility] DIABETES EM [facility]   2/10/2025  4:00 PM Morris,  MD Nilsa ECWMOENDO [facility]   6/10/2025  2:00 PM Melissa Guerrero MD [id number] [facility]   9/3/2025  3:20 PM Rohini Mckenna MD ECCFHOBGYN [facility] Care Manager Follow Up Date: 1 month       Reason For Follow Up: review progress and or barriers towards patient's goals.     Time Spent This Encounter Total: 55 min medical record review, telephone communication, care plan updates where needed, education, goals, and action plan recreation/update. Provided acknowledgment and validation to patient's concerns.   Monthly Minute Total including today: 55  Physical assessment, complete health history, and need for CCM established by Yves Yanez MD.

## 2025-01-22 NOTE — TELEPHONE ENCOUNTER
Spoke to patient for CCM .   CCM sending condition update :     Patient stated she has had a headache for a few days- stated her BP is typically average 130-140 (systolic)  Patient stated she is taking medications as directed- watching salt intake .      Patient checked BP during call today  :    BP : 162/82 - Pulse - 79  Second reading : 160/90 p - 81.       Patient also stated she went to [place] [facility] and had hearing test - was told her Right ear is only about 30% and left is only 60 % - patient would like to know if PCP can recommend next step to discuss hearing aids      .   Please advise.  Thank you .

## 2025-01-23 NOTE — TELEPHONE ENCOUNTER
Spoke with patient, Date of Birth verified.  She was informed of MD recommendation, pt stated she had appt with Neuro Dr. Selin Bhatt about 2-3 weeks ago.   She was advised to get CTA, her test is scheduled for tomorrow 1-24-25.  She stated her BP runs less than 140 systolic, the last few days she was cold and can't sleep due to pain .  She will keep us inform.         Future Appointments   Date Time Provider Department Center   1/24/2025  1:00 PM  CT MAIN RM3  CT [facility]   1/27/2025  2:15 PM Alejandrina Torre, MARYAM [facility] DIABETES EM [facility]   2/3/2025  1:00 PM Sharmaine Jules, ZULEMA [facility] DIABETES [facility]   2/10/2025  4:00 PM Nilsa Caceres MD ECWMOENDO [facility]   6/10/2025  2:00 PM Melissa Guerrero MD [id number] [facility]   9/3/2025  3:20 PM Rohini Mckenna MD ECCFHOBGYN [facility]

## 2025-01-24 ENCOUNTER — HOSPITAL ENCOUNTER (OUTPATIENT)
Dept: CT IMAGING | Facility: HOSPITAL | Age: 55
Discharge: HOME | End: 2025-01-24
Attending: Other
Payer: MEDICARE

## 2025-01-24 DIAGNOSIS — I63.349: ICD-10-CM

## 2025-01-24 LAB
CREAT BLD-MCNC: 0.8 MG/DL
EGFRCR SERPLBLD CKD-EPI 2021: 86 ML/MIN/1.73M2 (ref 60–?)

## 2025-01-24 PROCEDURE — 82565 ASSAY OF CREATININE: CPT

## 2025-01-24 PROCEDURE — 70496 CT ANGIOGRAPHY HEAD: CPT | Performed by: OTHER

## 2025-01-24 PROCEDURE — 70498 CT ANGIOGRAPHY NECK: CPT | Performed by: OTHER

## 2025-01-24 RX ORDER — ESCITALOPRAM OXALATE 10 MG/1
10 TABLET ORAL DAILY
Qty: 90 TABLET | Refills: 3 | Status: SHIPPED | OUTPATIENT
Start: 2025-01-24

## 2025-01-24 NOTE — TELEPHONE ENCOUNTER
Refill passed per [facility] protocol.     Requested Prescriptions   Pending Prescriptions Disp Refills    escitalopram 10 MG Oral Tab 90 tablet 3     Sig: Take 1 tablet (10 mg total) by mouth daily.       Psychiatric Non-Scheduled (Anti-Anxiety) Passed - 1/24/2025  8:29 AM        Passed - In person appointment or virtual visit in the past 6 mos or appointment in next 3 mos     Recent Outpatient Visits              2 weeks ago Uncontrolled type 2 diabetes mellitus with hyperglycemia ([facility])    [facility]    Nurse Only    3 weeks ago Type 2 diabetes mellitus with diabetic polyneuropathy, with long-term current use of insulin ([facility]    Office Visit    1 month ago Type 2 diabetes mellitus with other neurologic complication, with long-term current use of insulin ([facility])    [facility]    Nurse Only    1 month ago Stage 3 chronic kidney disease, unspecified whether stage 3a or 3b CKD ([facility])    [facility] Melissa Guerrero MD    Office Visit    2 months ago Type 2 diabetes mellitus with other neurologic complication, with long-term current use of insulin ([facility])    [facility]    Nurse Only          Future Appointments         Provider Department Appt Notes    Today [facility] CT MAIN [address] QA CM INS/COB    In 3 days Alejandrina Torre, RN [facility] Pre-Pump iLet    In 1 week Sharmaine Jules RD [facility]     In 2 weeks Nilsa Caceres MD [facility] 2 months    In 4 months Melissa Guerrero MD [facility] 6 month    In 7 months Rohini Mckenna MD [facility] - OB/GYN annual                     Passed - Depression Screening completed within the past 12 months        Passed - Medication is active on med list             [unfilled]      [unfilled]

## 2025-01-24 NOTE — IMAGING NOTE
Spoke to Dr. Pantoja radiology re:pt did not take premed as ordered. Pt took prednisone at 12am,7am and 12pm and did not take benadryl. Per Dr. Pantoja @[id number] Pt is ok to take otc benedryl brought from home that is , wait 1 hour and then complete scan.

## 2025-01-27 ENCOUNTER — TELEPHONE (OUTPATIENT)
Dept: ENDOCRINOLOGY | Facility: HOSPITAL | Age: 55
End: 2025-01-27

## 2025-01-27 ENCOUNTER — HOSPITAL ENCOUNTER (OUTPATIENT)
Dept: ENDOCRINOLOGY | Facility: HOSPITAL | Age: 55
End: 2025-01-27
Payer: MEDICARE

## 2025-01-27 DIAGNOSIS — E11.65 UNCONTROLLED TYPE 2 DIABETES MELLITUS WITH HYPERGLYCEMIA (HCC): Primary | ICD-10-CM

## 2025-01-27 NOTE — TELEPHONE ENCOUNTER
Patient has attended pre pump education for insulin pump.  She will need prescription for rapid acting insulin vials for insulin pump therapy.    Thank you     on unit/family member

## 2025-01-27 NOTE — PATIENT INSTRUCTIONS
Goals       1.  EDC - Insulin Pump (pt-stated)       Note created  1/27/2025  4:07 PM by Alejandrina Torre RN      REview user guides and videos prior to pump start appointment.

## 2025-01-27 NOTE — PROGRESS NOTES
Ej Pisano  : 3/30/1970 was seen for Initial Individual Pre-Pump Education:Beta Bionic    Date: 2025   Start time: 1400 End time: 1520    Ej attended the appointment with her pump supplies.  We reviewed how the insulin pump works and benefits for blood sugar control.     She is familiar with foods that are carbohydrates and we discussed typical meals for breakfast and dinner. She states her lunch meal is skipped or consuming only vegetables.    We were unable to download the [employer] miquel on her phone due to IOS settings.  We reviewed the video on changing the cartridge and filling the tubing for the Contact detach .    Ej will need a prescription for rapid acting vial for insulin pump therapy.  Message sent to provider.  Recommend she bring 2 unopened rapid acting insulin pens if unable to obtain vial from pharmacy.    She will be traveling to [facility] sometimes in the next few months, she might be interested in utilizing insulin pens during vacation instead of the pump.  Recommend she discuss with MD.    Assessment:    She does not carbohydrate count however she is familiar with carbohydrate type foods  Correctly using alternate method of matching mealtime insulin to food?: Yes-15 units of rapid acting insulin prior to meals.  Using correction factor/ISF correctly? yes  Monitoring sugar with CGM or via blood sugar meter at least QID? Yes    Keeping detailed records? No    Aware of proper troubleshooting for hypo - and hyperglycemia? Yes    Teaching Content/Education:     Reviewed carbohydrate type foods she typically consumes for meals and snacks.   Reminded her to have Dexcom G7 session in place for pump initiation.  Reminded to keep records and bring to pump initiation  Taught pre-pump guidelines   Advised EJ to use vendor training resources    Patient Goals/Recommendations: Patient chosen goals included in patient's instructions for today.      Pump Initiation scheduled for  2/3/2025.    Patient verbalized understanding and has no further questions at this time.    Alejandrina Torre RN

## 2025-01-28 RX ORDER — INSULIN ASPART 100 [IU]/ML
INJECTION, SOLUTION INTRAVENOUS; SUBCUTANEOUS
Qty: 30 ML | Refills: 2 | Status: SHIPPED | OUTPATIENT
Start: 2025-01-28

## 2025-01-28 NOTE — PROGRESS NOTES
CCM placed call to [facility] to inquire about patient diabetic shoe.     CCM  unable to get thorough , will try back at a later time.       CCM Sent order to [facility] Via right fax:     [facility] information:   [address] [place] [address]  Get Directions   Phone: [phone]  Fax: [phone]    Hours  Monday - Friday, 8 a.m. - 5 p.m.    CCM send information to patient via [email or web address] to contact office to schedule.     Time Spent :  5 min   Total monthly time : 60 minutes.

## 2025-02-03 ENCOUNTER — HOSPITAL ENCOUNTER (OUTPATIENT)
Dept: ENDOCRINOLOGY | Facility: HOSPITAL | Age: 55
End: 2025-02-03
Payer: MEDICARE

## 2025-02-03 ENCOUNTER — MOBILE ENCOUNTER (OUTPATIENT)
Facility: LOCATION | Age: 55
End: 2025-02-03

## 2025-02-03 DIAGNOSIS — Z79.4 TYPE 2 DIABETES MELLITUS WITH OTHER NEUROLOGIC COMPLICATION, WITH LONG-TERM CURRENT USE OF INSULIN (HCC): Primary | ICD-10-CM

## 2025-02-03 DIAGNOSIS — E11.49 TYPE 2 DIABETES MELLITUS WITH OTHER NEUROLOGIC COMPLICATION, WITH LONG-TERM CURRENT USE OF INSULIN (HCC): Primary | ICD-10-CM

## 2025-02-03 NOTE — PROGRESS NOTES
Anjel Pisano  : 3/30/1970 was seen for Initial Individual Pump Start Education:  Date: 2/3/2025  Referring Provider: Michelle Leger    Start time: 1P  End time: 345P    No charge visited as Neisha from company trained with her - CDCES observed.    Assessment:     Insulin Pump Brand: Beta Bionic  Basic programming of all settings reviewed and entered into pump.   See training checklist in patient chart.    Infusion Sets:   Infusion Set/Pod location placed lower left abdomen on 2/3/2025     Patient skills:    Taught concept of insulin pump therapy   Reviewed utilization of insulin to carbohydrate ratio and correction factor   Reviewed difference between basal and bolus insulin  Verbalized understanding of prevention/treatment for Hyperglycemia, Hypoglycemia, and DKA with insulin pump therapy  Discussed when to call the 1-051 number for pump problems, diabetes educator, or              physician.   Practiced filling reservoir / inserting infusion set/POD    Comments:     Do you currently have a glucagon treatment available to you? Yes  If no, do you mind if we communicate about prescribing this for you to your referring provider? Not applicable    Patient verbalized understanding and has no further questions at this time.    She plans to go to [facility] in Feb/March for unknown period of time - Neisha explained that she will have to have supplies shipped from home in [place] and also have back up insulin with her.     Travel note provided via [employer].    Patient Goals/Recommendations: Patient chosen goals included in patient's instructions for today.    Patient Chosen Goals:   1. Check in with Neisha over the next 3 days      2. Keep glucose tabs (4 glucose tabs for sugar less than 70) with you, in case of a low blood sugar   3. Twice in doubt, take it out (follow high blood sugar protocol provided at visit)        Vial of insulin requested.    Plan:   Advised Anjel Pisano to use vendor training resources for reinforcement.  Follow up appointment set for 2/19/2025 Sharmaine Jules RD. Patient verbalized understanding and has no further questions at this time.       Sharmaine Jules RD

## 2025-02-04 NOTE — PROGRESS NOTES
Called the pager at 9 pm   New pump at 1 pm   Ate dinner at 6   9 pm Bg was 370s and has been getting alarms for 3 hrs BG is high     Will take 8 units injection of short acting now and change pump site   Will reach out again if there is an issue   Drink plenty of water   T2 not t1 and never had dka

## 2025-02-05 ENCOUNTER — PATIENT OUTREACH (OUTPATIENT)
Dept: CASE MANAGEMENT | Age: 55
End: 2025-02-05

## 2025-02-05 ENCOUNTER — TELEPHONE (OUTPATIENT)
Dept: ENDOCRINOLOGY | Facility: HOSPITAL | Age: 55
End: 2025-02-05

## 2025-02-05 DIAGNOSIS — I10 ESSENTIAL HYPERTENSION: ICD-10-CM

## 2025-02-05 DIAGNOSIS — M54.12 CERVICAL RADICULOPATHY: ICD-10-CM

## 2025-02-05 DIAGNOSIS — K21.9 GASTROESOPHAGEAL REFLUX DISEASE WITHOUT ESOPHAGITIS: Primary | ICD-10-CM

## 2025-02-05 DIAGNOSIS — N18.30 CKD STAGE 3 DUE TO TYPE 1 DIABETES MELLITUS (HCC): ICD-10-CM

## 2025-02-05 DIAGNOSIS — D64.9 ANEMIA, UNSPECIFIED TYPE: ICD-10-CM

## 2025-02-05 DIAGNOSIS — H25.13 AGE-RELATED NUCLEAR CATARACT OF BOTH EYES: ICD-10-CM

## 2025-02-05 DIAGNOSIS — I10 PRIMARY HYPERTENSION: ICD-10-CM

## 2025-02-05 DIAGNOSIS — E66.9 OBESITY (BMI 30-39.9): ICD-10-CM

## 2025-02-05 DIAGNOSIS — R41.82 ALTERED MENTAL STATUS, UNSPECIFIED ALTERED MENTAL STATUS TYPE: ICD-10-CM

## 2025-02-05 DIAGNOSIS — R60.0 EDEMA OF BOTH LEGS: ICD-10-CM

## 2025-02-05 DIAGNOSIS — E10.22 CKD STAGE 3 DUE TO TYPE 1 DIABETES MELLITUS (HCC): ICD-10-CM

## 2025-02-05 NOTE — TELEPHONE ENCOUNTER
Messaged Neisha via text and also via email.   Neisha is scheduled to do a follow for a site change today via phone with patient as well.   Will await her response re ways for office to access data - wondering if office can access data for pt's visit the same way we plan to when Neisha is present for follow up - by pairing to phone miquel ilet and then disconnecting/unpairing after visit.

## 2025-02-05 NOTE — TELEPHONE ENCOUNTER
Provided heaven date/time of appt for follow up for Norris - will await return response to see if she is able to attend for appt to show staff how to use their ipad/work phone to get data on those that do not have an miquel on their phone.

## 2025-02-05 NOTE — PROGRESS NOTES
Spoke to [facility] for Chronic Care Management.      Updates to patient care team/comments:  no change   Patient reported changes in medications: no change  Med Adherence  Comment:  Patient reported compliance.     Health Maintenance:   Health Maintenance   Topic Date Due    Pneumococcal Vaccine: 50+ Years (2 of 2 - PCV) 12/21/2017    Zoster Vaccines (2 of 2) 07/12/2023    Annual Physical  05/03/2024    COVID-19 Vaccine (3 - 2024-25 season) 09/01/2024    Influenza Vaccine (1) 10/01/2024    Diabetes Care A1C  12/23/2024    Annual Depression Screening  01/01/2025    Diabetes Care: Microalb/Creat Ratio (Annual)  01/01/2025    Gyne Pelvic Exam  08/29/2025    Colorectal Cancer Screening  11/12/2025    Diabetes Care Dilated Eye Exam  11/18/2025    Mammogram  12/27/2025    Diabetes Care: GFR  01/24/2026    Pap Smear  08/29/2027    DTaP,Tdap,and Td Vaccines (2 - Td or Tdap) 05/17/2033    Diabetes Care: Foot Exam (Annual)  Completed    Meningococcal B Vaccine  Aged Out       Patient updates/concerns:    Spoke to patient for [facility] . Patient reported she is doing good.   Patient sent message to [facility] regarding assistance with diabetes shoes. [facility] informed patient referral was sent to [facility]. [facility] provided patient with phone number.   Patient reported she has no there  concerns at this time. Patient reported BP is stable .  Patient stated she recently had insulin pump placed stated it is working well so far and stated overall hoping to improve A1C .   Patient continues to have neck pain.   Patient sates she is scheduled to see Dr. Robert Bhatt - Neurologist on 2/10. Patient plans to discuss clearance for aspirin so she can get injection done by Dr. Jennifer Bhatt. .     Patient has appointment with ophthalmologist  on February 14.    Goals/Action Plan:  Active goal from previous outreach:  improve pain   Patient reported progress towards goals:  patient has upcoming appointment with Dr. PATRICA Bhatt -              - What:  improve Pain             - Where/When/How : keep follow up appointments as needed, work on glucose control, maintain healthy BP .  Patient Reported Barriers and Concerns:  none                   - Plan for overcoming barriers:  patient encouraged to call with any questions or concerns.       Care Managers Interventions:   CCM placed call to  clinic - confirmed they have order and will be reaching out to patient. Patient notified.   CCM listened and provided support.   Reminded patient of overdue Health Maintenance.   CCM updated patient records from Care Everywhere.   CCM updated immunizations- no updates.   Continue to provide encouragement, support and education for healthy coping and diagnosis.          Future Appointments:   Future Appointments   Date Time Provider Department Center   2/11/2025  1:15 PM Nilsa Caceres MD ECWMOENDO [facility]   2/19/2025 10:30 AM Sharmaine Jules RD [facility] DIABETES [facility]   6/10/2025  2:00 PM Melissa Guerrero MD [id number] [facility]   9/3/2025  3:20 PM Rohini Mckenna MD ECCFHOBGYN [facility] Care Manager Follow Up Date: 1 month     Reason For Follow Up: review progress and or barriers towards patient's goals.     Time Spent This Encounter Total: 20  min medical record review, telephone communication, care plan updates where needed, education, goals, and action plan recreation/update. Provided acknowledgment and validation to patient's concerns.   Monthly Minute Total including today: 20   Physical assessment, complete health history, and need for CCM established by Yves Yanez MD.

## 2025-02-07 ENCOUNTER — TELEPHONE (OUTPATIENT)
Dept: ENDOCRINOLOGY CLINIC | Facility: CLINIC | Age: 55
End: 2025-02-07

## 2025-02-07 NOTE — TELEPHONE ENCOUNTER
Medication Quantity Refills Start End   insulin aspart 100 Units/mL Injection Solution 30 mL 2 2/4/2025 --   Sig:   Inject via insulin pump as directed. Max daily dose 75 units     Route:   (none)     Order #:   [id number]         Prior Authorization Needed    Message:  Plan does not cover this medication. Please call plan at [phone] to initiate prior authorization or called/fax pharmacy to change medication. Patient ID# is [id number]

## 2025-02-08 NOTE — TELEPHONE ENCOUNTER
Spoke to pharmacist: brand name Novolog is covered by insurance - pharmacist adjusted RX and stated co-pay is $0 - will prepare Novolog

## 2025-02-11 ENCOUNTER — OFFICE VISIT (OUTPATIENT)
Dept: ENDOCRINOLOGY CLINIC | Facility: CLINIC | Age: 55
End: 2025-02-11

## 2025-02-11 VITALS
WEIGHT: 198 LBS | BODY MASS INDEX: 36 KG/M2 | SYSTOLIC BLOOD PRESSURE: 152 MMHG | DIASTOLIC BLOOD PRESSURE: 82 MMHG | HEART RATE: 62 BPM

## 2025-02-11 DIAGNOSIS — E11.65 UNCONTROLLED TYPE 2 DIABETES MELLITUS WITH HYPERGLYCEMIA (HCC): Primary | ICD-10-CM

## 2025-02-11 LAB
CARTRIDGE LOT#: ABNORMAL NUMERIC
GLUCOSE BLOOD: 148
HEMOGLOBIN A1C: 9.2 % (ref 4.3–5.6)

## 2025-02-11 PROCEDURE — 99214 OFFICE O/P EST MOD 30 MIN: CPT | Performed by: INTERNAL MEDICINE

## 2025-02-11 PROCEDURE — 82947 ASSAY GLUCOSE BLOOD QUANT: CPT | Performed by: INTERNAL MEDICINE

## 2025-02-11 PROCEDURE — 83036 HEMOGLOBIN GLYCOSYLATED A1C: CPT | Performed by: INTERNAL MEDICINE

## 2025-02-11 NOTE — PROGRESS NOTES
Name: Anjel Pisano  Date: 2/11/2025    Referring Physician: No ref. provider found    HISTORY OF PRESENT ILLNESS   Anjel Pisano is a 54 year old female who presents for diabetes mellitus, diagnosed over 10 years ago.  She was seen during hospitalization in 3/2015 and started on insulin therapy at that time.    Prior HbA, C or glycohemoglobin were 9.4% 3/2015; 7.6% 6/2015; 8.8% 10/2015; 8.0% 2/2016; 7.0% 6/2016; 7.8% 8/2016; 8.0% 2/2017; 8.3% 5/2017; 8.5% 9/2017; 8.2% 4/2018; 10.6% 9/2019; 10.2% 11/2019; 8.8% 12/2019; 10.0% 7/2021; 9.4% 10/2021; 9.4% 3/2022; 8.8% 6/2022; 9.0% 9/2022; 10.1% 12/2022; 9.6% 2/2023; 10.2% 11/2023;  10.4% 3/2024; 10.2% 5/2024; 10.6% 9/2024; 9.2% POC Today     Since last visit she has been started on iLet pump but only been on the pump for one week.     Dietary compliance: Fair. She states she tries to follow low CHO diet. No significant diet changes since last visit but she has been frustrated with weight gain.    Exercise: Has been doing more walking recently. Going up and down stairs more frequently   Polyuria/polydipsia: No   Blurred vision: Yes     Episodes of hypoglycemia: Has two episodes in the past few months    Blood Glucose:    Blood Glucose:   Checking 4 times per day  Dexcom CGM     Medications for DM   iLet pump -->started one week ago     Unable to tolerate GLP-1 and SGLT-2     REVIEW OF SYSTEMS  Eyes: Diabetic retinopathy present: No            Most recent visit to eye doctor in last 12 months: Yes, followed by [facility], follow up 2/17    CV: Cardiovascular disease present: No         Hypertension present: Yes         Hyperlipidemia present: Yes         Peripheral Vascular Disease present: No    : Nephropathy present: No    Neuro: Neuropathy present: No    Skin: Infection or ulceration: No    Osteoporosis: No    Thyroid disease: No      Medications:     Current Outpatient Medications:     insulin aspart 100 Units/mL Injection Solution, Inject via insulin pump  as directed. Max daily dose 75 units, Disp: 30 mL, Rfl: 2    escitalopram 10 MG Oral Tab, Take 1 tablet (10 mg total) by mouth daily., Disp: 90 tablet, Rfl: 3    metFORMIN 500 MG Oral Tab, Take 2 tablets (1,000 mg total) by mouth 2 (two) times daily with meals., Disp: 360 tablet, Rfl: 1    gabapentin 100 MG Oral Cap, Take 1 capsule (100 mg total) by mouth 3 (three) times daily., Disp: 270 capsule, Rfl: 0    ammonium lactate (AMLACTIN DAILY) 12 % External Lotion, Apply 1 Application topically as needed for Dry Skin., Disp: 225 g, Rfl: 3    Insulin Pen Needle (BD PEN NEEDLE ABDOULAYE 2ND GEN) 32G X 4 MM Does not apply Misc, USE FOUR TIMES DAILY AS DIRECTED, Disp: 100 each, Rfl: 1    metoprolol succinate  MG Oral Tablet 24 Hr, Take 1 tablet (100 mg total) by mouth daily., Disp: 90 tablet, Rfl: 3    metoprolol succinate ER 25 MG Oral Tablet 24 Hr, Take 1 tablet (25 mg total) by mouth nightly., Disp: 90 tablet, Rfl: 3    torsemide 10 MG Oral Tab, Take 1 tablet (10 mg total) by mouth daily., Disp: 30 tablet, Rfl: 1    atorvastatin 80 MG Oral Tab, Take 1 tablet (80 mg total) by mouth nightly., Disp: 90 tablet, Rfl: 3    levETIRAcetam 500 MG Oral Tab, Take 1 tablet (500 mg total) by mouth 2 (two) times daily., Disp: 180 tablet, Rfl: 0    amLODIPine 10 MG Oral Tab, Take 1 tablet (10 mg total) by mouth daily., Disp: 90 tablet, Rfl: 3    insulin glargine (LANTUS SOLOSTAR) 100 UNIT/ML Subcutaneous Solution Pen-injector, Inject 30 Units into the skin nightly. (Patient taking differently: Inject 40 Units into the skin nightly.), Disp: 27 mL, Rfl: 1    Continuous Glucose Sensor (DEXCOM G7 SENSOR) Does not apply Misc, 1 each Every 10 days., Disp: 9 each, Rfl: 1    losartan 100 MG Oral Tab, Take 1 tablet (100 mg total) by mouth daily., Disp: 90 tablet, Rfl: 3    Glucose Blood (ONETOUCH ULTRA) In Vitro Strip, Please provide test strips that are covered by patient's insurance., Disp: 100 strip, Rfl: 0    Blood Glucose Monitoring  Suppl (ONETOUCH ULTRA 2) w/Device Does not apply Kit, Please provide what is covered by patient's insurance, Disp: 1 kit, Rfl: 0    pregabalin (LYRICA) 75 MG Oral Cap, Take 1 capsule (75 mg total) by mouth 2 (two) times daily., Disp: 60 capsule, Rfl: 0    ERGOCALCIFEROL 1.25 MG (44073 UT) Oral Cap, TAKE 1 CAPSULE BY MOUTH 1 TIME A WEEK FOR 12 DOSES, Disp: 12 capsule, Rfl: 1    Blood Glucose Monitoring Suppl (TRUE METRIX METER) w/Device Does not apply Kit, Use to check blood sugar four times a day, Disp: 1 kit, Rfl: 0    Glucose Blood (TRUE METRIX BLOOD GLUCOSE TEST) In Vitro Strip, Check blood sugar four times a day, Disp: 400 strip, Rfl: 1    TRUEplus Lancets 33G Does not apply Misc, USE TO CHECK BLOOD SUGAR FOUR TIMES DAILY, Disp: 400 each, Rfl: 1    NOVOLOG FLEXPEN 100 UNIT/ML Subcutaneous Solution Pen-injector, INJECT 35 UNITS INTO THE SKIN THREE TIMES DAILY WITH MEAKS PLUS SLIDING SCALE, Disp: 90 mL, Rfl: 0    Glucose Blood (TRUE METRIX BLOOD GLUCOSE TEST) In Vitro Strip, Use to check blood sugar three times a day, Disp: 300 strip, Rfl: 1    hydrALAZINE 100 MG Oral Tab, Take 1 tablet (100 mg total) by mouth 2 (two) times daily., Disp: 180 tablet, Rfl: 3    Accu-Chek FastClix Lancets Does not apply Misc, Use to check blood sugar three times a day, Disp: 300 each, Rfl: 0    Glucose Blood (ACCU-CHEK GUIDE) In Vitro Strip, Use to check blood sugar three times a day, Disp: 300 strip, Rfl: 0    Omeprazole 40 MG Oral Capsule Delayed Release, TAKE 1 CAPSULE EVERY DAY 30 TO 60 MINUTES BEFORE A MEAL, Disp: 90 capsule, Rfl: 3    Alcohol Swabs (DROPSAFE ALCOHOL PREP) 70 % Does not apply Pads, USE AS DIRECTED, Disp: 100 each, Rfl: 0    Blood Glucose Monitoring Suppl (ACCU-CHEK GUIDE) w/Device Does not apply Kit, Use to check blood sugar three times a day, Disp: 1 kit, Rfl: 0    fluticasone propionate 50 MCG/ACT Nasal Suspension, 2 sprays by Nasal route daily., Disp: 48 g, Rfl: 1    VENTOLIN  (90 Base) MCG/ACT  Inhalation Aero Soln, Inhale 2 puffs into the lungs every 6 (six) hours as needed for Wheezing., Disp: 18 g, Rfl: 1    aspirin 325 MG Oral Tab, Take 1 tablet (325 mg total) by mouth daily., Disp: , Rfl:      Allergies:   Allergies   Allergen Reactions    Radiology Contrast Iodinated Dyes HIVES, RASH and ITCHING     25: pt states severe itching, redness, hives.  Needs to premedicate for all CT dye scans - [facility], RN    Reglan [Metoclopramide] OTHER (SEE COMMENTS)     Sensitivity, with crawling sensation.    Adhesive Tape      itching    Wound Dressing Adhesive RASH       Social History:   Social History     Socioeconomic History    Marital status: Legally     Number of children: 3   Occupational History    Occupation:      Comment: disabled    Occupation: phlebotomy   Tobacco Use    Smoking status: Former     Current packs/day: 0.00     Average packs/day: 0.2 packs/day for 1 year (0.2 ttl pk-yrs)     Types: Cigarettes     Start date: 2012     Quit date: 2013     Years since quittin.1    Smokeless tobacco: Never   Vaping Use    Vaping status: Never Used   Substance and Sexual Activity    Alcohol use: No     Alcohol/week: 0.0 standard drinks of alcohol    Drug use: No    Sexual activity: Not Currently       Medical History:   Past Medical History:    Age-related nuclear cataract of both eyes    Anemia    Apnea    Cataract    Coronary atherosclerosis    Depression    DM type 2 (diabetes mellitus, type 2) (HCC)    Esophageal reflux    High blood pressure    High cholesterol    Meibomian gland dysfunction (MGD), bilateral, both upper and lower lids    Migraine    Muscle weakness    left sided weakness uses walker and cane    Sleep apnea    Stenosis of right vertebral artery    Stroke (HCC)    Type II or unspecified type diabetes mellitus without mention of complication, not stated as uncontrolled       Surgical history:   Past Surgical History:   Procedure Laterality Date    Brain  surgery  2014    Colonoscopy N/A 8/25/2017    Procedure: COLONOSCOPY;  Surgeon: Sharmaine Burks MD;  Location: [facility] ENDOSCOPY    Colonoscopy      Colonoscopy N/A 11/12/2022    Procedure: COLONOSCOPY with Polypectomy;  Surgeon: Terry Weaver MD;  Location: [facility] ENDOSCOPY    Excise carotid body+carotid artery  09/2017    Incision and drainage Right 04/19/16    lower abdominal skin    Laparoscopic cholecystectomy  2002         PHYSICAL EXAM  /82   Pulse 62   Wt 198 lb (89.8 kg)   BMI 36.21 kg/m²     General Appearance:  alert, well developed, in no acute distress  Eyes:  normal conjunctivae, sclera., normal sclera and normal pupils  Ears/Nose/Mouth/Throat/Neck:  no palpable thyroid nodules or cervical lymphadenopathy  Back: no kyphosis or back tenderness  Musculoskeletal:  normal muscle strength and tone  Skin:  normal moisture and skin texture  Hair & Nails:  normal scalp hair     Hematologic:  no excessive bruising  Neuro:  sensory grossly intact and motor grossly intact  Psychiatric:  oriented to time, self, and place  Nutritional:  no abnormal weight gain or loss    ASSESSMENT/PLAN:      1. Diabetes Mellitus Type 2, Uncontrolled  -Uncontrolled  -HgA1c- 9.2% -->improved since transition to iLet   -Overall BG levels are better controlled per patient   -Reviewed ABC's of diabetes  -Discussed importance of glycemic control to prevent complications of diabetes  -Discussed complications of diabetes include retinopathy, neuropathy, nephropathy and cardiovascular disease  -Discussed importance of SBGM- using freestyle maximo   -Discussed importance of low CHO diet- recommend 135 grams total per day, 45 grams per meal.   -She self adjusted insulin dose significantly due to hypoglycemia   -Discussed importance of taking higher insulin dose  -She has now been transitioned to iLet pump and per patient BG levels are significantly improved  -Intolerant of GLP-1 and SGLT-2 therapy     -Reviewed at length  importance of adherence with DM medications. Reviewed importance of contacting clinic if sugars are <80 or persistently >300 so that medications can be adjusted.     -normotensive   -Lipids-9/2-024 - , on statin therapy- atorvastatin 80mg PO nightly. She admits to missing doses of medication. Reviewed importance of improved adherence with statin.  -->recheck lipids     -elevated urine microalbumin 10/2022 and 7/2023- reviewed importance of good blood sugar control. On losartan -->recheck urine testing   -Continue Dexcom G7    -Has optho follow up scheduled.   -normal foot exam performed 9/2024   -Provided letter for travel   -Will need to coordinate with iLet and [facility] to figure out how to download data       RTC 4 months     2/11/2025

## 2025-02-12 ENCOUNTER — TELEPHONE (OUTPATIENT)
Dept: INTERNAL MEDICINE CLINIC | Facility: CLINIC | Age: 55
End: 2025-02-12

## 2025-02-17 ENCOUNTER — LAB ENCOUNTER (OUTPATIENT)
Dept: LAB | Age: 55
End: 2025-02-17
Attending: INTERNAL MEDICINE
Payer: MEDICARE

## 2025-02-17 ENCOUNTER — OFFICE VISIT (OUTPATIENT)
Dept: INTERNAL MEDICINE CLINIC | Facility: CLINIC | Age: 55
End: 2025-02-17

## 2025-02-17 ENCOUNTER — TELEPHONE (OUTPATIENT)
Dept: INTERNAL MEDICINE CLINIC | Facility: CLINIC | Age: 55
End: 2025-02-17

## 2025-02-17 VITALS
OXYGEN SATURATION: 97 % | BODY MASS INDEX: 36.62 KG/M2 | SYSTOLIC BLOOD PRESSURE: 138 MMHG | HEIGHT: 62 IN | WEIGHT: 199 LBS | HEART RATE: 62 BPM | DIASTOLIC BLOOD PRESSURE: 76 MMHG

## 2025-02-17 DIAGNOSIS — N18.30 STAGE 3 CHRONIC KIDNEY DISEASE, UNSPECIFIED WHETHER STAGE 3A OR 3B CKD (HCC): ICD-10-CM

## 2025-02-17 DIAGNOSIS — I65.21 ATHEROSCLEROSIS OF RIGHT CAROTID ARTERY: Primary | ICD-10-CM

## 2025-02-17 DIAGNOSIS — I65.22 LEFT CAROTID STENOSIS: ICD-10-CM

## 2025-02-17 DIAGNOSIS — E11.65 UNCONTROLLED TYPE 2 DIABETES MELLITUS WITH HYPERGLYCEMIA (HCC): ICD-10-CM

## 2025-02-17 DIAGNOSIS — I10 ESSENTIAL HYPERTENSION WITH GOAL BLOOD PRESSURE LESS THAN 140/90: ICD-10-CM

## 2025-02-17 LAB
ALBUMIN SERPL-MCNC: 4.6 G/DL (ref 3.2–4.8)
ALBUMIN/GLOB SERPL: 1.6 {RATIO} (ref 1–2)
ALP LIVER SERPL-CCNC: 111 U/L
ALT SERPL-CCNC: 22 U/L
ANION GAP SERPL CALC-SCNC: 7 MMOL/L (ref 0–18)
AST SERPL-CCNC: 20 U/L (ref ?–34)
BASOPHILS # BLD AUTO: 0.05 X10(3) UL (ref 0–0.2)
BASOPHILS NFR BLD AUTO: 0.5 %
BILIRUB SERPL-MCNC: 0.3 MG/DL (ref 0.3–1.2)
BUN BLD-MCNC: 14 MG/DL (ref 9–23)
BUN/CREAT SERPL: 15.2 (ref 10–20)
CALCIUM BLD-MCNC: 9.7 MG/DL (ref 8.7–10.4)
CHLORIDE SERPL-SCNC: 107 MMOL/L (ref 98–112)
CHOLEST SERPL-MCNC: 173 MG/DL (ref ?–200)
CO2 SERPL-SCNC: 29 MMOL/L (ref 21–32)
CREAT BLD-MCNC: 0.92 MG/DL
CREAT UR-SCNC: 196.1 MG/DL
CREAT UR-SCNC: 196.1 MG/DL
DEPRECATED RDW RBC AUTO: 45.5 FL (ref 35.1–46.3)
EGFRCR SERPLBLD CKD-EPI 2021: 73 ML/MIN/1.73M2 (ref 60–?)
EOSINOPHIL # BLD AUTO: 0.15 X10(3) UL (ref 0–0.7)
EOSINOPHIL NFR BLD AUTO: 1.4 %
ERYTHROCYTE [DISTWIDTH] IN BLOOD BY AUTOMATED COUNT: 15.9 % (ref 11–15)
FASTING PATIENT LIPID ANSWER: NO
FASTING STATUS PATIENT QL REPORTED: NO
GLOBULIN PLAS-MCNC: 2.8 G/DL (ref 2–3.5)
GLUCOSE BLD-MCNC: 120 MG/DL (ref 70–99)
HCT VFR BLD AUTO: 40.4 %
HDLC SERPL-MCNC: 30 MG/DL (ref 40–59)
HGB BLD-MCNC: 11.9 G/DL
IMM GRANULOCYTES # BLD AUTO: 0.03 X10(3) UL (ref 0–1)
IMM GRANULOCYTES NFR BLD: 0.3 %
LDLC SERPL CALC-MCNC: 113 MG/DL (ref ?–100)
LYMPHOCYTES # BLD AUTO: 3.16 X10(3) UL (ref 1–4)
LYMPHOCYTES NFR BLD AUTO: 28.9 %
MCH RBC QN AUTO: 23.2 PG (ref 26–34)
MCHC RBC AUTO-ENTMCNC: 29.5 G/DL (ref 31–37)
MCV RBC AUTO: 78.9 FL
MICROALBUMIN UR-MCNC: 7.4 MG/DL
MICROALBUMIN/CREAT 24H UR-RTO: 37.7 UG/MG (ref ?–30)
MONOCYTES # BLD AUTO: 0.63 X10(3) UL (ref 0.1–1)
MONOCYTES NFR BLD AUTO: 5.8 %
NEUTROPHILS # BLD AUTO: 6.9 X10 (3) UL (ref 1.5–7.7)
NEUTROPHILS # BLD AUTO: 6.9 X10(3) UL (ref 1.5–7.7)
NEUTROPHILS NFR BLD AUTO: 63.1 %
NONHDLC SERPL-MCNC: 143 MG/DL (ref ?–130)
OSMOLALITY SERPL CALC.SUM OF ELEC: 298 MOSM/KG (ref 275–295)
PHOSPHATE SERPL-MCNC: 3.7 MG/DL (ref 2.4–5.1)
PLATELET # BLD AUTO: 283 10(3)UL (ref 150–450)
POTASSIUM SERPL-SCNC: 4.1 MMOL/L (ref 3.5–5.1)
PROT SERPL-MCNC: 7.4 G/DL (ref 5.7–8.2)
PROT UR-MCNC: 33.9 MG/DL (ref ?–14)
PROT/CREAT UR-RTO: 0.17
PTH-INTACT SERPL-MCNC: 48 PG/ML (ref 18.5–88)
RBC # BLD AUTO: 5.12 X10(6)UL
SODIUM SERPL-SCNC: 143 MMOL/L (ref 136–145)
TRIGL SERPL-MCNC: 167 MG/DL (ref 30–149)
TSI SER-ACNC: 1.39 UIU/ML (ref 0.55–4.78)
VLDLC SERPL CALC-MCNC: 29 MG/DL (ref 0–30)
WBC # BLD AUTO: 10.9 X10(3) UL (ref 4–11)

## 2025-02-17 PROCEDURE — 85025 COMPLETE CBC W/AUTO DIFF WBC: CPT

## 2025-02-17 PROCEDURE — 84156 ASSAY OF PROTEIN URINE: CPT

## 2025-02-17 PROCEDURE — 82043 UR ALBUMIN QUANTITATIVE: CPT

## 2025-02-17 PROCEDURE — 84443 ASSAY THYROID STIM HORMONE: CPT

## 2025-02-17 PROCEDURE — 83970 ASSAY OF PARATHORMONE: CPT

## 2025-02-17 PROCEDURE — 82570 ASSAY OF URINE CREATININE: CPT

## 2025-02-17 PROCEDURE — 80053 COMPREHEN METABOLIC PANEL: CPT

## 2025-02-17 PROCEDURE — 36415 COLL VENOUS BLD VENIPUNCTURE: CPT

## 2025-02-17 PROCEDURE — 84100 ASSAY OF PHOSPHORUS: CPT

## 2025-02-17 PROCEDURE — 80061 LIPID PANEL: CPT

## 2025-02-17 RX ORDER — PREDNISOLONE ACETATE 10 MG/ML
1 SUSPENSION/ DROPS OPHTHALMIC 4 TIMES DAILY
COMMUNITY

## 2025-02-17 RX ORDER — BENZONATATE 100 MG/1
CAPSULE ORAL
COMMUNITY
Start: 2025-01-03

## 2025-02-17 RX ORDER — LANCETS 33 GAUGE
1 EACH MISCELLANEOUS 4 TIMES DAILY
COMMUNITY

## 2025-02-17 NOTE — ASSESSMENT & PLAN NOTE
Referral to Vascular Surgeon. Msg sent to Vascular team to assist with sooner appt. Reviewed concerning s/s.   BP control reviewed.   Referral to Cardiology  Orders:    Vascular Surgery - In Network    [facility] CARDIOLOGY EXTERNAL

## 2025-02-17 NOTE — ASSESSMENT & PLAN NOTE
Referral to Vascular Surgeon. Msg sent to Vascular team to assist with sooner appt. Reviewed concerning s/s.   BP control reviewed.   Referral to Cardiology  Orders:    Vascular Surgery - In Network    [facility] CARDIOLOGY EXTERNAL

## 2025-02-17 NOTE — ASSESSMENT & PLAN NOTE
Referral to cardiologist  Home BP reported <140/90. Reviewed BP goal, concerning s/s. CPM. Check BP twice daily.   Orders:    [facility] CARDIOLOGY EXTERNAL

## 2025-02-17 NOTE — PROGRESS NOTES
Anjel Pisano is a 54 year old female.  HPI:   Pt presents to clinic requesting referral to cardiology and for a specialist for her carotid arteries. Reports she has been having bilateral pains along the neck. She is managed by pain clinic. Hx of left sided weakness. Denies any numbness, blurred vision, CP, SOB, new or worsening sx,  States she saw a neurologist who completed CTA Brain+Carotids:Advised her to see cardiology and Vascular. She was also advised to f/u with  neurologist at [facility] for hx of moyamoya disease.     CTA BRAIN + CTA CAROTIDS (CPT=70496/43653)    Result Date: 1/24/2025  CONCLUSION:   1. No acute intracranial abnormality identified.  2. Small area of encephalomalacia along the posterior right temporal lobe.  Medium-sized area of encephalomalacia in the right frontal lobe.  Small area of encephalomalacia in the left parietal lobe.  Mild age-indeterminate microvascular ischemic changes  in the cerebral white matter. If there is clinical concern for acute ischemia/infarction, an MRI of the brain would be recommended for further evaluation.  3. There are postoperative changes along the right carotid bifurcation.  There also changes of a previous right pterional craniotomy with a branch of the right external carotid artery extending to the surface of the right cerebrum in the region of the right middle cerebral artery branches.  4. The right cervical internal carotid artery is occluded with reconstitution in the region of the mid cavernous segment.  There is moderate to severe mixed plaque in the cavernous and supraclinoid segments of the internal carotid arteries which are diminutive in caliber with areas of moderate to severe stenosis of greater than 75%.  There are wispy branches of the bilateral anterior cerebral artery A1 segments as well as markedly diminutive caliber of the remainder of the left anterior cerebral artery.  These findings may all be within the spectrum of moyamoya disease, with  other etiologies not entirely excluded.  Clinical correlation recommended.  5. The right subclavian artery origin is obscured by beam hardening from the contrast bolus, however underlying high-grade stenosis is not excluded.  Clinical correlation for a signs or symptoms of subclavian steal is suggested.  This could be further assessed with ultrasound Doppler exam as clinically directed.  6. There is high-grade stenosis and/or occlusion of the origin and proximal V1 segment of the left vertebral artery which is somewhat limited in evaluation due to beam hardening from body habitus.  This could be further assessed with conventional angiography as clinically directed.  There is moderate atherosclerotic irregularity and narrowing in the distal V1 and proximal V2 segments of the left vertebral artery.    Please see above for further details.  The radiology support staff is in the process of contacting the referring physician regarding this report.   LOCATION:  [facility]   Dictated by (CST): Pee Willis MD on 1/24/2025 at 3:24 PM     Finalized by (CST): Pee Willis MD on 1/24/2025 at 3:41 PM       Current Outpatient Medications   Medication Sig Dispense Refill    OneTouch Delica Lancets 33G Does not apply Misc 1 each by Other route 4 (four) times daily.      prednisoLONE 1 % Ophthalmic Suspension Place 1 drop into the left eye 4 (four) times daily.      benzonatate 100 MG Oral Cap       insulin aspart 100 Units/mL Injection Solution Inject via insulin pump as directed. Max daily dose 75 units 30 mL 2    escitalopram 10 MG Oral Tab Take 1 tablet (10 mg total) by mouth daily. 90 tablet 3    gabapentin 100 MG Oral Cap Take 1 capsule (100 mg total) by mouth 3 (three) times daily. 270 capsule 0    ammonium lactate (AMLACTIN DAILY) 12 % External Lotion Apply 1 Application topically as needed for Dry Skin. 225 g 3    Insulin Pen Needle (BD PEN NEEDLE ABDOULAYE 2ND GEN) 32G X 4 MM Does not apply Misc USE FOUR TIMES DAILY AS  DIRECTED 100 each 1    metoprolol succinate  MG Oral Tablet 24 Hr Take 1 tablet (100 mg total) by mouth daily. 90 tablet 3    metoprolol succinate ER 25 MG Oral Tablet 24 Hr Take 1 tablet (25 mg total) by mouth nightly. 90 tablet 3    torsemide 10 MG Oral Tab Take 1 tablet (10 mg total) by mouth daily. 30 tablet 1    atorvastatin 80 MG Oral Tab Take 1 tablet (80 mg total) by mouth nightly. 90 tablet 3    levETIRAcetam 500 MG Oral Tab Take 1 tablet (500 mg total) by mouth 2 (two) times daily. 180 tablet 0    amLODIPine 10 MG Oral Tab Take 1 tablet (10 mg total) by mouth daily. 90 tablet 3    Continuous Glucose Sensor (DEXCOM G7 SENSOR) Does not apply Misc 1 each Every 10 days. 9 each 1    losartan 100 MG Oral Tab Take 1 tablet (100 mg total) by mouth daily. 90 tablet 3    Glucose Blood (ONETOUCH ULTRA) In Vitro Strip Please provide test strips that are covered by patient's insurance. 100 strip 0    Blood Glucose Monitoring Suppl (ONETOUCH ULTRA 2) w/Device Does not apply Kit Please provide what is covered by patient's insurance 1 kit 0    pregabalin (LYRICA) 75 MG Oral Cap Take 1 capsule (75 mg total) by mouth 2 (two) times daily. 60 capsule 0    ERGOCALCIFEROL 1.25 MG (69098 UT) Oral Cap TAKE 1 CAPSULE BY MOUTH 1 TIME A WEEK FOR 12 DOSES 12 capsule 1    Blood Glucose Monitoring Suppl (TRUE METRIX METER) w/Device Does not apply Kit Use to check blood sugar four times a day 1 kit 0    Glucose Blood (TRUE METRIX BLOOD GLUCOSE TEST) In Vitro Strip Check blood sugar four times a day 400 strip 1    TRUEplus Lancets 33G Does not apply Misc USE TO CHECK BLOOD SUGAR FOUR TIMES DAILY 400 each 1    Glucose Blood (TRUE METRIX BLOOD GLUCOSE TEST) In Vitro Strip Use to check blood sugar three times a day 300 strip 1    hydrALAZINE 100 MG Oral Tab Take 1 tablet (100 mg total) by mouth 2 (two) times daily. 180 tablet 3    Accu-Chek FastClix Lancets Does not apply Misc Use to check blood sugar three times a day 300 each 0     Glucose Blood (ACCU-CHEK GUIDE) In Vitro Strip Use to check blood sugar three times a day 300 strip 0    Omeprazole 40 MG Oral Capsule Delayed Release TAKE 1 CAPSULE EVERY DAY 30 TO 60 MINUTES BEFORE A MEAL 90 capsule 3    Alcohol Swabs (DROPSAFE ALCOHOL PREP) 70 % Does not apply Pads USE AS DIRECTED 100 each 0    Blood Glucose Monitoring Suppl (ACCU-CHEK GUIDE) w/Device Does not apply Kit Use to check blood sugar three times a day 1 kit 0    fluticasone propionate 50 MCG/ACT Nasal Suspension 2 sprays by Nasal route daily. 48 g 1    VENTOLIN  (90 Base) MCG/ACT Inhalation Aero Soln Inhale 2 puffs into the lungs every 6 (six) hours as needed for Wheezing. 18 g 1    aspirin 325 MG Oral Tab Take 1 tablet (325 mg total) by mouth daily.        Past Medical History:    Age-related nuclear cataract of both eyes    Anemia    Apnea    Cataract    Coronary atherosclerosis    Depression    DM type 2 (diabetes mellitus, type 2) ([facility])    Esophageal reflux    High blood pressure    High cholesterol    Meibomian gland dysfunction (MGD), bilateral, both upper and lower lids    Migraine    Muscle weakness    left sided weakness uses walker and cane    Sleep apnea    Stenosis of right vertebral artery    Stroke ([facility])    Type II or unspecified type diabetes mellitus without mention of complication, not stated as uncontrolled      Social History:  Social History     Socioeconomic History    Marital status: Legally     Number of children: 3   Occupational History    Occupation:      Comment: disabled    Occupation: phlebotomy   Tobacco Use    Smoking status: Former     Current packs/day: 0.00     Average packs/day: 0.2 packs/day for 1 year (0.2 ttl pk-yrs)     Types: Cigarettes     Start date: 2012     Quit date: 2013     Years since quittin.1    Smokeless tobacco: Never   Vaping Use    Vaping status: Never Used   Substance and Sexual Activity    Alcohol use: No     Alcohol/week: 0.0  standard drinks of alcohol    Drug use: No    Sexual activity: Not Currently     Social Drivers of Health     Transportation Needs: Unmet Transportation Needs (1/9/2024)    Transportation Needs     Lack of Transportation: Yes   Stress: No Stress Concern Present (4/21/2023)    Stress     Feeling of Stress : No   Housing Stability: Low Risk  (4/21/2023)    Housing Stability     Housing Instability: No        REVIEW OF SYSTEMS:   Review of Systems   Constitutional:  Negative for activity change, appetite change, chills, diaphoresis, fatigue, fever and unexpected weight change.   HENT:  Negative for congestion.    Eyes:  Negative for photophobia and visual disturbance.   Respiratory:  Negative for cough, chest tightness, shortness of breath and wheezing.    Cardiovascular:  Negative for chest pain, palpitations and leg swelling.   Gastrointestinal:  Negative for abdominal pain, constipation, diarrhea, nausea and vomiting.   Endocrine: Negative.    Genitourinary:  Negative for difficulty urinating and vaginal bleeding.   Musculoskeletal:  Positive for arthralgias and neck pain. Negative for back pain, gait problem, joint swelling, myalgias and neck stiffness.   Skin: Negative.    Neurological:  Positive for weakness. Negative for dizziness, tremors, seizures, syncope, facial asymmetry, light-headedness, numbness and headaches.   Hematological: Negative.    Psychiatric/Behavioral: Negative.            EXAM:   /76   Pulse 62   Ht 5' 2\" (1.575 m)   Wt 199 lb (90.3 kg)   SpO2 97%   BMI 36.40 kg/m²     Physical Exam  Vitals reviewed.   Constitutional:       General: She is not in acute distress.     Appearance: Normal appearance. She is obese. She is not ill-appearing.   HENT:      Head: Normocephalic and atraumatic.   Eyes:      General: No scleral icterus.     Conjunctiva/sclera: Conjunctivae normal.      Pupils: Pupils are equal, round, and reactive to light.   Cardiovascular:      Rate and Rhythm: Normal rate  and regular rhythm.      Pulses: Normal pulses.      Heart sounds: Normal heart sounds. No murmur heard.  Pulmonary:      Effort: Pulmonary effort is normal. No respiratory distress.      Breath sounds: Normal breath sounds. No stridor. No wheezing, rhonchi or rales.   Chest:      Chest wall: No tenderness.   Musculoskeletal:      Cervical back: Neck supple. No edema, erythema, rigidity or crepitus. Muscular tenderness present. No pain with movement or spinous process tenderness. Normal range of motion.   Lymphadenopathy:      Cervical: No cervical adenopathy.   Skin:     General: Skin is warm.      Coloration: Skin is not jaundiced.      Findings: No bruising or erythema.   Neurological:      General: No focal deficit present.      Mental Status: She is alert and oriented to person, place, and time.      Cranial Nerves: No cranial nerve deficit.      Gait: Gait normal.   Psychiatric:         Mood and Affect: Mood normal.         Behavior: Behavior normal.         Thought Content: Thought content normal.         Judgment: Judgment normal.            ASSESSMENT AND PLAN:     Assessment & Plan  Atherosclerosis of right carotid artery  Referral to Vascular Surgeon. Msg sent to Vascular team to assist with sooner appt. Reviewed concerning s/s.   BP control reviewed.   Referral to Cardiology  Orders:    Vascular Surgery - In Network    [facility] CARDIOLOGY EXTERNAL    Left carotid stenosis  Referral to Vascular Surgeon. Msg sent to Vascular team to assist with sooner appt. Reviewed concerning s/s.   BP control reviewed.   Referral to Cardiology  Orders:    Vascular Surgery - In [facility] CARDIOLOGY EXTERNAL    Essential hypertension with goal blood pressure less than 140/90  Referral to cardiologist  Home BP reported <140/90. Reviewed BP goal, concerning s/s. CPM. Check BP twice daily.   Orders:    [facility] CARDIOLOGY EXTERNAL         The patient indicates understanding of these  issues and agrees to the plan.  The patient is asked to return in 2-4 weeks. .     The above note was creating using Dragon speech recognition technology. Please excuse any typos.

## 2025-02-19 ENCOUNTER — HOSPITAL ENCOUNTER (OUTPATIENT)
Dept: ENDOCRINOLOGY | Facility: HOSPITAL | Age: 55
Discharge: HOME | End: 2025-02-19
Payer: MEDICARE

## 2025-02-19 ENCOUNTER — TELEPHONE (OUTPATIENT)
Dept: ENDOCRINOLOGY CLINIC | Facility: CLINIC | Age: 55
End: 2025-02-19

## 2025-02-19 DIAGNOSIS — E11.49 TYPE 2 DIABETES MELLITUS WITH OTHER NEUROLOGIC COMPLICATION, WITH LONG-TERM CURRENT USE OF INSULIN (HCC): Primary | ICD-10-CM

## 2025-02-19 DIAGNOSIS — Z79.4 TYPE 2 DIABETES MELLITUS WITH OTHER NEUROLOGIC COMPLICATION, WITH LONG-TERM CURRENT USE OF INSULIN (HCC): Primary | ICD-10-CM

## 2025-02-19 NOTE — PROGRESS NOTES
Anjel Pisano  : 3/30/1970 was seen for 1:1 Insulin Pump Follow up:    Date: 2025  Referring Provider: Marco Start time: 1030A End time: 1130A    No charge visited as Neisha from company trained with her - CDCES observed.  On insulin pump since 25  Neisha downloaded reports today    Insertion site assessed: lower left - patient may have scar tissue based on observations on report  Pt is happy with pump and doing well allowing pump to use algorithm to take over management.    Goal set to lower - 110 today versus usual of 120.     Type of pump: Beta Bionic    Type of infusion set: manual - leur infusion set    Type of insulin: u-100    TDD: 114.7, total basal 48.6     Reviewed pump settings:   Usual meals currently are averaging at B: 10.7 (67%), L: 5.9, D: 13.4 (63% usual)    Evaluated pump download:   Average Glucose (mg/dL): 179.8 +/- 60  Glucose readings per day: Estimated A1c for period downloaded: 7.6   30.6% high  55.5% in range      RECOMMENDATIONS:  Reviewed how to see if bolus provided previously  Reviewed dori travel and need for additional sites for travel and also pt is changing q 1-2 days  Emphasized rotating sites and observing for \"poor sites\"     Discussed pump failure back-up plan and infusion set/site issues.  Patient verbalized understanding and has no further questions at this time.    Sharmaine Jules RD

## 2025-02-19 NOTE — TELEPHONE ENCOUNTER
Rep with iLet stopped by clinic. Patient is traveling to [place] next month and will need enough supplies for the trip. Asking to edit the iLet order to be changed daily. Message sent on [employer].

## 2025-02-20 ENCOUNTER — OFFICE VISIT (OUTPATIENT)
Facility: CLINIC | Age: 55
End: 2025-02-20

## 2025-02-20 VITALS — WEIGHT: 199 LBS | BODY MASS INDEX: 36.62 KG/M2 | HEIGHT: 62 IN

## 2025-02-20 DIAGNOSIS — I65.21 OCCLUSION OF RIGHT CAROTID ARTERY: Primary | ICD-10-CM

## 2025-02-20 NOTE — PROGRESS NOTES
Samer F. Najjar, MD  Vascular Surgery  [facility]      VASCULAR SURGERY   CLINIC CONSULT NOTE        Name: Anjel Pisano   :   3/30/1970  [id number]     REFERRING PHYSICIAN:  Jessica Galeana  PRIMARY CARE PHYSICIAN:  Yves Yanez MD    HISTORY OF PRESENT ILLNESS:   Patient is a 54 year old female who has been referred to discuss the findings on her recent CT angiogram of the carotid and brain.  She has a complex medical history with moyamoya disease.  The history is being obtained from the [facility].  Apparently she suffered a significant right cerebral stroke in  which left her with some residual left hemiparesis, memory loss and confusion.  She underwent a right extracranial to intracranial artery bypass by a neurosurgeon in [place] because her right internal carotid was 100% occluded and she appeared to be having ongoing symptoms.  Since that time her neurologic status had been stable until recently when she had some worsening neurologic symptoms.  She did undergo a CT angiogram back in January that revealed that the right internal carotid artery is occluded with reconstitution of flow within the region of the mid cavernous segment.  The external carotid artery was patent.  Moderate to severe mixed plaque in the cavernous and supraclinoid segments of the internal carotid arteries with diminutive luminal caliber with severe stenosis. The intracranial bypass was not seen which likely indicates an occlusion. She was found to have critical stenosis at the origin of the external carotid artery with 100% occlusion of the internal carotid on the right.  The patient describes having significant neck and shoulder pain down to her left arm describes having restless legs.     PAST MEDICAL HISTORY:    Past Medical History:    Age-related nuclear cataract of both eyes    Anemia    Apnea    Cataract    Coronary atherosclerosis    Depression    DM type 2 (diabetes mellitus, type 2) ([facility])     Esophageal reflux    High blood pressure    High cholesterol    Meibomian gland dysfunction (MGD), bilateral, both upper and lower lids    Migraine    Muscle weakness    left sided weakness uses walker and cane    Seizures (HCC)    Sleep apnea    Stenosis of right vertebral artery    Stroke (HCC)    Type II or unspecified type diabetes mellitus without mention of complication, not stated as uncontrolled       PAST SURGICAL HISTORY:   Past Surgical History:   Procedure Laterality Date    Brain surgery  2014    Colonoscopy N/A 8/25/2017    Procedure: COLONOSCOPY;  Surgeon: Sharmaine Burks MD;  Location: [facility] END[facility]    Colonoscopy      Colonoscopy N/A 11/12/2022    Procedure: COLONOSCOPY with Polypectomy;  Surgeon: Terry Weaver MD;  Location: [facility] ENDOSCOPY    Excise carotid body+carotid artery  09/2017    Incision and drainage Right 04/19/16    lower abdominal skin    Laparoscopic cholecystectomy  2002        MEDICATIONS:   No current outpatient medications on file.    ALLERGIES:    She is allergic to radiology contrast iodinated dyes, reglan [metoclopramide], adhesive tape, and wound dressing adhesive.    SOCIAL HISTORY:    Patient  reports that she quit smoking about 11 years ago. Her smoking use included cigarettes. She started smoking about 12 years ago. She has a 0.2 pack-year smoking history. She has never used smokeless tobacco. She reports that she does not drink alcohol and does not use drugs.    FAMILY HISTORY:    Patient's family history includes Colon Cancer in her father; Diabetes in her father; Heart Disorder in her father and mother; Hypertension in her father; Lipids in her brother, father, and mother; Obesity in her brother, father, and mother.    ROS:     A 12 point review of systems with pertinent positives and negatives listed in the HPI.    EXAM:    Ht 5' 2\" (1.575 m)   Wt 199 lb (90.3 kg)   BMI 36.40 kg/m²   GENERAL: alert and orientated X 3, well developed, well nourished, in no  apparent distress  PSYCH: normal mood and affect  HEENT: ears and throat are clear  NECK: supple, no lymphadenopathy, thyroid wnl  CAROTID: no bruits  RESPIRATORY: no rales, rhonchi, or wheezes B  CARDIO: RRR without murmur, no murmur, no gallop   ABDOMEN: soft, non-tender with no palpable aneurysm or masses  BACK: normal, no tenderness  SKIN: no rashes, warm and dry  EXTREMITIES: no tenderness  NEURO: no sensory or motor deficits  VASCULAR:      Femoral Popliteal DP PT Peroneal   Right 1+       biphasic signal biphasic signal    Left 1+       biphasic signal biphasic signal        LABS:   Lab Results   Component Value Date     (H) 05/23/2024    A1C 9.2 (A) 02/11/2025      Lab Results   Component Value Date     (H) 03/01/2025    BUN 13 03/01/2025    CREATSERUM 0.79 03/01/2025    BUNCREA 16.5 03/01/2025    ANIONGAP 7 03/01/2025    GFRAA 76 05/16/2022    GFRNAA 66 05/16/2022    CA 8.5 (L) 03/01/2025     03/01/2025    K 5.1 03/01/2025     03/01/2025    CO2 25.0 03/01/2025    OSMOCALC 292 03/01/2025      Lab Results   Component Value Date    WBC 16.8 (H) 03/01/2025    RBC 4.68 03/01/2025    HGB 11.2 (L) 03/01/2025    HCT 36.7 03/01/2025    MCV 78.4 (L) 03/01/2025    MCH 23.9 (L) 03/01/2025    MCHC 30.5 (L) 03/01/2025    RDW 16.1 (H) 03/01/2025    .0 03/01/2025    MPV 10.1 08/20/2018        Lab Results   Component Value Date    HGB 11.2 (L) 03/01/2025    HGB 10.9 (L) 03/01/2025    HGB 11.8 (L) 03/01/2025    HGB 12.5 02/28/2025    HGB 11.9 (L) 02/17/2025    HGB 12.4 12/10/2024    CREATSERUM 0.79 03/01/2025    CREATSERUM 0.80 03/01/2025    CREATSERUM 0.74 03/01/2025    CREATSERUM 0.75 02/28/2025    CREATSERUM 0.92 02/17/2025    CREATSERUM 0.83 12/10/2024         ASSESSMENT/PLAN:    I have reviewed the patient's prior documentation and studies from Epic and from [email or web address].    Diagnoses and all orders for this visit:    Occlusion of right carotid artery    The patient has a complex vascular  disease that appears to be consistent with moyamoya disease with intracranial occlusions.  She has previously undergone a right extracranial to intracranial artery bypass about 10 years ago that now appears to have occluded.  I explained to the patient that her left neck shoulder and arm appears to be musculoskeletal in nature as it is affected by movement.  I do not believe there is anything more that can be done for her right carotid artery as she is currently living off of collaterals.  Her left carotid artery is patent.  No further testing is indicated from a vascular surgery standpoint.  The patient can follow-up with neurology or neurosurgery for her intracranial disease.    The patient indicated an understanding of these issues and agreed to the plan and all questions were answered during the clinic visit.      Thank you for allowing me to participate in your patient's care.   Please do not hesitate to contact me with any questions.    Sincerely,  Samer F. Najjar, MD    Please note: Dragon speech recognition software was used to prepare this note. If a word or phrase is confusing, it is likely do to a failure of recognition.   Please contact me with any questions or clarifications.

## 2025-02-26 NOTE — TELEPHONE ENCOUNTER
Received Additional Supply Letter from [facility]. Form completed and placed in provider's folder for signature.

## 2025-02-28 ENCOUNTER — APPOINTMENT (OUTPATIENT)
Dept: GENERAL RADIOLOGY | Facility: HOSPITAL | Age: 55
End: 2025-02-28
Attending: STUDENT IN AN ORGANIZED HEALTH CARE EDUCATION/TRAINING PROGRAM
Payer: MEDICARE

## 2025-02-28 ENCOUNTER — HOSPITAL ENCOUNTER (INPATIENT)
Facility: HOSPITAL | Age: 55
LOS: 5 days | Discharge: HOME | End: 2025-03-06
Attending: STUDENT IN AN ORGANIZED HEALTH CARE EDUCATION/TRAINING PROGRAM | Admitting: INTERNAL MEDICINE
Payer: MEDICARE

## 2025-02-28 DIAGNOSIS — Z86.73 HISTORY OF STROKE: ICD-10-CM

## 2025-02-28 DIAGNOSIS — I10 ESSENTIAL HYPERTENSION WITH GOAL BLOOD PRESSURE LESS THAN 140/90: ICD-10-CM

## 2025-02-28 DIAGNOSIS — R07.9 CHEST PAIN, UNSPECIFIED TYPE: Primary | ICD-10-CM

## 2025-02-28 DIAGNOSIS — E11.49 TYPE 2 DIABETES MELLITUS WITH OTHER NEUROLOGIC COMPLICATION, WITH LONG-TERM CURRENT USE OF INSULIN (HCC): ICD-10-CM

## 2025-02-28 DIAGNOSIS — Z79.4 TYPE 2 DIABETES MELLITUS WITH OTHER NEUROLOGIC COMPLICATION, WITH LONG-TERM CURRENT USE OF INSULIN (HCC): ICD-10-CM

## 2025-02-28 DIAGNOSIS — I95.9 HYPOTENSION, UNSPECIFIED HYPOTENSION TYPE: ICD-10-CM

## 2025-02-28 DIAGNOSIS — I21.4 NSTEMI (NON-ST ELEVATED MYOCARDIAL INFARCTION) (HCC): ICD-10-CM

## 2025-02-28 DIAGNOSIS — I77.74 VERTEBRAL ARTERY DISSECTION (HCC): ICD-10-CM

## 2025-02-28 LAB
ANION GAP SERPL CALC-SCNC: 8 MMOL/L (ref 0–18)
BASOPHILS # BLD AUTO: 0.06 X10(3) UL (ref 0–0.2)
BASOPHILS NFR BLD AUTO: 0.6 %
BUN BLD-MCNC: 12 MG/DL (ref 9–23)
BUN/CREAT SERPL: 16 (ref 10–20)
CALCIUM BLD-MCNC: 9.4 MG/DL (ref 8.7–10.4)
CHLORIDE SERPL-SCNC: 107 MMOL/L (ref 98–112)
CHOLEST SERPL-MCNC: 128 MG/DL (ref ?–200)
CO2 SERPL-SCNC: 26 MMOL/L (ref 21–32)
CREAT BLD-MCNC: 0.75 MG/DL
D DIMER PPP FEU-MCNC: 0.36 UG/ML FEU (ref ?–0.56)
DEPRECATED RDW RBC AUTO: 44.5 FL (ref 35.1–46.3)
EGFRCR SERPLBLD CKD-EPI 2021: 93 ML/MIN/1.73M2 (ref 60–?)
EOSINOPHIL # BLD AUTO: 0.13 X10(3) UL (ref 0–0.7)
EOSINOPHIL NFR BLD AUTO: 1.3 %
ERYTHROCYTE [DISTWIDTH] IN BLOOD BY AUTOMATED COUNT: 16.4 % (ref 11–15)
GLUCOSE BLD-MCNC: 78 MG/DL (ref 70–99)
HCT VFR BLD AUTO: 41.1 %
HDLC SERPL-MCNC: 32 MG/DL (ref 40–59)
HGB BLD-MCNC: 12.5 G/DL
IMM GRANULOCYTES # BLD AUTO: 0.02 X10(3) UL (ref 0–1)
IMM GRANULOCYTES NFR BLD: 0.2 %
LDLC SERPL CALC-MCNC: 76 MG/DL (ref ?–100)
LYMPHOCYTES # BLD AUTO: 3.02 X10(3) UL (ref 1–4)
LYMPHOCYTES NFR BLD AUTO: 29.6 %
MCH RBC QN AUTO: 23.2 PG (ref 26–34)
MCHC RBC AUTO-ENTMCNC: 30.4 G/DL (ref 31–37)
MCV RBC AUTO: 76.3 FL
MONOCYTES # BLD AUTO: 0.74 X10(3) UL (ref 0.1–1)
MONOCYTES NFR BLD AUTO: 7.3 %
NEUTROPHILS # BLD AUTO: 6.22 X10 (3) UL (ref 1.5–7.7)
NEUTROPHILS # BLD AUTO: 6.22 X10(3) UL (ref 1.5–7.7)
NEUTROPHILS NFR BLD AUTO: 61 %
NONHDLC SERPL-MCNC: 96 MG/DL (ref ?–130)
NT-PROBNP SERPL-MCNC: 274 PG/ML (ref ?–125)
OSMOLALITY SERPL CALC.SUM OF ELEC: 291 MOSM/KG (ref 275–295)
PLATELET # BLD AUTO: 277 10(3)UL (ref 150–450)
POTASSIUM SERPL-SCNC: 3.8 MMOL/L (ref 3.5–5.1)
RBC # BLD AUTO: 5.39 X10(6)UL
SODIUM SERPL-SCNC: 141 MMOL/L (ref 136–145)
TRIGL SERPL-MCNC: 109 MG/DL (ref 30–149)
TROPONIN I SERPL HS-MCNC: 69 NG/L
VLDLC SERPL CALC-MCNC: 17 MG/DL (ref 0–30)
WBC # BLD AUTO: 10.2 X10(3) UL (ref 4–11)

## 2025-02-28 PROCEDURE — 02HV33Z INSERTION OF INFUSION DEVICE INTO SUPERIOR VENA CAVA, PERCUTANEOUS APPROACH: ICD-10-PCS | Performed by: HOSPITALIST

## 2025-02-28 PROCEDURE — 71045 X-RAY EXAM CHEST 1 VIEW: CPT | Performed by: STUDENT IN AN ORGANIZED HEALTH CARE EDUCATION/TRAINING PROGRAM

## 2025-02-28 PROCEDURE — 99223 1ST HOSP IP/OBS HIGH 75: CPT | Performed by: HOSPITALIST

## 2025-02-28 RX ORDER — ASPIRIN 81 MG/1
324 TABLET, CHEWABLE ORAL ONCE
Status: COMPLETED | OUTPATIENT
Start: 2025-02-28 | End: 2025-02-28

## 2025-02-28 RX ORDER — HYDRALAZINE HYDROCHLORIDE 20 MG/ML
20 INJECTION INTRAMUSCULAR; INTRAVENOUS ONCE
Status: COMPLETED | OUTPATIENT
Start: 2025-02-28 | End: 2025-02-28

## 2025-02-28 RX ORDER — HYDRALAZINE HYDROCHLORIDE 20 MG/ML
10 INJECTION INTRAMUSCULAR; INTRAVENOUS ONCE
Status: DISCONTINUED | OUTPATIENT
Start: 2025-02-28 | End: 2025-02-28

## 2025-03-01 ENCOUNTER — APPOINTMENT (OUTPATIENT)
Dept: CV DIAGNOSTICS | Facility: HOSPITAL | Age: 55
End: 2025-03-01
Attending: HOSPITALIST
Payer: MEDICARE

## 2025-03-01 ENCOUNTER — APPOINTMENT (OUTPATIENT)
Dept: GENERAL RADIOLOGY | Facility: HOSPITAL | Age: 55
End: 2025-03-01
Attending: STUDENT IN AN ORGANIZED HEALTH CARE EDUCATION/TRAINING PROGRAM
Payer: MEDICARE

## 2025-03-01 ENCOUNTER — APPOINTMENT (OUTPATIENT)
Dept: CT IMAGING | Facility: HOSPITAL | Age: 55
End: 2025-03-01
Attending: EMERGENCY MEDICINE
Payer: MEDICARE

## 2025-03-01 PROBLEM — Z96.41 INSULIN PUMP IN PLACE: Status: ACTIVE | Noted: 2025-03-01

## 2025-03-01 PROBLEM — I95.9 HYPOTENSION: Status: ACTIVE | Noted: 2025-03-01

## 2025-03-01 PROBLEM — I67.5 MOYA MOYA DISEASE: Status: ACTIVE | Noted: 2025-03-01

## 2025-03-01 PROBLEM — E11.65 UNCONTROLLED TYPE 2 DIABETES MELLITUS WITH HYPERGLYCEMIA (HCC): Status: ACTIVE | Noted: 2025-03-01

## 2025-03-01 PROBLEM — R07.9 CHEST PAIN, UNSPECIFIED TYPE: Status: ACTIVE | Noted: 2025-03-01

## 2025-03-01 PROBLEM — I77.74 VERTEBRAL ARTERY DISSECTION (HCC): Status: ACTIVE | Noted: 2025-03-01

## 2025-03-01 PROBLEM — R56.9 SEIZURES (HCC): Status: ACTIVE | Noted: 2022-02-03

## 2025-03-01 PROBLEM — E11.3299 MILD NON PROLIFERATIVE DIABETIC RETINOPATHY (HCC): Status: ACTIVE | Noted: 2023-02-22

## 2025-03-01 PROBLEM — I65.21 RIGHT-SIDED EXTRACRANIAL CAROTID ARTERY STENOSIS: Status: ACTIVE | Noted: 2022-01-15

## 2025-03-01 PROBLEM — I95.9 HYPOTENSION, UNSPECIFIED HYPOTENSION TYPE: Status: ACTIVE | Noted: 2025-03-01

## 2025-03-01 PROBLEM — R29.810 WEAKNESS ON RIGHT SIDE OF FACE: Status: ACTIVE | Noted: 2022-01-14

## 2025-03-01 PROBLEM — I21.4 NSTEMI (NON-ST ELEVATED MYOCARDIAL INFARCTION) (HCC): Status: ACTIVE | Noted: 2025-03-01

## 2025-03-01 LAB
ALBUMIN SERPL-MCNC: 3.8 G/DL (ref 3.2–4.8)
ALBUMIN/GLOB SERPL: 1.4 {RATIO} (ref 1–2)
ALP LIVER SERPL-CCNC: 87 U/L
ALT SERPL-CCNC: 9 U/L
ANION GAP SERPL CALC-SCNC: 7 MMOL/L (ref 0–18)
ANION GAP SERPL CALC-SCNC: 9 MMOL/L (ref 0–18)
ANION GAP SERPL CALC-SCNC: 9 MMOL/L (ref 0–18)
APTT PPP: 28.4 SECONDS (ref 23–36)
APTT PPP: 43.7 SECONDS (ref 23–36)
APTT PPP: 91.8 SECONDS (ref 23–36)
AST SERPL-CCNC: 12 U/L (ref ?–34)
BASOPHILS # BLD AUTO: 0.03 X10(3) UL (ref 0–0.2)
BASOPHILS # BLD AUTO: 0.03 X10(3) UL (ref 0–0.2)
BASOPHILS NFR BLD AUTO: 0.2 %
BASOPHILS NFR BLD AUTO: 0.3 %
BILIRUB SERPL-MCNC: 0.5 MG/DL (ref 0.3–1.2)
BUN BLD-MCNC: 12 MG/DL (ref 9–23)
BUN BLD-MCNC: 12 MG/DL (ref 9–23)
BUN BLD-MCNC: 13 MG/DL (ref 9–23)
BUN/CREAT SERPL: 15 (ref 10–20)
BUN/CREAT SERPL: 16.2 (ref 10–20)
BUN/CREAT SERPL: 16.5 (ref 10–20)
CALCIUM BLD-MCNC: 8.3 MG/DL (ref 8.7–10.4)
CALCIUM BLD-MCNC: 8.5 MG/DL (ref 8.7–10.4)
CALCIUM BLD-MCNC: 9 MG/DL (ref 8.7–10.4)
CHLORIDE SERPL-SCNC: 106 MMOL/L (ref 98–112)
CHLORIDE SERPL-SCNC: 108 MMOL/L (ref 98–112)
CHLORIDE SERPL-SCNC: 108 MMOL/L (ref 98–112)
CO2 SERPL-SCNC: 24 MMOL/L (ref 21–32)
CO2 SERPL-SCNC: 25 MMOL/L (ref 21–32)
CO2 SERPL-SCNC: 25 MMOL/L (ref 21–32)
CREAT BLD-MCNC: 0.74 MG/DL
CREAT BLD-MCNC: 0.79 MG/DL
CREAT BLD-MCNC: 0.8 MG/DL
DEPRECATED RDW RBC AUTO: 44 FL (ref 35.1–46.3)
DEPRECATED RDW RBC AUTO: 44.8 FL (ref 35.1–46.3)
DEPRECATED RDW RBC AUTO: 46.3 FL (ref 35.1–46.3)
EGFRCR SERPLBLD CKD-EPI 2021: 86 ML/MIN/1.73M2 (ref 60–?)
EGFRCR SERPLBLD CKD-EPI 2021: 88 ML/MIN/1.73M2 (ref 60–?)
EGFRCR SERPLBLD CKD-EPI 2021: 95 ML/MIN/1.73M2 (ref 60–?)
EOSINOPHIL # BLD AUTO: 0 X10(3) UL (ref 0–0.7)
EOSINOPHIL # BLD AUTO: 0.06 X10(3) UL (ref 0–0.7)
EOSINOPHIL NFR BLD AUTO: 0 %
EOSINOPHIL NFR BLD AUTO: 0.6 %
ERYTHROCYTE [DISTWIDTH] IN BLOOD BY AUTOMATED COUNT: 16.1 % (ref 11–15)
ERYTHROCYTE [DISTWIDTH] IN BLOOD BY AUTOMATED COUNT: 16.2 % (ref 11–15)
ERYTHROCYTE [DISTWIDTH] IN BLOOD BY AUTOMATED COUNT: 16.3 % (ref 11–15)
GLOBULIN PLAS-MCNC: 2.7 G/DL (ref 2–3.5)
GLUCOSE BLD-MCNC: 119 MG/DL (ref 70–99)
GLUCOSE BLD-MCNC: 128 MG/DL (ref 70–99)
GLUCOSE BLD-MCNC: 86 MG/DL (ref 70–99)
GLUCOSE BLDC GLUCOMTR-MCNC: 100 MG/DL (ref 70–99)
GLUCOSE BLDC GLUCOMTR-MCNC: 120 MG/DL (ref 70–99)
GLUCOSE BLDC GLUCOMTR-MCNC: 131 MG/DL (ref 70–99)
GLUCOSE BLDC GLUCOMTR-MCNC: 132 MG/DL (ref 70–99)
GLUCOSE BLDC GLUCOMTR-MCNC: 215 MG/DL (ref 70–99)
GLUCOSE BLDC GLUCOMTR-MCNC: 254 MG/DL (ref 70–99)
GLUCOSE BLDC GLUCOMTR-MCNC: 90 MG/DL (ref 70–99)
HCT VFR BLD AUTO: 35.5 %
HCT VFR BLD AUTO: 36.7 %
HCT VFR BLD AUTO: 38.2 %
HGB BLD-MCNC: 10.9 G/DL
HGB BLD-MCNC: 11.2 G/DL
HGB BLD-MCNC: 11.8 G/DL
IMM GRANULOCYTES # BLD AUTO: 0.04 X10(3) UL (ref 0–1)
IMM GRANULOCYTES # BLD AUTO: 0.11 X10(3) UL (ref 0–1)
IMM GRANULOCYTES NFR BLD: 0.4 %
IMM GRANULOCYTES NFR BLD: 0.6 %
LYMPHOCYTES # BLD AUTO: 0.84 X10(3) UL (ref 1–4)
LYMPHOCYTES # BLD AUTO: 5.3 X10(3) UL (ref 1–4)
LYMPHOCYTES NFR BLD AUTO: 4.7 %
LYMPHOCYTES NFR BLD AUTO: 56.2 %
MCH RBC QN AUTO: 23.5 PG (ref 26–34)
MCH RBC QN AUTO: 23.6 PG (ref 26–34)
MCH RBC QN AUTO: 23.9 PG (ref 26–34)
MCHC RBC AUTO-ENTMCNC: 30.5 G/DL (ref 31–37)
MCHC RBC AUTO-ENTMCNC: 30.7 G/DL (ref 31–37)
MCHC RBC AUTO-ENTMCNC: 30.9 G/DL (ref 31–37)
MCV RBC AUTO: 75.9 FL
MCV RBC AUTO: 77 FL
MCV RBC AUTO: 78.4 FL
MONOCYTES # BLD AUTO: 0.27 X10(3) UL (ref 0.1–1)
MONOCYTES # BLD AUTO: 0.6 X10(3) UL (ref 0.1–1)
MONOCYTES NFR BLD AUTO: 1.5 %
MONOCYTES NFR BLD AUTO: 6.4 %
NEUTROPHILS # BLD AUTO: 16.56 X10 (3) UL (ref 1.5–7.7)
NEUTROPHILS # BLD AUTO: 16.56 X10(3) UL (ref 1.5–7.7)
NEUTROPHILS # BLD AUTO: 3.4 X10 (3) UL (ref 1.5–7.7)
NEUTROPHILS # BLD AUTO: 3.4 X10(3) UL (ref 1.5–7.7)
NEUTROPHILS NFR BLD AUTO: 36.1 %
NEUTROPHILS NFR BLD AUTO: 93 %
OSMOLALITY SERPL CALC.SUM OF ELEC: 289 MOSM/KG (ref 275–295)
OSMOLALITY SERPL CALC.SUM OF ELEC: 292 MOSM/KG (ref 275–295)
OSMOLALITY SERPL CALC.SUM OF ELEC: 293 MOSM/KG (ref 275–295)
PLATELET # BLD AUTO: 252 10(3)UL (ref 150–450)
PLATELET # BLD AUTO: 254 10(3)UL (ref 150–450)
PLATELET # BLD AUTO: 298 10(3)UL (ref 150–450)
POTASSIUM SERPL-SCNC: 3.4 MMOL/L (ref 3.5–5.1)
POTASSIUM SERPL-SCNC: 3.6 MMOL/L (ref 3.5–5.1)
POTASSIUM SERPL-SCNC: 5.1 MMOL/L (ref 3.5–5.1)
PROT SERPL-MCNC: 6.5 G/DL (ref 5.7–8.2)
RBC # BLD AUTO: 4.61 X10(6)UL
RBC # BLD AUTO: 4.68 X10(6)UL
RBC # BLD AUTO: 5.03 X10(6)UL
SODIUM SERPL-SCNC: 140 MMOL/L (ref 136–145)
SODIUM SERPL-SCNC: 140 MMOL/L (ref 136–145)
SODIUM SERPL-SCNC: 141 MMOL/L (ref 136–145)
TROPONIN I SERPL HS-MCNC: 1324 NG/L
TROPONIN I SERPL HS-MCNC: 180 NG/L
TROPONIN I SERPL HS-MCNC: 3691 NG/L
TROPONIN I SERPL HS-MCNC: 59 NG/L
TROPONIN I SERPL HS-MCNC: 61 NG/L
WBC # BLD AUTO: 16.8 X10(3) UL (ref 4–11)
WBC # BLD AUTO: 17.8 X10(3) UL (ref 4–11)
WBC # BLD AUTO: 9.4 X10(3) UL (ref 4–11)

## 2025-03-01 PROCEDURE — 70498 CT ANGIOGRAPHY NECK: CPT | Performed by: EMERGENCY MEDICINE

## 2025-03-01 PROCEDURE — 70496 CT ANGIOGRAPHY HEAD: CPT | Performed by: EMERGENCY MEDICINE

## 2025-03-01 PROCEDURE — 99233 SBSQ HOSP IP/OBS HIGH 50: CPT | Performed by: HOSPITALIST

## 2025-03-01 PROCEDURE — 99223 1ST HOSP IP/OBS HIGH 75: CPT | Performed by: INTERNAL MEDICINE

## 2025-03-01 PROCEDURE — 93306 TTE W/DOPPLER COMPLETE: CPT | Performed by: HOSPITALIST

## 2025-03-01 PROCEDURE — 71045 X-RAY EXAM CHEST 1 VIEW: CPT | Performed by: STUDENT IN AN ORGANIZED HEALTH CARE EDUCATION/TRAINING PROGRAM

## 2025-03-01 PROCEDURE — 99223 1ST HOSP IP/OBS HIGH 75: CPT | Performed by: OTHER

## 2025-03-01 RX ORDER — FUROSEMIDE 10 MG/ML
40 INJECTION INTRAMUSCULAR; INTRAVENOUS DAILY
Status: DISCONTINUED | OUTPATIENT
Start: 2025-03-01 | End: 2025-03-01

## 2025-03-01 RX ORDER — NITROGLYCERIN 0.4 MG/1
0.4 TABLET SUBLINGUAL EVERY 5 MIN PRN
Status: DISCONTINUED | OUTPATIENT
Start: 2025-03-01 | End: 2025-03-06

## 2025-03-01 RX ORDER — LOSARTAN POTASSIUM 100 MG/1
100 TABLET ORAL DAILY
Status: DISCONTINUED | OUTPATIENT
Start: 2025-03-01 | End: 2025-03-03

## 2025-03-01 RX ORDER — DIPHENHYDRAMINE HYDROCHLORIDE 50 MG/ML
INJECTION INTRAMUSCULAR; INTRAVENOUS
Status: COMPLETED
Start: 2025-03-01 | End: 2025-03-01

## 2025-03-01 RX ORDER — HYDROCODONE BITARTRATE AND ACETAMINOPHEN 5; 325 MG/1; MG/1
1 TABLET ORAL EVERY 6 HOURS PRN
Status: DISCONTINUED | OUTPATIENT
Start: 2025-03-01 | End: 2025-03-06

## 2025-03-01 RX ORDER — PANTOPRAZOLE SODIUM 40 MG/1
40 TABLET, DELAYED RELEASE ORAL
Status: DISCONTINUED | OUTPATIENT
Start: 2025-03-01 | End: 2025-03-06

## 2025-03-01 RX ORDER — MORPHINE SULFATE 2 MG/ML
2 INJECTION, SOLUTION INTRAMUSCULAR; INTRAVENOUS ONCE
Status: COMPLETED | OUTPATIENT
Start: 2025-03-01 | End: 2025-03-01

## 2025-03-01 RX ORDER — FUROSEMIDE 10 MG/ML
40 INJECTION INTRAMUSCULAR; INTRAVENOUS
Status: DISCONTINUED | OUTPATIENT
Start: 2025-03-01 | End: 2025-03-03

## 2025-03-01 RX ORDER — ACETAMINOPHEN 325 MG/1
650 TABLET ORAL EVERY 6 HOURS PRN
Status: DISCONTINUED | OUTPATIENT
Start: 2025-03-01 | End: 2025-03-06

## 2025-03-01 RX ORDER — HEPARIN SODIUM 5000 [USP'U]/ML
5000 INJECTION, SOLUTION INTRAVENOUS; SUBCUTANEOUS EVERY 12 HOURS SCHEDULED
Status: DISCONTINUED | OUTPATIENT
Start: 2025-03-01 | End: 2025-03-01

## 2025-03-01 RX ORDER — NICOTINE POLACRILEX 4 MG
30 LOZENGE BUCCAL
Status: DISCONTINUED | OUTPATIENT
Start: 2025-03-01 | End: 2025-03-06

## 2025-03-01 RX ORDER — LEVETIRACETAM 500 MG/1
500 TABLET ORAL 2 TIMES DAILY
Status: DISCONTINUED | OUTPATIENT
Start: 2025-03-01 | End: 2025-03-06

## 2025-03-01 RX ORDER — ATORVASTATIN CALCIUM 80 MG/1
80 TABLET, FILM COATED ORAL NIGHTLY
Status: DISCONTINUED | OUTPATIENT
Start: 2025-03-01 | End: 2025-03-05

## 2025-03-01 RX ORDER — ONDANSETRON 2 MG/ML
4 INJECTION INTRAMUSCULAR; INTRAVENOUS EVERY 6 HOURS PRN
Status: DISCONTINUED | OUTPATIENT
Start: 2025-03-01 | End: 2025-03-05

## 2025-03-01 RX ORDER — HEPARIN SODIUM 1000 [USP'U]/ML
5000 INJECTION, SOLUTION INTRAVENOUS; SUBCUTANEOUS ONCE
Status: COMPLETED | OUTPATIENT
Start: 2025-03-01 | End: 2025-03-01

## 2025-03-01 RX ORDER — ASPIRIN 325 MG
325 TABLET ORAL DAILY
Status: DISCONTINUED | OUTPATIENT
Start: 2025-03-01 | End: 2025-03-02

## 2025-03-01 RX ORDER — MAGNESIUM HYDROXIDE/ALUMINUM HYDROXICE/SIMETHICONE 120; 1200; 1200 MG/30ML; MG/30ML; MG/30ML
30 SUSPENSION ORAL 4 TIMES DAILY PRN
Status: DISCONTINUED | OUTPATIENT
Start: 2025-03-01 | End: 2025-03-06

## 2025-03-01 RX ORDER — LABETALOL HYDROCHLORIDE 5 MG/ML
10 INJECTION, SOLUTION INTRAVENOUS EVERY 4 HOURS PRN
Status: DISCONTINUED | OUTPATIENT
Start: 2025-03-01 | End: 2025-03-06

## 2025-03-01 RX ORDER — HEPARIN SODIUM AND DEXTROSE 10000; 5 [USP'U]/100ML; G/100ML
INJECTION INTRAVENOUS CONTINUOUS
Status: DISCONTINUED | OUTPATIENT
Start: 2025-03-01 | End: 2025-03-03

## 2025-03-01 RX ORDER — DEXTROSE MONOHYDRATE 25 G/50ML
50 INJECTION, SOLUTION INTRAVENOUS
Status: DISCONTINUED | OUTPATIENT
Start: 2025-03-01 | End: 2025-03-06

## 2025-03-01 RX ORDER — ONDANSETRON 2 MG/ML
4 INJECTION INTRAMUSCULAR; INTRAVENOUS ONCE
Status: COMPLETED | OUTPATIENT
Start: 2025-03-01 | End: 2025-03-01

## 2025-03-01 RX ORDER — IPRATROPIUM BROMIDE AND ALBUTEROL SULFATE 2.5; .5 MG/3ML; MG/3ML
3 SOLUTION RESPIRATORY (INHALATION) EVERY 6 HOURS PRN
Status: DISCONTINUED | OUTPATIENT
Start: 2025-03-01 | End: 2025-03-06

## 2025-03-01 RX ORDER — NICOTINE POLACRILEX 4 MG
15 LOZENGE BUCCAL
Status: DISCONTINUED | OUTPATIENT
Start: 2025-03-01 | End: 2025-03-06

## 2025-03-01 RX ORDER — ZOLPIDEM TARTRATE 5 MG/1
5 TABLET ORAL NIGHTLY PRN
Status: DISCONTINUED | OUTPATIENT
Start: 2025-03-01 | End: 2025-03-06

## 2025-03-01 RX ORDER — ONDANSETRON 2 MG/ML
INJECTION INTRAMUSCULAR; INTRAVENOUS
Status: DISPENSED
Start: 2025-03-01 | End: 2025-03-01

## 2025-03-01 RX ORDER — AMLODIPINE BESYLATE 10 MG/1
10 TABLET ORAL DAILY
Status: DISCONTINUED | OUTPATIENT
Start: 2025-03-01 | End: 2025-03-03

## 2025-03-01 RX ORDER — MORPHINE SULFATE 4 MG/ML
4 INJECTION, SOLUTION INTRAMUSCULAR; INTRAVENOUS ONCE
Status: COMPLETED | OUTPATIENT
Start: 2025-03-01 | End: 2025-03-01

## 2025-03-01 RX ORDER — GABAPENTIN 100 MG/1
100 CAPSULE ORAL 3 TIMES DAILY
Status: DISCONTINUED | OUTPATIENT
Start: 2025-03-01 | End: 2025-03-04

## 2025-03-01 RX ORDER — ESCITALOPRAM OXALATE 10 MG/1
10 TABLET ORAL DAILY
Status: DISCONTINUED | OUTPATIENT
Start: 2025-03-01 | End: 2025-03-06

## 2025-03-01 RX ORDER — ALBUTEROL SULFATE 5 MG/ML
SOLUTION RESPIRATORY (INHALATION)
Status: COMPLETED
Start: 2025-03-01 | End: 2025-03-01

## 2025-03-01 RX ORDER — HEPARIN SODIUM AND DEXTROSE 10000; 5 [USP'U]/100ML; G/100ML
1000 INJECTION INTRAVENOUS ONCE
Status: COMPLETED | OUTPATIENT
Start: 2025-03-01 | End: 2025-03-01

## 2025-03-01 RX ORDER — HYDRALAZINE HYDROCHLORIDE 100 MG/1
100 TABLET, FILM COATED ORAL EVERY 8 HOURS SCHEDULED
Status: DISCONTINUED | OUTPATIENT
Start: 2025-03-01 | End: 2025-03-03

## 2025-03-01 RX ORDER — METHYLPREDNISOLONE SODIUM SUCCINATE 125 MG/2ML
125 INJECTION INTRAMUSCULAR; INTRAVENOUS ONCE
Status: COMPLETED | OUTPATIENT
Start: 2025-03-01 | End: 2025-03-01

## 2025-03-01 RX ORDER — METOPROLOL SUCCINATE 50 MG/1
100 TABLET, EXTENDED RELEASE ORAL DAILY
Status: DISCONTINUED | OUTPATIENT
Start: 2025-03-01 | End: 2025-03-03

## 2025-03-01 RX ORDER — METOPROLOL TARTRATE 1 MG/ML
2.5 INJECTION, SOLUTION INTRAVENOUS ONCE
Status: COMPLETED | OUTPATIENT
Start: 2025-03-01 | End: 2025-03-01

## 2025-03-01 RX ORDER — DIPHENHYDRAMINE HYDROCHLORIDE 50 MG/ML
50 INJECTION INTRAMUSCULAR; INTRAVENOUS ONCE
Status: COMPLETED | OUTPATIENT
Start: 2025-03-01 | End: 2025-03-01

## 2025-03-01 NOTE — ED INITIAL ASSESSMENT (HPI)
Pt has had shortness of breath for a few days but is worse today. Pt reports it is worse with lying down, no difference at rest vs with activity. Also having chest pain and tightness with pain to left arm.  Pt has also noticed recent swelling to both legs and restlessness in her legs

## 2025-03-01 NOTE — CONSULTS
[facility]  [address] [place] [address], [address]  [facility] [id number]  [phone]          NEUROLOGY CONSULTATION NOTE    [facility]  part of [facility]    Report of Consultation  Anjel Pisano Patient Status:  Inpatient     3/30/1970 MRN [id number]    Location [facility] 2W/SW Attending Santiago Dwyer MD    Hosp Day # 0 PCP Yves Yanez MD      Date of Admission:  2025  Date of Consult:  3/1/2025  Reason for Admission/Consultation:  hypotension. Polly tapia     Requested by: Santiago Dywer MD  _________________________________________________________________________________________  Chief Complaint:   Chief Complaint   Patient presents with    Difficulty Breathing    Chest Pain Angina     HPI:  Anjel Pisano is a 54 year old woman  w/ a pmhx sig. for R sided strokes and R EC-IC bypass in  at OSH in [place], intracranial atherosclerosis, chronic left vertebral artery stenosis, moderate narrowing of the right subclavian artery,  intractable headaches, seizures on keppra, HTN, HLD, and DM who  p/w 3 days of fluctuating chest pain and difficulty breathing with radiation to the arm found to have elevated troponins (in the setting of chronically elevated troponins) who is being seen by neurology after patient became diaphoretic, panicked, intermittently aphasic, and hypotensive to the 60s over 40s after she was given 20 mg of IV hydralazine for blood pressures in the 210s over 70s.    She had a CTA head and neck which was stable compared to her last CTA head and neck from 2025.  Patient had a central line placed and was started on IV Levophed with resolution of her symptoms.      Neurology was primarily asked regarding blood pressure goals given her history of moyamoya and her acute hypotension.    As an outpatient patient called to get an appointment at [facility] on 2025 after she was recommended to see a stroke neurologist for  her intracranial atherosclerosis and moyamoya. She has an appointment in march 2025.    She reports her chronic has been worse in the last 2 days.  Hurt more  Felt like her head would burst.  Again she has had a headache this been ongoing for 2 years.  It has progressively gotten worse and has been more severe in the last 2 days.  Reports pressure in her head and neck.  Reports sharp pain in her head.  Also reports sharp pain in her mandible bilaterally.    Had an episode of aphasia once while she was in [place].  She reports she has chronic left-sided hemisensory loss.      Review and summation of prior records  [facility] brother's neurology note from 2/10/25  by  Dr. Robert Bhatt :   \"Recommendations  1. Continue aspirin 325 mg p.o. daily  2. Continue atorvastatin 40 mg p.o. daily.  3. Continue levetiracetam 500 mg p.o. b.i.d.  4. I discussed with her that she can undergo planned tooth work and pain procedure from my standpoint. She should not be off more than 3 days of her aspirin. There is risk of stroke when she is off aspirin. She verbalized understanding.  5. Strict diabetes control.  6. She is taking gabapentin and pregabalin from a different physician.  7. Continue to follow-up with pain clinic.  8. Her blood pressure was elevated today. She is asymptomatic. She will discuss with PCP regarding this. Goal BP less than 120/80  9. Advised her to see a cardiologist or vascular surgeon through PCP due to high-grade stenosis of right subclavian artery.  10. Advised her to see a stroke specialist at the [facility] in [place] due to history of moyamoya disease...Old infarcts in right frontal and left occipital area. She had severe stenosis in right intracranial ICA. She has history of right EC/IC anastomosis. Advised her to see a stroke specialist at [facility]..Most likely tension headaches. I explained to her that I do not have any further medications that can help her headaches. She has previously tried  gabapentin, pregabalin, duloxetine and carbamazepine ..She underwent CT angio of head and neck at [facility]. I had [facility] but she did not. She did not bring the images. I gave her the printout of the report and discussed the impression with her. There were no acute changes. There were chronic ischemic changes. There is history of right EC/IC bypass. There is high-grade stenosis of right subclavian. There is high-grade stenosis of left vertebral artery. I had lengthy discussion with her regarding this..She continues to have neck pain. She is followed by pain clinic. She is planning to undergo injection with them. She states that she has to be off aspirin for 3 days prior through this procedure. She is also undergoing tooth work and her dentist wants her to be off aspirin as well. I have advised her that she should not be off more than 3 days. I also discussed with her that whenever she is off the medication there is a risk of stroke. She verbalized understanding.   For her headaches she has previously tried gabapentin, duloxetine and carbamazepine without any help.  She occasionally feels lightheaded. There have not been any recent falls or injuries.  She has history of left-sided weakness. She does not have any numbness. She has blurred vision. She does not have any speech or swallowing difficulties. She does not have any memory difficulty.  I answered all her questions .    \"    [place] neurosurgery note from 3/1/16 \"Neurosurgery Clinic Note    45 y F who is s/p R sided strokes and R EC-IC bypass in 2014 at OS in [place]. Has had angio demonstrating L A1 stenosis (approx 60%) and left A1 is around 60%, and significant stenosis of the vertebral arteries at their origin. She has been followed by Dr. Moser, last seen in 7/2015. At that time she underwent CT perfusion which appears stable and demonstrates decreased cerebral blood flow distribution of the anterior R MCA compared  to the left with delayed MTT on the left and relatively symmetric CBV. She reports ongoing HA on R side which have been worsening. She has blurry vision and eye tearing which she feels have also become more bothersome. Recently hospitalized in [place] in January for , and was treated and released. Received \"migraine medicine\" while there which did not help HA. She denies n/v, new weakness, new sensory symptoms, gait changes. She does endorse short term memory deficits which her sister feels are worse over past year.    Exam:  NAD, AO3  PERRL, EOMi, FS, TM  Slow blinking  L drift  LUE/LLE 4+, otherwise full  Slow to initiate movements, but follows complex commands  Old incision well healed    Imaging: MRI brain from 10/2015 reviewed, demonstrates stable old infarct, no acute changes.    A/P: 45 y F s/p R ECIC bypass at OSH with known L A1 stenosis and b/l vertebral artery stenosis at origins.    - Neurologically stable  - Will repeat CT perfusion 1 year from prior (6/2016)  - Neuropsych testing  - Referral to neurology for HA evaluation.\"    ROS:  Pertinent positive and negatives per HPI.  All others were reviewed and negative.    Past Medical History:    Age-related nuclear cataract of both eyes    Anemia    Apnea    Cataract    Coronary atherosclerosis    Depression    DM type 2 (diabetes mellitus, type 2) (HCC)    Esophageal reflux    High blood pressure    High cholesterol    Meibomian gland dysfunction (MGD), bilateral, both upper and lower lids    Migraine    Muscle weakness    left sided weakness uses walker and cane    Seizures (HCC)    Sleep apnea    Stenosis of right vertebral artery    Stroke (HCC)    Type II or unspecified type diabetes mellitus without mention of complication, not stated as uncontrolled       Past Surgical History:   Procedure Laterality Date    Brain surgery  2014    Colonoscopy N/A 8/25/2017    Procedure: COLONOSCOPY;  Surgeon: Sharmaine Burks MD;  Location: [facility]  ENDOSCOPY    Colonoscopy      Colonoscopy N/A 2022    Procedure: COLONOSCOPY with Polypectomy;  Surgeon: Terry Weaver MD;  Location: [facility] ENDOSCOPY    Excise carotid body+carotid artery  2017    Incision and drainage Right 16    lower abdominal skin    Laparoscopic cholecystectomy  2002       Family History   Problem Relation Age of Onset    Diabetes Father     Hypertension Father     Heart Disorder Father     Lipids Father     Obesity Father     Colon Cancer Father     Heart Disorder Mother     Lipids Mother     Obesity Mother     Lipids Brother     Obesity Brother     Glaucoma Neg     Macular degeneration Neg        Social History     Socioeconomic History    Marital status: Legally      Spouse name: Not on file    Number of children: 3    Years of education: Not on file    Highest education level: Not on file   Occupational History    Occupation:      Comment: disabled    Occupation: phlebotomy   Tobacco Use    Smoking status: Former     Current packs/day: 0.00     Average packs/day: 0.2 packs/day for 1 year (0.2 ttl pk-yrs)     Types: Cigarettes     Start date: 2012     Quit date: 2013     Years since quittin.2    Smokeless tobacco: Never   Vaping Use    Vaping status: Never Used   Substance and Sexual Activity    Alcohol use: No     Alcohol/week: 0.0 standard drinks of alcohol    Drug use: No    Sexual activity: Not Currently   Other Topics Concern    Not on file   Social History Narrative    Not on file     Social Drivers of Health     Food Insecurity: Food Insecurity Present (3/1/2025)    NCSS - Food Insecurity     Worried About Running Out of Food in the Last Year: No     Ran Out of Food in the Last Year: Yes   Transportation Needs: Unmet Transportation Needs (3/1/2025)    NCSS - Transportation     Lack of Transportation: Yes   Stress: No Stress Concern Present (2023)    Stress     Feeling of Stress : No   Housing Stability: Not At Risk (3/1/2025)     NCSS - Housing/Utilities     Has Housing: Yes     Worried About Losing Housing: No     Unable to Get Utilities: No       Objective:    Last vitals and weight :  Vitals:    03/01/25 0600   BP: 117/72   Pulse: 91   Resp: 14   Temp:      @FLOWCHS(14)@    Exam:  - General: appears stated age and no distress  - CV: Symmetric pulses   Carotids:   - Pulmonary: No sign of respiratory distress.   Neurologic Exam  - Mental Status: Alert and attentive.  Oriented x 4..  Speech is spontaneous, fluent, and prosodic. Comprehension intact. Repetition intact. Phrase length and rate are normal. No paraphasic errors, neologisms, anomia, acalculia, apraxia, anosognosia, or R/L confusion. No neglect.   - Cranial Nerves: No gaze preference. Visual fields:normal Pupils are 4mm briskly constricting to 3mm and equally round and reactive to light  in a well lit room.  EOMI. No nystagmus. No ptosis. V1-V3 intact B/L to light touch.No pathological facial asymmetry. No flattening of the nasolabial fold. .  Hearing grossly intact.  Tongue midline. No atrophy or fasiculations of the tongue noted. Palate and uvula elevate symmetrically.  Shoulder shrug symmetric.  - Fundoscopic exam:normal w/o hemorrhages, exudates, or papilledema.No attenuation. No pallor.  - Motor:  normal tone, normal bulk. No interosseous wasting. No flattening of hypothenar eminences.       Right Left     Motor Strength   Deltoids 5 5  Triceps 5 5  Biceps 5 5  Wrist Extensors 5 5   5 5      Knee extensors 5 5         Pronator drift: bilateral arm drifts down before 10 seconds.  They did not hit the bed.   Arm Rolling: No orbiting.   Finger Taps: Finger taps are symmetric in rate and amplitude.    Rapid movements: Rapid/fine movements are symmetric. As expected their dominant hand is slightly faster.   Leg Drift: none   Foot Taps: Foot taps are symmetric.      Asterixis: No asterixis noted.   Tremor:      Reflexes:    C5 C6 C7  L4 S1   R 2+ 2+  2+ 2+   L 2+ 2+  2+ 2+    Adductor Spread: No adductor spread noted.    Frontal release signs:Not assessed.    Jaw Jerk:    Garfield's sign:absent   Nonsustained clonus: Absent   Sustained clonus: Absent   - Sensory:   Light touch: Decreased in left arm and leg  Temperature: Decreased in left arm and leg  Pinprick:   Vibration:  Decreased in left arm and leg  Proprioception:      Sensory level:      Romberg:   - Cerebellum: No truncal ataxia. No titubations. No dysmetria, no dysdiadochokinesis. No overshoot.        NIH Stroke Scale  Person Administering Scale: Paddy Craig DO  1a  Level of consciousness: 0 = Alert keenly responsive    1b. LOC questions:  0 = Answers both questions correctly     1c. LOC commands: 0 = Performs both tasks correctly    2.  Best Gaze: 0 = Normal    3.  Visual: 0 = No visual loss     4. Facial Palsy: 0 = Normal symmetrical movement     5a.  Motor left arm: 1 = Drift, limb holds 90º(or 45º) but drifts down before full 10 seconds but does not hit bed or other support 2 = Some effort against gravity, limb cannot get to or maintain (if cued 90ºor 45º) drifts down to bed, but has some effort against gravity. 3 = No effort against gravity, limb falls 4 = No movement   5b.  Motor right arm: 1 = Drift, limb holds 90º(or 45º) but drifts down before full 10 seconds but does not hit bed or other support 2 = Some effort against gravity, limb cannot get to or maintain (if cued 90ºor 45º) drifts down to bed, but has some effort against gravity. 3 = No effort against gravity, limb falls 4 = No movement   6a. motor left le = No drift; leg holds 30º for full 5 seconds     6b  Motor right le = No drift; leg holds 30º for full 5 seconds     7. Limb Ataxia: 0 = Absent     8.  Sensory: 1 = Mild/moderate sensory loss; may be dulled/\"Not as sharp\"     9. Best Language:  0 = No aphasia, normal      10. Dysarthria: 0 = Normal articulation   11. Extinction and Inattention: 0 = No abnormality     Total:   3     Modified vikas  scale score:2    Inpatient Medications   amLODIPine  10 mg Oral Daily    aspirin  325 mg Oral Daily    atorvastatin  80 mg Oral Nightly    escitalopram  10 mg Oral Daily    gabapentin  100 mg Oral TID    hydrALAZINE  100 mg Oral Q8H PABLO    levETIRAcetam  500 mg Oral BID    losartan  100 mg Oral Daily    metoprolol succinate ER  100 mg Oral Daily    pantoprazole  40 mg Oral QAM AC    furosemide  40 mg Intravenous Daily    metoprolol tartrate  12.5 mg Oral 2x Daily(Beta Blocker)    insulin   Subcutaneous TID AC and HS          ondansetron    acetaminophen    ipratropium-albuterol    zolpidem    alum-mag hydroxide-simethicone    labetalol    glucose **OR** glucose **OR** glucose-vitamin C **OR** dextrose **OR** glucose **OR** glucose **OR** glucose-vitamin C    nitroglycerin    HYDROcodone-acetaminophen      Home Medications  Current Outpatient Medications   Medication Instructions    Accu-Chek FastClix Lancets Does not apply Misc Use to check blood sugar three times a day    Alcohol Swabs (DROPSAFE ALCOHOL PREP) 70 % Does not apply Pads USE AS DIRECTED    amLODIPine (NORVASC) 10 mg, Oral, Daily    ammonium lactate (AMLACTIN DAILY) 12 % External Lotion 1 Application, Topical, As needed    aspirin 325 mg, Daily    atorvastatin (LIPITOR) 80 mg, Oral, Nightly    benzonatate 100 MG Oral Cap     Blood Glucose Monitoring Suppl (ACCU-CHEK GUIDE) w/Device Does not apply Kit Use to check blood sugar three times a day    Blood Glucose Monitoring Suppl (ONETOUCH ULTRA 2) w/Device Does not apply Kit Please provide what is covered by patient's insurance    Blood Glucose Monitoring Suppl (TRUE METRIX METER) w/Device Does not apply Kit Use to check blood sugar four times a day    Continuous Glucose Sensor (DEXCOM G7 SENSOR) Does not apply Misc 1 each, Does not apply, Every 10 days    ergocalciferol (VITAMIN D2) 50,000 Units, Oral, Weekly    escitalopram (LEXAPRO) 10 mg, Oral, Daily    fluticasone propionate 50 MCG/ACT Nasal  Suspension 2 sprays, Nasal, Daily    gabapentin (NEURONTIN) 100 mg, Oral, 3 times daily    Glucose Blood (ACCU-CHEK GUIDE) In Vitro Strip Use to check blood sugar three times a day    Glucose Blood (ONETOUCH ULTRA) In Vitro Strip Please provide test strips that are covered by patient's insurance.    Glucose Blood (TRUE METRIX BLOOD GLUCOSE TEST) In Vitro Strip Use to check blood sugar three times a day    Glucose Blood (TRUE METRIX BLOOD GLUCOSE TEST) In Vitro Strip Check blood sugar four times a day    hydrALAZINE (APRESOLINE) 100 mg, Oral, 2 times daily    insulin aspart 100 Units/mL Injection Solution Inject via insulin pump as directed. Max daily dose 75 units    Insulin Pen Needle (BD PEN NEEDLE ABDOULAYE 2ND GEN) 32G X 4 MM Does not apply Misc USE FOUR TIMES DAILY AS DIRECTED    levETIRAcetam (KEPPRA) 500 mg, Oral, 2 times daily    losartan (COZAAR) 100 mg, Oral, Daily    metoprolol succinate ER (TOPROL XL) 100 mg, Oral, Daily    metoprolol succinate ER (TOPROL XL) 25 mg, Oral, Nightly    Omeprazole 40 MG Oral Capsule Delayed Release TAKE 1 CAPSULE EVERY DAY 30 TO 60 MINUTES BEFORE A MEAL    OneTouch Delica Lancets 33G Does not apply Misc 1 each, 4 times daily    prednisoLONE 1 % Ophthalmic Suspension 1 drop, 4 times daily    pregabalin (LYRICA) 75 mg, Oral, 2 times daily    torsemide (DEMADEX) 10 mg, Oral, Daily    TRUEplus Lancets 33G Does not apply Misc USE TO CHECK BLOOD SUGAR FOUR TIMES DAILY    VENTOLIN  (90 Base) MCG/ACT Inhalation Aero Soln 2 puffs, Inhalation, Every 6 hours PRN        Data reviewed  Laboratory Data:  Lab Results   Component Value Date    WBC 16.8 (H) 03/01/2025    HGB 11.2 (L) 03/01/2025    HCT 36.7 03/01/2025    .0 03/01/2025    CREATSERUM 0.79 03/01/2025    BUN 13 03/01/2025     03/01/2025    K 5.1 03/01/2025     03/01/2025    CO2 25.0 03/01/2025     (H) 03/01/2025    CA 8.5 (L) 03/01/2025    ALB 3.8 03/01/2025    ALKPHO 87 03/01/2025    TP 6.5 03/01/2025     AST 12 03/01/2025    ALT 9 (L) 03/01/2025    PTT 28.4 03/01/2025    INR 1.0 08/31/2017    PTP 12.5 08/31/2017    T4F 0.66 08/20/2018    TSH 1.386 02/17/2025    LIP 19 05/24/2023    DDIMER 0.36 02/28/2025    ESRML 19 08/20/2018    PHOS 3.7 02/17/2025    TROP 0.03 06/27/2017    B12 580 05/26/2021    POCGLU 224 03/07/2024     Recent Results (from the past 72 hours)   EKG 12 Lead    Collection Time: 02/28/25  7:38 PM   Result Value Ref Range    Ventricular rate 76 BPM    Atrial rate 76 BPM    P-R Interval 136 ms    QRS Duration 126 ms    Q-T Interval 434 ms    QTC Calculation (Bezet) 488 ms    P Axis 34 degrees    R Axis 13 degrees    T Axis 12 degrees   Basic Metabolic Panel (8)    Collection Time: 02/28/25 10:06 PM   Result Value Ref Range    Glucose 78 70 - 99 mg/dL    Sodium 141 136 - 145 mmol/L    Potassium 3.8 3.5 - 5.1 mmol/L    Chloride 107 98 - 112 mmol/L    CO2 26.0 21.0 - 32.0 mmol/L    Anion Gap 8 0 - 18 mmol/L    BUN 12 9 - 23 mg/dL    Creatinine 0.75 0.55 - 1.02 mg/dL    BUN/CREA Ratio 16.0 10.0 - 20.0    Calcium, Total 9.4 8.7 - 10.4 mg/dL    Calculated Osmolality 291 275 - 295 mOsm/kg    eGFR-Cr 93 >=60 mL/min/1.73m2   CBC With Differential With Platelet    Collection Time: 02/28/25 10:06 PM   Result Value Ref Range    WBC 10.2 4.0 - 11.0 x10(3) uL    RBC 5.39 (H) 3.80 - 5.30 x10(6)uL    HGB 12.5 12.0 - 16.0 g/dL    HCT 41.1 35.0 - 48.0 %    MCV 76.3 (L) 80.0 - 100.0 fL    MCH 23.2 (L) 26.0 - 34.0 pg    MCHC 30.4 (L) 31.0 - 37.0 g/dL    RDW-SD 44.5 35.1 - 46.3 fL    RDW 16.4 (H) 11.0 - 15.0 %    .0 150.0 - 450.0 10(3)uL    Neutrophil Absolute Prelim 6.22 1.50 - 7.70 x10 (3) uL    Neutrophil Absolute 6.22 1.50 - 7.70 x10(3) uL    Lymphocyte Absolute 3.02 1.00 - 4.00 x10(3) uL    Monocyte Absolute 0.74 0.10 - 1.00 x10(3) uL    Eosinophil Absolute 0.13 0.00 - 0.70 x10(3) uL    Basophil Absolute 0.06 0.00 - 0.20 x10(3) uL    Immature Granulocyte Absolute 0.02 0.00 - 1.00 x10(3) uL    Neutrophil %  61.0 %    Lymphocyte % 29.6 %    Monocyte % 7.3 %    Eosinophil % 1.3 %    Basophil % 0.6 %    Immature Granulocyte % 0.2 %   Troponin I (High Sensitivity)    Collection Time: 02/28/25 10:06 PM   Result Value Ref Range    Troponin I (High Sensitivity) 69 (HH) <=34 ng/L   Pro Beta Natriuretic Peptide    Collection Time: 02/28/25 10:06 PM   Result Value Ref Range    Pro-Beta Natriuretic Peptide 274 (H) <125 pg/mL   D-Dimer    Collection Time: 02/28/25 10:06 PM   Result Value Ref Range    D-Dimer 0.36 <0.56 ug/mL FEU   Lipid Panel    Collection Time: 02/28/25 10:06 PM   Result Value Ref Range    Cholesterol, Total 128 <200 mg/dL    HDL Cholesterol 32 (L) 40 - 59 mg/dL    Triglycerides 109 30 - 149 mg/dL    LDL Cholesterol 76 <100 mg/dL    VLDL 17 0 - 30 mg/dL    Non HDL Chol 96 <130 mg/dL   EKG 12 Lead    Collection Time: 03/01/25 12:20 AM   Result Value Ref Range    Ventricular rate 90 BPM    Atrial rate 90 BPM    P-R Interval 142 ms    QRS Duration 116 ms    Q-T Interval 410 ms    QTC Calculation (Bezet) 501 ms    P Axis 42 degrees    R Axis 50 degrees    T Axis 3 degrees   Troponin I (High Sensitivity)    Collection Time: 03/01/25 12:39 AM   Result Value Ref Range    Troponin I (High Sensitivity) 59 (HH) <=34 ng/L   POCT Glucose    Collection Time: 03/01/25 12:50 AM   Result Value Ref Range    POC Glucose  100 (H) 70 - 99 mg/dL   CBC With Differential With Platelet    Collection Time: 03/01/25 12:56 AM   Result Value Ref Range    WBC 9.4 4.0 - 11.0 x10(3) uL    RBC 5.03 3.80 - 5.30 x10(6)uL    HGB 11.8 (L) 12.0 - 16.0 g/dL    HCT 38.2 35.0 - 48.0 %    MCV 75.9 (L) 80.0 - 100.0 fL    MCH 23.5 (L) 26.0 - 34.0 pg    MCHC 30.9 (L) 31.0 - 37.0 g/dL    RDW-SD 44.0 35.1 - 46.3 fL    RDW 16.3 (H) 11.0 - 15.0 %    .0 150.0 - 450.0 10(3)uL    Neutrophil Absolute Prelim 3.40 1.50 - 7.70 x10 (3) uL    Neutrophil Absolute 3.40 1.50 - 7.70 x10(3) uL    Lymphocyte Absolute 5.30 (H) 1.00 - 4.00 x10(3) uL    Monocyte Absolute  0.60 0.10 - 1.00 x10(3) uL    Eosinophil Absolute 0.06 0.00 - 0.70 x10(3) uL    Basophil Absolute 0.03 0.00 - 0.20 x10(3) uL    Immature Granulocyte Absolute 0.04 0.00 - 1.00 x10(3) uL    Neutrophil % 36.1 %    Lymphocyte % 56.2 %    Monocyte % 6.4 %    Eosinophil % 0.6 %    Basophil % 0.3 %    Immature Granulocyte % 0.4 %   Comp Metabolic Panel (14)    Collection Time: 03/01/25 12:56 AM   Result Value Ref Range    Glucose 86 70 - 99 mg/dL    Sodium 140 136 - 145 mmol/L    Potassium 3.6 3.5 - 5.1 mmol/L    Chloride 106 98 - 112 mmol/L    CO2 25.0 21.0 - 32.0 mmol/L    Anion Gap 9 0 - 18 mmol/L    BUN 12 9 - 23 mg/dL    Creatinine 0.74 0.55 - 1.02 mg/dL    BUN/CREA Ratio 16.2 10.0 - 20.0    Calcium, Total 9.0 8.7 - 10.4 mg/dL    Calculated Osmolality 289 275 - 295 mOsm/kg    eGFR-Cr 95 >=60 mL/min/1.73m2    ALT 9 (L) 10 - 49 U/L    AST 12 <34 U/L    Alkaline Phosphatase 87 46 - 118 U/L    Bilirubin, Total 0.5 0.3 - 1.2 mg/dL    Total Protein 6.5 5.7 - 8.2 g/dL    Albumin 3.8 3.2 - 4.8 g/dL    Globulin  2.7 2.0 - 3.5 g/dL    A/G Ratio 1.4 1.0 - 2.0   Scan slide    Collection Time: 03/01/25 12:56 AM   Result Value Ref Range    Slide Review See MD blood smear consultation.    MD BLOOD SMEAR CONSULT    Collection Time: 03/01/25 12:56 AM   Result Value Ref Range    MD Blood Smear Consult         Evaluation of CBC with differential data and the peripheral blood smear demonstrates microcytic anemia with normal absolute RBC count, and absolute lymphocytosis.     Red blood cells are overall normochromic and show anisopoikilocytosis, including microcytes and ovalocytes.    Lymphocytes are mildly increased and are mostly small to intermediate in size with occasional reactive forms, including large granular lymphocytes.    No significant morphologic abnormalities are seen for the other leukocyte subsets or platelets.      Overall, the differential considerations for the finding of a lymphocytosis consisting of mature-appearing  cells includes infection (most commonly viral. others), drug effects, transient stress, chronic inflammatory/autoimmune disorders, and possibly manifestations of a lymphoproliferative disorder (such as chronic lymphocytic leukemia, other) vs. non-Hodgkin lymphoma.     Main differential causes for an anemia of this type may include iron deficiency (most often  due to GI or /GYNE blood loss), some hemoglobinopathies (most commonly thalassemia minor, others), sideroblastic anemias and anemia of chronic disease.      Recommend clinical correlation and correlation with serum iron studies. If the results of the iron studies are unremarkable then hemoglobin electrophoresis may be helpful to investigate for possible hemoglobinopathy.     Reviewed by Shane Schwab M.D.       EKG 12 Lead    Collection Time: 03/01/25  2:08 AM   Result Value Ref Range    Ventricular rate 109 BPM    Atrial rate 109 BPM    P-R Interval 154 ms    QRS Duration 124 ms    Q-T Interval 372 ms    QTC Calculation (Bezet) 500 ms    P Axis 45 degrees    R Axis 114 degrees    T Axis 16 degrees   Troponin I (High Sensitivity)    Collection Time: 03/01/25  2:20 AM   Result Value Ref Range    Troponin I (High Sensitivity) 61 (HH) <=34 ng/L   POCT Glucose    Collection Time: 03/01/25  3:06 AM   Result Value Ref Range    POC Glucose  132 (H) 70 - 99 mg/dL   POCT Glucose    Collection Time: 03/01/25  3:31 AM   Result Value Ref Range    POC Glucose  131 (H) 70 - 99 mg/dL   EKG    Collection Time: 03/01/25  3:37 AM   Result Value Ref Range    Ventricular rate 110 BPM    Atrial rate 110 BPM    P-R Interval 158 ms    QRS Duration 124 ms    Q-T Interval 374 ms    QTC Calculation (Bezet) 506 ms    P Axis 43 degrees    R Axis 131 degrees    T Axis 6 degrees   Troponin I (High Sensitivity)    Collection Time: 03/01/25  4:43 AM   Result Value Ref Range    Troponin I (High Sensitivity) 180 (HH) <=34 ng/L   Basic Metabolic Panel (8)    Collection Time: 03/01/25  4:43 AM    Result Value Ref Range    Glucose 119 (H) 70 - 99 mg/dL    Sodium 141 136 - 145 mmol/L    Potassium 3.4 (L) 3.5 - 5.1 mmol/L    Chloride 108 98 - 112 mmol/L    CO2 24.0 21.0 - 32.0 mmol/L    Anion Gap 9 0 - 18 mmol/L    BUN 12 9 - 23 mg/dL    Creatinine 0.80 0.55 - 1.02 mg/dL    BUN/CREA Ratio 15.0 10.0 - 20.0    Calcium, Total 8.3 (L) 8.7 - 10.4 mg/dL    Calculated Osmolality 293 275 - 295 mOsm/kg    eGFR-Cr 86 >=60 mL/min/1.73m2   CBC With Differential With Platelet    Collection Time: 03/01/25  4:43 AM   Result Value Ref Range    WBC 17.8 (H) 4.0 - 11.0 x10(3) uL    RBC 4.61 3.80 - 5.30 x10(6)uL    HGB 10.9 (L) 12.0 - 16.0 g/dL    HCT 35.5 35.0 - 48.0 %    MCV 77.0 (L) 80.0 - 100.0 fL    MCH 23.6 (L) 26.0 - 34.0 pg    MCHC 30.7 (L) 31.0 - 37.0 g/dL    RDW-SD 44.8 35.1 - 46.3 fL    RDW 16.2 (H) 11.0 - 15.0 %    .0 150.0 - 450.0 10(3)uL    Neutrophil Absolute Prelim 16.56 (H) 1.50 - 7.70 x10 (3) uL    Neutrophil Absolute 16.56 (H) 1.50 - 7.70 x10(3) uL    Lymphocyte Absolute 0.84 (L) 1.00 - 4.00 x10(3) uL    Monocyte Absolute 0.27 0.10 - 1.00 x10(3) uL    Eosinophil Absolute 0.00 0.00 - 0.70 x10(3) uL    Basophil Absolute 0.03 0.00 - 0.20 x10(3) uL    Immature Granulocyte Absolute 0.11 0.00 - 1.00 x10(3) uL    Neutrophil % 93.0 %    Lymphocyte % 4.7 %    Monocyte % 1.5 %    Eosinophil % 0.0 %    Basophil % 0.2 %    Immature Granulocyte % 0.6 %   POCT Glucose    Collection Time: 03/01/25  6:39 AM   Result Value Ref Range    POC Glucose  120 (H) 70 - 99 mg/dL   EKG 12 Lead    Collection Time: 03/01/25  7:06 AM   Result Value Ref Range    Ventricular rate 95 BPM    Atrial rate 95 BPM    P-R Interval 152 ms    QRS Duration 128 ms    Q-T Interval 424 ms    QTC Calculation (Bezet) 532 ms    P Axis 47 degrees    R Axis 28 degrees    T Axis 12 degrees   Basic Metabolic Panel (8)    Collection Time: 03/01/25  9:15 AM   Result Value Ref Range    Glucose 128 (H) 70 - 99 mg/dL    Sodium 140 136 - 145 mmol/L     Potassium 5.1 3.5 - 5.1 mmol/L    Chloride 108 98 - 112 mmol/L    CO2 25.0 21.0 - 32.0 mmol/L    Anion Gap 7 0 - 18 mmol/L    BUN 13 9 - 23 mg/dL    Creatinine 0.79 0.55 - 1.02 mg/dL    BUN/CREA Ratio 16.5 10.0 - 20.0    Calcium, Total 8.5 (L) 8.7 - 10.4 mg/dL    Calculated Osmolality 292 275 - 295 mOsm/kg    eGFR-Cr 88 >=60 mL/min/1.73m2   CBC, Platelet; No Differential    Collection Time: 03/01/25  9:15 AM   Result Value Ref Range    WBC 16.8 (H) 4.0 - 11.0 x10(3) uL    RBC 4.68 3.80 - 5.30 x10(6)uL    HGB 11.2 (L) 12.0 - 16.0 g/dL    HCT 36.7 35.0 - 48.0 %    MCV 78.4 (L) 80.0 - 100.0 fL    MCH 23.9 (L) 26.0 - 34.0 pg    MCHC 30.5 (L) 31.0 - 37.0 g/dL    RDW 16.1 (H) 11.0 - 15.0 %    RDW-SD 46.3 35.1 - 46.3 fL    .0 150.0 - 450.0 10(3)uL   PTT, Activated    Collection Time: 03/01/25  9:15 AM   Result Value Ref Range    PTT 28.4 23.0 - 36.0 seconds   Troponin I (High Sensitivity)    Collection Time: 03/01/25  9:15 AM   Result Value Ref Range    Troponin I (High Sensitivity) 1,324 (HH) <=34 ng/L     Lab Results   Component Value Date    LDL 76 02/28/2025    HDL 32 02/28/2025    TRIG 109 02/28/2025    VLDL 17 02/28/2025     HGBA1C:   Lab Results   Component Value Date    A1C 9.2 (A) 02/11/2025    A1C 10.6 (A) 09/23/2024    A1C 10.2 (A) 05/23/2024     (H) 05/23/2024        Test results/Imaging:   XR CHEST AP PORTABLE  (CPT=71045)    Result Date: 3/1/2025  CONCLUSION:   Mild cardiomegaly with interstitial pulmonary edema.  A preliminary report was issued by the [facility]eradiology [facility]. There are no clinically actionable discrepancies.    Dictated by (CST): Antonieta Hart MD on 3/01/2025 at 9:48 AM     Finalized by (CST): Antonieta Hart MD on 3/01/2025 at 9:49 AM          CTA BRAIN + CTA CAROTIDS (CPT=70496/86312)    Result Date: 3/1/2025  CONCLUSION:   No acute intracranial hemorrhage, hydrocephalus, or mass effect.  If there is clinical concern for acute ischemic stroke, MRI of the  brain is recommended.  Unchanged complete or near complete occlusion of the left V1 vertebral artery, with additional irregularity of the V2 segment.  Findings are concerning for age chronic vertebral artery dissection.  Right external to internal carotid artery bypass.  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  Retrograde reconstitution of flow in the distal intracranial ICA, similar to prior.  Chronic hypoenhancement of the left ZARA.  Otherwise, No large vessel occlusion involving the major arterial branches of the [id number].  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  Moderate narrowing at the right subclavian artery origin.  Correlate for subclavian steal.  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  Small subcentimeter lucent lesions throughout the calvarium, nonspecific.  Recommend correlation with clinical/laboratory findings for myelomatous process.  A preliminary report was issued by the [facility] Radiology teleradiology service. There are no clinically actionable discrepancies.  Report was initially discussed with Dr. Staley At 1:55 a.m. Eastern time by Dr. Rojas    Dictated by (CST): Antonieta Hart MD on 3/01/2025 at 8:24 AM     Finalized by (CST): Antonieta Hart MD on 3/01/2025 at 8:39 AM          XR CHEST AP PORTABLE  (CPT=71045)    Result Date: 3/1/2025  CONCLUSION:   Increased interstitial markings bilaterally, which may be secondary to interstitial pulmonary edema or atypical/viral pneumonia in the appropriate clinical setting.    A preliminary report was issued by the [facility] [facility] [employer] [facility] Radiology teleradiology service. There are no clinically actionable discrepancies.  Dictated by (CST): Antonieta Hart MD on 3/01/2025 at 6:27 AM     Finalized by (CST): Antonieta Hart MD on 3/01/2025 at 6:28 AM         EKG 12 Lead    Result Date: 3/1/2025  Normal sinus rhythm Right bundle branch block Abnormal ECG When compared with ECG  of 01-MAR-2025 03:37, The axis Shifted left ST no longer depressed in Anterior-lateral leads T wave inversion no longer evident in Anterior leads    EKG    Result Date: 3/1/2025  Sinus tachycardia Indeterminate axis Right bundle branch block Marked ST abnormality, possible inferior subendocardial injury Marked ST abnormality, possible anterolateral subendocardial injury Abnormal ECG When compared with ECG of 01-MAR-2025 02:08, No significant change was found    EKG 12 Lead    Result Date: 3/1/2025  Sinus tachycardia Right bundle branch block Marked ST abnormality, possible inferior subendocardial injury Marked ST abnormality, possible anterolateral subendocardial injury Abnormal ECG When compared with ECG of 01-MAR-2025 00:20, The axis Shifted right ST more depressed in Anterior-lateral leads    EKG 12 Lead    Result Date: 3/1/2025  Normal sinus rhythm Right bundle branch block Abnormal ECG When compared with ECG of 28-FEB-2025 19:38, ST now depressed in Anterior leads    EKG 12 Lead    Result Date: 3/1/2025  Normal sinus rhythm Right bundle branch block Abnormal ECG When compared with ECG of 20-AUG-2024 13:13, QT has lengthened   CTA BRAIN + CTA CAROTIDS (CPT=70496/28648)    Result Date: 3/1/2025  CONCLUSION:   No acute intracranial hemorrhage, hydrocephalus, or mass effect.  If there is clinical concern for acute ischemic stroke, MRI of the brain is recommended.  Unchanged complete or near complete occlusion of the left V1 vertebral artery, with additional irregularity of the V2 segment.  Findings are concerning for age chronic vertebral artery dissection.  Right external to internal carotid artery bypass.  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  Retrograde reconstitution of flow in the distal intracranial ICA, similar to prior.  Chronic hypoenhancement of the left ZARA.  Otherwise, No large vessel occlusion involving the major arterial branches of the [id number].  Overall aforementioned  findings can be related to sequela of moyamoya disease.  Moderate narrowing at the right subclavian artery origin.  Correlate for subclavian steal.  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  Small subcentimeter lucent lesions throughout the calvarium, nonspecific.  Recommend correlation with clinical/laboratory findings for myelomatous process.  A preliminary report was issued by the [facility] Radiology teleradiology service. There are no clinically actionable discrepancies.  Report was initially discussed with Dr. Staley At 1:55 a.m. Eastern time by Dr. Rojas    Dictated by (CST): Antonieta Hrat MD on 3/01/2025 at 8:24 AM     Finalized by (CST): Antonieta Hart MD on 3/01/2025 at 8:39 AM          CTA BRAIN + CTA CAROTIDS (CPT=70496/95919)    Result Date: 1/24/2025  CONCLUSION:   1. No acute intracranial abnormality identified.  2. Small area of encephalomalacia along the posterior right temporal lobe.  Medium-sized area of encephalomalacia in the right frontal lobe.  Small area of encephalomalacia in the left parietal lobe.  Mild age-indeterminate microvascular ischemic changes  in the cerebral white matter. If there is clinical concern for acute ischemia/infarction, an MRI of the brain would be recommended for further evaluation.  3. There are postoperative changes along the right carotid bifurcation.  There also changes of a previous right pterional craniotomy with a branch of the right external carotid artery extending to the surface of the right cerebrum in the region of the right middle cerebral artery branches.  4. The right cervical internal carotid artery is occluded with reconstitution in the region of the mid cavernous segment.  There is moderate to severe mixed plaque in the cavernous and supraclinoid segments of the internal carotid arteries which are diminutive in caliber with areas of moderate to severe stenosis of greater than 75%.  There are wispy branches of the  bilateral anterior cerebral artery A1 segments as well as markedly diminutive caliber of the remainder of the left anterior cerebral artery.  These findings may all be within the spectrum of moyamoya disease, with other etiologies not entirely excluded.  Clinical correlation recommended.  5. The right subclavian artery origin is obscured by beam hardening from the contrast bolus, however underlying high-grade stenosis is not excluded.  Clinical correlation for a signs or symptoms of subclavian steal is suggested.  This could be further assessed with ultrasound Doppler exam as clinically directed.  6. There is high-grade stenosis and/or occlusion of the origin and proximal V1 segment of the left vertebral artery which is somewhat limited in evaluation due to beam hardening from body habitus.  This could be further assessed with conventional angiography as clinically directed.  There is moderate atherosclerotic irregularity and narrowing in the distal V1 and proximal V2 segments of the left vertebral artery.    Please see above for further details.  The radiology support staff is in the process of contacting the referring physician regarding this report.   LOCATION:  [facility]   Dictated by (CST): Pee Willis MD on 1/24/2025 at 3:24 PM     Finalized by (CST): Pee Willis MD on 1/24/2025 at 3:41 PM        Performed an independent visualization of  cta head and neck   Imaging revealed:   agree w/ read          Anjel Pisano is a 54 year old woman  w/ a pmhx sig. for R sided strokes and R EC-IC bypass in 2014 at OSH in [place], intracranial atherosclerosis, chronic left vertebral artery stenosis, moderate narrowing of the right subclavian artery,  intractable headaches, seizures on keppra, HTN, HLD, and DM who  p/w 3 days of fluctuating chest pain and difficulty breathing with radiation to the arm found to have elevated troponins (in the setting of chronically elevated troponins) who is being seen by neurology after  patient became diaphoretic, panicked, intermittently aphasic, and hypotensive to the 60s over 40s after she was given 20 mg of IV hydralazine for blood pressures in the 210s over 70s.    Deficits she reports are likely chronic from her prior right hemisphere stroke.  Repeat MRI brain is pending.  She also has had chronic headaches for 2 years per chart review.  Only medication she can really get of a headache cocktail is Depakote.  Avoid Benadryl because it caused hypotension.  Avoid Toradol because of her history of stroke.  Avoid Reglan without use of Benadryl.  Otherwise  will continue home medications for headache.       Acute intermittent aphasia In the setting of hypotension known moyamoya with right ECA ICA bypass, intracranial atherosclerosis, and chronic left vertebral artery stenosis  Differential Diagnosis:   Secondary to acute hypotension  Diagnostics:  CTA head and neck    Therapeutics:  Neuro checks Q4.    MAP  > 65   at a minimum  SBP > 90.  Reperfusion therapy eligibility: patient is not eligible because: out of the  time window  not a candidate for thrombectomy because no large vessel occlusion  Agent and time of first antithrombotic administration (or contraindication):   Cont   BP goal:   MAP greater than 65.  SBP greater than 90.  Additional brain and vascular imaging ordered:   CTA head/neck  and CT stroke brain  ; MRI brain to evaluate for any interval infarcts  Cardiac imaging: Telemetry   Additional labs ordered:   Labs: Fasting lipid panel and HgbA1C; troponins per cardiology  Dysphagia status:   DysphagiaNo acute facial droop; per RN swallow evaluation.  Fluids/nutrition:   ivfstroke : AVOID Hypotonic fluids in patients with acute ischemic stroke or cerebral edema.  Hypotonic fluids can worsen cerebral edema.  This includes D5 W, half-normal saline, and lactated Ringer's.  If patients need glucose then please use normal saline.  DVT prophylaxis:   SCD's while in bed  Therapy/rehab  services ordered (speech/PT/OT/rehab consult):   No persistent deficits no rehab services indicated.   maintain oxygen saturation >94%. No supplemental oxygen  in nonhypoxic patients with acute ischemic stroke (AIS).  Tx hyperthermia (temperature >38°C) and identify source  Evidence indicates that persistent in-hospital hyperglycemia during the first 24 hours after AIS is associated with worse outcomes than normoglycemia and thus, it is reasonable to treat hyperglycemia to achieve blood glucose levels in a range of 140 to 180 mg/dL and to closely monitor to prevent hypoglycemia in patients with AIS.  Neuro checks Q4.  Telemetry.  Neurosurgery consult.  Could be seen by  as an outpatient.      2.  History of seizures  Therapeutics:  Neuro checks Q4.   Seizure precautions  Telemetry  4mg Ativan for status epilepticus or  seizures lasting longer than 5 minutes, or multiple seizures without return to baseline in the interim. Max daily dose  of ativan is 0.1 mg/kg.  Continue Keppra 500 twice daily              This document is not intended to support charting by exception.  Sections left blank in a completed note should be presumed not to have been done.    Disclaimer:   This record was dictated using Dragon software. There may be errors due to voice recognition problems that were not realized and corrected during the completion of the note.            Thank you.  Paddy Craig D.O.   Vascular & General Neurology  3/1/2025  11:57 AM

## 2025-03-01 NOTE — ED QUICK NOTES
Orders for admission, patient is aware of plan and ready to go upstairs. Any questions, please call ED RN Malaika at extension [phone].     Patient Covid vaccination status: Fully vaccinated     COVID Test Ordered in ED: None    COVID Suspicion at Admission: N/A    Running Infusions:  None    Mental Status/LOC at time of transport: A&Ox4    Other pertinent information:   CIWA score: N/A   NIH score:  N/A

## 2025-03-01 NOTE — ED PROVIDER NOTES
ED Course as of 03/01/25 0539  ------------------------------------------------------------  Time: 02/28 2330  Comment: Patient hypertensive blood pressure in the 210s over 70s to 80s, she does take hydralazine at home.  Will give 20 mg dose and reassess.  ------------------------------------------------------------  Time: 03/01 0022  Comment: Patient has become acutely agitated, states she feels like her entire head and neck are tight and she has tightness in her hands.  She then became aphasic though is following commands, no pronator drift, antigrav x4 extremities. No facial droop. PERRLA bilaterally.  Will send for stat head CT and discussed with neurology.  She is already received aspirin.  Her blood pressures in the 170s over 90s after hydralazine, not significantly decreased from highest pressure here.  She is saturating 100% on room air.  Her lungs are clear.  Her tongue is not swollen.  She has no hives.  No vomiting.  Her blood sugar is 113.  She has no focal unilateral weakness but is intermittently aphasic.  Will send for stat CT head and CTA.  States she has been able to tolerate contrast dye with Benadryl before we will premedicate with Benadryl.  Will discuss with neurology.   ------------------------------------------------------------  Time: 03/01 0158  Comment: Patient became hypotensive around 1 AM and more lethargic though protecting her airway.  Was given dose of IM epinephrine for possible anaphylactic reaction to IV hydralazine.  Was also given DuoNeb.  Maintaining her end-tidal CO2.  Maintaining airway.  I placed an emergent right femoral CVC due to blood pressures of 60s over 40s with Levophed started with improvement.  Pt now improved after levophed. On 15 mcg/min of levo. Awake and alert. Complains of tightness in her jaw/neck. Updated her with imaging results and plan. Her airway is intact she is not hypoxic she has no tongue swelling    ------------------------------------------------------------  Time: 03/01 0232  Comment: Repeat EKG my interpretation at 0208 shows sinus tachycardia rate 109 beats minute, again seen is right bundle branch block however there is now ST depression in inferior and precordial leads with elevation in aVR.  Discussed with cardiology team, attending is reviewing  ------------------------------------------------------------  Time: 03/01 0250  Comment: D/w on call cardiology attending agrees hold off on cardiac alert for now, will trend trop and see on consult. Changes may be related to demand due to hypotension. Will CTM. BP improved, levophed weaned to 5 mcg/min. Updated hospitalist Dr. Balderas and upgraded pt to ICU. D/w Dr. Sosa accepts consult. D/w Dr. Craig will see on consult agrees with plan  ------------------------------------------------------------  Time: 03/01 0259  Value: Troponin I (High Sensitivity)(!!): 61  Comment: Rpt trop stable 61            Procedure: Central Line Placement  Indication: The patient required placement of a central venous catheter for emergent venous access for purposes of laboratory evaluation, IV fluid administration, and /or medication administration for hypotension    After obtaining verbal and written consent, the patient was prepped for placement of a central venous catheter.  Area of entry was prepped and draped in sterile fashion.  Physical landmarks were identified and confirmed by ultrasound imaging.  Lidocaine 1% was then infiltrated for local anesthetic.    Using the linear probe covered in a sterile sheath, a short axis view of the vein was obtained.  The R femoral vein was noted to be completely compressible and was identified as separate from any adjacent non compressible arterial structures. Under real-time guidance, the introducer needle was observed to tent the vein and then to puncture it.  There was positive return of venous blood and J point wire was then  advanced in a modified Seldinger technique.  The introducer needle wire was then removed and #11 scalpel was then used to make a small incision to allow placement of the catheter. The J point wire was then removed and dilator was then removed and the catheter was advanced over the wire and secured in place with sutures.  Blood was readily drawn back through all ports and ports were easily flushed with saline or heparin flush.  Sterile dressing was then placed over the site.      Patient tolerated the procedure well.    Due to a high probability of clinically significant, life threatening deterioration, the patient required my highest level of preparedness to intervene emergently and I personally spent 75 minutes of critical care time directly and personally managing the patient. This critical care time included obtaining a history; examining the patient; pulse oximetry; ordering and review of studies; arranging urgent treatment with development of a management plan; evaluation of patient's response to treatment; frequent reassessment; and, discussions with other providers.    This critical care time was performed to assess and manage the high probability of imminent, life-threatening deterioration that could result in multi-organ failure. It was exclusive of separately billable procedures and treating other patients and teaching time.        Octavia Beckford DO  Attending Physician  Emergency Medicine

## 2025-03-01 NOTE — PLAN OF CARE
Problem: Patient Centered Care  Goal: Patient preferences are identified and integrated in the patient's plan of care  Description: Interventions:  - What would you like us to know as we care for you?   - Provide timely, complete, and accurate information to patient/family  - Incorporate patient and family knowledge, values, beliefs, and cultural backgrounds into the planning and delivery of care  - Encourage patient/family to participate in care and decision-making at the level they choose  - Honor patient and family perspectives and choices  Outcome: Progressing     Problem: CARDIOVASCULAR - ADULT  Goal: Maintains optimal cardiac output and hemodynamic stability  Description: INTERVENTIONS:  - Monitor vital signs, rhythm, and trends  - Monitor for bleeding, hypotension and signs of decreased cardiac output  - Evaluate effectiveness of vasoactive medications to optimize hemodynamic stability  - Monitor arterial and/or venous puncture sites for bleeding and/or hematoma  - Assess quality of pulses, skin color and temperature  - Assess for signs of decreased coronary artery perfusion - ex. Angina  - Evaluate fluid balance, assess for edema, trend weights  Outcome: Progressing  Goal: Absence of cardiac arrhythmias or at baseline  Description: INTERVENTIONS:  - Continuous cardiac monitoring, monitor vital signs, obtain 12 lead EKG if indicated  - Evaluate effectiveness of antiarrhythmic and heart rate control medications as ordered  - Initiate emergency measures for life threatening arrhythmias  - Monitor electrolytes and administer replacement therapy as ordered  Outcome: Progressing     Problem: RESPIRATORY - ADULT  Goal: Achieves optimal ventilation and oxygenation  Description: INTERVENTIONS:  - Assess for changes in respiratory status  - Assess for changes in mentation and behavior  - Position to facilitate oxygenation and minimize respiratory effort  - Oxygen supplementation based on oxygen saturation or ABGs  -  Provide Smoking Cessation handout, if applicable  - Encourage broncho-pulmonary hygiene including cough, deep breathe, Incentive Spirometry  - Assess the need for suctioning and perform as needed  - Assess and instruct to report SOB or any respiratory difficulty  - Respiratory Therapy support as indicated  - Manage/alleviate anxiety  - Monitor for signs/symptoms of CO2 retention  Outcome: Progressing     Problem: GASTROINTESTINAL - ADULT  Goal: Minimal or absence of nausea and vomiting  Description: INTERVENTIONS:  - Maintain adequate hydration with IV or PO as ordered and tolerated  - Nasogastric tube to low intermittent suction as ordered  - Evaluate effectiveness of ordered antiemetic medications  - Provide nonpharmacologic comfort measures as appropriate  - Advance diet as tolerated, if ordered  - Obtain nutritional consult as needed  - Evaluate fluid balance  Outcome: Progressing     Problem: METABOLIC/FLUID AND ELECTROLYTES - ADULT  Goal: Glucose maintained within prescribed range  Description: INTERVENTIONS:  - Monitor Blood Glucose as ordered  - Assess for signs and symptoms of hyperglycemia and hypoglycemia  - Administer ordered medications to maintain glucose within target range  - Assess barriers to adequate nutritional intake and initiate nutrition consult as needed  - Instruct patient on self management of diabetes  Outcome: Progressing  Goal: Electrolytes maintained within normal limits  Description: INTERVENTIONS:  - Monitor labs and rhythm and assess patient for signs and symptoms of electrolyte imbalances  - Administer electrolyte replacement as ordered  - Monitor response to electrolyte replacements, including rhythm and repeat lab results as appropriate  - Fluid restriction as ordered  - Instruct patient on fluid and nutrition restrictions as appropriate  Outcome: Progressing  Goal: Hemodynamic stability and optimal renal function maintained  Description: INTERVENTIONS:  - Monitor labs and assess  for signs and symptoms of volume excess or deficit  - Monitor intake, output and patient weight  - Monitor urine specific gravity, serum osmolarity and serum sodium as indicated or ordered  - Monitor response to interventions for patient's volume status, including labs, urine output, blood pressure (other measures as available)  - Encourage oral intake as appropriate  - Instruct patient on fluid and nutrition restrictions as appropriate  Outcome: Progressing     Problem: MUSCULOSKELETAL - ADULT  Goal: Return mobility to safest level of function  Description: INTERVENTIONS:  - Assess patient stability and activity tolerance for standing, transferring and ambulating w/ or w/o assistive devices  - Assist with transfers and ambulation using safe patient handling equipment as needed  - Ensure adequate protection for wounds/incisions during mobilization  - Obtain PT/OT consults as needed  - Advance activity as appropriate  - Communicate ordered activity level and limitations with patient/family  Outcome: Progressing  Goal: Maintain proper alignment of affected body part  Description: INTERVENTIONS:  - Support and protect limb and body alignment per provider's orders  - Instruct and reinforce with patient and family use of appropriate assistive device and precautions (e.g. spinal or hip dislocation precautions)  Outcome: Progressing     Problem: Impaired Functional Mobility  Goal: Achieve highest/safest level of mobility/gait  Description: Interventions:  - Assess patient's functional ability and stability  - Promote increasing activity/tolerance for mobility and gait  - Educate and engage patient/family in tolerated activity level and precautions  - Recommend use of sit-stand lift for transfers  - Recommend use of total lift for transfers  - Recommend patient transfer to bedside chair toward strongest side  - When transferring patient, block weaker knee for safety  Outcome: Progressing

## 2025-03-01 NOTE — PLAN OF CARE
Problem: Patient Centered Care  Goal: Patient preferences are identified and integrated in the patient's plan of care  Description: Interventions:  - What would you like us to know as we care for you?   - Provide timely, complete, and accurate information to patient/family  - Incorporate patient and family knowledge, values, beliefs, and cultural backgrounds into the planning and delivery of care  - Encourage patient/family to participate in care and decision-making at the level they choose  - Honor patient and family perspectives and choices  Outcome: Progressing     Problem: CARDIOVASCULAR - ADULT  Goal: Maintains optimal cardiac output and hemodynamic stability  Description: INTERVENTIONS:  - Monitor vital signs, rhythm, and trends  - Monitor for bleeding, hypotension and signs of decreased cardiac output  - Evaluate effectiveness of vasoactive medications to optimize hemodynamic stability  - Monitor arterial and/or venous puncture sites for bleeding and/or hematoma  - Assess quality of pulses, skin color and temperature  - Assess for signs of decreased coronary artery perfusion - ex. Angina  - Evaluate fluid balance, assess for edema, trend weights  Outcome: Progressing  Goal: Absence of cardiac arrhythmias or at baseline  Description: INTERVENTIONS:  - Continuous cardiac monitoring, monitor vital signs, obtain 12 lead EKG if indicated  - Evaluate effectiveness of antiarrhythmic and heart rate control medications as ordered  - Initiate emergency measures for life threatening arrhythmias  - Monitor electrolytes and administer replacement therapy as ordered  Outcome: Progressing     Problem: RESPIRATORY - ADULT  Goal: Achieves optimal ventilation and oxygenation  Description: INTERVENTIONS:  - Assess for changes in respiratory status  - Assess for changes in mentation and behavior  - Position to facilitate oxygenation and minimize respiratory effort  - Oxygen supplementation based on oxygen saturation or ABGs  -  Provide Smoking Cessation handout, if applicable  - Encourage broncho-pulmonary hygiene including cough, deep breathe, Incentive Spirometry  - Assess the need for suctioning and perform as needed  - Assess and instruct to report SOB or any respiratory difficulty  - Respiratory Therapy support as indicated  - Manage/alleviate anxiety  - Monitor for signs/symptoms of CO2 retention  Outcome: Progressing     Problem: GASTROINTESTINAL - ADULT  Goal: Minimal or absence of nausea and vomiting  Description: INTERVENTIONS:  - Maintain adequate hydration with IV or PO as ordered and tolerated  - Nasogastric tube to low intermittent suction as ordered  - Evaluate effectiveness of ordered antiemetic medications  - Provide nonpharmacologic comfort measures as appropriate  - Advance diet as tolerated, if ordered  - Obtain nutritional consult as needed  - Evaluate fluid balance  Outcome: Progressing     Problem: METABOLIC/FLUID AND ELECTROLYTES - ADULT  Goal: Glucose maintained within prescribed range  Description: INTERVENTIONS:  - Monitor Blood Glucose as ordered  - Assess for signs and symptoms of hyperglycemia and hypoglycemia  - Administer ordered medications to maintain glucose within target range  - Assess barriers to adequate nutritional intake and initiate nutrition consult as needed  - Instruct patient on self management of diabetes  Outcome: Progressing  Goal: Electrolytes maintained within normal limits  Description: INTERVENTIONS:  - Monitor labs and rhythm and assess patient for signs and symptoms of electrolyte imbalances  - Administer electrolyte replacement as ordered  - Monitor response to electrolyte replacements, including rhythm and repeat lab results as appropriate  - Fluid restriction as ordered  - Instruct patient on fluid and nutrition restrictions as appropriate  Outcome: Progressing  Goal: Hemodynamic stability and optimal renal function maintained  Description: INTERVENTIONS:  - Monitor labs and assess  for signs and symptoms of volume excess or deficit  - Monitor intake, output and patient weight  - Monitor urine specific gravity, serum osmolarity and serum sodium as indicated or ordered  - Monitor response to interventions for patient's volume status, including labs, urine output, blood pressure (other measures as available)  - Encourage oral intake as appropriate  - Instruct patient on fluid and nutrition restrictions as appropriate  Outcome: Progressing     Problem: MUSCULOSKELETAL - ADULT  Goal: Return mobility to safest level of function  Description: INTERVENTIONS:  - Assess patient stability and activity tolerance for standing, transferring and ambulating w/ or w/o assistive devices  - Assist with transfers and ambulation using safe patient handling equipment as needed  - Ensure adequate protection for wounds/incisions during mobilization  - Obtain PT/OT consults as needed  - Advance activity as appropriate  - Communicate ordered activity level and limitations with patient/family  Outcome: Progressing  Goal: Maintain proper alignment of affected body part  Description: INTERVENTIONS:  - Support and protect limb and body alignment per provider's orders  - Instruct and reinforce with patient and family use of appropriate assistive device and precautions (e.g. spinal or hip dislocation precautions)  Outcome: Progressing     Problem: Impaired Functional Mobility  Goal: Achieve highest/safest level of mobility/gait  Description: Interventions:  - Assess patient's functional ability and stability  - Promote increasing activity/tolerance for mobility and gait  - Educate and engage patient/family in tolerated activity level and precautions    Outcome: Progressing

## 2025-03-01 NOTE — PLAN OF CARE
Anjel Pisano Patient Status:  Inpatient    3/30/1970 MRN [id number]   Location [facility] 2W/SW Attending Mary Balderas MD   Hosp Day # 0 PCP Yves Yanez MD       Cardiology Nocturnal APN Note    Page Received: Dr. Lennon/Dr. Beckford, ED Physicians    HPI:     Patient is a 54 year old female with PMH of CAD, HTN, HLD, CVA with right sided residual weakness, right vertebral artery stenosis, bilateral carotid artery stenosis s/p CEA, DM II and CKD stage III who presented to the ED with c/o chest pain and dyspnea. Pt reported intermittent dyspnea and chest pain for the past few days that was worse with activity. On arrival to ED, pt was noted to be hypertensive with SBP up to the 200s. Initial EKG showed no acute ischemic changes and initial troponin was mildly elevated but appeared to be chronic. While in ED, pt was given IV hydralazine for hypertension. Pt became hypotensive and was c/o neck and jaw tightness. EKGs were obtained and showed new ischemic changes. EKGs and plan of care reviewed with on call cardiologists (Dr. Abernathy and Dr. Martinez). Pt was started on levophed. Bedside echo showed normal LV function. [facility] consulted for chest pain/dyspnea/hypertension/abnormal EKG. Pharmacologic stress test completed in  was negative for myocardial ischemia. Echocardiogram completed in  showed normal left ventricular cavity size and systolic function. EF 70%. HPI obtained from chart review and information provided by ED physician.       ED Clinical Course    EKG: As noted above in HPI    Labs: Troponin 69, D-dimer negative, Hgb 11.8    Imaging: CXR showed mild pulmonary edema. No focal consolidation or pleural effusions. CTA brain/carotids negative for acute findings per ED physician    Medications: Aspirin 324 mg po, Hydralazine 20 mg IVP, Benadryl 50 mg IVP, Solumedrol, morphine, Zofran        Assessment/Plan:    - On arrival to ICU, levphed stopped SBP in the 160s. Pt was c/o jaw,  shoulder, and neck pain with chest tightness and dyspnea. HR in the 100s. IV Lopressor and sublingual nitroglycerin ordered.   - Troponin flat. 69-->59-->61. Appears to be chronically elevated. Will defer IV heparin for now.   - 2D echocardiogram pending  - Continue to monitor overnight on telemetry  - Formal cardiology consult to follow in AM.       GINGER Johnson  [facility]  3/1/2025  4:19 AM

## 2025-03-01 NOTE — H&P
[facility]  part of [facility]    History & Physical    Anjel Pisano Patient Status:  Emergency    3/30/1970 MRN [id number]   Location [facility] EMERGENCY DEPARTMENT Attending Tej Lennon*   Hosp Day # 0 PCP Yves Yanez MD     Date:  2025  Date of Admission:  2025    Chief Complaint:  Chief Complaint   Patient presents with    Difficulty Breathing    Chest Pain Angina       History of Present Illness:  Anjel Pisano is a(n) 54 year old female, with a past medical history significant for CAD, CVA with right-sided weakness, right vertebral stenosis, bilateral carotid artery stenosis status post carotid endarterectomy, insulin requiring diabetes and chronic kidney disease stage III presents with a complaint of chest discomfort and shortness of breath ongoing for the past 2 days.  Describes the onset as gradual, pain intermittent in nature 6 out of 10 at its worst associated with shortness of breath particularly on exertion.  In ER was found to be hypertensive with systolic pressure in the 200s, responded to IV hydralazine with a marked drop in her systolic pressures into the 60s requiring pressor support thereafter.  At this time patient became confused agitated as well.    History:  Past Medical History:    Age-related nuclear cataract of both eyes    Anemia    Apnea    Cataract    Coronary atherosclerosis    Depression    DM type 2 (diabetes mellitus, type 2) (HCC)    Esophageal reflux    High blood pressure    High cholesterol    Meibomian gland dysfunction (MGD), bilateral, both upper and lower lids    Migraine    Muscle weakness    left sided weakness uses walker and cane    Sleep apnea    Stenosis of right vertebral artery    Stroke (HCC)    Type II or unspecified type diabetes mellitus without mention of complication, not stated as uncontrolled     Past Surgical History:   Procedure Laterality Date    Brain surgery      Colonoscopy N/A  2017    Procedure: COLONOSCOPY;  Surgeon: Sharmaine Burks MD;  Location: [facility] ENDOSCOPY    Colonoscopy      Colonoscopy N/A 2022    Procedure: COLONOSCOPY with Polypectomy;  Surgeon: Terry Weaver MD;  Location: [facility] ENDOSCOPY    Excise carotid body+carotid artery  2017    Incision and drainage Right 16    lower abdominal skin    Laparoscopic cholecystectomy       Family History   Problem Relation Age of Onset    Diabetes Father     Hypertension Father     Heart Disorder Father     Lipids Father     Obesity Father     Colon Cancer Father     Heart Disorder Mother     Lipids Mother     Obesity Mother     Lipids Brother     Obesity Brother     Glaucoma Neg     Macular degeneration Neg       reports that she quit smoking about 11 years ago. Her smoking use included cigarettes. She started smoking about 12 years ago. She has a 0.2 pack-year smoking history. She has never used smokeless tobacco. She reports that she does not drink alcohol and does not use drugs.    Allergies:  Allergies[1]    Home Medications:  Prior to Admission Medications   Prescriptions Last Dose Informant Patient Reported? Taking?   Accu-Chek FastClix Lancets Does not apply Misc   No No   Sig: Use to check blood sugar three times a day   Alcohol Swabs (DROPSAFE ALCOHOL PREP) 70 % Does not apply Pads   No No   Sig: USE AS DIRECTED   Blood Glucose Monitoring Suppl (ACCU-CHEK GUIDE) w/Device Does not apply Kit   No No   Sig: Use to check blood sugar three times a day   Blood Glucose Monitoring Suppl (ONETOUCH ULTRA 2) w/Device Does not apply Kit   No No   Sig: Please provide what is covered by patient's insurance   Blood Glucose Monitoring Suppl (TRUE METRIX METER) w/Device Does not apply Kit   No No   Sig: Use to check blood sugar four times a day   Continuous Glucose Sensor (DEXCOM G7 SENSOR) Does not apply Misc   No No   Si each Every 10 days.   ERGOCALCIFEROL 1.25 MG (76528 UT) Oral Cap   No No   Sig: TAKE 1  CAPSULE BY MOUTH 1 TIME A WEEK FOR 12 DOSES   Glucose Blood (ACCU-CHEK GUIDE) In Vitro Strip   No No   Sig: Use to check blood sugar three times a day   Glucose Blood (ONETOUCH ULTRA) In Vitro Strip   No No   Sig: Please provide test strips that are covered by patient's insurance.   Glucose Blood (TRUE METRIX BLOOD GLUCOSE TEST) In Vitro Strip   No No   Sig: Use to check blood sugar three times a day   Glucose Blood (TRUE METRIX BLOOD GLUCOSE TEST) In Vitro Strip   No No   Sig: Check blood sugar four times a day   Insulin Pen Needle (BD PEN NEEDLE ABDOULAYE 2ND GEN) 32G X 4 MM Does not apply Misc   No No   Sig: USE FOUR TIMES DAILY AS DIRECTED   Omeprazole 40 MG Oral Capsule Delayed Release   No No   Sig: TAKE 1 CAPSULE EVERY DAY 30 TO 60 MINUTES BEFORE A MEAL   OneTouch Delica Lancets 33G Does not apply Misc   Yes No   Si each by Other route 4 (four) times daily.   TRUEplus Lancets 33G Does not apply Misc   No No   Sig: USE TO CHECK BLOOD SUGAR FOUR TIMES DAILY   VENTOLIN  (90 Base) MCG/ACT Inhalation Aero Soln   No No   Sig: Inhale 2 puffs into the lungs every 6 (six) hours as needed for Wheezing.   amLODIPine 10 MG Oral Tab   No No   Sig: Take 1 tablet (10 mg total) by mouth daily.   ammonium lactate (AMLACTIN DAILY) 12 % External Lotion   No No   Sig: Apply 1 Application topically as needed for Dry Skin.   aspirin 325 MG Oral Tab   Yes No   Sig: Take 1 tablet (325 mg total) by mouth daily.   atorvastatin 80 MG Oral Tab   No No   Sig: Take 1 tablet (80 mg total) by mouth nightly.   benzonatate 100 MG Oral Cap   Yes No   escitalopram 10 MG Oral Tab   No No   Sig: Take 1 tablet (10 mg total) by mouth daily.   fluticasone propionate 50 MCG/ACT Nasal Suspension   No No   Si sprays by Nasal route daily.   gabapentin 100 MG Oral Cap   No No   Sig: Take 1 capsule (100 mg total) by mouth 3 (three) times daily.   glucagon (GVOKE HYPOPEN 2-PACK) 1 MG/0.2ML Subcutaneous injection   No No   Sig: Inject 0.2 mL (1 mg  total) into the skin once as needed for Low blood glucose.   hydrALAZINE 100 MG Oral Tab   No No   Sig: Take 1 tablet (100 mg total) by mouth 2 (two) times daily.   insulin aspart 100 Units/mL Injection Solution   No No   Sig: Inject via insulin pump as directed. Max daily dose 75 units   levETIRAcetam 500 MG Oral Tab   No No   Sig: Take 1 tablet (500 mg total) by mouth 2 (two) times daily.   losartan 100 MG Oral Tab   No No   Sig: Take 1 tablet (100 mg total) by mouth daily.   metoprolol succinate  MG Oral Tablet 24 Hr   No No   Sig: Take 1 tablet (100 mg total) by mouth daily.   metoprolol succinate ER 25 MG Oral Tablet 24 Hr   No No   Sig: Take 1 tablet (25 mg total) by mouth nightly.   prednisoLONE 1 % Ophthalmic Suspension   Yes No   Sig: Place 1 drop into the left eye 4 (four) times daily.   pregabalin (LYRICA) 75 MG Oral Cap   No No   Sig: Take 1 capsule (75 mg total) by mouth 2 (two) times daily.   torsemide 10 MG Oral Tab   No No   Sig: Take 1 tablet (10 mg total) by mouth daily.      Facility-Administered Medications: None       Review of Systems:  Constitutional:  Weakness, Fatigue.  Eye:  Negative.  Ear/Nose/Mouth/Throat:  Negative.  Respiratory:  Negative  Cardiovascular: Negative  Gastrointestinal:  Negative.  Genitourinary:  Negative  Endocrine:  Negative.  Immunologic:  Negative.  Musculoskeletal:  Negative.  Integumentary:  Negative.  Neurologic:  Negative.  Psychiatric:  Negative.  ROS reviewed as documented in chart    Physical Exam:  Temp:  [98.5 °F (36.9 °C)] 98.5 °F (36.9 °C)  Pulse:  [67-85] 67  Resp:  [22] 22  BP: (169-195)/(71-87) 169/87  SpO2:  [98 %] 98 %    General:  Alert and oriented.  Diffuse skin problem:  None.  Eye:  Pupils are equal, round and reactive to light, extraocular movements are intact, Normal conjunctiva.  HENT:  Normocephalic, oral mucosa is moist.  Head:  Normocephalic, atraumatic.  Neck:  Supple, non-tender, no carotid bruit, no jugular venous distention, no  lymphadenopathy, no thyromegaly.  Respiratory:  Lungs are clear to auscultation, respirations are non-labored, breath sounds are equal, symmetrical chest wall expansion.  Cardiovascular:  Normal rate, regular rhythm, no murmur, no edema.  Gastrointestinal:  Soft, non-tender, non-distended, normal bowel sounds, no organomegaly.  Lymphatics:  No lymphadenopathy neck, axilla, groin.  Musculoskeletal: Normal range of motion.  normal strength.  Feet:  Normal pulses.  Neurologic:  Alert, oriented, no focal deficits, cranial nerves II-XII are grossly intact.  Cognition and Speech:  Oriented, speech clear and coherent.  Psychiatric:  Cooperative, appropriate mood & affect.      Laboratory Data:   Lab Results   Component Value Date    WBC 10.2 02/28/2025    HGB 12.5 02/28/2025    HCT 41.1 02/28/2025    .0 02/28/2025    CREATSERUM 0.75 02/28/2025    BUN 12 02/28/2025     02/28/2025    K 3.8 02/28/2025     02/28/2025    CO2 26.0 02/28/2025    GLU 78 02/28/2025    CA 9.4 02/28/2025    DDIMER 0.36 02/28/2025       Imaging:  No results found.     Assessment and Plan:    Chest pain possible ACS  EKG with ischemic changes, troponin mildly elevated however plateaued off, cardiology on board.  Will get 2D echo to further evaluate.    Accelerated hypertension  Responded to IV hydralazine by significant drop in blood pressure, briefly started on pressor support now improved, will hold off on pressors use antihypertensives cautiously.    Acute on chronic diastolic heart failure  X-ray indicated above pulmonary edema, will start patient on Lasix 40 mg IV daily, monitor I's and O's and daily weights.    Insulin requiring diabetes with associated neuropathy  Will use sliding scale coverage, check A1c.    Acute encephalopathy, likely ischemic  Likely triggered by sudden drop in blood pressure, now appears to be returning back to baseline, monitor as needed.    Prophylaxis  Subcutaneous heparin    CODE  STATUS  Full    Primary care physician  Yves Yanez MD    MDM: High, acute illness/severe exacerbation of chronic illness posing threat to life.  IV medications requiring close inpatient monitoring  60 minutes spent on this admission - examining patient, obtaining history, reviewing previous medical records, going over test results/imaging and discussing plan of care. All questions answered.     Disposition  Clinical course will dictate outcome      KIESHA SAUNDERS MD  2/28/2025  11:57 PM         [1]   Allergies  Allergen Reactions    Radiology Contrast Iodinated Dyes HIVES, RASH and ITCHING     1/20/25: pt states severe itching, redness, hives.  Needs to premedicate for all CT dye scans - [facility], RN    Reglan [Metoclopramide] OTHER (SEE COMMENTS)     Sensitivity, with crawling sensation.    Adhesive Tape      itching    Wound Dressing Adhesive RASH

## 2025-03-01 NOTE — ED QUICK NOTES
Patient returned from CT w/ c/o itching throughout her body. Epi administered. Patient then became diaphoretic and pale, BP low. MD at bedside, levo ordered.

## 2025-03-01 NOTE — ED PROVIDER NOTES
Patient Seen in: [facility] Emergency Department      History     Chief Complaint   Patient presents with    Difficulty Breathing    Chest Pain Angina     Stated Complaint: Body Pain, Difficulty Breathing    Subjective:   HPI      54-year-old female history of hypertension hyperlipidemia CAD, CVA with residual left-sided weakness, presents with chest pain difficulty breathing.  3-day history of waxing and waning chest pressure, with radiation to the arm.  Associated with dyspnea.  Chest pain and dyspnea are worse with exertion. No calf pain/swelling, no weight gain, no orthopnea.     Objective:     Past Medical History:    Age-related nuclear cataract of both eyes    Anemia    Apnea    Cataract    Coronary atherosclerosis    Depression    DM type 2 (diabetes mellitus, type 2) (HCC)    Esophageal reflux    High blood pressure    High cholesterol    Meibomian gland dysfunction (MGD), bilateral, both upper and lower lids    Migraine    Muscle weakness    left sided weakness uses walker and cane    Sleep apnea    Stenosis of right vertebral artery    Stroke ([facility])    Type II or unspecified type diabetes mellitus without mention of complication, not stated as uncontrolled              Past Surgical History:   Procedure Laterality Date    Brain surgery  2014    Colonoscopy N/A 8/25/2017    Procedure: COLONOSCOPY;  Surgeon: Sharmaine Burks MD;  Location: [facility] ENDOSCOPY    Colonoscopy      Colonoscopy N/A 11/12/2022    Procedure: COLONOSCOPY with Polypectomy;  Surgeon: Terry Weaver MD;  Location: [facility] ENDOSCOPY    Excise carotid body+carotid artery  09/2017    Incision and drainage Right 04/19/16    lower abdominal skin    Laparoscopic cholecystectomy  2002                Social History     Socioeconomic History    Marital status: Legally     Number of children: 3   Occupational History    Occupation:      Comment: disabled    Occupation: phlebotomy   Tobacco Use    Smoking status:  Former     Current packs/day: 0.00     Average packs/day: 0.2 packs/day for 1 year (0.2 ttl pk-yrs)     Types: Cigarettes     Start date: 2012     Quit date: 2013     Years since quittin.2    Smokeless tobacco: Never   Vaping Use    Vaping status: Never Used   Substance and Sexual Activity    Alcohol use: No     Alcohol/week: 0.0 standard drinks of alcohol    Drug use: No    Sexual activity: Not Currently     Social Drivers of Health     Transportation Needs: Unmet Transportation Needs (2024)    Transportation Needs     Lack of Transportation: Yes   Housing Stability: Low Risk  (2023)    Housing Stability     Housing Instability: No                  Physical Exam     ED Triage Vitals [25 1930]   BP (!) 195/71   Pulse 85   Resp 22   Temp 98.5 °F (36.9 °C)   Temp src Oral   SpO2 98 %   O2 Device None (Room air)       Current Vitals:   Vital Signs  BP: (!) 169/87  Pulse: 67  Resp: 22  Temp: 98.5 °F (36.9 °C)  Temp src: Oral  MAP (mmHg): (!) 111    Oxygen Therapy  SpO2: 98 %  O2 Device: None (Room air)        Physical Exam  Constitutional: awake, alert, no sig distress  HENT: mmm, no lesions,  Neck: normal range of motion, no tenderness, supple.  Eyes: PERRL, EOMI, conjunctiva normal, no discharge. Sclera anicteric.  Cardiovascular: rr no murmur  Respiratory: Normal breath sounds, no respiratory distress, no wheezing, no chest tenderness.  GI: Bowel sounds normal, Soft, no tenderness, no masses, no pulsatile masses.  : No CVA tenderness.  Skin: Warm, dry, no erythema, no rash.  Musculoskeletal: Intact distal pulses, no edema, no tenderness, no cyanosis, no clubbing. Good range of motion in all major joints. No tenderness to palpation or major deformities noted. Back- No tenderness.  Neurologic: Alert & oriented x 3, normal motor function, normal sensory function, no focal deficits noted.  Psych: Calm, cooperative, nl affect      ED Course     Labs Reviewed   CBC WITH DIFFERENTIAL WITH  PLATELET - Abnormal; Notable for the following components:       Result Value    RBC 5.39 (*)     MCV 76.3 (*)     MCH 23.2 (*)     MCHC 30.4 (*)     RDW 16.4 (*)     All other components within normal limits   TROPONIN I HIGH SENSITIVITY - Abnormal; Notable for the following components:    Troponin I (High Sensitivity) 69 (*)     All other components within normal limits   PRO BETA NATRIURETIC PEPTIDE - Abnormal; Notable for the following components:    Pro-Beta Natriuretic Peptide 274 (*)     All other components within normal limits   LIPID PANEL - Abnormal; Notable for the following components:    HDL Cholesterol 32 (*)     All other components within normal limits   BASIC METABOLIC PANEL (8) - Normal   D-DIMER - Normal   TROPONIN I HIGH SENSITIVITY     EKG    Rate, intervals and axes as noted on EKG Report.  Rate: 76  Rhythm: Sinus Rhythm  Reading: SR, RBBB, no st change, no stemi. Qtc 488ms                X-RAY CHEST 1 VIEW 2156 HRS.     Comparison 1/2/2024    IMPRESSION:  -Diffuse mild prominence of the pulmonary vasculature with indistinct margins is suspicious for mild pulmonary edema.    -No focal consolidation or pleural effusions.       MDM      54F hx as above who presents with exertional chest pain and dyspnea.  On arrival she is hypertensive other vitals are stable and reassuring.  DDx: ACS, PE, CHF, MSK pain, anxiety  Review of patient's external medical record shows-history of significant vascular pathology such as CVA, severe atherosclerotic disease, review of chart does not suggest any recent cardiac workup.  Patient noted to have chronic troponin elevation of 69 will trend.  BNP slightly elevated at 274.  Reviewed chest x-ray dependently shows mild pulmonary edema.  Given she is saturating well on room air, with very mild elevation in proBNP, will defer diuresis to primary.  Given aspirin will admit for serial enzymes consideration of further cardiology evaluation    D/w hospitalist and  cardiology.     Admission disposition: 2/28/2025 11:08 PM           Medical Decision Making      Disposition and Plan     Clinical Impression:  1. Chest pain, unspecified type         Disposition:  Admit  2/28/2025 11:08 pm    Follow-up:  No follow-up provider specified.        Medications Prescribed:  Current Discharge Medication List              Supplementary Documentation:         Hospital Problems       Present on Admission  Date Reviewed: 2/17/2025            ICD-10-CM Noted POA    Chest pain R07.9 2/28/2025 Unknown

## 2025-03-01 NOTE — CONSULTS
[facility]  part of [facility]    Consult Note    Date:  3/1/2025  Date of Admission:  2/28/2025    Chief Complaint:   Anjel Pisano is a(n) 54 year old female with shortness of breath.    HPI:   The patient is 3 days of shortness of breath associated with neck discomfort and bilateral shoulder discomfort with left upper extremity numbness.  She tells me that she had poor appetite.  She had a negative stress test many years ago.  She denied fever and chills but did have some shakes yesterday.  When she came to the emergency room, her blood pressure was markedly elevated and she was given 20 mg of hydralazine when her blood pressure plummeted and she subsequently required initiation of Levophed and had a central line placed in her right groin.  The Levophed is now off.  The patient is a vasculopath and has coronary artery disease, hypertension, hyperlipidemia, stroke with right-sided residual weakness, right vertebral artery stenosis, bilateral carotid artery stenosis status post CEA with bypass from the right external carotid artery to the internal carotid artery with complete occlusion of the proximal right carotid artery, diabetes, CKD.  The troponins are elevated and there is marked ST segment abnormality on EKG.    History     Past Medical History:    Age-related nuclear cataract of both eyes    Anemia    Apnea    Cataract    Coronary atherosclerosis    Depression    DM type 2 (diabetes mellitus, type 2) (HCC)    Esophageal reflux    High blood pressure    High cholesterol    Meibomian gland dysfunction (MGD), bilateral, both upper and lower lids    Migraine    Muscle weakness    left sided weakness uses walker and cane    Sleep apnea    Stenosis of right vertebral artery    Stroke (HCC)    Type II or unspecified type diabetes mellitus without mention of complication, not stated as uncontrolled     Past Surgical History:   Procedure Laterality Date    Brain surgery  2014    Colonoscopy N/A  2017    Procedure: COLONOSCOPY;  Surgeon: Sharmaine Burks MD;  Location: [facility] ENDOSCOPY    Colonoscopy      Colonoscopy N/A 2022    Procedure: COLONOSCOPY with Polypectomy;  Surgeon: Terry Weaver MD;  Location: [facility] ENDOSCOPY    Excise carotid body+carotid artery  2017    Incision and drainage Right 16    lower abdominal skin    Laparoscopic cholecystectomy       Family History   Problem Relation Age of Onset    Diabetes Father     Hypertension Father     Heart Disorder Father     Lipids Father     Obesity Father     Colon Cancer Father     Heart Disorder Mother     Lipids Mother     Obesity Mother     Lipids Brother     Obesity Brother     Glaucoma Neg     Macular degeneration Neg      Social History: Single, 3 kids, very distant tobacco use, no alcohol, unemployed  Social History     Socioeconomic History    Marital status: Legally     Number of children: 3   Occupational History    Occupation:      Comment: disabled    Occupation: phlebotomy   Tobacco Use    Smoking status: Former     Current packs/day: 0.00     Average packs/day: 0.2 packs/day for 1 year (0.2 ttl pk-yrs)     Types: Cigarettes     Start date: 2012     Quit date: 2013     Years since quittin.2    Smokeless tobacco: Never   Vaping Use    Vaping status: Never Used   Substance and Sexual Activity    Alcohol use: No     Alcohol/week: 0.0 standard drinks of alcohol    Drug use: No    Sexual activity: Not Currently     Social Drivers of Health     Food Insecurity: Food Insecurity Present (3/1/2025)    NCSS - Food Insecurity     Worried About Running Out of Food in the Last Year: No     Ran Out of Food in the Last Year: Yes   Transportation Needs: Unmet Transportation Needs (3/1/2025)    NCSS - Transportation     Lack of Transportation: Yes   Stress: No Stress Concern Present (2023)    Stress     Feeling of Stress : No   Housing Stability: Not At Risk (3/1/2025)    NCSS -  Housing/Utilities     Has Housing: Yes     Worried About Losing Housing: No     Unable to Get Utilities: No     Allergies/Medications:   Allergies: Allergies[1]  Medications Prior to Admission   Medication Sig    OneTouch Delica Lancets 33G Does not apply Misc 1 each by Other route 4 (four) times daily.    prednisoLONE 1 % Ophthalmic Suspension Place 1 drop into the left eye 4 (four) times daily.    benzonatate 100 MG Oral Cap     insulin aspart 100 Units/mL Injection Solution Inject via insulin pump as directed. Max daily dose 75 units    escitalopram 10 MG Oral Tab Take 1 tablet (10 mg total) by mouth daily.    gabapentin 100 MG Oral Cap Take 1 capsule (100 mg total) by mouth 3 (three) times daily.    ammonium lactate (AMLACTIN DAILY) 12 % External Lotion Apply 1 Application topically as needed for Dry Skin.    Insulin Pen Needle (BD PEN NEEDLE ABDOULAYE 2ND GEN) 32G X 4 MM Does not apply Misc USE FOUR TIMES DAILY AS DIRECTED    [] glucagon (GVOKE HYPOPEN 2-PACK) 1 MG/0.2ML Subcutaneous injection Inject 0.2 mL (1 mg total) into the skin once as needed for Low blood glucose.    metoprolol succinate  MG Oral Tablet 24 Hr Take 1 tablet (100 mg total) by mouth daily.    metoprolol succinate ER 25 MG Oral Tablet 24 Hr Take 1 tablet (25 mg total) by mouth nightly.    torsemide 10 MG Oral Tab Take 1 tablet (10 mg total) by mouth daily.    atorvastatin 80 MG Oral Tab Take 1 tablet (80 mg total) by mouth nightly.    levETIRAcetam 500 MG Oral Tab Take 1 tablet (500 mg total) by mouth 2 (two) times daily.    amLODIPine 10 MG Oral Tab Take 1 tablet (10 mg total) by mouth daily.    Continuous Glucose Sensor (DEXCOM G7 SENSOR) Does not apply Misc 1 each Every 10 days.    losartan 100 MG Oral Tab Take 1 tablet (100 mg total) by mouth daily.    Glucose Blood (ONETOUCH ULTRA) In Vitro Strip Please provide test strips that are covered by patient's insurance.    Blood Glucose Monitoring Suppl (ONETOUCH ULTRA 2) w/Device Does  not apply Kit Please provide what is covered by patient's insurance    pregabalin (LYRICA) 75 MG Oral Cap Take 1 capsule (75 mg total) by mouth 2 (two) times daily.    ERGOCALCIFEROL 1.25 MG (88339 UT) Oral Cap TAKE 1 CAPSULE BY MOUTH 1 TIME A WEEK FOR 12 DOSES    Blood Glucose Monitoring Suppl (TRUE METRIX METER) w/Device Does not apply Kit Use to check blood sugar four times a day    Glucose Blood (TRUE METRIX BLOOD GLUCOSE TEST) In Vitro Strip Check blood sugar four times a day    TRUEplus Lancets 33G Does not apply Misc USE TO CHECK BLOOD SUGAR FOUR TIMES DAILY    Glucose Blood (TRUE METRIX BLOOD GLUCOSE TEST) In Vitro Strip Use to check blood sugar three times a day    hydrALAZINE 100 MG Oral Tab Take 1 tablet (100 mg total) by mouth 2 (two) times daily.    Accu-Chek FastClix Lancets Does not apply Misc Use to check blood sugar three times a day    Glucose Blood (ACCU-CHEK GUIDE) In Vitro Strip Use to check blood sugar three times a day    Omeprazole 40 MG Oral Capsule Delayed Release TAKE 1 CAPSULE EVERY DAY 30 TO 60 MINUTES BEFORE A MEAL    Alcohol Swabs (DROPSAFE ALCOHOL PREP) 70 % Does not apply Pads USE AS DIRECTED    Blood Glucose Monitoring Suppl (ACCU-CHEK GUIDE) w/Device Does not apply Kit Use to check blood sugar three times a day    fluticasone propionate 50 MCG/ACT Nasal Suspension 2 sprays by Nasal route daily.    VENTOLIN  (90 Base) MCG/ACT Inhalation Aero Soln Inhale 2 puffs into the lungs every 6 (six) hours as needed for Wheezing.    aspirin 325 MG Oral Tab Take 1 tablet (325 mg total) by mouth daily.       Review of Systems:   Review of Systems:  Vision normal. Ear nose and throat normal. Bowel normal. Bladder function normal. No depression. No thyroid disease. No lymphatic system concerns.  No rash. Muscles and joints unremarkable. No weight loss no weight gain.    Physical Exam:   Vital Signs:  Blood pressure 117/72, pulse 91, temperature 97.5 °F (36.4 °C), resp. rate 14, height 5' 2\"  (1.575 m), weight 201 lb 8 oz (91.4 kg), SpO2 99%, not currently breastfeeding.     Alert white female obese  HEENT examination is unremarkable with pupils equal round and reactive to light and accommodation.   Neck without adenopathy, thyromegaly, JVD nor bruit.   Lungs slightly diminished to auscultation and percussion.  Cardiac regular rate and rhythm no murmur.   Abdomen nontender, without hepatosplenomegaly and no mass appreciable.   Extremities without clubbing cyanosis nor edema.   Neurologic subtle right-sided weakness.  Skin without gross abnormality    Results:     Lab Results   Component Value Date    WBC 16.8 03/01/2025    HGB 11.2 03/01/2025    HCT 36.7 03/01/2025    .0 03/01/2025    CREATSERUM 0.79 03/01/2025    BUN 13 03/01/2025     03/01/2025    K 5.1 03/01/2025     03/01/2025    CO2 25.0 03/01/2025     03/01/2025    CA 8.5 03/01/2025    ALB 3.8 03/01/2025    ALKPHO 87 03/01/2025    BILT 0.5 03/01/2025    TP 6.5 03/01/2025    AST 12 03/01/2025    ALT 9 03/01/2025    PTT 28.4 03/01/2025    DDIMER 0.36 02/28/2025     CT scan the brain-stable extensive vascular disease    EKG-right bundle branch block with ST depression in the anterior leads.    Assessment/Plan:   1.  Dyspnea with neck and shoulder discomfort and associated positive troponin with EKG abnormalities-non-ST elevation myocardial infarction with troponin 1324.  Could have stress ischemia associated with hypertensive urgency but patient has known severe vasculopathy diffusely.    Recommendations:  1.  IV heparin  2.  Beta-blockade  3.  Trending troponin  4.  Echocardiography  5.  Will monitor in the ICU  6.  Will need further ischemia diagnostics as per cardiology.    2.  Diabetes mellitus-values okay.    Recommendations: As per endocrinology, insulin pump if supplies are available, otherwise we will transition to Tresiba and NovoLog.    3.  DVT prophylaxis-on insulin drip    4.  Cerebral vasculopathy    5.   Hypertension-initially the patient was markedly hypertensive and then hypotensive with IV hydralazine, now stabilizing on beta-blockade.    6.  Central line access-patient has a right femoral line.  Will hopefully be able to remove as soon as tomorrow.    I am delighted to assist with this patient's care.    Jerry Sosa MD  Medical Director, Critical Care, [facility]  Medical Director, [facility]  Pager: [phone]               [1]   Allergies  Allergen Reactions    Radiology Contrast Iodinated Dyes HIVES, RASH and ITCHING     1/20/25: pt states severe itching, redness, hives.  Needs to premedicate for all CT dye scans - GUSTAVO, RN    Reglan [Metoclopramide] OTHER (SEE COMMENTS)     Sensitivity, with crawling sensation.    Adhesive Tape      itching    Wound Dressing Adhesive RASH

## 2025-03-01 NOTE — ED QUICK NOTES
Patient lethargic, aphasic. Patient able to move all extremities and follow commands. MD at bedside. CT ordered. Blood glucose 113.

## 2025-03-01 NOTE — CONSULTS
[facility] - CARDIOLOGY CONSULT NOTE    Anjel Pisano Patient Status:  Inpatient    3/30/1970 MRN [id number]   Location [facility] 2W/SW Attending Mary Balderas MD   Hosp Day # 0 PCP Yves Yanez MD     Date of Admission:  2025  Date of Consult:  3/1/2025  I was asked by Mary Balderas MD to provide recommendations for evaluation and management of cardiac issues.    Reason for Consultation:     SOB/Chest pain    History of Present Illness:   Patient is a 54 year old female with PMH of CAD, HTN, HLD, CVA with right sided residual weakness, right vertebral artery stenosis, bilateral carotid artery stenosis s/p CEA, DM II and CKD who presented to the ED with c/o chest pain and dyspnea. She notes 3-4 days of SOB, associated with chest pressure and neck pain. Initially admitted, BP was elevated. She underwent CT scan was premedicated for dye allergy. After scan she became hypotensive, was given steroids and benydryl started on levo. Came to the ICU and BP had improved and her levo was stopped. She was having shoulder pain, neck pain and headache. Her pain improved with norco. This morning she is comfortable, no significant SOB, and chest pain improved. Her trop is chronically elevated in the 60s, this AM was 180. EKG during episode showed ST depressions.     Results:     Lab Results   Component Value Date    WBC 17.8 (H) 2025    HGB 10.9 (L) 2025    HCT 35.5 2025    .0 2025    CREATSERUM 0.80 2025    BUN 12 2025     2025    K 3.4 (L) 2025     2025    CO2 24.0 2025     (H) 2025    CA 8.3 (L) 2025    ALB 3.8 2025    ALKPHO 87 2025    BILT 0.5 2025    TP 6.5 2025    AST 12 2025    ALT 9 (L) 2025    PTT 32.0 2017    INR 1.0 2017    PT 12.8 2016    T4F 0.66 2018    TSH 1.386 2025    LIP 19 2023    DDIMER 0.36  02/28/2025    ESRML 19 08/20/2018    PHOS 3.7 02/17/2025    TROP 0.03 06/27/2017    B12 580 05/26/2021    POCGLU 224 03/07/2024       XR CHEST AP PORTABLE  (CPT=71045)    Result Date: 3/1/2025  CONCLUSION:   Increased interstitial markings bilaterally, which may be secondary to interstitial pulmonary edema or atypical/viral pneumonia in the appropriate clinical setting.    A preliminary report was issued by the [facility] teleradiology service. There are no clinically actionable discrepancies.  Dictated by (CST): Antonieta Hart MD on 3/01/2025 at 6:27 AM     Finalized by (CST): Antonieta Hart MD on 3/01/2025 at 6:28 AM         EKG 12 Lead    Result Date: 3/1/2025  Normal sinus rhythm Right bundle branch block Abnormal ECG When compared with ECG of 20-AUG-2024 13:13, QT has lengthened    EKG 12 Lead    Result Date: 3/1/2025  Normal sinus rhythm Right bundle branch block Abnormal ECG When compared with ECG of 28-FEB-2025 19:38, ST now depressed in Anterior leads    EKG    Result Date: 3/1/2025  Sinus tachycardia Indeterminate axis Right bundle branch block Marked ST abnormality, possible inferior subendocardial injury Marked ST abnormality, possible anterolateral subendocardial injury Abnormal ECG When compared with ECG of 01-MAR-2025 02:08, No significant change was found    EKG 12 Lead    Result Date: 3/1/2025  Sinus tachycardia Right bundle branch block Marked ST abnormality, possible inferior subendocardial injury Marked ST abnormality, possible anterolateral subendocardial injury Abnormal ECG When compared with ECG of 01-MAR-2025 00:20, The axis Shifted right ST more depressed in Anterior-lateral leads     Past Medical History  Past Medical History:    Age-related nuclear cataract of both eyes    Anemia    Apnea    Cataract    Coronary atherosclerosis    Depression    DM type 2 (diabetes mellitus, type 2) (HCC)    Esophageal reflux    High blood pressure    High cholesterol    Meibomian gland  dysfunction (MGD), bilateral, both upper and lower lids    Migraine    Muscle weakness    left sided weakness uses walker and cane    Sleep apnea    Stenosis of right vertebral artery    Stroke (HCC)    Type II or unspecified type diabetes mellitus without mention of complication, not stated as uncontrolled       Past Surgical History  Past Surgical History:   Procedure Laterality Date    Brain surgery  2014    Colonoscopy N/A 8/25/2017    Procedure: COLONOSCOPY;  Surgeon: Sharmaine Burks MD;  Location: [facility] ENDOSCOPY    Colonoscopy      Colonoscopy N/A 11/12/2022    Procedure: COLONOSCOPY with Polypectomy;  Surgeon: Terry Weaver MD;  Location: [facility] ENDOSCOPY    Excise carotid body+carotid artery  09/2017    Incision and drainage Right 04/19/16    lower abdominal skin    Laparoscopic cholecystectomy  2002       Family History  Family History   Problem Relation Age of Onset    Diabetes Father     Hypertension Father     Heart Disorder Father     Lipids Father     Obesity Father     Colon Cancer Father     Heart Disorder Mother     Lipids Mother     Obesity Mother     Lipids Brother     Obesity Brother     Glaucoma Neg     Macular degeneration Neg        Social History  Pediatric History   Patient Parents    Not on file     Other Topics Concern    Not on file   Social History Narrative    Not on file           Current Medications:  Current Facility-Administered Medications   Medication Dose Route Frequency    amLODIPine (Norvasc) tab 10 mg  10 mg Oral Daily    aspirin tab 325 mg  325 mg Oral Daily    atorvastatin (Lipitor) tab 80 mg  80 mg Oral Nightly    escitalopram (Lexapro) tab 10 mg  10 mg Oral Daily    gabapentin (Neurontin) cap 100 mg  100 mg Oral TID    hydrALAZINE (Apresoline) tab 100 mg  100 mg Oral Q8H PABLO    levETIRAcetam (Keppra) tab 500 mg  500 mg Oral BID    losartan (Cozaar) tab 100 mg  100 mg Oral Daily    metoprolol succinate ER (Toprol XL) 24 hr tab 100 mg  100 mg Oral Daily     pantoprazole (Protonix) DR tab 40 mg  40 mg Oral QAM AC    ondansetron (Zofran) 4 MG/2ML injection 4 mg  4 mg Intravenous Q6H PRN    acetaminophen (Tylenol) tab 650 mg  650 mg Oral Q6H PRN    furosemide (Lasix) 10 mg/mL injection 40 mg  40 mg Intravenous Daily    ipratropium-albuterol (Duoneb) 0.5-2.5 (3) MG/3ML inhalation solution 3 mL  3 mL Nebulization Q6H PRN    zolpidem (Ambien) tab 5 mg  5 mg Oral Nightly PRN    alum-mag hydroxide-simethicone (Maalox) 200-200-20 MG/5ML oral suspension 30 mL  30 mL Oral QID PRN    labetalol (Trandate) 5 mg/mL injection 10 mg  10 mg Intravenous Q4H PRN    glucose (Dex4) 15 GM/59ML oral liquid 15 g  15 g Oral Q15 Min PRN    Or    glucose (Glutose) 40% oral gel 15 g  15 g Oral Q15 Min PRN    Or    glucose-vitamin C (Dex-4) chewable tab 4 tablet  4 tablet Oral Q15 Min PRN    Or    dextrose 50% injection 50 mL  50 mL Intravenous Q15 Min PRN    Or    glucose (Dex4) 15 GM/59ML oral liquid 30 g  30 g Oral Q15 Min PRN    Or    glucose (Glutose) 40% oral gel 30 g  30 g Oral Q15 Min PRN    Or    glucose-vitamin C (Dex-4) chewable tab 8 tablet  8 tablet Oral Q15 Min PRN    insulin regular human (Novolin R, Humulin R) 100 UNIT/ML injection 1-11 Units  1-11 Units Subcutaneous 4 times per day    norepinephrine (Levophed) 4 mg/250mL infusion premix  0.5-50 mcg/min Intravenous Continuous    nitroglycerin (Nitrostat) SL tab 0.4 mg  0.4 mg Sublingual Q5 Min PRN    HYDROcodone-acetaminophen (Norco) 5-325 MG per tab 1 tablet  1 tablet Oral Q6H PRN    potassium chloride 40 mEq in 250mL sodium chloride 0.9% IVPB premix  40 mEq Intravenous Once    heparin (Porcine) 1000 UNIT/ML injection - BOLUS IV 5,000 Units  5,000 Units Intravenous Once    heparin (Porcine) 05338 units/250 mL infusion (ACS/AFIB) INITIAL DOSE  1,000 Units/hr Intravenous Once    heparin (Porcine) 30751 units/250mL infusion ACS/AFIB CONTINUOUS  200-3,000 Units/hr Intravenous Continuous     Medications Prior to Admission   Medication  Sig    OneTouch Delica Lancets 33G Does not apply Misc 1 each by Other route 4 (four) times daily.    prednisoLONE 1 % Ophthalmic Suspension Place 1 drop into the left eye 4 (four) times daily.    benzonatate 100 MG Oral Cap     insulin aspart 100 Units/mL Injection Solution Inject via insulin pump as directed. Max daily dose 75 units    escitalopram 10 MG Oral Tab Take 1 tablet (10 mg total) by mouth daily.    gabapentin 100 MG Oral Cap Take 1 capsule (100 mg total) by mouth 3 (three) times daily.    ammonium lactate (AMLACTIN DAILY) 12 % External Lotion Apply 1 Application topically as needed for Dry Skin.    Insulin Pen Needle (BD PEN NEEDLE ABDOULAYE 2ND GEN) 32G X 4 MM Does not apply Misc USE FOUR TIMES DAILY AS DIRECTED    [] glucagon (GVOKE HYPOPEN 2-PACK) 1 MG/0.2ML Subcutaneous injection Inject 0.2 mL (1 mg total) into the skin once as needed for Low blood glucose.    metoprolol succinate  MG Oral Tablet 24 Hr Take 1 tablet (100 mg total) by mouth daily.    metoprolol succinate ER 25 MG Oral Tablet 24 Hr Take 1 tablet (25 mg total) by mouth nightly.    torsemide 10 MG Oral Tab Take 1 tablet (10 mg total) by mouth daily.    atorvastatin 80 MG Oral Tab Take 1 tablet (80 mg total) by mouth nightly.    levETIRAcetam 500 MG Oral Tab Take 1 tablet (500 mg total) by mouth 2 (two) times daily.    amLODIPine 10 MG Oral Tab Take 1 tablet (10 mg total) by mouth daily.    Continuous Glucose Sensor (DEXCOM G7 SENSOR) Does not apply Misc 1 each Every 10 days.    losartan 100 MG Oral Tab Take 1 tablet (100 mg total) by mouth daily.    Glucose Blood (ONETOUCH ULTRA) In Vitro Strip Please provide test strips that are covered by patient's insurance.    Blood Glucose Monitoring Suppl (ONETOUCH ULTRA 2) w/Device Does not apply Kit Please provide what is covered by patient's insurance    pregabalin (LYRICA) 75 MG Oral Cap Take 1 capsule (75 mg total) by mouth 2 (two) times daily.    ERGOCALCIFEROL 1.25 MG (56209 UT)  Oral Cap TAKE 1 CAPSULE BY MOUTH 1 TIME A WEEK FOR 12 DOSES    Blood Glucose Monitoring Suppl (TRUE METRIX METER) w/Device Does not apply Kit Use to check blood sugar four times a day    Glucose Blood (TRUE METRIX BLOOD GLUCOSE TEST) In Vitro Strip Check blood sugar four times a day    TRUEplus Lancets 33G Does not apply Misc USE TO CHECK BLOOD SUGAR FOUR TIMES DAILY    Glucose Blood (TRUE METRIX BLOOD GLUCOSE TEST) In Vitro Strip Use to check blood sugar three times a day    hydrALAZINE 100 MG Oral Tab Take 1 tablet (100 mg total) by mouth 2 (two) times daily.    Accu-Chek FastClix Lancets Does not apply Misc Use to check blood sugar three times a day    Glucose Blood (ACCU-CHEK GUIDE) In Vitro Strip Use to check blood sugar three times a day    Omeprazole 40 MG Oral Capsule Delayed Release TAKE 1 CAPSULE EVERY DAY 30 TO 60 MINUTES BEFORE A MEAL    Alcohol Swabs (DROPSAFE ALCOHOL PREP) 70 % Does not apply Pads USE AS DIRECTED    Blood Glucose Monitoring Suppl (ACCU-CHEK GUIDE) w/Device Does not apply Kit Use to check blood sugar three times a day    fluticasone propionate 50 MCG/ACT Nasal Suspension 2 sprays by Nasal route daily.    VENTOLIN  (90 Base) MCG/ACT Inhalation Aero Soln Inhale 2 puffs into the lungs every 6 (six) hours as needed for Wheezing.    aspirin 325 MG Oral Tab Take 1 tablet (325 mg total) by mouth daily.       Allergies  Allergies[1]    Review of Systems:   10 pt ROS performed, separate from HPI  Review of Systems:  GENERAL: no fevers, chills, sweats  HEENT: no visual or hearing changes  SKIN: denies any unusual skin lesions or rashes  RESPIRATORY: denies shortness of breath with exertion  CARDIOVASCULAR: chest pain, SOB  GI: denies abdominal pain and denies heartburn  : no dysuria or hematuria  NEURO: denies headaches, focal weaknesses or paresthesias  All other systems were reviewed are negative  Physical Exam:   Blood pressure 117/72, pulse 91, temperature 97.5 °F (36.4 °C), resp.  rate 14, height 62\", weight 201 lb 8 oz (91.4 kg), SpO2 99%, not currently breastfeeding.    Scheduled Meds:    amLODIPine  10 mg Oral Daily    aspirin  325 mg Oral Daily    atorvastatin  80 mg Oral Nightly    escitalopram  10 mg Oral Daily    gabapentin  100 mg Oral TID    hydrALAZINE  100 mg Oral Q8H PABLO    levETIRAcetam  500 mg Oral BID    losartan  100 mg Oral Daily    metoprolol succinate ER  100 mg Oral Daily    pantoprazole  40 mg Oral QAM AC    furosemide  40 mg Intravenous Daily    insulin regular human  1-11 Units Subcutaneous 4 times per day    potassium chloride  40 mEq Intravenous Once    heparin  5,000 Units Intravenous Once    initial dose (ACS/Afib) heparin  1,000 Units/hr Intravenous Once         Physical Exam:    General: Alert and oriented. No apparent distress. No respiratory or constitutional distress.  HEENT: Normocephalic, anicteric sclera, neck supple  Neck: No JVD, carotids 2+, supple  Cardiac: Regular rate. No pathologic murmur.  Lungs: Clear with normal effort.  Normal excursions and effort.  Abdomen: Soft, non-tender. BS-present.  Extremities: Without clubbing, cyanosis.  Peripheral pulsespresent.  Neurologic: Alert and oriented, normal affect. Motor ok.  Skin: Warm and dry.     Imaging: I independently visualized all relevant chest imaging in PACS, agree with radiology interpretation except where noted.  Thank you for allowing me to participate in the care of your patient.    Labs:  HEM:  Recent Labs   Lab 02/28/25 2206 03/01/25 0056 03/01/25  0443   WBC 10.2 9.4 17.8*   HGB 12.5 11.8* 10.9*   .0 298.0 254.0       Chem:  Recent Labs   Lab 02/28/25 2206 03/01/25  0056 03/01/25  0443    140 141   K 3.8 3.6 3.4*    106 108   CO2 26.0 25.0 24.0   BUN 12 12 12   CREATSERUM 0.75 0.74 0.80   CA 9.4 9.0 8.3*   GLU 78 86 119*       Recent Labs   Lab 03/01/25  0056   ALT 9*   AST 12   ALB 3.8       No results for input(s): \"TROP\", \"CK\" in the last 168 hours.    No results for  input(s): \"PTP\", \"INR\" in the last 168 hours.    Impression:   SOB/Chest pressure- etiology unclear, she is a vasculopath so CAD is on the differential, initially on presentation her sx appeared to be stable, though after her CT overnight with episode of hypotension her sx worsened. Would continue ASA, statin, BP meds, lasix, start heparin ggt. EKG this am back to baseline. Check echo to eval EF and wall motion. Likely will need ischemic evaluation this admit, timing TBD pending clinical course   Elevated troponin- possible the bump this am is demand from her hypotensive episode, will continue trend and check echo   HTN- pressures this morning are good, continue home amlodipine and losartan, will start low dose BB  Hx if carotid stenosis- s/p CEA, she has multiple lesions in her vertebrals etc   Hx of CVA    Recommendations:  Start heparin   Start low dose BB  Trend out trop   Check echo  Ischemic eval timing TBD pending clinical course     C5    Yobani Johnson MD  [facility] [place]  3/1/2025         [1]   Allergies  Allergen Reactions    Radiology Contrast Iodinated Dyes HIVES, RASH and ITCHING     1/20/25: pt states severe itching, redness, hives.  Needs to premedicate for all CT dye scans - GUSTAVO, RN    Reglan [Metoclopramide] OTHER (SEE COMMENTS)     Sensitivity, with crawling sensation.    Adhesive Tape      itching    Wound Dressing Adhesive RASH

## 2025-03-01 NOTE — PROGRESS NOTES
[facility]  part of Merged with [facility]    Progress Note    Anjel Pisano Patient Status:  Inpatient    3/30/1970 MRN [id number]   Location [facility] 2W/SW Attending Santiago Dwyer MD   Hosp Day # 0 PCP Yves Yanez MD       Subjective:   Anjel Pisano is a(n) 54 year old female was seen and examined  Resting in bed  Denies any cp but having some mild sob  No f,c,n,v or HA currently     Objective:   Blood pressure 117/72, pulse 91, temperature 97.5 °F (36.4 °C), resp. rate 14, height 5' 2\" (1.575 m), weight 201 lb 8 oz (91.4 kg), SpO2 99%, not currently breastfeeding.    GENERAL:  The patient appeared to be in no distress and was comfortable.  SKIN:  Warm and hydrated  PSYCHIATRIC: Calm and cooperative    HEENT:  Head was atraumatic and normocephalic.  Eyes: Sclera was anicteric.  Pupils were equal.  Ears:  There were no lesions.  Nose:  No lesions were noted.      NECK:  Supple.  There was no JVD.    CHEST:  Symmetrical movement on inspiration  CARDIAC: S1 S2+, RRR  LUNGS:  CTAB with decreased entry at bases   ABDOMEN: Non-distended, non-tender, BS+  MUSCULOSKELETAL:  There was no deformity.  There was full range of motion in all the extremities.    EXTREMITIES: There was no edema  NEUROLOGIC: R sided weakness    Current Inpatient Medications:     Current Facility-Administered Medications:     amLODIPine (Norvasc) tab 10 mg, 10 mg, Oral, Daily    aspirin tab 325 mg, 325 mg, Oral, Daily    atorvastatin (Lipitor) tab 80 mg, 80 mg, Oral, Nightly    escitalopram (Lexapro) tab 10 mg, 10 mg, Oral, Daily    gabapentin (Neurontin) cap 100 mg, 100 mg, Oral, TID    hydrALAZINE (Apresoline) tab 100 mg, 100 mg, Oral, Q8H PABLO    levETIRAcetam (Keppra) tab 500 mg, 500 mg, Oral, BID    losartan (Cozaar) tab 100 mg, 100 mg, Oral, Daily    metoprolol succinate ER (Toprol XL) 24 hr tab 100 mg, 100 mg, Oral, Daily    pantoprazole (Protonix) DR tab 40 mg, 40 mg, Oral, QAM AC    ondansetron (Zofran)  4 MG/2ML injection 4 mg, 4 mg, Intravenous, Q6H PRN    acetaminophen (Tylenol) tab 650 mg, 650 mg, Oral, Q6H PRN    furosemide (Lasix) 10 mg/mL injection 40 mg, 40 mg, Intravenous, Daily    ipratropium-albuterol (Duoneb) 0.5-2.5 (3) MG/3ML inhalation solution 3 mL, 3 mL, Nebulization, Q6H PRN    zolpidem (Ambien) tab 5 mg, 5 mg, Oral, Nightly PRN    alum-mag hydroxide-simethicone (Maalox) 200-200-20 MG/5ML oral suspension 30 mL, 30 mL, Oral, QID PRN    labetalol (Trandate) 5 mg/mL injection 10 mg, 10 mg, Intravenous, Q4H PRN    glucose (Dex4) 15 GM/59ML oral liquid 15 g, 15 g, Oral, Q15 Min PRN **OR** glucose (Glutose) 40% oral gel 15 g, 15 g, Oral, Q15 Min PRN **OR** glucose-vitamin C (Dex-4) chewable tab 4 tablet, 4 tablet, Oral, Q15 Min PRN **OR** dextrose 50% injection 50 mL, 50 mL, Intravenous, Q15 Min PRN **OR** glucose (Dex4) 15 GM/59ML oral liquid 30 g, 30 g, Oral, Q15 Min PRN **OR** glucose (Glutose) 40% oral gel 30 g, 30 g, Oral, Q15 Min PRN **OR** glucose-vitamin C (Dex-4) chewable tab 8 tablet, 8 tablet, Oral, Q15 Min PRN    insulin regular human (Novolin R, Humulin R) 100 UNIT/ML injection 1-11 Units, 1-11 Units, Subcutaneous, 4 times per day    norepinephrine (Levophed) 4 mg/250mL infusion premix, 0.5-50 mcg/min, Intravenous, Continuous    nitroglycerin (Nitrostat) SL tab 0.4 mg, 0.4 mg, Sublingual, Q5 Min PRN    HYDROcodone-acetaminophen (Norco) 5-325 MG per tab 1 tablet, 1 tablet, Oral, Q6H PRN    potassium chloride 40 mEq in 250mL sodium chloride 0.9% IVPB premix, 40 mEq, Intravenous, Once    heparin (Porcine) 1000 UNIT/ML injection - BOLUS IV 5,000 Units, 5,000 Units, Intravenous, Once    heparin (Porcine) 79424 units/250 mL infusion (ACS/AFIB) INITIAL DOSE, 1,000 Units/hr, Intravenous, Once    heparin (Porcine) 35620 units/250mL infusion ACS/AFIB CONTINUOUS, 200-3,000 Units/hr, Intravenous, Continuous    metoprolol tartrate (Lopressor) partial tab 12.5 mg, 12.5 mg, Oral, 2x Daily(Beta  Blocker)    Assessment and Plan:   Chest pain possible ACS  EKG with ischemic changes, troponin up trended this AM  Cardiology consulted, pt started on heparin gtt  Await 2d echo  Will need ischemic eval this admission  Pt currently denies any cp or sob      Accelerated hypertension  Responded to IV hydralazine by significant drop in blood pressure  Needed pressor briefly, currently bp stable  Cardiology following     Acute on chronic diastolic heart failure  X-ray indicated above pulmonary edema  Started on Lasix 40 mg IV daily  Monitor I's and O's and daily weights    Insulin requiring diabetes with associated neuropathy  Will start on CF insulin  Monitor BS and adjust accordingly      Acute encephalopathy, likely ischemic  Likely triggered by sudden drop in blood pressure, now appears to be returning back to baseline, monitor as needed.     Prophylaxis  Subcutaneous heparin     CODE STATUS  Full     MDM: High         Results:     Recent Labs   Lab 02/28/25 2206 03/01/25 0056 03/01/25 0443 03/01/25  0915   RBC 5.39* 5.03 4.61 4.68   HGB 12.5 11.8* 10.9* 11.2*   HCT 41.1 38.2 35.5 36.7   MCV 76.3* 75.9* 77.0* 78.4*   MCH 23.2* 23.5* 23.6* 23.9*   MCHC 30.4* 30.9* 30.7* 30.5*   RDW 16.4* 16.3* 16.2* 16.1*   NEPRELIM 6.22 3.40 16.56*  --    WBC 10.2 9.4 17.8* 16.8*   .0 298.0 254.0 252.0         Recent Labs   Lab 03/01/25 0056 03/01/25 0443 03/01/25  0915   GLU 86 119* 128*   BUN 12 12 13   CREATSERUM 0.74 0.80 0.79   EGFRCR 95 86 88   CA 9.0 8.3* 8.5*    141 140   K 3.6 3.4* 5.1    108 108   CO2 25.0 24.0 25.0         Imaging:   XR CHEST AP PORTABLE  (CPT=71045)    Result Date: 3/1/2025  CONCLUSION:   Mild cardiomegaly with interstitial pulmonary edema.  A preliminary report was issued by the [facility] teleradiology service. There are no clinically actionable discrepancies.    Dictated by (CST): Antonieta Hart MD on 3/01/2025 at 9:48 AM     Finalized by (CST): Antonieta Hart MD on  3/01/2025 at 9:49 AM          CTA BRAIN + CTA CAROTIDS (CPT=70496/90594)    Result Date: 3/1/2025  CONCLUSION:   No acute intracranial hemorrhage, hydrocephalus, or mass effect.  If there is clinical concern for acute ischemic stroke, MRI of the brain is recommended.  Unchanged complete or near complete occlusion of the left V1 vertebral artery, with additional irregularity of the V2 segment.  Findings are concerning for age chronic vertebral artery dissection.  Right external to internal carotid artery bypass.  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  Retrograde reconstitution of flow in the distal intracranial ICA, similar to prior.  Chronic hypoenhancement of the left ZARA.  Otherwise, No large vessel occlusion involving the major arterial branches of the eprwny-km-Mumcao.  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  Moderate narrowing at the right subclavian artery origin.  Correlate for subclavian steal.  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  Small subcentimeter lucent lesions throughout the calvarium, nonspecific.  Recommend correlation with clinical/laboratory findings for myelomatous process.  A preliminary report was issued by the [facility] teleradiology service. There are no clinically actionable discrepancies.  Report was initially discussed with Dr. Staley At 1:55 a.m. Eastern time by Dr. Rojas    Dictated by (CST): Antonieta Hart MD on 3/01/2025 at 8:24 AM     Finalized by (CST): nAtonieta Hart MD on 3/01/2025 at 8:39 AM          XR CHEST AP PORTABLE  (CPT=71045)    Result Date: 3/1/2025  CONCLUSION:   Increased interstitial markings bilaterally, which may be secondary to interstitial pulmonary edema or atypical/viral pneumonia in the appropriate clinical setting.    A preliminary report was issued by the [facility] teleradiology service. There are no clinically actionable discrepancies.  Dictated by  (CST): Antonieta Hart MD on 3/01/2025 at 6:27 AM     Finalized by (CST): Antonieta Hart MD on 3/01/2025 at 6:28 AM         EKG 12 Lead    Result Date: 3/1/2025  Normal sinus rhythm Right bundle branch block Abnormal ECG When compared with ECG of 01-MAR-2025 03:37, The axis Shifted left ST no longer depressed in Anterior-lateral leads T wave inversion no longer evident in Anterior leads    EKG    Result Date: 3/1/2025  Sinus tachycardia Indeterminate axis Right bundle branch block Marked ST abnormality, possible inferior subendocardial injury Marked ST abnormality, possible anterolateral subendocardial injury Abnormal ECG When compared with ECG of 01-MAR-2025 02:08, No significant change was found    EKG 12 Lead    Result Date: 3/1/2025  Sinus tachycardia Right bundle branch block Marked ST abnormality, possible inferior subendocardial injury Marked ST abnormality, possible anterolateral subendocardial injury Abnormal ECG When compared with ECG of 01-MAR-2025 00:20, The axis Shifted right ST more depressed in Anterior-lateral leads    EKG 12 Lead    Result Date: 3/1/2025  Normal sinus rhythm Right bundle branch block Abnormal ECG When compared with ECG of 28-FEB-2025 19:38, ST now depressed in Anterior leads    EKG 12 Lead    Result Date: 3/1/2025  Normal sinus rhythm Right bundle branch block Abnormal ECG When compared with ECG of 20-AUG-2024 13:13, QT has lengthened       Santiago Dwyer MD  3/1/2025

## 2025-03-01 NOTE — DIABETES ED
[facility]  Inpatient Diabetes Clinician Note    Anjel Pisano Patient Status:  Inpatient   3/30/1970 MRN [id number]  Location [facility] 2W/SW Attending Santiago Dwyer MD  Hosp Day # 0 PCP Yves Yanez MD      Patient admitted to hospital with insulin pump and CGM.  Total amount of insulin remaining in pump since last site change is 3 units.  Dr. Serra has seen pt and has orders in chart to change to basal bolus insulin injections.  Spoke to floor RN to confirm.  Pt states that she asked her sister to bring insulin and supplies but she is not here yet; per pt may be due to start of Ramadan.  She reports that she has not given any meal boluses since admission.    Insulin Pump Settings: (Retrieved from pump)    Type of Pump:  Beta Bionics I-Let  Type of Insulin:  Novolog  Automated Insulin Deliver System: yes    Last site change: 2 days ago but insulin total in pump down to 3 units.  Pump holds 180 units of insulin.    Total Basal:    51 units per day      Average Insulin Amounts per pump history:    Breakfast: 15 units, Lunch: 5.9 units, Dinner: 13.5 units    BG Goal:   110    Insulin pump and Continuous Glucose agreement signed and in chart.  Pt is alert and orientated and currently able to self-manage insulin pump.  Patient able to demonstrate appropriate carb counting skills.       Nory Stoddard RD  Diabetes Educator  3/1/2025

## 2025-03-01 NOTE — CONSULTS
[facility]  part of [facility]    Report of Consultation    Anjel Pisano Patient Status:  Inpatient    3/30/1970 MRN [id number]   Location [facility] 2W/SW Attending Santiago Dwyer MD   Hosp Day # 0 PCP Yves Yanez MD     Date of Admission:  2025   DOS is the same date the note was signed   Consulted by Santiago Dwyer MD  Reason for Consultation:    Hyperglycemia, uncontrolled DM    History of Present Illness:   Patient is a 54 year old female who was admitted to the hospital for Chest pain, unspecified type:     PMH of CAD, HTN, HLD, CVA with right sided residual weakness, right vertebral artery stenosis, bilateral carotid artery stenosis s/p CEA, DM II and CKD who presented to the ED with c/o chest pain and dyspnea     t2DM on iLet pump . Started in 2025      Past Medical History  Past Medical History:    Age-related nuclear cataract of both eyes    Anemia    Apnea    Cataract    Coronary atherosclerosis    Depression    DM type 2 (diabetes mellitus, type 2) (HCC)    Esophageal reflux    High blood pressure    High cholesterol    Meibomian gland dysfunction (MGD), bilateral, both upper and lower lids    Migraine    Muscle weakness    left sided weakness uses walker and cane    Sleep apnea    Stenosis of right vertebral artery    Stroke (HCC)    Type II or unspecified type diabetes mellitus without mention of complication, not stated as uncontrolled       Past Surgical History  Past Surgical History:   Procedure Laterality Date    Brain surgery      Colonoscopy N/A 2017    Procedure: COLONOSCOPY;  Surgeon: Sharmaine Burks MD;  Location: [facility] ENDOSCOPY    Colonoscopy      Colonoscopy N/A 2022    Procedure: COLONOSCOPY with Polypectomy;  Surgeon: Terry Weaver MD;  Location: [facility] ENDOSCOPY    Excise carotid body+carotid artery  2017    Incision and drainage Right 16    lower abdominal skin    Laparoscopic cholecystectomy          Family History  Family History   Problem Relation Age of Onset    Diabetes Father     Hypertension Father     Heart Disorder Father     Lipids Father     Obesity Father     Colon Cancer Father     Heart Disorder Mother     Lipids Mother     Obesity Mother     Lipids Brother     Obesity Brother     Glaucoma Neg     Macular degeneration Neg        Social History  Social History     Socioeconomic History    Marital status: Legally     Number of children: 3   Occupational History    Occupation:      Comment: disabled    Occupation: phlebotomy   Tobacco Use    Smoking status: Former     Current packs/day: 0.00     Average packs/day: 0.2 packs/day for 1 year (0.2 ttl pk-yrs)     Types: Cigarettes     Start date: 2012     Quit date: 2013     Years since quittin.2    Smokeless tobacco: Never   Vaping Use    Vaping status: Never Used   Substance and Sexual Activity    Alcohol use: No     Alcohol/week: 0.0 standard drinks of alcohol    Drug use: No    Sexual activity: Not Currently     Social Drivers of Health     Food Insecurity: Food Insecurity Present (3/1/2025)    NCSS - Food Insecurity     Worried About Running Out of Food in the Last Year: No     Ran Out of Food in the Last Year: Yes   Transportation Needs: Unmet Transportation Needs (3/1/2025)    NCSS - Transportation     Lack of Transportation: Yes   Stress: No Stress Concern Present (2023)    Stress     Feeling of Stress : No   Housing Stability: Not At Risk (3/1/2025)    NCSS - Housing/Utilities     Has Housing: Yes     Worried About Losing Housing: No     Unable to Get Utilities: No           Current Medications:  Current Facility-Administered Medications   Medication Dose Route Frequency    amLODIPine (Norvasc) tab 10 mg  10 mg Oral Daily    aspirin tab 325 mg  325 mg Oral Daily    atorvastatin (Lipitor) tab 80 mg  80 mg Oral Nightly    escitalopram (Lexapro) tab 10 mg  10 mg Oral Daily    gabapentin (Neurontin)  cap 100 mg  100 mg Oral TID    hydrALAZINE (Apresoline) tab 100 mg  100 mg Oral Q8H PABLO    levETIRAcetam (Keppra) tab 500 mg  500 mg Oral BID    losartan (Cozaar) tab 100 mg  100 mg Oral Daily    metoprolol succinate ER (Toprol XL) 24 hr tab 100 mg  100 mg Oral Daily    pantoprazole (Protonix) DR tab 40 mg  40 mg Oral QAM AC    ondansetron (Zofran) 4 MG/2ML injection 4 mg  4 mg Intravenous Q6H PRN    acetaminophen (Tylenol) tab 650 mg  650 mg Oral Q6H PRN    furosemide (Lasix) 10 mg/mL injection 40 mg  40 mg Intravenous Daily    ipratropium-albuterol (Duoneb) 0.5-2.5 (3) MG/3ML inhalation solution 3 mL  3 mL Nebulization Q6H PRN    zolpidem (Ambien) tab 5 mg  5 mg Oral Nightly PRN    alum-mag hydroxide-simethicone (Maalox) 200-200-20 MG/5ML oral suspension 30 mL  30 mL Oral QID PRN    labetalol (Trandate) 5 mg/mL injection 10 mg  10 mg Intravenous Q4H PRN    glucose (Dex4) 15 GM/59ML oral liquid 15 g  15 g Oral Q15 Min PRN    Or    glucose (Glutose) 40% oral gel 15 g  15 g Oral Q15 Min PRN    Or    glucose-vitamin C (Dex-4) chewable tab 4 tablet  4 tablet Oral Q15 Min PRN    Or    dextrose 50% injection 50 mL  50 mL Intravenous Q15 Min PRN    Or    glucose (Dex4) 15 GM/59ML oral liquid 30 g  30 g Oral Q15 Min PRN    Or    glucose (Glutose) 40% oral gel 30 g  30 g Oral Q15 Min PRN    Or    glucose-vitamin C (Dex-4) chewable tab 8 tablet  8 tablet Oral Q15 Min PRN    insulin regular human (Novolin R, Humulin R) 100 UNIT/ML injection 1-11 Units  1-11 Units Subcutaneous 4 times per day    norepinephrine (Levophed) 4 mg/250mL infusion premix  0.5-50 mcg/min Intravenous Continuous    nitroglycerin (Nitrostat) SL tab 0.4 mg  0.4 mg Sublingual Q5 Min PRN    HYDROcodone-acetaminophen (Norco) 5-325 MG per tab 1 tablet  1 tablet Oral Q6H PRN    heparin (Porcine) 89654 units/250mL infusion ACS/AFIB CONTINUOUS  200-3,000 Units/hr Intravenous Continuous    metoprolol tartrate (Lopressor) partial tab 12.5 mg  12.5 mg Oral 2x  Daily(Beta Blocker)     Medications Prior to Admission   Medication Sig    OneTouch Delica Lancets 33G Does not apply Misc 1 each by Other route 4 (four) times daily.    prednisoLONE 1 % Ophthalmic Suspension Place 1 drop into the left eye 4 (four) times daily.    benzonatate 100 MG Oral Cap     insulin aspart 100 Units/mL Injection Solution Inject via insulin pump as directed. Max daily dose 75 units    escitalopram 10 MG Oral Tab Take 1 tablet (10 mg total) by mouth daily.    gabapentin 100 MG Oral Cap Take 1 capsule (100 mg total) by mouth 3 (three) times daily.    ammonium lactate (AMLACTIN DAILY) 12 % External Lotion Apply 1 Application topically as needed for Dry Skin.    Insulin Pen Needle (BD PEN NEEDLE ABDOULAYE 2ND GEN) 32G X 4 MM Does not apply Misc USE FOUR TIMES DAILY AS DIRECTED    [] glucagon (GVOKE HYPOPEN 2-PACK) 1 MG/0.2ML Subcutaneous injection Inject 0.2 mL (1 mg total) into the skin once as needed for Low blood glucose.    metoprolol succinate  MG Oral Tablet 24 Hr Take 1 tablet (100 mg total) by mouth daily.    metoprolol succinate ER 25 MG Oral Tablet 24 Hr Take 1 tablet (25 mg total) by mouth nightly.    torsemide 10 MG Oral Tab Take 1 tablet (10 mg total) by mouth daily.    atorvastatin 80 MG Oral Tab Take 1 tablet (80 mg total) by mouth nightly.    levETIRAcetam 500 MG Oral Tab Take 1 tablet (500 mg total) by mouth 2 (two) times daily.    amLODIPine 10 MG Oral Tab Take 1 tablet (10 mg total) by mouth daily.    Continuous Glucose Sensor (DEXCOM G7 SENSOR) Does not apply Misc 1 each Every 10 days.    losartan 100 MG Oral Tab Take 1 tablet (100 mg total) by mouth daily.    Glucose Blood (ONETOUCH ULTRA) In Vitro Strip Please provide test strips that are covered by patient's insurance.    Blood Glucose Monitoring Suppl (ONETOUCH ULTRA 2) w/Device Does not apply Kit Please provide what is covered by patient's insurance    pregabalin (LYRICA) 75 MG Oral Cap Take 1 capsule (75 mg total) by  mouth 2 (two) times daily.    ERGOCALCIFEROL 1.25 MG (84830 UT) Oral Cap TAKE 1 CAPSULE BY MOUTH 1 TIME A WEEK FOR 12 DOSES    Blood Glucose Monitoring Suppl (TRUE METRIX METER) w/Device Does not apply Kit Use to check blood sugar four times a day    Glucose Blood (TRUE METRIX BLOOD GLUCOSE TEST) In Vitro Strip Check blood sugar four times a day    TRUEplus Lancets 33G Does not apply Misc USE TO CHECK BLOOD SUGAR FOUR TIMES DAILY    Glucose Blood (TRUE METRIX BLOOD GLUCOSE TEST) In Vitro Strip Use to check blood sugar three times a day    hydrALAZINE 100 MG Oral Tab Take 1 tablet (100 mg total) by mouth 2 (two) times daily.    Accu-Chek FastClix Lancets Does not apply Misc Use to check blood sugar three times a day    Glucose Blood (ACCU-CHEK GUIDE) In Vitro Strip Use to check blood sugar three times a day    Omeprazole 40 MG Oral Capsule Delayed Release TAKE 1 CAPSULE EVERY DAY 30 TO 60 MINUTES BEFORE A MEAL    Alcohol Swabs (DROPSAFE ALCOHOL PREP) 70 % Does not apply Pads USE AS DIRECTED    Blood Glucose Monitoring Suppl (ACCU-CHEK GUIDE) w/Device Does not apply Kit Use to check blood sugar three times a day    fluticasone propionate 50 MCG/ACT Nasal Suspension 2 sprays by Nasal route daily.    VENTOLIN  (90 Base) MCG/ACT Inhalation Aero Soln Inhale 2 puffs into the lungs every 6 (six) hours as needed for Wheezing.    aspirin 325 MG Oral Tab Take 1 tablet (325 mg total) by mouth daily.       Allergies  Allergies[1]                Review of Systems:   All other systems are negative other than HPI    Physical Exam:   Vital Signs:  Blood pressure 117/72, pulse 91, temperature 97.5 °F (36.4 °C), resp. rate 14, height 5' 2\" (1.575 m), weight 201 lb 8 oz (91.4 kg), SpO2 99%, not currently breastfeeding.     General: Awake and alert.    HENT: Eye: EOMI, normal lids, no discharge,     Neck: full range of motion  Neck/Thyroid: neck inspection: normal, No scar, No goiter   Lungs: No acute respiratory distress,  non-labored respiration. Speaking full sentences  Abdomen:  nontender , obese   MSK: Moves extremities spontaneously. full range of motion in all major joints  Neuro:speech is clear. Awake, alert, no aphasia, no facial asymmetry,   Psych: Orientated to time, place, person & situation,   Skin: Skin is dry, no obvious rashes or lesions      Results:      Latest Reference Range & Units 03/01/25 03:31 03/01/25 06:39   POC GLUCOSE 70 - 99 mg/dL 131 (H) 120 (H)   (H): Data is abnormally high    Laboratory Data:  Lab Results   Component Value Date    WBC 16.8 (H) 03/01/2025    HGB 11.2 (L) 03/01/2025    HCT 36.7 03/01/2025    .0 03/01/2025    CREATSERUM 0.79 03/01/2025    BUN 13 03/01/2025     03/01/2025    K 5.1 03/01/2025     03/01/2025    CO2 25.0 03/01/2025     (H) 03/01/2025    CA 8.5 (L) 03/01/2025    ALB 3.8 03/01/2025    ALKPHO 87 03/01/2025    TP 6.5 03/01/2025    AST 12 03/01/2025    ALT 9 (L) 03/01/2025    PTT 28.4 03/01/2025    INR 1.0 08/31/2017    PTP 12.5 08/31/2017    T4F 0.66 08/20/2018    TSH 1.386 02/17/2025    LIP 19 05/24/2023    DDIMER 0.36 02/28/2025    ESRML 19 08/20/2018    PHOS 3.7 02/17/2025    TROP 0.03 06/27/2017    B12 580 05/26/2021    POCGLU 224 03/07/2024         Imaging:  XR CHEST AP PORTABLE  (CPT=71045)    Result Date: 3/1/2025  CONCLUSION:   Mild cardiomegaly with interstitial pulmonary edema.  A preliminary report was issued by the [facility] Radiology teleradiology service. There are no clinically actionable discrepancies.    Dictated by (CST): Antonieta Hart MD on 3/01/2025 at 9:48 AM     Finalized by (CST): Antonieta Hart MD on 3/01/2025 at 9:49 AM          CTA BRAIN + CTA CAROTIDS (CPT=70496/22595)    Result Date: 3/1/2025  CONCLUSION:   No acute intracranial hemorrhage, hydrocephalus, or mass effect.  If there is clinical concern for acute ischemic stroke, MRI of the brain is recommended.  Unchanged complete or near complete occlusion of the left V1  vertebral artery, with additional irregularity of the V2 segment.  Findings are concerning for age chronic vertebral artery dissection.  Right external to internal carotid artery bypass.  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  Retrograde reconstitution of flow in the distal intracranial ICA, similar to prior.  Chronic hypoenhancement of the left ZARA.  Otherwise, No large vessel occlusion involving the major arterial branches of the [id number].  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  Moderate narrowing at the right subclavian artery origin.  Correlate for subclavian steal.  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  Small subcentimeter lucent lesions throughout the calvarium, nonspecific.  Recommend correlation with clinical/laboratory findings for myelomatous process.  A preliminary report was issued by the [employer] Radiology teleradiology service. There are no clinically actionable discrepancies.  Report was initially discussed with Dr. Staley At 1:55 a.m. Eastern time by Dr. Rojas    Dictated by (CST): Antonieta Hart MD on 3/01/2025 at 8:24 AM     Finalized by (CST): Antonieta Hart MD on 3/01/2025 at 8:39 AM          XR CHEST AP PORTABLE  (CPT=71045)    Result Date: 3/1/2025  CONCLUSION:   Increased interstitial markings bilaterally, which may be secondary to interstitial pulmonary edema or atypical/viral pneumonia in the appropriate clinical setting.    A preliminary report was issued by the [employer] Radiology teleradiology service. There are no clinically actionable discrepancies.  Dictated by (CST): Antonieta Hart MD on 3/01/2025 at 6:27 AM     Finalized by (CST): Antonieta Hart MD on 3/01/2025 at 6:28 AM              Impression:     Patient Active Problem List   Diagnosis    Edema of both legs    Neck pain    Apnea    Gastroesophageal reflux disease without esophagitis    Type 2 diabetes mellitus with neurologic  complication (HCC)    Meibomian gland dysfunction (MGD), bilateral, both upper and lower lids    Age-related nuclear cataract of both eyes    Abscess of abdominal wall    Allergic rhinitis    Essential hypertension    TMJ (dislocation of temporomandibular joint)    Iron deficiency anemia    Leukocytosis    Plantar fasciitis of left foot    Type 2 diabetes mellitus with complication, with long-term current use of insulin (HCC)    Left-sided cerebrovascular accident (CVA) (HCC)    History of craniotomy    Family history of colon cancer    Abnormal uterine bleeding (AUB)    Transient alteration of awareness    Altered mental status    Depression    Need for dental care    Type 1 diabetes mellitus without retinopathy (HCC)    S/P colonoscopic polypectomy    Gastropathy    S/P carotid endarterectomy    Carotid atherosclerosis    Dyslipidemia    Pre-op testing    Pure hypercholesterolemia    Anemia    CKD stage 3 due to type 1 diabetes mellitus (HCC)    Type 2 diabetes mellitus with hyperglycemia, with long-term current use of insulin (HCC)    Weight gain    Obesity (BMI 30-39.9)    Migraine without status migrainosus, not intractable    Nausea    Primary hypertension    Hernia of abdominal wall    Morbid (severe) obesity due to excess calories (HCC)    Left upper quadrant abdominal pain    Cataracta    Cervical radiculopathy    Arthropathy of cervical facet joint    Left carotid stenosis    Essential hypertension with goal blood pressure less than 140/90    Chest pain    Chest pain, unspecified type    Hypotension    Vertebral artery dissection (HCC)    NSTEMI (non-ST elevated myocardial infarction) (HCC)    Hypotension, unspecified hypotension type    Insulin pump in place    Uncontrolled type 2 diabetes mellitus with hyperglycemia (HCC)          . Uncontrolled complicated Diabetes Mellitus Type 2, on insulin pump  - if can get pump supplies from home, ok to be on it.   - Discussed importance of glycemic control  If  needed, will change to injection   - Tresiba 25 units subcutaneous daily   - Novolog  8 units subcutaneous TID with meals  - Novolog medium correction scale ACHS   - carb controlled diet if ok to eat  - Hypoglycemia protocol    Thank you for allowing me to participate in the care of your patient.    D/w pt and Suzette Serra MD  3/1/2025         [1]   Allergies  Allergen Reactions    Radiology Contrast Iodinated Dyes HIVES, RASH and ITCHING     1/20/25: pt states severe itching, redness, hives.  Needs to premedicate for all CT dye scans - GUSTAVO, SUZETTE    Reglan [Metoclopramide] OTHER (SEE COMMENTS)     Sensitivity, with crawling sensation.    Adhesive Tape      itching    Wound Dressing Adhesive RASH

## 2025-03-02 ENCOUNTER — APPOINTMENT (OUTPATIENT)
Dept: INTERVENTIONAL RADIOLOGY/VASCULAR | Facility: HOSPITAL | Age: 55
End: 2025-03-02
Attending: INTERNAL MEDICINE
Payer: MEDICARE

## 2025-03-02 LAB
ALBUMIN SERPL-MCNC: 4 G/DL (ref 3.2–4.8)
ANION GAP SERPL CALC-SCNC: 5 MMOL/L (ref 0–18)
APTT PPP: 27.4 SECONDS (ref 23–36)
APTT PPP: 48.8 SECONDS (ref 23–36)
BASOPHILS # BLD AUTO: 0.01 X10(3) UL (ref 0–0.2)
BASOPHILS NFR BLD AUTO: 0.1 %
BUN BLD-MCNC: 19 MG/DL (ref 9–23)
BUN/CREAT SERPL: 20.9 (ref 10–20)
CALCIUM BLD-MCNC: 8.9 MG/DL (ref 8.7–10.4)
CHLORIDE SERPL-SCNC: 107 MMOL/L (ref 98–112)
CO2 SERPL-SCNC: 27 MMOL/L (ref 21–32)
CREAT BLD-MCNC: 0.91 MG/DL
DEPRECATED RDW RBC AUTO: 45.4 FL (ref 35.1–46.3)
EGFRCR SERPLBLD CKD-EPI 2021: 74 ML/MIN/1.73M2 (ref 60–?)
EOSINOPHIL # BLD AUTO: 0.02 X10(3) UL (ref 0–0.7)
EOSINOPHIL NFR BLD AUTO: 0.1 %
ERYTHROCYTE [DISTWIDTH] IN BLOOD BY AUTOMATED COUNT: 16.4 % (ref 11–15)
GLUCOSE BLD-MCNC: 144 MG/DL (ref 70–99)
GLUCOSE BLDC GLUCOMTR-MCNC: 100 MG/DL (ref 70–99)
GLUCOSE BLDC GLUCOMTR-MCNC: 104 MG/DL (ref 70–99)
GLUCOSE BLDC GLUCOMTR-MCNC: 113 MG/DL (ref 70–99)
GLUCOSE BLDC GLUCOMTR-MCNC: 120 MG/DL (ref 70–99)
GLUCOSE BLDC GLUCOMTR-MCNC: 151 MG/DL (ref 70–99)
GLUCOSE BLDC GLUCOMTR-MCNC: 180 MG/DL (ref 70–99)
GLUCOSE BLDC GLUCOMTR-MCNC: 94 MG/DL (ref 70–99)
HCT VFR BLD AUTO: 34.2 %
HGB BLD-MCNC: 10.3 G/DL
IMM GRANULOCYTES # BLD AUTO: 0.07 X10(3) UL (ref 0–1)
IMM GRANULOCYTES NFR BLD: 0.4 %
LYMPHOCYTES # BLD AUTO: 2.42 X10(3) UL (ref 1–4)
LYMPHOCYTES NFR BLD AUTO: 14 %
MAGNESIUM SERPL-MCNC: 1.9 MG/DL (ref 1.6–2.6)
MCH RBC QN AUTO: 23.1 PG (ref 26–34)
MCHC RBC AUTO-ENTMCNC: 30.1 G/DL (ref 31–37)
MCV RBC AUTO: 76.7 FL
MONOCYTES # BLD AUTO: 1.11 X10(3) UL (ref 0.1–1)
MONOCYTES NFR BLD AUTO: 6.4 %
NEUTROPHILS # BLD AUTO: 13.69 X10 (3) UL (ref 1.5–7.7)
NEUTROPHILS # BLD AUTO: 13.69 X10(3) UL (ref 1.5–7.7)
NEUTROPHILS NFR BLD AUTO: 79 %
OSMOLALITY SERPL CALC.SUM OF ELEC: 293 MOSM/KG (ref 275–295)
PHOSPHATE SERPL-MCNC: 4 MG/DL (ref 2.4–5.1)
PLATELET # BLD AUTO: 284 10(3)UL (ref 150–450)
PLATELET # BLD AUTO: 284 10(3)UL (ref 150–450)
POTASSIUM SERPL-SCNC: 4.3 MMOL/L (ref 3.5–5.1)
POTASSIUM SERPL-SCNC: 4.3 MMOL/L (ref 3.5–5.1)
RBC # BLD AUTO: 4.46 X10(6)UL
SODIUM SERPL-SCNC: 139 MMOL/L (ref 136–145)
TROPONIN I SERPL HS-MCNC: 4714 NG/L
WBC # BLD AUTO: 17.3 X10(3) UL (ref 4–11)

## 2025-03-02 PROCEDURE — 99233 SBSQ HOSP IP/OBS HIGH 50: CPT | Performed by: INTERNAL MEDICINE

## 2025-03-02 PROCEDURE — 99232 SBSQ HOSP IP/OBS MODERATE 35: CPT | Performed by: OTHER

## 2025-03-02 PROCEDURE — B2111ZZ FLUOROSCOPY OF MULTIPLE CORONARY ARTERIES USING LOW OSMOLAR CONTRAST: ICD-10-PCS | Performed by: INTERNAL MEDICINE

## 2025-03-02 PROCEDURE — 4A023N7 MEASUREMENT OF CARDIAC SAMPLING AND PRESSURE, LEFT HEART, PERCUTANEOUS APPROACH: ICD-10-PCS | Performed by: INTERNAL MEDICINE

## 2025-03-02 PROCEDURE — 99233 SBSQ HOSP IP/OBS HIGH 50: CPT | Performed by: HOSPITALIST

## 2025-03-02 RX ORDER — NITROGLYCERIN 20 MG/100ML
INJECTION INTRAVENOUS
Status: COMPLETED
Start: 2025-03-02 | End: 2025-03-02

## 2025-03-02 RX ORDER — HEPARIN SODIUM 1000 [USP'U]/ML
INJECTION, SOLUTION INTRAVENOUS; SUBCUTANEOUS
Status: COMPLETED
Start: 2025-03-02 | End: 2025-03-02

## 2025-03-02 RX ORDER — SODIUM CHLORIDE 9 MG/ML
INJECTION, SOLUTION INTRAVENOUS CONTINUOUS
Status: ACTIVE | OUTPATIENT
Start: 2025-03-02 | End: 2025-03-02

## 2025-03-02 RX ORDER — INSULIN DEGLUDEC 100 U/ML
25 INJECTION, SOLUTION SUBCUTANEOUS DAILY
Status: DISCONTINUED | OUTPATIENT
Start: 2025-03-02 | End: 2025-03-02

## 2025-03-02 RX ORDER — METHYLPREDNISOLONE SODIUM SUCCINATE 40 MG/ML
40 INJECTION INTRAMUSCULAR; INTRAVENOUS EVERY 6 HOURS
Status: COMPLETED | OUTPATIENT
Start: 2025-03-02 | End: 2025-03-02

## 2025-03-02 RX ORDER — IOPAMIDOL 612 MG/ML
112 INJECTION, SOLUTION INTRAVASCULAR
Status: COMPLETED | OUTPATIENT
Start: 2025-03-02 | End: 2025-03-02

## 2025-03-02 RX ORDER — MIDAZOLAM HYDROCHLORIDE 1 MG/ML
INJECTION INTRAMUSCULAR; INTRAVENOUS
Status: DISCONTINUED
Start: 2025-03-02 | End: 2025-03-02 | Stop reason: WASHOUT

## 2025-03-02 RX ORDER — NITROGLYCERIN 20 MG/100ML
INJECTION INTRAVENOUS CONTINUOUS
Status: DISCONTINUED | OUTPATIENT
Start: 2025-03-02 | End: 2025-03-03

## 2025-03-02 RX ORDER — DEXTROSE MONOHYDRATE AND SODIUM CHLORIDE 5; .9 G/100ML; G/100ML
INJECTION, SOLUTION INTRAVENOUS CONTINUOUS
Status: DISCONTINUED | OUTPATIENT
Start: 2025-03-02 | End: 2025-03-02

## 2025-03-02 RX ORDER — LIDOCAINE HYDROCHLORIDE 20 MG/ML
INJECTION, SOLUTION INFILTRATION; PERINEURAL
Status: COMPLETED
Start: 2025-03-02 | End: 2025-03-02

## 2025-03-02 RX ORDER — DIPHENHYDRAMINE HYDROCHLORIDE 50 MG/ML
50 INJECTION INTRAMUSCULAR; INTRAVENOUS ONCE
Status: COMPLETED | OUTPATIENT
Start: 2025-03-02 | End: 2025-03-02

## 2025-03-02 RX ORDER — DEXTROSE MONOHYDRATE AND SODIUM CHLORIDE 5; .45 G/100ML; G/100ML
INJECTION, SOLUTION INTRAVENOUS CONTINUOUS
Status: DISCONTINUED | OUTPATIENT
Start: 2025-03-02 | End: 2025-03-02

## 2025-03-02 RX ORDER — ASPIRIN 81 MG/1
81 TABLET ORAL DAILY
Status: DISCONTINUED | OUTPATIENT
Start: 2025-03-03 | End: 2025-03-06

## 2025-03-02 NOTE — PROGRESS NOTES
[facility]  [address], [address]  [facility] [address]  [phone]        INPATIENT NEUROLOGY   FOLLOW UP PROGRESS NOTE  [facility]  part of [facility]    Anjel Pisano Patient Status:  Inpatient     3/30/1970 MRN [id number]    Location [facility] 2W/SW Attending Santiago Dwyer MD    Hosp Day # 1 PCP Yves Yanez MD    Date of Admission:  2025  Date of Consult Follow Up:  3/2/2025     Assessment and Plan:   Anjel Pisano is a 54 year old  a 54 year old woman  w/ a pmhx sig. for R sided strokes and R EC-IC bypass in  at OSH in [place], intracranial atherosclerosis, chronic left vertebral artery stenosis, moderate narrowing of the right subclavian artery,  intractable headaches, seizures on keppra, HTN, HLD, and DM who  p/w 3 days of fluctuating chest pain and difficulty breathing with radiation to the arm found to have elevated troponins (in the setting of chronically elevated troponins) who is being seen by neurology after patient became diaphoretic, panicked, intermittently aphasic, and hypotensive to the 60s over 40s after she was given 20 mg of IV hydralazine for blood pressures in the 210s over 70s.    Deficits she reports are likely chronic from her prior right hemisphere stroke.  Repeat MRI brain is pending.  She also has had chronic headaches for 2 years per chart review.  Only medication she can really get of a headache cocktail is Depakote.    On 3/2/2025 she complained of new chest pain with associated shortness of breath.  Also reported new left hand numbness, which improved after cardiac catheterization.  Her cardiac catheter revealed 50% mLAD lesion.  No intervention.  per cardiac cardiology does not account for her symptoms.  Patient reports her left hand  Numbness and?  Pain improved after cath.    Currently on nitroglycerin drip.  May exacerbate headaches      Acute intermittent aphasia In the setting  of hypotension known moyamoya with right ECA ICA bypass, intracranial atherosclerosis, and chronic left vertebral artery stenosis  Differential Diagnosis:  Secondary to acute hypotension     Plan       Neuro checks Q4.    MAP  > 65   at a minimum  SBP > 90.  Reperfusion therapy eligibility: patient is not eligible because: out of the  time window  not a candidate for thrombectomy because no large vessel occlusion  Agent and time of first antithrombotic administration (or contraindication):   Cont   BP goal:   MAP greater than 65.  SBP greater than 90.  Additional brain and vascular imaging ordered:   CTA head/neck  and CT stroke brain  ; MRI brain to evaluate for any interval infarcts.  Suspicion for interval stroke is low.  Okay for MRI brain cannot be completed as an inpatient.  Cardiac imaging: Telemetry   Additional labs ordered:   Labs: Fasting lipid panel and HgbA1C; troponins per cardiology  Dysphagia status:   DysphagiaNo acute facial droop; per RN swallow evaluation.  Fluids/nutrition:   ivfstroke : AVOID Hypotonic fluids in patients with acute ischemic stroke or cerebral edema.  Hypotonic fluids can worsen cerebral edema.  This includes D5 W, half-normal saline, and lactated Ringer's.  If patients need glucose then please use normal saline.  DVT prophylaxis:   SCD's while in bed  Therapy/rehab services ordered (speech/PT/OT/rehab consult):   No persistent deficits no rehab services indicated.   maintain oxygen saturation >94%. No supplemental oxygen  in nonhypoxic patients with acute ischemic stroke (AIS).  Tx hyperthermia (temperature >38°C) and identify source  Evidence indicates that persistent in-hospital hyperglycemia during the first 24 hours after AIS is associated with worse outcomes than normoglycemia and thus, it is reasonable to treat hyperglycemia to achieve blood glucose levels in a range of 140 to 180 mg/dL and to closely monitor to prevent hypoglycemia in patients with AIS.  Neuro  checks Q4.  Telemetry.  Neurosurgery consult.  Can be seen by  as an outpatient.  Also does have outpatient  follow-up with neurology at [facility] scheduled.               INTERVAL EVENTS  03/02/25: cardiac cath completed       SUBJECTIVE:   Patient seen immediately after cardiac catheterization.  Reports her left-sided symptoms have improved.  No new neurological deficits.  Denies any worsening of her headache.  Pertinent positive and negatives per HPI.  All others were reviewed and negative.       Objective   OBJECTIVE:   Last vitals and weight :  Blood pressure 141/81, pulse 71, temperature 97.7 °F (36.5 °C), temperature source Temporal, resp. rate 15, height 62\", weight 201 lb 8 oz (91.4 kg), SpO2 96%, not currently breastfeeding.   Vitals:    03/02/25 1155 03/02/25 1200 03/02/25 1215 03/02/25 1230   BP: 114/62 134/64 127/76 141/81   BP Location:  Right arm     Pulse: 73 68 68 71   Resp: 19 14 18 15   Temp:  97.7 °F (36.5 °C)     TempSrc:  Temporal     SpO2: 96% 99% 99% 96%   Weight:       Height:          Exam:  - General: appears stated age and no distress     - Pulmonary: no sign of respiratory distress.   Neurologic Exam  - Mental Status: Alert and attentive.   .  Speech is spontaneous, fluent, and prosodic. Comprehension intact. Repetition intact. Phrase length and rate are normal.   - Cranial Nerves: No gaze preference. Visual fields:normal  Pupils are 4mm briskly constricting to 3mm and equally round and reactive to light  in a well lit room. EOMI. No nystagmus. No ptosis. V1-V3 decreased to  light touch  on the left.questionable chronic/trace left-sided facial weakness.  Hearing grossly intact.  Tongue midline. No atrophy or fasiculations of the tongue noted. Palate and uvula elevate symmetrically.  Shoulder shrug symmetric.  - Motor:  normal tone, normal bulk. No interosseous wasting. No flattening of hypothenar eminences.    Right Left     Motor Strength    5 5   Knee  extensors 5 5    Pronator drift: No pronator drift   Leg Drift: none    Asterixis: No asterixis noted.    - Sensory:   Light touch: Decreased left hemibody       Medications:   nitroGLYCERIN   Rectal Q12H    methylPREDNISolone  40 mg Intravenous Q6H    [START ON 3/3/2025] aspirin  81 mg Oral Daily    amLODIPine  10 mg Oral Daily    atorvastatin  80 mg Oral Nightly    escitalopram  10 mg Oral Daily    gabapentin  100 mg Oral TID    hydrALAZINE  100 mg Oral Q8H PABLO    levETIRAcetam  500 mg Oral BID    losartan  100 mg Oral Daily    metoprolol succinate ER  100 mg Oral Daily    pantoprazole  40 mg Oral QAM AC    insulin   Subcutaneous TID AC and HS    furosemide  40 mg Intravenous BID (Diuretic)    insulin aspart  1-7 Units Subcutaneous TID CC and HS       PRNS:   ondansetron    acetaminophen    ipratropium-albuterol    zolpidem    alum-mag hydroxide-simethicone    labetalol    glucose **OR** glucose **OR** glucose-vitamin C **OR** dextrose **OR** glucose **OR** glucose **OR** glucose-vitamin C    nitroglycerin    HYDROcodone-acetaminophen    Infusions:    nitroGLYCERIN in dextrose 5% 5 mcg/min (03/02/25 1246)    dextrose 5%-sodium chloride 0.9% 50 mL/hr at 03/02/25 1013    sodium chloride      norepinephrine Stopped (03/01/25 0355)    continuous dose heparin 1,150 Units/hr (03/02/25 1019)            Results:   Laboratory Data:  Lab Results   Component Value Date    WBC 17.3 (H) 03/02/2025    HGB 10.3 (L) 03/02/2025    HCT 34.2 (L) 03/02/2025    .0 03/02/2025    .0 03/02/2025    CREATSERUM 0.91 03/02/2025    BUN 19 03/02/2025     03/02/2025    K 4.3 03/02/2025    K 4.3 03/02/2025     03/02/2025    CO2 27.0 03/02/2025     (H) 03/02/2025    CA 8.9 03/02/2025    ALB 4.0 03/02/2025    ALKPHO 87 03/01/2025    TP 6.5 03/01/2025    AST 12 03/01/2025    ALT 9 (L) 03/01/2025    PTT 48.8 (H) 03/02/2025    INR 1.0 08/31/2017    PTP 12.5 08/31/2017    T4F 0.66 08/20/2018    TSH 1.386 02/17/2025     LIP 19 05/24/2023    DDIMER 0.36 02/28/2025    ESRML 19 08/20/2018    MG 1.9 03/02/2025    PHOS 4.0 03/02/2025    TROP 0.03 06/27/2017    B12 580 05/26/2021    POCGLU 224 03/07/2024     Recent Results (from the past 72 hours)   EKG 12 Lead    Collection Time: 02/28/25  7:38 PM   Result Value Ref Range    Ventricular rate 76 BPM    Atrial rate 76 BPM    P-R Interval 136 ms    QRS Duration 126 ms    Q-T Interval 434 ms    QTC Calculation (Bezet) 488 ms    P Axis 34 degrees    R Axis 13 degrees    T Axis 12 degrees   Basic Metabolic Panel (8)    Collection Time: 02/28/25 10:06 PM   Result Value Ref Range    Glucose 78 70 - 99 mg/dL    Sodium 141 136 - 145 mmol/L    Potassium 3.8 3.5 - 5.1 mmol/L    Chloride 107 98 - 112 mmol/L    CO2 26.0 21.0 - 32.0 mmol/L    Anion Gap 8 0 - 18 mmol/L    BUN 12 9 - 23 mg/dL    Creatinine 0.75 0.55 - 1.02 mg/dL    BUN/CREA Ratio 16.0 10.0 - 20.0    Calcium, Total 9.4 8.7 - 10.4 mg/dL    Calculated Osmolality 291 275 - 295 mOsm/kg    eGFR-Cr 93 >=60 mL/min/1.73m2   CBC With Differential With Platelet    Collection Time: 02/28/25 10:06 PM   Result Value Ref Range    WBC 10.2 4.0 - 11.0 x10(3) uL    RBC 5.39 (H) 3.80 - 5.30 x10(6)uL    HGB 12.5 12.0 - 16.0 g/dL    HCT 41.1 35.0 - 48.0 %    MCV 76.3 (L) 80.0 - 100.0 fL    MCH 23.2 (L) 26.0 - 34.0 pg    MCHC 30.4 (L) 31.0 - 37.0 g/dL    RDW-SD 44.5 35.1 - 46.3 fL    RDW 16.4 (H) 11.0 - 15.0 %    .0 150.0 - 450.0 10(3)uL    Neutrophil Absolute Prelim 6.22 1.50 - 7.70 x10 (3) uL    Neutrophil Absolute 6.22 1.50 - 7.70 x10(3) uL    Lymphocyte Absolute 3.02 1.00 - 4.00 x10(3) uL    Monocyte Absolute 0.74 0.10 - 1.00 x10(3) uL    Eosinophil Absolute 0.13 0.00 - 0.70 x10(3) uL    Basophil Absolute 0.06 0.00 - 0.20 x10(3) uL    Immature Granulocyte Absolute 0.02 0.00 - 1.00 x10(3) uL    Neutrophil % 61.0 %    Lymphocyte % 29.6 %    Monocyte % 7.3 %    Eosinophil % 1.3 %    Basophil % 0.6 %    Immature Granulocyte % 0.2 %   Troponin I (High  Sensitivity)    Collection Time: 02/28/25 10:06 PM   Result Value Ref Range    Troponin I (High Sensitivity) 69 (HH) <=34 ng/L   Pro Beta Natriuretic Peptide    Collection Time: 02/28/25 10:06 PM   Result Value Ref Range    Pro-Beta Natriuretic Peptide 274 (H) <125 pg/mL   D-Dimer    Collection Time: 02/28/25 10:06 PM   Result Value Ref Range    D-Dimer 0.36 <0.56 ug/mL FEU   Lipid Panel    Collection Time: 02/28/25 10:06 PM   Result Value Ref Range    Cholesterol, Total 128 <200 mg/dL    HDL Cholesterol 32 (L) 40 - 59 mg/dL    Triglycerides 109 30 - 149 mg/dL    LDL Cholesterol 76 <100 mg/dL    VLDL 17 0 - 30 mg/dL    Non HDL Chol 96 <130 mg/dL   EKG 12 Lead    Collection Time: 03/01/25 12:20 AM   Result Value Ref Range    Ventricular rate 90 BPM    Atrial rate 90 BPM    P-R Interval 142 ms    QRS Duration 116 ms    Q-T Interval 410 ms    QTC Calculation (Bezet) 501 ms    P Axis 42 degrees    R Axis 50 degrees    T Axis 3 degrees   Troponin I (High Sensitivity)    Collection Time: 03/01/25 12:39 AM   Result Value Ref Range    Troponin I (High Sensitivity) 59 (HH) <=34 ng/L   POCT Glucose    Collection Time: 03/01/25 12:50 AM   Result Value Ref Range    POC Glucose  100 (H) 70 - 99 mg/dL   CBC With Differential With Platelet    Collection Time: 03/01/25 12:56 AM   Result Value Ref Range    WBC 9.4 4.0 - 11.0 x10(3) uL    RBC 5.03 3.80 - 5.30 x10(6)uL    HGB 11.8 (L) 12.0 - 16.0 g/dL    HCT 38.2 35.0 - 48.0 %    MCV 75.9 (L) 80.0 - 100.0 fL    MCH 23.5 (L) 26.0 - 34.0 pg    MCHC 30.9 (L) 31.0 - 37.0 g/dL    RDW-SD 44.0 35.1 - 46.3 fL    RDW 16.3 (H) 11.0 - 15.0 %    .0 150.0 - 450.0 10(3)uL    Neutrophil Absolute Prelim 3.40 1.50 - 7.70 x10 (3) uL    Neutrophil Absolute 3.40 1.50 - 7.70 x10(3) uL    Lymphocyte Absolute 5.30 (H) 1.00 - 4.00 x10(3) uL    Monocyte Absolute 0.60 0.10 - 1.00 x10(3) uL    Eosinophil Absolute 0.06 0.00 - 0.70 x10(3) uL    Basophil Absolute 0.03 0.00 - 0.20 x10(3) uL    Immature  Granulocyte Absolute 0.04 0.00 - 1.00 x10(3) uL    Neutrophil % 36.1 %    Lymphocyte % 56.2 %    Monocyte % 6.4 %    Eosinophil % 0.6 %    Basophil % 0.3 %    Immature Granulocyte % 0.4 %   Comp Metabolic Panel (14)    Collection Time: 03/01/25 12:56 AM   Result Value Ref Range    Glucose 86 70 - 99 mg/dL    Sodium 140 136 - 145 mmol/L    Potassium 3.6 3.5 - 5.1 mmol/L    Chloride 106 98 - 112 mmol/L    CO2 25.0 21.0 - 32.0 mmol/L    Anion Gap 9 0 - 18 mmol/L    BUN 12 9 - 23 mg/dL    Creatinine 0.74 0.55 - 1.02 mg/dL    BUN/CREA Ratio 16.2 10.0 - 20.0    Calcium, Total 9.0 8.7 - 10.4 mg/dL    Calculated Osmolality 289 275 - 295 mOsm/kg    eGFR-Cr 95 >=60 mL/min/1.73m2    ALT 9 (L) 10 - 49 U/L    AST 12 <34 U/L    Alkaline Phosphatase 87 46 - 118 U/L    Bilirubin, Total 0.5 0.3 - 1.2 mg/dL    Total Protein 6.5 5.7 - 8.2 g/dL    Albumin 3.8 3.2 - 4.8 g/dL    Globulin  2.7 2.0 - 3.5 g/dL    A/G Ratio 1.4 1.0 - 2.0   Scan slide    Collection Time: 03/01/25 12:56 AM   Result Value Ref Range    Slide Review See MD blood smear consultation.    MD BLOOD SMEAR CONSULT    Collection Time: 03/01/25 12:56 AM   Result Value Ref Range    MD Blood Smear Consult         Evaluation of CBC with differential data and the peripheral blood smear demonstrates microcytic anemia with normal absolute RBC count, and absolute lymphocytosis.     Red blood cells are overall normochromic and show anisopoikilocytosis, including microcytes and ovalocytes.    Lymphocytes are mildly increased and are mostly small to intermediate in size with occasional reactive forms, including large granular lymphocytes.    No significant morphologic abnormalities are seen for the other leukocyte subsets or platelets.      Overall, the differential considerations for the finding of a lymphocytosis consisting of mature-appearing cells includes infection (most commonly viral. others), drug effects, transient stress, chronic inflammatory/autoimmune disorders, and  possibly manifestations of a lymphoproliferative disorder (such as chronic lymphocytic leukemia, other) vs. non-Hodgkin lymphoma.     Main differential causes for an anemia of this type may include iron deficiency (most often  due to GI or /GYNE blood loss), some hemoglobinopathies (most commonly thalassemia minor, others), sideroblastic anemias and anemia of chronic disease.      Recommend clinical correlation and correlation with serum iron studies. If the results of the iron studies are unremarkable then hemoglobin electrophoresis may be helpful to investigate for possible hemoglobinopathy.     Reviewed by Shane Schwab M.D.       EKG 12 Lead    Collection Time: 03/01/25  2:08 AM   Result Value Ref Range    Ventricular rate 109 BPM    Atrial rate 109 BPM    P-R Interval 154 ms    QRS Duration 124 ms    Q-T Interval 372 ms    QTC Calculation (Bezet) 500 ms    P Axis 45 degrees    R Axis 114 degrees    T Axis 16 degrees   Troponin I (High Sensitivity)    Collection Time: 03/01/25  2:20 AM   Result Value Ref Range    Troponin I (High Sensitivity) 61 (HH) <=34 ng/L   POCT Glucose    Collection Time: 03/01/25  3:06 AM   Result Value Ref Range    POC Glucose  132 (H) 70 - 99 mg/dL   POCT Glucose    Collection Time: 03/01/25  3:31 AM   Result Value Ref Range    POC Glucose  131 (H) 70 - 99 mg/dL   EKG    Collection Time: 03/01/25  3:37 AM   Result Value Ref Range    Ventricular rate 110 BPM    Atrial rate 110 BPM    P-R Interval 158 ms    QRS Duration 124 ms    Q-T Interval 374 ms    QTC Calculation (Bezet) 506 ms    P Axis 43 degrees    R Axis 131 degrees    T Axis 6 degrees   Troponin I (High Sensitivity)    Collection Time: 03/01/25  4:43 AM   Result Value Ref Range    Troponin I (High Sensitivity) 180 (HH) <=34 ng/L   Basic Metabolic Panel (8)    Collection Time: 03/01/25  4:43 AM   Result Value Ref Range    Glucose 119 (H) 70 - 99 mg/dL    Sodium 141 136 - 145 mmol/L    Potassium 3.4 (L) 3.5 - 5.1 mmol/L    Chloride  108 98 - 112 mmol/L    CO2 24.0 21.0 - 32.0 mmol/L    Anion Gap 9 0 - 18 mmol/L    BUN 12 9 - 23 mg/dL    Creatinine 0.80 0.55 - 1.02 mg/dL    BUN/CREA Ratio 15.0 10.0 - 20.0    Calcium, Total 8.3 (L) 8.7 - 10.4 mg/dL    Calculated Osmolality 293 275 - 295 mOsm/kg    eGFR-Cr 86 >=60 mL/min/1.73m2   CBC With Differential With Platelet    Collection Time: 03/01/25  4:43 AM   Result Value Ref Range    WBC 17.8 (H) 4.0 - 11.0 x10(3) uL    RBC 4.61 3.80 - 5.30 x10(6)uL    HGB 10.9 (L) 12.0 - 16.0 g/dL    HCT 35.5 35.0 - 48.0 %    MCV 77.0 (L) 80.0 - 100.0 fL    MCH 23.6 (L) 26.0 - 34.0 pg    MCHC 30.7 (L) 31.0 - 37.0 g/dL    RDW-SD 44.8 35.1 - 46.3 fL    RDW 16.2 (H) 11.0 - 15.0 %    .0 150.0 - 450.0 10(3)uL    Neutrophil Absolute Prelim 16.56 (H) 1.50 - 7.70 x10 (3) uL    Neutrophil Absolute 16.56 (H) 1.50 - 7.70 x10(3) uL    Lymphocyte Absolute 0.84 (L) 1.00 - 4.00 x10(3) uL    Monocyte Absolute 0.27 0.10 - 1.00 x10(3) uL    Eosinophil Absolute 0.00 0.00 - 0.70 x10(3) uL    Basophil Absolute 0.03 0.00 - 0.20 x10(3) uL    Immature Granulocyte Absolute 0.11 0.00 - 1.00 x10(3) uL    Neutrophil % 93.0 %    Lymphocyte % 4.7 %    Monocyte % 1.5 %    Eosinophil % 0.0 %    Basophil % 0.2 %    Immature Granulocyte % 0.6 %   POCT Glucose    Collection Time: 03/01/25  6:39 AM   Result Value Ref Range    POC Glucose  120 (H) 70 - 99 mg/dL   EKG 12 Lead    Collection Time: 03/01/25  7:06 AM   Result Value Ref Range    Ventricular rate 95 BPM    Atrial rate 95 BPM    P-R Interval 152 ms    QRS Duration 128 ms    Q-T Interval 424 ms    QTC Calculation (Bezet) 532 ms    P Axis 47 degrees    R Axis 28 degrees    T Axis 12 degrees   Basic Metabolic Panel (8)    Collection Time: 03/01/25  9:15 AM   Result Value Ref Range    Glucose 128 (H) 70 - 99 mg/dL    Sodium 140 136 - 145 mmol/L    Potassium 5.1 3.5 - 5.1 mmol/L    Chloride 108 98 - 112 mmol/L    CO2 25.0 21.0 - 32.0 mmol/L    Anion Gap 7 0 - 18 mmol/L    BUN 13 9 - 23 mg/dL     Creatinine 0.79 0.55 - 1.02 mg/dL    BUN/CREA Ratio 16.5 10.0 - 20.0    Calcium, Total 8.5 (L) 8.7 - 10.4 mg/dL    Calculated Osmolality 292 275 - 295 mOsm/kg    eGFR-Cr 88 >=60 mL/min/1.73m2   CBC, Platelet; No Differential    Collection Time: 03/01/25  9:15 AM   Result Value Ref Range    WBC 16.8 (H) 4.0 - 11.0 x10(3) uL    RBC 4.68 3.80 - 5.30 x10(6)uL    HGB 11.2 (L) 12.0 - 16.0 g/dL    HCT 36.7 35.0 - 48.0 %    MCV 78.4 (L) 80.0 - 100.0 fL    MCH 23.9 (L) 26.0 - 34.0 pg    MCHC 30.5 (L) 31.0 - 37.0 g/dL    RDW 16.1 (H) 11.0 - 15.0 %    RDW-SD 46.3 35.1 - 46.3 fL    .0 150.0 - 450.0 10(3)uL   PTT, Activated    Collection Time: 03/01/25  9:15 AM   Result Value Ref Range    PTT 28.4 23.0 - 36.0 seconds   Troponin I (High Sensitivity)    Collection Time: 03/01/25  9:15 AM   Result Value Ref Range    Troponin I (High Sensitivity) 1,324 (HH) <=34 ng/L   PTT, Activated    Collection Time: 03/01/25  4:45 PM   Result Value Ref Range    PTT 91.8 (H) 23.0 - 36.0 seconds   Troponin I (High Sensitivity)    Collection Time: 03/01/25  4:45 PM   Result Value Ref Range    Troponin I (High Sensitivity) 3,691 (HH) <=34 ng/L   POCT Glucose    Collection Time: 03/01/25  4:54 PM   Result Value Ref Range    POC Glucose  90 70 - 99 mg/dL   POCT Glucose    Collection Time: 03/01/25  8:07 PM   Result Value Ref Range    POC Glucose  254 (H) 70 - 99 mg/dL   POCT Glucose    Collection Time: 03/01/25  9:34 PM   Result Value Ref Range    POC Glucose  215 (H) 70 - 99 mg/dL   PTT, Activated    Collection Time: 03/01/25 11:15 PM   Result Value Ref Range    PTT 43.7 (H) 23.0 - 36.0 seconds   Potassium    Collection Time: 03/02/25  5:46 AM   Result Value Ref Range    Potassium 4.3 3.5 - 5.1 mmol/L   Platelet Count    Collection Time: 03/02/25  5:46 AM   Result Value Ref Range    .0 150.0 - 450.0 10(3)uL   CBC With Differential With Platelet    Collection Time: 03/02/25  5:46 AM   Result Value Ref Range    WBC 17.3 (H) 4.0 - 11.0  x10(3) uL    RBC 4.46 3.80 - 5.30 x10(6)uL    HGB 10.3 (L) 12.0 - 16.0 g/dL    HCT 34.2 (L) 35.0 - 48.0 %    MCV 76.7 (L) 80.0 - 100.0 fL    MCH 23.1 (L) 26.0 - 34.0 pg    MCHC 30.1 (L) 31.0 - 37.0 g/dL    RDW-SD 45.4 35.1 - 46.3 fL    RDW 16.4 (H) 11.0 - 15.0 %    .0 150.0 - 450.0 10(3)uL    Neutrophil Absolute Prelim 13.69 (H) 1.50 - 7.70 x10 (3) uL    Neutrophil Absolute 13.69 (H) 1.50 - 7.70 x10(3) uL    Lymphocyte Absolute 2.42 1.00 - 4.00 x10(3) uL    Monocyte Absolute 1.11 (H) 0.10 - 1.00 x10(3) uL    Eosinophil Absolute 0.02 0.00 - 0.70 x10(3) uL    Basophil Absolute 0.01 0.00 - 0.20 x10(3) uL    Immature Granulocyte Absolute 0.07 0.00 - 1.00 x10(3) uL    Neutrophil % 79.0 %    Lymphocyte % 14.0 %    Monocyte % 6.4 %    Eosinophil % 0.1 %    Basophil % 0.1 %    Immature Granulocyte % 0.4 %   Renal Function Panel    Collection Time: 03/02/25  5:46 AM   Result Value Ref Range    Glucose 144 (H) 70 - 99 mg/dL    Sodium 139 136 - 145 mmol/L    Potassium 4.3 3.5 - 5.1 mmol/L    Chloride 107 98 - 112 mmol/L    CO2 27.0 21.0 - 32.0 mmol/L    Anion Gap 5 0 - 18 mmol/L    BUN 19 9 - 23 mg/dL    Creatinine 0.91 0.55 - 1.02 mg/dL    BUN/CREA Ratio 20.9 (H) 10.0 - 20.0    Calcium, Total 8.9 8.7 - 10.4 mg/dL    Calculated Osmolality 293 275 - 295 mOsm/kg    eGFR-Cr 74 >=60 mL/min/1.73m2    Albumin 4.0 3.2 - 4.8 g/dL    Phosphorus 4.0 2.4 - 5.1 mg/dL   Magnesium    Collection Time: 03/02/25  5:46 AM   Result Value Ref Range    Magnesium 1.9 1.6 - 2.6 mg/dL   PTT, Activated    Collection Time: 03/02/25  5:46 AM   Result Value Ref Range    PTT 48.8 (H) 23.0 - 36.0 seconds   Troponin I (High Sensitivity)    Collection Time: 03/02/25  8:08 AM   Result Value Ref Range    Troponin I (High Sensitivity) 4,714 (HH) <=34 ng/L   POCT Glucose    Collection Time: 03/02/25  9:41 AM   Result Value Ref Range    POC Glucose  104 (H) 70 - 99 mg/dL   EKG 12 Lead    Collection Time: 03/02/25  9:44 AM   Result Value Ref Range     Ventricular rate 70 BPM    Atrial rate 70 BPM    P-R Interval 146 ms    QRS Duration 116 ms    Q-T Interval 440 ms    QTC Calculation (Bezet) 475 ms    P Axis 46 degrees    R Axis 54 degrees    T Axis 16 degrees   POCT Glucose    Collection Time: 03/02/25 10:07 AM   Result Value Ref Range    POC Glucose  100 (H) 70 - 99 mg/dL   POCT Glucose    Collection Time: 03/02/25 10:41 AM   Result Value Ref Range    POC Glucose  94 70 - 99 mg/dL   POCT Glucose    Collection Time: 03/02/25 11:53 AM   Result Value Ref Range    POC Glucose  113 (H) 70 - 99 mg/dL         Test results/Imaging:   CTA BRAIN + CTA CAROTIDS (CPT=70496/23942)    Result Date: 3/1/2025  CONCLUSION:   No acute intracranial hemorrhage, hydrocephalus, or mass effect.  If there is clinical concern for acute ischemic stroke, MRI of the brain is recommended.  Unchanged complete or near complete occlusion of the left V1 vertebral artery, with additional irregularity of the V2 segment.  Findings are concerning for age chronic vertebral artery dissection.  Right external to internal carotid artery bypass.  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  Retrograde reconstitution of flow in the distal intracranial ICA, similar to prior.  Chronic hypoenhancement of the left ZARA.  Otherwise, No large vessel occlusion involving the major arterial branches of the [id number].  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  Moderate narrowing at the right subclavian artery origin.  Correlate for subclavian steal.  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  Small subcentimeter lucent lesions throughout the calvarium, nonspecific.  Recommend correlation with clinical/laboratory findings for myelomatous process.  A preliminary report was issued by the [facility] teleradiology service. There are no clinically actionable discrepancies.  Report was initially discussed with Dr. Staley At 1:55  a.m. Eastern time by Dr. Rojas    Dictated by (CST): Antonieta Hart MD on 3/01/2025 at 8:24 AM     Finalized by (CST): Antonieta Hart MD on 3/01/2025 at 8:39 AM          CTA BRAIN + CTA CAROTIDS (CPT=70496/76197)    Result Date: 1/24/2025  CONCLUSION:   1. No acute intracranial abnormality identified.  2. Small area of encephalomalacia along the posterior right temporal lobe.  Medium-sized area of encephalomalacia in the right frontal lobe.  Small area of encephalomalacia in the left parietal lobe.  Mild age-indeterminate microvascular ischemic changes  in the cerebral white matter. If there is clinical concern for acute ischemia/infarction, an MRI of the brain would be recommended for further evaluation.  3. There are postoperative changes along the right carotid bifurcation.  There also changes of a previous right pterional craniotomy with a branch of the right external carotid artery extending to the surface of the right cerebrum in the region of the right middle cerebral artery branches.  4. The right cervical internal carotid artery is occluded with reconstitution in the region of the mid cavernous segment.  There is moderate to severe mixed plaque in the cavernous and supraclinoid segments of the internal carotid arteries which are diminutive in caliber with areas of moderate to severe stenosis of greater than 75%.  There are wispy branches of the bilateral anterior cerebral artery A1 segments as well as markedly diminutive caliber of the remainder of the left anterior cerebral artery.  These findings may all be within the spectrum of moyamoya disease, with other etiologies not entirely excluded.  Clinical correlation recommended.  5. The right subclavian artery origin is obscured by beam hardening from the contrast bolus, however underlying high-grade stenosis is not excluded.  Clinical correlation for a signs or symptoms of subclavian steal is suggested.  This could be further assessed with ultrasound  Doppler exam as clinically directed.  6. There is high-grade stenosis and/or occlusion of the origin and proximal V1 segment of the left vertebral artery which is somewhat limited in evaluation due to beam hardening from body habitus.  This could be further assessed with conventional angiography as clinically directed.  There is moderate atherosclerotic irregularity and narrowing in the distal V1 and proximal V2 segments of the left vertebral artery.    Please see above for further details.  The radiology support staff is in the process of contacting the referring physician regarding this report.   LOCATION:  [facility]   Dictated by (CST): Pee Willis MD on 1/24/2025 at 3:24 PM     Finalized by (CST): Pee Willis MD on 1/24/2025 at 3:41 PM       EKG 12 Lead    Result Date: 3/2/2025  Normal sinus rhythm Right bundle branch block Abnormal ECG When compared with ECG of 01-MAR-2025 07:06, Nonspecific T wave abnormality now evident in Anterior leads QT has shortened    EKG 12 Lead    Result Date: 3/2/2025  Normal sinus rhythm Right bundle branch block Abnormal ECG When compared with ECG of 01-MAR-2025 03:37, The axis Shifted left ST no longer depressed in Anterior-lateral leads T wave inversion no longer evident in Anterior leads    EKG    Result Date: 3/2/2025  Sinus tachycardia Indeterminate axis Right bundle branch block Marked ST abnormality, possible inferior subendocardial injury Marked ST abnormality, possible anterolateral subendocardial injury Abnormal ECG When compared with ECG of 01-MAR-2025 02:08, No significant change was found    EKG 12 Lead    Result Date: 3/2/2025  Sinus tachycardia Right bundle branch block Marked ST abnormality, possible inferior subendocardial injury Marked ST abnormality, possible anterolateral subendocardial injury Abnormal ECG When compared with ECG of 01-MAR-2025 00:20, The axis Shifted right ST more depressed in Anterior-lateral leads    EKG 12 Lead    Result Date:  3/2/2025  Normal sinus rhythm Right bundle branch block Abnormal ECG When compared with ECG of 28-FEB-2025 19:38, ST now depressed in Anterior leads    EKG 12 Lead    Result Date: 3/2/2025  Normal sinus rhythm Right bundle branch block Abnormal ECG When compared with ECG of 20-AUG-2024 13:13, QT has lengthened     Performed an independent visualization of:  CT brain WO   Imaging revealed: Agree with radiology read.    Education/Instructions given to: patient   Barriers to Learning:None  Content: Refer to note above. Evaluation/Outcome: Verbalized understanding    Disclaimer:   This record was dictated using Dragon software. There may be errors due to voice recognition problems that were not realized and corrected during the completion of the note.      This document is not intended to support charting by exception.  Sections left blank in a completed note should be presumed not to have been done.     Total time involved in care the patient clued reviewing the prior records, reviewing the notes, reviewing the imaging, and face to face time was 35 minutes, more than 50% of the time was spent in counseling and/or coordination of care related to moyamoya, intracranial atherosclerosis.    Thank you.  Paddy Craig D.O.   Vascular & General Neurology    3/2/2025  12:55 PM

## 2025-03-02 NOTE — OPERATIVE REPORT
[facility]  part of [facility]    Cardiac Catheterization Note    Primary Proceduralist: Yobani Johnson MD  Procedure Performed: [facility] and [facility]  Date of Procedure: 3/2/2025   Indication: NSTEMI  Pre Operative Diagnosis: NSTEMI  Post Operative Diagnosis: Non-obstructive CAD  Estimated blood loss: <5 cc  Specimens: None    Consent:   Informed written consent was obtained from the patient after risk benefits and alternative explained the patient.  Patient agreed to proceed    Sedation  IV conscious sedation was achieved with Versed and fentanyl.  It was administered under my direct supervision.  Hemodynamic monitoring took place throughout the entire procedure by myself in the Cath Lab staff.  25mcg of Fentanyl were given.  Start Time: 1052 End Time: 1125      Description of the procedure: After written informed consent was obtained from the patient, patient was brought to the cardiac catheterization laboratory in a stable condition and fasting state.  Patient was prepped and draped in the usual sterile fashion.  IV conscious sedation was achieved with Versed and fentanyl.  Lidocaine 1% was used to infiltrate the left femoral artery, micropuncture and ultrasound were used to obtain access and 6 Swedish sheath was placed.  A JR4 for was used to selectively engage the  right coronary system to form angiography.  Initially a JL 4 was attempted to selectively engage the left coronary system however this catheter was too long therefore a JL 3 5 was used.  The selectively engage the left circumflex diagnostic angiogram was performed.  Next a JL 3.0 was used and this was able to selectively engage the LAD and diagnostic angiography was performed.  A pigtail catheter was advanced over wire into the LV and hemodynamics were obtained.    Coronary Angiogram Findings:  LM: Short left main, no significant disease  LAD: LAD is a small caliber vessel which does wraparound the apex.  It gives off a  moderate-sized diagonal 1 which has luminal irregularities.  At the bifurcation of this diagonal the LAD has a 20 to 30% stenosis.  In the mid LAD there is a 50% stenosis.  LCX: Medium to large caliber artery giving rise to OM branches.  Overall small caliber vessels.  There is a 20 to 30% mid OM2 lesion, this is a branching vessel and there was diffuse distal disease nonobstructive.  RCA: Large caliber artery giving rise to acute marginal branches, and bifurcates into RPDA & RPL.  There is diffuse luminal irregularities however no obstructive disease.    Hemodynamics:  LVEDP 19 mmHg  No significant gradient upon Ao-LV pullback    Monitored sedation administered by the cath lab RN, and supervised throughout the procedure by myself. Total time 33 minutes.     Closure: Femoral angiogram performed.  Perclose was deployed    No immediate complications.  None    A/P:  Nonobstructive coronary artery disease.  No clear explanation for patient's troponin elevation on this angiogram.  Suspect there was some supply demand mismatch from patient was significantly hypotensive following her CT scan.  Continue to treat her chest pain medically, may have some microvascular disease versus other etiologies which will be evaluated.  Can restart heparin drip in 4 hours, would continue until tomorrow then stop  Continue aspirin and metoprolol  Continue high intensity statin  Bedrest for 4 hours  Return to floor for further management    Yobani Johnson MD  Interventional Cardiology  [facility]

## 2025-03-02 NOTE — PLAN OF CARE
Pt is A&Ox4, on 2 L NC, no complaints of pain. On heparin gtt. Bed is locked and in lowest position. Call light is within reach.  Problem: Patient Centered Care  Goal: Patient preferences are identified and integrated in the patient's plan of care  Description: Interventions:  - What would you like us to know as we care for you?   - Provide timely, complete, and accurate information to patient/family  - Incorporate patient and family knowledge, values, beliefs, and cultural backgrounds into the planning and delivery of care  - Encourage patient/family to participate in care and decision-making at the level they choose  - Honor patient and family perspectives and choices  Outcome: Progressing     Problem: CARDIOVASCULAR - ADULT  Goal: Maintains optimal cardiac output and hemodynamic stability  Description: INTERVENTIONS:  - Monitor vital signs, rhythm, and trends  - Monitor for bleeding, hypotension and signs of decreased cardiac output  - Evaluate effectiveness of vasoactive medications to optimize hemodynamic stability  - Monitor arterial and/or venous puncture sites for bleeding and/or hematoma  - Assess quality of pulses, skin color and temperature  - Assess for signs of decreased coronary artery perfusion - ex. Angina  - Evaluate fluid balance, assess for edema, trend weights  Outcome: Progressing  Goal: Absence of cardiac arrhythmias or at baseline  Description: INTERVENTIONS:  - Continuous cardiac monitoring, monitor vital signs, obtain 12 lead EKG if indicated  - Evaluate effectiveness of antiarrhythmic and heart rate control medications as ordered  - Initiate emergency measures for life threatening arrhythmias  - Monitor electrolytes and administer replacement therapy as ordered  Outcome: Progressing     Problem: RESPIRATORY - ADULT  Goal: Achieves optimal ventilation and oxygenation  Description: INTERVENTIONS:  - Assess for changes in respiratory status  - Assess for changes in mentation and behavior  -  Position to facilitate oxygenation and minimize respiratory effort  - Oxygen supplementation based on oxygen saturation or ABGs  - Provide Smoking Cessation handout, if applicable  - Encourage broncho-pulmonary hygiene including cough, deep breathe, Incentive Spirometry  - Assess the need for suctioning and perform as needed  - Assess and instruct to report SOB or any respiratory difficulty  - Respiratory Therapy support as indicated  - Manage/alleviate anxiety  - Monitor for signs/symptoms of CO2 retention  Outcome: Progressing     Problem: GASTROINTESTINAL - ADULT  Goal: Minimal or absence of nausea and vomiting  Description: INTERVENTIONS:  - Maintain adequate hydration with IV or PO as ordered and tolerated  - Nasogastric tube to low intermittent suction as ordered  - Evaluate effectiveness of ordered antiemetic medications  - Provide nonpharmacologic comfort measures as appropriate  - Advance diet as tolerated, if ordered  - Obtain nutritional consult as needed  - Evaluate fluid balance  Outcome: Progressing     Problem: METABOLIC/FLUID AND ELECTROLYTES - ADULT  Goal: Glucose maintained within prescribed range  Description: INTERVENTIONS:  - Monitor Blood Glucose as ordered  - Assess for signs and symptoms of hyperglycemia and hypoglycemia  - Administer ordered medications to maintain glucose within target range  - Assess barriers to adequate nutritional intake and initiate nutrition consult as needed  - Instruct patient on self management of diabetes  Outcome: Progressing  Goal: Electrolytes maintained within normal limits  Description: INTERVENTIONS:  - Monitor labs and rhythm and assess patient for signs and symptoms of electrolyte imbalances  - Administer electrolyte replacement as ordered  - Monitor response to electrolyte replacements, including rhythm and repeat lab results as appropriate  - Fluid restriction as ordered  - Instruct patient on fluid and nutrition restrictions as appropriate  Outcome:  Progressing  Goal: Hemodynamic stability and optimal renal function maintained  Description: INTERVENTIONS:  - Monitor labs and assess for signs and symptoms of volume excess or deficit  - Monitor intake, output and patient weight  - Monitor urine specific gravity, serum osmolarity and serum sodium as indicated or ordered  - Monitor response to interventions for patient's volume status, including labs, urine output, blood pressure (other measures as available)  - Encourage oral intake as appropriate  - Instruct patient on fluid and nutrition restrictions as appropriate  Outcome: Progressing     Problem: MUSCULOSKELETAL - ADULT  Goal: Return mobility to safest level of function  Description: INTERVENTIONS:  - Assess patient stability and activity tolerance for standing, transferring and ambulating w/ or w/o assistive devices  - Assist with transfers and ambulation using safe patient handling equipment as needed  - Ensure adequate protection for wounds/incisions during mobilization  - Obtain PT/OT consults as needed  - Advance activity as appropriate  - Communicate ordered activity level and limitations with patient/family  Outcome: Progressing  Goal: Maintain proper alignment of affected body part  Description: INTERVENTIONS:  - Support and protect limb and body alignment per provider's orders  - Instruct and reinforce with patient and family use of appropriate assistive device and precautions (e.g. spinal or hip dislocation precautions)  Outcome: Progressing     Problem: Impaired Functional Mobility  Goal: Achieve highest/safest level of mobility/gait  Description: Interventions:  - Assess patient's functional ability and stability  - Promote increasing activity/tolerance for mobility and gait  - Educate and engage patient/family in tolerated activity level and precautions    Outcome: Progressing

## 2025-03-02 NOTE — BRIEF OP NOTE
Post cath note:    Patient presented to cath lab for LHC in the setting of chest pain, NSTEMI    [facility] with 50% mLAD lesion, otherwise non-obstructive disease. Likely does not explain her symptoms    LVEDP 19 mmHg    Plan  4 hours bed rest  Wean nitroglycerin ggt, restart BP meds    Full report to follow

## 2025-03-02 NOTE — SIGNIFICANT EVENT
Called by nurse, pt c/o new onset chest pain with associated shortness of breath. Cardiology notified by nurse.  EKG without acute changes (seen by cards as well). Troponin level 4714.  Her most recent blood glucose in 104 on insulin pump.  Cardiology is planning to take her for cardiac angiogram- will stop insulin pump and start D5NS at 50/hr with close monitoring of blood glucose.  She is allergic to IV contrast- premed ordered by cardiology, heparin and ntg gtt started.     On exam she appears in moderate distress, VSS, will increase oxygen and add bubbler.    Will monitor closely- titrated NTG gtt, f/u on angiogram    Cedric Sosa, Yuliya updated.    JEFF HERNÁNEDZ APRN/CNP  Acute Care Nurse Practitioner  3/2/2025  [phone]

## 2025-03-02 NOTE — PROGRESS NOTES
[facility]  part of [facility]    Progress Note    Anjel Pisano Patient Status:  Inpatient    3/30/1970 MRN [id number]   Location [facility] 2W/ Attending Santiago Dwyer MD   Hosp Day # 1 PCP Yves Yanez MD     Subjective:   Anjel Pisano is a(n) 54 year old female  Chest pain, unspecified type:     PMH of CAD, HTN, HLD, CVA with right sided residual weakness, right vertebral artery stenosis, bilateral carotid artery stenosis s/p CEA, DM II and CKD who presented to the ED with c/o chest pain and dyspnea      t2DM on iLet pump . Started in 2025    D/w Rn last night and this AM     Going to cath lab   Pump was disconnected ~ 940AM        Objective:   Vital Signs:  Blood pressure 120/70, pulse 71, temperature 97 °F (36.1 °C), temperature source Temporal, resp. rate 22, height 5' 2\" (1.575 m), weight 201 lb 8 oz (91.4 kg), SpO2 97%, not currently breastfeeding.                      Assessment and Plan:     Patient Active Problem List   Diagnosis    Edema of both legs    Neck pain    Apnea    Gastroesophageal reflux disease without esophagitis    Type 2 diabetes mellitus with neurologic complication (HCC)    Meibomian gland dysfunction (MGD), bilateral, both upper and lower lids    Age-related nuclear cataract of both eyes    Abscess of abdominal wall    Allergic rhinitis    Essential hypertension    TMJ (dislocation of temporomandibular joint)    Iron deficiency anemia    Leukocytosis    Plantar fasciitis of left foot    Type 2 diabetes mellitus with complication, with long-term current use of insulin (HCC)    Left-sided cerebrovascular accident (CVA) (HCC)    History of craniotomy    Family history of colon cancer    Abnormal uterine bleeding (AUB)    Transient alteration of awareness    Altered mental status    Depression    Need for dental care    Type 1 diabetes mellitus without retinopathy (HCC)    S/P colonoscopic polypectomy    Gastropathy    S/P carotid  endarterectomy    Carotid atherosclerosis    Dyslipidemia    Pre-op testing    Pure hypercholesterolemia    Anemia    CKD stage 3 due to type 1 diabetes mellitus (HCC)    Type 2 diabetes mellitus with hyperglycemia, with long-term current use of insulin (HCC)    Weight gain    Obesity (BMI 30-39.9)    Migraine without status migrainosus, not intractable    Nausea    Primary hypertension    Hernia of abdominal wall    Morbid (severe) obesity due to excess calories (HCC)    Left upper quadrant abdominal pain    Cataracta    Cervical radiculopathy    Arthropathy of cervical facet joint    Left carotid stenosis    Essential hypertension with goal blood pressure less than 140/90    Chest pain    Chest pain, unspecified type    Hypotension    Vertebral artery dissection (HCC)    NSTEMI (non-ST elevated myocardial infarction) (HCC)    Hypotension, unspecified hypotension type    Insulin pump in place    Uncontrolled type 2 diabetes mellitus with hyperglycemia (HCC)    Mild non proliferative diabetic retinopathy (HCC)    Seizures (HCC)    Weakness on right side of face    Right-sided extracranial carotid artery stenosis    Tapia tapia disease      . Uncontrolled complicated Diabetes Mellitus Type 2, on insulin pump   - carb controlled diet if ok to eat  - Hypoglycemia protocol     D/w Pt and nurses . She is on D5 now and off pump  but will connect post cath.         Thank you for allowing me to participate in the care of your patient.    Results:     Lab Results   Component Value Date    WBC 17.3 (H) 03/02/2025    HGB 10.3 (L) 03/02/2025    HCT 34.2 (L) 03/02/2025    .0 03/02/2025    .0 03/02/2025    CREATSERUM 0.91 03/02/2025    BUN 19 03/02/2025     03/02/2025    K 4.3 03/02/2025    K 4.3 03/02/2025     03/02/2025    CO2 27.0 03/02/2025     (H) 03/02/2025    CA 8.9 03/02/2025    ALB 4.0 03/02/2025    ALKPHO 87 03/01/2025    BILT 0.5 03/01/2025    TP 6.5 03/01/2025    AST 12 03/01/2025    ALT  9 (L) 03/01/2025    PTT 48.8 (H) 03/02/2025    INR 1.0 08/31/2017    PT 12.8 09/26/2016    T4F 0.66 08/20/2018    TSH 1.386 02/17/2025    LIP 19 05/24/2023    DDIMER 0.36 02/28/2025    ESRML 19 08/20/2018    MG 1.9 03/02/2025    PHOS 4.0 03/02/2025    TROP 0.03 06/27/2017    B12 580 05/26/2021    POCGLU 224 03/07/2024       XR CHEST AP PORTABLE  (CPT=71045)    Result Date: 3/1/2025  CONCLUSION:   Mild cardiomegaly with interstitial pulmonary edema.  A preliminary report was issued by the [facility] teleradiology service. There are no clinically actionable discrepancies.    Dictated by (CST): Antonieta Hart MD on 3/01/2025 at 9:48 AM     Finalized by (CST): Antonieta Hart MD on 3/01/2025 at 9:49 AM          CTA BRAIN + CTA CAROTIDS (CPT=70496/68478)    Result Date: 3/1/2025  CONCLUSION:   No acute intracranial hemorrhage, hydrocephalus, or mass effect.  If there is clinical concern for acute ischemic stroke, MRI of the brain is recommended.  Unchanged complete or near complete occlusion of the left V1 vertebral artery, with additional irregularity of the V2 segment.  Findings are concerning for age chronic vertebral artery dissection.  Right external to internal carotid artery bypass.  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  Retrograde reconstitution of flow in the distal intracranial ICA, similar to prior.  Chronic hypoenhancement of the left ZARA.  Otherwise, No large vessel occlusion involving the major arterial branches of the [id number].  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  Moderate narrowing at the right subclavian artery origin.  Correlate for subclavian steal.  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  Small subcentimeter lucent lesions throughout the calvarium, nonspecific.  Recommend correlation with clinical/laboratory findings for myelomatous process.  A preliminary report was issued by the [facility] teleradiology service. There are no clinically actionable discrepancies.  Report was initially discussed with Dr. Staley At 1:55 a.m. Eastern time by Dr. Rojas    Dictated by (CST): Antonieta Hart MD on 3/01/2025 at 8:24 AM     Finalized by (CST): Antonieta Hart MD on 3/01/2025 at 8:39 AM          XR CHEST AP PORTABLE  (CPT=71045)    Result Date: 3/1/2025  CONCLUSION:   Increased interstitial markings bilaterally, which may be secondary to interstitial pulmonary edema or atypical/viral pneumonia in the appropriate clinical setting.    A preliminary report was issued by the [facility] teleradiology service. There are no clinically actionable discrepancies.  Dictated by (CST): Antonieta Hart MD on 3/01/2025 at 6:27 AM     Finalized by (CST): Antonieta Hart MD on 3/01/2025 at 6:28 AM         EKG 12 Lead    Result Date: 3/2/2025  Normal sinus rhythm Right bundle branch block Abnormal ECG When compared with ECG of 01-MAR-2025 03:37, The axis Shifted left ST no longer depressed in Anterior-lateral leads T wave inversion no longer evident in Anterior leads    EKG    Result Date: 3/2/2025  Sinus tachycardia Indeterminate axis Right bundle branch block Marked ST abnormality, possible inferior subendocardial injury Marked ST abnormality, possible anterolateral subendocardial injury Abnormal ECG When compared with ECG of 01-MAR-2025 02:08, No significant change was found    EKG 12 Lead    Result Date: 3/2/2025  Sinus tachycardia Right bundle branch block Marked ST abnormality, possible inferior subendocardial injury Marked ST abnormality, possible anterolateral subendocardial injury Abnormal ECG When compared with ECG of 01-MAR-2025 00:20, The axis Shifted right ST more depressed in Anterior-lateral leads    EKG 12 Lead    Result Date: 3/2/2025  Normal sinus rhythm Right bundle branch block Abnormal ECG When compared with ECG of 28-FEB-2025 19:38, ST now depressed in Anterior leads    EKG 12  Lead    Result Date: 3/2/2025  Normal sinus rhythm Right bundle branch block Abnormal ECG When compared with ECG of 20-AUG-2024 13:13, QT has lengthened       Lab Results   Component Value Date    A1C 9.2 (A) 02/11/2025    A1C 10.6 (A) 09/23/2024    A1C 10.2 (A) 05/23/2024    A1C 9.9 (H) 05/23/2024    A1C 10.4 (A) 02/29/2024       No results for input(s): \"TSH\", \"T4F\", \"T3F\", \"THYP\" in the last 72 hours.  @LAB(TSH)@      Obdulio Serra MD  3/2/2025        DOS is the same date the note was signed

## 2025-03-02 NOTE — PROGRESS NOTES
[facility]  part of [facility]    Progress Note    Anjel Pisano Patient Status:  Inpatient    3/30/1970 MRN [id number]   Location [facility] 2W/SW Attending Santiago Dwyer MD   Hosp Day # 1 PCP Yves Yanez MD       Subjective:   Anjel Pisano is a(n) 54 year old female was seen and examined  This AM had sob and cp, warranting a cardiac angiogram  Currently she is resting in bed, NAD  No cp or sob  No f,c,n,v or HA  Sister at bedside     Objective:   Blood pressure 141/81, pulse 71, temperature 97.7 °F (36.5 °C), temperature source Temporal, resp. rate 15, height 5' 2\" (1.575 m), weight 201 lb 8 oz (91.4 kg), SpO2 96%, not currently breastfeeding.    GENERAL:  The patient appeared to be in no distress and was comfortable.  SKIN:  Warm and hydrated  PSYCHIATRIC: Calm and cooperative    HEENT:  Head was atraumatic and normocephalic.  Eyes: Sclera was anicteric.  Pupils were equal.  Ears:  There were no lesions.  Nose:  No lesions were noted.      NECK:  Supple.  There was no JVD.    CHEST:  Symmetrical movement on inspiration  CARDIAC: S1 S2+, RRR  LUNGS:  CTAB with decreased entry at bases   ABDOMEN: Non-distended, non-tender, BS+  MUSCULOSKELETAL:  There was no deformity.  There was full range of motion in all the extremities.    EXTREMITIES: There was no edema  NEUROLOGIC: R sided weakness    Current Inpatient Medications:     Current Facility-Administered Medications:     nitroGLYCERIN in dextrose 5% 50 mg/250mL infusion premix, 5-400 mcg/min, Intravenous, Continuous    nitroGLYCERIN 0.4% rectal ointment, , Rectal, Q12H    methylPREDNISolone sodium succinate (Solu-MEDROL) injection 40 mg, 40 mg, Intravenous, Q6H    sodium chloride 0.9% infusion, , Intravenous, Continuous    [START ON 3/3/2025] aspirin DR tab 81 mg, 81 mg, Oral, Daily    amLODIPine (Norvasc) tab 10 mg, 10 mg, Oral, Daily    atorvastatin (Lipitor) tab 80 mg, 80 mg, Oral, Nightly    escitalopram (Lexapro)  tab 10 mg, 10 mg, Oral, Daily    gabapentin (Neurontin) cap 100 mg, 100 mg, Oral, TID    hydrALAZINE (Apresoline) tab 100 mg, 100 mg, Oral, Q8H PABLO    levETIRAcetam (Keppra) tab 500 mg, 500 mg, Oral, BID    losartan (Cozaar) tab 100 mg, 100 mg, Oral, Daily    metoprolol succinate ER (Toprol XL) 24 hr tab 100 mg, 100 mg, Oral, Daily    pantoprazole (Protonix) DR tab 40 mg, 40 mg, Oral, QAM AC    ondansetron (Zofran) 4 MG/2ML injection 4 mg, 4 mg, Intravenous, Q6H PRN    acetaminophen (Tylenol) tab 650 mg, 650 mg, Oral, Q6H PRN    ipratropium-albuterol (Duoneb) 0.5-2.5 (3) MG/3ML inhalation solution 3 mL, 3 mL, Nebulization, Q6H PRN    zolpidem (Ambien) tab 5 mg, 5 mg, Oral, Nightly PRN    alum-mag hydroxide-simethicone (Maalox) 200-200-20 MG/5ML oral suspension 30 mL, 30 mL, Oral, QID PRN    labetalol (Trandate) 5 mg/mL injection 10 mg, 10 mg, Intravenous, Q4H PRN    glucose (Dex4) 15 GM/59ML oral liquid 15 g, 15 g, Oral, Q15 Min PRN **OR** glucose (Glutose) 40% oral gel 15 g, 15 g, Oral, Q15 Min PRN **OR** glucose-vitamin C (Dex-4) chewable tab 4 tablet, 4 tablet, Oral, Q15 Min PRN **OR** dextrose 50% injection 50 mL, 50 mL, Intravenous, Q15 Min PRN **OR** glucose (Dex4) 15 GM/59ML oral liquid 30 g, 30 g, Oral, Q15 Min PRN **OR** glucose (Glutose) 40% oral gel 30 g, 30 g, Oral, Q15 Min PRN **OR** glucose-vitamin C (Dex-4) chewable tab 8 tablet, 8 tablet, Oral, Q15 Min PRN    norepinephrine (Levophed) 4 mg/250mL infusion premix, 0.5-50 mcg/min, Intravenous, Continuous    nitroglycerin (Nitrostat) SL tab 0.4 mg, 0.4 mg, Sublingual, Q5 Min PRN    HYDROcodone-acetaminophen (Norco) 5-325 MG per tab 1 tablet, 1 tablet, Oral, Q6H PRN    heparin (Porcine) 11184 units/250mL infusion ACS/AFIB CONTINUOUS, 200-3,000 Units/hr, Intravenous, Continuous    insulin via iLet Insulin Pump, , Subcutaneous, TID AC and HS    furosemide (Lasix) 10 mg/mL injection 40 mg, 40 mg, Intravenous, BID (Diuretic)    insulin aspart (NovoLOG) 100  Units/mL FlexPen 1-7 Units, 1-7 Units, Subcutaneous, TID CC and HS    Assessment and Plan:   Chest pain possible ACS  EKG with ischemic changes, troponin up trended this AM and pt had cp  Subsequent cardiac cath showing 50% mLAD lesion, otherwise non-obstructive disease  Remains on heparin gtt, pt denies any cp currently  2d echo reviewed     Accelerated hypertension  Responded to IV hydralazine by significant drop in blood pressure  BP stable currently      Acute on chronic diastolic heart failure  X-ray indicated above pulmonary edema  Started on Lasix 40 mg IV daily  Monitor I's and O's and daily weights  Net IO Since Admission: 281.93 mL [03/02/25 1440]    Insulin requiring diabetes with associated neuropathy  Endocrinology consulted  Has insulin pump  Cont to monitor BS     Acute encephalopathy, likely ischemic  Likely triggered by sudden drop in blood pressure  Pt back to baseline      Prophylaxis  Subcutaneous heparin     CODE STATUS  Full     MDM: High         Results:     Recent Labs   Lab 03/01/25  0056 03/01/25  0443 03/01/25  0915 03/02/25  0546   RBC 5.03 4.61 4.68 4.46   HGB 11.8* 10.9* 11.2* 10.3*   HCT 38.2 35.5 36.7 34.2*   MCV 75.9* 77.0* 78.4* 76.7*   MCH 23.5* 23.6* 23.9* 23.1*   MCHC 30.9* 30.7* 30.5* 30.1*   RDW 16.3* 16.2* 16.1* 16.4*   NEPRELIM 3.40 16.56*  --  13.69*   WBC 9.4 17.8* 16.8* 17.3*   .0 254.0 252.0 284.0  284.0         Recent Labs   Lab 03/01/25  0443 03/01/25  0915 03/02/25  0546   * 128* 144*   BUN 12 13 19   CREATSERUM 0.80 0.79 0.91   EGFRCR 86 88 74   CA 8.3* 8.5* 8.9    140 139   K 3.4* 5.1 4.3  4.3    108 107   CO2 24.0 25.0 27.0         Imaging:   XR CHEST AP PORTABLE  (CPT=71045)    Result Date: 3/1/2025  CONCLUSION:   Mild cardiomegaly with interstitial pulmonary edema.  A preliminary report was issued by the [facility] teleradiology service. There are no clinically actionable discrepancies.    Dictated by (CST): Antonieta Hart MD on  3/01/2025 at 9:48 AM     Finalized by (CST): Antonieta Hart MD on 3/01/2025 at 9:49 AM          CTA BRAIN + CTA CAROTIDS (CPT=70496/03801)    Result Date: 3/1/2025  CONCLUSION:   No acute intracranial hemorrhage, hydrocephalus, or mass effect.  If there is clinical concern for acute ischemic stroke, MRI of the brain is recommended.  Unchanged complete or near complete occlusion of the left V1 vertebral artery, with additional irregularity of the V2 segment.  Findings are concerning for age chronic vertebral artery dissection.  Right external to internal carotid artery bypass.  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  Retrograde reconstitution of flow in the distal intracranial ICA, similar to prior.  Chronic hypoenhancement of the left ZARA.  Otherwise, No large vessel occlusion involving the major arterial branches of the veojgn-oc-Eevkcz.  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  Moderate narrowing at the right subclavian artery origin.  Correlate for subclavian steal.  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  Small subcentimeter lucent lesions throughout the calvarium, nonspecific.  Recommend correlation with clinical/laboratory findings for myelomatous process.  A preliminary report was issued by the [facility] teleradiology service. There are no clinically actionable discrepancies.  Report was initially discussed with Dr. Staley At 1:55 a.m. Eastern time by Dr. Rojas    Dictated by (CST): Antonieta Hart MD on 3/01/2025 at 8:24 AM     Finalized by (CST): Antonieta Hart MD on 3/01/2025 at 8:39 AM          XR CHEST AP PORTABLE  (CPT=71045)    Result Date: 3/1/2025  CONCLUSION:   Increased interstitial markings bilaterally, which may be secondary to interstitial pulmonary edema or atypical/viral pneumonia in the appropriate clinical setting.    A preliminary report was issued by the [facility] teleradiology  service. There are no clinically actionable discrepancies.  Dictated by (CST): Antonieta Hart MD on 3/01/2025 at 6:27 AM     Finalized by (CST): Antonieta Hart MD on 3/01/2025 at 6:28 AM         EKG 12 Lead    Result Date: 3/2/2025  Normal sinus rhythm Right bundle branch block Abnormal ECG When compared with ECG of 01-MAR-2025 07:06, Nonspecific T wave abnormality now evident in Anterior leads QT has shortened    EKG 12 Lead    Result Date: 3/2/2025  Normal sinus rhythm Right bundle branch block Abnormal ECG When compared with ECG of 01-MAR-2025 03:37, The axis Shifted left ST no longer depressed in Anterior-lateral leads T wave inversion no longer evident in Anterior leads    EKG    Result Date: 3/2/2025  Sinus tachycardia Indeterminate axis Right bundle branch block Marked ST abnormality, possible inferior subendocardial injury Marked ST abnormality, possible anterolateral subendocardial injury Abnormal ECG When compared with ECG of 01-MAR-2025 02:08, No significant change was found    EKG 12 Lead    Result Date: 3/2/2025  Sinus tachycardia Right bundle branch block Marked ST abnormality, possible inferior subendocardial injury Marked ST abnormality, possible anterolateral subendocardial injury Abnormal ECG When compared with ECG of 01-MAR-2025 00:20, The axis Shifted right ST more depressed in Anterior-lateral leads    EKG 12 Lead    Result Date: 3/2/2025  Normal sinus rhythm Right bundle branch block Abnormal ECG When compared with ECG of 28-FEB-2025 19:38, ST now depressed in Anterior leads    EKG 12 Lead    Result Date: 3/2/2025  Normal sinus rhythm Right bundle branch block Abnormal ECG When compared with ECG of 20-AUG-2024 13:13, QT has lengthened       Santiago Dwyer MD  3/2/2025

## 2025-03-02 NOTE — PROGRESS NOTES
[facility] - CARDIOLOGY CONSULT NOTE    Anjel Pisano Patient Status:  Inpatient    3/30/1970 MRN [id number]   Location [facility] 2W/SW Attending Mary Balderas MD   Hosp Day # 1 PCP Yves Yanez MD       S   Feels overall improved today, has some mild SOB and neck and arm pain   Trop uptrending   Echo was unremarkable     Results:     Lab Results   Component Value Date    WBC 17.3 (H) 2025    HGB 10.3 (L) 2025    HCT 34.2 (L) 2025    .0 2025    .0 2025    CREATSERUM 0.91 2025    BUN 19 2025     2025    K 4.3 2025    K 4.3 2025     2025    CO2 27.0 2025     (H) 2025    CA 8.9 2025    ALB 4.0 2025    ALKPHO 87 2025    BILT 0.5 2025    TP 6.5 2025    AST 12 2025    ALT 9 (L) 2025    PTT 48.8 (H) 2025    INR 1.0 2017    PT 12.8 2016    T4F 0.66 2018    TSH 1.386 2025    LIP 19 2023    DDIMER 0.36 2025    ESRML 19 2018    MG 1.9 2025    PHOS 4.0 2025    TROP 0.03 2017    B12 580 2021    POCGLU 224 2024       XR CHEST AP PORTABLE  (CPT=71045)    Result Date: 3/1/2025  CONCLUSION:   Mild cardiomegaly with interstitial pulmonary edema.  A preliminary report was issued by the [facility] teleradiology service. There are no clinically actionable discrepancies.    Dictated by (CST): Antonieta Hart MD on 3/01/2025 at 9:48 AM     Finalized by (CST): Antonieta Hart MD on 3/01/2025 at 9:49 AM          CTA BRAIN + CTA CAROTIDS (CPT=70496/93848)    Result Date: 3/1/2025  CONCLUSION:   No acute intracranial hemorrhage, hydrocephalus, or mass effect.  If there is clinical concern for acute ischemic stroke, MRI of the brain is recommended.  Unchanged complete or near complete occlusion of the left V1 vertebral artery, with additional irregularity of the V2 segment.   Findings are concerning for age chronic vertebral artery dissection.  Right external to internal carotid artery bypass.  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  Retrograde reconstitution of flow in the distal intracranial ICA, similar to prior.  Chronic hypoenhancement of the left ZARA.  Otherwise, No large vessel occlusion involving the major arterial branches of the jnqhol-ch-Bigbge.  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  Moderate narrowing at the right subclavian artery origin.  Correlate for subclavian steal.  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  Small subcentimeter lucent lesions throughout the calvarium, nonspecific.  Recommend correlation with clinical/laboratory findings for myelomatous process.  A preliminary report was issued by the [facility] teleradiology service. There are no clinically actionable discrepancies.  Report was initially discussed with Dr. Staley At 1:55 a.m. Eastern time by Dr. Rojas    Dictated by (CST): Antonieta Hart MD on 3/01/2025 at 8:24 AM     Finalized by (CST): Antonieta Hart MD on 3/01/2025 at 8:39 AM          XR CHEST AP PORTABLE  (CPT=71045)    Result Date: 3/1/2025  CONCLUSION:   Increased interstitial markings bilaterally, which may be secondary to interstitial pulmonary edema or atypical/viral pneumonia in the appropriate clinical setting.    A preliminary report was issued by the [facility] teleradiology service. There are no clinically actionable discrepancies.  Dictated by (CST): Antonieta Hart MD on 3/01/2025 at 6:27 AM     Finalized by (CST): Antonieta Hart MD on 3/01/2025 at 6:28 AM         EKG 12 Lead    Result Date: 3/2/2025  Normal sinus rhythm Right bundle branch block Abnormal ECG When compared with ECG of 01-MAR-2025 03:37, The axis Shifted left ST no longer depressed in Anterior-lateral leads T wave inversion no longer evident in Anterior  leads    EKG    Result Date: 3/2/2025  Sinus tachycardia Indeterminate axis Right bundle branch block Marked ST abnormality, possible inferior subendocardial injury Marked ST abnormality, possible anterolateral subendocardial injury Abnormal ECG When compared with ECG of 01-MAR-2025 02:08, No significant change was found    EKG 12 Lead    Result Date: 3/2/2025  Sinus tachycardia Right bundle branch block Marked ST abnormality, possible inferior subendocardial injury Marked ST abnormality, possible anterolateral subendocardial injury Abnormal ECG When compared with ECG of 01-MAR-2025 00:20, The axis Shifted right ST more depressed in Anterior-lateral leads    EKG 12 Lead    Result Date: 3/2/2025  Normal sinus rhythm Right bundle branch block Abnormal ECG When compared with ECG of 28-FEB-2025 19:38, ST now depressed in Anterior leads    EKG 12 Lead    Result Date: 3/2/2025  Normal sinus rhythm Right bundle branch block Abnormal ECG When compared with ECG of 20-AUG-2024 13:13, QT has lengthened       Current Medications:  Current Facility-Administered Medications   Medication Dose Route Frequency    amLODIPine (Norvasc) tab 10 mg  10 mg Oral Daily    aspirin tab 325 mg  325 mg Oral Daily    atorvastatin (Lipitor) tab 80 mg  80 mg Oral Nightly    escitalopram (Lexapro) tab 10 mg  10 mg Oral Daily    gabapentin (Neurontin) cap 100 mg  100 mg Oral TID    hydrALAZINE (Apresoline) tab 100 mg  100 mg Oral Q8H PABLO    levETIRAcetam (Keppra) tab 500 mg  500 mg Oral BID    losartan (Cozaar) tab 100 mg  100 mg Oral Daily    metoprolol succinate ER (Toprol XL) 24 hr tab 100 mg  100 mg Oral Daily    pantoprazole (Protonix) DR tab 40 mg  40 mg Oral QAM AC    ondansetron (Zofran) 4 MG/2ML injection 4 mg  4 mg Intravenous Q6H PRN    acetaminophen (Tylenol) tab 650 mg  650 mg Oral Q6H PRN    ipratropium-albuterol (Duoneb) 0.5-2.5 (3) MG/3ML inhalation solution 3 mL  3 mL Nebulization Q6H PRN    zolpidem (Ambien) tab 5 mg  5 mg Oral  Nightly PRN    alum-mag hydroxide-simethicone (Maalox) 200-200-20 MG/5ML oral suspension 30 mL  30 mL Oral QID PRN    labetalol (Trandate) 5 mg/mL injection 10 mg  10 mg Intravenous Q4H PRN    glucose (Dex4) 15 GM/59ML oral liquid 15 g  15 g Oral Q15 Min PRN    Or    glucose (Glutose) 40% oral gel 15 g  15 g Oral Q15 Min PRN    Or    glucose-vitamin C (Dex-4) chewable tab 4 tablet  4 tablet Oral Q15 Min PRN    Or    dextrose 50% injection 50 mL  50 mL Intravenous Q15 Min PRN    Or    glucose (Dex4) 15 GM/59ML oral liquid 30 g  30 g Oral Q15 Min PRN    Or    glucose (Glutose) 40% oral gel 30 g  30 g Oral Q15 Min PRN    Or    glucose-vitamin C (Dex-4) chewable tab 8 tablet  8 tablet Oral Q15 Min PRN    norepinephrine (Levophed) 4 mg/250mL infusion premix  0.5-50 mcg/min Intravenous Continuous    nitroglycerin (Nitrostat) SL tab 0.4 mg  0.4 mg Sublingual Q5 Min PRN    HYDROcodone-acetaminophen (Norco) 5-325 MG per tab 1 tablet  1 tablet Oral Q6H PRN    heparin (Porcine) 53406 units/250mL infusion ACS/AFIB CONTINUOUS  200-3,000 Units/hr Intravenous Continuous    metoprolol tartrate (Lopressor) partial tab 12.5 mg  12.5 mg Oral 2x Daily(Beta Blocker)    insulin via iLet Insulin Pump   Subcutaneous TID AC and HS    furosemide (Lasix) 10 mg/mL injection 40 mg  40 mg Intravenous BID (Diuretic)    insulin aspart (NovoLOG) 100 Units/mL FlexPen 1-7 Units  1-7 Units Subcutaneous TID CC and HS     Medications Prior to Admission   Medication Sig    OneTouch Delica Lancets 33G Does not apply Misc 1 each by Other route 4 (four) times daily.    prednisoLONE 1 % Ophthalmic Suspension Place 1 drop into the left eye 4 (four) times daily.    benzonatate 100 MG Oral Cap     insulin aspart 100 Units/mL Injection Solution Inject via insulin pump as directed. Max daily dose 75 units    escitalopram 10 MG Oral Tab Take 1 tablet (10 mg total) by mouth daily.    gabapentin 100 MG Oral Cap Take 1 capsule (100 mg total) by mouth 3 (three) times  daily.    ammonium lactate (AMLACTIN DAILY) 12 % External Lotion Apply 1 Application topically as needed for Dry Skin.    Insulin Pen Needle (BD PEN NEEDLE ABDOULAYE 2ND GEN) 32G X 4 MM Does not apply Misc USE FOUR TIMES DAILY AS DIRECTED    [] glucagon (GVOKE HYPOPEN 2-PACK) 1 MG/0.2ML Subcutaneous injection Inject 0.2 mL (1 mg total) into the skin once as needed for Low blood glucose.    metoprolol succinate  MG Oral Tablet 24 Hr Take 1 tablet (100 mg total) by mouth daily.    metoprolol succinate ER 25 MG Oral Tablet 24 Hr Take 1 tablet (25 mg total) by mouth nightly.    torsemide 10 MG Oral Tab Take 1 tablet (10 mg total) by mouth daily.    atorvastatin 80 MG Oral Tab Take 1 tablet (80 mg total) by mouth nightly.    levETIRAcetam 500 MG Oral Tab Take 1 tablet (500 mg total) by mouth 2 (two) times daily.    amLODIPine 10 MG Oral Tab Take 1 tablet (10 mg total) by mouth daily.    Continuous Glucose Sensor (DEXCOM G7 SENSOR) Does not apply Misc 1 each Every 10 days.    losartan 100 MG Oral Tab Take 1 tablet (100 mg total) by mouth daily.    Glucose Blood (ONETOUCH ULTRA) In Vitro Strip Please provide test strips that are covered by patient's insurance.    Blood Glucose Monitoring Suppl (ONETOUCH ULTRA 2) w/Device Does not apply Kit Please provide what is covered by patient's insurance    pregabalin (LYRICA) 75 MG Oral Cap Take 1 capsule (75 mg total) by mouth 2 (two) times daily.    ERGOCALCIFEROL 1.25 MG (14820 UT) Oral Cap TAKE 1 CAPSULE BY MOUTH 1 TIME A WEEK FOR 12 DOSES    Blood Glucose Monitoring Suppl (TRUE METRIX METER) w/Device Does not apply Kit Use to check blood sugar four times a day    Glucose Blood (TRUE METRIX BLOOD GLUCOSE TEST) In Vitro Strip Check blood sugar four times a day    TRUEplus Lancets 33G Does not apply Misc USE TO CHECK BLOOD SUGAR FOUR TIMES DAILY    Glucose Blood (TRUE METRIX BLOOD GLUCOSE TEST) In Vitro Strip Use to check blood sugar three times a day    hydrALAZINE 100 MG  Oral Tab Take 1 tablet (100 mg total) by mouth 2 (two) times daily.    Accu-Chek FastClix Lancets Does not apply Misc Use to check blood sugar three times a day    Glucose Blood (ACCU-CHEK GUIDE) In Vitro Strip Use to check blood sugar three times a day    Omeprazole 40 MG Oral Capsule Delayed Release TAKE 1 CAPSULE EVERY DAY 30 TO 60 MINUTES BEFORE A MEAL    Alcohol Swabs (DROPSAFE ALCOHOL PREP) 70 % Does not apply Pads USE AS DIRECTED    Blood Glucose Monitoring Suppl (ACCU-CHEK GUIDE) w/Device Does not apply Kit Use to check blood sugar three times a day    fluticasone propionate 50 MCG/ACT Nasal Suspension 2 sprays by Nasal route daily.    VENTOLIN  (90 Base) MCG/ACT Inhalation Aero Soln Inhale 2 puffs into the lungs every 6 (six) hours as needed for Wheezing.    aspirin 325 MG Oral Tab Take 1 tablet (325 mg total) by mouth daily.     Physical Exam:   Blood pressure 124/72, pulse 84, temperature 97 °F (36.1 °C), temperature source Temporal, resp. rate 18, height 62\", weight 201 lb 8 oz (91.4 kg), SpO2 92%, not currently breastfeeding.    Scheduled Meds:    amLODIPine  10 mg Oral Daily    aspirin  325 mg Oral Daily    atorvastatin  80 mg Oral Nightly    escitalopram  10 mg Oral Daily    gabapentin  100 mg Oral TID    hydrALAZINE  100 mg Oral Q8H PABLO    levETIRAcetam  500 mg Oral BID    losartan  100 mg Oral Daily    metoprolol succinate ER  100 mg Oral Daily    pantoprazole  40 mg Oral QAM AC    metoprolol tartrate  12.5 mg Oral 2x Daily(Beta Blocker)    insulin   Subcutaneous TID AC and HS    furosemide  40 mg Intravenous BID (Diuretic)    insulin aspart  1-7 Units Subcutaneous TID CC and HS         Physical Exam:    General: Alert and oriented. No apparent distress. No respiratory or constitutional distress.  HEENT: Normocephalic, anicteric sclera, neck supple  Neck: No JVD, carotids 2+, supple  Cardiac: Regular rate. No pathologic murmur.  Lungs: Clear with normal effort.  Normal excursions and  effort.  Abdomen: Soft, non-tender. BS-present.  Extremities: Without clubbing, cyanosis.  Peripheral pulsespresent.  Neurologic: Alert and oriented, normal affect. Motor ok.  Skin: Warm and dry.     Imaging: I independently visualized all relevant chest imaging in PACS, agree with radiology interpretation except where noted.  Thank you for allowing me to participate in the care of your patient.    Labs:  HEM:  Recent Labs   Lab 02/28/25 2206 03/01/25 0056 03/01/25 0443 03/01/25 0915 03/02/25  0546   WBC 10.2 9.4 17.8* 16.8* 17.3*   HGB 12.5 11.8* 10.9* 11.2* 10.3*   .0 298.0 254.0 252.0 284.0  284.0       Chem:  Recent Labs   Lab 02/28/25 2206 03/01/25 0056 03/01/25 0443 03/01/25 0915 03/02/25  0546    140 141 140 139   K 3.8 3.6 3.4* 5.1 4.3  4.3    106 108 108 107   CO2 26.0 25.0 24.0 25.0 27.0   BUN 12 12 12 13 19   CREATSERUM 0.75 0.74 0.80 0.79 0.91   CA 9.4 9.0 8.3* 8.5* 8.9   MG  --   --   --   --  1.9   PHOS  --   --   --   --  4.0   GLU 78 86 119* 128* 144*       Recent Labs   Lab 03/01/25 0056 03/02/25  0546   ALT 9*  --    AST 12  --    ALB 3.8 4.0       No results for input(s): \"TROP\", \"CK\" in the last 168 hours.    No results for input(s): \"PTP\", \"INR\" in the last 168 hours.    Impression:   SOB/Chest pressure- etiology unclear, she is a vasculopath so CAD is on the differential, initially on presentation her sx appeared to be stable, though after her CT overnight with episode of hypotension her sx worsened. EKG showed signs of global ischemia. Would continue ASA, statin, BP meds, lasix, heparin ggt. EKG  back to baseline. Echo with preserved EF. LHC tomorrow unless her sx change  NSTEMI- demand from the hypotensive episode vs ACS. No current sx, trop uptrending yesterday, echo normal, continue management as above. [facility] tomorrow   HTN- pressures this morning are good, continue home amlodipine and losartan, hydralazine, low dose BB  Hx if carotid stenosis- s/p CEA, she has  multiple lesions in her vertebrals etc   Hx of CVA    Recommendations:  Plan for LHC tomorrow unless sx change/worsen  Continue other meds as scheduled     C3    Yobani Johnson MD  [facility]  3/1/2025

## 2025-03-02 NOTE — PROGRESS NOTES
[facility]  part of [facility]    Progress Note      Assessment and Plan:   1.  Dyspnea with neck and shoulder discomfort and associated positive troponin with EKG abnormalities-non-ST elevation myocardial infarction with troponin accelerating and now greater than 4000.  Could have stress ischemia associated with hypertensive urgency but patient has known severe vasculopathy diffusely.  The patient has ongoing back pain.  She also has a history of moyamoya disease.  The echo is unrevealing.  Pulmonary artery pressures are moderately increased and there is grade 1 diastolic dysfunction but normal ejection fraction.  With ongoing pain, patient will go to Cath Lab.    Recommendations:  1.  IV heparin  2.  Beta-blockade  3.  Trending troponin  4.  Echocardiography  5.  Will monitor in the ICU  6.  Left heart catheterization.    2.  Diabetes mellitus-values okay.    Recommendations: As per endocrinology, insulin pump if supplies are available, otherwise we will transition to Tresiba and NovoLog.    3.  DVT prophylaxis-on insulin drip    4.  Cerebral vasculopathy    5.  Hypertension-initially the patient was markedly hypertensive and then hypotensive with IV hydralazine, now stabilizing on beta-blockade.    6.  Central line access-patient has a right femoral line.  Will hopefully be able to remove as soon as tomorrow.        Subjective:   Anjel Pisano is a(n) 54 year old female who had further chest discomfort today.    Objective:   Blood pressure 120/70, pulse 71, temperature 97 °F (36.1 °C), temperature source Temporal, resp. rate 22, height 5' 2\" (1.575 m), weight 201 lb 8 oz (91.4 kg), SpO2 97%, not currently breastfeeding.    Physical Exam alert female  HEENT examination is unremarkable with pupils equal round and reactive to light and accommodation.   Neck without adenopathy, thyromegaly, JVD nor bruit.   Lungs diminished to auscultation and percussion.  Cardiac regular rate and rhythm no  murmur.   Abdomen nontender, without hepatosplenomegaly and no mass appreciable.   Extremities without clubbing cyanosis nor edema.   Neurologic grossly intact with symmetric tone and strength and reflex.  Skin without gross abnormality     Results:     Lab Results   Component Value Date    WBC 17.3 03/02/2025    HGB 10.3 03/02/2025    HCT 34.2 03/02/2025    .0 03/02/2025    .0 03/02/2025    CREATSERUM 0.91 03/02/2025    BUN 19 03/02/2025     03/02/2025    K 4.3 03/02/2025    K 4.3 03/02/2025     03/02/2025    CO2 27.0 03/02/2025     03/02/2025    CA 8.9 03/02/2025    ALB 4.0 03/02/2025    PTT 48.8 03/02/2025    MG 1.9 03/02/2025    PHOS 4.0 03/02/2025       Jerry Sosa MD  Medical Director, Critical Care, [facility]  Medical Director, [facility]  Pager: [phone]

## 2025-03-03 ENCOUNTER — APPOINTMENT (OUTPATIENT)
Dept: GENERAL RADIOLOGY | Facility: HOSPITAL | Age: 55
End: 2025-03-03
Attending: INTERNAL MEDICINE
Payer: MEDICARE

## 2025-03-03 LAB
ALBUMIN SERPL-MCNC: 4.1 G/DL (ref 3.2–4.8)
ANION GAP SERPL CALC-SCNC: 7 MMOL/L (ref 0–18)
APTT PPP: 63.9 SECONDS (ref 23–36)
ATRIAL RATE: 109 BPM
ATRIAL RATE: 110 BPM
ATRIAL RATE: 70 BPM
ATRIAL RATE: 76 BPM
ATRIAL RATE: 90 BPM
ATRIAL RATE: 95 BPM
BASOPHILS # BLD AUTO: 0.02 X10(3) UL (ref 0–0.2)
BASOPHILS NFR BLD AUTO: 0.1 %
BUN BLD-MCNC: 23 MG/DL (ref 9–23)
BUN/CREAT SERPL: 27.4 (ref 10–20)
CALCIUM BLD-MCNC: 9 MG/DL (ref 8.7–10.4)
CHLORIDE SERPL-SCNC: 102 MMOL/L (ref 98–112)
CO2 SERPL-SCNC: 30 MMOL/L (ref 21–32)
CREAT BLD-MCNC: 0.84 MG/DL
DEPRECATED RDW RBC AUTO: 45.3 FL (ref 35.1–46.3)
EGFRCR SERPLBLD CKD-EPI 2021: 82 ML/MIN/1.73M2 (ref 60–?)
EOSINOPHIL # BLD AUTO: 0.01 X10(3) UL (ref 0–0.7)
EOSINOPHIL NFR BLD AUTO: 0.1 %
ERYTHROCYTE [DISTWIDTH] IN BLOOD BY AUTOMATED COUNT: 16.1 % (ref 11–15)
GLUCOSE BLD-MCNC: 111 MG/DL (ref 70–99)
GLUCOSE BLDC GLUCOMTR-MCNC: 106 MG/DL (ref 70–99)
GLUCOSE BLDC GLUCOMTR-MCNC: 115 MG/DL (ref 70–99)
GLUCOSE BLDC GLUCOMTR-MCNC: 217 MG/DL (ref 70–99)
GLUCOSE BLDC GLUCOMTR-MCNC: 315 MG/DL (ref 70–99)
GLUCOSE BLDC GLUCOMTR-MCNC: 47 MG/DL (ref 70–99)
GLUCOSE BLDC GLUCOMTR-MCNC: 52 MG/DL (ref 70–99)
GLUCOSE BLDC GLUCOMTR-MCNC: 69 MG/DL (ref 70–99)
GLUCOSE BLDC GLUCOMTR-MCNC: 84 MG/DL (ref 70–99)
HCT VFR BLD AUTO: 36.8 %
HGB BLD-MCNC: 10.8 G/DL
IMM GRANULOCYTES # BLD AUTO: 0.1 X10(3) UL (ref 0–1)
IMM GRANULOCYTES NFR BLD: 0.6 %
LYMPHOCYTES # BLD AUTO: 1.45 X10(3) UL (ref 1–4)
LYMPHOCYTES NFR BLD AUTO: 9.2 %
MAGNESIUM SERPL-MCNC: 2.2 MG/DL (ref 1.6–2.6)
MCH RBC QN AUTO: 22.8 PG (ref 26–34)
MCHC RBC AUTO-ENTMCNC: 29.3 G/DL (ref 31–37)
MCV RBC AUTO: 77.6 FL
MONOCYTES # BLD AUTO: 0.72 X10(3) UL (ref 0.1–1)
MONOCYTES NFR BLD AUTO: 4.6 %
NEUTROPHILS # BLD AUTO: 13.45 X10 (3) UL (ref 1.5–7.7)
NEUTROPHILS # BLD AUTO: 13.45 X10(3) UL (ref 1.5–7.7)
NEUTROPHILS NFR BLD AUTO: 85.4 %
OSMOLALITY SERPL CALC.SUM OF ELEC: 292 MOSM/KG (ref 275–295)
P AXIS: 34 DEGREES
P AXIS: 42 DEGREES
P AXIS: 43 DEGREES
P AXIS: 45 DEGREES
P AXIS: 46 DEGREES
P AXIS: 47 DEGREES
P-R INTERVAL: 136 MS
P-R INTERVAL: 142 MS
P-R INTERVAL: 146 MS
P-R INTERVAL: 152 MS
P-R INTERVAL: 154 MS
P-R INTERVAL: 158 MS
PHOSPHATE SERPL-MCNC: 4.4 MG/DL (ref 2.4–5.1)
PLATELET # BLD AUTO: 295 10(3)UL (ref 150–450)
PLATELET # BLD AUTO: 295 10(3)UL (ref 150–450)
POTASSIUM SERPL-SCNC: 4 MMOL/L (ref 3.5–5.1)
Q-T INTERVAL: 372 MS
Q-T INTERVAL: 374 MS
Q-T INTERVAL: 410 MS
Q-T INTERVAL: 424 MS
Q-T INTERVAL: 434 MS
Q-T INTERVAL: 440 MS
QRS DURATION: 116 MS
QRS DURATION: 116 MS
QRS DURATION: 124 MS
QRS DURATION: 124 MS
QRS DURATION: 126 MS
QRS DURATION: 128 MS
QTC CALCULATION (BEZET): 475 MS
QTC CALCULATION (BEZET): 488 MS
QTC CALCULATION (BEZET): 500 MS
QTC CALCULATION (BEZET): 501 MS
QTC CALCULATION (BEZET): 506 MS
QTC CALCULATION (BEZET): 532 MS
R AXIS: 114 DEGREES
R AXIS: 13 DEGREES
R AXIS: 131 DEGREES
R AXIS: 28 DEGREES
R AXIS: 50 DEGREES
R AXIS: 54 DEGREES
RBC # BLD AUTO: 4.74 X10(6)UL
SODIUM SERPL-SCNC: 139 MMOL/L (ref 136–145)
T AXIS: 12 DEGREES
T AXIS: 12 DEGREES
T AXIS: 16 DEGREES
T AXIS: 16 DEGREES
T AXIS: 3 DEGREES
T AXIS: 6 DEGREES
TROPONIN I SERPL HS-MCNC: 2677 NG/L
VENTRICULAR RATE: 109 BPM
VENTRICULAR RATE: 110 BPM
VENTRICULAR RATE: 70 BPM
VENTRICULAR RATE: 76 BPM
VENTRICULAR RATE: 90 BPM
VENTRICULAR RATE: 95 BPM
WBC # BLD AUTO: 15.8 X10(3) UL (ref 4–11)

## 2025-03-03 PROCEDURE — 99233 SBSQ HOSP IP/OBS HIGH 50: CPT | Performed by: INTERNAL MEDICINE

## 2025-03-03 PROCEDURE — 99233 SBSQ HOSP IP/OBS HIGH 50: CPT | Performed by: HOSPITALIST

## 2025-03-03 PROCEDURE — 71045 X-RAY EXAM CHEST 1 VIEW: CPT | Performed by: INTERNAL MEDICINE

## 2025-03-03 RX ORDER — AMLODIPINE BESYLATE 10 MG/1
10 TABLET ORAL 2 TIMES DAILY
Status: DISCONTINUED | OUTPATIENT
Start: 2025-03-04 | End: 2025-03-03

## 2025-03-03 RX ORDER — EZETIMIBE 10 MG/1
10 TABLET ORAL NIGHTLY
Status: DISCONTINUED | OUTPATIENT
Start: 2025-03-03 | End: 2025-03-06

## 2025-03-03 RX ORDER — CARVEDILOL 12.5 MG/1
12.5 TABLET ORAL 2 TIMES DAILY WITH MEALS
Status: DISCONTINUED | OUTPATIENT
Start: 2025-03-04 | End: 2025-03-05

## 2025-03-03 RX ORDER — HYDRALAZINE HYDROCHLORIDE 100 MG/1
100 TABLET, FILM COATED ORAL 2 TIMES DAILY
Status: DISCONTINUED | OUTPATIENT
Start: 2025-03-03 | End: 2025-03-04

## 2025-03-03 RX ORDER — AMLODIPINE BESYLATE 5 MG/1
5 TABLET ORAL 2 TIMES DAILY
Status: DISCONTINUED | OUTPATIENT
Start: 2025-03-04 | End: 2025-03-04

## 2025-03-03 RX ORDER — LOSARTAN POTASSIUM 25 MG/1
25 TABLET ORAL DAILY
Status: DISCONTINUED | OUTPATIENT
Start: 2025-03-04 | End: 2025-03-04

## 2025-03-03 NOTE — PROGRESS NOTES
[facility]  part of [facility]    Progress Note    Anjel Pisano Patient Status:  Inpatient    3/30/1970 MRN [id number]   Location [facility] 2W/SW Attending Santiago Dwyer MD   Hosp Day # 2 PCP Yves Yanez MD       Subjective:   Anjel Pisano is a(n) 54 year old female was seen and examined  No acute events overnight  Resting in bed, NAD  No cp or sob  No f,c,n,v or HA  Sister at bedside     Objective:   Blood pressure 118/66, pulse 63, temperature 97.4 °F (36.3 °C), temperature source Oral, resp. rate 20, height 5' 2\" (1.575 m), weight 201 lb 8 oz (91.4 kg), SpO2 98%    GENERAL:  The patient appeared to be in no distress and was comfortable.  SKIN:  Warm and hydrated  PSYCHIATRIC: Calm and cooperative    HEENT:  Head was atraumatic and normocephalic.  Eyes: Sclera was anicteric.  Pupils were equal.  Ears:  There were no lesions.  Nose:  No lesions were noted.      NECK:  Supple.  There was no JVD.    CHEST:  Symmetrical movement on inspiration  CARDIAC: S1 S2+, RRR  LUNGS:  CTAB with decreased entry at bases   ABDOMEN: Non-distended, non-tender, BS+  MUSCULOSKELETAL:  There was no deformity.  There was full range of motion in all the extremities.    EXTREMITIES: There was no edema  NEUROLOGIC: R sided weakness    Current Inpatient Medications:     Current Facility-Administered Medications:     hydrALAZINE (Apresoline) tab 100 mg, 100 mg, Oral, BID    [START ON 3/4/2025] amLODIPine (Norvasc) tab 5 mg, 5 mg, Oral, BID    [START ON 3/4/2025] carvedilol (Coreg) tab 12.5 mg, 12.5 mg, Oral, BID with meals    [START ON 3/4/2025] losartan (Cozaar) tab 25 mg, 25 mg, Oral, Daily    ezetimibe (Zetia) tab 10 mg, 10 mg, Oral, Nightly    aspirin DR tab 81 mg, 81 mg, Oral, Daily    atorvastatin (Lipitor) tab 80 mg, 80 mg, Oral, Nightly    escitalopram (Lexapro) tab 10 mg, 10 mg, Oral, Daily    gabapentin (Neurontin) cap 100 mg, 100 mg, Oral, TID    levETIRAcetam (Keppra) tab 500 mg,  500 mg, Oral, BID    pantoprazole (Protonix) DR tab 40 mg, 40 mg, Oral, QAM AC    ondansetron (Zofran) 4 MG/2ML injection 4 mg, 4 mg, Intravenous, Q6H PRN    acetaminophen (Tylenol) tab 650 mg, 650 mg, Oral, Q6H PRN    ipratropium-albuterol (Duoneb) 0.5-2.5 (3) MG/3ML inhalation solution 3 mL, 3 mL, Nebulization, Q6H PRN    zolpidem (Ambien) tab 5 mg, 5 mg, Oral, Nightly PRN    alum-mag hydroxide-simethicone (Maalox) 200-200-20 MG/5ML oral suspension 30 mL, 30 mL, Oral, QID PRN    labetalol (Trandate) 5 mg/mL injection 10 mg, 10 mg, Intravenous, Q4H PRN    glucose (Dex4) 15 GM/59ML oral liquid 15 g, 15 g, Oral, Q15 Min PRN **OR** glucose (Glutose) 40% oral gel 15 g, 15 g, Oral, Q15 Min PRN **OR** glucose-vitamin C (Dex-4) chewable tab 4 tablet, 4 tablet, Oral, Q15 Min PRN **OR** dextrose 50% injection 50 mL, 50 mL, Intravenous, Q15 Min PRN **OR** glucose (Dex4) 15 GM/59ML oral liquid 30 g, 30 g, Oral, Q15 Min PRN **OR** glucose (Glutose) 40% oral gel 30 g, 30 g, Oral, Q15 Min PRN **OR** glucose-vitamin C (Dex-4) chewable tab 8 tablet, 8 tablet, Oral, Q15 Min PRN    nitroglycerin (Nitrostat) SL tab 0.4 mg, 0.4 mg, Sublingual, Q5 Min PRN    HYDROcodone-acetaminophen (Norco) 5-325 MG per tab 1 tablet, 1 tablet, Oral, Q6H PRN    insulin via iLet Insulin Pump, , Subcutaneous, TID AC and HS    Assessment and Plan:   Chest pain possible ACS  EKG with ischemic changes, troponin up trended this AM and pt had cp  Subsequent cardiac cath showing 50% mLAD lesion, otherwise non-obstructive disease  Heparin gtt stopped  2d echo reviewed     Accelerated hypertension  Responded to IV hydralazine by significant drop in blood pressure  BP stable currently      Acute on chronic diastolic heart failure  X-ray indicated above pulmonary edema  Started on Lasix 40 mg IV daily  Monitor I's and O's and daily weights  Net IO Since Admission: -1,999.36 mL [03/03/25 1512]      Insulin requiring diabetes with associated  neuropathy  Endocrinology consulted  Has insulin pump  Cont to monitor BS     Acute encephalopathy, likely ischemic  Likely triggered by sudden drop in blood pressure  Pt back to baseline   Will consult PT/OT     Prophylaxis  Subcutaneous heparin     CODE STATUS  Full     MDM: High         Results:     Recent Labs   Lab 03/01/25  0443 03/01/25  0915 03/02/25  0546 03/03/25  0533   RBC 4.61 4.68 4.46 4.74   HGB 10.9* 11.2* 10.3* 10.8*   HCT 35.5 36.7 34.2* 36.8   MCV 77.0* 78.4* 76.7* 77.6*   MCH 23.6* 23.9* 23.1* 22.8*   MCHC 30.7* 30.5* 30.1* 29.3*   RDW 16.2* 16.1* 16.4* 16.1*   NEPRELIM 16.56*  --  13.69* 13.45*   WBC 17.8* 16.8* 17.3* 15.8*   .0 252.0 284.0  284.0 295.0  295.0         Recent Labs   Lab 03/01/25  0915 03/02/25  0546 03/03/25  0533   * 144* 111*   BUN 13 19 23   CREATSERUM 0.79 0.91 0.84   EGFRCR 88 74 82   CA 8.5* 8.9 9.0    139 139   K 5.1 4.3  4.3 4.0    107 102   CO2 25.0 27.0 30.0         Imaging:   No results found.  EKG 12 Lead    Result Date: 3/3/2025  Normal sinus rhythm Right bundle branch block Abnormal ECG When compared with ECG of 01-MAR-2025 07:06, Nonspecific T wave abnormality now evident in Anterior leads QT has shortened Confirmed by SILVESTRE LANGLEY, DANIEL ([id number]) on 3/3/2025 9:56:16 AM       Santiago Dwyer MD  3/3/2025

## 2025-03-03 NOTE — PLAN OF CARE
Patient alert and oriented. Off heparin gtt. Hypoglycemic episode. Call light within reach. Safety precautions in place.   Problem: Patient Centered Care  Goal: Patient preferences are identified and integrated in the patient's plan of care  Description: Interventions:  - What would you like us to know as we care for you?   - Provide timely, complete, and accurate information to patient/family  - Incorporate patient and family knowledge, values, beliefs, and cultural backgrounds into the planning and delivery of care  - Encourage patient/family to participate in care and decision-making at the level they choose  - Honor patient and family perspectives and choices  Outcome: Progressing     Problem: CARDIOVASCULAR - ADULT  Goal: Maintains optimal cardiac output and hemodynamic stability  Description: INTERVENTIONS:  - Monitor vital signs, rhythm, and trends  - Monitor for bleeding, hypotension and signs of decreased cardiac output  - Evaluate effectiveness of vasoactive medications to optimize hemodynamic stability  - Monitor arterial and/or venous puncture sites for bleeding and/or hematoma  - Assess quality of pulses, skin color and temperature  - Assess for signs of decreased coronary artery perfusion - ex. Angina  - Evaluate fluid balance, assess for edema, trend weights  Outcome: Progressing  Goal: Absence of cardiac arrhythmias or at baseline  Description: INTERVENTIONS:  - Continuous cardiac monitoring, monitor vital signs, obtain 12 lead EKG if indicated  - Evaluate effectiveness of antiarrhythmic and heart rate control medications as ordered  - Initiate emergency measures for life threatening arrhythmias  - Monitor electrolytes and administer replacement therapy as ordered  Outcome: Progressing     Problem: RESPIRATORY - ADULT  Goal: Achieves optimal ventilation and oxygenation  Description: INTERVENTIONS:  - Assess for changes in respiratory status  - Assess for changes in mentation and behavior  - Position  to facilitate oxygenation and minimize respiratory effort  - Oxygen supplementation based on oxygen saturation or ABGs  - Provide Smoking Cessation handout, if applicable  - Encourage broncho-pulmonary hygiene including cough, deep breathe, Incentive Spirometry  - Assess the need for suctioning and perform as needed  - Assess and instruct to report SOB or any respiratory difficulty  - Respiratory Therapy support as indicated  - Manage/alleviate anxiety  - Monitor for signs/symptoms of CO2 retention  Outcome: Progressing     Problem: GASTROINTESTINAL - ADULT  Goal: Minimal or absence of nausea and vomiting  Description: INTERVENTIONS:  - Maintain adequate hydration with IV or PO as ordered and tolerated  - Nasogastric tube to low intermittent suction as ordered  - Evaluate effectiveness of ordered antiemetic medications  - Provide nonpharmacologic comfort measures as appropriate  - Advance diet as tolerated, if ordered  - Obtain nutritional consult as needed  - Evaluate fluid balance  Outcome: Progressing     Problem: METABOLIC/FLUID AND ELECTROLYTES - ADULT  Goal: Glucose maintained within prescribed range  Description: INTERVENTIONS:  - Monitor Blood Glucose as ordered  - Assess for signs and symptoms of hyperglycemia and hypoglycemia  - Administer ordered medications to maintain glucose within target range  - Assess barriers to adequate nutritional intake and initiate nutrition consult as needed  - Instruct patient on self management of diabetes  Outcome: Progressing  Goal: Electrolytes maintained within normal limits  Description: INTERVENTIONS:  - Monitor labs and rhythm and assess patient for signs and symptoms of electrolyte imbalances  - Administer electrolyte replacement as ordered  - Monitor response to electrolyte replacements, including rhythm and repeat lab results as appropriate  - Fluid restriction as ordered  - Instruct patient on fluid and nutrition restrictions as appropriate  Outcome:  Progressing  Goal: Hemodynamic stability and optimal renal function maintained  Description: INTERVENTIONS:  - Monitor labs and assess for signs and symptoms of volume excess or deficit  - Monitor intake, output and patient weight  - Monitor urine specific gravity, serum osmolarity and serum sodium as indicated or ordered  - Monitor response to interventions for patient's volume status, including labs, urine output, blood pressure (other measures as available)  - Encourage oral intake as appropriate  - Instruct patient on fluid and nutrition restrictions as appropriate  Outcome: Progressing     Problem: MUSCULOSKELETAL - ADULT  Goal: Return mobility to safest level of function  Description: INTERVENTIONS:  - Assess patient stability and activity tolerance for standing, transferring and ambulating w/ or w/o assistive devices  - Assist with transfers and ambulation using safe patient handling equipment as needed  - Ensure adequate protection for wounds/incisions during mobilization  - Obtain PT/OT consults as needed  - Advance activity as appropriate  - Communicate ordered activity level and limitations with patient/family  Outcome: Progressing  Goal: Maintain proper alignment of affected body part  Description: INTERVENTIONS:  - Support and protect limb and body alignment per provider's orders  - Instruct and reinforce with patient and family use of appropriate assistive device and precautions (e.g. spinal or hip dislocation precautions)  Outcome: Progressing     Problem: Impaired Functional Mobility  Goal: Achieve highest/safest level of mobility/gait  Description: Interventions:  - Assess patient's functional ability and stability  - Promote increasing activity/tolerance for mobility and gait  - Educate and engage patient/family in tolerated activity level and precautions  Outcome: Progressing

## 2025-03-03 NOTE — PROGRESS NOTES
Progress Note  Anjel Pisano Patient Status:  Inpatient    3/30/1970 MRN [id number]   Location [facility] 3W/SW Attending Santiago Dwyer MD   Hosp Day # 2 PCP Yves Yanez MD     SUBJECTIVE:    Denies headaches, double vision, or chest pain. Reported left arm numbness this morning that resolved on its own. Denies pain to incision site. No numbness or tingling to BLE.     VITALS:  /68 (BP Location: Right arm)   Pulse 72   Temp 98.2 °F (36.8 °C) (Oral)   Resp 20   Ht 5' 2\" (1.575 m)   Wt 201 lb 8 oz (91.4 kg)   SpO2 97%   BMI 36.85 kg/m²   INTAKE/OUTPUT:    Intake/Output Summary (Last 24 hours) at 3/3/2025 0932  Last data filed at 3/2/2025 2350  Gross per 24 hour   Intake 1088.2 ml   Output 4025 ml   Net -2936.8 ml     Last 3 Weights   25 0443 201 lb 8 oz (91.4 kg)   25 1930 195 lb (88.5 kg)   25 1618 199 lb (90.3 kg)   25 1537 199 lb (90.3 kg)     LABS:  Recent Labs   Lab 25  0915 25  0546 25  0533   * 144* 111*   BUN 13 19 23   CREATSERUM 0.79 0.91 0.84   EGFRCR 88 74 82   CA 8.5* 8.9 9.0    139 139   K 5.1 4.3  4.3 4.0    107 102   CO2 25.0 27.0 30.0     Recent Labs   Lab 25  0443 25  0915 25  0546 25  0533   RBC 4.61 4.68 4.46 4.74   HGB 10.9* 11.2* 10.3* 10.8*   HCT 35.5 36.7 34.2* 36.8   MCV 77.0* 78.4* 76.7* 77.6*   MCH 23.6* 23.9* 23.1* 22.8*   MCHC 30.7* 30.5* 30.1* 29.3*   RDW 16.2* 16.1* 16.4* 16.1*   NEPRELIM 16.56*  --  13.69* 13.45*   WBC 17.8* 16.8* 17.3* 15.8*   .0 252.0 284.0  284.0 295.0  295.0     No results for input(s): \"TROP\", \"CK\" in the last 168 hours.  DIAGNOSTICS:  TELEMETRY:     ECHO 3/1/2025:  Conclusions:   1. Left ventricle: The cavity size was normal. Wall thickness was moderately      increased. Systolic function was normal. The estimated ejection fraction      was 55-60%, by 3D assessment. No diagnostic evidence for regional wall      motion  abnormalities. Doppler parameters are consistent with abnormal      left ventricular relaxation - grade 1 diastolic dysfunction.   2. Mitral valve: There was mild regurgitation.   3. Pulmonary arteries: Systolic pressure was moderately increased.     ROS: Negative unless noted above   PHYSICAL EXAM:  General: Alert and oriented x 3. No apparent distress.  HEENT: Normocephalic, sclera are nonicteric. Hearing appropriate bilaterally.  Neck: No JVD or Carotid bruits. Trachea midline.   Cardiac: Regular rate and rhythm. S1, S2 auscultated. No murmurs, rubs, or gallops appreciated.   Lungs: Clear without wheezes, rales, rhonchi or dullness. Chest expansion symmetrical. Regular effort.  Abdomen: Soft, non-tender, +BS. No hepatosplenomegaly or appreciable masses.   Extremities: Without clubbing, cyanosis. Peripheral pulses are 2+. Edema   Neurologic: Motor and sensory nerves grossly intact.   Psych: Appropriate affect   Skin: Warm and dry. No obvious lesions, wounds, or ulcerations.     MEDICATIONS:   nitroGLYCERIN   Rectal Q12H    aspirin  81 mg Oral Daily    amLODIPine  10 mg Oral Daily    atorvastatin  80 mg Oral Nightly    escitalopram  10 mg Oral Daily    gabapentin  100 mg Oral TID    hydrALAZINE  100 mg Oral Q8H PABLO    levETIRAcetam  500 mg Oral BID    losartan  100 mg Oral Daily    metoprolol succinate ER  100 mg Oral Daily    pantoprazole  40 mg Oral QAM AC    insulin   Subcutaneous TID AC and HS    furosemide  40 mg Intravenous BID (Diuretic)      nitroGLYCERIN in dextrose 5% Stopped (03/02/25 1410)    norepinephrine Stopped (03/01/25 0355)    continuous dose heparin 1,150 Units/hr (03/02/25 1716)     ASSESSMENT:    Chest Pain/ Type II NSTEMI   - Peak troponin pending   - ECHO 3/1/25: LVEF 55-60%, no WMA, Mild MR   - LHC 3/2/25: mLAD 50% occlusion. No PCI   - ASA, BB, Statin, ARB, Nitrate    HTN Urgency  - s/p nitroglycerin infusion, Now controlled on Metoprolol, Charting reflects that antihypertensives have only  been intermittently given due to patients refusal but patient denies any refusal   - Per neuro minimum MAP >65 and SBP>90    Bilateral Carotid Stenosis s/p CEA   Uncontrolled Type II DM- A1c 9.2%, Endocrine following   HLD- LDL 76, Lipitor 80 mg   Chronic Anemia- Stable     Moyamoya   Hx Right ECA ICA Bypass and Chronic Left Vertebral Artery Stenosis   Chronic Headaches   Hx Right Hemisphere Stroke   - Neurology following, outpatient MRI   - CTA Brain/Carotids without intracranial hemorrhage, hydrocephalus, or mass affect   - Chronic Rt side residual weakness     PLAN:  - Will stop rectal nitroglycerin   - Stop heparin infusion   - Readjust antihypertensives to reflect home dosing then re-evaluate what changes are needed. Starting tomorrow will switch Metoprolol to Coreg   - Appears compensated stop IV diuresis   - Add Zetia     Plan of care discussed with patient and RN.     Aissatou Amaral, MSN, FN-BC, CCK  03/03/25   9:32 AM  [phone] [place]  [phone] [place]      Cardiology attending    Patient is comfortable, sitting up in chair.  The above note was reviewed and changes made necessary.  Discussed with ORA Reyez.  Otherwise agree with assessment and plan as above.

## 2025-03-03 NOTE — PROGRESS NOTES
[facility]  part of [facility]    Progress Note    Anjel Pisano Patient Status:  Inpatient    3/30/1970 MRN [id number]   Location [facility] 3W/ Attending Santiago Dwyer MD   Hosp Day # 2 PCP Yves Yanez MD     Subjective:   Anjel Pisano is a(n) 54 year old female  w/ Chest pain, unspecified type:     PMH of CAD, HTN, HLD, CVA with right sided residual weakness, right vertebral artery stenosis, bilateral carotid artery stenosis s/p CEA, DM II and CKD who presented to the ED with c/o chest pain and dyspnea      t2DM on iLet pump . Started in 2025     D/w Rn last night and this AM       3/2/2024 cath lab    D/w rn multiple times few times post cath     Objective:   Vital Signs:  Blood pressure 118/66, pulse 63, temperature 97.4 °F (36.3 °C), temperature source Oral, resp. rate 20, height 5' 2\" (1.575 m), weight 201 lb 8 oz (91.4 kg), SpO2 98%, not currently breastfeeding.                      Assessment and Plan:     Patient Active Problem List   Diagnosis    Edema of both legs    Neck pain    Apnea    Gastroesophageal reflux disease without esophagitis    Type 2 diabetes mellitus with neurologic complication (HCC)    Meibomian gland dysfunction (MGD), bilateral, both upper and lower lids    Age-related nuclear cataract of both eyes    Abscess of abdominal wall    Allergic rhinitis    Essential hypertension    TMJ (dislocation of temporomandibular joint)    Iron deficiency anemia    Leukocytosis    Plantar fasciitis of left foot    Type 2 diabetes mellitus with complication, with long-term current use of insulin (HCC)    Left-sided cerebrovascular accident (CVA) (HCC)    History of craniotomy    Family history of colon cancer    Abnormal uterine bleeding (AUB)    Transient alteration of awareness    Altered mental status    Depression    Need for dental care    Type 1 diabetes mellitus without retinopathy (HCC)    S/P colonoscopic polypectomy    Gastropathy    S/P  carotid endarterectomy    Carotid atherosclerosis    Dyslipidemia    Pre-op testing    Pure hypercholesterolemia    Anemia    CKD stage 3 due to type 1 diabetes mellitus (HCC)    Type 2 diabetes mellitus with hyperglycemia, with long-term current use of insulin (HCC)    Weight gain    Obesity (BMI 30-39.9)    Migraine without status migrainosus, not intractable    Nausea    Primary hypertension    Hernia of abdominal wall    Morbid (severe) obesity due to excess calories (HCC)    Left upper quadrant abdominal pain    Cataracta    Cervical radiculopathy    Arthropathy of cervical facet joint    Left carotid stenosis    Essential hypertension with goal blood pressure less than 140/90    Chest pain    Chest pain, unspecified type    Hypotension    Vertebral artery dissection (HCC)    NSTEMI (non-ST elevated myocardial infarction) (HCC)    Hypotension, unspecified hypotension type    Insulin pump in place    Uncontrolled type 2 diabetes mellitus with hyperglycemia (HCC)    Mild non proliferative diabetic retinopathy (HCC)    Seizures (HCC)    Weakness on right side of face    Right-sided extracranial carotid artery stenosis    Tapia tapia disease       . Uncontrolled complicated Diabetes Mellitus Type 2, on insulin pump   - carb controlled diet if ok to eat  - Hypoglycemia protocol     D/w Pt and nurses              Results:     Lab Results   Component Value Date    WBC 15.8 (H) 03/03/2025    HGB 10.8 (L) 03/03/2025    HCT 36.8 03/03/2025    .0 03/03/2025    .0 03/03/2025    CREATSERUM 0.84 03/03/2025    BUN 23 03/03/2025     03/03/2025    K 4.0 03/03/2025     03/03/2025    CO2 30.0 03/03/2025     (H) 03/03/2025    CA 9.0 03/03/2025    ALB 4.1 03/03/2025    ALKPHO 87 03/01/2025    BILT 0.5 03/01/2025    TP 6.5 03/01/2025    AST 12 03/01/2025    ALT 9 (L) 03/01/2025    PTT 63.9 (H) 03/02/2025    INR 1.0 08/31/2017    PT 12.8 09/26/2016    T4F 0.66 08/20/2018    TSH 1.386 02/17/2025    LIP  19 05/24/2023    DDIMER 0.36 02/28/2025    ESRML 19 08/20/2018    MG 2.2 03/03/2025    PHOS 4.4 03/03/2025    TROP 0.03 06/27/2017    B12 580 05/26/2021    POCGLU 224 03/07/2024       No results found.  EKG 12 Lead    Result Date: 3/3/2025  Normal sinus rhythm Right bundle branch block Abnormal ECG When compared with ECG of 01-MAR-2025 07:06, Nonspecific T wave abnormality now evident in Anterior leads QT has shortened Confirmed by SILVESTRE LANGLEY, DANIEL ([id number]) on 3/3/2025 9:56:16 AM       Lab Results   Component Value Date    A1C 9.2 (A) 02/11/2025    A1C 10.6 (A) 09/23/2024    A1C 10.2 (A) 05/23/2024    A1C 9.9 (H) 05/23/2024    A1C 10.4 (A) 02/29/2024       No results for input(s): \"TSH\", \"T4F\", \"T3F\", \"THYP\" in the last 72 hours.  @LAB(TSH)@      Obdulio Serra MD  3/3/2025        DOS is the same date the note was signed

## 2025-03-03 NOTE — PAYOR COMM NOTE
--------------  ADMISSION REVIEW     Payor: [facility]  Subscriber #:  [id number]  Authorization Number: [id number]    Admit date: 3/1/25  Admit time:  3:24 AM       ED Provider Notes        History   HPI  54-year-old female history of hypertension hyperlipidemia CAD, CVA with residual left-sided weakness, presents with chest pain difficulty breathing.  3-day history of waxing and waning chest pressure, with radiation to the arm.  Associated with dyspnea.  Chest pain and dyspnea are worse with exertion. No calf pain/swelling, no weight gain, no orthopnea.     ED Triage Vitals [02/28/25 1930]   BP (!) 195/71   Pulse 85   Resp 22   Temp 98.5 °F (36.9 °C)   Temp src Oral   SpO2 98 %   O2 Device None (Room air)     Current Vitals:   Vital Signs  BP: (!) 169/87  Pulse: 67  Resp: 22  Temp: 98.5 °F (36.9 °C)  Temp src: Oral  MAP (mmHg): (!) 111    Oxygen Therapy  SpO2: 98 %  O2 Device: None (Room air)    HENT: mmm, no lesions,  Neck: normal range of motion, no tenderness, supple.  Eyes: PERRL, EOMI, conjunctiva normal, no discharge. Sclera anicteric.  Cardiovascular: rr no murmur  Respiratory: Normal breath sounds, no respiratory distress, no wheezing, no chest tenderness.  GI: Bowel sounds normal, Soft, no tenderness, no masses, no pulsatile masses.  : No CVA tenderness.  Skin: Warm, dry, no erythema, no rash.  Musculoskeletal: Intact distal pulses, no edema, no tenderness, no cyanosis, no clubbing. Good range of motion in all major joints. No tenderness to palpation or major deformities noted. Back- No tenderness.  Neurologic: Alert & oriented x 3, normal motor function, normal sensory function, no focal deficits noted.    Labs Reviewed   CBC WITH DIFFERENTIAL WITH PLATELET - Abnormal; Notable for the following components:       Result Value    RBC 5.39 (*)     MCV 76.3 (*)     MCH 23.2 (*)     MCHC 30.4 (*)     RDW 16.4 (*)     All other components within normal limits   TROPONIN I HIGH SENSITIVITY -  Abnormal; Notable for the following components:    Troponin I (High Sensitivity) 69 (*)     All other components within normal limits   PRO BETA NATRIURETIC PEPTIDE - Abnormal; Notable for the following components:    Pro-Beta Natriuretic Peptide 274 (*)     All other components within normal limits   LIPID PANEL - Abnormal; Notable for the following components:    HDL Cholesterol 32 (*)    EKG    Rate, intervals and axes as noted on EKG Report.  Rate: 76  Rhythm: Sinus Rhythm  Reading: SR, RBBB, no st change, no stemi. Qtc 488ms  Admission disposition: 2/28/2025 11:08 PM    Disposition and Plan     Clinical Impression:  1. Chest pain, unspecified type       Disposition:  Admit  2/28/2025 11:08 pm                 History & Physical      History of Present Illness:  Anjel Pisano is a(n) 54 year old female, with a past medical history significant for CAD, CVA with right-sided weakness, right vertebral stenosis, bilateral carotid artery stenosis status post carotid endarterectomy, insulin requiring diabetes and chronic kidney disease stage III presents with a complaint of chest discomfort and shortness of breath ongoing for the past 2 days.  Describes the onset as gradual, pain intermittent in nature 6 out of 10 at its worst associated with shortness of breath particularly on exertion.  In ER was found to be hypertensive with systolic pressure in the 200s, responded to IV hydralazine with a marked drop in her systolic pressures into the 60s requiring pressor support thereafter.  At this time patient became confused agitated as well.     Temp:  [98.5 °F (36.9 °C)] 98.5 °F (36.9 °C)  Pulse:  [67-85] 67  Resp:  [22] 22  BP: (169-195)/(71-87) 169/87  SpO2:  [98 %] 98 %     Eye:  Pupils are equal, round and reactive to light, extraocular movements are intact, Normal conjunctiva.  HENT:  Normocephalic, oral mucosa is moist.  Head:  Normocephalic, atraumatic.  Neck:  Supple, non-tender, no carotid bruit, no jugular venous  distention, no lymphadenopathy, no thyromegaly.  Respiratory:  Lungs are clear to auscultation, respirations are non-labored, breath sounds are equal, symmetrical chest wall expansion.  Cardiovascular:  Normal rate, regular rhythm, no murmur, no edema.  Gastrointestinal:  Soft, non-tender, non-distended, normal bowel sounds, no organomegaly.  Lymphatics:  No lymphadenopathy neck, axilla, groin.  Musculoskeletal: Normal range of motion.  normal strength.  Feet:  Normal pulses.  Neurologic:  Alert, oriented, no focal deficits, cranial nerves II-XII are grossly intact.  Cognition and Speech:  Oriented, speech clear and coherent.  Psychiatric:  Cooperative, appropriate mood & affect.     Lab Results   Component Value Date     WBC 10.2 02/28/2025     HGB 12.5 02/28/2025     HCT 41.1 02/28/2025     .0 02/28/2025     CREATSERUM 0.75 02/28/2025     BUN 12 02/28/2025      02/28/2025     K 3.8 02/28/2025      02/28/2025     CO2 26.0 02/28/2025     GLU 78 02/28/2025     CA 9.4 02/28/2025     DDIMER 0.36 02/28/2025      Assessment and Plan:     Chest pain possible ACS  EKG with ischemic changes, troponin mildly elevated however plateaued off, cardiology on board.  Will get 2D echo to further evaluate.     Accelerated hypertension  Responded to IV hydralazine by significant drop in blood pressure, briefly started on pressor support now improved, will hold off on pressors use antihypertensives cautiously.     Acute on chronic diastolic heart failure  X-ray indicated above pulmonary edema, will start patient on Lasix 40 mg IV daily, monitor I's and O's and daily weights.     Insulin requiring diabetes with associated neuropathy  Will use sliding scale coverage, check A1c.     Acute encephalopathy, likely ischemic  Likely triggered by sudden drop in blood pressure, now appears to be returning back to baseline, monitor as needed.     Prophylaxis  Subcutaneous heparin               3/1/25       Blood pressure  117/72, pulse 91, temperature 97.5 °F (36.4 °C), resp. rate 14, height 5' 2\" (1.575 m), weight 201 lb 8 oz (91.4 kg), SpO2 99%, not currently breastfeeding.    SKIN:  Warm and hydrated  PSYCHIATRIC: Calm and cooperative    HEENT:  Head was atraumatic and normocephalic.  Eyes: Sclera was anicteric.  Pupils were equal.  Ears:  There were no lesions.  Nose:  No lesions were noted.      NECK:  Supple.  There was no JVD.    CHEST:  Symmetrical movement on inspiration  CARDIAC: S1 S2+, RRR  LUNGS:  CTAB with decreased entry at bases   ABDOMEN: Non-distended, non-tender, BS+  MUSCULOSKELETAL:  There was no deformity.  There was full range of motion in all the extremities.    EXTREMITIES: There was no edema  NEUROLOGIC: R sided weakness     Assessment and Plan:   Chest pain possible ACS  EKG with ischemic changes, troponin up trended this AM  Cardiology consulted, pt started on heparin gtt  Await 2d echo  Will need ischemic eval this admission  Pt currently denies any cp or sob      Accelerated hypertension  Responded to IV hydralazine by significant drop in blood pressure  Needed pressor briefly, currently bp stable  Cardiology following     Acute on chronic diastolic heart failure  X-ray indicated above pulmonary edema  Started on Lasix 40 mg IV daily  Monitor I's and O's and daily weights     Insulin requiring diabetes with associated neuropathy  Will start on CF insulin  Monitor BS and adjust accordingly      Acute encephalopathy, likely ischemic  Likely triggered by sudden drop in blood pressure, now appears to be returning back to baseline, monitor as needed.     Prophylaxis  Subcutaneous heparin     Lab 02/28/25  2206 03/01/25  0056 03/01/25  0443 03/01/25  0915   RBC 5.39* 5.03 4.61 4.68   HGB 12.5 11.8* 10.9* 11.2*   HCT 41.1 38.2 35.5 36.7   MCV 76.3* 75.9* 77.0* 78.4*   MCH 23.2* 23.5* 23.6* 23.9*   MCHC 30.4* 30.9* 30.7* 30.5*   RDW 16.4* 16.3* 16.2* 16.1*   NEPRELIM 6.22 3.40 16.56*  --    WBC 10.2 9.4 17.8*  16.8*   .0 298.0 254.0 252.0      Lab 03/01/25  0056 03/01/25  0443 03/01/25  0915   GLU 86 119* 128*   BUN 12 12 13   CREATSERUM 0.74 0.80 0.79   EGFRCR 95 86 88   CA 9.0 8.3* 8.5*    141 140   K 3.6 3.4* 5.1    108 108   CO2 25.0 24.0 25.0       XR CHEST AP PORTABLE  (CPT=71045)  Result Date: 3/1/2025  CONCLUSION:   Mild cardiomegaly with interstitial pulmonary edema.  A preliminary report was issued by the [facility] teleradiology service. There are no clinically actionable discrepancies.    Dictated by (CST): Antonieta Hart MD on 3/01/2025 at 9:48 AM     Finalized by (CST): Antonieta Hart MD on 3/01/2025 at 9:49 AM           CTA BRAIN + CTA CAROTIDS (CPT=70496/50796)  Result Date: 3/1/2025  CONCLUSION:   No acute intracranial hemorrhage, hydrocephalus, or mass effect.  If there is clinical concern for acute ischemic stroke, MRI of the brain is recommended.  Unchanged complete or near complete occlusion of the left V1 vertebral artery, with additional irregularity of the V2 segment.  Findings are concerning for age chronic vertebral artery dissection.  Right external to internal carotid artery bypass.  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  Retrograde reconstitution of flow in the distal intracranial ICA, similar to prior.  Chronic hypoenhancement of the left ZARA.  Otherwise, No large vessel occlusion involving the major arterial branches of the [id number]-yf-Xregyx.  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  Moderate narrowing at the right subclavian artery origin.  Correlate for subclavian steal.  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  Small subcentimeter lucent lesions throughout the calvarium, nonspecific.  Recommend correlation with clinical/laboratory findings for myelomatous process.  A preliminary report was issued by the [facility] teleradiology service. There are no clinically  actionable discrepancies.  Report was initially discussed with Dr. Staley At 1:55 a.m. Eastern time by Dr. Rojas    Dictated by (CST): Antonieta Hart MD on 3/01/2025 at 8:24 AM     Finalized by (CST): Antonieta Hart MD on 3/01/2025 at 8:39 AM           XR CHEST AP PORTABLE  (CPT=71045)   Result Date: 3/1/2025  CONCLUSION:   Increased interstitial markings bilaterally, which may be secondary to interstitial pulmonary edema or atypical/viral pneumonia in the appropriate clinical setting.    A preliminary report was issued by the [facility] teleradiology service. There are no clinically actionable discrepancies.  Dictated by (CST): Antonieta Hart MD on 3/01/2025 at 6:27 AM     Finalized by (CST): Antonieta Hart MD on 3/01/2025 at 6:28 AM           EKG 12 Lead  Result Date: 3/1/2025  Normal sinus rhythm Right bundle branch block Abnormal ECG When compared with ECG of 01-MAR-2025 03:37, The axis Shifted left ST no longer depressed in Anterior-lateral leads T wave inversion no longer evident in Anterior leads     EKG  Result Date: 3/1/2025  Sinus tachycardia Indeterminate axis Right bundle branch block Marked ST abnormality, possible inferior subendocardial injury Marked ST abnormality, possible anterolateral subendocardial injury Abnormal ECG When compared with ECG of 01-MAR-2025 02:08, No significant change was found     EKG 12 Lead  Result Date: 3/1/2025  Sinus tachycardia Right bundle branch block Marked ST abnormality, possible inferior subendocardial injury Marked ST abnormality, possible anterolateral subendocardial injury Abnormal ECG When compared with ECG of 01-MAR-2025 00:20, The axis Shifted right ST more depressed in Anterior-lateral leads     EKG 12 Lead  Result Date: 3/1/2025  Normal sinus rhythm Right bundle branch block Abnormal ECG When compared with ECG of 28-FEB-2025 19:38, ST now depressed in Anterior leads     EKG 12 Lead  Result Date: 3/1/2025  Normal sinus rhythm Right bundle branch  block Abnormal ECG When compared with ECG of 20-AUG-2024 13:13, QT has lengthened                     3/2/25     This AM had sob and cp, warranting a cardiac angiogram     Blood pressure 141/81, pulse 71, temperature 97.7 °F (36.5 °C), temperature source Temporal, resp. rate 15, height 5' 2\" (1.575 m), weight 201 lb 8 oz (91.4 kg), SpO2 96%, not currently breastfeeding.     SKIN:  Warm and hydrated  PSYCHIATRIC: Calm and cooperative    HEENT:  Head was atraumatic and normocephalic.  Eyes: Sclera was anicteric.  Pupils were equal.  Ears:  There were no lesions.  Nose:  No lesions were noted.      NECK:  Supple.  There was no JVD.    CHEST:  Symmetrical movement on inspiration  CARDIAC: S1 S2+, RRR  LUNGS:  CTAB with decreased entry at bases   ABDOMEN: Non-distended, non-tender, BS+  MUSCULOSKELETAL:  There was no deformity.  There was full range of motion in all the extremities.    EXTREMITIES: There was no edema  NEUROLOGIC: R sided weakness     Assessment and Plan:   Chest pain possible ACS  EKG with ischemic changes, troponin up trended this AM and pt had cp  Subsequent cardiac cath showing 50% mLAD lesion, otherwise non-obstructive disease  Remains on heparin gtt, pt denies any cp currently  2d echo reviewed     Accelerated hypertension  Responded to IV hydralazine by significant drop in blood pressure  BP stable currently      Acute on chronic diastolic heart failure  X-ray indicated above pulmonary edema  Started on Lasix 40 mg IV daily  Monitor I's and O's and daily weights  Net IO Since Admission: 281.93 mL [03/02/25 1440]     Insulin requiring diabetes with associated neuropathy  Endocrinology consulted  Has insulin pump  Cont to monitor BS     Acute encephalopathy, likely ischemic  Likely triggered by sudden drop in blood pressure  Pt back to baseline      Prophylaxis  Subcutaneous heparin     Lab 03/01/25  0056 03/01/25  0443 03/01/25  0915 03/02/25  0546   RBC 5.03 4.61 4.68 4.46   HGB 11.8* 10.9* 11.2*  10.3*   HCT 38.2 35.5 36.7 34.2*   MCV 75.9* 77.0* 78.4* 76.7*   MCH 23.5* 23.6* 23.9* 23.1*   MCHC 30.9* 30.7* 30.5* 30.1*   RDW 16.3* 16.2* 16.1* 16.4*   NEPRELIM 3.40 16.56*  --  13.69*   WBC 9.4 17.8* 16.8* 17.3*   .0 254.0 252.0 284.0  284.0      Lab 03/01/25  0443 03/01/25  0915 03/02/25  0546   * 128* 144*   BUN 12 13 19   CREATSERUM 0.80 0.79 0.91   EGFRCR 86 88 74   CA 8.3* 8.5* 8.9    140 139   K 3.4* 5.1 4.3  4.3    108 107   CO2 24.0 25.0 27.0                  CARDIOLOGY  Impression:   SOB/Chest pressure- etiology unclear, she is a vasculopath so CAD is on the differential, initially on presentation her sx appeared to be stable, though after her CT overnight with episode of hypotension her sx worsened. EKG showed signs of global ischemia. Would continue ASA, statin, BP meds, lasix, heparin ggt. EKG  back to baseline. Echo with preserved EF. LHC tomorrow unless her sx change  NSTEMI- demand from the hypotensive episode vs ACS. No current sx, trop uptrending yesterday, echo normal, continue management as above. LHC tomorrow   HTN- pressures this morning are good, continue home amlodipine and losartan, hydralazine, low dose BB  Hx if carotid stenosis- s/p CEA, she has multiple lesions in her vertebrals etc   Hx of CVA     Recommendations:  Plan for LHC tomorrow unless sx change/worsen  Continue other meds as scheduled      PULMONOLOGY  Assessment and Plan:   1.  Dyspnea with neck and shoulder discomfort and associated positive troponin with EKG abnormalities-non-ST elevation myocardial infarction with troponin accelerating and now greater than 4000.  Could have stress ischemia associated with hypertensive urgency but patient has known severe vasculopathy diffusely.  The patient has ongoing back pain.  She also has a history of moyamoya disease.  The echo is unrevealing.  Pulmonary artery pressures are moderately increased and there is grade 1 diastolic dysfunction but normal  ejection fraction.  With ongoing pain, patient will go to Cath Lab.     Recommendations:  1.  IV heparin  2.  Beta-blockade  3.  Trending troponin  4.  Echocardiography  5.  Will monitor in the ICU  6.  Left heart catheterization.     2.  Diabetes mellitus-values okay.     Recommendations: As per endocrinology, insulin pump if supplies are available, otherwise we will transition to Tresiba and NovoLog.     3.  DVT prophylaxis-on insulin drip     4.  Cerebral vasculopathy     5.  Hypertension-initially the patient was markedly hypertensive and then hypotensive with IV hydralazine, now stabilizing on beta-blockade.     6.  Central line access-patient has a right femoral line.  Will hopefully be able to remove as soon as tomorrow.       3/3/25  CARDIOLOGY  Reported left arm numbness this morning that resolved on its own. Denies pain to incision site. No numbness or tingling to BLE.      /68 (BP Location: Right arm)   Pulse 72   Temp 98.2 °F (36.8 °C) (Oral)   Resp 20   Ht 5' 2\" (1.575 m)   Wt 201 lb 8 oz (91.4 kg)   SpO2 97%   BMI 36.85 kg/m²     Lab 03/01/25  0915 03/02/25  0546 03/03/25  0533   * 144* 111*   BUN 13 19 23   CREATSERUM 0.79 0.91 0.84   EGFRCR 88 74 82   CA 8.5* 8.9 9.0    139 139   K 5.1 4.3  4.3 4.0    107 102   CO2 25.0 27.0 30.0      Lab 03/01/25  0443 03/01/25  0915 03/02/25  0546 03/03/25  0533   RBC 4.61 4.68 4.46 4.74   HGB 10.9* 11.2* 10.3* 10.8*   HCT 35.5 36.7 34.2* 36.8   MCV 77.0* 78.4* 76.7* 77.6*   MCH 23.6* 23.9* 23.1* 22.8*   MCHC 30.7* 30.5* 30.1* 29.3*   RDW 16.2* 16.1* 16.4* 16.1*   NEPRELIM 16.56*  --  13.69* 13.45*   WBC 17.8* 16.8* 17.3* 15.8*   .0 252.0 284.0  284.0 295.0  295.0    ECHO 3/1/2025:  Conclusions:   1. Left ventricle: The cavity size was normal. Wall thickness was moderately      increased. Systolic function was normal. The estimated ejection fraction      was 55-60%, by 3D assessment. No diagnostic evidence for regional wall       motion abnormalities. Doppler parameters are consistent with abnormal      left ventricular relaxation - grade 1 diastolic dysfunction.   2. Mitral valve: There was mild regurgitation.   3. Pulmonary arteries: Systolic pressure was moderately increased.      PHYSICAL EXAM:  General: Alert and oriented x 3.   HEENT: Normocephalic, sclera are nonicteric. Hearing appropriate bilaterally.  Neck: No JVD or Carotid bruits. Trachea midline.   Cardiac: Regular rate and rhythm. S1, S2 auscultated. No murmurs, rubs, or gallops appreciated.   Lungs: Clear without wheezes, rales, rhonchi or dullness. Chest expansion symmetrical. Regular effort.  Abdomen: Soft, non-tender, +BS. No hepatosplenomegaly or appreciable masses.   Extremities: Without clubbing, cyanosis. Peripheral pulses are 2+. Edema   Neurologic: Motor and sensory nerves grossly intact.   Psych: Appropriate affect   Skin: Warm and dry. No obvious lesions, wounds, or ulcerations.      MEDICATIONS:   nitroGLYCERIN   Rectal Q12H    aspirin  81 mg Oral Daily    amLODIPine  10 mg Oral Daily    atorvastatin  80 mg Oral Nightly    escitalopram  10 mg Oral Daily    gabapentin  100 mg Oral TID    hydrALAZINE  100 mg Oral Q8H PABLO    levETIRAcetam  500 mg Oral BID    losartan  100 mg Oral Daily    metoprolol succinate ER  100 mg Oral Daily    pantoprazole  40 mg Oral QAM AC    insulin   Subcutaneous TID AC and HS    furosemide  40 mg Intravenous BID (Diuretic)       nitroGLYCERIN in dextrose 5% Stopped (03/02/25 1410)    norepinephrine Stopped (03/01/25 0355)    continuous dose heparin 1,150 Units/hr (03/02/25 1716)      ASSESSMENT:     Chest Pain/ Type II NSTEMI   - Peak troponin pending   - ECHO 3/1/25: LVEF 55-60%, no WMA, Mild MR   - LHC 3/2/25: mLAD 50% occlusion. No PCI   - ASA, BB, Statin, ARB, Nitrate     HTN Urgency  - s/p nitroglycerin infusion, Now controlled on Metoprolol, Charting reflects that antihypertensives have only been intermittently given due to  patients refusal but patient denies any refusal   - Per neuro minimum MAP >65 and SBP>90     Bilateral Carotid Stenosis s/p CEA   Uncontrolled Type II DM- A1c 9.2%, Endocrine following   HLD- LDL 76, Lipitor 80 mg   Chronic Anemia- Stable      Moyamoya   Hx Right ECA ICA Bypass and Chronic Left Vertebral Artery Stenosis   Chronic Headaches   Hx Right Hemisphere Stroke   - Neurology following, outpatient MRI   - CTA Brain/Carotids without intracranial hemorrhage, hydrocephalus, or mass affect   - Chronic Rt side residual weakness      PLAN:  - Will stop rectal nitroglycerin   - Stop heparin infusion   - Readjust antihypertensives to reflect home dosing then re-evaluate what changes are needed. Starting tomorrow will switch Metoprolol to Coreg   - Appears compensated stop IV diuresis   - Add Zetia               MEDICATIONS ADMINISTERED IN LAST 1 DAY:  amLODIPine (Norvasc) tab 10 mg       Date Action Dose Route User    3/3/2025 1002 Given 10 mg Oral Verna Dougherty RN          aspirin DR tab 81 mg       Date Action Dose Route User    3/3/2025 1001 Given 81 mg Oral Verna Dougherty RN          atorvastatin (Lipitor) tab 80 mg       Date Action Dose Route User    3/2/2025 2100 Given 80 mg Oral Nirmala Beckwith RN          heparin (Porcine) 67742 units/250mL infusion ACS/AFIB CONTINUOUS       Date Action Dose Route User    3/2/2025 1716 Hi-Risk Restarted 1,150 Units/hr Intravenous Esme Padilla RN          escitalopram (Lexapro) tab 10 mg       Date Action Dose Route User    3/3/2025 1003 Given 10 mg Oral Verna Dougherty RN          furosemide (Lasix) 10 mg/mL injection 40 mg       Date Action Dose Route User    3/3/2025 1002 Given 40 mg Intravenous Verna Dougherty RN    3/2/2025 1805 Given 40 mg Intravenous Esme Padilla RN          gabapentin (Neurontin) cap 100 mg       Date Action Dose Route User    3/3/2025 1002 Given 100 mg Oral Verna Dougherty RN    3/2/2025 2100 Given 100 mg Oral Nirmala Beckwith RN    3/2/2025  1754 Given 100 mg Oral Esme Padilla RN          hydrALAZINE (Apresoline) tab 100 mg       Date Action Dose Route User    3/2/2025 1440 Given 100 mg Oral Esme Padilla RN          insulin via iLet Insulin Pump       Date Action Dose Route User    3/3/2025 0800 Self Administered via Pump (none) Subcutaneous (Left Lower Abdomen) Verna Dougherty RN    3/2/2025 2130 Self Administered via Pump (none) Subcutaneous (Left Lower Abdomen) Nirmala Beckwith RN    3/2/2025 1700 Self Administered via Pump (none) Subcutaneous (Left Lower Abdomen) Esme Padilla RN          levETIRAcetam (Keppra) tab 500 mg       Date Action Dose Route User    3/3/2025 1004 Given 500 mg Oral Verna Dougherty RN    3/2/2025 2100 Given 500 mg Oral Nirmala Beckwith RN          losartan (Cozaar) tab 100 mg       Date Action Dose Route User    3/3/2025 1002 Given 100 mg Oral Verna Dougherty RN          methylPREDNISolone sodium succinate (Solu-MEDROL) injection 40 mg       Date Action Dose Route User    3/2/2025 2100 Given 40 mg Intravenous Nirmala Beckwith RN    3/2/2025 1755 Given 40 mg Intravenous Esme Padilla RN          metoprolol succinate ER (Toprol XL) 24 hr tab 100 mg       Date Action Dose Route User    3/3/2025 1003 Given 100 mg Oral Verna Dougherty RN          nitroGLYCERIN in dextrose 5% 50 mg/250mL infusion premix       Date Action Dose Route User    3/2/2025 1351 Rate/Dose Change 5 mcg/min Intravenous Esme Padilla RN          pantoprazole (Protonix) DR tab 40 mg       Date Action Dose Route User    3/3/2025 0530 Given 40 mg Oral Nirmala Beckwith RN            Vitals (last day)       Date/Time Temp Pulse Resp BP SpO2 Weight O2 Device O2 Flow Rate (L/min) [facility]    03/03/25 1300 -- -- -- 118/66 -- -- -- --     03/03/25 0958 97.4 °F (36.3 °C) 63 20 124/76 98 % -- None (Room air) -- AA    03/03/25 0527 98.2 °F (36.8 °C) 72 20 128/68 97 % -- None (Room air) -- SD    03/02/25 2059 98.5 °F (36.9 °C) 73 16 137/69 94 % -- None (Room  air) -- SD    03/02/25 1600 97.8 °F (36.6 °C) 75 17 91/69 96 % -- -- 5 L/min AV    03/02/25 1300 -- 75 15 161/95 100 % -- -- -- AV    03/02/25 1200 97.7 °F (36.5 °C) 68 14 134/64 99 % -- Nasal cannula 5 L/min AV    03/02/25 1155 -- 73 19 114/62 96 % -- -- -- AV    03/02/25 1030 -- 69 18 153/79 96 % -- -- -- AV    03/02/25 1025 -- 76 21 165/83 99 % -- -- -- AV    03/02/25 1020 -- 73 17 164/89 98 % -- -- -- AV    03/02/25 1015 -- 74 14 174/94 96 % -- -- -- AV    03/02/25 1010 -- 69 17 153/106 96 % -- -- -- AV    03/02/25 1005 -- 75 19 132/101 95 % -- -- -- AV    03/02/25 1000 -- 69 9 146/78 99 % -- -- -- AV    03/02/25 0955 -- 73 11 156/89 97 % -- -- -- AV    03/02/25 0945 -- 71 19 160/84 98 % -- -- 5 L/min AV    03/02/25 0925 -- 71 22 120/70 97 % -- Nasal cannula 1 L/min AV    03/02/25 0800 97 °F (36.1 °C) 76 18 123/83 96 % -- -- -- AV    03/02/25 0400 97 °F (36.1 °C) 84 18 124/72 92 % -- Nasal cannula 2 L/min BM    03/02/25 0000 96.7 °F (35.9 °C) 75 13 145/68 98 % -- Nasal cannula 2 L/min BM

## 2025-03-03 NOTE — PROGRESS NOTES
Pulmonary Medicine Inpatient Progress Note                 Subjective:  On RA afebrile  Feels a little better. Still has some difficulty breathing   CXR with pulm edema pattern       ALLERGIES:  Allergies[1]       MEDS:  Home Medications:  Medications Taking[2]  Scheduled Medication:   hydrALAZINE  100 mg Oral BID    [START ON 3/4/2025] amLODIPine  5 mg Oral BID    [START ON 3/4/2025] carvedilol  12.5 mg Oral BID with meals    [START ON 3/4/2025] losartan  25 mg Oral Daily    ezetimibe  10 mg Oral Nightly    aspirin  81 mg Oral Daily    atorvastatin  80 mg Oral Nightly    escitalopram  10 mg Oral Daily    gabapentin  100 mg Oral TID    levETIRAcetam  500 mg Oral BID    pantoprazole  40 mg Oral QAM AC    insulin   Subcutaneous TID AC and HS     Continuous Infusing Medication:    PRN Medications:    ondansetron    acetaminophen    ipratropium-albuterol    zolpidem    alum-mag hydroxide-simethicone    labetalol    glucose **OR** glucose **OR** glucose-vitamin C **OR** dextrose **OR** glucose **OR** glucose **OR** glucose-vitamin C    nitroglycerin    HYDROcodone-acetaminophen       PHYSICAL EXAM:  /66 (BP Location: Right arm)   Pulse 63   Temp 97.4 °F (36.3 °C) (Oral)   Resp 20   Ht 5' 2\" (1.575 m)   Wt 201 lb 8 oz (91.4 kg)   SpO2 98%   BMI 36.85 kg/m²   CONSTITUTIONAL: alert, oriented, no apparent distress  HEENT: atraumatic normocephalic  MOUTH: mucous membranes are moist. No OP exudates  NECK/THROAT: no JVD. Trachea midline. No obvious thyromegaly  LUNG: clear upper b/l no wheezing, + faint crackles. Chest symmetric with respiratory motion  HEART: regular rate and rhythm, no obvious murmers or gallops note  ABD: soft non tender. + bowel sounds. No organomegaly noted  EXT: no clubbing, cyanosis, or edema noted       IMAGES:  CXR 3/1/25  CONCLUSION:   Mild cardiomegaly with interstitial pulmonary edema.         TTE  ECHO 3/1/2025:  Conclusions:   1. Left ventricle: The cavity size was normal. Wall  thickness was moderately      increased. Systolic function was normal. The estimated ejection fraction      was 55-60%, by 3D assessment. No diagnostic evidence for regional wall      motion abnormalities. Doppler parameters are consistent with abnormal      left ventricular relaxation - grade 1 diastolic dysfunction.   2. Mitral valve: There was mild regurgitation.   3. Pulmonary arteries: Systolic pressure was moderately increased.       LABS:  Recent Labs   Lab 03/01/25  0443 03/01/25  0915 03/02/25  0546 03/03/25  0533   RBC 4.61 4.68 4.46 4.74   HGB 10.9* 11.2* 10.3* 10.8*   HCT 35.5 36.7 34.2* 36.8   MCV 77.0* 78.4* 76.7* 77.6*   MCH 23.6* 23.9* 23.1* 22.8*   MCHC 30.7* 30.5* 30.1* 29.3*   RDW 16.2* 16.1* 16.4* 16.1*   NEPRELIM 16.56*  --  13.69* 13.45*   WBC 17.8* 16.8* 17.3* 15.8*   .0 252.0 284.0  284.0 295.0  295.0       Recent Labs   Lab 03/01/25  0056 03/01/25 0443 03/01/25  0915 03/02/25  0546 03/03/25  0533   GLU 86   < > 128* 144* 111*   BUN 12   < > 13 19 23   CREATSERUM 0.74   < > 0.79 0.91 0.84   EGFRCR 95   < > 88 74 82   CA 9.0   < > 8.5* 8.9 9.0   ALB 3.8  --   --  4.0 4.1      < > 140 139 139   K 3.6   < > 5.1 4.3  4.3 4.0      < > 108 107 102   CO2 25.0   < > 25.0 27.0 30.0   ALKPHO 87  --   --   --   --    AST 12  --   --   --   --    ALT 9*  --   --   --   --    BILT 0.5  --   --   --   --    TP 6.5  --   --   --   --     < > = values in this interval not displayed.        ASSESSMENT/PLAN:  NSTEMI   -s/p TTE with elevated pulm pressures, diastolic dysfunction, normal EF  -cardiology consulted  -s/p cath on 3/2/25 with LHC showing 50% mLAD lesion which cards didn't think explains her symptoms  -weaned off nitroglycerin, hep gtts   -on oral meds now  -recheck CXR     Moyamoya with acute aphasia   -neuro following  -on keppra        Thank you for the opportunity to care for Anjel NATHANAEL Sky DO, MPH  Pulmonary Critical Care Medicine  [facility]  Pulmonary and Critical Care Medicine          [1]   Allergies  Allergen Reactions    Radiology Contrast Iodinated Dyes HIVES, RASH and ITCHING     1/20/25: pt states severe itching, redness, hives.  Needs to premedicate for all CT dye scans - MARYAM CHEN [Metoclopramide] OTHER (SEE COMMENTS)     Sensitivity, with crawling sensation.    Adhesive Tape      itching    Wound Dressing Adhesive RASH   [2]   No outpatient medications have been marked as taking for the 2/28/25 encounter (Hospital Encounter).

## 2025-03-03 NOTE — CM/SW NOTE
03/03/25 1300   CM/SW Referral Data   Referral Source Social Work (self-referral);Physician   Reason for Referral Discharge planning;Financial issues   Informant Patient   Medical Hx   Does patient have an established PCP? Yes   Significant Past Medical/Mental Health Hx CAD, CVA, CKD3   Patient Info   Patient's Current Mental Status at Time of Assessment Alert;Oriented   Patient's Home Environment House   Number of Levels in Home 2   Number of Stair in Home   (stays on main level)   Patient lives with Sibling   Patient Status Prior to Admission   Independent with ADLs and Mobility No   Pt. requires assistance with Driving;Housework   Discharge Needs   Anticipated D/C needs To be determined     Social work was able to meet with the patient at bedside regarding SDOH and discharge planning.    The patient lives with her sister in a 2 level home.  The patient requires assistance with some ADLs at baseline.    Social work inquired about the SDOH consult.  The patient states she lacks transportation and the service through her [facility] Medicaid always cancels.  The patient also states that she has SNAP but her amount has been cut down but food continues to rise.    Social work included food pantry resources and transportation resources on the patient's AVS.     The patient has no questions or concerns at this time.     SW/CM to remain available for support and/or discharge planning.     Vicenta Verduzco MSW, LSW  Discharge Planner [id number]

## 2025-03-03 NOTE — CARDIAC REHAB
CARDIAC REHAB HEART FAILURE EDUCATION    Handouts provided and reviewed: CHF Booklet.         Disease Process: Disease process reviewed.    Reviewed the following: DAILY WEIGHT MONITORING: reviewed, pt states she has a scale cindy reis      SODIUM RESTRICTION: reviewed      FLUID RESTRICTION: reviewed      RISK FACTORS: reviewed      SMOKING CESSATION: non smoker      HOME EXERCISE ACTIVITY: daily walking       OUTPATIENT CARDIAC REHAB: not a candidate for phase 2 cardiac rehab        WHEN TO CONTACT YOUR PHYSICIAN: reviewed warning signs      HEART FAILURE CLINIC: [phone]

## 2025-03-03 NOTE — PLAN OF CARE
Pt troponin 4,714 notified Mervat PAYAN; pt newly endorsing increased SOB, chest pain 7-8, stating left hand numbness, Mervat notified, orders carried out. Dr. Johnson at bedside, nitroglycerin gtt started and titrated per orders.   Patient taken for cath, heparin gtt stopped by cath team.  Patient returned; patient denies pain, denies numbness or tingling, aox4, following commands.  Left groin dressing dry and intact, no hematoma noted.  Scheduled medications given per order.    Heparin gtt to be restarted 4 hours after procedure per order; Mervat Cano notified of PTT result 27.4 prior to restarting heparin gtt and what heparin titration protocol is stating; per Mervat PAYAN heparin titration protocol not followed; per Mervat PAYAN heparin gtt restarted at previous rate, no bolus given, APTT to be redrawn in 6 hours.    Report given to Ena FRANCISCO. Patient belongings packed.  Patient transferred to room 330, belongings with patient. Patient's home insulin sent upstairs with patient upon transfer Ena FRANCISCO notified. VM left to patient's sister Gretel notifying of pt's room transfer.

## 2025-03-03 NOTE — DIETARY NOTE
NUTRITION EDUCATION NOTE     Received consult for cardiac nutrition education. Verbally reviewed basic cardiac diet restrictions. Provided with Eating Heart-Healthy handout to reinforce. Receptive to instruction. May benefit from outpt f/u. Expect fair compliance. RD contact information provided to pt.          Janet Lopez, MS, RDN, LDN  Clinical Dietitian

## 2025-03-04 PROBLEM — E11.649 HYPOGLYCEMIA DUE TO TYPE 2 DIABETES MELLITUS (HCC): Status: ACTIVE | Noted: 2025-03-04

## 2025-03-04 LAB
ALBUMIN SERPL-MCNC: 3.8 G/DL (ref 3.2–4.8)
ANION GAP SERPL CALC-SCNC: 7 MMOL/L (ref 0–18)
ANION GAP SERPL CALC-SCNC: 7 MMOL/L (ref 0–18)
APTT PPP: 28.5 SECONDS (ref 23–36)
BASOPHILS # BLD AUTO: 0.02 X10(3) UL (ref 0–0.2)
BASOPHILS NFR BLD AUTO: 0.1 %
BUN BLD-MCNC: 25 MG/DL (ref 9–23)
BUN BLD-MCNC: 25 MG/DL (ref 9–23)
BUN/CREAT SERPL: 21.6 (ref 10–20)
BUN/CREAT SERPL: 21.6 (ref 10–20)
CALCIUM BLD-MCNC: 8.9 MG/DL (ref 8.7–10.4)
CALCIUM BLD-MCNC: 8.9 MG/DL (ref 8.7–10.4)
CHLORIDE SERPL-SCNC: 99 MMOL/L (ref 98–112)
CHLORIDE SERPL-SCNC: 99 MMOL/L (ref 98–112)
CO2 SERPL-SCNC: 30 MMOL/L (ref 21–32)
CO2 SERPL-SCNC: 30 MMOL/L (ref 21–32)
CREAT BLD-MCNC: 1.16 MG/DL
CREAT BLD-MCNC: 1.16 MG/DL
DEPRECATED RDW RBC AUTO: 44.3 FL (ref 35.1–46.3)
EGFRCR SERPLBLD CKD-EPI 2021: 55 ML/MIN/1.73M2 (ref 60–?)
EGFRCR SERPLBLD CKD-EPI 2021: 55 ML/MIN/1.73M2 (ref 60–?)
EOSINOPHIL # BLD AUTO: 0.03 X10(3) UL (ref 0–0.7)
EOSINOPHIL NFR BLD AUTO: 0.2 %
ERYTHROCYTE [DISTWIDTH] IN BLOOD BY AUTOMATED COUNT: 16.5 % (ref 11–15)
GLUCOSE BLD-MCNC: 204 MG/DL (ref 70–99)
GLUCOSE BLD-MCNC: 204 MG/DL (ref 70–99)
GLUCOSE BLDC GLUCOMTR-MCNC: 115 MG/DL (ref 70–99)
GLUCOSE BLDC GLUCOMTR-MCNC: 130 MG/DL (ref 70–99)
GLUCOSE BLDC GLUCOMTR-MCNC: 155 MG/DL (ref 70–99)
GLUCOSE BLDC GLUCOMTR-MCNC: 176 MG/DL (ref 70–99)
GLUCOSE BLDC GLUCOMTR-MCNC: 183 MG/DL (ref 70–99)
GLUCOSE BLDC GLUCOMTR-MCNC: 57 MG/DL (ref 70–99)
GLUCOSE BLDC GLUCOMTR-MCNC: 68 MG/DL (ref 70–99)
GLUCOSE BLDC GLUCOMTR-MCNC: 85 MG/DL (ref 70–99)
GLUCOSE BLDC GLUCOMTR-MCNC: 90 MG/DL (ref 70–99)
GLUCOSE BLDC GLUCOMTR-MCNC: 97 MG/DL (ref 70–99)
HCT VFR BLD AUTO: 35.7 %
HGB BLD-MCNC: 11.1 G/DL
IMM GRANULOCYTES # BLD AUTO: 0.04 X10(3) UL (ref 0–1)
IMM GRANULOCYTES NFR BLD: 0.3 %
LYMPHOCYTES # BLD AUTO: 3.48 X10(3) UL (ref 1–4)
LYMPHOCYTES NFR BLD AUTO: 25.1 %
MCH RBC QN AUTO: 23.7 PG (ref 26–34)
MCHC RBC AUTO-ENTMCNC: 31.1 G/DL (ref 31–37)
MCV RBC AUTO: 76.1 FL
MONOCYTES # BLD AUTO: 1.09 X10(3) UL (ref 0.1–1)
MONOCYTES NFR BLD AUTO: 7.9 %
NEUTROPHILS # BLD AUTO: 9.22 X10 (3) UL (ref 1.5–7.7)
NEUTROPHILS # BLD AUTO: 9.22 X10(3) UL (ref 1.5–7.7)
NEUTROPHILS NFR BLD AUTO: 66.4 %
OSMOLALITY SERPL CALC.SUM OF ELEC: 292 MOSM/KG (ref 275–295)
OSMOLALITY SERPL CALC.SUM OF ELEC: 292 MOSM/KG (ref 275–295)
PHOSPHATE SERPL-MCNC: 4.7 MG/DL (ref 2.4–5.1)
PLATELET # BLD AUTO: 279 10(3)UL (ref 150–450)
POTASSIUM SERPL-SCNC: 4.2 MMOL/L (ref 3.5–5.1)
POTASSIUM SERPL-SCNC: 4.2 MMOL/L (ref 3.5–5.1)
RBC # BLD AUTO: 4.69 X10(6)UL
SODIUM SERPL-SCNC: 136 MMOL/L (ref 136–145)
SODIUM SERPL-SCNC: 136 MMOL/L (ref 136–145)
WBC # BLD AUTO: 13.9 X10(3) UL (ref 4–11)

## 2025-03-04 PROCEDURE — 99232 SBSQ HOSP IP/OBS MODERATE 35: CPT | Performed by: PHYSICIAN ASSISTANT

## 2025-03-04 PROCEDURE — 99233 SBSQ HOSP IP/OBS HIGH 50: CPT | Performed by: INTERNAL MEDICINE

## 2025-03-04 PROCEDURE — 99233 SBSQ HOSP IP/OBS HIGH 50: CPT | Performed by: HOSPITALIST

## 2025-03-04 RX ORDER — GABAPENTIN 100 MG/1
100 CAPSULE ORAL NIGHTLY
Status: DISCONTINUED | OUTPATIENT
Start: 2025-03-04 | End: 2025-03-06

## 2025-03-04 RX ORDER — SODIUM CHLORIDE 9 MG/ML
INJECTION, SOLUTION INTRAVENOUS CONTINUOUS
Status: ACTIVE | OUTPATIENT
Start: 2025-03-04 | End: 2025-03-04

## 2025-03-04 RX ORDER — CLOPIDOGREL BISULFATE 75 MG/1
75 TABLET ORAL DAILY
Status: DISCONTINUED | OUTPATIENT
Start: 2025-03-04 | End: 2025-03-06

## 2025-03-04 RX ORDER — GABAPENTIN 100 MG/1
100 CAPSULE ORAL NIGHTLY
Status: DISCONTINUED | OUTPATIENT
Start: 2025-03-05 | End: 2025-03-04

## 2025-03-04 NOTE — PROGRESS NOTES
Progress Note  Anjel Pisano Patient Status:  Inpatient    3/30/1970 MRN [id number]   Location [facility] 3W/SW Attending Santiago Dwyer MD   Hosp Day # 3 PCP Yves Yanez MD     SUBJECTIVE:    Denies headaches, if having diplopia but feels like it may be related to her BG, currently 254 on her monitor. Denies pain to incision site. No numbness or tingling to BLE.     VITALS:  /70 (BP Location: Right arm)   Pulse 69   Temp 98 °F (36.7 °C) (Oral)   Resp 22   Ht 5' 2\" (1.575 m)   Wt 197 lb 12.8 oz (89.7 kg)   SpO2 95%   BMI 36.18 kg/m²   INTAKE/OUTPUT:    Intake/Output Summary (Last 24 hours) at 3/4/2025 0717  Last data filed at 3/3/2025 2143  Gross per 24 hour   Intake 666.81 ml   Output 1150 ml   Net -483.19 ml     Last 3 Weights   25 0506 197 lb 12.8 oz (89.7 kg)   25 0443 201 lb 8 oz (91.4 kg)   25 1930 195 lb (88.5 kg)   25 1618 199 lb (90.3 kg)   25 1537 199 lb (90.3 kg)     LABS:  Recent Labs   Lab 25  0546 25  0533 25  0540   * 111* 204*  204*   BUN 19 23 25*  25*   CREATSERUM 0.91 0.84 1.16*  1.16*   EGFRCR 74 82 55*  55*   CA 8.9 9.0 8.9  8.9    139 136  136   K 4.3  4.3 4.0 4.2  4.2    102 99  99   CO2 27.0 30.0 30.0  30.0     Recent Labs   Lab 25  0546 25  0533 25  0540   RBC 4.46 4.74 4.69   HGB 10.3* 10.8* 11.1*   HCT 34.2* 36.8 35.7   MCV 76.7* 77.6* 76.1*   MCH 23.1* 22.8* 23.7*   MCHC 30.1* 29.3* 31.1   RDW 16.4* 16.1* 16.5*   NEPRELIM 13.69* 13.45* 9.22*   WBC 17.3* 15.8* 13.9*   .0  284.0 295.0  295.0 279.0     No results for input(s): \"TROP\", \"CK\" in the last 168 hours.  DIAGNOSTICS:  TELEMETRY:     ECHO 3/1/2025:  Conclusions:   1. Left ventricle: The cavity size was normal. Wall thickness was moderately      increased. Systolic function was normal. The estimated ejection fraction      was 55-60%, by 3D assessment. No diagnostic evidence for regional wall       motion abnormalities. Doppler parameters are consistent with abnormal      left ventricular relaxation - grade 1 diastolic dysfunction.   2. Mitral valve: There was mild regurgitation.   3. Pulmonary arteries: Systolic pressure was moderately increased.     ROS: Negative unless noted above   PHYSICAL EXAM:  General: Alert and oriented x 3. No apparent distress.  HEENT: Normocephalic, sclera are nonicteric. Hearing appropriate bilaterally.  Neck: No JVD or Carotid bruits. Trachea midline.   Cardiac: Regular rate and rhythm. S1, S2 auscultated. No murmurs, rubs, or gallops appreciated.   Lungs: Clear without wheezes, rales, rhonchi or dullness. Chest expansion symmetrical. Regular effort.  Abdomen: Soft, non-tender, +BS. No hepatosplenomegaly or appreciable masses.   Extremities: Without clubbing, cyanosis. Peripheral pulses are 2+. Edema none   Neurologic: Motor and sensory nerves grossly intact.   Psych: Appropriate affect   Skin: Warm and dry. No obvious lesions, wounds, or ulcerations.     MEDICATIONS:   hydrALAZINE  100 mg Oral BID    amLODIPine  5 mg Oral BID    carvedilol  12.5 mg Oral BID with meals    losartan  25 mg Oral Daily    ezetimibe  10 mg Oral Nightly    aspirin  81 mg Oral Daily    atorvastatin  80 mg Oral Nightly    escitalopram  10 mg Oral Daily    gabapentin  100 mg Oral TID    levETIRAcetam  500 mg Oral BID    pantoprazole  40 mg Oral QAM AC    insulin   Subcutaneous TID AC and HS     ASSESSMENT:    Chest Pain/ Type II NSTEMI   - Peak troponin pending   - ECHO 3/1/25: LVEF 55-60%, no WMA, Mild MR   - LHC 3/2/25: mLAD 50% occlusion. No PCI   - ASA, BB, Statin, ARB, Nitrate    HTN Urgency  - s/p nitroglycerin infusion, Charting reflects that antihypertensives have only been intermittently given due to patients refusal but patient denies any refusal. Pressure this morning is 95/60  - Per neuro minimum MAP >65 and SBP>90    KISHAN- Appears mildly dehydrated   Bilateral Carotid Stenosis s/p CEA    Uncontrolled Type II DM- A1c 9.2%, Endocrine following   HLD- LDL 76, Lipitor 80 mg, Zetia added this stay   Chronic Anemia- Stable     Moyamoya   Hx Right ECA ICA Bypass and Chronic Left Vertebral Artery Stenosis   Chronic Headaches   Hx Right Hemisphere Stroke   - Neurology following, outpatient MRI   - CTA Brain/Carotids without intracranial hemorrhage, hydrocephalus, or mass affect   - Chronic Rt side residual weakness     PLAN:  - Hold antihypertensives, per patient her neurosurgeon told her not to allow her SBP to fall below 130, Will have neurology clarify this, their recommendations are as above  - Appears dehydrated, start IVF  - Unclear why her troponin was significantly elevated, a CCTA was initially recommended however, she has an iodine allergy and was pre-medicated prior to angiogram but still developed an allergic reaction with eye swelling, itching, and tongue swelling. Will review with attending alternatives     ADDENDUM 1418: Per neuro ok to continue BP parameters to maintain a MAP >65 and SBP >90. Will treat empirically with Aspirin and Plavix. No plans for CCTA at this time. Check AM Cortisol considering BP swings.     Plan of care discussed with patient and RN.     Aissatou Amaral, MSN, [facility], [facility]  03/04/25   9:32 AM  [phone] [place]  [phone] [place]        Cardiologist Addendum:  Anjel Pisano was seen and examined independently and I agree with the above documentation provided by TAMIKO Wiley.  Patient with small caliber coronaries, moderate disease in the mid LAD, moderate to severe distal LAD disease, diffuse moderate disease of distal OM.  Likely had infarction secondary to hypoperfusion while hypotensive.  Recommend aggressive medical management, DAPT/statin.  Recommend workup for hypotension, a.m. cortisol.    Thank you for allowing me to take part in the care of Anjel Pisano. Please call with any questions of concerns.    L3    Val Peña,   [place]  [facility] [place]   Interventional Cardiac and Vascular Services      March 04, 2025  4:40 PM

## 2025-03-04 NOTE — PROGRESS NOTES
[facility]  part of [facility]    Progress Note    Anjel Pisano Patient Status:  Inpatient    3/30/1970 MRN [id number]   Location [facility] 3W/ Attending Santiago Dwyer MD   Hosp Day # 3 PCP Yves Yanez MD     Subjective:   Anjel Pisano is a(n) 54 year old female   w/ Chest pain, unspecified type:     PMH of CAD, HTN, HLD, CVA with right sided residual weakness, right vertebral artery stenosis, bilateral carotid artery stenosis s/p CEA, DM II and CKD who presented to the ED with c/o chest pain and dyspnea      t2DM on iLet pump . Started in 2025     D/w Rn - pt had hypoglycemia overnight and pump was disconnected        3/2/2024 cath lab for coronary angiogram          Objective:   Vital Signs:  Blood pressure 144/70, pulse 69, temperature 98 °F (36.7 °C), temperature source Oral, resp. rate 22, height 5' 2\" (1.575 m), weight 197 lb 12.8 oz (89.7 kg), SpO2 95%, not currently breastfeeding.                      Assessment and Plan:     Patient Active Problem List   Diagnosis    Edema of both legs    Neck pain    Apnea    Gastroesophageal reflux disease without esophagitis    Type 2 diabetes mellitus with neurologic complication (HCC)    Meibomian gland dysfunction (MGD), bilateral, both upper and lower lids    Age-related nuclear cataract of both eyes    Abscess of abdominal wall    Allergic rhinitis    Essential hypertension    TMJ (dislocation of temporomandibular joint)    Iron deficiency anemia    Leukocytosis    Plantar fasciitis of left foot    Type 2 diabetes mellitus with complication, with long-term current use of insulin (HCC)    Left-sided cerebrovascular accident (CVA) (HCC)    History of craniotomy    Family history of colon cancer    Abnormal uterine bleeding (AUB)    Transient alteration of awareness    Altered mental status    Depression    Need for dental care    Type 1 diabetes mellitus without retinopathy (HCC)    S/P colonoscopic polypectomy     Gastropathy    S/P carotid endarterectomy    Carotid atherosclerosis    Dyslipidemia    Pre-op testing    Pure hypercholesterolemia    Anemia    CKD stage 3 due to type 1 diabetes mellitus (HCC)    Type 2 diabetes mellitus with hyperglycemia, with long-term current use of insulin (HCC)    Weight gain    Obesity (BMI 30-39.9)    Migraine without status migrainosus, not intractable    Nausea    Primary hypertension    Hernia of abdominal wall    Morbid (severe) obesity due to excess calories (HCC)    Left upper quadrant abdominal pain    Cataracta    Cervical radiculopathy    Arthropathy of cervical facet joint    Left carotid stenosis    Essential hypertension with goal blood pressure less than 140/90    Chest pain    Chest pain, unspecified type    Hypotension    Vertebral artery dissection (HCC)    NSTEMI (non-ST elevated myocardial infarction) (HCC)    Hypotension, unspecified hypotension type    Insulin pump in place    Uncontrolled type 2 diabetes mellitus with hyperglycemia (HCC)    Mild non proliferative diabetic retinopathy (HCC)    Seizures (HCC)    Weakness on right side of face    Right-sided extracranial carotid artery stenosis    Tapia tapia disease    Hypoglycemia due to type 2 diabetes mellitus (HCC)     . Uncontrolled complicated Diabetes Mellitus Type 2, on insulin pump   - carb controlled diet if ok to eat  - Hypoglycemia protocol     D/w Pt and nurses - will reconnect pump before eating       Results:     Lab Results   Component Value Date    WBC 13.9 (H) 03/04/2025    HGB 11.1 (L) 03/04/2025    HCT 35.7 03/04/2025    .0 03/04/2025    CREATSERUM 1.16 (H) 03/04/2025    CREATSERUM 1.16 (H) 03/04/2025    BUN 25 (H) 03/04/2025    BUN 25 (H) 03/04/2025     03/04/2025     03/04/2025    K 4.2 03/04/2025    K 4.2 03/04/2025    CL 99 03/04/2025    CL 99 03/04/2025    CO2 30.0 03/04/2025    CO2 30.0 03/04/2025     (H) 03/04/2025     (H) 03/04/2025    CA 8.9 03/04/2025    CA 8.9  03/04/2025    ALB 3.8 03/04/2025    ALKPHO 87 03/01/2025    BILT 0.5 03/01/2025    TP 6.5 03/01/2025    AST 12 03/01/2025    ALT 9 (L) 03/01/2025    PTT 28.5 03/04/2025    INR 1.0 08/31/2017    PT 12.8 09/26/2016    T4F 0.66 08/20/2018    TSH 1.386 02/17/2025    LIP 19 05/24/2023    DDIMER 0.36 02/28/2025    ESRML 19 08/20/2018    MG 2.2 03/03/2025    PHOS 4.7 03/04/2025    TROP 0.03 06/27/2017    B12 580 05/26/2021    POCGLU 224 03/07/2024       XR CHEST AP PORTABLE  (CPT=71045)    Result Date: 3/3/2025  CONCLUSION: No new abnormality.  Persistent pulmonary edema and elevation of the right hemidiaphragm.  Persistent questionable trace pleural effusions.      Dictated by (CST): Piter Cody MD on 3/03/2025 at 3:35 PM     Finalized by (CST): Piter Cody MD on 3/03/2025 at 3:36 PM         EKG 12 Lead    Result Date: 3/3/2025  Normal sinus rhythm Right bundle branch block Abnormal ECG When compared with ECG of 01-MAR-2025 07:06, Nonspecific T wave abnormality now evident in Anterior leads QT has shortened Confirmed by SILVESTRE LANGLEY, DANIEL ([id number]) on 3/3/2025 9:56:16 AM       Lab Results   Component Value Date    A1C 9.2 (A) 02/11/2025    A1C 10.6 (A) 09/23/2024    A1C 10.2 (A) 05/23/2024    A1C 9.9 (H) 05/23/2024    A1C 10.4 (A) 02/29/2024       No results for input(s): \"TSH\", \"T4F\", \"T3F\", \"THYP\" in the last 72 hours.  @LAB(TSH)@      Obdulio Serra MD  3/4/2025        DOS is the same date the note was signed

## 2025-03-04 NOTE — OCCUPATIONAL THERAPY NOTE
OCCUPATIONAL THERAPY EVALUATION - INPATIENT     Room Number: 330/330-A  Evaluation Date: 3/4/2025  Type of Evaluation: Initial  Presenting Problem: hypoglycemia, hypotension    Physician Order: IP Consult to Occupational Therapy  Reason for Therapy: ADL/IADL Dysfunction and Discharge Planning    OCCUPATIONAL THERAPY ASSESSMENT   Patient is a 54 year old female admitted 2/28/2025 for SOB and chest pain. Dx include HTN, acute on chronic CHF,  acute encephalopathy, hypotension, hypoglycemia. Prior to admission, patient's baseline is generally I with daily routines and household mobility withotu AD. Pt lives with her sister and her sisters kids. Pt is typically alone days. Pt does not use an AD. Pt does not work and does not drive.  Patient is currently functioning near baseline with BADLs and functional mobility.  Patient is requiring supervision as a result of the following impairments: c/o dizziness. Occupational Therapy will continue to follow for duration of hospitalization.    Patient will benefit from continued skilled OT Services while hospitalized do not anticipate OT services upon discharge. Pt may benefit from outpt therapy- cardiac vs PT?.    PLAN DURING HOSPITALIZATION     OT Treatment Plan: Balance activities, Energy conservation/work simplification techniques, ADL training, Functional transfer training, Endurance training, Patient/Family education, Patient/Family training, Equipment eval/education     OCCUPATIONAL THERAPY MEDICAL/SOCIAL HISTORY   Problem List  Principal Problem:    Chest pain, unspecified type  Active Problems:    Chest pain    Hypotension    Vertebral artery dissection (HCC)    NSTEMI (non-ST elevated myocardial infarction) (HCC)    Hypotension, unspecified hypotension type    Insulin pump in place    Uncontrolled type 2 diabetes mellitus with hyperglycemia (HCC)    Tapia tapia disease    Hypoglycemia due to type 2 diabetes mellitus (HCC)    HOME SITUATION  Type of Home: House  Home  Layout: -- (lives on main floor, accesses basement laundry)  Lives With: -- (with sister and several of sister's kids)  Toilet and Equipment: Standard height toilet  Shower/Tub and Equipment: Walk-in shower; Shower chair  Occupation/Status: does not work  Hand Dominance: Right  Drives: No    Use of Assistive Device(s): none    Prior Level of [place]: Prior to admission, patient's baseline is generally I with daily routines and household mobility without AD. Pt lives with her sister and her sisters kids. Pt is typically alone days. Pt does not use an AD. Pt does not work and does not drive.     SUBJECTIVE  \"My whole left side is weaker\"    OCCUPATIONAL THERAPY EXAMINATION      OBJECTIVE  Precautions: Bed/chair alarm  Fall Risk: High fall risk      PAIN ASSESSMENT  [place]      ACTIVITY TOLERANCE  Pulse: 78  Heart Rate Source: Monitor     BP: 92/60 (with c/o dizziness and blurry vision per nurse with BP taken just prior to therapist arrival)    O2 SATURATIONS  Oxygen Therapy  SPO2% on Room Air at Rest: 94    COGNITION  Pt is awake, responsive with increased time to process  O x4 with effort?  Follows basic commands consistently during bed side session  OT facilitated Mini-Cog Assessment, a tool used to detect cognitive impairment and need for further cognitive testing. It allows assessment of cognitive function, memory, language comprehension, visual-motor skills, and executive function. Patient achieved a score of 4 out of 5 possible points, indicating intact for cognitive function.      VISION  Pt reports blurry vision with no functional deficits observed during bed side session    Communication: able to express her needs    Behavioral/Emotional/Social: pleasant,slow to respond    RANGE OF MOTION   Upper extremity ROM is within functional limits     STRENGTH ASSESSMENT  Upper extremity strength is within functional limits with LT UE grossly 4+/5    ACTIVITIES OF DAILY LIVING ASSESSMENT  AM-PAC ‘6-Clicks’  Inpatient Daily Activity Short Form  How much help from another person does the patient currently need…  -   Putting on and taking off regular lower body clothing?: A Little  -   Bathing (including washing, rinsing, drying)?: A Little  -   Toileting, which includes using toilet, bedpan or urinal? : A Little  -   Putting on and taking off regular upper body clothing?: A Little  -   Taking care of personal grooming such as brushing teeth?: A Little  -   Eating meals?: None    AM-PAC Score:  Score: 19  Approx Degree of Impairment: 42.8%  Standardized Score (AM-PAC Scale): 40.22  CMS Modifier (G-Code): CK    FUNCTIONAL TRANSFER ASSESSMENT  Sit to Stand: Edge of Bed; Chair  Edge of Bed: Contact Guard Assist  Chair: Contact Guard Assist    BED MOBILITY  Rolling: Supervision  Sit to Supine (OT): Supervision    BALANCE ASSESSMENT  Static Sitting: Independent    FUNCTIONAL ADL ASSESSMENT  Feeding: I  Grooming: I with set up from sitting  UE self care: set up from sitting  LE dressing: set up frombed side- able to reach MINISTERIO feet    Skilled Therapy Provided: Up[on entering room, pt in chair with nurse tending to her. Nurse reports pt currently with low BP, symptomatic with dizziness and blurry vision. Nurse requesting pt be assisted back to bed.  Pt is pleasant and appears slow to respond though accurate.   Provide CGA/close SBA for bed side functional mobility- from chair, bed. Pt manages bed mobility SBA/Independent.   Pt was able to manage LE dressing, reaching both feet without difficulty.   Anticipate pt is close to base line. Plan to continue OT services while hospitalized.   Session limited by low BP with dizziness.     EDUCATION PROVIDED  Patient Education : Role of Occupational Therapy; Fall Prevention  Patient's Response to Education: Verbalized Understanding    The patient's Approx Degree of Impairment: 42.8% has been calculated based on documentation in the [facility] '6 clicks' Inpatient Daily Activity Short Form.   Research supports that patients with this level of impairment may benefit from HHC though pt may benefit from out pt services cardiac vs PT?.  Final disposition will be made by interdisciplinary medical team.     Patient End of Session: In bed, Needs met, Call light within reach, RN aware of session/findings, All patient questions and concerns addressed    OT Goals  Patients self stated goal is: be well     Patient will complete functional transfer with Independent  Comment:     Patient will complete toileting with Independent  Comment:     Patient will tolerate standing for 5 minutes in prep for adls with independent   Comment:    Patient will complete item retrieval with independent  Comment:          Goals  on: 3/18/25  Frequency: 3x/week    Patient Evaluation Complexity Level:   Occupational Profile/Medical History LOW - Brief history including review of medical or therapy records    Specific performance deficits impacting engagement in ADL/IADL LOW  1 - 3 performance deficits    Client Assessment/Performance Deficits LOW - No comorbidities nor modifications of tasks    Clinical Decision Making LOW - Analysis of occupational profile, problem-focused assessments, limited treatment options    Overall Complexity LOW       Self-Care Home Management: 15 minutes

## 2025-03-04 NOTE — PLAN OF CARE
Patient resting well. Endocrinology to follow up regarding insulin pump and coverage.     Problem: Patient Centered Care  Goal: Patient preferences are identified and integrated in the patient's plan of care  Description: Interventions:  - What would you like us to know as we care for you?   - Provide timely, complete, and accurate information to patient/family  - Incorporate patient and family knowledge, values, beliefs, and cultural backgrounds into the planning and delivery of care  - Encourage patient/family to participate in care and decision-making at the level they choose  - Honor patient and family perspectives and choices  Outcome: Progressing     Problem: CARDIOVASCULAR - ADULT  Goal: Maintains optimal cardiac output and hemodynamic stability  Description: INTERVENTIONS:  - Monitor vital signs, rhythm, and trends  - Monitor for bleeding, hypotension and signs of decreased cardiac output  - Evaluate effectiveness of vasoactive medications to optimize hemodynamic stability  - Monitor arterial and/or venous puncture sites for bleeding and/or hematoma  - Assess quality of pulses, skin color and temperature  - Assess for signs of decreased coronary artery perfusion - ex. Angina  - Evaluate fluid balance, assess for edema, trend weights  Outcome: Progressing  Goal: Absence of cardiac arrhythmias or at baseline  Description: INTERVENTIONS:  - Continuous cardiac monitoring, monitor vital signs, obtain 12 lead EKG if indicated  - Evaluate effectiveness of antiarrhythmic and heart rate control medications as ordered  - Initiate emergency measures for life threatening arrhythmias  - Monitor electrolytes and administer replacement therapy as ordered  Outcome: Progressing     Problem: RESPIRATORY - ADULT  Goal: Achieves optimal ventilation and oxygenation  Description: INTERVENTIONS:  - Assess for changes in respiratory status  - Assess for changes in mentation and behavior  - Position to facilitate oxygenation and  minimize respiratory effort  - Oxygen supplementation based on oxygen saturation or ABGs  - Provide Smoking Cessation handout, if applicable  - Encourage broncho-pulmonary hygiene including cough, deep breathe, Incentive Spirometry  - Assess the need for suctioning and perform as needed  - Assess and instruct to report SOB or any respiratory difficulty  - Respiratory Therapy support as indicated  - Manage/alleviate anxiety  - Monitor for signs/symptoms of CO2 retention  Outcome: Progressing     Problem: GASTROINTESTINAL - ADULT  Goal: Minimal or absence of nausea and vomiting  Description: INTERVENTIONS:  - Maintain adequate hydration with IV or PO as ordered and tolerated  - Nasogastric tube to low intermittent suction as ordered  - Evaluate effectiveness of ordered antiemetic medications  - Provide nonpharmacologic comfort measures as appropriate  - Advance diet as tolerated, if ordered  - Obtain nutritional consult as needed  - Evaluate fluid balance  Outcome: Progressing     Problem: METABOLIC/FLUID AND ELECTROLYTES - ADULT  Goal: Electrolytes maintained within normal limits  Description: INTERVENTIONS:  - Monitor labs and rhythm and assess patient for signs and symptoms of electrolyte imbalances  - Administer electrolyte replacement as ordered  - Monitor response to electrolyte replacements, including rhythm and repeat lab results as appropriate  - Fluid restriction as ordered  - Instruct patient on fluid and nutrition restrictions as appropriate  Outcome: Progressing  Goal: Hemodynamic stability and optimal renal function maintained  Description: INTERVENTIONS:  - Monitor labs and assess for signs and symptoms of volume excess or deficit  - Monitor intake, output and patient weight  - Monitor urine specific gravity, serum osmolarity and serum sodium as indicated or ordered  - Monitor response to interventions for patient's volume status, including labs, urine output, blood pressure (other measures as  available)  - Encourage oral intake as appropriate  - Instruct patient on fluid and nutrition restrictions as appropriate  Outcome: Progressing     Problem: MUSCULOSKELETAL - ADULT  Goal: Return mobility to safest level of function  Description: INTERVENTIONS:  - Assess patient stability and activity tolerance for standing, transferring and ambulating w/ or w/o assistive devices  - Assist with transfers and ambulation using safe patient handling equipment as needed  - Ensure adequate protection for wounds/incisions during mobilization  - Obtain PT/OT consults as needed  - Advance activity as appropriate  - Communicate ordered activity level and limitations with patient/family  Outcome: Progressing  Goal: Maintain proper alignment of affected body part  Description: INTERVENTIONS:  - Support and protect limb and body alignment per provider's orders  - Instruct and reinforce with patient and family use of appropriate assistive device and precautions (e.g. spinal or hip dislocation precautions)  Outcome: Progressing     Problem: Impaired Functional Mobility  Goal: Achieve highest/safest level of mobility/gait  Description: Interventions:  - Assess patient's functional ability and stability  - Promote increasing activity/tolerance for mobility and gait  - Educate and engage patient/family in tolerated activity level and precautions  Outcome: Progressing

## 2025-03-04 NOTE — PROGRESS NOTES
[facility]  part of [facility]    Progress Note    Anjel Pisano Patient Status:  Inpatient    3/30/1970 MRN [id number]   Location [facility] 2W/SW Attending Santiago Dwyer MD   Hosp Day # 3 PCP Yves Yanez MD       Subjective:   Anjel Pisano is a(n) 54 year old female was seen and examined  No acute events overnight  Resting in bed, NAD  No cp or sob  No f,c,n,v   C/o mild HA    Objective:   Blood pressure 118/66, pulse 63, temperature 97.4 °F (36.3 °C), temperature source Oral, resp. rate 20, height 5' 2\" (1.575 m), weight 201 lb 8 oz (91.4 kg), SpO2 98%    GENERAL:  The patient appeared to be in no distress and was comfortable.  SKIN:  Warm and hydrated  PSYCHIATRIC: Calm and cooperative    HEENT:  Head was atraumatic and normocephalic.  Eyes: Sclera was anicteric.  Pupils were equal.  Ears:  There were no lesions.  Nose:  No lesions were noted.      NECK:  Supple.  There was no JVD.    CHEST:  Symmetrical movement on inspiration  CARDIAC: S1 S2+, RRR  LUNGS:  CTAB with decreased entry at bases   ABDOMEN: Non-distended, non-tender, BS+  MUSCULOSKELETAL:  There was no deformity.  There was full range of motion in all the extremities.    EXTREMITIES: There was no edema  NEUROLOGIC: R sided weakness    Current Inpatient Medications:     Current Facility-Administered Medications:     sodium chloride 0.9% infusion, , Intravenous, Continuous    [Held by provider] hydrALAZINE (Apresoline) tab 100 mg, 100 mg, Oral, BID    [Held by provider] amLODIPine (Norvasc) tab 5 mg, 5 mg, Oral, BID    [Held by provider] carvedilol (Coreg) tab 12.5 mg, 12.5 mg, Oral, BID with meals    [Held by provider] losartan (Cozaar) tab 25 mg, 25 mg, Oral, Daily    ezetimibe (Zetia) tab 10 mg, 10 mg, Oral, Nightly    aspirin DR tab 81 mg, 81 mg, Oral, Daily    atorvastatin (Lipitor) tab 80 mg, 80 mg, Oral, Nightly    escitalopram (Lexapro) tab 10 mg, 10 mg, Oral, Daily    gabapentin (Neurontin) cap  100 mg, 100 mg, Oral, TID    levETIRAcetam (Keppra) tab 500 mg, 500 mg, Oral, BID    pantoprazole (Protonix) DR tab 40 mg, 40 mg, Oral, QAM AC    ondansetron (Zofran) 4 MG/2ML injection 4 mg, 4 mg, Intravenous, Q6H PRN    acetaminophen (Tylenol) tab 650 mg, 650 mg, Oral, Q6H PRN    ipratropium-albuterol (Duoneb) 0.5-2.5 (3) MG/3ML inhalation solution 3 mL, 3 mL, Nebulization, Q6H PRN    zolpidem (Ambien) tab 5 mg, 5 mg, Oral, Nightly PRN    alum-mag hydroxide-simethicone (Maalox) 200-200-20 MG/5ML oral suspension 30 mL, 30 mL, Oral, QID PRN    labetalol (Trandate) 5 mg/mL injection 10 mg, 10 mg, Intravenous, Q4H PRN    glucose (Dex4) 15 GM/59ML oral liquid 15 g, 15 g, Oral, Q15 Min PRN **OR** glucose (Glutose) 40% oral gel 15 g, 15 g, Oral, Q15 Min PRN **OR** glucose-vitamin C (Dex-4) chewable tab 4 tablet, 4 tablet, Oral, Q15 Min PRN **OR** dextrose 50% injection 50 mL, 50 mL, Intravenous, Q15 Min PRN **OR** glucose (Dex4) 15 GM/59ML oral liquid 30 g, 30 g, Oral, Q15 Min PRN **OR** glucose (Glutose) 40% oral gel 30 g, 30 g, Oral, Q15 Min PRN **OR** glucose-vitamin C (Dex-4) chewable tab 8 tablet, 8 tablet, Oral, Q15 Min PRN    nitroglycerin (Nitrostat) SL tab 0.4 mg, 0.4 mg, Sublingual, Q5 Min PRN    HYDROcodone-acetaminophen (Norco) 5-325 MG per tab 1 tablet, 1 tablet, Oral, Q6H PRN    insulin via iLet Insulin Pump, , Subcutaneous, TID AC and HS    Assessment and Plan:   Chest pain possible ACS  EKG with ischemic changes, troponin up trended this AM and pt had cp  Subsequent cardiac cath showing 50% mLAD lesion, otherwise non-obstructive disease  Heparin gtt stopped  2d echo reviewed     Accelerated hypertension  Responded to IV hydralazine by significant drop in blood pressure  BP stable currently      Acute on chronic diastolic heart failure  X-ray indicated above pulmonary edema  Started on Lasix 40 mg IV daily  Monitor I's and O's and daily weights  Net IO Since Admission: -2,179.36 mL [03/04/25  1457]    Insulin requiring diabetes with associated neuropathy  Endocrinology consulted  Has insulin pump  Cont to monitor BS     Acute encephalopathy, likely ischemic  Likely triggered by sudden drop in blood pressure  Pt back to baseline   PT/OT consulted, will need continued therapy as OP     Prophylaxis  Subcutaneous heparin     CODE STATUS  Full     MDM: High         Results:     Recent Labs   Lab 03/02/25  0546 03/03/25  0533 03/04/25  0540   RBC 4.46 4.74 4.69   HGB 10.3* 10.8* 11.1*   HCT 34.2* 36.8 35.7   MCV 76.7* 77.6* 76.1*   MCH 23.1* 22.8* 23.7*   MCHC 30.1* 29.3* 31.1   RDW 16.4* 16.1* 16.5*   NEPRELIM 13.69* 13.45* 9.22*   WBC 17.3* 15.8* 13.9*   .0  284.0 295.0  295.0 279.0         Recent Labs   Lab 03/02/25  0546 03/03/25  0533 03/04/25  0540   * 111* 204*  204*   BUN 19 23 25*  25*   CREATSERUM 0.91 0.84 1.16*  1.16*   EGFRCR 74 82 55*  55*   CA 8.9 9.0 8.9  8.9    139 136  136   K 4.3  4.3 4.0 4.2  4.2    102 99  99   CO2 27.0 30.0 30.0  30.0         Imaging:   XR CHEST AP PORTABLE  (CPT=71045)    Result Date: 3/3/2025  CONCLUSION: No new abnormality.  Persistent pulmonary edema and elevation of the right hemidiaphragm.  Persistent questionable trace pleural effusions.      Dictated by (CST): Piter Cody MD on 3/03/2025 at 3:35 PM     Finalized by (CST): Piter Cody MD on 3/03/2025 at 3:36 PM                 Santiago Dwyer MD  3/4/2025

## 2025-03-04 NOTE — PROGRESS NOTES
[facility]  part of [facility]    Progress Note    Anjel Pisano Patient Status:  Inpatient    3/30/1970 MRN [id number]   Location [facility] 3W/SW Attending Santiago Dwyer MD   Hosp Day # 3 PCP Yves Yanez MD     Subjective:   Seen and examined while resting in bed. Tired with blurred vision which she feels is related to hypoglycemia episodes. Still complains of shortness of breath. No cough or wheezing. No chest pain. On room air.    Objective:   Blood pressure 120/71, pulse 61, temperature 98.4 °F (36.9 °C), temperature source Oral, resp. rate 20, height 5' 2\" (1.575 m), weight 197 lb 12.8 oz (89.7 kg), SpO2 93%, not currently breastfeeding.  Physical Exam  Vitals and nursing note reviewed.   Constitutional:       General: She is awake. She is not in acute distress.     Appearance: She is obese. She is ill-appearing.   HENT:      Head: Normocephalic and atraumatic.   Cardiovascular:      Rate and Rhythm: Normal rate and regular rhythm.   Pulmonary:      Effort: No respiratory distress.      Breath sounds: No wheezing, rhonchi or rales.   Musculoskeletal:      Cervical back: Normal range of motion and neck supple.      Right lower leg: No edema.      Left lower leg: No edema.   Skin:     General: Skin is warm and dry.   Neurological:      General: No focal deficit present.      Mental Status: She is alert and oriented to person, place, and time.   Psychiatric:         Mood and Affect: Mood normal.         Behavior: Behavior is cooperative.       Results:   Lab Results   Component Value Date    WBC 13.9 (H) 2025    HGB 11.1 (L) 2025    HCT 35.7 2025    .0 2025    CREATSERUM 1.16 (H) 2025    CREATSERUM 1.16 (H) 2025    BUN 25 (H) 2025    BUN 25 (H) 2025     2025     2025    K 4.2 2025    K 4.2 2025    CL 99 2025    CL 99 2025    CO2 30.0 2025    CO2 30.0  03/04/2025     (H) 03/04/2025     (H) 03/04/2025    CA 8.9 03/04/2025    CA 8.9 03/04/2025    ALB 3.8 03/04/2025    ALKPHO 87 03/01/2025    BILT 0.5 03/01/2025    TP 6.5 03/01/2025    AST 12 03/01/2025    ALT 9 (L) 03/01/2025    PTT 28.5 03/04/2025    INR 1.0 08/31/2017    PT 12.8 09/26/2016    T4F 0.66 08/20/2018    TSH 1.386 02/17/2025    LIP 19 05/24/2023    DDIMER 0.36 02/28/2025    ESRML 19 08/20/2018    MG 2.2 03/03/2025    PHOS 4.7 03/04/2025    TROP 0.03 06/27/2017    TROPHS 2,677 (HH) 03/03/2025    B12 580 05/26/2021    POCGLU 224 03/07/2024       XR CHEST AP PORTABLE  (CPT=71045)    Result Date: 3/3/2025  CONCLUSION: No new abnormality.  Persistent pulmonary edema and elevation of the right hemidiaphragm.  Persistent questionable trace pleural effusions.      Dictated by (CST): Piter Cody MD on 3/03/2025 at 3:35 PM     Finalized by (CST): Piter Cody MD on 3/03/2025 at 3:36 PM               Assessment & Plan:   NSTEMI  Echo 3/1/25: LVEF 55-60%, grade 1 diastolic dysfunction, mild MR, moderate pHTN  LHC 3/2/25: mLAD 50% occlusion, no PCI  CXR 3/3/25: pulm edema  Ongoing c/o dyspnea  Plan:  -As per cardiology    HTN urgency - resolved  S/p nitroglycerin gtt, on oral meds now  Resolved and now with hypotension  Plan:  -Antihypertensives on hold for hypotension    Acute on chronic CHF  Received IV Lasix, stopped 3/3  CXR 3/3/25: persistent pulm edema  Given IVF today  I/Os net -2.2 L  Plan:  -Lasix discontinued    Dyspnea  CXR with chronic elevated R hemidiaphragm and pulmonary edema  Diuretics stopped with hypotension, negative I/Os, increased creatinine  Not requiring oxygen  Plan:  -Follow clinically    [place] with acute aphasia  History of ECA to ICA bypass and chronic L vertebral artery stenosis  History of R sided strokes  CTA brain no acute findings  Low suspicion for interval stroke per neuro  Plans for outpatient MRI  Plan:  -Neurology following    Rishi Cuenca,  WILIAM  Pulmonary Medicine  3/4/2025

## 2025-03-04 NOTE — DIABETES ED
[facility]  Inpatient Diabetes Clinician Note    Anjel Pisano Patient Status:  Inpatient   3/30/1970 MRN [id number]  Location [facility] 3W/SW Attending Santiago Dwyer MD  Hosp Day # 3 PCP Yves Yanez MD      Patient in hospital with iLet CGM device.  She has experienced multiple lower blood sugar values.  CGM target changed to \"usual\" (120 mg/dL) per discuss with Dr. Serra.      Alejandrina Torre, RN  Diabetes Educator  3/4/2025

## 2025-03-04 NOTE — PHYSICAL THERAPY NOTE
PHYSICAL THERAPY EVALUATION - INPATIENT     Room Number: 330/330-A  Evaluation Date: 3/4/2025  Type of Evaluation: Initial   Physician Order: PT Eval and Treat    Presenting Problem: chest pain  Co-Morbidities : DM, insulin pump placed 2/2025, Tapia Tapia, seizures, carotid endartectomy, h/o crani, CVA with LT residual deficits  Reason for Therapy: Mobility Dysfunction and Discharge Planning    PHYSICAL THERAPY ASSESSMENT   Patient is a 54 year old female admitted 2/28/2025 for chest pain.  Prior to admission, patient's baseline is independent with mobility, endurance limitations per her report.  Patient is currently functioning near baseline with bed mobility, transfers, and gait.  Patient is requiring stand-by assist as a result of the following impairments: decreased functional strength, medical status, and cardiac endurance .  Physical Therapy will continue to follow for duration of hospitalization.    Patient will benefit from continued skilled PT Services at discharge to promote prior level of function and safety with additional support and return home with Cardiopulmonary Rehab.    PLAN DURING HOSPITALIZATION  Nursing Mobility Recommendation : 1 Assist     PT Treatment Plan: Bed mobility, Body mechanics, Endurance, Energy conservation, Family education, Gait training, Range of motion, Stoop training, Transfer training, Balance training  Rehab Potential : Good  Frequency (Obs): 5x/week     PHYSICAL THERAPY MEDICAL/SOCIAL HISTORY   History related to current admission: chest pain     Problem List  Principal Problem:    Chest pain, unspecified type  Active Problems:    Chest pain    Hypotension    Vertebral artery dissection (HCC)    NSTEMI (non-ST elevated myocardial infarction) (HCC)    Hypotension, unspecified hypotension type    Insulin pump in place    Uncontrolled type 2 diabetes mellitus with hyperglycemia (HCC)    Tapia tapia disease    Hypoglycemia due to type 2 diabetes mellitus (HCC)      HOME  SITUATION  Type of Home: House  Home Layout:  (lives on main floor, accesses basement laundry)                     Lives With:  (with sister and several of sister's kids)    Drives: No         Prior Level of [place]: independent     SUBJECTIVE  \"Sometimes I have a hard time in the shower.\"     PHYSICAL THERAPY EXAMINATION   OBJECTIVE  Precautions: Bed/chair alarm  Fall Risk: High fall risk    WEIGHT BEARING RESTRICTION       PAIN ASSESSMENT     Location: no needs       COGNITION  Overall Cognitive Status:  delayed processing noted    BALANCE  Static Sitting: Good  Dynamic Sitting: Fair +  Static Standing: Fair  Dynamic Standing: Fair    ACTIVITY TOLERANCE  Pulse: 66  Heart Rate Source: Monitor     BP: 111/62  BP Location: Right arm  BP Method: Automatic  Patient Position: Lying    O2 WALK  Oxygen Therapy  SPO2% on Room Air at Rest: 94    AM-PAC '6-Clicks' INPATIENT SHORT FORM - BASIC MOBILITY  How much difficulty does the patient currently have...  Patient Difficulty: Turning over in bed (including adjusting bedclothes, sheets and blankets)?: A Little   Patient Difficulty: Sitting down on and standing up from a chair with arms (e.g., wheelchair, bedside commode, etc.): A Little   Patient Difficulty: Moving from lying on back to sitting on the side of the bed?: A Little   How much help from another person does the patient currently need...   Help from Another: Moving to and from a bed to a chair (including a wheelchair)?: A Little   Help from Another: Need to walk in hospital room?: A Little   Help from Another: Climbing 3-5 steps with a railing?: A Lot     AM-PAC Score:  Raw Score: 17   Approx Degree of Impairment: 50.57%   Standardized Score (AM-PAC Scale): 42.13   CMS Modifier (G-Code): CK    FUNCTIONAL ABILITY STATUS  Functional Mobility/Gait Assessment  Gait Assistance:  (SBA)  Distance (ft): 4' (limited by orthostatic hypotension this date, 92/60 in sitting EOB)  Assistive Device: None  Pattern: Within  Functional Limits  Rolling: stand-by assist  Supine to Sit: stand-by assist  Sit to Supine: stand-by assist  Sit to Stand: stand-by assist    Exercise/Education Provided:  Bed mobility  Body mechanics  Functional activity tolerated  Gait training  Transfer training    The patient's Approx Degree of Impairment: 50.57% has been calculated based on documentation in the [facility] '6 clicks' Inpatient Basic Mobility Short Form.  Research supports that patients with this level of impairment may benefit from JONA.  Final disposition will be made by interdisciplinary medical team.    Patient End of Session: In bed, Needs met, Call light within reach, RN aware of session/findings, All patient questions and concerns addressed, Hospital anti-slip socks    CURRENT GOALS  Goals to be met by: 3/30  Patient Goal Patient's self-stated goal is: unstated    Goal #1 Patient is able to demonstrate supine - sit EOB @ level: supervision     Goal #1   Current Status    Goal #2 Patient is able to demonstrate transfers Sit to/from Stand at assistance level: supervision with none     Goal #2  Current Status    Goal #3 Patient is able to ambulate 150 feet with assist device: none at assistance level: supervision   Goal #3   Current Status    Goal #4 Patient will negotiate 2 stairs/one curb w/ assistive device and supervision   Goal #4   Current Status    Goal #5 Patient to demonstrate independence with home activity/exercise instructions provided to patient in preparation for discharge.   Goal #5   Current Status    Goal #6    Goal #6  Current Status      Patient Evaluation Complexity Level:  History Moderate - 1 or 2 personal factors and/or co-morbidities   Examination of body systems Moderate - addressing a total of 3 or more elements   Clinical Presentation  Moderate - Evolving   Clinical Decision Making  Moderate Complexity     Therapeutic Activity:  12 minutes

## 2025-03-05 ENCOUNTER — APPOINTMENT (OUTPATIENT)
Dept: MRI IMAGING | Facility: HOSPITAL | Age: 55
End: 2025-03-05
Attending: Other
Payer: MEDICARE

## 2025-03-05 PROBLEM — I63.9 SILENT CEREBRAL INFARCTION (HCC): Status: ACTIVE | Noted: 2025-03-05

## 2025-03-05 PROBLEM — Z86.73 HISTORY OF STROKE: Status: ACTIVE | Noted: 2025-03-05

## 2025-03-05 LAB
ALBUMIN SERPL-MCNC: 3.9 G/DL (ref 3.2–4.8)
ANION GAP SERPL CALC-SCNC: 8 MMOL/L (ref 0–18)
ANION GAP SERPL CALC-SCNC: 8 MMOL/L (ref 0–18)
BASOPHILS # BLD AUTO: 0.04 X10(3) UL (ref 0–0.2)
BASOPHILS NFR BLD AUTO: 0.3 %
BUN BLD-MCNC: 16 MG/DL (ref 9–23)
BUN BLD-MCNC: 16 MG/DL (ref 9–23)
BUN/CREAT SERPL: 20.3 (ref 10–20)
BUN/CREAT SERPL: 20.3 (ref 10–20)
CALCIUM BLD-MCNC: 8.9 MG/DL (ref 8.7–10.4)
CALCIUM BLD-MCNC: 8.9 MG/DL (ref 8.7–10.4)
CHLORIDE SERPL-SCNC: 104 MMOL/L (ref 98–112)
CHLORIDE SERPL-SCNC: 104 MMOL/L (ref 98–112)
CHOLEST SERPL-MCNC: 113 MG/DL (ref ?–200)
CO2 SERPL-SCNC: 31 MMOL/L (ref 21–32)
CO2 SERPL-SCNC: 31 MMOL/L (ref 21–32)
CORTIS SERPL-MCNC: 8.4 UG/DL
CREAT BLD-MCNC: 0.79 MG/DL
CREAT BLD-MCNC: 0.79 MG/DL
DEPRECATED RDW RBC AUTO: 45.1 FL (ref 35.1–46.3)
EGFRCR SERPLBLD CKD-EPI 2021: 88 ML/MIN/1.73M2 (ref 60–?)
EGFRCR SERPLBLD CKD-EPI 2021: 88 ML/MIN/1.73M2 (ref 60–?)
EOSINOPHIL # BLD AUTO: 0.13 X10(3) UL (ref 0–0.7)
EOSINOPHIL NFR BLD AUTO: 1.1 %
ERYTHROCYTE [DISTWIDTH] IN BLOOD BY AUTOMATED COUNT: 16.2 % (ref 11–15)
GLUCOSE BLD-MCNC: 76 MG/DL (ref 70–99)
GLUCOSE BLD-MCNC: 76 MG/DL (ref 70–99)
GLUCOSE BLDC GLUCOMTR-MCNC: 131 MG/DL (ref 70–99)
GLUCOSE BLDC GLUCOMTR-MCNC: 138 MG/DL (ref 70–99)
GLUCOSE BLDC GLUCOMTR-MCNC: 148 MG/DL (ref 70–99)
GLUCOSE BLDC GLUCOMTR-MCNC: 163 MG/DL (ref 70–99)
GLUCOSE BLDC GLUCOMTR-MCNC: 89 MG/DL (ref 70–99)
HCT VFR BLD AUTO: 34.8 %
HDLC SERPL-MCNC: 30 MG/DL (ref 40–59)
HGB BLD-MCNC: 10.9 G/DL
IMM GRANULOCYTES # BLD AUTO: 0.03 X10(3) UL (ref 0–1)
IMM GRANULOCYTES NFR BLD: 0.3 %
LDLC SERPL CALC-MCNC: 61 MG/DL (ref ?–100)
LYMPHOCYTES # BLD AUTO: 3.68 X10(3) UL (ref 1–4)
LYMPHOCYTES NFR BLD AUTO: 31.3 %
MCH RBC QN AUTO: 24.2 PG (ref 26–34)
MCHC RBC AUTO-ENTMCNC: 31.3 G/DL (ref 31–37)
MCV RBC AUTO: 77.2 FL
MONOCYTES # BLD AUTO: 0.83 X10(3) UL (ref 0.1–1)
MONOCYTES NFR BLD AUTO: 7.1 %
NEUTROPHILS # BLD AUTO: 7.03 X10 (3) UL (ref 1.5–7.7)
NEUTROPHILS # BLD AUTO: 7.03 X10(3) UL (ref 1.5–7.7)
NEUTROPHILS NFR BLD AUTO: 59.9 %
NONHDLC SERPL-MCNC: 83 MG/DL (ref ?–130)
OSMOLALITY SERPL CALC.SUM OF ELEC: 296 MOSM/KG (ref 275–295)
OSMOLALITY SERPL CALC.SUM OF ELEC: 296 MOSM/KG (ref 275–295)
PHOSPHATE SERPL-MCNC: 3.8 MG/DL (ref 2.4–5.1)
PLATELET # BLD AUTO: 270 10(3)UL (ref 150–450)
POTASSIUM SERPL-SCNC: 4 MMOL/L (ref 3.5–5.1)
POTASSIUM SERPL-SCNC: 4 MMOL/L (ref 3.5–5.1)
RBC # BLD AUTO: 4.51 X10(6)UL
SODIUM SERPL-SCNC: 143 MMOL/L (ref 136–145)
SODIUM SERPL-SCNC: 143 MMOL/L (ref 136–145)
TRIGL SERPL-MCNC: 122 MG/DL (ref 30–149)
VLDLC SERPL CALC-MCNC: 18 MG/DL (ref 0–30)
WBC # BLD AUTO: 11.7 X10(3) UL (ref 4–11)

## 2025-03-05 PROCEDURE — 99233 SBSQ HOSP IP/OBS HIGH 50: CPT | Performed by: OTHER

## 2025-03-05 PROCEDURE — 70551 MRI BRAIN STEM W/O DYE: CPT | Performed by: OTHER

## 2025-03-05 PROCEDURE — 99233 SBSQ HOSP IP/OBS HIGH 50: CPT | Performed by: HOSPITALIST

## 2025-03-05 PROCEDURE — 99232 SBSQ HOSP IP/OBS MODERATE 35: CPT | Performed by: INTERNAL MEDICINE

## 2025-03-05 PROCEDURE — 99233 SBSQ HOSP IP/OBS HIGH 50: CPT | Performed by: INTERNAL MEDICINE

## 2025-03-05 RX ORDER — ACETAMINOPHEN 325 MG/1
650 TABLET ORAL EVERY 4 HOURS PRN
Status: DISCONTINUED | OUTPATIENT
Start: 2025-03-05 | End: 2025-03-06

## 2025-03-05 RX ORDER — ACETAMINOPHEN 650 MG/1
650 SUPPOSITORY RECTAL EVERY 4 HOURS PRN
Status: DISCONTINUED | OUTPATIENT
Start: 2025-03-05 | End: 2025-03-06

## 2025-03-05 RX ORDER — HYDRALAZINE HYDROCHLORIDE 20 MG/ML
10 INJECTION INTRAMUSCULAR; INTRAVENOUS EVERY 6 HOURS PRN
Status: DISCONTINUED | OUTPATIENT
Start: 2025-03-05 | End: 2025-03-06

## 2025-03-05 RX ORDER — HEPARIN SODIUM 5000 [USP'U]/ML
5000 INJECTION, SOLUTION INTRAVENOUS; SUBCUTANEOUS EVERY 8 HOURS SCHEDULED
Status: DISCONTINUED | OUTPATIENT
Start: 2025-03-05 | End: 2025-03-06

## 2025-03-05 RX ORDER — ONDANSETRON 2 MG/ML
4 INJECTION INTRAMUSCULAR; INTRAVENOUS EVERY 6 HOURS PRN
Status: DISCONTINUED | OUTPATIENT
Start: 2025-03-05 | End: 2025-03-06

## 2025-03-05 RX ORDER — SODIUM CHLORIDE 9 MG/ML
INJECTION, SOLUTION INTRAVENOUS CONTINUOUS
Status: DISCONTINUED | OUTPATIENT
Start: 2025-03-05 | End: 2025-03-06

## 2025-03-05 RX ORDER — METOPROLOL SUCCINATE 25 MG/1
25 TABLET, EXTENDED RELEASE ORAL
Status: DISCONTINUED | OUTPATIENT
Start: 2025-03-05 | End: 2025-03-06

## 2025-03-05 RX ORDER — PROCHLORPERAZINE EDISYLATE 5 MG/ML
5 INJECTION INTRAMUSCULAR; INTRAVENOUS EVERY 8 HOURS PRN
Status: DISCONTINUED | OUTPATIENT
Start: 2025-03-05 | End: 2025-03-06

## 2025-03-05 RX ORDER — ATORVASTATIN CALCIUM 80 MG/1
80 TABLET, FILM COATED ORAL NIGHTLY
Status: DISCONTINUED | OUTPATIENT
Start: 2025-03-05 | End: 2025-03-06

## 2025-03-05 RX ORDER — AMLODIPINE BESYLATE 5 MG/1
5 TABLET ORAL DAILY
Status: DISCONTINUED | OUTPATIENT
Start: 2025-03-05 | End: 2025-03-06

## 2025-03-05 RX ORDER — METOPROLOL SUCCINATE 25 MG/1
25 TABLET, EXTENDED RELEASE ORAL
Status: DISCONTINUED | OUTPATIENT
Start: 2025-03-06 | End: 2025-03-05

## 2025-03-05 NOTE — PROGRESS NOTES
[facility]  part of [facility]    Progress Note    Anjel Pisano Patient Status:  Inpatient    3/30/1970 MRN [id number]   Location [facility] 2W/SW Attending Santiago Dwyer MD   Hosp Day # 4 PCP Yves Yanez MD       Subjective:   Anjel Pisano is a(n) 54 year old female was seen and examined  No acute events overnight  Resting in chair, NAD  C/o L sided HA and mild L blurry vision  No cp or sob  No f,c,n,v     Objective:   Blood pressure 118/66, pulse 63, temperature 97.4 °F (36.3 °C), temperature source Oral, resp. rate 20, height 5' 2\" (1.575 m), weight 201 lb 8 oz (91.4 kg), SpO2 98%    GENERAL:  The patient appeared to be in no distress and was comfortable.  SKIN:  Warm and hydrated  PSYCHIATRIC: Calm and cooperative    HEENT:  Head was atraumatic and normocephalic.  Eyes: Sclera was anicteric.  Pupils were equal.  Ears:  There were no lesions.  Nose:  No lesions were noted.      NECK:  Supple.  There was no JVD.    CHEST:  Symmetrical movement on inspiration  CARDIAC: S1 S2+, RRR  LUNGS:  CTAB with decreased entry at bases   ABDOMEN: Non-distended, non-tender, BS+  MUSCULOSKELETAL:  There was no deformity.  There was full range of motion in all the extremities.    EXTREMITIES: There was no edema  NEUROLOGIC: R sided weakness    Current Inpatient Medications:     Current Facility-Administered Medications:     amLODIPine (Norvasc) tab 5 mg, 5 mg, Oral, Daily    [START ON 3/6/2025] metoprolol succinate ER (Toprol XL) 24 hr tab 25 mg, 25 mg, Oral, Daily Beta Blocker    clopidogrel (Plavix) tab 75 mg, 75 mg, Oral, Daily    gabapentin (Neurontin) cap 100 mg, 100 mg, Oral, Nightly    ezetimibe (Zetia) tab 10 mg, 10 mg, Oral, Nightly    aspirin DR tab 81 mg, 81 mg, Oral, Daily    atorvastatin (Lipitor) tab 80 mg, 80 mg, Oral, Nightly    escitalopram (Lexapro) tab 10 mg, 10 mg, Oral, Daily    levETIRAcetam (Keppra) tab 500 mg, 500 mg, Oral, BID    pantoprazole (Protonix) DR  tab 40 mg, 40 mg, Oral, QAM AC    ondansetron (Zofran) 4 MG/2ML injection 4 mg, 4 mg, Intravenous, Q6H PRN    acetaminophen (Tylenol) tab 650 mg, 650 mg, Oral, Q6H PRN    ipratropium-albuterol (Duoneb) 0.5-2.5 (3) MG/3ML inhalation solution 3 mL, 3 mL, Nebulization, Q6H PRN    zolpidem (Ambien) tab 5 mg, 5 mg, Oral, Nightly PRN    alum-mag hydroxide-simethicone (Maalox) 200-200-20 MG/5ML oral suspension 30 mL, 30 mL, Oral, QID PRN    labetalol (Trandate) 5 mg/mL injection 10 mg, 10 mg, Intravenous, Q4H PRN    glucose (Dex4) 15 GM/59ML oral liquid 15 g, 15 g, Oral, Q15 Min PRN **OR** glucose (Glutose) 40% oral gel 15 g, 15 g, Oral, Q15 Min PRN **OR** glucose-vitamin C (Dex-4) chewable tab 4 tablet, 4 tablet, Oral, Q15 Min PRN **OR** dextrose 50% injection 50 mL, 50 mL, Intravenous, Q15 Min PRN **OR** glucose (Dex4) 15 GM/59ML oral liquid 30 g, 30 g, Oral, Q15 Min PRN **OR** glucose (Glutose) 40% oral gel 30 g, 30 g, Oral, Q15 Min PRN **OR** glucose-vitamin C (Dex-4) chewable tab 8 tablet, 8 tablet, Oral, Q15 Min PRN    nitroglycerin (Nitrostat) SL tab 0.4 mg, 0.4 mg, Sublingual, Q5 Min PRN    HYDROcodone-acetaminophen (Norco) 5-325 MG per tab 1 tablet, 1 tablet, Oral, Q6H PRN    insulin via iLet Insulin Pump, , Subcutaneous, TID AC and HS    Assessment and Plan:   Chest pain possible ACS  EKG with ischemic changes, troponin up trended this AM and pt had cp  Subsequent cardiac cath showing 50% mLAD lesion, otherwise non-obstructive disease  Heparin gtt stopped  2d echo reviewed     Accelerated hypertension  Responded to IV hydralazine by significant drop in blood pressure  BP stable currently      Acute on chronic diastolic heart failure  X-ray indicated above pulmonary edema  Started on Lasix 40 mg IV daily  Monitor I's and O's and daily weights  Net IO Since Admission: -2,179.36 mL [03/04/25 1457]    Insulin requiring diabetes with associated neuropathy  Endocrinology consulted  Has insulin pump  Cont to monitor  BS     Acute encephalopathy, likely ischemic  Likely triggered by sudden drop in blood pressure  Pt back to baseline   PT/OT consulted, will need continued therapy as OP    Hx of CVA  CTA brain showing occlusion of L vertebral artery, concerning for chronic vertebral artery dissection  Neurology has been consulted  Pt with persistent L sided HA  Will obtain MRI for further evaluation and management     Prophylaxis  Subcutaneous heparin     CODE STATUS  Full     MDM: High         Results:     Recent Labs   Lab 03/03/25  0533 03/04/25  0540 03/05/25  0605   RBC 4.74 4.69 4.51   HGB 10.8* 11.1* 10.9*   HCT 36.8 35.7 34.8*   MCV 77.6* 76.1* 77.2*   MCH 22.8* 23.7* 24.2*   MCHC 29.3* 31.1 31.3   RDW 16.1* 16.5* 16.2*   NEPRELIM 13.45* 9.22* 7.03   WBC 15.8* 13.9* 11.7*   .0  295.0 279.0 270.0         Recent Labs   Lab 03/03/25  0533 03/04/25  0540 03/05/25  0605   * 204*  204* 76  76   BUN 23 25*  25* 16  16   CREATSERUM 0.84 1.16*  1.16* 0.79  0.79   EGFRCR 82 55*  55* 88  88   CA 9.0 8.9  8.9 8.9  8.9    136  136 143  143   K 4.0 4.2  4.2 4.0  4.0    99  99 104  104   CO2 30.0 30.0  30.0 31.0  31.0         Imaging:   XR CHEST AP PORTABLE  (CPT=71045)    Result Date: 3/3/2025  CONCLUSION: No new abnormality.  Persistent pulmonary edema and elevation of the right hemidiaphragm.  Persistent questionable trace pleural effusions.      Dictated by (CST): Piter Cody MD on 3/03/2025 at 3:35 PM     Finalized by (CST): Piter Cody MD on 3/03/2025 at 3:36 PM                 Santiago Dwyer MD  3/5/2025

## 2025-03-05 NOTE — PHYSICAL THERAPY NOTE
PHYSICAL THERAPY TREATMENT NOTE - INPATIENT     Room Number: 330/330-A       Presenting Problem: chest pain  Co-Morbidities : DM, insulin pump placed 2/2025, Tapia Tapia, seizures, carotid endartectomy, h/o crani, CVA with LT residual deficits    Problem List  Principal Problem:    Chest pain, unspecified type  Active Problems:    Chest pain    Hypotension    Vertebral artery dissection (HCC)    NSTEMI (non-ST elevated myocardial infarction) ([facility])    Hypotension, unspecified hypotension type    Insulin pump in place    Uncontrolled type 2 diabetes mellitus with hyperglycemia (HCC)    Tapia tapia disease    Hypoglycemia due to type 2 diabetes mellitus ([facility])      PHYSICAL THERAPY ASSESSMENT   Patient demonstrates good  progress this session, goals  updated to reflect patient performance.      Patient is requiring supervision as a result of the following impairments: decreased functional strength, decreased endurance/aerobic capacity, decreased muscular endurance, and medical status.     Patient continues to function near baseline with bed mobility, transfers, gait, maintaining seated position, standing prolonged periods, and performing household tasks.  Next session anticipate patient to progress bed mobility, transfers, gait, stair negotiation, maintaining seated position, standing prolonged periods, and performing household tasks.  Physical Therapy will continue to follow patient for duration of hospitalization.    Patient continues to benefit from continued skilled PT services: at discharge to promote prior level of function and safety with additional support and return home with home health PT.    PLAN DURING HOSPITALIZATION  Nursing Mobility Recommendation : 1 Assist     PT Treatment Plan: Bed mobility, Body mechanics, Endurance, Energy conservation, Gait training, Transfer training, Balance training, Strengthening, Patient education  Frequency (Obs): 5x/week     SUBJECTIVE  I feel ok I had a headache earlier but now  its a bit better.     OBJECTIVE  Precautions: Bed/chair alarm    WEIGHT BEARING RESTRICTION       PAIN ASSESSMENT   Ratin  Location: no needs       BALANCE  Static Sitting: Good  Dynamic Sitting: Fair +  Static Standing: Fair  Dynamic Standing: Fair    ACTIVITY TOLERANCE                          O2 WALK  Oxygen Therapy  SPO2% on Room Air at Rest: 96  SPO2% Ambulation on Room Air: 94    AM-PAC '6-Clicks' INPATIENT SHORT FORM - BASIC MOBILITY  How much difficulty does the patient currently have...  Patient Difficulty: Turning over in bed (including adjusting bedclothes, sheets and blankets)?: None   Patient Difficulty: Sitting down on and standing up from a chair with arms (e.g., wheelchair, bedside commode, etc.): None   Patient Difficulty: Moving from lying on back to sitting on the side of the bed?: None   How much help from another person does the patient currently need...   Help from Another: Moving to and from a bed to a chair (including a wheelchair)?: None   Help from Another: Need to walk in hospital room?: A Little   Help from Another: Climbing 3-5 steps with a railing?: A Little     AM-PAC Score:  Raw Score: 22   Approx Degree of Impairment: 20.91%   Standardized Score (AM-PAC Scale): 53.28   CMS Modifier (G-Code): CJ    FUNCTIONAL ABILITY STATUS  Functional Mobility/Gait Assessment  Gait Assistance: Supervision  Distance (ft): 200  Assistive Device: Rolling walker  Pattern:  (decreased step length and shaheen)  Rolling: supervision  Supine to Sit: supervision  Sit to Supine: supervision  Sit to Stand: supervision    Skilled Therapy Provided: Pt ed with bed mobility and transfers with a RW with SBA. Pt ed with amb 200' with RW with SBA with decreased step length and shaheen. Pt ed with therex x 10 reps for LE's for strength and ROM as per HEP.     The patient's Approx Degree of Impairment: 20.91% has been calculated based on documentation in the [facility] '6 clicks' Inpatient Daily Activity Short Form.   Research supports that patients with this level of impairment may benefit from [facility] PT with family assist.    Final disposition will be made by interdisciplinary medical team.    THERAPEUTIC EXERCISES  Lower Extremity Ankle pumps  Heel slides  LAQ     Position Sitting & Standing       Patient End of Session: Up in chair, With  staff, Needs met, Call light within reach, RN aware of session/findings, All patient questions and concerns addressed, Alarm set    CURRENT GOALS   Goals to be met by: 3/30  Patient Goal Patient's self-stated goal is: unstated    Goal #1 Patient is able to demonstrate supine - sit EOB @ level: supervision      Goal #1   Current Status  SBA   Goal #2 Patient is able to demonstrate transfers Sit to/from Stand at assistance level: supervision with none      Goal #2  Current Status  SBA with RW   Goal #3 Patient is able to ambulate 150 feet with assist device: none at assistance level: supervision   Goal #3   Current Status  SBA with '    Goal #4 Patient will negotiate 2 stairs/one curb w/ assistive device and supervision   Goal #4   Current Status  CGA with 1 SR support x 2 steps   Goal #5 Patient to demonstrate independence with home activity/exercise instructions provided to patient in preparation for discharge.   Goal #5   Current Status  Ongoing   Goal #6       Gait Training: 15 minutes  Therapeutic Exercise: 9 minutes

## 2025-03-05 NOTE — PROGRESS NOTES
Progress Note  Anjel Pisano Patient Status:  Inpatient    3/30/1970 MRN [id number]   Location [facility] 3W/SW Attending Santiago Dwyer MD   Hosp Day # 4 PCP Yves Yanez MD     Subjective:  Pt describes L sided headache/pressure when she coughs, denies diplopia. C/O dizziness when standing or walking. Denies chest pain.     Objective:  BP (!) 167/65 (BP Location: Left arm)   Pulse 84   Temp 98.2 °F (36.8 °C) (Oral)   Resp 20   Ht 5' 2\" (1.575 m)   Wt 194 lb 8 oz (88.2 kg)   SpO2 96%   BMI 35.57 kg/m²     Telemetry: NSR    Intake/Output:    Intake/Output Summary (Last 24 hours) at 3/5/2025 1139  Last data filed at 3/5/2025 0600  Gross per 24 hour   Intake 1040 ml   Output --   Net 1040 ml       Last 3 Weights   25 0549 194 lb 8 oz (88.2 kg)   25 0506 197 lb 12.8 oz (89.7 kg)   25 0443 201 lb 8 oz (91.4 kg)   25 1930 195 lb (88.5 kg)   25 1618 199 lb (90.3 kg)   25 1537 199 lb (90.3 kg)       Labs:  Recent Labs   Lab 25  0533 25  0540 25  0605   * 204*  204* 76  76   BUN 23 25*  25* 16  16   CREATSERUM 0.84 1.16*  1.16* 0.79  0.79   EGFRCR 82 55*  55* 88  88   CA 9.0 8.9  8.9 8.9  8.9    136  136 143  143   K 4.0 4.2  4.2 4.0  4.0    99  99 104  104   CO2 30.0 30.0  30.0 31.0  31.0     Recent Labs   Lab 25  0533 25  0540 25  0605   RBC 4.74 4.69 4.51   HGB 10.8* 11.1* 10.9*   HCT 36.8 35.7 34.8*   MCV 77.6* 76.1* 77.2*   MCH 22.8* 23.7* 24.2*   MCHC 29.3* 31.1 31.3   RDW 16.1* 16.5* 16.2*   NEPRELIM 13.45* 9.22* 7.03   WBC 15.8* 13.9* 11.7*   .0  295.0 279.0 270.0         Recent Labs   Lab 25  1645 25  0808 25  1017   TROPHS 3,691* 4,714* 2,677*       Diagnostics:  No results found.   Review of Systems   Cardiovascular:  Negative for chest pain, dyspnea on exertion, leg swelling, orthopnea and palpitations.   Respiratory:  Negative for cough and  shortness of breath.    Neurological:  Positive for dizziness and headaches.       Physical Exam:    Gen: alert, oriented x 3, NAD  Heent: pupils equal, reactive. Mucous membranes moist.   Neck: no jvd  Cardiac: regular rate and rhythm, normal S1,S2, no murmur, clicks, rub or gallop  Lungs: CTA  Abd: soft, NT/ND +bs  Ext: no edema  Skin: Warm, dry      Medications:     clopidogrel  75 mg Oral Daily    gabapentin  100 mg Oral Nightly    ezetimibe  10 mg Oral Nightly    aspirin  81 mg Oral Daily    atorvastatin  80 mg Oral Nightly    escitalopram  10 mg Oral Daily    levETIRAcetam  500 mg Oral BID    pantoprazole  40 mg Oral QAM AC    insulin   Subcutaneous TID AC and HS       Assessment:  Hypertensive Urgency/Labile BP  Initially presented w/HTN urgency requiring NTG gtt  Profound hypotension post CT earlier in admit; has been labile for last 2 days  Hx on toprol, losartan, amlodipine, hydralazine at home   Toprol changed to coreg yesterday w/precipitous drop in BP  's this am - all BP meds on hold  BP goal MAP >65, SBP > 90 per neurology recommendation  Chest Pain/Type II NSTEMI  Peak Troponin 4714, now downtrending -likely d/t supply/demand mismatch r/t severe hypotensive episode  LHC w/non-obstructive CAD, small vessel disease  Echo LVEF 55-60, no RWMA, G1DD, mild MR  Hx [place] Disease, Intracranial Atherosclerosis, Chronic L Vertebral Artery Stenosis  Prior R Hemispheric CVA   R EC-IC bypass 2014   Neurology following  Has appt with [facility] Neuro team in March  On asa, plavix, statin  R Subclavian Stenosis   Hx Intractable Headaches  Hx Seizures - Keppra   KISHAN - resolved  Hyperlipidemia - LDL 76, atorvastatin 80mg, zetia added  Uncontrolled DMII - A1C 9.2  Chronic Anemia - Hgb 10-11    Plan:  Continue asa, plavix, high intensity statin, zetia   BP elevated this am - has been very labile this admit with precipitous decrease after one dose of coreg yesterday. Discontinue coreg, resume low dose toprol 25mg po  daily today. Will uptitrate as BP allows (hx on toprol 100mg po daily at home)  Resume amlodipine 5mg po daily   Continue to hold losartan, hydralazine at this time. Will likely add losartan back next.  Further recommendations per neurology    Plan of care discussed with patient, RN.    Tiny Hernadez, GINGER  3/5/2025  11:39 AM  [phone] [facility]  [phone] [facility]        Cardiologist Addendum:  Anjel Pisano was seen and examined independently and I agree with the above documentation provided by TAMIKO Howell. Pt more awake, alert. Cortisol wnl. BP actually high after holding all meds. Agree with cautious reinstitution of BP meds.    Thank you for allowing me to take part in the care of Anjel Pisano. Please call with any questions of concerns.    L3    Val Peña DO  [facility] [place]   Interventional Cardiac and Vascular Services      March 05, 2025  4:44 PM

## 2025-03-05 NOTE — PHYSICAL THERAPY NOTE
PHYSICAL THERAPY TREATMENT NOTE - INPATIENT     Room Number: 330/330-A       Presenting Problem: chest pain  Co-Morbidities : DM, insulin pump placed 2/2025, Tapia Tapia, seizures, carotid endartectomy, h/o crani, CVA with LT residual deficits    Problem List  Principal Problem:    Chest pain, unspecified type  Active Problems:    Chest pain    Hypotension    Vertebral artery dissection (HCC)    NSTEMI (non-ST elevated myocardial infarction) (Formerly [facility])    Hypotension, unspecified hypotension type    Insulin pump in place    Uncontrolled type 2 diabetes mellitus with hyperglycemia (HCC)    Tapia tapia disease    Hypoglycemia due to type 2 diabetes mellitus (HCC)      PHYSICAL THERAPY ASSESSMENT   Patient demonstrates good  progress this session, goals  updated to reflect patient performance.      Patient is requiring supervision as a result of the following impairments: decreased functional strength, decreased endurance/aerobic capacity, impaired standing balance, decreased muscular endurance, and medical status.     Patient continues to function below baseline with bed mobility, transfers, gait, maintaining seated position, standing prolonged periods, and performing household tasks.  Next session anticipate patient to progress bed mobility, transfers, gait, stair negotiation, maintaining seated position, standing prolonged periods, and performing household tasks.  Physical Therapy will continue to follow patient for duration of hospitalization.    Patient continues to benefit from continued skilled PT services: at discharge to promote prior level of function and safety with additional support and return home with home health PT.    PLAN DURING HOSPITALIZATION  Nursing Mobility Recommendation : 1 Assist     PT Treatment Plan: Bed mobility, Body mechanics, Endurance, Energy conservation, Gait training, Transfer training, Balance training, Strengthening, Patient education  Frequency (Obs): 5x/week     SUBJECTIVE  I feel a  little better the headache calmed down a bit. I have no dizziness.     OBJECTIVE  Precautions: Bed/chair alarm    WEIGHT BEARING RESTRICTION       PAIN ASSESSMENT   Ratin  Location: no needs       BALANCE  Static Sitting: Good  Dynamic Sitting: Fair +  Static Standing: Fair  Dynamic Standing: Fair    ACTIVITY TOLERANCE                          O2 WALK  Oxygen Therapy  SPO2% on Room Air at Rest: 96  SPO2% Ambulation on Room Air: 94    AM-PAC '6-Clicks' INPATIENT SHORT FORM - BASIC MOBILITY  How much difficulty does the patient currently have...  Patient Difficulty: Turning over in bed (including adjusting bedclothes, sheets and blankets)?: None   Patient Difficulty: Sitting down on and standing up from a chair with arms (e.g., wheelchair, bedside commode, etc.): None   Patient Difficulty: Moving from lying on back to sitting on the side of the bed?: None   How much help from another person does the patient currently need...   Help from Another: Moving to and from a bed to a chair (including a wheelchair)?: None   Help from Another: Need to walk in hospital room?: A Little   Help from Another: Climbing 3-5 steps with a railing?: A Little     AM-PAC Score:  Raw Score: 22   Approx Degree of Impairment: 20.91%   Standardized Score (AM-PAC Scale): 53.28   CMS Modifier (G-Code): CJ    FUNCTIONAL ABILITY STATUS  Functional Mobility/Gait Assessment  Gait Assistance: Supervision  Distance (ft): 200  Assistive Device: Rolling walker  Pattern:  (decreased step length and shaheen)  Rolling: supervision  Supine to Sit: supervision  Sit to Supine: contact guard assist  Sit to Stand: contact guard assist    Skilled Therapy Provided: Pt ed with bed mobility and transfers with RW with SBA. Pt ed with amb 200' with RW with decreased step length and shaheen. Pt ed with therex in chair x 10 reps for LE strength and ROM. Pt is on track to return home with [facility] PT with family assist.     The patient's Approx Degree of Impairment:  20.91% has been calculated based on documentation in the [facility] '6 clicks' Inpatient Daily Activity Short Form.  Research supports that patients with this level of impairment may benefit from [facility] PT with family assist.    Final disposition will be made by interdisciplinary medical team.    THERAPEUTIC EXERCISES  Lower Extremity Ankle pumps  Heel raises  LAQ     Position Sitting & Standing       Patient End of Session: Up in chair, With  staff, Needs met, Call light within reach, RN aware of session/findings, All patient questions and concerns addressed, Alarm set    CURRENT GOALS     Goals to be met by: 3/30  Patient Goal Patient's self-stated goal is: unstated    Goal #1 Patient is able to demonstrate supine - sit EOB @ level: supervision      Goal #1   Current Status  SBA   Goal #2 Patient is able to demonstrate transfers Sit to/from Stand at assistance level: supervision with none      Goal #2  Current Status  SBA with RW   Goal #3 Patient is able to ambulate 150 feet with assist device: none at assistance level: supervision   Goal #3   Current Status  SBA with '    Goal #4 Patient will negotiate 2 stairs/one curb w/ assistive device and supervision   Goal #4   Current Status  CGA 2 steps with 1 SR support   Goal #5 Patient to demonstrate independence with home activity/exercise instructions provided to patient in preparation for discharge.   Goal #5   Current Status  Ongoing     Gait Training: 15 minutes  Therapeutic Exercise: 10 minutes

## 2025-03-05 NOTE — PROGRESS NOTES
Pulmonary Medicine Inpatient Progress Note                 Subjective:  On RA afebrile  Head hurts when coughs. Cough is dry       ALLERGIES:  Allergies[1]       MEDS:  Home Medications:  Medications Taking[2]  Scheduled Medication:   clopidogrel  75 mg Oral Daily    gabapentin  100 mg Oral Nightly    [Held by provider] carvedilol  12.5 mg Oral BID with meals    ezetimibe  10 mg Oral Nightly    aspirin  81 mg Oral Daily    atorvastatin  80 mg Oral Nightly    escitalopram  10 mg Oral Daily    levETIRAcetam  500 mg Oral BID    pantoprazole  40 mg Oral QAM AC    insulin   Subcutaneous TID AC and HS     Continuous Infusing Medication:    PRN Medications:    ondansetron    acetaminophen    ipratropium-albuterol    zolpidem    alum-mag hydroxide-simethicone    labetalol    glucose **OR** glucose **OR** glucose-vitamin C **OR** dextrose **OR** glucose **OR** glucose **OR** glucose-vitamin C    nitroglycerin    HYDROcodone-acetaminophen       PHYSICAL EXAM:  BP (!) 167/65 (BP Location: Left arm)   Pulse 75   Temp 98.2 °F (36.8 °C) (Oral)   Resp 20   Ht 5' 2\" (1.575 m)   Wt 194 lb 8 oz (88.2 kg)   SpO2 96%   BMI 35.57 kg/m²   CONSTITUTIONAL: alert, oriented, no apparent distress  HEENT: atraumatic normocephalic  MOUTH: mucous membranes are moist. No OP exudates  NECK/THROAT: no JVD. Trachea midline. No obvious thyromegaly  LUNG: clear upper b/l no wheezing, + very faint crackles. Chest symmetric with respiratory motion  HEART: regular rate and rhythm, no obvious murmers or gallops note  ABD: soft non tender. + bowel sounds. No organomegaly noted  EXT: no clubbing, cyanosis, or edema noted       IMAGES:  CXR 3/1/25  CONCLUSION:   Mild cardiomegaly with interstitial pulmonary edema.         TTE  ECHO 3/1/2025:  Conclusions:   1. Left ventricle: The cavity size was normal. Wall thickness was moderately      increased. Systolic function was normal. The estimated ejection fraction      was 55-60%, by 3D assessment. No  diagnostic evidence for regional wall      motion abnormalities. Doppler parameters are consistent with abnormal      left ventricular relaxation - grade 1 diastolic dysfunction.   2. Mitral valve: There was mild regurgitation.   3. Pulmonary arteries: Systolic pressure was moderately increased.       LABS:  Recent Labs   Lab 03/03/25  0533 03/04/25  0540 03/05/25  0605   RBC 4.74 4.69 4.51   HGB 10.8* 11.1* 10.9*   HCT 36.8 35.7 34.8*   MCV 77.6* 76.1* 77.2*   MCH 22.8* 23.7* 24.2*   MCHC 29.3* 31.1 31.3   RDW 16.1* 16.5* 16.2*   NEPRELIM 13.45* 9.22* 7.03   WBC 15.8* 13.9* 11.7*   .0  295.0 279.0 270.0       Recent Labs   Lab 03/01/25  0056 03/01/25  0443 03/03/25  0533 03/04/25  0540 03/05/25  0605   GLU 86   < > 111* 204*  204* 76  76   BUN 12   < > 23 25*  25* 16  16   CREATSERUM 0.74   < > 0.84 1.16*  1.16* 0.79  0.79   EGFRCR 95   < > 82 55*  55* 88  88   CA 9.0   < > 9.0 8.9  8.9 8.9  8.9   ALB 3.8   < > 4.1 3.8 3.9      < > 139 136  136 143  143   K 3.6   < > 4.0 4.2  4.2 4.0  4.0      < > 102 99  99 104  104   CO2 25.0   < > 30.0 30.0  30.0 31.0  31.0   ALKPHO 87  --   --   --   --    AST 12  --   --   --   --    ALT 9*  --   --   --   --    BILT 0.5  --   --   --   --    TP 6.5  --   --   --   --     < > = values in this interval not displayed.        ASSESSMENT/PLAN:  NSTEMI   -s/p TTE with elevated pulm pressures, diastolic dysfunction, normal EF  -cardiology consulted  -s/p cath on 3/2/25 with [facility] showing 50% mLAD lesion which cards didn't think explains her symptoms  -weaned off nitroglycerin, hep gtts   -on oral meds now being managed by cards  -recheck CXR     [place] with acute aphasia   -neuro following  -on keppra     KISHAN  -improved    Proph  -DVT: defer to primary team      Pt's acute pulmonary and critical care issues have significantly improved. Therefore, our service will sign off at this time. Please don't hesitate to contact us if there are questions  or concerns we can address or the patient's clinical status changes which requires our service's attention.     Thank you for the opportunity to care for Anjel Pisano.     TOBIN Sky DO, MPH  Pulmonary Critical Care Medicine  [facility]        [1]   Allergies  Allergen Reactions    Radiology Contrast Iodinated Dyes HIVES, RASH and ITCHING     1/20/25: pt states severe itching, redness, hives.  Needs to premedicate for all CT dye scans - GUSTAVO, RN    Reglan [Metoclopramide] OTHER (SEE COMMENTS)     Sensitivity, with crawling sensation.    Adhesive Tape      itching    Wound Dressing Adhesive RASH   [2]   No outpatient medications have been marked as taking for the 2/28/25 encounter (Hospital Encounter).

## 2025-03-05 NOTE — DIABETES ED
[facility]  Inpatient Diabetes Clinician Note    Anjel Pisaon Patient Status:  Inpatient   3/30/1970 MRN [id number]  Location [facility] 3W/SW Attending Santiago Dwyer MD  Hosp Day # 4 PCP Yves Yanez MD      Patient admitted to hospital with insulin pump and CGM.  Epic charting shows patient had insulin bolus for meal late in the evening.  Patient states she did not have a low blood sugar last night however does not remember if she ate a late dinner.  If patient provided insulin via pump without eating a late meal, it may be contributing to overall lower blood sugar values.  Discussed with RN. Reinforced with patient not to take insulin via pump unless consuming a meal.      Alejandrina Torre RN  Diabetes Educator  3/5/2025

## 2025-03-05 NOTE — PROGRESS NOTES
[facility]  part of [facility]    Progress Note    Anjel Pisano Patient Status:  Inpatient    3/30/1970 MRN [id number]   Location [facility] 3W/ Attending Santiago Dwyer MD   Hosp Day # 4 PCP Yves Yanez MD     Subjective:   Anjel Pisano is a(n) 54 year old female    Presented  w/ Chest pain, unspecified type:     PMH of CAD, HTN, HLD, CVA with right sided residual weakness, right vertebral artery stenosis, bilateral carotid artery stenosis s/p CEA, DM II and CKD who presented to the ED with c/o chest pain and dyspnea      t2DM on iLet pump . Started in 2025     D/w Rn and CDE multiple times yesterday. She had low. Pump settings were adjusted.       3/2/2024 cath lab for coronary angiogram       Objective:   Vital Signs:  Blood pressure (!) 167/65, pulse 75, temperature 98.2 °F (36.8 °C), temperature source Oral, resp. rate 20, height 5' 2\" (1.575 m), weight 194 lb 8 oz (88.2 kg), SpO2 96%, not currently breastfeeding.                      Assessment and Plan:     Patient Active Problem List   Diagnosis    Edema of both legs    Neck pain    Apnea    Gastroesophageal reflux disease without esophagitis    Type 2 diabetes mellitus with neurologic complication (HCC)    Meibomian gland dysfunction (MGD), bilateral, both upper and lower lids    Age-related nuclear cataract of both eyes    Abscess of abdominal wall    Allergic rhinitis    Essential hypertension    TMJ (dislocation of temporomandibular joint)    Iron deficiency anemia    Leukocytosis    Plantar fasciitis of left foot    Type 2 diabetes mellitus with complication, with long-term current use of insulin (HCC)    Left-sided cerebrovascular accident (CVA) (HCC)    History of craniotomy    Family history of colon cancer    Abnormal uterine bleeding (AUB)    Transient alteration of awareness    Altered mental status    Depression    Need for dental care    Type 1 diabetes mellitus without retinopathy (HCC)    S/P  colonoscopic polypectomy    Gastropathy    S/P carotid endarterectomy    Carotid atherosclerosis    Dyslipidemia    Pre-op testing    Pure hypercholesterolemia    Anemia    CKD stage 3 due to type 1 diabetes mellitus (HCC)    Type 2 diabetes mellitus with hyperglycemia, with long-term current use of insulin (HCC)    Weight gain    Obesity (BMI 30-39.9)    Migraine without status migrainosus, not intractable    Nausea    Primary hypertension    Hernia of abdominal wall    Morbid (severe) obesity due to excess calories (HCC)    Left upper quadrant abdominal pain    Cataracta    Cervical radiculopathy    Arthropathy of cervical facet joint    Left carotid stenosis    Essential hypertension with goal blood pressure less than 140/90    Chest pain    Chest pain, unspecified type    Hypotension    Vertebral artery dissection (HCC)    NSTEMI (non-ST elevated myocardial infarction) (HCC)    Hypotension, unspecified hypotension type    Insulin pump in place    Uncontrolled type 2 diabetes mellitus with hyperglycemia (HCC)    Mild non proliferative diabetic retinopathy (HCC)    Seizures (HCC)    Weakness on right side of face    Right-sided extracranial carotid artery stenosis    Tapia tapia disease    Hypoglycemia due to type 2 diabetes mellitus (HCC)       . Uncontrolled complicated Diabetes Mellitus Type 2, on insulin pump   - carb controlled diet if ok to eat  - Hypoglycemia protocol  - adjusted pump settings   Pt will change site  3/5/2025      D/w Pt and nurses      Results:     Lab Results   Component Value Date    WBC 11.7 (H) 03/05/2025    HGB 10.9 (L) 03/05/2025    HCT 34.8 (L) 03/05/2025    .0 03/05/2025    CREATSERUM 0.79 03/05/2025    CREATSERUM 0.79 03/05/2025    BUN 16 03/05/2025    BUN 16 03/05/2025     03/05/2025     03/05/2025    K 4.0 03/05/2025    K 4.0 03/05/2025     03/05/2025     03/05/2025    CO2 31.0 03/05/2025    CO2 31.0 03/05/2025    GLU 76 03/05/2025    GLU 76  03/05/2025    CA 8.9 03/05/2025    CA 8.9 03/05/2025    ALB 3.9 03/05/2025    ALKPHO 87 03/01/2025    BILT 0.5 03/01/2025    TP 6.5 03/01/2025    AST 12 03/01/2025    ALT 9 (L) 03/01/2025    PTT 28.5 03/04/2025    INR 1.0 08/31/2017    PT 12.8 09/26/2016    T4F 0.66 08/20/2018    TSH 1.386 02/17/2025    LIP 19 05/24/2023    DDIMER 0.36 02/28/2025    ESRML 19 08/20/2018    MG 2.2 03/03/2025    PHOS 3.8 03/05/2025    TROP 0.03 06/27/2017    B12 580 05/26/2021    POCGLU 224 03/07/2024       XR CHEST AP PORTABLE  (CPT=71045)    Result Date: 3/3/2025  CONCLUSION: No new abnormality.  Persistent pulmonary edema and elevation of the right hemidiaphragm.  Persistent questionable trace pleural effusions.      Dictated by (CST): Piter Cody MD on 3/03/2025 at 3:35 PM     Finalized by (CST): Piter Cody MD on 3/03/2025 at 3:36 PM                 Lab Results   Component Value Date    A1C 9.2 (A) 02/11/2025    A1C 10.6 (A) 09/23/2024    A1C 10.2 (A) 05/23/2024    A1C 9.9 (H) 05/23/2024    A1C 10.4 (A) 02/29/2024       No results for input(s): \"TSH\", \"T4F\", \"T3F\", \"THYP\" in the last 72 hours.  @LAB(TSH)@      Obdulio Serra MD  3/5/2025        DOS is the same date the note was signed

## 2025-03-05 NOTE — PLAN OF CARE
Cardiology Plan of Care    Notified by RN that 's this evening. Hydralazine 10mg IV q 6 hrs prn ordered. Pt is also pending MRI brain this evening per neurology for blurred vision.     Tiny Hernadez, APRN  03/05/25   5:39 PM  [phone] [place]  [phone] [place]

## 2025-03-05 NOTE — PROGRESS NOTES
PT attempted in am RN reporting pt has low BP and head ache. PT will re attempt in PM as medical progress and pt tolerance allows.

## 2025-03-05 NOTE — PROGRESS NOTES
[facility]  [address], [address]  [facility] [address]  [phone]        INPATIENT NEUROLOGY   FOLLOW UP PROGRESS NOTE  [facility]  part of [facility]    Anjel Pisano Patient Status:  Inpatient     3/30/1970 MRN [id number]    Location [facility] 2W/SW Attending Santiago Dwyer MD    Hosp Day # 4 PCP Yves Yanez MD    Date of Admission:  2025  Date of Consult Follow Up:  25     Assessment and Plan:   Anjel Pisano is a 54 year old  a 54 year old woman  w/ a pmhx sig. for R sided strokes and R EC-IC bypass in  at OSH in [place], intracranial atherosclerosis, chronic left vertebral artery stenosis, moderate narrowing of the right subclavian artery,  intractable headaches, seizures on keppra, HTN, HLD, and DM who  p/w 3 days of fluctuating chest pain and difficulty breathing with radiation to the arm found to have elevated troponins (in the setting of chronically elevated troponins) who is being seen by neurology after patient became diaphoretic, panicked, intermittently aphasic, and hypotensive to the 60s over 40s after she was given 20 mg of IV hydralazine for blood pressures in the 210s over 70s.    Deficits she reports are likely chronic from her prior right hemisphere stroke.  Repeat MRI brain is pending.  She also has had chronic headaches for 2 years per chart review.  Only medication she can really get of a headache cocktail is Depakote.    On 3/2/2025 she complained of new chest pain with associated shortness of breath.  Also reported new left hand numbness, which improved after cardiac catheterization.  Her cardiac catheter revealed 50% mLAD lesion.  No intervention.  per cardiac cardiology does not account for her symptoms.  Patient reports her left hand  Numbness and?  Pain improved after cath.    Seen again in follow-up on 3/5/2025 she complained of blurred vision in her left eye.  Stat MRI of the  brain ordered. Demonstrates punctate infarcts  in her ZARA territory (she has chronic left ZARA intracranial atherosclerosis) and her left temporal lobe. Also noted ?infarct in her cerebellum.     None of these MRI findings can account for her blurry vision in her left eye. She has likely asymptomatic infarcts due to her intracranial atherosclerosis. She is on dual antiplatelets currently. After 90 days her asa can be increased to 325. Can increase dose of her gabapentin for her neck pain. Not a candidate for tpa b/c out of the window. Not a candidate for thrombectomy b/c clinically inconsistent w/ LVO.         Blurred vision on 3/5/25, acute intermittent aphasia In the setting of hypotension known moyamoya with right ECA ICA bypass, intracranial atherosclerosis, and chronic left vertebral artery stenosis  Differential Diagnosis:  Blurred vision may be chronic deficit related to diabetic retinopathy  Sx on presentation were secondary to acute hypotension     Plan       Neuro checks Q4.    MAP  > 65   New goal SBP >  120.  Reperfusion therapy eligibility: patient is not eligible because: out of the  time window  not a candidate for thrombectomy because no large vessel occlusion  Agent and time of first antithrombotic administration (or contraindication):   Cont   BP goal:   MAP greater than 65.  SBP greater than 90.  Additional brain and vascular imaging ordered:   CTA head/neck  and CT stroke brain  ;  STAT MRI brain to evaluate for any interval infarcts completed; see above.   Cardiac imaging: Telemetry   Additional labs ordered:   Labs: Fasting lipid panel and HgbA1C; troponins per cardiology  Dysphagia status:   DysphagiaNo acute facial droop; per RN swallow evaluation.  Fluids/nutrition:   ivfstroke : AVOID Hypotonic fluids in patients with acute ischemic stroke or cerebral edema.  Hypotonic fluids can worsen cerebral edema.  This includes D5 W, half-normal saline, and lactated Ringer's.  If patients need  glucose then please use normal saline.  DVT prophylaxis:   SCD's while in bed  Therapy/rehab services ordered (speech/PT/OT/rehab consult):   No persistent deficits no rehab services indicated.   maintain oxygen saturation >94%. No supplemental oxygen  in nonhypoxic patients with acute ischemic stroke (AIS).  Tx hyperthermia (temperature >38°C) and identify source  Evidence indicates that persistent in-hospital hyperglycemia during the first 24 hours after AIS is associated with worse outcomes than normoglycemia and thus, it is reasonable to treat hyperglycemia to achieve blood glucose levels in a range of 140 to 180 mg/dL and to closely monitor to prevent hypoglycemia in patients with AIS.  Neuro checks Q4.  Telemetry.  Neurosurgery consult.  Can be seen by  as an outpatient.  Also does have outpatient  follow-up with neurology at [facility] scheduled.  Increase gabapentin to 300qhs for pain.         INTERVAL EVENTS  03/02/25: cardiac cath completed  03/05/25:c/o blurred vision in her left eye.         SUBJECTIVE:   Now reports blurred vision in her left eye ongoing since yesterday.  Pain in her neck when she sneezes and coughs. Reports sharp pain. Otherwise no new deficits. Pt was sleeping  when this author entered the room. Reports her HA may be slightly better.   Pertinent positive and negatives per HPI.  All others were reviewed and negative.       Objective   OBJECTIVE:   Last vitals and weight :  Blood pressure (!) 167/65, pulse 84, temperature 98.2 °F (36.8 °C), temperature source Oral, resp. rate 20, height 62\", weight 194 lb 8 oz (88.2 kg), SpO2 96%, not currently breastfeeding.   Vitals:    03/05/25 0549 03/05/25 0604 03/05/25 0915 03/05/25 0918   BP:  (!) 162/83 (!) 166/73 (!) 167/65   BP Location:  Left arm Right arm Left arm   Pulse:  75 84    Resp:  20 20    Temp:  99 °F (37.2 °C) 98.2 °F (36.8 °C)    TempSrc:  Oral Oral    SpO2:  93% 96%    Weight: 194 lb 8 oz (88.2 kg)      Height:           Exam:  - General: appears stated age and no distress     - Pulmonary: no sign of respiratory distress.   Neurologic Exam  - Mental Status: Alert and attentive.   .  Speech is spontaneous, fluent, and prosodic. Comprehension intact. Repetition intact. Phrase length and rate are normal.   - Cranial Nerves: No gaze preference. Visual fields:normal  Pupils are 4mm briskly constricting to 3mm and equally round and reactive to light  in a well lit room. EOMI. No nystagmus. No ptosis. V1-V3 decreased to  light touch  on the left.questionable chronic/trace left-sided facial weakness.  Hearing grossly intact.  Tongue midline. No atrophy or fasiculations of the tongue noted. Palate and uvula elevate symmetrically.  Shoulder shrug symmetric.  - Motor:  normal tone, normal bulk. No interosseous wasting. No flattening of hypothenar eminences.    Right Left     Motor Strength    5 5   Knee extensors 5 5    Pronator drift: No pronator drift   Leg Drift: none    Asterixis: No asterixis noted.    - Sensory:   Light touch: Decreased left hemibody  Cerebellum no dysmetria    NIH Stroke Scale  Person Administering Scale: Paddy Craig DO  1a  Level of consciousness: 0 = Alert keenly responsive    1b. LOC questions:  0 = Answers both questions correctly     1c. LOC commands: 0 = Performs both tasks correctly    2.  Best Gaze: 0 = Normal    3.  Visual: 0 = No visual loss     4. Facial Palsy: 0 = Normal symmetrical movement     5a.  Motor left arm: 0 = No drift; limb holds 90º(or 45º) for full 10 seconds   5b.  Motor right arm: 0 = No drift; limb holds 90º(or 45º) for full 10 seconds   6a. motor left le = No drift; leg holds 30º for full 5 seconds     6b  Motor right le = No drift; leg holds 30º for full 5 seconds     7. Limb Ataxia: 0 = Absent     8.  Sensory: 1 = Mild/moderate sensory loss; may be dulled/\"Not as sharp\"     9. Best Language:  0 = No aphasia, normal      10. Dysarthria: 0 = Normal articulation   11.  Extinction and Inattention: 0 = No abnormality     Total:   1     Modified Carmel Scale Score: 0.    Medications:   amLODIPine  5 mg Oral Daily    [START ON 3/6/2025] metoprolol succinate  25 mg Oral Daily Beta Blocker    clopidogrel  75 mg Oral Daily    gabapentin  100 mg Oral Nightly    ezetimibe  10 mg Oral Nightly    aspirin  81 mg Oral Daily    atorvastatin  80 mg Oral Nightly    escitalopram  10 mg Oral Daily    levETIRAcetam  500 mg Oral BID    pantoprazole  40 mg Oral QAM AC    insulin   Subcutaneous TID AC and HS       PRNS:   ondansetron    acetaminophen    ipratropium-albuterol    zolpidem    alum-mag hydroxide-simethicone    labetalol    glucose **OR** glucose **OR** glucose-vitamin C **OR** dextrose **OR** glucose **OR** glucose **OR** glucose-vitamin C    nitroglycerin    HYDROcodone-acetaminophen    Infusions:              Results:   Laboratory Data:  Lab Results   Component Value Date    WBC 11.7 (H) 03/05/2025    HGB 10.9 (L) 03/05/2025    HCT 34.8 (L) 03/05/2025    .0 03/05/2025    CREATSERUM 0.79 03/05/2025    CREATSERUM 0.79 03/05/2025    BUN 16 03/05/2025    BUN 16 03/05/2025     03/05/2025     03/05/2025    K 4.0 03/05/2025    K 4.0 03/05/2025     03/05/2025     03/05/2025    CO2 31.0 03/05/2025    CO2 31.0 03/05/2025    GLU 76 03/05/2025    GLU 76 03/05/2025    CA 8.9 03/05/2025    CA 8.9 03/05/2025    ALB 3.9 03/05/2025    ALKPHO 87 03/01/2025    TP 6.5 03/01/2025    AST 12 03/01/2025    ALT 9 (L) 03/01/2025    PTT 28.5 03/04/2025    INR 1.0 08/31/2017    PTP 12.5 08/31/2017    T4F 0.66 08/20/2018    TSH 1.386 02/17/2025    LIP 19 05/24/2023    DDIMER 0.36 02/28/2025    ESRML 19 08/20/2018    MG 2.2 03/03/2025    PHOS 3.8 03/05/2025    TROP 0.03 06/27/2017    B12 580 05/26/2021    POCGLU 224 03/07/2024           Test results/Imaging:   CTA BRAIN + CTA CAROTIDS (CPT=70496/83004)    Result Date: 3/1/2025  CONCLUSION:   No acute intracranial hemorrhage,  hydrocephalus, or mass effect.  If there is clinical concern for acute ischemic stroke, MRI of the brain is recommended.  Unchanged complete or near complete occlusion of the left V1 vertebral artery, with additional irregularity of the V2 segment.  Findings are concerning for age chronic vertebral artery dissection.  Right external to internal carotid artery bypass.  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  Retrograde reconstitution of flow in the distal intracranial ICA, similar to prior.  Chronic hypoenhancement of the left ZARA.  Otherwise, No large vessel occlusion involving the major arterial branches of the oczjsj-bg-Gmyamc.  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  Moderate narrowing at the right subclavian artery origin.  Correlate for subclavian steal.  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  Small subcentimeter lucent lesions throughout the calvarium, nonspecific.  Recommend correlation with clinical/laboratory findings for myelomatous process.  A preliminary report was issued by the [facility] teleradiology service. There are no clinically actionable discrepancies.  Report was initially discussed with Dr. Staley At 1:55 a.m. Eastern time by Dr. Rojas    Dictated by (CST): Antonieta Hart MD on 3/01/2025 at 8:24 AM     Finalized by (CST): Antonieta Hart MD on 3/01/2025 at 8:39 AM          CTA BRAIN + CTA CAROTIDS (CPT=70496/51737)    Result Date: 1/24/2025  CONCLUSION:   1. No acute intracranial abnormality identified.  2. Small area of encephalomalacia along the posterior right temporal lobe.  Medium-sized area of encephalomalacia in the right frontal lobe.  Small area of encephalomalacia in the left parietal lobe.  Mild age-indeterminate microvascular ischemic changes  in the cerebral white matter. If there is clinical concern for acute ischemia/infarction, an MRI of the brain would be recommended for  further evaluation.  3. There are postoperative changes along the right carotid bifurcation.  There also changes of a previous right pterional craniotomy with a branch of the right external carotid artery extending to the surface of the right cerebrum in the region of the right middle cerebral artery branches.  4. The right cervical internal carotid artery is occluded with reconstitution in the region of the mid cavernous segment.  There is moderate to severe mixed plaque in the cavernous and supraclinoid segments of the internal carotid arteries which are diminutive in caliber with areas of moderate to severe stenosis of greater than 75%.  There are wispy branches of the bilateral anterior cerebral artery A1 segments as well as markedly diminutive caliber of the remainder of the left anterior cerebral artery.  These findings may all be within the spectrum of moyamoya disease, with other etiologies not entirely excluded.  Clinical correlation recommended.  5. The right subclavian artery origin is obscured by beam hardening from the contrast bolus, however underlying high-grade stenosis is not excluded.  Clinical correlation for a signs or symptoms of subclavian steal is suggested.  This could be further assessed with ultrasound Doppler exam as clinically directed.  6. There is high-grade stenosis and/or occlusion of the origin and proximal V1 segment of the left vertebral artery which is somewhat limited in evaluation due to beam hardening from body habitus.  This could be further assessed with conventional angiography as clinically directed.  There is moderate atherosclerotic irregularity and narrowing in the distal V1 and proximal V2 segments of the left vertebral artery.    Please see above for further details.  The radiology support staff is in the process of contacting the referring physician regarding this report.   LOCATION:  [id number]   Dictated by (CST): Pee Willis MD on 1/24/2025 at 3:24 PM     Finalized  by (CST): Pee Willis MD on 1/24/2025 at 3:41 PM             Performed an independent visualization of:  CT brain WO ,CTA HEAD AND NECK   Imaging revealed: Agree with radiology read.    Education/Instructions given to: patient   Barriers to Learning:None  Content: Refer to note above. Evaluation/Outcome: Verbalized understanding    Disclaimer:   This record was dictated using Dragon software. There may be errors due to voice recognition problems that were not realized and corrected during the completion of the note.      This document is not intended to support charting by exception.  Sections left blank in a completed note should be presumed not to have been done.          Thank you.  Paddy Craig D.O.   Vascular & General Neurology

## 2025-03-06 VITALS
HEART RATE: 74 BPM | HEIGHT: 62 IN | SYSTOLIC BLOOD PRESSURE: 154 MMHG | WEIGHT: 184 LBS | RESPIRATION RATE: 16 BRPM | DIASTOLIC BLOOD PRESSURE: 75 MMHG | BODY MASS INDEX: 33.86 KG/M2 | OXYGEN SATURATION: 94 % | TEMPERATURE: 99 F

## 2025-03-06 LAB
ALBUMIN SERPL-MCNC: 3.9 G/DL (ref 3.2–4.8)
ANION GAP SERPL CALC-SCNC: 9 MMOL/L (ref 0–18)
ANION GAP SERPL CALC-SCNC: 9 MMOL/L (ref 0–18)
BASOPHILS # BLD AUTO: 0.04 X10(3) UL (ref 0–0.2)
BASOPHILS NFR BLD AUTO: 0.4 %
BUN BLD-MCNC: 13 MG/DL (ref 9–23)
BUN BLD-MCNC: 13 MG/DL (ref 9–23)
BUN/CREAT SERPL: 16 (ref 10–20)
BUN/CREAT SERPL: 16 (ref 10–20)
CALCIUM BLD-MCNC: 8.9 MG/DL (ref 8.7–10.4)
CALCIUM BLD-MCNC: 8.9 MG/DL (ref 8.7–10.4)
CHLORIDE SERPL-SCNC: 99 MMOL/L (ref 98–112)
CHLORIDE SERPL-SCNC: 99 MMOL/L (ref 98–112)
CO2 SERPL-SCNC: 29 MMOL/L (ref 21–32)
CO2 SERPL-SCNC: 29 MMOL/L (ref 21–32)
CREAT BLD-MCNC: 0.81 MG/DL
CREAT BLD-MCNC: 0.81 MG/DL
DEPRECATED RDW RBC AUTO: 44.7 FL (ref 35.1–46.3)
EGFRCR SERPLBLD CKD-EPI 2021: 85 ML/MIN/1.73M2 (ref 60–?)
EGFRCR SERPLBLD CKD-EPI 2021: 85 ML/MIN/1.73M2 (ref 60–?)
EOSINOPHIL # BLD AUTO: 0.2 X10(3) UL (ref 0–0.7)
EOSINOPHIL NFR BLD AUTO: 1.8 %
ERYTHROCYTE [DISTWIDTH] IN BLOOD BY AUTOMATED COUNT: 16.2 % (ref 11–15)
GLUCOSE BLD-MCNC: 200 MG/DL (ref 70–99)
GLUCOSE BLD-MCNC: 200 MG/DL (ref 70–99)
GLUCOSE BLDC GLUCOMTR-MCNC: 144 MG/DL (ref 70–99)
GLUCOSE BLDC GLUCOMTR-MCNC: 91 MG/DL (ref 70–99)
HCT VFR BLD AUTO: 34.5 %
HGB BLD-MCNC: 10.6 G/DL
IMM GRANULOCYTES # BLD AUTO: 0.05 X10(3) UL (ref 0–1)
IMM GRANULOCYTES NFR BLD: 0.4 %
LYMPHOCYTES # BLD AUTO: 3.83 X10(3) UL (ref 1–4)
LYMPHOCYTES NFR BLD AUTO: 33.7 %
MAGNESIUM SERPL-MCNC: 2.1 MG/DL (ref 1.6–2.6)
MCH RBC QN AUTO: 23.6 PG (ref 26–34)
MCHC RBC AUTO-ENTMCNC: 30.7 G/DL (ref 31–37)
MCV RBC AUTO: 76.7 FL
MONOCYTES # BLD AUTO: 0.62 X10(3) UL (ref 0.1–1)
MONOCYTES NFR BLD AUTO: 5.5 %
NEUTROPHILS # BLD AUTO: 6.63 X10 (3) UL (ref 1.5–7.7)
NEUTROPHILS # BLD AUTO: 6.63 X10(3) UL (ref 1.5–7.7)
NEUTROPHILS NFR BLD AUTO: 58.2 %
OSMOLALITY SERPL CALC.SUM OF ELEC: 290 MOSM/KG (ref 275–295)
OSMOLALITY SERPL CALC.SUM OF ELEC: 290 MOSM/KG (ref 275–295)
PHOSPHATE SERPL-MCNC: 3.7 MG/DL (ref 2.4–5.1)
PLATELET # BLD AUTO: 276 10(3)UL (ref 150–450)
POTASSIUM SERPL-SCNC: 4.1 MMOL/L (ref 3.5–5.1)
POTASSIUM SERPL-SCNC: 4.1 MMOL/L (ref 3.5–5.1)
RBC # BLD AUTO: 4.5 X10(6)UL
SODIUM SERPL-SCNC: 137 MMOL/L (ref 136–145)
SODIUM SERPL-SCNC: 137 MMOL/L (ref 136–145)
WBC # BLD AUTO: 11.4 X10(3) UL (ref 4–11)

## 2025-03-06 PROCEDURE — 99239 HOSP IP/OBS DSCHRG MGMT >30: CPT | Performed by: HOSPITALIST

## 2025-03-06 PROCEDURE — 99233 SBSQ HOSP IP/OBS HIGH 50: CPT | Performed by: INTERNAL MEDICINE

## 2025-03-06 RX ORDER — AMLODIPINE BESYLATE 5 MG/1
5 TABLET ORAL DAILY
Qty: 30 TABLET | Refills: 0 | Status: SHIPPED | OUTPATIENT
Start: 2025-03-07

## 2025-03-06 RX ORDER — EZETIMIBE 10 MG/1
10 TABLET ORAL NIGHTLY
Qty: 30 TABLET | Refills: 0 | Status: SHIPPED | OUTPATIENT
Start: 2025-03-06

## 2025-03-06 RX ORDER — ASPIRIN 81 MG/1
81 TABLET ORAL DAILY
Qty: 30 TABLET | Refills: 2 | Status: SHIPPED | OUTPATIENT
Start: 2025-03-07

## 2025-03-06 RX ORDER — GLUCAGON INJECTION, SOLUTION 1 MG/.2ML
1 INJECTION, SOLUTION SUBCUTANEOUS ONCE AS NEEDED
Status: SHIPPED | COMMUNITY
Start: 2025-03-06

## 2025-03-06 RX ORDER — CLOPIDOGREL BISULFATE 75 MG/1
75 TABLET ORAL DAILY
Qty: 30 TABLET | Refills: 2 | Status: SHIPPED | OUTPATIENT
Start: 2025-03-07

## 2025-03-06 RX ORDER — METOPROLOL SUCCINATE 25 MG/1
25 TABLET, EXTENDED RELEASE ORAL DAILY
Status: SHIPPED | COMMUNITY
Start: 2025-03-06

## 2025-03-06 NOTE — CM/SW NOTE
03/06/25 1300   Discharge disposition   Expected discharge disposition Home or Self   Post Acute Care Provider Home   Patient Declines Recommended Services Yes   Discharge transportation [facility]     The patient received a MDO for discharge.    The patient declined all home health services.    The patient will be transported home via [facility] at 5:45pm.  PCS complete.  The patient's Medicaid will cover the cost of her transport.    RN to inform patient.    SW/CM to remain available for support and/or discharge planning.     Vicenta Verduzco MSW, LSW  Discharge Planner [id number]

## 2025-03-06 NOTE — CM/SW NOTE
03/06/25 1200   Choice of Post-Acute Provider   Informed patient of right to choose their preferred provider Yes   List of appropriate post-acute services provided to patient/family with quality data No - Declined list   Patient declines recommended services Yes     Social work was able to meet with the patient at bedside to discuss home health.    The patient is declining all home health service at this time.    The patient is only agreeable to Outpatient PT.    Attending physician aware.    SW/CM to remain available for support and/or discharge planning.     Vicenta Verduzco MSW, LSW  Discharge Planner [id number]

## 2025-03-06 NOTE — PROGRESS NOTES
[facility]  part of [facility]    Progress Note    Anjel Pisano Patient Status:  Inpatient    3/30/1970 MRN [id number]   Location [facility] 3W/ Attending Eugenio Sage MD   Hosp Day # 5 PCP Yves Yanez MD     Subjective:   Anjel Pisano is a(n) 54 year old female  Presented  w/ Chest pain, unspecified type:     PMH of CAD, HTN, HLD, CVA with right sided residual weakness, right vertebral artery stenosis, bilateral carotid artery stenosis s/p CEA, DM II and CKD who presented to the ED with c/o chest pain and dyspnea      t2DM on iLet pump . Started in 2025     D/w Rn and CDE multiple times yesterday. She had low. Pump settings were adjusted.       3/2/2024 cath lab for coronary angiogram    Objective:   Vital Signs:  Blood pressure 151/75, pulse 67, temperature 98.7 °F (37.1 °C), temperature source Oral, resp. rate 14, height 5' 2\" (1.575 m), weight 184 lb (83.5 kg), SpO2 96%, not currently breastfeeding.                      Assessment and Plan:     Patient Active Problem List   Diagnosis    Edema of both legs    Neck pain    Apnea    Gastroesophageal reflux disease without esophagitis    Type 2 diabetes mellitus with neurologic complication (HCC)    Meibomian gland dysfunction (MGD), bilateral, both upper and lower lids    Age-related nuclear cataract of both eyes    Abscess of abdominal wall    Allergic rhinitis    Essential hypertension    TMJ (dislocation of temporomandibular joint)    Iron deficiency anemia    Leukocytosis    Plantar fasciitis of left foot    Type 2 diabetes mellitus with complication, with long-term current use of insulin (HCC)    Left-sided cerebrovascular accident (CVA) (HCC)    History of craniotomy    Family history of colon cancer    Abnormal uterine bleeding (AUB)    Transient alteration of awareness    Altered mental status    Depression    Need for dental care    Type 1 diabetes mellitus without retinopathy (HCC)    S/P colonoscopic  polypectomy    Gastropathy    S/P carotid endarterectomy    Carotid atherosclerosis    Dyslipidemia    Pre-op testing    Pure hypercholesterolemia    Anemia    CKD stage 3 due to type 1 diabetes mellitus (HCC)    Type 2 diabetes mellitus with hyperglycemia, with long-term current use of insulin (HCC)    Weight gain    Obesity (BMI 30-39.9)    Migraine without status migrainosus, not intractable    Nausea    Primary hypertension    Hernia of abdominal wall    Morbid (severe) obesity due to excess calories (HCC)    Left upper quadrant abdominal pain    Cataracta    Cervical radiculopathy    Arthropathy of cervical facet joint    Left carotid stenosis    Essential hypertension with goal blood pressure less than 140/90    Chest pain    Chest pain, unspecified type    Hypotension    Vertebral artery dissection (HCC)    NSTEMI (non-ST elevated myocardial infarction) (HCC)    Hypotension, unspecified hypotension type    Insulin pump in place    Uncontrolled type 2 diabetes mellitus with hyperglycemia (HCC)    Mild non proliferative diabetic retinopathy (HCC)    Seizures (HCC)    Weakness on right side of face    Right-sided extracranial carotid artery stenosis    Tapia tapia disease    Hypoglycemia due to type 2 diabetes mellitus (HCC)    Silent cerebral infarction (HCC)    History of stroke       . Uncontrolled complicated Diabetes Mellitus Type 2, on insulin pump   - carb controlled diet if ok to eat  - Hypoglycemia protocol  - adjusted pump settings   Changed site on  3/5/2025      D/w Pt and nurses    Results:      Latest Reference Range & Units 03/05/25 12:54 03/05/25 17:30 03/05/25 21:02 03/05/25 22:56 03/06/25 07:29   POC GLUCOSE 70 - 99 mg/dL 148 (H) 163 (H) 138 (H) 131 (H) 144 (H)   (H): Data is abnormally high  Lab Results   Component Value Date    WBC 11.4 (H) 03/06/2025    HGB 10.6 (L) 03/06/2025    HCT 34.5 (L) 03/06/2025    .0 03/06/2025    CREATSERUM 0.81 03/06/2025    CREATSERUM 0.81 03/06/2025    BUN  13 03/06/2025    BUN 13 03/06/2025     03/06/2025     03/06/2025    K 4.1 03/06/2025    K 4.1 03/06/2025    CL 99 03/06/2025    CL 99 03/06/2025    CO2 29.0 03/06/2025    CO2 29.0 03/06/2025     (H) 03/06/2025     (H) 03/06/2025    CA 8.9 03/06/2025    CA 8.9 03/06/2025    ALB 3.9 03/06/2025    ALKPHO 87 03/01/2025    BILT 0.5 03/01/2025    TP 6.5 03/01/2025    AST 12 03/01/2025    ALT 9 (L) 03/01/2025    PTT 28.5 03/04/2025    INR 1.0 08/31/2017    PT 12.8 09/26/2016    T4F 0.66 08/20/2018    TSH 1.386 02/17/2025    LIP 19 05/24/2023    DDIMER 0.36 02/28/2025    ESRML 19 08/20/2018    MG 2.1 03/06/2025    PHOS 3.7 03/06/2025    TROP 0.03 06/27/2017    B12 580 05/26/2021    POCGLU 224 03/07/2024       MRI BRAIN (CPT=70551)    Result Date: 3/5/2025  CONCLUSION:  1. Tiny acute lacunar size infarcts subcortical left temporal lobe, and left paramedian frontal lobe -probably subcortical . 2. Chronic cortical infarcts involving right posterior temporal occipital region, right posterior frontal convexity, left posterior parietal lobe. 3. Changes of chronic small vessel disease in cerebral white matter. 4. Occluded right internal carotid. 5. Prior right temporal craniotomy.    Dictated by (CST): Pipe Mix MD on 3/05/2025 at 8:33 PM     Finalized by (CST): Pipe Mix MD on 3/05/2025 at 8:46 PM                 Lab Results   Component Value Date    A1C 9.2 (A) 02/11/2025    A1C 10.6 (A) 09/23/2024    A1C 10.2 (A) 05/23/2024    A1C 9.9 (H) 05/23/2024    A1C 10.4 (A) 02/29/2024       No results for input(s): \"TSH\", \"T4F\", \"T3F\", \"THYP\" in the last 72 hours.  @LAB(TSH)@      Obdulio Serra MD  3/6/2025        DOS is the same date the note was signed

## 2025-03-06 NOTE — PHYSICAL THERAPY NOTE
PHYSICAL THERAPY TREATMENT NOTE - INPATIENT     Room Number: 330/330-A       Presenting Problem: chest pain  Co-Morbidities : DM, insulin pump placed 2025, Tapia Tapia, seizures, carotid endartectomy, h/o crani, CVA with LT residual deficits    Problem List  Principal Problem:    Chest pain, unspecified type  Active Problems:    Chest pain    Hypotension    Vertebral artery dissection (HCC)    NSTEMI (non-ST elevated myocardial infarction) (HCC)    Hypotension, unspecified hypotension type    Insulin pump in place    Uncontrolled type 2 diabetes mellitus with hyperglycemia (HCC)    Tapia tapia disease    Hypoglycemia due to type 2 diabetes mellitus (HCC)    Silent cerebral infarction (HCC)    History of stroke      PHYSICAL THERAPY ASSESSMENT   Patient demonstrates good  progress this session, goals  remain in progress.      Patient is requiring stand-by assist as a result of the following impairments: decreased muscular endurance.     Patient continues to function near baseline with bed mobility, transfers, and gait.  Next session anticipate patient to progress bed mobility, transfers, and gait.  Physical Therapy will continue to follow patient for duration of hospitalization.    Patient continues to benefit from continued skilled PT services: at discharge to promote prior level of function.  Anticipate patient will return home with Cardiopulmonary Rehab.    PLAN DURING HOSPITALIZATION  Nursing Mobility Recommendation : 1 Assist     PT Treatment Plan: Bed mobility, Body mechanics, Endurance, Energy conservation, Patient education, Family education, Gait training, Strengthening, Transfer training, Balance training  Frequency (Obs): 5x/week     SUBJECTIVE  I was up in the chair earlier today.    OBJECTIVE  Precautions: Bed/chair alarm    WEIGHT BEARING RESTRICTION       PAIN ASSESSMENT   Ratin  Location: denies       BALANCE  Static Sitting: Good  Dynamic Sitting: Fair +  Static Standing: Fair  Dynamic Standing:  Fair -    ACTIVITY TOLERANCE  Pulse: 72  Heart Rate Source: Monitor     BP: (!) 169/80  BP Location: Right arm  BP Method: Automatic  Patient Position: Sitting     O2 WALK  Oxygen Therapy  SPO2% Ambulation on Room Air: 96    AM-[facility] '6-Clicks' INPATIENT SHORT FORM - BASIC MOBILITY  How much difficulty does the patient currently have...  Patient Difficulty: Turning over in bed (including adjusting bedclothes, sheets and blankets)?: A Little   Patient Difficulty: Sitting down on and standing up from a chair with arms (e.g., wheelchair, bedside commode, etc.): A Little   Patient Difficulty: Moving from lying on back to sitting on the side of the bed?: A Little   How much help from another person does the patient currently need...   Help from Another: Moving to and from a bed to a chair (including a wheelchair)?: A Little   Help from Another: Need to walk in hospital room?: A Little   Help from Another: Climbing 3-5 steps with a railing?: A Little     AM-PAC Score:  Raw Score: 18   Approx Degree of Impairment: 46.58%   Standardized Score (AM-PAC Scale): 43.63   CMS Modifier (G-Code): CK    FUNCTIONAL ABILITY STATUS  Functional Mobility/Gait Assessment  Gait Assistance:  (SBA)  Distance (ft): 200  Assistive Device: Rolling walker  Pattern: Within Functional Limits    Sit to Stand: stand-by assist    Skilled Therapy Provided: Pt ok to be seen per MARYAM Bashir. Pt agreeable to participate in therapy session. Pt demo'd safe amb in hallway, no LOB. Pt able to talk and walk throughout ambulation. Pt cued for RW safety for turning to sit in chair. Pt educ in activity pacing for home and in seated therex. Pt denied pain and visual symptoms during PT session, denied dizziness and lightheadedness as well.    The patient's Approx Degree of Impairment: 46.58% has been calculated based on documentation in the [facility] '6 clicks' Inpatient Daily Activity Short Form.  Research supports that patients with this level of impairment may benefit  from home with HHPT,however, pt is declining HHPT. Pt would benefit from OPPT/cardiopulm rehab.  Final disposition will be made by interdisciplinary medical team.    THERAPEUTIC EXERCISES  Lower Extremity Alternating marching  Ankle pumps  Knee extension  LAQ     Position Sitting       Patient End of Session: Up in chair, Needs met, Call light within reach, RN aware of session/findings, All patient questions and concerns addressed, Hospital anti-slip socks    CURRENT GOALS   Goals to be met by: 3/30  Patient Goal Patient's self-stated goal is: unstated    Goal #1 Patient is able to demonstrate supine - sit EOB @ level: supervision      Goal #1   Current Status  NT this session   Goal #2 Patient is able to demonstrate transfers Sit to/from Stand at assistance level: supervision with none      Goal #2  Current Status  SBA with RW   Goal #3 Patient is able to ambulate 150 feet with assist device: none at assistance level: supervision   Goal #3   Current Status  SBA with '    Goal #4 Patient will negotiate 2 stairs/one curb w/ assistive device and supervision   Goal #4   Current Status  NT, pt declined stairs   Goal #5 Patient to demonstrate independence with home activity/exercise instructions provided to patient in preparation for discharge.   Goal #5   Current Status  Ongoing   Goal #6       Gait Training: 15 minutes

## 2025-03-06 NOTE — SLP NOTE
ADULT SWALLOWING EVALUATION    ASSESSMENT    ASSESSMENT/OVERALL IMPRESSION:  PPE REQUIRED. THIS THERAPIST WORE GLOVES, DROPLET MASK, AND GOGGLES FOR DURATION OF EVALUATION. HANDS WASHED UPON ENTRANCE/EXIT.    SLP BSSE orders received and acknowledged. A swallow evaluation warranted secondary to stoke protocol. Pt afebrile with clear vocal quality, on room air, with oxygen saturation at 97. Pt with no prior hx of dysphagia at [facility]. Pt positioned upright in bed. Pt with no complaints of pain, RN aware. Pt with adequate oral acceptance and bilabial seal across all trials. Pt with intact bite, mastication of solids, and timely A-P transit. Pt's swallow response appears timely with adequate hyolaryngeal elevation/excursion. No clinical signs of aspiration (e.g., immediate/delayed throat clear, immediate/delayed cough, wet vocal quality, increased O2 effort) observed across all trials.   Oxygen status remained >96 t/o the entire evaluation.     At this time, pt presents with adequate oral and pharyngeal phase for safe oral intake. Recommend a regular diet and thin liquids with strict adherence to safe swallowing compensatory strategies. Results and recommendations reviewed with RN and pt. Pt v/u to all results/recommendations. Recommendations remain written on whiteboard. SLP collaborated with RN for MD diet orders.     PLAN: SLP to complete SLE in future session.     RECOMMENDATIONS   Diet Recommendations - Solids: Regular  Diet Recommendations - Liquids: Thin Liquids    Aspiration Precautions: Upright position  Medication Administration Recommendations: No restrictions  Treatment Plan/Recommendations: Communication evaluation    HISTORY   MEDICAL HISTORY  Reason for Referral: Stroke protocol    Problem List  Principal Problem:    Chest pain, unspecified type  Active Problems:    Chest pain    Hypotension    Vertebral artery dissection (HCC)    NSTEMI (non-ST elevated myocardial infarction) (HCC)    Hypotension,  unspecified hypotension type    Insulin pump in place    Uncontrolled type 2 diabetes mellitus with hyperglycemia (HCC)    Tapia tapia disease    Hypoglycemia due to type 2 diabetes mellitus (HCC)    Silent cerebral infarction (HCC)    History of stroke      Past Medical History  Past Medical History:    Age-related nuclear cataract of both eyes    Anemia    Apnea    Cataract    Coronary atherosclerosis    Depression    DM type 2 (diabetes mellitus, type 2) ([facility])    Esophageal reflux    High blood pressure    High cholesterol    Meibomian gland dysfunction (MGD), bilateral, both upper and lower lids    Migraine    Muscle weakness    left sided weakness uses walker and cane    Seizures ([facility])    Sleep apnea    Stenosis of right vertebral artery    Stroke ([facility])    Type II or unspecified type diabetes mellitus without mention of complication, not stated as uncontrolled       Prior Living Situation: Home with support (lives with sister)  Diet Prior to Admission: Regular, Thin liquids  Precautions: Aspiration    Patient/Family Goals: No difficulties swallowing    SWALLOWING HISTORY  Current Diet Consistency: Regular, Thin liquids  Dysphagia History: None at [facility]    Imaging Results:   MRI BRAIN 3/5/25:  CONCLUSION:   1. Tiny acute lacunar size infarcts subcortical left temporal lobe, and left paramedian frontal lobe -probably subcortical .   2. Chronic cortical infarcts involving right posterior temporal occipital region, right posterior frontal convexity, left posterior parietal lobe.   3. Changes of chronic small vessel disease in cerebral white matter.   4. Occluded right internal carotid.   5. Prior right temporal craniotomy.     Dictated by (CST): Pipe Mix MD on 3/05/2025 at 8:33 PM       Finalized by (CST): Pipe Mix MD on 3/05/2025 at 8:46 PM     CXR 3/3/25:  CONCLUSION: No new abnormality.  Persistent pulmonary edema and elevation of the right hemidiaphragm.  Persistent questionable trace pleural effusions.       Dictated by (CST): Piter Cody MD on 3/03/2025 at 3:35 PM       Finalized by (CST): Ptier Cody MD on 3/03/2025 at 3:36 PM         OBJECTIVE   ORAL MOTOR EXAMINATION  Dentition: Natural, Functional  Symmetry: Within Functional Limits  Strength: Within Functional Limits     Range of Motion: Within Functional Limits  Rate of Motion: Within Functional Limits    Voice Quality: Clear  Respiratory Status: Unlabored  Consistencies Trialed: Thin liquids, Hard solid  Method of Presentation: Self presentation, Cup, Straw, Single sips  Patient Positioned: Upright, Midline    Oral Phase of Swallow: Within Functional Limits      Pharyngeal Phase of Swallow: Within Functional Limits           (Please note: Silent aspiration cannot be evaluated clinically. Videofluoroscopic Swallow Study is required to rule-out silent aspiration.)    Esophageal Phase of Swallow: No complaints consistent with possible esophageal involvement      GOALS  Goal #1 SLP to complete SLE in future session.   In Progress     FOLLOW UP  Treatment Plan/Recommendations: Communication evaluation  Duration: 1 week  Follow Up Needed (Documentation Required): Yes  SLP Follow-up Date: 03/07/25    Thank you for your referral.   If you have any questions, please contact DERICK Jackson M.S. CCC-SLP  Speech Language Pathologist  Phone Number [phone]

## 2025-03-06 NOTE — PLAN OF CARE
Problem: Patient Centered Care  Goal: Patient preferences are identified and integrated in the patient's plan of care  Description: Interventions:  - What would you like us to know as we care for you?   - Provide timely, complete, and accurate information to patient/family  - Incorporate patient and family knowledge, values, beliefs, and cultural backgrounds into the planning and delivery of care  - Encourage patient/family to participate in care and decision-making at the level they choose  - Honor patient and family perspectives and choices  Outcome: Progressing   Patient alert and oriented x 4, patient complaints of blurred vision in left eye awaiting MRI this evening.   Insuline pump and CGM in place. Patient was restarted on blood pressure medication, BP was still high this evening, cardiology was notified, PRN Hydralazine was added. Patient has been tolerating diet. Voiding freely. Fall precautions in place.

## 2025-03-06 NOTE — PLAN OF CARE
Problem: Patient Centered Care  Goal: Patient preferences are identified and integrated in the patient's plan of care  Description: Interventions:  - Provide timely, complete, and accurate information to patient/family  - Incorporate patient and family knowledge, values, beliefs, and cultural backgrounds into the planning and delivery of care  - Encourage patient/family to participate in care and decision-making at the level they choose  - Honor patient and family perspectives and choices  Outcome: Adequate for Discharge     Problem: CARDIOVASCULAR - ADULT  Goal: Maintains optimal cardiac output and hemodynamic stability  Description: INTERVENTIONS:  - Monitor vital signs, rhythm, and trends  - Monitor for bleeding, hypotension and signs of decreased cardiac output  - Evaluate effectiveness of vasoactive medications to optimize hemodynamic stability  - Monitor arterial and/or venous puncture sites for bleeding and/or hematoma  - Assess quality of pulses, skin color and temperature  - Assess for signs of decreased coronary artery perfusion - ex. Angina  - Evaluate fluid balance, assess for edema, trend weights  Outcome: Adequate for Discharge  Goal: Absence of cardiac arrhythmias or at baseline  Description: INTERVENTIONS:  - Continuous cardiac monitoring, monitor vital signs, obtain 12 lead EKG if indicated  - Evaluate effectiveness of antiarrhythmic and heart rate control medications as ordered  - Initiate emergency measures for life threatening arrhythmias  - Monitor electrolytes and administer replacement therapy as ordered  Outcome: Adequate for Discharge     Problem: RESPIRATORY - ADULT  Goal: Achieves optimal ventilation and oxygenation  Description: INTERVENTIONS:  - Assess for changes in respiratory status  - Assess for changes in mentation and behavior  - Position to facilitate oxygenation and minimize respiratory effort  - Oxygen supplementation based on oxygen saturation or ABGs  - Provide Smoking  Cessation handout, if applicable  - Encourage broncho-pulmonary hygiene including cough, deep breathe, Incentive Spirometry  - Assess the need for suctioning and perform as needed  - Assess and instruct to report SOB or any respiratory difficulty  - Respiratory Therapy support as indicated  - Manage/alleviate anxiety  - Monitor for signs/symptoms of CO2 retention  Outcome: Adequate for Discharge     Problem: GASTROINTESTINAL - ADULT  Goal: Minimal or absence of nausea and vomiting  Description: INTERVENTIONS:  - Maintain adequate hydration with IV or PO as ordered and tolerated  - Nasogastric tube to low intermittent suction as ordered  - Evaluate effectiveness of ordered antiemetic medications  - Provide nonpharmacologic comfort measures as appropriate  - Advance diet as tolerated, if ordered  - Obtain nutritional consult as needed  - Evaluate fluid balance  Outcome: Adequate for Discharge     Problem: METABOLIC/FLUID AND ELECTROLYTES - ADULT  Goal: Glucose maintained within prescribed range  Description: INTERVENTIONS:  - Monitor Blood Glucose as ordered  - Assess for signs and symptoms of hyperglycemia and hypoglycemia  - Administer ordered medications to maintain glucose within target range  - Assess barriers to adequate nutritional intake and initiate nutrition consult as needed  - Instruct patient on self management of diabetes  Outcome: Adequate for Discharge  Goal: Electrolytes maintained within normal limits  Description: INTERVENTIONS:  - Monitor labs and rhythm and assess patient for signs and symptoms of electrolyte imbalances  - Administer electrolyte replacement as ordered  - Monitor response to electrolyte replacements, including rhythm and repeat lab results as appropriate  - Fluid restriction as ordered  - Instruct patient on fluid and nutrition restrictions as appropriate  Outcome: Adequate for Discharge  Goal: Hemodynamic stability and optimal renal function maintained  Description:  INTERVENTIONS:  - Monitor labs and assess for signs and symptoms of volume excess or deficit  - Monitor intake, output and patient weight  - Monitor urine specific gravity, serum osmolarity and serum sodium as indicated or ordered  - Monitor response to interventions for patient's volume status, including labs, urine output, blood pressure (other measures as available)  - Encourage oral intake as appropriate  - Instruct patient on fluid and nutrition restrictions as appropriate  Outcome: Adequate for Discharge     Problem: MUSCULOSKELETAL - ADULT  Goal: Return mobility to safest level of function  Description: INTERVENTIONS:  - Assess patient stability and activity tolerance for standing, transferring and ambulating w/ or w/o assistive devices  - Assist with transfers and ambulation using safe patient handling equipment as needed  - Ensure adequate protection for wounds/incisions during mobilization  - Obtain PT/OT consults as needed  - Advance activity as appropriate  - Communicate ordered activity level and limitations with patient/family  Outcome: Adequate for Discharge  Goal: Maintain proper alignment of affected body part  Description: INTERVENTIONS:  - Support and protect limb and body alignment per provider's orders  - Instruct and reinforce with patient and family use of appropriate assistive device and precautions (e.g. spinal or hip dislocation precautions)  Outcome: Adequate for Discharge     Problem: Impaired Functional Mobility  Goal: Achieve highest/safest level of mobility/gait  Description: Interventions:  - Assess patient's functional ability and stability  - Promote increasing activity/tolerance for mobility and gait  - Educate and engage patient/family in tolerated activity level and precautions  Outcome: Adequate for Discharge

## 2025-03-06 NOTE — DISCHARGE SUMMARY
[facility]  Discharge Summary     Anjel Pisano  : 3/30/1970    Status: Inpatient  Day #: 5    Attending: Eugenio Sage MD  PCP: Yves Yanez MD     Date of Admission:  2025  Date of Discharge:  3/6/2025     Hospital Discharge Diagnoses:     Tensive urgency  Acute on chronic diastolic CHF  Insulin-dependent diabetes with neuropathy  Vertebral Artery occlusion  Right internal carotid occlusion  Small Acute CVAs      History of Present Illness:     Copied from Admission H&P:    Anjel Pisano is a(n) 54 year old female, with a past medical history significant for CAD, CVA with right-sided weakness, right vertebral stenosis, bilateral carotid artery stenosis status post carotid endarterectomy, insulin requiring diabetes and chronic kidney disease stage III presents with a complaint of chest discomfort and shortness of breath ongoing for the past 2 days.  Describes the onset as gradual, pain intermittent in nature 6 out of 10 at its worst associated with shortness of breath particularly on exertion.  In ER was found to be hypertensive with systolic pressure in the 200s, responded to IV hydralazine with a marked drop in her systolic pressures into the 60s requiring pressor support thereafter.  At this time patient became confused agitated as well.      Hospital Course:     Chest pain   EKG with ischemic changes, troponin up trended and pt had cp  Subsequent cardiac cath showing 50% mLAD lesion, otherwise non-obstructive disease  Heparin gtt stopped  2d echo reviewed     Accelerated hypertension  Responded to IV hydralazine but significant drop in blood pressure  D/w cardiology, will discharge on current BP meds and she has a monitor at home     Acute on chronic diastolic heart failure  X-ray indicated above pulmonary edema  Started on Lasix 40 mg IV daily  Monitor I's and O's and daily weights  Net IO Since Admission: -897  Back on oral torsemide on discharge     Insulin requiring diabetes with  associated neuropathy  Endocrinology consulted  Has insulin pump  Cont to monitor BS     Acute encephalopathy, likely ischemic  Likely triggered by sudden drop in blood pressure  Pt back to baseline   PT/OT consulted, will need continued therapy as OP     Acute CVA  CTA brain showing occlusion of L vertebral artery, concerning for chronic vertebral artery dissection  Neurology has been consulted  Pt with persistent L sided HA - resolved today  MRI brain -tiny acute lacunar infarcts in subcortical left temporal lobe and left paramedian frontal lobe, probably subcortical.  Other chronic changes.  Occluded right internal carotid artery.  -asa 81 and plavix for 90 days then stop plavix and back to asa 325 daily per neuro. Instructions given.      Consultants         Provider   Role Specialty     Paddy Craig DO      Consulting Physician NEUROLOGY     Yaima Browne MD      Consulting Physician ENDOCRINOLOGY     Angus James MD      Consulting Physician Interventional, Cardiology     Jerry Sosa MD      Consulting Physician PULMONARY DISEASES     Roberto Weathers MD      Consulting Physician NEUROLOGY     Ciera Sheldon APRN      Consulting Physician Nurse Practitioner             Physical Exam:   Blood pressure 155/89, pulse 71, temperature 98.4 °F (36.9 °C), temperature source Oral, resp. rate 16, height 5' 2\" (1.575 m), weight 184 lb (83.5 kg), SpO2 94%, not currently breastfeeding.  General:  Alert, no distress  HEENT:  Normocephalic, atraumatic  Cardiac:  Regular rate, regular rhythm  Pulmonary:  Clear to auscultation bilaterally, respirations unlabored  Gastrointestinal:  Soft, non-tender, normal bowel sounds  Musculoskeletal:  No joint swelling  Extremities:  No edema, no cyanosis, no clubbing  Neurologic:  nonfocal  Psychiatric:  Normal affect, calm and appropriate         Discharge Medications        START taking these medications        Instructions Prescription details   aspirin 81 MG  Tbec  Start taking on: March 7, 2025  Replaces: aspirin 325 MG Tabs      Take 1 tablet (81 mg total) by mouth daily.   Quantity: 30 tablet  Refills: 2     clopidogrel 75 MG Tabs  Commonly known as: Plavix  Start taking on: March 7, 2025      Take 1 tablet (75 mg total) by mouth daily.   Quantity: 30 tablet  Refills: 2     ezetimibe 10 MG Tabs  Commonly known as: Zetia      Take 1 tablet (10 mg total) by mouth nightly.   Quantity: 30 tablet  Refills: 0            CHANGE how you take these medications        Instructions Prescription details   amLODIPine 5 MG Tabs  Commonly known as: Norvasc  Start taking on: March 7, 2025  What changed:   medication strength  how much to take      Take 1 tablet (5 mg total) by mouth daily.   Quantity: 30 tablet  Refills: 0     metoprolol succinate ER 25 MG Tb24  Commonly known as: Toprol XL  What changed:   when to take this  Another medication with the same name was removed. Continue taking this medication, and follow the directions you see here.      Take 1 tablet (25 mg total) by mouth daily.   Refills: 0     True Metrix Blood Glucose Test Strp  What changed: Another medication with the same name was removed. Continue taking this medication, and follow the directions you see here.      Check blood sugar four times a day   Quantity: 400 strip  Refills: 1            CONTINUE taking these medications        Instructions Prescription details   OneTouch Delica Lancets 33G Misc      1 each by Other route 4 (four) times daily.   Refills: 0     Accu-Chek FastClix Lancets Misc      Use to check blood sugar three times a day   Quantity: 300 each  Refills: 0     TRUEplus Lancets 33G Misc      USE TO CHECK BLOOD SUGAR FOUR TIMES DAILY   Quantity: 400 each  Refills: 1     Accu-Chek Guide w/Device Kit      Use to check blood sugar three times a day   Quantity: 1 kit  Refills: 0     True Metrix Meter w/Device Kit      Use to check blood sugar four times a day   Quantity: 1 kit  Refills: 0      OneTouch Ultra 2 w/Device Kit      Please provide what is covered by patient's insurance   Quantity: 1 kit  Refills: 0     ammonium lactate 12 % Lotn  Commonly known as: Amlactin Daily      Apply 1 Application topically as needed for Dry Skin.   Quantity: 225 g  Refills: 3     atorvastatin 80 MG Tabs  Commonly known as: Lipitor      Take 1 tablet (80 mg total) by mouth nightly.   Quantity: 90 tablet  Refills: 3     BD Pen Needle Barbara 2nd Gen 32G X 4 MM Misc  Generic drug: Insulin Pen Needle      USE FOUR TIMES DAILY AS DIRECTED   Quantity: 100 each  Refills: 1     Dexcom G7 Sensor Misc      1 each Every 10 days.   Quantity: 9 each  Refills: 1     DropSafe Alcohol Prep 70 % Pads      USE AS DIRECTED   Quantity: 100 each  Refills: 0     escitalopram 10 MG Tabs  Commonly known as: Lexapro      Take 1 tablet (10 mg total) by mouth daily.   Quantity: 90 tablet  Refills: 3     fluticasone propionate 50 MCG/ACT Susp  Commonly known as: Flonase      2 sprays by Nasal route daily.   Quantity: 48 g  Refills: 1     gabapentin 100 MG Caps  Commonly known as: Neurontin      Take 1 capsule (100 mg total) by mouth 3 (three) times daily.   Stop taking on: April 3, 2025  Quantity: 270 capsule  Refills: 0     Gvoke HypoPen 2-Pack 1 MG/0.2ML injection  Generic drug: glucagon      Inject 0.2 mL (1 mg total) into the skin once as needed for Low blood glucose.   Refills: 0     insulin aspart 100 Units/mL Soln  Commonly known as: NovoLOG      Inject via insulin pump as directed. Max daily dose 75 units   Quantity: 30 mL  Refills: 2     levETIRAcetam 500 MG Tabs  Commonly known as: Keppra      Take 1 tablet (500 mg total) by mouth 2 (two) times daily.   Quantity: 180 tablet  Refills: 0     Omeprazole 40 MG Cpdr      TAKE 1 CAPSULE EVERY DAY 30 TO 60 MINUTES BEFORE A MEAL   Quantity: 90 capsule  Refills: 3     prednisoLONE 1 % Susp  Commonly known as: Pred Forte      Place 1 drop into the left eye 4 (four) times daily.   Refills: 0      torsemide 10 MG Tabs  Commonly known as: DEMADEX      Take 1 tablet (10 mg total) by mouth daily.   Quantity: 30 tablet  Refills: 1     Ventolin  (90 Base) MCG/ACT Aers  Generic drug: albuterol      Inhale 2 puffs into the lungs every 6 (six) hours as needed for Wheezing.   Quantity: 18 g  Refills: 1            STOP taking these medications      aspirin 325 MG Tabs  Replaced by: aspirin 81 MG Tbec        benzonatate 100 MG Caps  Commonly known as: Tessalon        hydrALAZINE 100 MG Tabs  Commonly known as: Apresoline        losartan 100 MG Tabs  Commonly known as: Cozaar        pregabalin 75 MG Caps  Commonly known as: Lyrica                  Where to Get Your Medications        These medications were sent to [facility] - [address] [phone], [phone]  [address], [facility] [address]      Phone: [phone]   amLODIPine 5 MG Tabs  aspirin 81 MG Tbec  clopidogrel 75 MG Tabs  ezetimibe 10 MG Tabs        Follow-up Information       Yves Yanez MD. Schedule an appointment as soon as possible for a visit.    Specialty: Internal Medicine  Contact information:  [address] [place] [address]  [phone]               Robert Bhatt Follow up.    Specialty: Psychiatry  Why: keep your scheduled neurology appointment  Contact information:  [address]  [address]  [facility] [address]  [phone]               Manoj Treviño Follow up.    Specialty: PSYCHIATRIST  Why: keep your scheduled neurology appointment.  Contact information:  [address]  [facility] [address]  [phone]               Paddy Craig DO. Schedule an appointment as soon as possible for a visit in 1 month(s).    Specialty: NEUROLOGY  Why: Please contact [facility] for a hospital follow up within one month.  Contact information:  [address]  [facility] [address]  [phone]               Lasha Plummer MD. Schedule an appointment as soon as possible for a visit.    Specialty: NEUROSURGERY  Contact information:  [address] [facility] [address]  [facility] [address] [phone]                             Hospital Discharge Diagnoses:  hypertensive urgency    Lace+ Score: 61  59-90 High Risk  29-58 Medium Risk  0-28   Low Risk.    TCM Follow-Up Recommendation:  LACE > 58: High Risk of readmission after discharge from the hospital.        I spent >30 minutes on this discharge. Discussed treatment and discharge plans.       Eugenio Sage MD

## 2025-03-06 NOTE — PROGRESS NOTES
Progress Note  Anjel Pisano Patient Status:  Inpatient    3/30/1970 MRN [id number]   Location [facility] 3W/SW Attending Santiago Dwyer MD   Hosp Day # 5 PCP Yves Yanez MD     Subjective:  No chest pain  Some vision changes previously described     Objective:  /89 (BP Location: Right arm)   Pulse 71   Temp 98.4 °F (36.9 °C) (Oral)   Resp 16   Ht 62\"   Wt 184 lb (83.5 kg)   SpO2 94%   BMI 33.65 kg/m²     Telemetry: NSR    Intake/Output:    Intake/Output Summary (Last 24 hours) at 3/6/2025 1112  Last data filed at 3/6/2025 0600  Gross per 24 hour   Intake 260 ml   Output --   Net 260 ml       Last 3 Weights   25 0604 184 lb (83.5 kg)   25 0549 194 lb 8 oz (88.2 kg)   25 0506 197 lb 12.8 oz (89.7 kg)   25 0443 201 lb 8 oz (91.4 kg)   25 1930 195 lb (88.5 kg)   25 1319 184 lb 13.6 oz (83.8 kg)   25 1618 199 lb (90.3 kg)       Labs:  Recent Labs   Lab 25  0540 25  0605 25  0619   *  204* 76  76 200*  200*   BUN 25*  25* 16  16 13  13   CREATSERUM 1.16*  1.16* 0.79  0.79 0.81  0.81   EGFRCR 55*  55* 88  88 85  85   CA 8.9  8.9 8.9  8.9 8.9  8.9     136 143  143 137  137   K 4.2  4.2 4.0  4.0 4.1  4.1   CL 99  99 104  104 99  99   CO2 30.0  30.0 31.0  31.0 29.0  29.0     Recent Labs   Lab 25  0540 25  0605 25  0619   RBC 4.69 4.51 4.50   HGB 11.1* 10.9* 10.6*   HCT 35.7 34.8* 34.5*   MCV 76.1* 77.2* 76.7*   MCH 23.7* 24.2* 23.6*   MCHC 31.1 31.3 30.7*   RDW 16.5* 16.2* 16.2*   NEPRELIM 9.22* 7.03 6.63   WBC 13.9* 11.7* 11.4*   .0 270.0 276.0         Recent Labs   Lab 25  1645 25  0808 25  1017   TROPHS 3,691* 4,714* 2,677*       Diagnostics:  MRI BRAIN (CPT=70551)    Result Date: 3/5/2025  CONCLUSION:  1. Tiny acute lacunar size infarcts subcortical left temporal lobe, and left paramedian frontal lobe -probably subcortical . 2. Chronic  cortical infarcts involving right posterior temporal occipital region, right posterior frontal convexity, left posterior parietal lobe. 3. Changes of chronic small vessel disease in cerebral white matter. 4. Occluded right internal carotid. 5. Prior right temporal craniotomy.    Dictated by (CST): Pipe Mix MD on 3/05/2025 at 8:33 PM     Finalized by (CST): Pipe Mix MD on 3/05/2025 at 8:46 PM          Review of Systems   Cardiovascular:  Negative for chest pain, dyspnea on exertion, leg swelling, orthopnea and palpitations.   Respiratory:  Negative for cough and shortness of breath.    Neurological:  Positive for dizziness and headaches.       Physical Exam:    Gen: alert, oriented x 3, NAD  Heent: pupils equal, reactive. Mucous membranes moist.   Neck: no jvd  Cardiac: regular rate and rhythm, normal S1,S2, no murmur, clicks, rub or gallop  Lungs: CTA  Abd: soft, NT/ND +bs  Ext: no edema  Skin: Warm, dry      Medications:     amLODIPine  5 mg Oral Daily    metoprolol succinate  25 mg Oral Daily Beta Blocker    heparin  5,000 Units Subcutaneous Q8H PABLO    atorvastatin  80 mg Oral Nightly    clopidogrel  75 mg Oral Daily    gabapentin  100 mg Oral Nightly    ezetimibe  10 mg Oral Nightly    aspirin  81 mg Oral Daily    escitalopram  10 mg Oral Daily    levETIRAcetam  500 mg Oral BID    pantoprazole  40 mg Oral QAM AC    insulin   Subcutaneous TID AC and HS      sodium chloride       Assessment:  Hypertensive Urgency/Labile BP  Initially presented w/HTN urgency requiring NTG gtt  Profound hypotension post CT earlier in admit; has been labile for last 2 days  Hx on toprol, losartan, amlodipine, hydralazine at home   Chest Pain/Type II NSTEMI  Peak Troponin 4714, now downtrending -likely d/t supply/demand mismatch r/t severe hypotensive episode  LHC w/non-obstructive CAD, small vessel disease  Echo LVEF 55-60, no RWMA, G1DD, mild MR  Hx M[place]oya Disease, Intracranial Atherosclerosis, Chronic L Vertebral  Artery Stenosis  Prior R Hemispheric CVA   R EC-IC bypass 2014   Neurology following  Has appt with [facility] Neuro team in March  On asa, plavix, statin  R Subclavian Stenosis   Hx Intractable Headaches  Hx Seizures - Keppra   KISHAN - resolved  Hyperlipidemia - LDL 76, atorvastatin 80mg, zetia added  Uncontrolled DMII - A1C 9.2  Chronic Anemia - Hgb 10-11    Plan:  Continue asa, plavix, high intensity statin, zetia   Continue current BP meds     L2     Yobani Johnson MD  [facility]       Cover bandage and splint with plastic or cast bag to keep clean and dry daily

## 2025-03-06 NOTE — PLAN OF CARE
Neuro q 4hr.     Problem: Patient Centered Care  Goal: Patient preferences are identified and integrated in the patient's plan of care  Description: Interventions:  - What would you like us to know as we care for you?   - Provide timely, complete, and accurate information to patient/family  - Incorporate patient and family knowledge, values, beliefs, and cultural backgrounds into the planning and delivery of care  - Encourage patient/family to participate in care and decision-making at the level they choose  - Honor patient and family perspectives and choices  Outcome: Progressing     Problem: CARDIOVASCULAR - ADULT  Goal: Maintains optimal cardiac output and hemodynamic stability  Description: INTERVENTIONS:  - Monitor vital signs, rhythm, and trends  - Monitor for bleeding, hypotension and signs of decreased cardiac output  - Evaluate effectiveness of vasoactive medications to optimize hemodynamic stability  - Monitor arterial and/or venous puncture sites for bleeding and/or hematoma  - Assess quality of pulses, skin color and temperature  - Assess for signs of decreased coronary artery perfusion - ex. Angina  - Evaluate fluid balance, assess for edema, trend weights  Outcome: Progressing  Goal: Absence of cardiac arrhythmias or at baseline  Description: INTERVENTIONS:  - Continuous cardiac monitoring, monitor vital signs, obtain 12 lead EKG if indicated  - Evaluate effectiveness of antiarrhythmic and heart rate control medications as ordered  - Initiate emergency measures for life threatening arrhythmias  - Monitor electrolytes and administer replacement therapy as ordered  Outcome: Progressing     Problem: RESPIRATORY - ADULT  Goal: Achieves optimal ventilation and oxygenation  Description: INTERVENTIONS:  - Assess for changes in respiratory status  - Assess for changes in mentation and behavior  - Position to facilitate oxygenation and minimize respiratory effort  - Oxygen supplementation based on oxygen  saturation or ABGs  - Provide Smoking Cessation handout, if applicable  - Encourage broncho-pulmonary hygiene including cough, deep breathe, Incentive Spirometry  - Assess the need for suctioning and perform as needed  - Assess and instruct to report SOB or any respiratory difficulty  - Respiratory Therapy support as indicated  - Manage/alleviate anxiety  - Monitor for signs/symptoms of CO2 retention  Outcome: Progressing     Problem: GASTROINTESTINAL - ADULT  Goal: Minimal or absence of nausea and vomiting  Description: INTERVENTIONS:  - Maintain adequate hydration with IV or PO as ordered and tolerated  - Nasogastric tube to low intermittent suction as ordered  - Evaluate effectiveness of ordered antiemetic medications  - Provide nonpharmacologic comfort measures as appropriate  - Advance diet as tolerated, if ordered  - Obtain nutritional consult as needed  - Evaluate fluid balance  Outcome: Progressing     Problem: METABOLIC/FLUID AND ELECTROLYTES - ADULT  Goal: Glucose maintained within prescribed range  Description: INTERVENTIONS:  - Monitor Blood Glucose as ordered  - Assess for signs and symptoms of hyperglycemia and hypoglycemia  - Administer ordered medications to maintain glucose within target range  - Assess barriers to adequate nutritional intake and initiate nutrition consult as needed  - Instruct patient on self management of diabetes  Outcome: Progressing  Goal: Electrolytes maintained within normal limits  Description: INTERVENTIONS:  - Monitor labs and rhythm and assess patient for signs and symptoms of electrolyte imbalances  - Administer electrolyte replacement as ordered  - Monitor response to electrolyte replacements, including rhythm and repeat lab results as appropriate  - Fluid restriction as ordered  - Instruct patient on fluid and nutrition restrictions as appropriate  Outcome: Progressing  Goal: Hemodynamic stability and optimal renal function maintained  Description: INTERVENTIONS:  -  Monitor labs and assess for signs and symptoms of volume excess or deficit  - Monitor intake, output and patient weight  - Monitor urine specific gravity, serum osmolarity and serum sodium as indicated or ordered  - Monitor response to interventions for patient's volume status, including labs, urine output, blood pressure (other measures as available)  - Encourage oral intake as appropriate  - Instruct patient on fluid and nutrition restrictions as appropriate  Outcome: Progressing     Problem: MUSCULOSKELETAL - ADULT  Goal: Return mobility to safest level of function  Description: INTERVENTIONS:  - Assess patient stability and activity tolerance for standing, transferring and ambulating w/ or w/o assistive devices  - Assist with transfers and ambulation using safe patient handling equipment as needed  - Ensure adequate protection for wounds/incisions during mobilization  - Obtain PT/OT consults as needed  - Advance activity as appropriate  - Communicate ordered activity level and limitations with patient/family  Outcome: Progressing  Goal: Maintain proper alignment of affected body part  Description: INTERVENTIONS:  - Support and protect limb and body alignment per provider's orders  - Instruct and reinforce with patient and family use of appropriate assistive device and precautions (e.g. spinal or hip dislocation precautions)  Outcome: Progressing     Problem: Impaired Functional Mobility  Goal: Achieve highest/safest level of mobility/gait  Description: Interventions:  - Assess patient's functional ability and stability  - Promote increasing activity/tolerance for mobility and gait  - Educate and engage patient/family in tolerated activity level and precautions  Outcome: Progressing

## 2025-03-06 NOTE — DISCHARGE INSTRUCTIONS
After 90 days, you will stop the clopidogrel (Plavix) and increase your aspirin back up to 325 mg.

## 2025-03-07 ENCOUNTER — PATIENT OUTREACH (OUTPATIENT)
Dept: CASE MANAGEMENT | Age: 55
End: 2025-03-07

## 2025-03-07 NOTE — PROGRESS NOTES
TCM Request/ADT  Hospital Follow up for Nuero/stroke/DM (Discharge 3/6 elm)  Last A1C Value: 9.2% Date: [2/11/2025]     Neurology   Paddy Craig   [address] [facility] [address]   [facility] [id number] [phone]   Existing miquel for 4/3@11:40am  PCP   Yves Yanez   [address] [place] [id number] [phone]   Attempt #1:  Left message on voicemail for patient to call transitions specialist back to schedule follow up appointments. Provided Transitions specialist scheduling phone number [phone].

## 2025-03-10 NOTE — PROGRESS NOTES
Voicemail received; returning pt's call  TCM Request/ADT  Hospital Follow up for Nuero/stroke/DM (Discharge 3/6 elm)  Last A1C Value: 9.2% Date: [2/11/2025]     Neurology   Paddy Craig   [address]   [facility] [id number] [phone]   Existing miquel for 4/3@11:40am  PCP   Yves Yanez   [address] [place] [address] [phone]   Appt made for 3/11@2:20pm with aprn     Confirmed with pt   Closing encounter

## 2025-03-12 ENCOUNTER — TELEPHONE (OUTPATIENT)
Dept: NEUROLOGY | Facility: CLINIC | Age: 55
End: 2025-03-12

## 2025-03-12 ENCOUNTER — TELEPHONE (OUTPATIENT)
Dept: SURGERY | Facility: CLINIC | Age: 55
End: 2025-03-12

## 2025-03-12 NOTE — TELEPHONE ENCOUNTER
Message received from Provider.    \"Please schedule next available with me. Ok to overbook at the patient's convenience.\"    Noted Provider routed to  to assist with scheduling.

## 2025-03-12 NOTE — TELEPHONE ENCOUNTER
Pt unavail for 3/17/2025 in [facility], provider does not have schedule in [facility] until 4/14. Pt prefers [facility] location.

## 2025-03-12 NOTE — TELEPHONE ENCOUNTER
Pt calling for appt with Dr Plummer per IP discharge ppwrk.Psr does not see Neurosurgry consult IP, please advise if pt to be seen,when/ which provider and reason for visit please.Pt aware office will call pt back to inform/schedule.

## 2025-03-12 NOTE — TELEPHONE ENCOUNTER
Psr in [place] called pt to make hfu appt with Dr Plummer, pt stated during call that pt is est with neurologist.Psr notes pt has hfu with Dr Craig. Please advise if pt should keep appt or f/u with her neurologist, Dr Robert hBatt.

## 2025-03-13 ENCOUNTER — MED REC SCAN ONLY (OUTPATIENT)
Dept: INTERNAL MEDICINE CLINIC | Facility: CLINIC | Age: 55
End: 2025-03-13

## 2025-03-14 NOTE — TELEPHONE ENCOUNTER
Contacted pt no answer lvmtcb to confirm her HFU whether she will be seeing dr chandler or current pcp. Awaiting pt confirmation

## 2025-03-18 ENCOUNTER — TELEPHONE (OUTPATIENT)
Dept: ENDOCRINOLOGY CLINIC | Facility: CLINIC | Age: 55
End: 2025-03-18

## 2025-03-18 DIAGNOSIS — E11.65 UNCONTROLLED TYPE 2 DIABETES MELLITUS WITH HYPERGLYCEMIA (HCC): Primary | ICD-10-CM

## 2025-03-18 NOTE — TELEPHONE ENCOUNTER
Patient states she received the diabetic pump but did not get any insulin and has questions about this please call

## 2025-03-20 ENCOUNTER — PATIENT OUTREACH (OUTPATIENT)
Dept: CASE MANAGEMENT | Age: 55
End: 2025-03-20

## 2025-03-20 NOTE — PROGRESS NOTES
Attempted to contact pt for CCM  monthly outreach. No answer , left detailed message for pt to call back.     Reviewed pt's chart including recent visits.     Discharged from hospital on 3/6- Needs to see PCP for follow up.      -Care everywhere updated.    -Immunization reconciliation completed. No updates available.    Pt is due for:     Health Maintenance   Topic Date Due    Pneumococcal Vaccine: 50+ Years (2 of 2 - PCV) 12/21/2017    Zoster Vaccines (2 of 2) 07/12/2023    Annual Physical  05/03/2024    COVID-19 Vaccine (3 - 2024-25 season) 09/01/2024    Influenza Vaccine (1) 10/01/2024    Diabetes Care A1C  05/11/2025    Gyne Pelvic Exam  08/29/2025    Colorectal Cancer Screening  11/12/2025    Diabetes Care Dilated Eye Exam  11/18/2025    Mammogram  12/27/2025    Diabetes Care: GFR  03/06/2026    Pap Smear  08/29/2027    DTaP,Tdap,and Td Vaccines (2 - Td or Tdap) 05/17/2033    Annual Depression Screening  Completed    Diabetes Care: Foot Exam (Annual)  Completed    Diabetes Care: Microalb/Creat Ratio (Annual)  Completed    Meningococcal B Vaccine  Aged Out         Future Appointments   Date Time Provider Department Center   4/3/2025 11:40 AM Paddy Craig DO ENIELHUR [facility]   4/8/2025  3:15 PM Ashli Tejeda, PT [facility]   4/10/2025  2:15 PM Ashli Tejeda, PT [facility]   4/14/2025  9:00 AM Lasha Plummer MD EMG NEURSURG [facility]   4/15/2025  4:00 PM Ashli Tejeda, PT [facility] PT [facility]   4/17/2025  4:00 PM Ashli Tejeda, PT [facility]   4/22/2025  4:00 PM Ashli Tejeda, PT [facility]   4/24/2025  4:00 PM Ashli Tejeda, PT [facility]   4/29/2025  4:00 PM Ashli Tejeda, PT [facility]   5/1/2025  4:00 PM Ashli Tejeda, PT [facility] PT [facility]   5/6/2025  4:00 PM Ashli Tejeda, PT [facility] PT EM [facility]   5/8/2025  4:00 PM Ashli Tejeda, PT CF PT EM [facility]   6/10/2025  2:00 PM Melissa Guerrero MD [id number] [facility]   6/12/2025  2:00 PM Nilsa Caceres MD [id number] [facility]   9/3/2025  3:20 PM Ghia,  Rohini OLIVER MD ECCFHOBGYN [facility]       Total time -  5 min  Total Monthly time- 5 min    ,

## 2025-03-26 ENCOUNTER — TELEPHONE (OUTPATIENT)
Dept: PHYSICAL THERAPY | Facility: HOSPITAL | Age: 55
End: 2025-03-26

## 2025-03-26 DIAGNOSIS — Z98.890 HISTORY OF CRANIOTOMY: ICD-10-CM

## 2025-03-27 ENCOUNTER — OFFICE VISIT (OUTPATIENT)
Dept: PHYSICAL THERAPY | Facility: HOSPITAL | Age: 55
End: 2025-03-27
Attending: HOSPITALIST
Payer: MEDICARE

## 2025-03-27 DIAGNOSIS — I77.74 VERTEBRAL ARTERY DISSECTION (HCC): Primary | ICD-10-CM

## 2025-03-27 DIAGNOSIS — Z86.73 HISTORY OF STROKE: ICD-10-CM

## 2025-03-27 PROCEDURE — 97162 PT EVAL MOD COMPLEX 30 MIN: CPT

## 2025-03-27 NOTE — PROGRESS NOTES
VESTIBULAR EVALUATION:     Diagnosis:   Dizziness and imbalance, vestibular migraine Patient:  Anjel Pisano (54 year old, female)        Referring Provider: Eugenio Sage/ Yves Yanez Today's Date   3/27/2025    Precautions:  Fall Risk   Date of Evaluation: 03/27/25  Next MD visit: N/A  Date of Surgery: N/A     PATIENT SUMMARY   Summary of chief complaints: dizziness and imbalance  History of current condition: Pt. has long history of CVA, TIA, migraine and seizure disorder, hospitalized from 2/28 to 3/6 with cardiac symptoms but also had acute CVA with left sided headache, MRI brain -tiny acute lacunar infarcts in subcortical left temporal lobe and left paramedian frontal lobe, probably subcortical.  Occluded right internal carotid artery. Pt. reports dizziness for more than a year, increased with walking, imbalance. Pt. saw Dr. Treviño for headache 3/10, prescribed Emgality injections Q28 days.   Pain level: current 0 /10, at best 0 /10, at worst 0 /10  Social History: Pt. lives with friend, no stairs   Occupation: not working- on disability- brain surgery   Leisure activities/Hobbies: reading   Prior level of function: indep no limitations  Current limitations: unable to shop, go out into community, difficulty showering, sit to/from stand, walking and turning, unable to cook (memory issues)  Pt goals: decreased dizziness, able to walk better  Symptoms with cough/sneeze or loud noise: No  Falls: Yes 2024 two instances of falling down stairs, bruising but no fractures    Hx of migraines: Yes frequently, unable to verbalize;  phonophobia, photophobia, exertion, visual changes, dizziness, irritability.   Hx of vision issue: Yes cataract surgery bilateral  Hx of hearing issues: hearing loss (no hearing aides due to financial limitations)    Dizziness: Current: 4 /10, Best: 0 /10, Worst:7 /10  Quality: vision changes- sees black, headache, imbalance  Frequency/Duration: daily, varies in intensity, lasts hours to  days   Aggravates: supine to/from sit; quick head movements; looking/reaching up; turning/direction changes   Relieves: not moving; sitting down; lying down     History of Headaches: positive   Headache: Current: 0 /10, Best: 0 /10, Worst: 8 /10   Quality: phonophobia, photophobia, exertion, visual changes, dizziness, irritability.  Frequency/Duration: unable to verbalize   Aggravates: too much activity; reading; quick movements; exercise (bright light, heat)  Relieves: lying down (sleep, dark room)    History of Cervical Pain: negative    Dizziness Handicap Inventory: (Patient-Rptd) 98 - Severe Handicap    Past medical history was reviewed with Anjel.  Significant findings include: brain surgery 2014, recurrent migraine  Imaging/Tests: MRI brain -tiny acute lacunar infarcts in subcortical left temporal lobe and left paramedian frontal lobe, probably subcortical.  Other chronic changes.  Occluded right internal carotid artery.   Anjel  has a past medical history of Age-related nuclear cataract of both eyes (03/30/2016), Anemia, Apnea (10/20/2015), Cataract, Coronary atherosclerosis, Depression, DM type 2 (diabetes mellitus, type 2) ([facility]), Esophageal reflux, High blood pressure, High cholesterol, Meibomian gland dysfunction (MGD), bilateral, both upper and lower lids (03/30/2016), Migraine, Muscle weakness, Seizures ([facility]) (2/3/2022), Sleep apnea, Stenosis of right vertebral artery, Stroke ([facility]) (2014), and Type II or unspecified type diabetes mellitus without mention of complication, not stated as uncontrolled.  She  has a past surgical history that includes Brain Surgery (2014); laparoscopic cholecystectomy (2002); Incision and Drainage (Right, 04/19/16); colonoscopy (N/A, 8/25/2017); colonoscopy; excise carotid body+carotid artery (09/2017); and colonoscopy (N/A, 11/12/2022).    ASSESSMENT  Anjel presents to physical therapy evaluation with primary c/o dizziness and imbalance. The results of the objective tests and  measures show impaired sensory organization, postural stability.  Further assessment of vestibular function and gait stability will be assessed next session.. Functional deficits include but are not limited to unable to shop, go out into community, difficulty showering, sit to/from stand, walking and turning, unable to cook (memory issues). Signs and symptoms are consistent with diagnosis of Dizziness and imbalance, vestibular migraine. Pt and PT discussed evaluation findings, pathology, POC and HEP.  Pt voiced understanding and performs HEP correctly without reported pain. Skilled Physical Therapy is medically necessary to address the above impairments and reach functional goals.    OBJECTIVE:    Musculoskeltal:  Posture/Observation: Pt. arrived without assistive device, no significant postural abnormalities   Cervical ROM WNL in all directions  Adverse neuro signs with ROM: No    Neurological:  Neuro Screen: Sensation: WNL for light touch screen    Coordination Testing:   Finger to Nose: WNL   Pronation/supination: WNL  Toe tapping:  WNL      Oculomotor & Vestibular Exam:  TBA    Positional Testing:  TBA    Balance and Functional Mobility:  Postural Control:   Romberg: EO 30 sec (increased sway), EC 10 sec (increased sway)       Functional Mobility:   Gait: pt ambulates on level ground with stooped posture/forward lean; shuffle; difficulty turning; path deviation with visual scanning.  Functional Gait Assessment (FGA):  TBA   Timed Up and Go: 19 sec   Five Times Sit to Stand Test: 36 sec       Today’s Treatment and Response:   Pt education was provided on exam findings, treatment diagnosis, treatment plan, expectations, and prognosis.   Today's Treatment       3/27/2025   Vestibular Treatment   Evaluation Minutes 45   Total Time Of Timed Procedures 0   Total Time Of Service-Based Procedures 45   Total Treatment Time 45   HEP Pt. Encourages to increase activity as tolerated, pacing herself for symptom management.   Due to pt's complex history and limited time for assessment, a home exercise program will be issued at her next session.          Patient was instructed in and issued a HEP for: Pt. Encourages to increase activity as tolerated, pacing herself for symptom management.  Due to pt's complex history and limited time for assessment, a home exercise program will be issued at her next session.      PLAN for next session:  Oculomotor exam, FGA, initiate HEP.    Pt was also provided recommendations for: detrimental fear avoidance behaviors; importance of remaining active; possible dizziness after evaluation; symptom management; pacing.    Charges:  PT EVAL: Moderate Complexity,    In agreement with evaluation findings and clinical rationale, this evaluation involved MODERATE COMPLEXITY decision making due to 1-2 personal factors/comorbidities, 3 or more body structures involved/activity limitations, and evolving symptoms as documented in the evaluation.     PLAN OF CARE:    Goals: (to be met in 10 visits)   Pt. Able to perform five times sit to stand test in less than 20 seconds to reflect an improvement in balance and LE strength.  Pt. Improved in TUG test to less than 10 sec to reflect an improvement in balance and gait.  Pt. Able to stand Romberg EO and EC x 30 sec without increased dizziness or excessive sway  Indep in home exercise program in order to maintain functional gains.     Frequency / Duration: Patient will be seen 1-2x/week or a total of 10 visits over a 90 day period. Treatment will include: neuromuscular re-education; balance training; gait training; eye/head coordination training; sensory organization training; habituation training for motion sensitivity and/or visual motion intolerance; canalith repositioning maneuver; symptom management instruction; patient education; home exercise program development and instruction    Education or treatment limitation: None   Rehab Potential: good     Patient was advised  of these findings, precautions, and treatment options and has agreed to actively participate in planning and for this course of care.     Thank you for your referral. Please co-sign or sign and return this letter via fax as soon as possible to [phone]. If you have any questions, please contact me at Dept: [phone]    Sincerely,  Electronically signed by therapist: Ashli Tejeda PT  Physician's certification required: Yes  I certify the need for these services furnished under this plan of treatment and while under my care.    X___________________________________________________ Date____________________    Certification From: 3/27/2025  To:6/25/2025

## 2025-03-28 RX ORDER — LEVETIRACETAM 500 MG/1
500 TABLET ORAL 2 TIMES DAILY
Qty: 180 TABLET | Refills: 1 | Status: SHIPPED | OUTPATIENT
Start: 2025-03-28

## 2025-03-29 NOTE — TELEPHONE ENCOUNTER
Refill passed per [facility] protocol.    Requested Prescriptions   Pending Prescriptions Disp Refills    LEVETIRACETAM 500 MG Oral Tab [Pharmacy Med Name: LEVETIRACETAM 500MG TABLETS] 180 tablet 0     Sig: TAKE 1 TABLET(500 MG) BY MOUTH TWICE DAILY       Neurology Medications Passed - 3/28/2025  8:50 PM        Passed - In person appointment or virtual visit in the past 6 mos or appointment in next 3 mos     Recent Outpatient Visits              Yesterday Vertebral artery dissection (HCC)    [facility] Ashli Tejeda, PT    Office Visit    1 month ago Occlusion of right carotid artery    [facility] Najjar, Samer F, MD    Office Visit    1 month ago Atherosclerosis of right carotid artery    [facility] Sas, Kathryn E., APRN    Office Visit    1 month ago Uncontrolled type 2 diabetes mellitus with hyperglycemia ([facility])    [facility] Nilsa Caceres MD    Office Visit    2 months ago Uncontrolled type 2 diabetes mellitus with hyperglycemia ([facility])    [facility]    Nurse Only          Future Appointments         Provider Department Appt Notes    In 6 days Paddy Craig,  [facility] hfu + stroke f/ up, 3/12/2025 psr sent TE as pt is est with another neurologist    In 1 week [facility]  BC [facility]    In 1 week Ashli Tejeda, [facility] AUTH[facility]  BC [facility]    In 2 weeks Lasha Plummer MD [facility] HFU acute CVA, see TE, pt not seen by Neurosurgery, appt ok per Dr GEE    In 2 weeks Ashli Tejeda PT [facility] AUTH???  BC FHP    In 2 weeks Ashli Tejeda, [facility] AUTH[facility]  BC F[facility]    In 3 weeks Ashli Tejeda, PT  [facility] AUTH???  BC FHP    In 3 weeks Ashli Tejeda, PT [facility] AUTH???  BC FHP    In 1 month Ashli Tejeda, PT [facility] AUTH???  BC FHP    In 1 month Ashli Tejeda, PT [facility] AUTH???  BC FHP    In 1 month Ashli Tejeda, PT [facility] AUTH???  BC FHP    In 1 month Ashli Tejeda, PT [facility] AUTH???  BC FHP    In 2 months Melissa Guerrero MD [facility] 6 month    In 2 months Nilsa Caceres MD [facility] 4 months    In 5 months Rohini Mckenna MD [facility] - OB/GYN annual                    Passed - Medication is active on med list           Future Appointments         Provider Department Appt Notes    In 6 days Paddy Craig,  [facility] hfu + stroke f/ up, 3/12/2025 psr sent TE as pt is est with another neurologist    In 1 week [facility]  BC [facility]    In 1 week Ashli Tejeda, PT [facility] AUTH???  BC FHP    In 2 weeks Lasha Plummer MD [facility] HFU acute CVA, see TE, pt not seen by Neurosurgery, appt ok per Dr GEE    In 2 weeks Ashli Tejeda, [facility] AUTH???  BC FHP    In 2 weeks Ashli Tejeda, PT [facility] AUTH???  BC FHP    In 3 weeks Ashli Tejeda, PT [facility] AUTH???  BC FHP    In 3 weeks Ashli Tejeda, PT [facility] AUTH???  BC FHP    In 1 month Ashli Tejeda, PT [facility] AUTH???  BC FHP    In 1 month Ashli Tejeda, PT [facility] AUTH???  BC FHP    In 1 month Ashli Tejeda, PT [facility] AUTH???  BC FHP     In 1 month Ashli Tejeda, PT [facility] AUTH???  BC P    In 2 months Melissa Guerrero MD [facility] 6 month    In 2 months Nilsa Caceres MD [facility] 4 months    In 5 months Rohini Mckenna MD [facility] - OB/GYN annual          Recent Outpatient Visits              Yesterday Vertebral artery dissection (HCC)    [facility] Ashli Tejeda, PT    Office Visit    1 month ago Occlusion of right carotid artery    [facility] Najjar, Samer F, MD    Office Visit    1 month ago Atherosclerosis of right carotid artery    [facility] [place] Sas, Kathryn E., APRN    Office Visit    1 month ago Uncontrolled type 2 diabetes mellitus with hyperglycemia (HCC)    [facility] Nilsa Caceres MD    Office Visit    2 months ago Uncontrolled type 2 diabetes mellitus with hyperglycemia (HCC)    [facility]    Nurse Only

## 2025-04-03 ENCOUNTER — OFFICE VISIT (OUTPATIENT)
Dept: NEUROLOGY | Facility: CLINIC | Age: 55
End: 2025-04-03
Payer: MEDICARE

## 2025-04-03 VITALS
HEIGHT: 62 IN | SYSTOLIC BLOOD PRESSURE: 149 MMHG | OXYGEN SATURATION: 94 % | DIASTOLIC BLOOD PRESSURE: 85 MMHG | BODY MASS INDEX: 33.86 KG/M2 | HEART RATE: 71 BPM | WEIGHT: 184 LBS

## 2025-04-03 DIAGNOSIS — Z86.79: ICD-10-CM

## 2025-04-03 DIAGNOSIS — I77.1 SUBCLAVIAN ARTERIAL STENOSIS: ICD-10-CM

## 2025-04-03 DIAGNOSIS — I67.2 INTRACRANIAL ATHEROSCLEROSIS: ICD-10-CM

## 2025-04-03 DIAGNOSIS — Z86.73 HISTORY OF CEREBRAL INFARCTION: Primary | ICD-10-CM

## 2025-04-03 PROCEDURE — 99214 OFFICE O/P EST MOD 30 MIN: CPT | Performed by: OTHER

## 2025-04-03 RX ORDER — PREDNISONE 50 MG/1
TABLET ORAL
COMMUNITY
Start: 2025-01-22

## 2025-04-03 RX ORDER — KETOROLAC TROMETHAMINE 5 MG/ML
1 SOLUTION OPHTHALMIC 2 TIMES DAILY
COMMUNITY

## 2025-04-03 RX ORDER — LOSARTAN POTASSIUM 100 MG/1
TABLET ORAL
COMMUNITY
Start: 2025-03-07

## 2025-04-03 RX ORDER — OFLOXACIN 3 MG/ML
SOLUTION/ DROPS OPHTHALMIC
COMMUNITY

## 2025-04-03 RX ORDER — FAMOTIDINE 40 MG/1
40 TABLET, FILM COATED ORAL EVERY MORNING
COMMUNITY
Start: 2025-01-04

## 2025-04-03 RX ORDER — CLOPIDOGREL BISULFATE 75 MG/1
75 TABLET ORAL DAILY
Qty: 63 TABLET | Refills: 0 | Status: SHIPPED | OUTPATIENT
Start: 2025-04-03 | End: 2025-06-05

## 2025-04-03 RX ORDER — AMLODIPINE BESYLATE 10 MG/1
TABLET ORAL
COMMUNITY
Start: 2025-03-26

## 2025-04-03 NOTE — PROGRESS NOTES
The following individual(s) verbally consented to be recorded using ambient AI listening technology and understand that they can each withdraw their consent to this listening technology at any point by asking the clinician to turn off or pause the recording:    Patient name: Anjel Julien patient presents to clinic for follow up from a hospital visit on 02/28/2025. Due to SOB and chest pain. Patient has SOB when walking. Patient was having headaches. Patient saw Neurologist at [facility] for headaches and received CGRP monoclonal antibodies injections. Patient has left arm and left leg weakness. She has right hand pain. She denies falls. She is taking Aspirin 325 mg. She is taking Keppra 500 mg. Patient would like to discontinue Gabapentin due to it making her drowsy.

## 2025-04-03 NOTE — PROGRESS NOTES
[facility] [place] [address], [address]  [facility] [address]  [phone]        Neurology Clinic Follow Up Note    Chief Complaint:  Hospital F/U      HPI:   Anjel Pisano is a 55 year old  woman  w/ a pmhx sig. for R sided strokes and R EC-IC bypass in 2014 at OSH in [place], intracranial atherosclerosis, chronic left vertebral artery stenosis, moderate narrowing of the right subclavian artery,  intractable headaches, seizures on keppra, HTN, HLD, and DM who  p/w 3 days of fluctuating chest pain and difficulty breathing with radiation to the arm found to have elevated troponins (in the setting of chronically elevated troponins) who is being seen by neurology after patient became diaphoretic, panicked, intermittently aphasic, and hypotensive to the 60s over 40s after she was given 20 mg of IV hydralazine for blood pressures in the 210s over 70s.    Deficits she reports are likely chronic from her prior right hemisphere stroke.  Repeat MRI brain is pending.  She also has had chronic headaches for 2 years per chart review.  Only medication she can really get of a headache cocktail is Depakote.    On 3/2/2025 she complained of new chest pain with associated shortness of breath.  Also reported new left hand numbness, which improved after cardiac catheterization.  Her cardiac catheter revealed 50% mLAD lesion.  No intervention.  per cardiac cardiology does not account for her symptoms.  Patient reports her left hand  Numbness and?  Pain improved after cath.    Seen again in follow-up on 3/5/2025 she complained of blurred vision in her left eye.  Stat MRI of the brain ordered. Demonstrates punctate infarcts  in her ZARA territory (she has chronic left ZAAR intracranial atherosclerosis) and her left temporal lobe. Also noted ?infarct in her cerebellum.     None of these MRI findings can account for her blurry vision in her left eye. She has likely asymptomatic infarcts due to her intracranial  atherosclerosis. She is on dual antiplatelets currently. After 90 days her asa can be increased to 325. Can increase dose of her gabapentin for her neck pain. Not a candidate for tpa b/c out of the window. Not a candidate for thrombectomy b/c clinically inconsistent w/ LVO.    04/03/25 Interval History/Subjective :   Here in follow up for her silent infarcts that occurred in the setting of hypotension.  Her neurologist at [facility] wanted her to go to [facility] for Moyamoya.  She was not aware of what tapia-tapia was.  Explained the diagnosis.     She does not have a cardiologist.  She does not have a vascular surgeon to evaluate for subclavian stenosis. If she walks quickly she gets blurred vision and her balance gets worse.      History of Present Illness  Anjel Pisano is a 55 year old female with Moyamoya disease who presents with persistent headaches and high blood pressure. She was referred by Dr. Robert Bhatt to see a stroke specialist at [facility] due to Moyamoya disease.    She has a history of Moyamoya disease and underwent bypass surgery in 2014 in [place] due to carotid artery blockage. Despite the surgery, her headaches have persisted. She experiences high blood pressure, with readings sometimes exceeding 200 mmHg, and home monitoring shows blood pressure as high as 189/187 mmHg. She experiences blurry vision and loss of balance when walking fast or using her arms extensively.    She has a history of intracranial atherosclerosis in the A1 segments and has been taking aspirin 81 mg. She is unsure if she is currently taking clopidogrel (Plavix) as prescribed after her hospital discharge. She manages her own medications but finds it challenging to remember new prescriptions.    She has a history of low blood sugar levels since her recent hospital discharge and uses an insulin pump. She has not seen a cardiologist or vascular surgeon yet.    She experiences pain in her left shoulder, which limits her ability to raise her  arm. She also reports swelling in her neck and difficulty sleeping due to pain. No double vision except when objects come close to her.        ROS: Pertinent positive and negatives per HPI.  All others were reviewed and negative.     Medications:  Current Outpatient Medications   Medication Instructions    Accu-Chek FastClix Lancets Does not apply Misc Use to check blood sugar three times a day    Alcohol Swabs (DROPSAFE ALCOHOL PREP) 70 % Does not apply Pads USE AS DIRECTED    amLODIPine (NORVASC) 5 mg, Oral, Daily    amLODIPine 10 MG Oral Tab     ammonium lactate (AMLACTIN DAILY) 12 % External Lotion 1 Application, Topical, As needed    aspirin 81 mg, Oral, Daily    atorvastatin (LIPITOR) 80 mg, Oral, Nightly    Blood Glucose Monitoring Suppl (ACCU-CHEK GUIDE) w/Device Does not apply Kit Use to check blood sugar three times a day    Blood Glucose Monitoring Suppl (ONETOUCH ULTRA 2) w/Device Does not apply Kit Please provide what is covered by patient's insurance    Blood Glucose Monitoring Suppl (TRUE METRIX METER) w/Device Does not apply Kit Use to check blood sugar four times a day    clopidogrel (PLAVIX) 75 mg, Oral, Daily    Continuous Glucose Sensor (DEXCOM G7 SENSOR) Does not apply Misc 1 each, Does not apply, Every 10 days    escitalopram (LEXAPRO) 10 mg, Oral, Daily    ezetimibe (ZETIA) 10 mg, Oral, Nightly    famotidine (PEPCID) 40 mg, Every morning    fluticasone propionate 50 MCG/ACT Nasal Suspension 2 sprays, Nasal, Daily    gabapentin (NEURONTIN) 100 mg, Oral, 3 times daily    Galcanezumab-gnlm 120 mg, Every 28 days    Glucose Blood (TRUE METRIX BLOOD GLUCOSE TEST) In Vitro Strip Check blood sugar four times a day    Gvoke HypoPen 2-Pack 1 mg, Once as needed    insulin aspart 100 Units/mL Injection Solution Inject via insulin pump as directed. Max daily dose 75 units    Insulin Pen Needle (BD PEN NEEDLE ABDOULAYE 2ND GEN) 32G X 4 MM Does not apply Misc USE FOUR TIMES DAILY AS DIRECTED    ketorolac 0.5 %  Ophthalmic Solution 1 drop, Left Eye, 2 times daily    levETIRAcetam (KEPPRA) 500 mg, Oral, 2 times daily    losartan 100 MG Oral Tab     metoprolol succinate ER (TOPROL XL) 25 mg, Daily    ofloxacin 0.3 % Ophthalmic Solution     Omeprazole 40 MG Oral Capsule Delayed Release TAKE 1 CAPSULE EVERY DAY 30 TO 60 MINUTES BEFORE A MEAL    OneTouch Delica Lancets 33G Does not apply Misc 1 each, 4 times daily    prednisoLONE 1 % Ophthalmic Suspension 1 drop, 4 times daily    predniSONE 50 MG Oral Tab     torsemide (DEMADEX) 10 mg, Oral, Daily    TRUEplus Lancets 33G Does not apply Misc USE TO CHECK BLOOD SUGAR FOUR TIMES DAILY    VENTOLIN  (90 Base) MCG/ACT Inhalation Aero Soln 2 puffs, Inhalation, Every 6 hours PRN        Reviewed and assessed      Objective:  Last vitals and weight :  Body mass index is 33.65 kg/m².   Vitals:    04/03/25 1155   Patient Position: Sitting   BP Location: Left arm      Blood pressure 149/85, pulse 71, height 62\", weight 184 lb (83.5 kg), SpO2 94%, not currently breastfeeding.  /85 (BP Location: Left arm, Patient Position: Sitting, Cuff Size: adult)   Pulse 71   Ht 62\"   Wt 184 lb (83.5 kg)   SpO2 94%   BMI 33.65 kg/m²   Exam:  - General: appears stated age and no distress     - Pulmonary: Normal excursion of the chest.  No signs of respiratory distress.  Neurologic Exam  - Mental Status: Alert and attentive. .  Speech is spontaneous, fluent, and prosodic. Comprehension and repetition intact. Phrase length and rate are normal. No paraphasic errors, neologisms, anomia, acalculia, apraxia, anosognosia, or R/L confusion.   - Cranial Nerves: No gaze preference. Visual fields:normal  Pupils are 4mm briskly constricting to 3mm and equally round and reactive to light  in a well lit room. No rAPD. EOMI. No nystagmus. No ptosis. V1-V3 intact B/L to light touch.No pathological facial asymmetry. No flattening of the nasolabial fold. .  Hearing grossly intact.  Tongue midline. No atrophy  or fasiculations of the tongue noted. Palate and uvula elevate symmetrically.  Shoulder shrug symmetric.     - Motor:  normal tone/bulk. No interosseous wasting. No flattening of hypothenar eminences.   Motor Strength    Pronator drift: No pronator drift   Index finger Rolling: left index finger is stationary; +right sided orbiting. Present on prior exam.    Finger Taps: Finger taps are symmetric in rate and amplitude.    Rapid movements: symmetric. No fatiguing.   Right Left     Motor Strength       Hip Flexors 5 5       Foot Taps:    Asterixis: No asterixis noted.   Tremor:        - Sensory:   Light touch: normal  Temperature:normal      Vibration: normal     - Cerebellum: No truncal ataxia. No titubations. No dysmetria, no dysdiadochokinesis. No rebound.   - Gait/station: Normal gait and station. Symmetric arm swing.      NIH Stroke Scale  Person Administering Scale: Paddy Craig DO  1a  Level of consciousness: 0 = Alert keenly responsive    1b. LOC questions:  0 = Answers both questions correctly     1c. LOC commands: 0 = Performs both tasks correctly    2.  Best Gaze: 0 = Normal    3.  Visual: 0 = No visual loss     4. Facial Palsy: 0 = Normal symmetrical movement     5a.  Motor left arm: 0 = No drift; limb holds 90º(or 45º) for full 10 seconds   5b.  Motor right arm: 0 = No drift; limb holds 90º(or 45º) for full 10 seconds   6a. motor left le = No drift; leg holds 30º for full 5 seconds     6b  Motor right le = No drift; leg holds 30º for full 5 seconds     7. Limb Ataxia: 0 = Absent     8.  Sensory: 0 = Normal, no sensory loss     9. Best Language:  0 = No aphasia, normal      10. Dysarthria: 0 = Normal articulation   11. Extinction and Inattention: 0 = No abnormality     Total:   0         Most recent lab results:     No results found for this or any previous visit (from the past 36 hours).  Reviewed and assessed    Diagnostic studies:  Performed an independent visualization of  mri brain from  3/5/25   Imaging revealed:   agree w/ read      Assessment      Her infarcts were likely duet to her transient hypotension. She has icad, and will be on dual antiplatelets for 90 days followed by asa monotherapy (325) indefinitely.  She should go to an academic center to establish care for her Tapia tapia rather than in the community. Agree she would be best treated at [facility]. She has already  seen neurology at [facility]. Recommend she see vascular neurosurgery.  She needs to maintain normotension. SBPs in the 140s are acceptable.  Stressed that uncontrolled htn will lead to an ich. She needs to check her BP at home.      Plan     Assessment & Plan  Moyamoya disease  Moyamoya disease with prior bypass surgery and intracranial atherosclerosis causing vessel narrowing. Symptoms include exertional blurred vision and balance issues. Surgical intervention may be needed if symptoms worsen.  - Refer to neurosurgeon Dr. Jeremy Bhatt at [facility] for further evaluation.  - Follow-up with neurosurgeon Dr. Pereira at [facility] for management.  - Continue aspirin 81 mg daily and clopidogrel 75 mg daily until June 5, 2025.  - Avoid omeprazole due to clopidogrel interaction.    Hypertension  Hypertension with episodes of blood pressure exceeding 200 mmHg, increasing risk for cerebrovascular events. Target blood pressure is 130/80 mmHg, with 140-145 mmHg acceptable post-brain surgery.  - Monitor blood pressure twice daily and record readings.  - Consult primary care provider if blood pressure remains high.    Right shoulder pain  Chronic right shoulder pain with limited range of motion and sleep disturbance. Previous consultation suggested possible injection.  - Evaluate by a specialist for potential intervention, such as an injection.    Follow-up  Requires follow-up with multiple specialists for complex medical management.  - Schedule appointment with a vascular surgeon.  - Arrange cardiology consultation for potential cardiac issues.      1. History  of cerebral infarction  - Neurosurgery Referral - In Network  - Cardio Referral - Internal    2. History of Moyamoya syndrome  - Neurosurgery Referral - In Network    3. Subclavian arterial stenosis  - Vascular Surgery - In Network  - Cardio Referral - Internal    4. Intracranial atherosclerosis                  This document is not intended to support charting by exception.  Sections left blank in a completed note should be presumed not to have been done.      Disclaimer:   This record was dictated using  Dragon software. There may be errors due to voice recognition problems that were not realized and corrected during the completion of the note.           Thank you for allowing me to participate in the care of your patient.    Paddy Craig DO  4/3/2025

## 2025-04-03 NOTE — PATIENT INSTRUCTIONS
Take clopidogrel 75 mg and aspirin 81 mg until 6/5/2025 then stop. After that you will take Aspirin 325 mg forever.  Stop the omeprazole, it  interacts with the  clopidogrel.

## 2025-04-07 NOTE — TELEPHONE ENCOUNTER
Tried calling the patient's mobile number. Left message to call back.    Tried calling patient's home number. Spoke with the patient.    She states that she is running low on novolog vials States that she is using 5-6 vials every 2-3 days. States when she changes her cartrige every 3 days she needs to fill with 5-6 vials of insulin.     Unable to access patient's ilet data via portal.     Mirna, do you mind contacting this patient to confirm how she is filling and changing her cartridges? Perhaps she is wasting insulin with each change? Also to see if she can be added to the portal?    Emily, refill pending for novolog vials to prevent patient from running out. Per calculations she should be using 6-7 vials per 90 days if current dose is up to 75 units daily.

## 2025-04-08 ENCOUNTER — APPOINTMENT (OUTPATIENT)
Dept: PHYSICAL THERAPY | Facility: HOSPITAL | Age: 55
End: 2025-04-08
Attending: HOSPITALIST
Payer: MEDICARE

## 2025-04-08 PROCEDURE — 97112 NEUROMUSCULAR REEDUCATION: CPT

## 2025-04-08 PROCEDURE — 97116 GAIT TRAINING THERAPY: CPT

## 2025-04-08 RX ORDER — INSULIN ASPART 100 [IU]/ML
INJECTION, SOLUTION INTRAVENOUS; SUBCUTANEOUS
Qty: 70 ML | Refills: 1 | Status: SHIPPED | OUTPATIENT
Start: 2025-04-08 | End: 2025-04-19

## 2025-04-08 RX ORDER — INSULIN ASPART 100 [IU]/ML
INJECTION, SOLUTION INTRAVENOUS; SUBCUTANEOUS
Qty: 70 ML | Refills: 1 | Status: SHIPPED | OUTPATIENT
Start: 2025-04-08 | End: 2025-04-08

## 2025-04-08 NOTE — TELEPHONE ENCOUNTER
Called patient on both numbers. Left message on cell phone to call back. Family member answered home number and explained patient not home currently in PT, and will give message to call back.   Patient using iLet. Does not have compatible phone, so unable to connect to portal.

## 2025-04-08 NOTE — TELEPHONE ENCOUNTER
Patient states she receivd 3 vials and is on her last vial of insulin. Per her Ilet, she is using only about 44 units daily. Notified pt that new script was sent to pharmacy. Patient verbalized understanding. No further questions.

## 2025-04-08 NOTE — PROGRESS NOTES
Patient: Anjel Pisano (55 year old, female) Referring Provider:  Insurance:   Diagnosis: Dizziness and imbalance, vestibular migraine Eugenio REYES [facility]   Date of Surgery: N/A Next MD visit:  N/A   Precautions:  Fall Risk N/A Referral Information:    Date of Evaluation: Req: 8, Auth: 8, Exp: 6/2/2025 03/27/25 POC Auth Visits:  8       Today's Date   4/8/2025    Subjective  Pt. reports that she has been more active since last session.  Pt. reports that balance is feeling a little better, dizziness 3/10 currently.       Pain: pain not reported     Objective  see flow sheet       Assessment  Pt. tolerated well with improved abiltiy to perform functional tasks with minimal dizziness.  Pt. is considered a fall risk according to FGA testing.    Goals (to be met in 8 visits)   Pt. Able to perform five times sit to stand test in less than 20 seconds to reflect an improvement in balance and LE strength.  Pt. Improved in TUG test to less than 10 sec to reflect an improvement in balance and gait.  Pt. Able to stand Romberg EO and EC x 30 sec without increased dizziness or excessive sway  Indep in home exercise program in order to maintain functional gains.         Plan  stairs, tap ups, sidestepping    Treatment Last 4 Visits  Treatment Day: 2       3/27/2025 4/8/2025   Vestibular Treatment   Neuro Re-Education  Oculomotor & Vestibular Exam:  Spontaneous Nystagmus: room light: none ;  fixation blocked: none  Smooth Pursuit: Negative  Saccades: Negative  Gaze Evoked Nystagmus:  room light: Negative; fixation blocked: Negative  Head Thrust: Negative  VOR screen:  WNL     VOR Cancellation: Negative   Convergence: Negative  Cover/Uncover:  Negative  Cross Cover:  Negative    Positional Testing:   [place]-Hallpike: R Negative, no symptoms, L Negative, no symptoms   Roll Test (HC): Negative, no symptoms     Romberg 30 sec EC  Four square step test: 14 sec  Four square stepping L/R x 5 ea  Sit to/from stand without UE support  2 x 5 reps         Gait Training  Functional Gait Assessment   Item Description Score (0 worst, 3 best)    ___3____1. Gait Level Surface-  Time: ____5.5______seconds  ___3____2. Change in gait speed  ___2____3. Gait with horizontal head turns   ___2____4. Gait with vertical head turns  ___3____5. Gait and pivot turn  ___3____6. Step over obstacle  ___0____7. Gait with narrow base of support-  # of steps___2_____  ___2____8. Gait with eyes closed-  Time: ___7.2_______seconds  ___2____9. Ambulating backward  ___1____10. Steps    TOTAL SCORE: ____21___/30    (less than 22/30 = fall risk)    Tandem gait x 10 ea  Gait with horiz head turns 40 ft x 2         Neuro Re-Educ Minutes  25   Gait Training Minutes  15   Evaluation Minutes 45    Total Time Of Timed Procedures 0 40   Total Time Of Service-Based Procedures 45 0   Total Treatment Time 45 40   HEP Pt. Encourages to increase activity as tolerated, pacing herself for symptom management.  Due to pt's complex history and limited time for assessment, a home exercise program will be issued at her next session.     Access Code: [email or web address]  URL: [email or web address]  Date: 04/08/2025  Prepared by: Ashli Tejeda    Exercises  - Walking with Head Rotation  - 1 x daily - 7 x weekly - 2 sets - 5 reps  - Tandem Walking  - 1 x daily - 7 x weekly - 2 sets - 10 reps  - Standing Near Stance in Corner with Eyes Closed  - 1 x daily - 7 x weekly - 2 sets - 30 sec hold  - Sit to Stand Without Arm Support  - 1 x daily - 7 x weekly - 2 sets - 5 reps        HEP  Access Code: [email or web address]  URL: [email or web address]  Date: 04/08/2025  Prepared by: Ashli Tejeda    Exercises  - Walking with Head Rotation  - 1 x daily - 7 x weekly - 2 sets - 5 reps  - Tandem Walking  - 1 x daily - 7 x weekly - 2 sets - 10 reps  - Standing Near Stance in Corner with Eyes Closed  - 1 x daily - 7 x weekly - 2 sets - 30 sec hold  - Sit to Stand Without Arm Support  - 1 x daily - 7 x  weekly - 2 sets - 5 reps    Charges  NMR, Gait

## 2025-04-10 ENCOUNTER — OFFICE VISIT (OUTPATIENT)
Dept: PHYSICAL THERAPY | Facility: HOSPITAL | Age: 55
End: 2025-04-10
Attending: HOSPITALIST
Payer: MEDICARE

## 2025-04-10 PROCEDURE — 97116 GAIT TRAINING THERAPY: CPT

## 2025-04-10 PROCEDURE — 97112 NEUROMUSCULAR REEDUCATION: CPT

## 2025-04-10 PROCEDURE — 97530 THERAPEUTIC ACTIVITIES: CPT

## 2025-04-10 NOTE — PROGRESS NOTES
Patient: Anjel Pisano (55 year old, female) Referring Provider:  Insurance:   Diagnosis: Dizziness and imbalance, vestibular migraine Eugenio REYES [facility]   Date of Surgery: N/A Next MD visit:  N/A   Precautions:  Fall Risk N/A Referral Information:    Date of Evaluation: Req: 8, Auth: 8, Exp: 6/2/2025 03/27/25 POC Auth Visits:  8       Today's Date   4/10/2025    Subjective  Pt. reports that she was able to do exercises- tandem gait was very challenging.   Pt. reports that balance is feeling a little better, dizziness 3/10 currently.       Pain: pain not reported     Objective  see flow sheet         Assessment  Pt. tolerated well with improved abiltiy to perform functional tasks with minimal dizziness. Pt. has HTN and reported feeling as though BP was high, and when checked pt. had BP of 177/96.  Pt. encouraged to contact MD re: BP.    Goals (to be met in 8 visits)   Pt. Able to perform five times sit to stand test in less than 20 seconds to reflect an improvement in balance and LE strength.  Pt. Improved in TUG test to less than 10 sec to reflect an improvement in balance and gait.  Pt. Able to stand Romberg EO and EC x 30 sec without increased dizziness or excessive sway  Indep in home exercise program in order to maintain functional gains.           Plan  Monitor BP during session, try stairs    Treatment Last 4 Visits  Treatment Day: 3       3/27/2025 4/8/2025 4/10/2025   Vestibular Treatment   Neuro Re-Education  Oculomotor & Vestibular Exam:  Spontaneous Nystagmus: room light: none ;  fixation blocked: none  Smooth Pursuit: Negative  Saccades: Negative  Gaze Evoked Nystagmus:  room light: Negative; fixation blocked: Negative  Head Thrust: Negative  VOR screen:  WNL     VOR Cancellation: Negative   Convergence: Negative  Cover/Uncover:  Negative  Cross Cover:  Negative    Positional Testing:   [place]-Hallpike: R Negative, no symptoms, L Negative, no symptoms   Roll Test (HC): Negative, no symptoms      Romberg 30 sec EC  Four square step test: 14 sec  Four square stepping L/R x 5 ea  Sit to/from stand without UE support 2 x 5 reps       Vitals at end of session:  HR: 88 bpm  SpO2: 96%  BP: 177/96    Romberg 30 sec EC (dec sway)  Four square stepping L/R x 5 ea   Sit to/from stand without UE support 2 x 5 reps   Sidestepping L/R over tape x 10  Attempted rebounder but caused pain right shoulder so stopped   Therapeutic Activity   Curb training up/down x 5, ascending with right, descending with left  Turning in standing x 10 ea, alternating sides     Gait Training  Functional Gait Assessment   Item Description Score (0 worst, 3 best)    ___3____1. Gait Level Surface-  Time: ____5.5______seconds  ___3____2. Change in gait speed  ___2____3. Gait with horizontal head turns   ___2____4. Gait with vertical head turns  ___3____5. Gait and pivot turn  ___3____6. Step over obstacle  ___0____7. Gait with narrow base of support-  # of steps___2_____  ___2____8. Gait with eyes closed-  Time: ___7.2_______seconds  ___2____9. Ambulating backward  ___1____10. Steps    TOTAL SCORE: ____21___/30    (less than 22/30 = fall risk)    Tandem gait x 10 ea  Gait with horiz head turns 40 ft x 2       Semi-tandem gait x 10 ea   Gait with horiz head turns 40 ft x 2 (3/10)    Ramp training ascending and descending x 5 with turns (6/20)     Neuro Re-Educ Minutes  25 20   Therapeutic Activity Minutes   10   Gait Training Minutes  15 10   Evaluation Minutes 45     Total Time Of Timed Procedures 0 40 40   Total Time Of Service-Based Procedures 45 0 0   Total Treatment Time 45 40 40   HEP Pt. Encourages to increase activity as tolerated, pacing herself for symptom management.  Due to pt's complex history and limited time for assessment, a home exercise program will be issued at her next session.     Access Code: [email or web address]  URL: [email or web address]  Date: 04/08/2025  Prepared by: Ashli Tejeda    Exercises  - Walking with Head  Rotation  - 1 x daily - 7 x weekly - 2 sets - 5 reps  - Tandem Walking  - 1 x daily - 7 x weekly - 2 sets - 10 reps  - Standing Near Stance in Corner with Eyes Closed  - 1 x daily - 7 x weekly - 2 sets - 30 sec hold  - Sit to Stand Without Arm Support  - 1 x daily - 7 x weekly - 2 sets - 5 reps Access Code: [email or web address]  URL: [email or web address]  Date: 04/10/2025  Prepared by: Ashli Tejeda    Exercises  - Walking with Head Rotation  - 1 x daily - 7 x weekly - 2 sets - 5 reps  - Tandem Walking  - 1 x daily - 7 x weekly - 2 sets - 10 reps  - Standing Near Stance in Corner with Eyes Closed  - 1 x daily - 7 x weekly - 2 sets - 30 sec hold  - Sit to Stand Without Arm Support  - 1 x daily - 7 x weekly - 2 sets - 5 reps  - Step Taps on Low Step  - 1 x daily - 7 x weekly - 10 reps  - Stepping in 4 Square Pattern  - 1 x daily - 7 x weekly - 2 sets - 10 reps        HEP  Access Code: [email or web address]  URL: [email or web address]  Date: 04/10/2025  Prepared by: Ashli Tejeda    Exercises  - Walking with Head Rotation  - 1 x daily - 7 x weekly - 2 sets - 5 reps  - Tandem Walking  - 1 x daily - 7 x weekly - 2 sets - 10 reps  - Standing Near Stance in Corner with Eyes Closed  - 1 x daily - 7 x weekly - 2 sets - 30 sec hold  - Sit to Stand Without Arm Support  - 1 x daily - 7 x weekly - 2 sets - 5 reps  - Step Taps on Low Step  - 1 x daily - 7 x weekly - 10 reps  - Stepping in 4 Square Pattern  - 1 x daily - 7 x weekly - 2 sets - 10 reps    Charges  NMR, Gait, TA

## 2025-04-11 ENCOUNTER — PATIENT OUTREACH (OUTPATIENT)
Dept: CASE MANAGEMENT | Age: 55
End: 2025-04-11

## 2025-04-11 NOTE — PROGRESS NOTES
Attempted [facility] monthly outreach,  left message for patient to call back ,can be reached at  [phone]. Will try back at a later time.      Medical record reviewed including recent office visits and test results.       Total Monthly Time - 5 min

## 2025-04-11 NOTE — PATIENT INSTRUCTIONS
Obtain all imaging cerebral studies, including diagnostic cerebral angiograms from:  [facility]    Refill policies:    Allow 2-3 business days for refills; controlled substances may take longer.  Contact your pharmacy at least 5 days prior to running out of medication and have them send an electronic request or submit request through the “request refill” option in your [employer] account.  Refills are not addressed on weekends; covering physicians do not authorize routine medications on weekends.  No narcotics or controlled substances are refilled after noon on Fridays or by on call physicians.  By law, narcotics must be electronically prescribed.  A 30 day supply with no refills is the maximum allowed.  If your prescription is due for a refill, you may be due for a follow up appointment.  To best provide you care, patients receiving routine medications need to be seen at least once a year.  Patients receiving narcotic/controlled substance medications need to be seen at least once every 3 months.  In the event that your preferred pharmacy does not have the requested medication in stock (e.g. Backordered), it is your responsibility to find another pharmacy that has the requested medication available.  We will gladly send a new prescription to that pharmacy at your request.    Scheduling Tests:    If your physician has ordered radiology tests such as MRI or CT scans, please contact Central Scheduling at [phone] right away to schedule the test.  Once scheduled, the [facility] Centralized Referral Team will work with your insurance carrier to obtain pre-certification or prior authorization.  Depending on your insurance carrier, approval may take 3-10 days.  It is highly recommended patients assure they have received an authorization before having a test performed.  If test is done without insurance authorization, patient may be responsible for  the entire amount billed.      Precertification and Prior Authorizations:  If your physician has recommended that you have a procedure or additional testing performed the [facility] Centralized Referral Team will contact your insurance carrier to obtain pre-certification or prior authorization.    You are strongly encouraged to contact your insurance carrier to verify that your procedure/test has been approved and is a COVERED benefit.  Although the [facility] Centralized Referral Team does its due diligence, the insurance carrier gives the disclaimer that \"Although the procedure is authorized, this does not guarantee payment.\"    Ultimately the patient is responsible for payment.   Thank you for your understanding in this matter.  Paperwork Completion:  If you require FMLA or disability paperwork for your recovery, please make sure to either drop it off or have it faxed to our office at [phone]. Be sure the form has your name and date of birth on it.  The form will be faxed to our Forms Department and they will complete it for you.  There is a 25$ fee for all forms that need to be filled out.  Please be aware there is a 10-14 day turnaround time.  You will need to sign a release of information (ANTHONY) form if your paperwork does not come with one.  You may call the Forms Department with any questions at [phone].  Their fax number is [phone].

## 2025-04-14 ENCOUNTER — OFFICE VISIT (OUTPATIENT)
Dept: SURGERY | Facility: CLINIC | Age: 55
End: 2025-04-14
Payer: MEDICARE

## 2025-04-14 VITALS
BODY MASS INDEX: 33.86 KG/M2 | DIASTOLIC BLOOD PRESSURE: 99 MMHG | SYSTOLIC BLOOD PRESSURE: 175 MMHG | WEIGHT: 184 LBS | HEART RATE: 65 BPM | HEIGHT: 62 IN

## 2025-04-14 DIAGNOSIS — I63.9 CEREBROVASCULAR ACCIDENT (CVA), UNSPECIFIED MECHANISM (HCC): ICD-10-CM

## 2025-04-14 DIAGNOSIS — Z95.828 STATUS POST VASCULAR BYPASS: ICD-10-CM

## 2025-04-14 DIAGNOSIS — I67.5 MOYA MOYA DISEASE: Primary | ICD-10-CM

## 2025-04-14 PROCEDURE — 99205 OFFICE O/P NEW HI 60 MIN: CPT | Performed by: NEUROLOGICAL SURGERY

## 2025-04-14 NOTE — H&P
[facility]  Neurological Surgery Clinic Note    Anjel Pisano  3/30/1970  [id number]  PCP: Yves Yanez MD    REASON FOR VISIT:  Strokes, intracranial atherosclerotic disease, history of right STA to MCA bypass    HISTORY OF PRESENT ILLNESS:  Anjel Pisano is a 55 year old female with a history of a right STA to MCA bypass in 2014 at [facility] and known multifocal intracranial atherosclerotic disease who presents to clinic for evaluation of multifocal acute left hemisphere strokes found on MRI brain 3/5/2025.  The patient has been on aspirin and Plavix since that time and reports no signs or symptoms of stroke.  She has had multiple angiograms at outside hospitals including in [place] and at [facility], but these images are not available for review.  CTA head and neck 3/1/2025 demonstrates chronic occlusion of the right internal carotid artery and multifocal intracranial atherosclerotic disease which appears relatively stable when compared to past imaging available in our medical record including a CTA head and neck 1/24/2025, and there does not appear to be any changes concerning for moyamoya in the left hemisphere.    PAST MEDICAL HISTORY:  Past Medical History[1]    PAST SURGICAL HISTORY:  Past Surgical History[2]    FAMILY HISTORY:  family history includes Colon Cancer in her father; Diabetes in her father; Heart Disorder in her father and mother; Hypertension in her father; Lipids in her brother, father, and mother; Obesity in her brother, father, and mother.    SOCIAL HISTORY:   reports that she quit smoking about 11 years ago. Her smoking use included cigarettes. She started smoking about 12 years ago. She has a 0.2 pack-year smoking history. She has never used smokeless tobacco. She reports that she does not drink alcohol and does not use drugs.    ALLERGIES:  Allergies[3]    MEDICATIONS:  Medications Ordered Prior to Encounter[4]    REVIEW OF  SYSTEMS:  A 10-point system was reviewed.  Pertinent positives and negatives are noted in HPI.      PHYSICAL EXAMINATION:  VITAL SIGNS: BP (!) 175/99 (BP Location: Right arm, Patient Position: Sitting, Cuff Size: adult)   Pulse 65   Ht 62\"   Wt 184 lb (83.5 kg)   BMI 33.65 kg/m²     A&Ox3, no acute distress  PERRL, EOMi, FS, TM  She has significant effort limitation but appears to be relatively full strength x 4    ASSESSMENT:  55-year-old female with a history of a right STA to MCA bypass in 2014 and multifocal intracranial atherosclerotic disease with multifocal punctate strokes in the left hemisphere on an MRI 6 weeks ago    I spoke to the patient regarding the current clinical situation and plan of care.  We reviewed the available imaging together.  I explained that given the extensive workup she has had in the past including multiple outside angiograms, that I will be the best be able to take care of her by her obtaining her outside records for my review.  I discussed the case with Dr. Craig and we agree at this time that there is no acute neurosurgical intervention indicated and that he will continue to manage her medically.  After this discussion and answering her questions, she expressed understanding and agreed with the plan.    Plan:  - No acute neurovascular intervention indicated at this time  - She will obtain outside imaging, including previously performed angiograms, for my review  - Follow-up with Dr. Craig as scheduled    Lasha Plummer MD  Neurological Surgery  [facility] Time: 60 min including face to face time, chart review, imaging interpretation, and coordination of care         [1]   Past Medical History:   Age-related nuclear cataract of both eyes    Anemia    Apnea    Cataract    Coronary atherosclerosis    Depression    DM type 2 (diabetes mellitus, type 2) (HCC)    Esophageal reflux    High blood pressure    High cholesterol     Meibomian gland dysfunction (MGD), bilateral, both upper and lower lids    Migraine    Muscle weakness    left sided weakness uses walker and cane    Seizures (HCC)    Sleep apnea    Stenosis of right vertebral artery    Stroke (HCC)    Type II or unspecified type diabetes mellitus without mention of complication, not stated as uncontrolled   [2]   Past Surgical History:  Procedure Laterality Date    Brain surgery  2014    Colonoscopy N/A 8/25/2017    Procedure: COLONOSCOPY;  Surgeon: Sharmaine Burks MD;  Location: [facility] ENDOSCOPY    Colonoscopy      Colonoscopy N/A 11/12/2022    Procedure: COLONOSCOPY with Polypectomy;  Surgeon: Terry Weaver MD;  Location: [facility] ENDOSCOPY    Excise carotid body+carotid artery  09/2017    Incision and drainage Right 04/19/16    lower abdominal skin    Laparoscopic cholecystectomy  2002   [3]   Allergies  Allergen Reactions    Radiology Contrast Iodinated Dyes HIVES, RASH and ITCHING     1/20/25: pt states severe itching, redness, hives.  Needs to premedicate for all CT dye scans - GUSTAVO, RN    Metoclopramide OTHER (SEE COMMENTS)     Sensitivity, with crawling sensation.    Other Reaction(s): OTHER (SEE COMMENTS)      Sensitivity, with crawling sensation.    Adhesive Tape      itching    Wound Dressing Adhesive RASH   [4]   Current Outpatient Medications on File Prior to Visit   Medication Sig Dispense Refill    insulin aspart 100 Units/mL Injection Solution Inject via insulin pump as directed. Max daily dose 75 units 70 mL 1    Galcanezumab-gnlm 120 MG/ML Subcutaneous Solution Auto-injector Inject 120 mg into the skin every 28 days.      losartan 100 MG Oral Tab       amLODIPine 10 MG Oral Tab       predniSONE 50 MG Oral Tab       famotidine 40 MG Oral Tab Take 1 tablet (40 mg total) by mouth every morning.      ketorolac 0.5 % Ophthalmic Solution Place 1 drop into the left eye 2 (two) times daily.      ofloxacin 0.3 % Ophthalmic Solution       clopidogrel 75 MG Oral Tab  Take 1 tablet (75 mg total) by mouth daily. 63 tablet 0    levETIRAcetam 500 MG Oral Tab Take 1 tablet (500 mg total) by mouth 2 (two) times daily. 180 tablet 1    glucagon (GVOKE HYPOPEN 2-PACK) 1 MG/0.2ML Subcutaneous injection Inject 0.2 mL (1 mg total) into the skin once as needed for Low blood glucose.      amLODIPine 5 MG Oral Tab Take 1 tablet (5 mg total) by mouth daily. 30 tablet 0    aspirin 81 MG Oral Tab EC Take 1 tablet (81 mg total) by mouth daily. 30 tablet 2    metoprolol succinate ER 25 MG Oral Tablet 24 Hr Take 1 tablet (25 mg total) by mouth daily.      clopidogrel 75 MG Oral Tab Take 1 tablet (75 mg total) by mouth daily. 30 tablet 2    ezetimibe 10 MG Oral Tab Take 1 tablet (10 mg total) by mouth nightly. 30 tablet 0    OneTouch Delica Lancets 33G Does not apply Misc 1 each by Other route 4 (four) times daily.      prednisoLONE 1 % Ophthalmic Suspension Place 1 drop into the left eye 4 (four) times daily.      escitalopram 10 MG Oral Tab Take 1 tablet (10 mg total) by mouth daily. 90 tablet 3    ammonium lactate (AMLACTIN DAILY) 12 % External Lotion Apply 1 Application topically as needed for Dry Skin. 225 g 3    Insulin Pen Needle (BD PEN NEEDLE ABDOULAYE 2ND GEN) 32G X 4 MM Does not apply Misc USE FOUR TIMES DAILY AS DIRECTED 100 each 1    torsemide 10 MG Oral Tab Take 1 tablet (10 mg total) by mouth daily. 30 tablet 1    atorvastatin 80 MG Oral Tab Take 1 tablet (80 mg total) by mouth nightly. 90 tablet 3    Continuous Glucose Sensor (DEXCOM G7 SENSOR) Does not apply Misc 1 each Every 10 days. 9 each 1    Blood Glucose Monitoring Suppl (ONETOUCH ULTRA 2) w/Device Does not apply Kit Please provide what is covered by patient's insurance 1 kit 0    Blood Glucose Monitoring Suppl (TRUE METRIX METER) w/Device Does not apply Kit Use to check blood sugar four times a day 1 kit 0    Glucose Blood (TRUE METRIX BLOOD GLUCOSE TEST) In Vitro Strip Check blood sugar four times a day 400 strip 1    TRUEplus  Lancets 33G Does not apply Misc USE TO CHECK BLOOD SUGAR FOUR TIMES DAILY 400 each 1    Accu-Chek FastClix Lancets Does not apply Misc Use to check blood sugar three times a day 300 each 0    Alcohol Swabs (DROPSAFE ALCOHOL PREP) 70 % Does not apply Pads USE AS DIRECTED 100 each 0    Blood Glucose Monitoring Suppl (ACCU-CHEK GUIDE) w/Device Does not apply Kit Use to check blood sugar three times a day 1 kit 0    fluticasone propionate 50 MCG/ACT Nasal Suspension 2 sprays by Nasal route daily. 48 g 1    VENTOLIN  (90 Base) MCG/ACT Inhalation Aero Soln Inhale 2 puffs into the lungs every 6 (six) hours as needed for Wheezing. 18 g 1     No current facility-administered medications on file prior to visit.

## 2025-04-14 NOTE — PROGRESS NOTES
New patient to Dr. Plummer presents for hospital follow up.     Symptoms: chronic headaches, blurred vision, balance issues, bilateral arm and shoulder pain, crawling sensation in face/neck    H/o Moyamoya disease, headaches, high blood pressure- she has been monitoring at home     Meds: currently on aspirin 81 mg daily, clopidogrel 75 mg daily,     The following individual(s) verbally consented to be recorded using ambient AI listening technology and understand that they can each withdraw their consent to this listening technology at any point by asking the clinician to turn off or pause the recording:    Patient name: Anjel Pisano

## 2025-04-15 ENCOUNTER — OFFICE VISIT (OUTPATIENT)
Dept: PHYSICAL THERAPY | Facility: HOSPITAL | Age: 55
End: 2025-04-15
Attending: HOSPITALIST
Payer: MEDICARE

## 2025-04-15 PROCEDURE — 97112 NEUROMUSCULAR REEDUCATION: CPT

## 2025-04-15 NOTE — PROGRESS NOTES
Patient: Anjel Pisano (55 year old, female) Referring Provider:  Insurance:   Diagnosis: Dizziness and imbalance, vestibular migraine Eugenio REYES MCR   Date of Surgery: N/A Next MD visit:  N/A   Precautions:  Fall Risk N/A Referral Information:    Date of Evaluation: Req: 8, Auth: 8, Exp: 6/2/2025 03/27/25 POC Auth Visits:  8       Today's Date   4/15/2025    Subjective  Pt. reports that she had a bad night's sleep.  Pt. reports woke up with headache, persistent all day.  Not dizzy, but headache 6/10.  Pt. reports that she feels as though her BP is high again- she just doesn't feel well.       Pain: pain not reported     Objective  see flow sheet          Assessment  Unable to tolerate any exercise due to pt's HTN. Even with rest and time pt's BP too high.  Suggested that pt. go to urgent care as pt's MD office closed, but pt declined and verbalized her intent to contact MD in the morning.    Goals (to be met in 8 visits)   Pt. Able to perform five times sit to stand test in less than 20 seconds to reflect an improvement in balance and LE strength.  Pt. Improved in TUG test to less than 10 sec to reflect an improvement in balance and gait.  Pt. Able to stand Romberg EO and EC x 30 sec without increased dizziness or excessive sway  Indep in home exercise program in order to maintain functional gains.             Plan  Monitor BP during session, try stairs    Treatment Last 4 Visits  Treatment Day: 4       3/27/2025 4/8/2025 4/10/2025 4/15/2025   Vestibular Treatment   Neuro Re-Education  Oculomotor & Vestibular Exam:  Spontaneous Nystagmus: room light: none ;  fixation blocked: none  Smooth Pursuit: Negative  Saccades: Negative  Gaze Evoked Nystagmus:  room light: Negative; fixation blocked: Negative  Head Thrust: Negative  VOR screen:  WNL     VOR Cancellation: Negative   Convergence: Negative  Cover/Uncover:  Negative  Cross Cover:  Negative    Positional Testing:   [place]-Hallpike: R Negative, no  symptoms, L Negative, no symptoms   Roll Test (HC): Negative, no symptoms     Romberg 30 sec EC  Four square step test: 14 sec  Four square stepping L/R x 5 ea  Sit to/from stand without UE support 2 x 5 reps       Vitals at end of session:  HR: 88 bpm  SpO2: 96%  BP: 177/96    Romberg 30 sec EC (dec sway)  Four square stepping L/R x 5 ea   Sit to/from stand without UE support 2 x 5 reps   Sidestepping L/R over tape x 10  Attempted rebounder but caused pain right shoulder so stopped Vitals:  BP: 195/88  HR: 70 bpm  SpO2: 96%  Pt. Rested 10 min, repeated BP: 187/95    Discussed safety in exercise with HTN   Therapeutic Activity   Curb training up/down x 5, ascending with right, descending with left  Turning in standing x 10 ea, alternating sides      Gait Training  Functional Gait Assessment   Item Description Score (0 worst, 3 best)    ___3____1. Gait Level Surface-  Time: ____5.5______seconds  ___3____2. Change in gait speed  ___2____3. Gait with horizontal head turns   ___2____4. Gait with vertical head turns  ___3____5. Gait and pivot turn  ___3____6. Step over obstacle  ___0____7. Gait with narrow base of support-  # of steps___2_____  ___2____8. Gait with eyes closed-  Time: ___7.2_______seconds  ___2____9. Ambulating backward  ___1____10. Steps    TOTAL SCORE: ____21___/30    (less than 22/30 = fall risk)    Tandem gait x 10 ea  Gait with horiz head turns 40 ft x 2       Semi-tandem gait x 10 ea   Gait with horiz head turns 40 ft x 2 (3/10)    Ramp training ascending and descending x 5 with turns (6/20)      Neuro Re-Educ Minutes  25 20 25   Therapeutic Activity Minutes   10    Gait Training Minutes  15 10    Evaluation Minutes 45      Total Time Of Timed Procedures 0 40 40 25   Total Time Of Service-Based Procedures 45 0 0 0   Total Treatment Time 45 40 40 25   HEP Pt. Encourages to increase activity as tolerated, pacing herself for symptom management.  Due to pt's complex history and limited time for  assessment, a home exercise program will be issued at her next session.     Access Code: [email or web address]  URL: [email or web address]  Date: 04/08/2025  Prepared by: Ashli Tejeda    Exercises  - Walking with Head Rotation  - 1 x daily - 7 x weekly - 2 sets - 5 reps  - Tandem Walking  - 1 x daily - 7 x weekly - 2 sets - 10 reps  - Standing Near Stance in Corner with Eyes Closed  - 1 x daily - 7 x weekly - 2 sets - 30 sec hold  - Sit to Stand Without Arm Support  - 1 x daily - 7 x weekly - 2 sets - 5 reps Access Code: [email or web address]  URL: [email or web address]  Date: 04/10/2025  Prepared by: Ashli Tejeda    Exercises  - Walking with Head Rotation  - 1 x daily - 7 x weekly - 2 sets - 5 reps  - Tandem Walking  - 1 x daily - 7 x weekly - 2 sets - 10 reps  - Standing Near Stance in Corner with Eyes Closed  - 1 x daily - 7 x weekly - 2 sets - 30 sec hold  - Sit to Stand Without Arm Support  - 1 x daily - 7 x weekly - 2 sets - 5 reps  - Step Taps on Low Step  - 1 x daily - 7 x weekly - 10 reps  - Stepping in 4 Square Pattern  - 1 x daily - 7 x weekly - 2 sets - 10 reps         HEP  Access Code: [email or web address]  URL: [email or web address]  Date: 04/10/2025  Prepared by: Ashli Tejeda    Exercises  - Walking with Head Rotation  - 1 x daily - 7 x weekly - 2 sets - 5 reps  - Tandem Walking  - 1 x daily - 7 x weekly - 2 sets - 10 reps  - Standing Near Stance in Corner with Eyes Closed  - 1 x daily - 7 x weekly - 2 sets - 30 sec hold  - Sit to Stand Without Arm Support  - 1 x daily - 7 x weekly - 2 sets - 5 reps  - Step Taps on Low Step  - 1 x daily - 7 x weekly - 10 reps  - Stepping in 4 Square Pattern  - 1 x daily - 7 x weekly - 2 sets - 10 reps    Charges  NMR

## 2025-04-16 ENCOUNTER — MED REC SCAN ONLY (OUTPATIENT)
Dept: INTERNAL MEDICINE CLINIC | Facility: CLINIC | Age: 55
End: 2025-04-16

## 2025-04-17 ENCOUNTER — OFFICE VISIT (OUTPATIENT)
Dept: PHYSICAL THERAPY | Facility: HOSPITAL | Age: 55
End: 2025-04-17
Attending: HOSPITALIST
Payer: MEDICARE

## 2025-04-17 ENCOUNTER — OFFICE VISIT (OUTPATIENT)
Dept: INTERNAL MEDICINE CLINIC | Facility: CLINIC | Age: 55
End: 2025-04-17

## 2025-04-17 VITALS
HEIGHT: 62 IN | WEIGHT: 199.31 LBS | SYSTOLIC BLOOD PRESSURE: 136 MMHG | DIASTOLIC BLOOD PRESSURE: 80 MMHG | TEMPERATURE: 98 F | HEART RATE: 69 BPM | BODY MASS INDEX: 36.68 KG/M2

## 2025-04-17 DIAGNOSIS — R60.0 EDEMA OF BOTH LEGS: ICD-10-CM

## 2025-04-17 DIAGNOSIS — I10 ESSENTIAL HYPERTENSION WITH GOAL BLOOD PRESSURE LESS THAN 140/90: Primary | ICD-10-CM

## 2025-04-17 PROCEDURE — 99214 OFFICE O/P EST MOD 30 MIN: CPT | Performed by: NURSE PRACTITIONER

## 2025-04-17 PROCEDURE — 97116 GAIT TRAINING THERAPY: CPT

## 2025-04-17 PROCEDURE — 97112 NEUROMUSCULAR REEDUCATION: CPT

## 2025-04-17 RX ORDER — TORSEMIDE 20 MG/1
10 TABLET ORAL DAILY
Qty: 90 TABLET | Refills: 0 | Status: SHIPPED | OUTPATIENT
Start: 2025-04-17 | End: 2025-04-17

## 2025-04-17 RX ORDER — TORSEMIDE 20 MG/1
20 TABLET ORAL DAILY
Qty: 90 TABLET | Refills: 0 | Status: SHIPPED | OUTPATIENT
Start: 2025-04-17

## 2025-04-17 NOTE — PROGRESS NOTES
Anjel Pisano is a 55 year old female.  HPI:   Pt is a 56 y/o female with history of female with history of Matias Matias disease, diabetes, vertebral artery dissection, seizures, NSTEMI, CKD stage III, hypertension.  Recently seen by neurology.  Following up on her blood pressure today.  Office BP reading at neurologist office was 175/99.  Patient reports home blood pressures are less than 140/90 at home.  Denies any chest pain, shortness of breath, dizziness, nausea, vomiting, diarrhea, constipation, appetite or weight changes.  She reports chronic bilateral lower extremity edema, using torsemide 10 mg daily but feels it is no longer effective.  Denies any shortness of breath at rest.  She is taking amlodipine 10 mg once daily, losartan 100 mg once daily, torsemide 10 mg once daily, clopidogrel, ezetimibe, metoprolol 25 mg once daily.  She has not had a visit with the vascular or cardiologist.  She reports she has scheduled this month with cardiology.  Current Medications[1]   Past Medical History[2]   Social History:  Short Social Hx on File[3]     REVIEW OF SYSTEMS:   Review of Systems   Constitutional:  Negative for activity change, appetite change, chills, diaphoresis, fatigue, fever and unexpected weight change.   HENT:  Negative for congestion.    Eyes:  Negative for visual disturbance.   Respiratory:  Negative for cough, chest tightness, shortness of breath and wheezing.    Cardiovascular:  Positive for leg swelling. Negative for chest pain and palpitations.   Gastrointestinal:  Negative for abdominal pain, constipation, diarrhea, nausea and vomiting.   Endocrine: Negative.    Genitourinary:  Negative for difficulty urinating and vaginal bleeding.   Musculoskeletal:  Negative for arthralgias and back pain.   Skin: Negative.    Neurological:  Negative for dizziness, seizures, numbness and headaches.   Hematological: Negative.    Psychiatric/Behavioral: Negative.            EXAM:   /80   Pulse 69   Temp  97.7 °F (36.5 °C) (Temporal)   Ht 5' 2\" (1.575 m)   Wt 199 lb 5 oz (90.4 kg)   BMI 36.45 kg/m²     Physical Exam  Vitals reviewed.   Constitutional:       General: She is not in acute distress.     Appearance: She is obese.   HENT:      Head: Normocephalic.   Eyes:      General: No scleral icterus.     Conjunctiva/sclera: Conjunctivae normal.   Cardiovascular:      Rate and Rhythm: Normal rate and regular rhythm.      Pulses: Normal pulses.      Heart sounds: Normal heart sounds.   Pulmonary:      Effort: Pulmonary effort is normal. No respiratory distress.      Breath sounds: Normal breath sounds.   Musculoskeletal:      Right lower leg: Edema present.      Left lower leg: Edema present.   Neurological:      Mental Status: She is alert and oriented to person, place, and time.   Psychiatric:         Thought Content: Thought content normal.         Judgment: Judgment normal.            ASSESSMENT AND PLAN:     Assessment & Plan  Essential hypertension with goal blood pressure less than 140/90  CPM, advising twice daily blood pressure checks.  Keep log.  Keep appointment with cardiologist this month.  Reviewed concerning signs and symptoms and when to go to ER.  Orders:    Comp Metabolic Panel (14) [E]; Future    Edema of both legs  Daily weight checks advised.  Increase torsemide from 10 to 20 mg daily  CMP in 2 weeks  Schedule with Vascular, keep appt with cardiology.   Orders:    torsemide 20 MG Oral Tab; Take 1 tablet (20 mg total) by mouth daily.    Comp Metabolic Panel (14) [E]; Future         The patient indicates understanding of these issues and agrees to the plan.  The patient is asked to return in 2 weeks lab, 4-6 weeks with PCP.     The above note was creating using Dragon speech recognition technology. Please excuse any typos.         [1]   Current Outpatient Medications   Medication Sig Dispense Refill    torsemide 20 MG Oral Tab Take 1 tablet (20 mg total) by mouth daily. 90 tablet 0    insulin  aspart 100 Units/mL Injection Solution Inject via insulin pump as directed. Max daily dose 75 units 70 mL 1    Galcanezumab-gnlm 120 MG/ML Subcutaneous Solution Auto-injector Inject 120 mg into the skin every 28 days.      losartan 100 MG Oral Tab       amLODIPine 10 MG Oral Tab       predniSONE 50 MG Oral Tab       famotidine 40 MG Oral Tab Take 1 tablet (40 mg total) by mouth every morning.      ketorolac 0.5 % Ophthalmic Solution Place 1 drop into the left eye 2 (two) times daily.      ofloxacin 0.3 % Ophthalmic Solution       clopidogrel 75 MG Oral Tab Take 1 tablet (75 mg total) by mouth daily. 63 tablet 0    levETIRAcetam 500 MG Oral Tab Take 1 tablet (500 mg total) by mouth 2 (two) times daily. 180 tablet 1    glucagon (GVOKE HYPOPEN 2-PACK) 1 MG/0.2ML Subcutaneous injection Inject 0.2 mL (1 mg total) into the skin once as needed for Low blood glucose.      amLODIPine 5 MG Oral Tab Take 1 tablet (5 mg total) by mouth daily. 30 tablet 0    aspirin 81 MG Oral Tab EC Take 1 tablet (81 mg total) by mouth daily. 30 tablet 2    metoprolol succinate ER 25 MG Oral Tablet 24 Hr Take 1 tablet (25 mg total) by mouth daily.      clopidogrel 75 MG Oral Tab Take 1 tablet (75 mg total) by mouth daily. 30 tablet 2    ezetimibe 10 MG Oral Tab Take 1 tablet (10 mg total) by mouth nightly. 30 tablet 0    OneTouch Delica Lancets 33G Does not apply Misc 1 each by Other route 4 (four) times daily.      prednisoLONE 1 % Ophthalmic Suspension Place 1 drop into the left eye 4 (four) times daily.      escitalopram 10 MG Oral Tab Take 1 tablet (10 mg total) by mouth daily. 90 tablet 3    ammonium lactate (AMLACTIN DAILY) 12 % External Lotion Apply 1 Application topically as needed for Dry Skin. 225 g 3    Insulin Pen Needle (BD PEN NEEDLE ABDOULAYE 2ND GEN) 32G X 4 MM Does not apply Misc USE FOUR TIMES DAILY AS DIRECTED 100 each 1    atorvastatin 80 MG Oral Tab Take 1 tablet (80 mg total) by mouth nightly. 90 tablet 3    Continuous Glucose  Sensor (DEXCOM G7 SENSOR) Does not apply Misc 1 each Every 10 days. 9 each 1    Blood Glucose Monitoring Suppl (ONETOUCH ULTRA 2) w/Device Does not apply Kit Please provide what is covered by patient's insurance 1 kit 0    Blood Glucose Monitoring Suppl (TRUE METRIX METER) w/Device Does not apply Kit Use to check blood sugar four times a day 1 kit 0    Glucose Blood (TRUE METRIX BLOOD GLUCOSE TEST) In Vitro Strip Check blood sugar four times a day 400 strip 1    TRUEplus Lancets 33G Does not apply Misc USE TO CHECK BLOOD SUGAR FOUR TIMES DAILY 400 each 1    Accu-Chek FastClix Lancets Does not apply Misc Use to check blood sugar three times a day 300 each 0    Alcohol Swabs (DROPSAFE ALCOHOL PREP) 70 % Does not apply Pads USE AS DIRECTED 100 each 0    Blood Glucose Monitoring Suppl (ACCU-CHEK GUIDE) w/Device Does not apply Kit Use to check blood sugar three times a day 1 kit 0    fluticasone propionate 50 MCG/ACT Nasal Suspension 2 sprays by Nasal route daily. 48 g 1    VENTOLIN  (90 Base) MCG/ACT Inhalation Aero Soln Inhale 2 puffs into the lungs every 6 (six) hours as needed for Wheezing. 18 g 1   [2]   Past Medical History:   Age-related nuclear cataract of both eyes    Anemia    Apnea    Cataract    Coronary atherosclerosis    Depression    DM type 2 (diabetes mellitus, type 2) ([facility])    Esophageal reflux    High blood pressure    High cholesterol    Meibomian gland dysfunction (MGD), bilateral, both upper and lower lids    Migraine    Muscle weakness    left sided weakness uses walker and cane    Seizures ([facility])    Sleep apnea    Stenosis of right vertebral artery    Stroke ([facility])    Type II or unspecified type diabetes mellitus without mention of complication, not stated as uncontrolled   [3]   Social History  Socioeconomic History    Marital status: Legally     Number of children: 3   Occupational History    Occupation:      Comment: disabled    Occupation: phlebotomy   Tobacco Use     Smoking status: Former     Current packs/day: 0.00     Average packs/day: 0.2 packs/day for 1 year (0.2 ttl pk-yrs)     Types: Cigarettes     Start date: 2012     Quit date: 2013     Years since quittin.3    Smokeless tobacco: Never   Vaping Use    Vaping status: Never Used   Substance and Sexual Activity    Alcohol use: No     Alcohol/week: 0.0 standard drinks of alcohol    Drug use: No    Sexual activity: Not Currently     Social Drivers of Health     Food Insecurity: Food Insecurity Present (2025)    NCSS - Food Insecurity     Worried About Running Out of Food in the Last Year: Yes     Ran Out of Food in the Last Year: Yes   Transportation Needs: No Transportation Needs (2025)    NCSS - Transportation     Lack of Transportation: No   Recent Concern: Transportation Needs - Unmet Transportation Needs (3/1/2025)    NCSS - Transportation     Lack of Transportation: Yes   Stress: No Stress Concern Present (2023)    Stress     Feeling of Stress : No   Housing Stability: At Risk (2025)    NCSS - Housing/Utilities     Has Housing: No     Worried About Losing Housing: Yes     Unable to Get Utilities: Yes

## 2025-04-17 NOTE — ASSESSMENT & PLAN NOTE
CPM, advising twice daily blood pressure checks.  Keep log.  Keep appointment with cardiologist this month.  Reviewed concerning signs and symptoms and when to go to ER.  Orders:    Comp Metabolic Panel (14) [E]; Future

## 2025-04-17 NOTE — ASSESSMENT & PLAN NOTE
Daily weight checks advised.  Increase torsemide from 10 to 20 mg daily  CMP in 2 weeks  Schedule with Vascular, keep appt with cardiology.   Orders:    torsemide 20 MG Oral Tab; Take 1 tablet (20 mg total) by mouth daily.    Comp Metabolic Panel (14) [E]; Future

## 2025-04-18 NOTE — PROGRESS NOTES
Patient: Anjel Pisano (55 year old, female) Referring Provider:  Insurance:   Diagnosis: Dizziness and imbalance, vestibular migraine Eugenio Sage  [facility]   Date of Surgery: N/A Next MD visit:  N/A   Precautions:  Fall Risk N/A Referral Information:    Date of Evaluation: Req: 8, Auth: 8, Exp: 6/2/2025 03/27/25 POC Auth Visits:  8       Today's Date   4/17/2025     Discharge Summary  Pt has attended 5 visits in Physical Therapy.     Subjective  Pt. reports that she is feeling floresita better.  Pt. reports that she has had improved headache and no dizziness in past several days.   Pt. reports that she saw PCP and BP was under better control- she recommended change in meds.       Pain: pain not reported     Objective  see flow sheet         Assessment  Pt's BP still elevated today, but pt. has seen PCP and her meds have been adjusted.  PT. able to tolerate therapy session without symptoms.  She has improved in all areas and has met all goals.  She reports being comfortable to discharge at this time.    Goals (to be met in 8 visits)   Pt. Able to perform five times sit to stand test in less than 20 seconds to reflect an improvement in balance and LE strength.  (GOAL MET)  Pt. Improved in TUG test to less than 10 sec to reflect an improvement in balance and gait.  (GOAL MET)   Pt. Able to stand Romberg EO and EC x 30 sec without increased dizziness or excessive sway (GOAL MET)  Indep in home exercise program in order to maintain functional gains.  (GOAL MET)         Plan  Discharge from PT    Treatment Last 4 Visits  Treatment Day: 5       4/8/2025 4/10/2025 4/15/2025 4/17/2025   Vestibular Treatment   Neuro Re-Education Oculomotor & Vestibular Exam:  Spontaneous Nystagmus: room light: none ;  fixation blocked: none  Smooth Pursuit: Negative  Saccades: Negative  Gaze Evoked Nystagmus:  room light: Negative; fixation blocked: Negative  Head Thrust: Negative  VOR screen:  WNL     VOR Cancellation: Negative    Convergence: Negative  Cover/Uncover:  Negative  Cross Cover:  Negative    Positional Testing:   [place]: R Negative, no symptoms, L Negative, no symptoms   Roll Test (HC): Negative, no symptoms     Romberg 30 sec EC  Four square step test: 14 sec  Four square stepping L/R x 5 ea  Sit to/from stand without UE support 2 x 5 reps       Vitals at end of session:  HR: 88 bpm  SpO2: 96%  BP: 177/96    Romberg 30 sec EC (dec sway)  Four square stepping L/R x 5 ea   Sit to/from stand without UE support 2 x 5 reps   Sidestepping L/R over tape x 10  Attempted rebounder but caused pain right shoulder so stopped Vitals:  BP: 195/88  HR: 70 bpm  SpO2: 96%  Pt. Rested 10 min, repeated BP: 187/95    Discussed safety in exercise with HTN Four square step test: 10 sec (was 14 sec)  Romberg EO and EC x 30 sec ea  Sit to/from stand x 10 no UE support               Five Times Sit to Stand Test: 14 sec (was 36 sec)     Therapeutic Activity  Curb training up/down x 5, ascending with right, descending with left  Turning in standing x 10 ea, alternating sides       Gait Training Functional Gait Assessment   Item Description Score (0 worst, 3 best)    ___3____1. Gait Level Surface-  Time: ____5.5______seconds  ___3____2. Change in gait speed  ___2____3. Gait with horizontal head turns   ___2____4. Gait with vertical head turns  ___3____5. Gait and pivot turn  ___3____6. Step over obstacle  ___0____7. Gait with narrow base of support-  # of steps___2_____  ___2____8. Gait with eyes closed-  Time: ___7.2_______seconds  ___2____9. Ambulating backward  ___1____10. Steps    TOTAL SCORE: ____21___/30    (less than 22/30 = fall risk)    Tandem gait x 10 ea  Gait with horiz head turns 40 ft x 2       Semi-tandem gait x 10 ea   Gait with horiz head turns 40 ft x 2 (3/10)    Ramp training ascending and descending x 5 with turns (6/20)    Functional Gait Assessment   Item Description Score (0 worst, 3 best)     ___3____1. Gait Level Surface-   Time: ____5.5______seconds   ___3____2. Change in gait speed   ___3____3. Gait with horizontal head turns   ___3____4. Gait with vertical head turns   ___3____5. Gait and pivot turn   ___3____6. Step over obstacle   ___0____7. Gait with narrow base of support-  # of steps___2_____   ___2____8. Gait with eyes closed-  Time: ___7.2_______seconds   ___3____9. Ambulating backward   ___2____10. Steps     TOTAL SCORE: ____25___/30  (was 21/30)  (less than 22/30 = fall risk)     Timed Up and Go: 9.2 sec (was 19 sec)    Tandem gait x 10 ea   Gait with horiz head turns 40 ft x 2        Neuro Re-Educ Minutes 25 20 25 10   Therapeutic Activity Minutes  10     Gait Training Minutes 15 10  30   Total Time Of Timed Procedures 40 40 25 40   Total Time Of Service-Based Procedures 0 0 0 0   Total Treatment Time 40 40 25 40   HEP Access Code: [email or web address]  URL: [email or web address]  Date: 04/08/2025  Prepared by: Ashli Tejeda    Exercises  - Walking with Head Rotation  - 1 x daily - 7 x weekly - 2 sets - 5 reps  - Tandem Walking  - 1 x daily - 7 x weekly - 2 sets - 10 reps  - Standing Near Stance in Corner with Eyes Closed  - 1 x daily - 7 x weekly - 2 sets - 30 sec hold  - Sit to Stand Without Arm Support  - 1 x daily - 7 x weekly - 2 sets - 5 reps Access Code: [email or web address]  URL: [email or web address]  Date: 04/10/2025  Prepared by: Ashli Tejeda    Exercises  - Walking with Head Rotation  - 1 x daily - 7 x weekly - 2 sets - 5 reps  - Tandem Walking  - 1 x daily - 7 x weekly - 2 sets - 10 reps  - Standing Near Stance in Corner with Eyes Closed  - 1 x daily - 7 x weekly - 2 sets - 30 sec hold  - Sit to Stand Without Arm Support  - 1 x daily - 7 x weekly - 2 sets - 5 reps  - Step Taps on Low Step  - 1 x daily - 7 x weekly - 10 reps  - Stepping in 4 Square Pattern  - 1 x daily - 7 x weekly - 2 sets - 10 reps  Pt. Encouraged to continue with HEP to maintain functional gains.        HEP  Pt. Encouraged to  continue with HEP to maintain functional gains.    Charges  NMR x 1, Gait x 2

## 2025-04-19 ENCOUNTER — TELEPHONE (OUTPATIENT)
Dept: ENDOCRINOLOGY CLINIC | Facility: CLINIC | Age: 55
End: 2025-04-19

## 2025-04-19 DIAGNOSIS — E11.65 UNCONTROLLED TYPE 2 DIABETES MELLITUS WITH HYPERGLYCEMIA (HCC): ICD-10-CM

## 2025-04-19 RX ORDER — INSULIN ASPART 100 [IU]/ML
INJECTION, SOLUTION INTRAVENOUS; SUBCUTANEOUS
Qty: 70 ML | Refills: 1 | Status: SHIPPED | OUTPATIENT
Start: 2025-04-19

## 2025-04-19 NOTE — TELEPHONE ENCOUNTER
Received page from answering service regarding patient not being able to get  insulin   [place] [facility] and spoke with pharmacist  She let me know that she is unable to dispense insulin for the patient through her insurance  She states that insulin was sent  to the [facility] pharmacy and insurance has already paid that pharmacy  Patient will have to call her insurance to get the insulin transferred  She states that she had offered the patient that she ( pharmacist) can call her insurance to see if 1 vial of emergency insulin can be dispensed.  I requested the pharmacist to please look into this.    I spoke with the patient next  Patient is upset that insulin was sent to the wrong pharmacy.  I apologized for the confusion.  Patient is leaving the country to go to [place] in the next 1 to 2 days.  I discussed the following options  GoodRx coupon to get NovoLog  Good Rx coupon to get Humalog  Get 1 vial from the insurance if the pharmacist is able to get it through  Switch to regular insulin.  Discussed that this can be brought over-the-counter from [facility].    Patient does not want to change insulins wants to stay with aspart.  She will use GoodRx coupon.        Total time spent: 30 minutes    Patient verbalized a complete  understanding of all of the above and did not have any further questions.       Endo staff:  Please follow-up with the patient  Monday.  Thanks.

## 2025-04-21 NOTE — TELEPHONE ENCOUNTER
Called pt and confirmed pt was able to  insulin and is currently on her trip. Patient aware to call the office with any questions.

## 2025-04-22 ENCOUNTER — APPOINTMENT (OUTPATIENT)
Dept: PHYSICAL THERAPY | Facility: HOSPITAL | Age: 55
End: 2025-04-22
Attending: HOSPITALIST
Payer: MEDICARE

## 2025-04-24 ENCOUNTER — APPOINTMENT (OUTPATIENT)
Dept: PHYSICAL THERAPY | Facility: HOSPITAL | Age: 55
End: 2025-04-24
Attending: HOSPITALIST
Payer: MEDICARE

## 2025-04-29 ENCOUNTER — APPOINTMENT (OUTPATIENT)
Dept: PHYSICAL THERAPY | Facility: HOSPITAL | Age: 55
End: 2025-04-29
Attending: HOSPITALIST
Payer: MEDICARE

## 2025-04-29 ENCOUNTER — TELEPHONE (OUTPATIENT)
Dept: ENDOCRINOLOGY CLINIC | Facility: CLINIC | Age: 55
End: 2025-04-29

## 2025-04-29 NOTE — TELEPHONE ENCOUNTER
Received fax from [facility]. Requesting signatures for diabetic footwear and chart notes. Printed LOV. Placed in providers folder for signature.

## 2025-05-01 ENCOUNTER — APPOINTMENT (OUTPATIENT)
Dept: PHYSICAL THERAPY | Facility: HOSPITAL | Age: 55
End: 2025-05-01
Attending: HOSPITALIST
Payer: MEDICARE

## 2025-05-06 ENCOUNTER — APPOINTMENT (OUTPATIENT)
Dept: PHYSICAL THERAPY | Facility: HOSPITAL | Age: 55
End: 2025-05-06
Attending: HOSPITALIST
Payer: MEDICARE

## 2025-05-08 ENCOUNTER — APPOINTMENT (OUTPATIENT)
Dept: PHYSICAL THERAPY | Facility: HOSPITAL | Age: 55
End: 2025-05-08
Attending: HOSPITALIST
Payer: MEDICARE

## 2025-05-14 ENCOUNTER — PATIENT OUTREACH (OUTPATIENT)
Dept: CASE MANAGEMENT | Age: 55
End: 2025-05-14

## 2025-05-14 ENCOUNTER — TELEPHONE (OUTPATIENT)
Dept: INTERNAL MEDICINE CLINIC | Facility: CLINIC | Age: 55
End: 2025-05-14

## 2025-05-14 ENCOUNTER — TELEPHONE (OUTPATIENT)
Dept: NEUROLOGY | Facility: CLINIC | Age: 55
End: 2025-05-14

## 2025-05-14 DIAGNOSIS — Z79.4 TYPE 2 DIABETES MELLITUS WITH HYPERGLYCEMIA, WITH LONG-TERM CURRENT USE OF INSULIN (HCC): Primary | ICD-10-CM

## 2025-05-14 DIAGNOSIS — E66.9 OBESITY (BMI 30-39.9): ICD-10-CM

## 2025-05-14 DIAGNOSIS — E78.00 PURE HYPERCHOLESTEROLEMIA: ICD-10-CM

## 2025-05-14 DIAGNOSIS — N18.30 CKD STAGE 3 DUE TO TYPE 1 DIABETES MELLITUS (HCC): ICD-10-CM

## 2025-05-14 DIAGNOSIS — E11.65 TYPE 2 DIABETES MELLITUS WITH HYPERGLYCEMIA, WITH LONG-TERM CURRENT USE OF INSULIN (HCC): Primary | ICD-10-CM

## 2025-05-14 DIAGNOSIS — I10 ESSENTIAL HYPERTENSION: ICD-10-CM

## 2025-05-14 DIAGNOSIS — K21.9 GASTROESOPHAGEAL REFLUX DISEASE WITHOUT ESOPHAGITIS: ICD-10-CM

## 2025-05-14 DIAGNOSIS — E10.22 CKD STAGE 3 DUE TO TYPE 1 DIABETES MELLITUS (HCC): ICD-10-CM

## 2025-05-14 NOTE — PROGRESS NOTES
Spoke to Anjel for Chronic Care Management.      Updates to patient care team/comments: no change   Patient reported changes in medications:  patient declined medication review- stated no changes.   Med Adherence  Comment: Patient reported compliance.   Health Maintenance:   Health Maintenance   Topic Date Due    Pneumococcal Vaccine: 50+ Years (2 of 2 - PCV) 12/21/2017    Zoster Vaccines (2 of 2) 07/12/2023    Annual Physical  05/03/2024    COVID-19 Vaccine (3 - 2024-25 season) 09/01/2024    Diabetes Care A1C  05/11/2025    Colorectal Cancer Screening  11/12/2025    Gyne Pelvic Exam  08/29/2025    Influenza Vaccine (Season Ended) 10/01/2025    Diabetes Care Dilated Eye Exam  11/18/2025    Mammogram  12/27/2025    Diabetes Care Foot Exam  01/03/2026    Diabetes Care: GFR  03/06/2026    Pap Smear  08/29/2027    DTaP,Tdap,and Td Vaccines (2 - Td or Tdap) 05/17/2033    Annual Depression Screening  Completed    Diabetes Care: Microalb/Creat Ratio (Annual)  Completed    Meningococcal B Vaccine  Aged Out       Patient updates/concerns:  Patient c/o ring finger and left hand numbness . CCM encouraged patient to schedule appointment to have lab to discuss this new concern. Patient continues to have leg swelling - stated she started  torsemide 20 mg .    Patient stated she is checking BP daily and readings have been stable- patient does not recall last BP reading. Patient denied any chest pain or shortness of breath . Patient stated she has not followed up with cardiologist due to family emergencies. Patient stated she will call to reschedule this appointment.    [facility] offered to schedule appointment with PCP . Patient declined. Patient mentions she is in [place] visiting family until end of June .     Diabetes: Patient reports glucose is stable .     Patient stated she has not had any recent falls. Patient stated she did not finish physical therapy . Patient is hoping to restart when she gets back in town.       [facility]  reviewed new medication regimen per  Dr. Craig last visit on 4/3/2025 .  Patient  stated she is not taking as directed.   Patient stated she was not aware that she is to be on 81 mg of Aspirin while on clopidogrel  .   Patient stated she will go back to taking 81 mg of aspirin until she completes the course of clopidogrel as directed until 6/5/2025 and she will then 325 mg of aspirin forever.    Patient confirmed she stopped the omeprazole ,       Instructions     Take clopidogrel 75 mg and aspirin 81 mg until 6/5/2025 then stop. After that you will take Aspirin 325 mg forever.  Stop the omeprazole, it  interacts with the  clopidogrel.           CCM reminded  of provider recommendations during last visit by GINGER Baires .  Daily weight checks advised.  Increase torsemide from 10 to 20 mg daily  CMP in 2 weeks  Schedule with Vascular, keep appt with cardiology.   The patient indicates understanding of these issues and agrees to the plan.  The patient is asked to return in 2 weeks lab, 4-6 weeks with PCP.         Goals/Action Plan:     Active goal from previous outreach:  improve pain   Patient reported progress towards goals:   ongoing               - What:  manage BP , diabetes            - Where/When/How : keep follow up appointments as needed, work on glucose control, maintain healthy BP .  Patient Reported Barriers and Concerns:   numbness. In hand                    - Plan for overcoming barriers: monitor symptoms-call with any new symptoms -  follow up with providers as instructed. Patient encouraged to call with any questions or concerns.          CCM interventions :  CCM will send message to pcp  advise of symptoms.    CCM will notify Neurology of medication update.    CCM reminded  of provider recommendations during last visit by GINGER Baires . Reminded to have labs .    CCM offered to schedule appointment . Patient declined at this time as she is out of state.     CCM listened and provided  support.   Reminded patient of overdue Health Maintenance.   CCM updated patient records from Care Everywhere.   CCM updated immunizations- no updates.   Continue to provide encouragement, support and education for healthy coping and diagnosis.       Future Appointments:   Future Appointments   Date Time Provider Department Center   6/10/2025  2:00 PM Melissa Guerrero MD [id number] [facility]   6/12/2025  2:00 PM Nilsa Caceres MD [id number] [facility]   9/3/2025  3:20 PM Rohini Mckenna MD [id number] [facility]                Next Care Manager Follow Up Date: 1 month     Reason For Follow Up: review progress and or barriers towards patient's goals.     Time Spent This Encounter Total: 26 min medical record review, telephone communication, care plan updates where needed, education, goals, and action plan recreation/update. Provided acknowledgment and validation to patient's concerns.   Monthly Minute Total including today: 26  Physical assessment, complete health history, and need for CCM established by Yves Yanez MD.

## 2025-05-14 NOTE — TELEPHONE ENCOUNTER
CCM sending as condition update/ FYI -  please advise further if needed.     CCM reviewed new medication regimen per  Dr. Craig last visit on 4/3/2025   Patient  stated she is not taking as directed.   Patient  stated she was not aware that she is to be on 81 mg of Aspirin while on clopidogrel  .   Patient stated she will go back to taking 81 mg of aspirin until she completes the course of clopidogrel as directed until 6/5/2025 and she will then 325 mg of aspirin forever.    Patient confirmed she stopped the omeprazole ,       Instructions     Take clopidogrel 75 mg and aspirin 81 mg until 6/5/2025 then stop. After that you will take Aspirin 325 mg forever.  Stop the omeprazole, it  interacts with the  clopidogrel.

## 2025-05-14 NOTE — TELEPHONE ENCOUNTER
Spoke to patient for ccm.   Patient c/o new onset of  left hand , middle /ring finger numbness ,  continues to have leg swelling. Denied shortness of breath , chest pain  and reported BP is stable. Patient also mentions she is feeling very hot lately - ccm attempted to schedule visit to follow up as instructed- patient stated she is unable to come in for appointment until end of June .     Please advise.

## 2025-05-19 NOTE — TELEPHONE ENCOUNTER
Please review and advise if there is anything further that should be directed to the patient.  Thanks

## 2025-05-27 NOTE — TELEPHONE ENCOUNTER
[phone].
[phone]

## 2025-06-16 ENCOUNTER — MED REC SCAN ONLY (OUTPATIENT)
Dept: INTERNAL MEDICINE CLINIC | Facility: CLINIC | Age: 55
End: 2025-06-16

## 2025-06-17 ENCOUNTER — PATIENT OUTREACH (OUTPATIENT)
Dept: CASE MANAGEMENT | Age: 55
End: 2025-06-17

## 2025-06-17 NOTE — PROGRESS NOTES
Attempted CCM monthly outreach,   unable to leave  a VM , phone does not ring - ccm will send mychart message .       Medical record reviewed including recent office visits and test results.       Total Monthly Time - 5 min

## 2025-07-21 ENCOUNTER — PATIENT OUTREACH (OUTPATIENT)
Dept: CASE MANAGEMENT | Age: 55
End: 2025-07-21

## (undated) DIAGNOSIS — Z98.890 HISTORY OF CRANIOTOMY: ICD-10-CM

## (undated) DIAGNOSIS — I63.9 LEFT-SIDED CEREBROVASCULAR ACCIDENT (CVA) (HCC): ICD-10-CM

## (undated) DIAGNOSIS — Z79.4 TYPE 2 DIABETES MELLITUS WITH OTHER NEUROLOGIC COMPLICATION, WITH LONG-TERM CURRENT USE OF INSULIN (HCC): ICD-10-CM

## (undated) DIAGNOSIS — I10 ESSENTIAL HYPERTENSION WITH GOAL BLOOD PRESSURE LESS THAN 140/90: ICD-10-CM

## (undated) DIAGNOSIS — H02.88A MEIBOMIAN GLAND DYSFUNCTION (MGD), BILATERAL, BOTH UPPER AND LOWER LIDS: ICD-10-CM

## (undated) DIAGNOSIS — Z79.4 TYPE 2 DIABETES MELLITUS WITH COMPLICATION, WITH LONG-TERM CURRENT USE OF INSULIN (HCC): ICD-10-CM

## (undated) DIAGNOSIS — E11.8 TYPE 2 DIABETES MELLITUS WITH COMPLICATION, WITH LONG-TERM CURRENT USE OF INSULIN (HCC): ICD-10-CM

## (undated) DIAGNOSIS — E11.49 TYPE 2 DIABETES MELLITUS WITH OTHER NEUROLOGIC COMPLICATION, WITH LONG-TERM CURRENT USE OF INSULIN (HCC): ICD-10-CM

## (undated) DIAGNOSIS — H02.88B MEIBOMIAN GLAND DYSFUNCTION (MGD), BILATERAL, BOTH UPPER AND LOWER LIDS: ICD-10-CM

## (undated) DIAGNOSIS — H26.9 CATARACT, UNSPECIFIED CATARACT TYPE, UNSPECIFIED LATERALITY: Primary | ICD-10-CM

## (undated) DIAGNOSIS — J30.9 ALLERGIC RHINITIS, UNSPECIFIED SEASONALITY, UNSPECIFIED TRIGGER: ICD-10-CM

## (undated) DIAGNOSIS — Z01.818 PREOPERATIVE EXAMINATION: ICD-10-CM

## (undated) DIAGNOSIS — R05.9 COUGH: ICD-10-CM

## (undated) DIAGNOSIS — Z01.00 DIABETIC EYE EXAM (HCC): ICD-10-CM

## (undated) DIAGNOSIS — J02.9 SORE THROAT: Primary | ICD-10-CM

## (undated) DIAGNOSIS — E11.9 DIABETIC EYE EXAM (HCC): ICD-10-CM

## (undated) DEVICE — KIT ENDO ORCAPOD 160/180/190

## (undated) DEVICE — GOWN SURG AERO BLUE PERF LG

## (undated) DEVICE — SUTURE CHROMIC 2-0 801H

## (undated) DEVICE — CHLORAPREP 26ML APPLICATOR

## (undated) DEVICE — KIT CLEAN ENDOKIT 1.1OZ GOWNX2

## (undated) DEVICE — SUCTION CANISTER, 3000CC,SAFELINER: Brand: DEROYAL

## (undated) DEVICE — NEEDLE HPO 18GA 1.5IN ECLPS

## (undated) DEVICE — SOL  .9 1000ML BTL

## (undated) DEVICE — LINE MNTR ADLT SET O2 INTMD

## (undated) DEVICE — SUTURE SILK 4-0 SA63H

## (undated) DEVICE — FORCEP RADIAL JAW 4

## (undated) DEVICE — ENDOSCOPY PACK UPPER: Brand: MEDLINE INDUSTRIES, INC.

## (undated) DEVICE — DRESSING FM 4.25X4.25IN PLMM

## (undated) DEVICE — COVER SGL STRL LGHT HNDL BLU

## (undated) DEVICE — CV: Brand: MEDLINE INDUSTRIES, INC.

## (undated) DEVICE — MEDI-VAC NON-CONDUCTIVE SUCTION TUBING 6MM X 1.8M (6FT.) L: Brand: CARDINAL HEALTH

## (undated) DEVICE — DRAIN INCS 7MM 20CMX7MM SIL

## (undated) DEVICE — X-RAY DETECTABLE SPONGES,16 PLY: Brand: VISTEC

## (undated) DEVICE — SNARE CAPTIFLEX MICRO-OVL OLY

## (undated) DEVICE — 3M™ IOBAN™ 2 ANTIMICROBIAL INCISE DRAPE 6650EZ: Brand: IOBAN™ 2

## (undated) DEVICE — SUNDT™ EXTERNAL CAROTID ENDARTERECTOMY SHUNT: Brand: SUNDT™

## (undated) DEVICE — FORESIGHT LARGE SENSOR: Brand: FORESIGHT

## (undated) DEVICE — 6 ML SYRINGE LUER-LOCK TIP: Brand: MONOJECT

## (undated) DEVICE — 12 ML SYRINGE LUER-LOCK TIP: Brand: MONOJECT

## (undated) DEVICE — SPECIMEN TRAP LUKI

## (undated) DEVICE — SUTURE MONOCRYL 4-0 Y845G

## (undated) DEVICE — INTENDED TO BE USED TO OCCLUDE, RETRACT AND IDENTIFY ARTERIES, VEINS, TENDONS AND NERVES IN SURGICAL PROCEDURES: Brand: STERION®  VESSEL LOOP

## (undated) DEVICE — 60 ML SYRINGE LUER-LOCK TIP: Brand: MONOJECT

## (undated) DEVICE — POUCH: SSEAL TYVEK 2000/CS: Brand: MEDICAL ACTION INDUSTRIES

## (undated) DEVICE — CLIP SM W INTNL HRZN TI TPE LF

## (undated) DEVICE — 35 ML SYRINGE REGULAR TIP: Brand: MONOJECT

## (undated) DEVICE — DRAIN RELIAVAC 100CC

## (undated) DEVICE — SUTURE SILK 2-0 685H

## (undated) DEVICE — STERILE LATEX POWDER-FREE SURGICAL GLOVESWITH NITRILE COATING: Brand: PROTEXIS

## (undated) DEVICE — Device: Brand: JELCO

## (undated) DEVICE — BARD® JAVID™ CAROTID BYPASS SHUNT, 17F - 10F X 27.5CM: Brand: BARD® JAVID

## (undated) DEVICE — Device: Brand: DEFENDO AIR/WATER/SUCTION AND BIOPSY VALVE

## (undated) DEVICE — ABSORBABLE HEMOSTAT (OXIDIZED REGENERATED CELLULOSE, U.S.P.): Brand: SURGICEL

## (undated) DEVICE — ENDOSCOPY PACK - LOWER: Brand: MEDLINE INDUSTRIES, INC.

## (undated) DEVICE — SOL  .9 1000ML BAG

## (undated) DEVICE — SUTURE SILK 2-0 SA65H

## (undated) DEVICE — 3M™ TEGADERM™ TRANSPARENT FILM DRESSING, 1626W, 4 IN X 4-3/4 IN (10 CM X 12 CM), 50 EACH/CARTON, 4 CARTON/CASE: Brand: 3M™ TEGADERM™

## (undated) DEVICE — SUTURE PROLENE 6-0 BV-1

## (undated) NOTE — MR AVS SNAPSHOT
[facility] [address]  [phone]               Thank you for choosing us for your health care visit with Carmen Davey DPM.  We are glad to serve you and happy to provide you with this summary of yo Commonly known as:  DOCQLACE           Ferrous Sulfate 325 (65 FE) MG Tabs   Take 325 mg by mouth daily. gabapentin 300 MG Caps   Take 1 capsule (300 mg total) by mouth every morning.    Commonly known as:  NEURONTIN           Glucose Blood Str[facility] BLOOD GLUCOSE Lizette   Use as directed           Vitamin D 2000 UNITS Caps   Take 1 capsule (2,000 Units total) by mouth daily. * Notice: This list has 6 medication(s) that are the same as other medications prescribed for you.  Read the

## (undated) NOTE — Clinical Note
Hi Dr. Caceres, pt came in for CGM report, available on my notes. She is 17% in range, 48% very high, GMI 9.2%. She has made different adjustments to her insulin than what was recommended at last visit.  Lantus 34 units daily --> 40 units Novolog 15 units three times daily Metformin 1000 mg twice daily Order process for iLet has been initiated. Hopefully it will improve glucose levels. Will pend referral to [facility] in a separate TE. I'll call pt again next week for glucose review and she has a f/u with you 2/10/25.

## (undated) NOTE — LETTER
[address] [facility], [employer] [place], [place]  Authorization for Invasive  Procedure                                                                                           1. I hereby authorize  Caitlyn Wagner MD, my physician and his/her assistants (if applicable), which may include medical students, residents, and/or fellows, to perform the following surgical operation/ procedure and administer such anesthesia as may be determined necessary by my physician: Operation/Procedure name (s) COLONOSCOPY on [address]   2. I recognize that during the surgical operation/procedure, unforeseen conditions may necessitate additional or different procedures than those listed above. I, therefore, further authorize and request that the above-named surgeon, assistants, or designees perform such procedures as are, in their judgment, necessary and desirable. 3.   My surgeon/physician has discussed prior to my surgery the potential benefits, risks and side effects of this procedure; the likelihood of achieving goals; and potential problems that might occur during recuperation. They also discussed reasonable alternatives to the procedure, including risks, benefits, and side effects related to the alternatives and risks related to not receiving this procedure. I have had all my questions answered and I acknowledge that no guarantee has been made as to the result that may be obtained. 4.   Should the need arise during my operation/procedure, which includes change of level of care prior to discharge, I also consent to the administration of blood and/or blood products. Further, I understand that despite careful testing and screening of blood or blood products by collecting agencies, I may still be subject to ill effects as a result of receiving a blood transfusion and/or blood products.   The following are some, but not all, of the potential risks that can occur: fever and allergic reactions, hemolytic reactions, transmission of diseases such as Hepatitis, AIDS and Cytomegalovirus (CMV) and fluid overload. In the event that I wish to have an autologous transfusion of my own blood, or a directed donor transfusion, I will discuss this with my physician. Check only if Refusing Blood or Blood Products  I understand refusal of blood or blood products as deemed necessary by my physician may have serious consequences to my condition to include possible death. I hereby assume responsibility for my refusal and release the hospital, its personnel, and my physicians from any responsibility for the consequences of my refusal.           ____ Refuse      5. I authorize the use of any specimen, organs, tissues, body parts or foreign objects that may be removed from my body during the operation/procedure for diagnosis, research or teaching purposes and their subsequent disposal by hospital authorities. I also authorize the release of specimen test results and/or written reports to my treating physician on the hospital medical staff or other referring or consulting physicians involved in my care, at the discretion of the Pathologist or my treating physician. 6.   I consent to the photographing or videotaping of the operations or procedures to be performed, including appropriate portions of my body for medical, scientific, or educational purposes, provided my identity is not revealed by the pictures or by descriptive texts accompanying them. If the procedure has been photographed/videotaped, the surgeon will obtain the original picture, image, videotape or CD. The hospital will not be responsible for storage, release or maintenance of the picture, image, tape or CD.    7.   I consent to the presence of a  or observers in the operating room as deemed necessary by my physician or their designees.     8.   I recognize that in the event my procedure results in extended X-Ray/fluoroscopy time, I may develop a skin reaction. 9. If I have a Do Not Attempt Resuscitation (DNAR) order in place, that status will be suspended while in the operating room, procedural suite, and during the recovery period unless otherwise explicitly stated by me (or a person authorized to consent on my behalf). The surgeon or my attending physician will determine when the applicable recovery period ends for purposes of reinstating the DNAR order. 10. Patients having a sterilization procedure: I understand that if the procedure is successful the results will be permanent and it will therefore be impossible for me to inseminate, conceive, or bear children. I also understand that the procedure is intended to result in sterility, although the result has not been guaranteed. 11. I acknowledge that my physician has explained sedation/analgesia administration to me including the risk and benefits I consent to the administration of sedation/analgesia as may be necessary or desirable in the judgment of my physician. I CERTIFY THAT I HAVE READ AND FULLY UNDERSTAND THE ABOVE CONSENT TO OPERATION and/or OTHER PROCEDURE.     _________________________________________ _________________________________     ___________________________________  Signature of Patient     Signature of Responsible Person                   Printed Name of Responsible Person                              _________________________________________ ______________________________        ___________________________________  Signature of Witness         Date  Time         Relationship to Patient    STATEMENT OF PHYSICIAN My signature below affirms that prior to the time of the procedure; I have explained to the patient and/or his/her legal representative, the risks and benefits involved in the proposed treatment and any reasonable alternative to the proposed treatment.  I have also explained the risks and benefits involved in refusal of the proposed treatment and alternatives to the proposed treatment and have answered the patient's questions.  If I have a significant financial interest in a co-management agreement or a significant financial interest in any product or implant, or other significant relationship used in this procedure/surgery, I have disclosed this and had a discussion with my patient.     _______________________________________________________________ _____________________________  Raza Saldana Physician)                                                                                         (Date)                                   (Time)  Patient Name: Mahesh Dumont    : 3/30/1970   Printed: 2022      Medical Record #: [id number]                                              Page 1 of 1

## (undated) NOTE — LETTER
2/11/2025    Anjel Pisano  [address] [facility] [address]    Subject: Diabetes and Traveling with Insulin    To Whom It May Concern:     Anjel Pisano  is under my care for Diabetes and requires insulin medication for management of her diabetes. She currently uses an insulin pump to deliver insulin which is required for her survival.  The insulin pump uses infusion sets and reservoirs to deliver insulin.  This device is required to stay attached to the patient, otherwise they could experience a life-threatening consequence known as diabetic ketoacidosis. In addition to the insulin pump, Anjel Pisano may be wearing a device on the skin called Continuous Glucose Monitoring System. It cannot be removed from the patient.   TSA full body scans or security magnetometers can cause insulin pump or glucose monitor malfunction. Patients who are currently on an insulin pump or glucose monitoring system should have a pat-down or metal detector check instead.    Low blood sugar is also a possibility which would require treatment with glucose tablets, juice boxes or other acceptable substitutes in order to increase the blood sugar back up to a safe level. Several juice boxes must be available on the airplane and are only commercially available in 4 ounce size or larger. Therefore, it is essential that she have in possession at all times: glucose tablets, juice boxes or other acceptable substitutes in the event a low blood sugar occurs. In addition to  the above items, the patient must have at all times the following items for patient safety:  Lancet device, lancets, glucometer  Testing strips  Alcohol swabs  Glucagon emergency kit  Glucose replacement (juice boxes or tabs) in the event of hypoglycemia (low blood sugar)   The above items cannot be stored in the checked luggage; they must be with the patient in carry-on luggage to avoid instability and access.      If you have any questions regarding this patient and her  medical care and requirements, please contact my office at [phone].  Sincerely,          Nilsa Caceres MD

## (undated) NOTE — Clinical Note
1/10/2017              [address] [address]         Dear Kristal Lees MD,    Thank you for allowing me to participate in your patient's care. We appreciate your confidence in their care for your patient.

## (undated) NOTE — LETTER
6/3/2021          To Whom It May Concern:    Read Elizabeth is currently under my medical care and may not return to { school/work:134977912} at this time. Please excuse Wahed for {NUMBERS 0-10:7661} {days weeks:3323::\"days\"}.   {HE/SHE :5445} may re

## (undated) NOTE — MR AVS SNAPSHOT
[facility] ([facility]  [address] [place] [address] [phone]               Thank you for choosing us for your health care visit with Shayna Flood MD.  We are glad to serve you and happy to provide you with this summary of your visit.   Ple current use of insulin    -  Primary      Instructions and Information about Your Health    Levemir 50 units SQ daily    Continue tradjenta and metformin     Novolog  INSULIN SLIDING SCALE  Base Values  Breakfast: 15  Lunch: 15  Dinner: 18  Ranges:  80-99: Insulin Glargine 100 UNIT/ML Sopn   Inject 50 Units into the skin daily. What changed:  additional instructions   Commonly known as:  LANTUS SOLOSTAR           * Lancets Misc   TRUE TRACK LANCETS, OR BRAND AS APPROVED BY INSURANCE.  TO BE USED 4 TIMES DA GLUCOSE BLOOD TEST      Component Value Standard Range & Units    GLUCOSE 256     Test Strip Lot # [id number] Numeric    Test Strip Expiration Date 1/31/2018 Date                  MyChart     Call the [employer] for assistance with your inactive [employer] account

## (undated) NOTE — LETTER
[employer]. Notifier: [employer]. Patient Name: Anjel Pisano Identification Number: [id number]                          Advance Beneficiary Notice of Noncoverage (ABN)   NOTE:  If Medicare doesn’t pay for D. item/service(s) below, you may have to pay.  Medicare does not pay for everything, even some care that you or your health care provider have good reason to think you need. We expect Medicare may not pay for the D. item/service(s) below.  D. Items or Services E. Reason Medicare May Not Pay: F. Estimated Cost         Ultrasound guided right subacromial bursa injection with corticosteroid  __ Medicare does not cover this service      __ Medicare may not pay for this   item/service for your condition     __ Medicare may not pay for this item/service as often as this        WHAT YOU NEED TO DO NOW:  Read this notice, so you can make an informed decision about your care.  Ask us any questions that you may have after you finish reading.  Choose an option below about whether to receive the D. item/service(s) listed above.  Note: If you choose Option 1 or 2, we may help you to use any other insurance that you might have, but Medicare cannot require us to do this.  G. OPTIONS: Check only one box.  We cannot choose a box for you.   OPTION 1. I want the D. item/service(s) listed above. You may ask to be paid now, but I also want Medicare billed for an official decision on payment, which is sent to me on a Medicare Summary Notice (MSN). I understand that if Medicare doesn’t pay, I am responsible for payment, but I can appeal to Medicare by following the directions on the MSN. If Medicare does pay, you will refund any payments I made to you, less co-pays or deductibles.  OPTION 2. I want the D. item/service(s) listed above, but do not bill Medicare. You may ask to be paid now as I am responsible for payment. I cannot appeal if Medicare is not billed.  OPTION 3. I don't want the D. item/service(s) listed above.  I understand with this choice I am not responsible for payment, and I cannot appeal to see if Medicare would pay.    H. Additional Information:    This notice gives our opinion, not an official Medicare decision. If you have other questions on this notice or Medicare billing, call 1-800-MEDICARE (1-328.380.1561/TTY: 1-870.876.2929). Signing below means that you have received and understand this notice. You also receive a copy.  I. Signature: J. Date:       You have the right to get Medicare information in an accessible format, like large print, Braille, or audio. You also have the right to file a complaint if you feel you’ve been discriminated against. Visit Medicare.gov/about- us/ydvcplpyoydlf-zcdenmojkhxuieedu-wsopuq.  According to the Paperwork Reduction Act of 1995, no persons are required to respond to a collection of information unless it displays a valid OMB control number. The valid OMB control number for this information collection is 4375-7132. The time required to complete this information collection is estimated to average 7 minutes per response, including the time to review instructions, search existing data resources, gather the data needed, and complete and review the information collection. If you have comments concerning the accuracy of the time estimate or suggestions for improving this form, please write to: CMS, [facility]     [place], Attn: [employer] Reports Clearance Officer, [place], [place] [address].  Form CMS-R-131 (Exp. 1/31/2026) Form Approved OMB No. 6032-1963

## (undated) NOTE — LETTER
7/22/2020              [place] [address]         Dear [place],    This letter is to inform you that our office has made several attempts to reach you by phone without success.   We were attempting to contac

## (undated) NOTE — MR AVS SNAPSHOT
[facility]  [address] [facility], [address] [facility]   [phone]               Thank you for choosing us for your health care visit with Jaime Arriaga DPM.  We are glad to serve you and happy to provide you with this summ Plantar fasciitis, bilateral    -  Primary      Instructions and Information about Your Health     None      Allergies as of Jun 08, 2017     Reglan [Metoclopramide]     Sensitivity, with crawling sensation.                    Current Medications Linagliptin 5 MG Tabs   Take 5 mg by mouth daily. Commonly known as:  TRADJENTA           losartan 100 MG Tabs   Take 1 tablet (100 mg total) by mouth once daily.    Commonly known as:  COZAAR           MetFORMIN HCl 1000 MG Tabs   Take 1 tablet (1,000 m

## (undated) NOTE — LETTER
5/15/2025        Anjel Pisano  [address] [facility] [address]         Dear Anjel,    This letter is to inform you that our office has made several attempts to reach you by phone without success.  We were attempting to contact you by phone regarding illness or problem    Please contact our office at the number listed below as soon as you receive this letter to discuss this issue and to make the necessary changes in our system to your contact information.  Thank you for your cooperation.        Sincerely,    Yves Yanez MD  [address] [place] [address]  Ph: [phone]  Fax: [phone]         Document electronically generated by:  Blanca OLIVER RN

## (undated) NOTE — LETTER
2/14/2020              [place] [address]         Dear Yo Paredes,    [address] [facility] records indicate that the labs and ultrasound ordered for you by Jessica Campos. Frannie Baires MD  have not been done.   If you have, in fact, shyanne

## (undated) NOTE — LETTER
02/11/19        [address]      Dear [place],    [address] [facility] records indicate that you have outstanding lab work and or testing that was ordered for you and has not yet been completed:  Orders Placed This Encounter      CBC

## (undated) NOTE — LETTER
12/13/2019              Heath Herrera        [address] [facility] [address]         To Whom it May Concern,    Kelly Corrie was seen in my office today, 12/13/19 at 1:40pm , for an appointment as scheduled.     Sincerely,        Carmen Le

## (undated) NOTE — MR AVS SNAPSHOT
[facility]  [address] [place] [address], [address] [place] [address]  [place]               Thank you for choosing us for your health care visit with Andreea Kothari MD.  We are glad to serve you and happy to provide you with this summary TSH [E]    Complete by:   May 10, 2017 (Approximate)    Assoc Dx:  Type 2 diabetes mellitus with complication, with long-term current use of insulin (HCC) [E11.8, Z79.4]           US PELVIS (TRANSABDOMINAL AND TRANSVAGINAL) (CPT=76856/15963)    Complete by It is the patient's responsibility to check with and follow their insurance company's guidelines for prior authorization for this test.  You may be held responsible for payment in full if proper authorization is not acquired.   Please contact the Patient Bu [address] [facility]   [address] [facility], [address] [facility], [address] [place] [address] 7     [phone] [address] [facility] Your provider has recommended you to a specialist. Your provider or the clinic staff will provide you with the specialist information. Please call the specialist directly to schedule your appointment.     Functional Status questions complete?:      Assoc Commonly known as:  TRUE METRIX BLOOD GLUCOSE TEST           HUMALOG KWIKPEN 100 UNIT/ML Sopn   Generic drug:  Insulin Lispro   ADMINISTER 15 UNITS UNDER THE SKIN THREE TIMES DAILY WITH MEALS   What changed:  Another medication with the same name was remov 1 kit by Does not apply route as needed. Use as directed           Vitamin D 2000 units Caps   Take 1 capsule (2,000 Units total) by mouth daily. * Notice: This list has 4 medication(s) that are the same as other medications prescribed for you.

## (undated) NOTE — Clinical Note
Elfego Caceres, I saw Wahed today for her CGM report. You may see it on my notes. She is 25% in range, 75% high or very high, GMI 8.9. She has been taking insulin different than what you had recommended. Lantus 30 units at night and novolog 15 units TID fixed doses. Per report she has some steep drops on her glucose levels and patient explained she still takes novolog even if she doesn't eat. She took 15 units today and didn't have lunch. Her glucose here was 80 and I gave her juice and crackers to eat on her way home if needed. Per dexcom glucose levels were staying steady. We reviewed lantus and novolog in detail, to not take novolog if she is not going to eat, and did nutrition education. She agreed to take novolog per your recommendations of 10 units before meals plus sliding scale and will increase lantus to 34 units at bedtime. She will return in 2 weeks for follow up.

## (undated) NOTE — LETTER
8/30/2017              [place] [address]             Dear Ariana Pendleton,    I am writing to you regarding your test results from the other day. Your colon polyps were benign hyperplastic type polyps.   Your nex

## (undated) NOTE — LETTER
6/28/2022    [place] [address]            Dear Karen Rocha,      [address] [facility] records indicate that you are due for an appointment for a Colonoscopy in August 2022, or sometime there after, with a physician at [place] [address]. Our doctors are booking out about 3-5 months in advance for procedures. Please call our office to schedule this appointment. Your medical well-being is important to us. If your insurance requires a referral, please call our primary care office to request one.       Thank you,      The Physicians and Staff at [facility]

## (undated) NOTE — LETTER
[place] [address]  Authorization for Invasive Procedures  Date: 03/02/2025           Time: 11:00    I hereby authorize Elizabeth, my physician and his/her assistants (if applicable), which may include medical students, residents, and/or fellows, to perform the following surgical operation/ procedure and administer such anesthesia as may be determined necessary by my physician:  Operation/Procedure name (s)  Cardiac Catheterization, Left Ventricular Cineangiography, Bilateral Selective Coronary Angiography and/or Right Heart Catheterization; possible Percutaneous Transluminal Coronary Angioplasty, Coronary Atherectomy, Coronary Stent, Intracoronary Thrombolytic therapy, Antiplatelet therapy and/or Intravascular Ultrasound on [facility]   2.   I recognize that during the surgical operation/procedure, unforeseen conditions may necessitate additional or different procedures than those listed above.  I, therefore, further authorize and request that the above-named surgeon, assistants, or designees perform such procedures as are, in their judgment, necessary and desirable.    3.   My surgeon/physician has discussed prior to my surgery the potential benefits, risks and side effects of this procedure; the likelihood of achieving goals; and potential problems that might occur during recuperation.  They also discussed reasonable alternatives to the procedure, including risks, benefits, and side effects related to the alternatives and risks related to not receiving this procedure.  I have had all my questions answered and I acknowledge that no guarantee has been made as to the result that may be obtained.    4.   Should the need arise during my operation/procedure, which includes change of level of care prior to discharge, I also consent to the administration of blood and/or blood products.  Further, I understand that despite careful testing and screening of blood or blood products by collecting  agencies, I may still be subject to ill effects as a result of receiving a blood transfusion and/or blood products.  The following are some, but not all, of the potential risks that can occur: fever and allergic reactions, hemolytic reactions, transmission of diseases such as Hepatitis, AIDS and Cytomegalovirus (CMV) and fluid overload.  In the event that I wish to have an autologous transfusion of my own blood, or a directed donor transfusion, I will discuss this with my physician.  Check only if Refusing Blood or Blood Products  I understand refusal of blood or blood products as deemed necessary by my physician may have serious consequences to my condition to include possible death. I hereby assume responsibility for my refusal and release the hospital, its personnel, and my physicians from any responsibility for the consequences of my refusal.          o  Refuse      5.   I authorize the use of any specimen, organs, tissues, body parts or foreign objects that may be removed from my body during the operation/procedure for diagnosis, research or teaching purposes and their subsequent disposal by hospital authorities.  I also authorize the release of specimen test results and/or written reports to my treating physician on the hospital medical staff or other referring or consulting physicians involved in my care, at the discretion of the Pathologist or my treating physician.    6.   I consent to the photographing or videotaping of the operations or procedures to be performed, including appropriate portions of my body for medical, scientific, or educational purposes, provided my identity is not revealed by the pictures or by descriptive texts accompanying them.  If the procedure has been photographed/videotaped, the surgeon will obtain the original picture, image, videotape or CD.  The hospital will not be responsible for storage, release or maintenance of the picture, image, tape or CD.    7.   I consent to the  presence of a  or observers in the operating room as deemed necessary by my physician or their designees.    8.   I recognize that in the event my procedure results in extended X-Ray/fluoroscopy time, I may develop a skin reaction.    9. If I have a Do Not Attempt Resuscitation (DNAR) order in place, that status will be suspended while in the operating room, procedural suite, and during the recovery period unless otherwise explicitly stated by me (or a person authorized to consent on my behalf). The surgeon or my attending physician will determine when the applicable recovery period ends for purposes of reinstating the DNAR order.  10. Patients having a sterilization procedure: I understand that if the procedure is successful the results will be permanent and it will therefore be impossible for me to inseminate, conceive, or bear children.  I also understand that the procedure is intended to result in sterility, although the result has not been guaranteed.   11. I acknowledge that my physician has explained sedation/analgesia administration to me including the risk and benefits I consent to the administration of sedation/analgesia as may be necessary or desirable in the judgment of my physician.    I CERTIFY THAT I HAVE READ AND FULLY UNDERSTAND THE ABOVE CONSENT TO OPERATION and/or OTHER PROCEDURE.        ____________________________________       _________________________________      ______________________________  Signature of Patient         Signature of Responsible Person        Printed Name of Responsible Person    ____________________________________      _________________________________      ______________________________       Signature of Witness          Relationship to Patient                       Date                                       Time  Patient Name: Anjel Berriosma  : 3/30/1970    Reviewed: 2024   Printed: 2025  Medical Record #: [id number] Page 1 of 2            STATEMENT OF PHYSICIAN My signature below affirms that prior to the time of the procedure; I have explained to the patient and/or his/her legal representative, the risks and benefits involved in the proposed treatment and any reasonable alternative to the proposed treatment. I have also explained the risks and benefits involved in refusal of the proposed treatment and alternatives to the proposed treatment and have answered the patient's questions. If I have a significant financial interest in a co-management agreement or a significant financial interest in any product or implant, or other significant relationship used in this procedure/surgery, I have disclosed this and had a discussion with my patient.     _______________________________________________________________ _____________________________  (Signature of Physician)                                                                                         (Date)                                   (Time)  Patient Name: Anjel Pisano  : 3/30/1970    Reviewed: 2024   Printed: 2025  Medical Record #: [id number] Page 2 of 2

## (undated) NOTE — LETTER
10/21/24        To Whom It May Concern:      Anjel Pisano  :  3/30/1970    []  Patient has been cleared to hold ASA 325mg for 7 days prior Right C5-6 and C6-7 facet joint injections.  Holding the medication(s) may put the patient at an increased risk for stroke, heart attack, or neurologic or cardiac events.    []  Patient has NOT been cleared to hold ASA for procedure.        X_________________________________________  (Provider signature)                                     (Date)      Please make a selection, sign and fax this letter back to our office    If this office may be of further assistance, please do not hesitate to contact us.      Sincerely,    Elis Bhatt, DO    Phone #: [phone]  Fax #: [phone]

## (undated) NOTE — MR AVS SNAPSHOT
[facility]  [address] [place] [address], [address] [facility]  [id number] [place] [address] [phone]               Thank you for choosing us for your health care visit with Shikha Shelby MD.  We are glad to serve you and happy to provide you with this summary of y aspirin 325 MG Tabs   Take 325 mg by mouth daily. Atorvastatin Calcium 40 MG Tabs   Take 1 tablet (40 mg total) by mouth nightly.    Commonly known as:  LIPITOR           docusate sodium 100 MG Caps   Take 1 capsule (100 mg total) by mouth 2 (two Commonly known as:  GLUCOPHAGE           * Metoprolol Succinate ER 25 MG Tb24   Take 1 tablet (25 mg total) by mouth once daily. Commonly known as: Toprol XL           * Metoprolol Succinate  MG Tb24   Take 1 tablet (100 mg total) by mouth daily. Hemoglobin Electrophoresis [E]    Complete by:  Feb 13, 2017 (Approximate)    Assoc Dx:   Anemia, unspecified type [D64.9]                 Follow-up Instructions     Return in about 2 weeks (around 2/27/2017) for with Dr. Edyta Jaramillo if your headache is no

## (undated) NOTE — LETTER
6/6/2018              [address] [place] [address]         Dear Lily Sommer,      It was a pleasure to see you at our [facility] office.  Your mammogram was normal. Please call to make an appointment for annual exam

## (undated) NOTE — MR AVS SNAPSHOT
[facility] [address]  [phone]               Thank you for choosing us for your health care visit with Michele Alba DPM.  We are glad to serve you and happy to provide you with this summ Plantar fasciitis, bilateral    -  Primary    Tinea pedis of both feet          Instructions and Information about Your Health     None      Allergies as of Jun 22, 2017     Reglan [Metoclopramide]     Sensitivity, with crawling sensation. Commonly known as:  KEPPRA           Linagliptin 5 MG Tabs   Take 5 mg by mouth daily. Commonly known as:  TRADJENTA           losartan 100 MG Tabs   Take 1 tablet (100 mg total) by mouth once daily.    Commonly known as:  COZAAR           Meloxicam 15 MG Educational Information     Healthy Diet and Regular Exercise  [employer] [facility] [employer] for making healthy food choices  -   Enjoy your food, but eat less. Fully enjoy your food when eating. Don’t eat while distracted and slow down.

## (undated) NOTE — ED AVS SNAPSHOT
Megan Christina   MRN: [id number]    Department:  [facility] Emergency Department   Date of Visit:  1/14/2019           Disclosure     Insurance plans vary and the physician(s) referred by the ER may not be covered by your plan.  Please contact y within the next three months to obtain basic health screening including reassessment of your blood pressure.     IF THERE IS ANY CHANGE OR WORSENING OF YOUR CONDITION, CALL YOUR PRIMARY CARE PHYSICIAN AT ONCE OR RETURN IMMEDIATELY TO THE EMERGENCY DEPARTMEN

## (undated) NOTE — MR AVS SNAPSHOT
[facility]  [address] [place] [address] [phone]               Thank you for choosing us for your health care visit with Rebel Sepulveda MD.  We are glad to serve you and happy to provide you with this summary of yo 131/74 mmHg 71 5' 1\" (1.549 m) 192 lb 6.4 oz (87.272 kg) 36.37 kg/m2         Current Medications          This list is accurate as of: 5/2/17  3:07 PM.  Always use your most recent med list.                acetaminophen 325 MG Tabs   2 tabs 3 times daily Generic drug:  insulin detemir   Inject 100 Units as directed daily. 54 units once daily before bedtime   What changed:  Another medication with the same name was removed. Continue taking this medication, and follow the directions you see here.            l If you have questions, you can call [phone] to talk to our [facility] Staff. Remember, [employer] is NOT to be used for urgent needs. For medical emergencies, dial 911. Visit [email or web address] to learn more.            Visit [email or web address]

## (undated) NOTE — Clinical Note
Elfego Martinez- Forwarding this note as patient update, she has been off of farxiga for a few months, recently started taking again . Thanks.

## (undated) NOTE — MR AVS SNAPSHOT
[facility]  [address] [place] [address]  [phone]               Thank you for choosing us for your health care visit with Minh Powell MD.  We are glad to serve you and happy to provide you with this summary of your visit.   Ple Atorvastatin Calcium 40 MG Tabs   Take 1 tablet (40 mg total) by mouth nightly. Commonly known as:  LIPITOR           docusate sodium 100 MG Caps   Take 1 capsule (100 mg total) by mouth 2 (two) times daily.    Commonly known as:  DOCQLACE           Ferr Take 1 tablet (1,000 mg total) by mouth 2 (two) times daily with meals. Commonly known as:  GLUCOPHAGE           * Metoprolol Succinate  MG Tb24   Take 1 tablet (100 mg total) by mouth daily. Commonly known as:   Toprol XL           * Metoprolol S Call the [employer] for assistance with your inactive [employer] account    If you have questions, you can call [phone] to talk to our [facility] Staff. Remember, [employer] is NOT to be used for urgent needs. For medical emergencies, dial 911.     Vi

## (undated) NOTE — MR AVS SNAPSHOT
[address], [address]  [address] [facility]  [phone]               Thank you for choosing us for your health care visit with Sandra Arnold MD.  We are glad to serve you and happy to provide you with this summa CBC, Platelet, No Differential [E]    Complete by:  Jun 14, 2017 (Approximate)    Assoc Dx:  Abnormal uterine bleeding (AUB) [N93.9]           TSH Assay, Thyroid Stim Hormone    Complete by:  Jun 14, 2017 (Approximate)    Assoc Dx:  Abnormal uterine BD PEN NEEDLE ABDOULAYE U/F 32G X 4 MM Misc   Generic drug:  Insulin Pen Needle   USE DAILY WITH LEVEMIR           docusate sodium 100 MG Caps   Take 1 capsule (100 mg total) by mouth 2 (two) times daily.    Commonly known as:  DOCQLACE           Ferrous Sulfat Take 2 tablets night prior to procedure   Commonly known as:  CYTOTEC           Omeprazole 40 MG Cpdr   TAKE 1 CAPSULE BY MOUTH EVERY DAY 30 TO 60 MINUTES BEFORE A MEAL           TRUE METRIX AIR GLUCOSE METER w/Device Kit   1 kit by Does not apply route as

## (undated) NOTE — LETTER
9/24/2020              [place] [address]         Dear Aurelio Mojica,    This letter is to inform you that our office has made several attempts to reach you by phone without success.   We were attempting to contac

## (undated) NOTE — LETTER
[place] [address]  Authorization for Invasive Procedures  Date: 03/02/2025           Time: 11:00    I hereby authorize Elizabeth, my physician and his/her assistants (if applicable), which may include medical students, residents, and/or fellows, to perform the following surgical operation/ procedure and administer such anesthesia as may be determined necessary by my physician:  Operation/Procedure name (s)  Cardiac Catheterization, Left Ventricular Cineangiography, Bilateral Selective Coronary Angiography and/or Right Heart Catheterization; possible Percutaneous Transluminal Coronary Angioplasty, Coronary Atherectomy, Coronary Stent, Intracoronary Thrombolytic therapy, Antiplatelet therapy and/or Intravascular Ultrasound on [facility]   2.   I recognize that during the surgical operation/procedure, unforeseen conditions may necessitate additional or different procedures than those listed above.  I, therefore, further authorize and request that the above-named surgeon, assistants, or designees perform such procedures as are, in their judgment, necessary and desirable.    3.   My surgeon/physician has discussed prior to my surgery the potential benefits, risks and side effects of this procedure; the likelihood of achieving goals; and potential problems that might occur during recuperation.  They also discussed reasonable alternatives to the procedure, including risks, benefits, and side effects related to the alternatives and risks related to not receiving this procedure.  I have had all my questions answered and I acknowledge that no guarantee has been made as to the result that may be obtained.    4.   Should the need arise during my operation/procedure, which includes change of level of care prior to discharge, I also consent to the administration of blood and/or blood products.  Further, I understand that despite careful testing and screening of blood or blood products by collecting  agencies, I may still be subject to ill effects as a result of receiving a blood transfusion and/or blood products.  The following are some, but not all, of the potential risks that can occur: fever and allergic reactions, hemolytic reactions, transmission of diseases such as Hepatitis, AIDS and Cytomegalovirus (CMV) and fluid overload.  In the event that I wish to have an autologous transfusion of my own blood, or a directed donor transfusion, I will discuss this with my physician.  Check only if Refusing Blood or Blood Products  I understand refusal of blood or blood products as deemed necessary by my physician may have serious consequences to my condition to include possible death. I hereby assume responsibility for my refusal and release the hospital, its personnel, and my physicians from any responsibility for the consequences of my refusal.          o  Refuse      5.   I authorize the use of any specimen, organs, tissues, body parts or foreign objects that may be removed from my body during the operation/procedure for diagnosis, research or teaching purposes and their subsequent disposal by hospital authorities.  I also authorize the release of specimen test results and/or written reports to my treating physician on the hospital medical staff or other referring or consulting physicians involved in my care, at the discretion of the Pathologist or my treating physician.    6.   I consent to the photographing or videotaping of the operations or procedures to be performed, including appropriate portions of my body for medical, scientific, or educational purposes, provided my identity is not revealed by the pictures or by descriptive texts accompanying them.  If the procedure has been photographed/videotaped, the surgeon will obtain the original picture, image, videotape or CD.  The hospital will not be responsible for storage, release or maintenance of the picture, image, tape or CD.    7.   I consent to the  presence of a  or observers in the operating room as deemed necessary by my physician or their designees.    8.   I recognize that in the event my procedure results in extended X-Ray/fluoroscopy time, I may develop a skin reaction.    9. If I have a Do Not Attempt Resuscitation (DNAR) order in place, that status will be suspended while in the operating room, procedural suite, and during the recovery period unless otherwise explicitly stated by me (or a person authorized to consent on my behalf). The surgeon or my attending physician will determine when the applicable recovery period ends for purposes of reinstating the DNAR order.  10. Patients having a sterilization procedure: I understand that if the procedure is successful the results will be permanent and it will therefore be impossible for me to inseminate, conceive, or bear children.  I also understand that the procedure is intended to result in sterility, although the result has not been guaranteed.   11. I acknowledge that my physician has explained sedation/analgesia administration to me including the risk and benefits I consent to the administration of sedation/analgesia as may be necessary or desirable in the judgment of my physician.    I CERTIFY THAT I HAVE READ AND FULLY UNDERSTAND THE ABOVE CONSENT TO OPERATION and/or OTHER PROCEDURE.        ____________________________________       _________________________________      ______________________________  Signature of Patient         Signature of Responsible Person        Printed Name of Responsible Person    ____________________________________      _________________________________      ______________________________       Signature of Witness          Relationship to Patient                       Date                                       Time  Patient Name: Anjel Breriosma  : 3/30/1970    Reviewed: 2024   Printed: 2025  Medical Record #: [id number] Page 1 of 2            STATEMENT OF PHYSICIAN My signature below affirms that prior to the time of the procedure; I have explained to the patient and/or his/her legal representative, the risks and benefits involved in the proposed treatment and any reasonable alternative to the proposed treatment. I have also explained the risks and benefits involved in refusal of the proposed treatment and alternatives to the proposed treatment and have answered the patient's questions. If I have a significant financial interest in a co-management agreement or a significant financial interest in any product or implant, or other significant relationship used in this procedure/surgery, I have disclosed this and had a discussion with my patient.     _______________________________________________________________ _____________________________  (Signature of Physician)                                                                                         (Date)                                   (Time)  Patient Name: Anjel Pisano  : 3/30/1970    Reviewed: 2024   Printed: 2025  Medical Record #: [id number] Page 2 of 2

## (undated) NOTE — LETTER
6/3/2021              Kalin Albarranil Estuardo was seen today in clinic and will be excused from work/school for ***    May return to work/school on ***    If any questions please con

## (undated) NOTE — LETTER
AUTHORIZATION FOR SURGICAL OPERATION OR OTHER PROCEDURE    1.  I hereby authorize Dr. Erika Borja, and [place] [employer] [facility] staff assigned to my case to perform the following operation and/or procedure at the [facility], [facility]:         Endometrial Biopsy Relationship to Patient:             Parent    Responsible person                            Spouse  In case of minor or                     Other  _____________   Incompetent name:  __________________________________________________

## (undated) NOTE — LETTER
7/12/2021              [place] [address]         Dear Mega Cerda,    This letter is to inform you that our office has made several attempts to reach you by phone without success.   We were attempting to contac

## (undated) NOTE — LETTER
April 16, 2018      [address] [address]      Dear Cary Milian:    Below are the results from your recent visit:    Your blood count is normal, it shows no anemia.  Your  Ferritin, which is your iron storage is decreased, but imp

## (undated) NOTE — LETTER
August 24, 2017    Lorena Givens MD  [facility]     Patient: Marybeth Dubon   YOB: 1970   Date of Visit: 8/24/2017       Dear Dr. Dominick Cohen MD:    Thank you for referring Talisha Zelaya to me for evaluation.  He Social History: Smoking status: Former Smoker                                                              Packs/day: 0.00      Years: 0.00         Quit date: 12/16/2013  Smokeless tobacco: Never Used                      Alcohol use:  No                Med by mouth 2 (two) times daily. Disp: 180 capsule Rfl: 3   Blood Glucose Monitoring Suppl (TRUE METRIX AIR GLUCOSE METER) w/Device Does not apply Kit 1 kit by Does not apply route as needed.  Use as directed Disp: 1 kit Rfl: 0   Glucose Blood (TRUE METRIX BLO Anterior Chamber Deep and quiet Deep and quiet    Iris Normal Normal    Lens Trace Nuclear sclerosis Trace Nuclear sclerosis    Vitreous Clear Clear          Fundus Exam       Right Left    Disc Sloping margin, Temporal crescent Sloping margin, Temporal c No prescriptions requested or ordered in this encounter     Follow up instructions:  Return in about 1 year (around 8/24/2018) for Diabetic eye exam.    8/24/2017  Scribed by: Liliana King MD      If you have questions, please do not hesitate to call m

## (undated) NOTE — LETTER
AUTHORIZATION FOR SURGICAL OPERATION OR OTHER PROCEDURE    1. I hereby authorize Dr. Reyes/ WILIAM Barrett , and [facility] staff assigned to my case to perform the following operation and/or procedure at the [facility]:    Cortisone injection in Left shoulder   _______________________________________________________________________________________________      _______________________________________________________________________________________________    2.  My physician has explained the nature and purpose of the operation or other procedure, possible alternative methods of treatment, the risks involved, and the possibility of complication to me.  I acknowledge that no guarantee has been made as to the result that may be obtained.  3.  I recognize that, during the course of this operation, or other procedure, unforseen conditions may necessitate additional or different procedure than those listed above.  I, therefore, further authorize and request that the above named physician, his/her physician assistants or designees perform such procedures as are, in his/her professional opinion, necessary and desirable.  4.  Any tissue or organs removed in the operation or other procedure may be disposed of by and at the discretion of the [facility] and [facility].  5.  I understand that in the event of a medical emergency, I will be transported by local paramedics to [facility] or other hospital emergency department.  6.  I certify that I have read and fully understand the above consent to operation and/or other procedure.    7.  I acknowledge that my physician has explained sedation/analgesia administration to me including the risks and benefits.  I consent to the administration of sedation/analgesia as may be necessary or desirable in the judgement of my physician.    Witness signature: ___________________________________________________ Date:   ______/______/_____                    Time:  ________ A.M.  P.M.       Patient Name:  ______________________________________________________  (please print)      Patient signature:  ___________________________________________________             Relationship to Patient:           []  Parent    Responsible person                          []  Spouse  In case of minor or                    [] Other  _____________   Incompetent name:  __________________________________________________                               (please print)      _____________      Responsible person  In case of minor or  Incompetent signature:  _______________________________________________    Statement of Physician  My signature below affirms that prior to the time of the procedure, I have explained to the patient and/or his/her guardian, the risks and benefits involved in the proposed treatment and any reasonable alternative to the proposed treatment.  I have also explained the risks and benefits involved in the refusal of the proposed treatment and have answered the patient's questions.                        Date:  ______/______/_______  Provider                      Signature:  __________________________________________________________       Time:  ___________ A.M    P.M.

## (undated) NOTE — Clinical Note
Hi Dr. Caceres, I've seen pt a couple of times for diabetes education. She states she is now taking medications as prescribed and recommended, novolog before meals, using sliding scale correctly. She's had some improvement in her overall BGs though still not at goal. CGM report on my notes. Stable at times, and other seems she's getting too much insulin. Currently taking Lantus 34 units daily at bedtime Novolog 10 units three times daily before meals Metformin 1000 mg twice daily She's going to increase novolog to 12 units with dinner. Do you have any other recommendations? She wants to wait for follow up with you in February.

## (undated) NOTE — LETTER
[facility], [facility]  [address]  [facility] [address]  Ph:[phone]  Fax:[phone]        Patient Information:  Patient Name:   Address: Anjel Pisano, ([id number])  [address] [address]  [phone] (home)  Sex: female          : 3/30/1970   ________________________________________________________________  Referral Information:  Order Date: Nader 3, 2025       Epic Order #: [id number]   Referral Type: DME - External Dx: Type 2 diabetes mellitus with diabetic polyneuropathy, with long-term current use of insulin (HCC) (E11.42,Z79.4)   Signed Referral [place]:     What DME is needed: diabetic shoes with inserts  Notify staff to enter order in [place]? No  Comments: Diabetic shoes with inserts  Number of Visits: 1           ______________________________________________________________  Scheduling Information:           **REFERRAL REQUEST**     Your physician has referred you to a specialist.  Your physician or the clinic staff will provide you with the phone number you should call to schedule your appointment.      If you are confident that your benefit plan will not require a referral or authorization, such as [place] Medicaid, please feel free to schedule your appointment immediately. However, if you are unsure about the requirements for authorization, please wait 5-7 days and then contact your physician's office. At that time, you will be provided with any authorization numbers or be assured that none are required. You can then schedule your appointment. Failure to obtain required authorization numbers can create reimbursement difficulties for you.     _______________________________________________________         Referred to Location Information      Location Address City Phone      [facility] [address] [phone]          Coverage Information:      Active Insurance as of 1/3/2025              Primary Coverage      Payor Plan Insurance Group Employer/Plan Group     MEDICARE S [facility] [id number]       Payor Plan Address Payor Plan Phone Number Payor Plan Fax Number Effective Dates     [address] [phone] 10/1/2024 - None Entered     [place] [id number]           Subscriber Name Subscriber Birth Date Member ID          JE HOBBS 3/30/1970 [id number]       Guarantor Name (ID) Guarantor Birth Date Guarantor Address Guarantor Type     EJ HOBBS ([id number]) 3/30/1970 [address]  Personal/Family         [place] [address]                   Electronically Signed By: Mila Sanford DPM [[id number]: [id number]]  Order Date: Nader 3, 2025 at 11:00 AM

## (undated) NOTE — LETTER
AUTHORIZATION FOR SURGICAL OPERATION OR OTHER PROCEDURE    1. I hereby authorize Dr. Elis Bhatt and the [facility] staff assigned to my case to perform the following operation and/or procedure at the [facility]:    Ultrasound guided right subacromial bursa injection with corticosteroid     2.  My physician has explained the nature and purpose of the operation or other procedure, possible alternative methods of treatment, the risks involved, and the possibility of complication to me.  I acknowledge that no guarantee has been made as to the result that may be obtained.  3.  I recognize that, during the course of this operation, or other procedure, unforseen conditions may necessitate additional or different procedure than those listed above.  I, therefore, further authorize and request that the above named physician, his/her physician assistants or designees perform such procedures as are, in his/her professional opinion, necessary and desirable.  4.  Any tissue or organs removed in the operation or other procedure may be disposed of by and at the discretion of the [facility] staff and [facility].  5.  I understand that in the event of a medical emergency, I will be transported by local paramedics to [facility] or other hospital emergency department.  6.  I certify that I have read and fully understand the above consent to operation and/or other procedure.    7.  I acknowledge that my physician has explained sedation/analgesia administration to me including the risks and benefits.  I consent to the administration of sedation/analgesia as may be necessary or desirable in the judgement of my physician.    Witness signature: ___________________________________________________ Date:  ______/______/_____                    Time:  ________ A.M.  P.M.       Patient Name:  Anjel Pisano  3/30/1970  [id number]       Patient signature:   ___________________________________________________                   Statement of Physician  My signature below affirms that prior to the time of the procedure, I have explained to the patient and/or his/her guardian, the risks and benefits involved in the proposed treatment and any reasonable alternative to the proposed treatment.  I have also explained the risks and benefits involved in the refusal of the proposed treatment and have answered the patient's questions.                        Date:  ______/______/_______  Provider                      Signature:  __________________________________________________________       Time:  ___________ AALEISHA CONTE

## (undated) NOTE — ED AVS SNAPSHOT
Cee Vargas   MRN: [id number]    Department:  [facility] Emergency Department   Date of Visit:  9/13/2017           Disclosure     Insurance plans vary and the physician(s) referred by the ER may not be covered by your plan.  Please contact y CARE PHYSICIAN AT ONCE OR RETURN IMMEDIATELY TO THE EMERGENCY DEPARTMENT. If you have been prescribed any medication(s), please fill your prescription right away and begin taking the medication(s) as directed.   If you believe that any of the medications

## (undated) NOTE — Clinical Note
Elfego Caceres, I saw pt for CGM report and insulin pump information. You may see the CGM report on my notes. Patient is 26% in range, 43% high and  and 31% very high. GMI at 8.7. Patient had one episode of hypoglycemia on 12/4/24. We reviewed insulin timing and to eat whenever she gives novolog. Will pend Gvoke pen for you in a separate TE. Adjusting insulin: Lantus 34--> 40 units at bedtime Novolog 12--> 14 units before breakfast 12 units before lunch 12 units before dinner We reviewed insulin pumps. She is interested in exploring minimed 780G and iLet. Will send email to the reps to get in touch with pt.  F/u with me in one month and with you in February.

## (undated) NOTE — MR AVS SNAPSHOT
After Visit Summary   7/26/2021    Valdemar Pang    MRN: [id number]           Visit Information     Date & Time  7/26/2021 10:20 AM Provider  Nroi Dominguez MD [address] [place] [address], [address] [place] [address],[address], [facility]/[employer]  Phone  331 2 (two) times daily with meals. gabapentin 300 MG Oral Cap Take 1 capsule (300 mg total) by mouth nightly. atorvastatin 40 MG Oral Tab Take 1 tablet (40 mg total) by mouth nightly.     amLODIPine Besylate 5 MG Oral Tab Take orally  5 mg tablet daily for This Visit    Encounter for gynecological examination with abnormal finding   [304892]  -  Primary  Pelvic pain   [972930]             We Ordered the Following     Normal Orders This Visit    HPV HIGH RISK , THIN PREP COLLECTION [[id number] CUSTOM]     TH or ALEC online. The physician will respond and provide   a treatment plan within a few hours.  ONLINE VISIT  Primary Care Providers  Treatment for mild illness or injury that does not require immediate attention VIDEO VISITS  Average cost  $35*    e-VISTS  A